# Patient Record
Sex: FEMALE | Race: BLACK OR AFRICAN AMERICAN | NOT HISPANIC OR LATINO
[De-identification: names, ages, dates, MRNs, and addresses within clinical notes are randomized per-mention and may not be internally consistent; named-entity substitution may affect disease eponyms.]

---
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
## 2020-01-02 ENCOUNTER — LABORATORY RESULT (OUTPATIENT)
Age: 20
End: 2020-01-02

## 2020-01-02 ENCOUNTER — APPOINTMENT (OUTPATIENT)
Dept: PEDIATRIC HEMATOLOGY/ONCOLOGY | Facility: CLINIC | Age: 20
End: 2020-01-02
Payer: COMMERCIAL

## 2020-01-02 ENCOUNTER — OUTPATIENT (OUTPATIENT)
Dept: OUTPATIENT SERVICES | Age: 20
LOS: 1 days | End: 2020-01-02

## 2020-01-02 DIAGNOSIS — Z98.890 OTHER SPECIFIED POSTPROCEDURAL STATES: Chronic | ICD-10-CM

## 2020-01-02 LAB
BASOPHILS # BLD AUTO: 0.09 K/UL — SIGNIFICANT CHANGE UP (ref 0–0.2)
BASOPHILS NFR BLD AUTO: 0.7 % — SIGNIFICANT CHANGE UP (ref 0–2)
BLD GP AB SCN SERPL QL: NEGATIVE — SIGNIFICANT CHANGE UP
EOSINOPHIL # BLD AUTO: 0.34 K/UL — SIGNIFICANT CHANGE UP (ref 0–0.5)
EOSINOPHIL NFR BLD AUTO: 2.6 % — SIGNIFICANT CHANGE UP (ref 0–6)
HCT VFR BLD CALC: 29.1 % — LOW (ref 34.5–45)
HGB BLD-MCNC: 9.6 G/DL — LOW (ref 11.5–15.5)
IMM GRANULOCYTES NFR BLD AUTO: 0.3 % — SIGNIFICANT CHANGE UP (ref 0–1.5)
LYMPHOCYTES # BLD AUTO: 27.1 % — SIGNIFICANT CHANGE UP (ref 13–44)
LYMPHOCYTES # BLD AUTO: 3.52 K/UL — HIGH (ref 1–3.3)
MCHC RBC-ENTMCNC: 29 PG — SIGNIFICANT CHANGE UP (ref 27–34)
MCHC RBC-ENTMCNC: 33 % — SIGNIFICANT CHANGE UP (ref 32–36)
MCV RBC AUTO: 87.9 FL — SIGNIFICANT CHANGE UP (ref 80–100)
MONOCYTES # BLD AUTO: 1.17 K/UL — HIGH (ref 0–0.9)
MONOCYTES NFR BLD AUTO: 9 % — SIGNIFICANT CHANGE UP (ref 2–14)
NEUTROPHILS # BLD AUTO: 7.84 K/UL — HIGH (ref 1.8–7.4)
NEUTROPHILS NFR BLD AUTO: 60.3 % — SIGNIFICANT CHANGE UP (ref 43–77)
NRBC # FLD: 0.05 K/UL — SIGNIFICANT CHANGE UP (ref 0–0)
PLATELET # BLD AUTO: 411 K/UL — HIGH (ref 150–400)
PMV BLD: 10.3 FL — SIGNIFICANT CHANGE UP (ref 7–13)
RBC # BLD: 3.31 M/UL — LOW (ref 3.8–5.2)
RBC # FLD: 17.6 % — HIGH (ref 10.3–14.5)
RETICS #: 244 K/UL — HIGH (ref 25–125)
RETICS/RBC NFR: 7.4 % — HIGH (ref 0.5–2.5)
RH IG SCN BLD-IMP: POSITIVE — SIGNIFICANT CHANGE UP
WBC # BLD: 13 K/UL — HIGH (ref 3.8–10.5)
WBC # FLD AUTO: 13 K/UL — HIGH (ref 3.8–10.5)

## 2020-01-02 PROCEDURE — ZZZZZ: CPT

## 2020-01-03 DIAGNOSIS — D57.1 SICKLE-CELL DISEASE WITHOUT CRISIS: ICD-10-CM

## 2020-01-03 LAB
HGB A MFR BLD: 66.7 % — LOW
HGB A2 MFR BLD: 2.8 % — SIGNIFICANT CHANGE UP (ref 2.4–3.5)
HGB F MFR BLD: < 1 % — SIGNIFICANT CHANGE UP (ref 0–1.5)
HGB S MFR BLD: 29.9 % — HIGH (ref 0–0)

## 2020-01-04 ENCOUNTER — LABORATORY RESULT (OUTPATIENT)
Age: 20
End: 2020-01-04

## 2020-01-04 ENCOUNTER — OUTPATIENT (OUTPATIENT)
Dept: OUTPATIENT SERVICES | Age: 20
LOS: 1 days | End: 2020-01-04

## 2020-01-04 ENCOUNTER — APPOINTMENT (OUTPATIENT)
Dept: PEDIATRIC HEMATOLOGY/ONCOLOGY | Facility: CLINIC | Age: 20
End: 2020-01-04
Payer: COMMERCIAL

## 2020-01-04 VITALS
SYSTOLIC BLOOD PRESSURE: 102 MMHG | TEMPERATURE: 98.42 F | DIASTOLIC BLOOD PRESSURE: 48 MMHG | RESPIRATION RATE: 22 BRPM | HEART RATE: 84 BPM

## 2020-01-04 DIAGNOSIS — Z98.890 OTHER SPECIFIED POSTPROCEDURAL STATES: Chronic | ICD-10-CM

## 2020-01-04 LAB
APPEARANCE UR: CLEAR — SIGNIFICANT CHANGE UP
BILIRUB UR-MCNC: NEGATIVE — SIGNIFICANT CHANGE UP
BLOOD UR QL VISUAL: NEGATIVE — SIGNIFICANT CHANGE UP
COLOR SPEC: SIGNIFICANT CHANGE UP
GLUCOSE UR-MCNC: NEGATIVE — SIGNIFICANT CHANGE UP
KETONES UR-MCNC: NEGATIVE — SIGNIFICANT CHANGE UP
LEUKOCYTE ESTERASE UR-ACNC: NEGATIVE — SIGNIFICANT CHANGE UP
NITRITE UR-MCNC: NEGATIVE — SIGNIFICANT CHANGE UP
PH UR: 7 — SIGNIFICANT CHANGE UP (ref 5–8)
PROT UR-MCNC: NEGATIVE — SIGNIFICANT CHANGE UP
SP GR SPEC: 1.01 — SIGNIFICANT CHANGE UP (ref 1–1.04)
UROBILINOGEN FLD QL: SIGNIFICANT CHANGE UP

## 2020-01-04 PROCEDURE — ZZZZZ: CPT

## 2020-01-04 PROCEDURE — 99214 OFFICE O/P EST MOD 30 MIN: CPT

## 2020-01-04 RX ORDER — SULFAMETHOXAZOLE AND TRIMETHOPRIM 800; 160 MG/1; MG/1
800-160 TABLET ORAL TWICE DAILY
Qty: 6 | Refills: 0 | Status: DISCONTINUED | COMMUNITY
Start: 2019-11-09 | End: 2020-01-04

## 2020-01-06 DIAGNOSIS — D57.1 SICKLE-CELL DISEASE WITHOUT CRISIS: ICD-10-CM

## 2020-01-09 NOTE — PHYSICAL EXAM
[Mediport] : Mediport [Normal] : affect appropriate [No focal deficits] : no focal deficits [100: Normal, no complaints, no evidence of disease.] : 100: Normal, no complaints, no evidence of disease.

## 2020-01-09 NOTE — REVIEW OF SYSTEMS
[Sore Throat] : sore throat [Braces] : braces [Dysuria] : no dysuria [Abdominal Pain] : no abdominal pain [Nausea] : no nausea [Headache] : no headache [Delvalle] : delvalle [Anxiety] : anxiety [Depressed] : depressed [Negative] : Allergic/Immunologic [FreeTextEntry3] : strabismus [de-identified] : see HPI [Immunizations are up to date by report] : Immunizations are up to date by report

## 2020-01-09 NOTE — HISTORY OF PRESENT ILLNESS
[de-identified] : Elizabeth is now an 19 year old young woman with sickle cell anemia who is on chronic transfusion therapy to prevent vaso-occlusive painful episodes and acute chest syndrome. She has had no recent VOC or ACS admits.  No recent fevers.  She has been following with Psych regularly secondary to diagnosis of Bipolar disorder and Depression.  She is taking both Abilify and Zoloft, and denies missing doses.  She is up to date within the last year having seen Ophthalmology, Pulmonology and Cardio.  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  Denies fever, URI symptoms, n/v/d. No headache. Feeling well today, no new complaints.\par \par Recent admission sent in from outpatient ophthalmologist with new left afferent pupillary defect in setting of 3 days of bilateral headache x3 days.  Evaluated by ophtho and neurosurg.  No acute ophtho intervention needed and to f/u as outpatient. \par \par MRI done. No shunt malfunction. No hydro.\par \par \par  [No Feeding Issues] : no feeding issues at this time

## 2020-01-21 ENCOUNTER — NON-APPOINTMENT (OUTPATIENT)
Age: 20
End: 2020-01-21

## 2020-01-21 ENCOUNTER — APPOINTMENT (OUTPATIENT)
Dept: OPHTHALMOLOGY | Facility: CLINIC | Age: 20
End: 2020-01-21
Payer: COMMERCIAL

## 2020-01-21 PROCEDURE — 92012 INTRM OPH EXAM EST PATIENT: CPT

## 2020-01-26 ENCOUNTER — INPATIENT (INPATIENT)
Age: 20
LOS: 2 days | Discharge: HOME | End: 2020-01-29
Attending: PEDIATRICS | Admitting: PEDIATRICS
Payer: COMMERCIAL

## 2020-01-26 VITALS
HEART RATE: 87 BPM | TEMPERATURE: 98 F | SYSTOLIC BLOOD PRESSURE: 99 MMHG | RESPIRATION RATE: 20 BRPM | OXYGEN SATURATION: 96 % | WEIGHT: 125 LBS | DIASTOLIC BLOOD PRESSURE: 61 MMHG

## 2020-01-26 DIAGNOSIS — D57.00 HB-SS DISEASE WITH CRISIS, UNSPECIFIED: ICD-10-CM

## 2020-01-26 DIAGNOSIS — Z98.890 OTHER SPECIFIED POSTPROCEDURAL STATES: Chronic | ICD-10-CM

## 2020-01-26 LAB
ALBUMIN SERPL ELPH-MCNC: 4.2 G/DL — SIGNIFICANT CHANGE UP (ref 3.3–5)
ALP SERPL-CCNC: 53 U/L — SIGNIFICANT CHANGE UP (ref 40–120)
ALT FLD-CCNC: 10 U/L — SIGNIFICANT CHANGE UP (ref 4–33)
ANION GAP SERPL CALC-SCNC: 14 MMO/L — SIGNIFICANT CHANGE UP (ref 7–14)
AST SERPL-CCNC: 32 U/L — SIGNIFICANT CHANGE UP (ref 4–32)
B PERT DNA SPEC QL NAA+PROBE: NOT DETECTED — SIGNIFICANT CHANGE UP
BASOPHILS # BLD AUTO: 0.08 K/UL — SIGNIFICANT CHANGE UP (ref 0–0.2)
BASOPHILS NFR BLD AUTO: 0.6 % — SIGNIFICANT CHANGE UP (ref 0–2)
BILIRUB SERPL-MCNC: 4.1 MG/DL — HIGH (ref 0.2–1.2)
BUN SERPL-MCNC: 7 MG/DL — SIGNIFICANT CHANGE UP (ref 7–23)
C PNEUM DNA SPEC QL NAA+PROBE: NOT DETECTED — SIGNIFICANT CHANGE UP
CALCIUM SERPL-MCNC: 8.9 MG/DL — SIGNIFICANT CHANGE UP (ref 8.4–10.5)
CHLORIDE SERPL-SCNC: 105 MMOL/L — SIGNIFICANT CHANGE UP (ref 98–107)
CO2 SERPL-SCNC: 19 MMOL/L — LOW (ref 22–31)
CREAT SERPL-MCNC: 0.59 MG/DL — SIGNIFICANT CHANGE UP (ref 0.5–1.3)
EOSINOPHIL # BLD AUTO: 0.29 K/UL — SIGNIFICANT CHANGE UP (ref 0–0.5)
EOSINOPHIL NFR BLD AUTO: 2.2 % — SIGNIFICANT CHANGE UP (ref 0–6)
FLUAV H1 2009 PAND RNA SPEC QL NAA+PROBE: NOT DETECTED — SIGNIFICANT CHANGE UP
FLUAV H1 RNA SPEC QL NAA+PROBE: NOT DETECTED — SIGNIFICANT CHANGE UP
FLUAV H3 RNA SPEC QL NAA+PROBE: NOT DETECTED — SIGNIFICANT CHANGE UP
FLUAV SUBTYP SPEC NAA+PROBE: NOT DETECTED — SIGNIFICANT CHANGE UP
FLUBV RNA SPEC QL NAA+PROBE: NOT DETECTED — SIGNIFICANT CHANGE UP
GLUCOSE SERPL-MCNC: 92 MG/DL — SIGNIFICANT CHANGE UP (ref 70–99)
HADV DNA SPEC QL NAA+PROBE: NOT DETECTED — SIGNIFICANT CHANGE UP
HCG SERPL-ACNC: < 5 MIU/ML — SIGNIFICANT CHANGE UP
HCOV PNL SPEC NAA+PROBE: SIGNIFICANT CHANGE UP
HCT VFR BLD CALC: 24.9 % — LOW (ref 34.5–45)
HGB BLD-MCNC: 8.4 G/DL — LOW (ref 11.5–15.5)
HIV COMBO RESULT: SIGNIFICANT CHANGE UP
HIV1+2 AB SPEC QL: SIGNIFICANT CHANGE UP
HMPV RNA SPEC QL NAA+PROBE: NOT DETECTED — SIGNIFICANT CHANGE UP
HPIV1 RNA SPEC QL NAA+PROBE: NOT DETECTED — SIGNIFICANT CHANGE UP
HPIV2 RNA SPEC QL NAA+PROBE: NOT DETECTED — SIGNIFICANT CHANGE UP
HPIV3 RNA SPEC QL NAA+PROBE: NOT DETECTED — SIGNIFICANT CHANGE UP
HPIV4 RNA SPEC QL NAA+PROBE: NOT DETECTED — SIGNIFICANT CHANGE UP
IMM GRANULOCYTES NFR BLD AUTO: 0.5 % — SIGNIFICANT CHANGE UP (ref 0–1.5)
LYMPHOCYTES # BLD AUTO: 1.29 K/UL — SIGNIFICANT CHANGE UP (ref 1–3.3)
LYMPHOCYTES # BLD AUTO: 9.8 % — LOW (ref 13–44)
MAGNESIUM SERPL-MCNC: 1.7 MG/DL — SIGNIFICANT CHANGE UP (ref 1.6–2.6)
MCHC RBC-ENTMCNC: 30.2 PG — SIGNIFICANT CHANGE UP (ref 27–34)
MCHC RBC-ENTMCNC: 33.7 % — SIGNIFICANT CHANGE UP (ref 32–36)
MCV RBC AUTO: 89.6 FL — SIGNIFICANT CHANGE UP (ref 80–100)
MONOCYTES # BLD AUTO: 1.15 K/UL — HIGH (ref 0–0.9)
MONOCYTES NFR BLD AUTO: 8.7 % — SIGNIFICANT CHANGE UP (ref 2–14)
NEUTROPHILS # BLD AUTO: 10.28 K/UL — HIGH (ref 1.8–7.4)
NEUTROPHILS NFR BLD AUTO: 78.2 % — HIGH (ref 43–77)
NRBC # FLD: 0.13 K/UL — SIGNIFICANT CHANGE UP (ref 0–0)
NRBC FLD-RTO: 1 — SIGNIFICANT CHANGE UP
PHOSPHATE SERPL-MCNC: 3.3 MG/DL — SIGNIFICANT CHANGE UP (ref 2.5–4.5)
PLATELET # BLD AUTO: 357 K/UL — SIGNIFICANT CHANGE UP (ref 150–400)
PMV BLD: 9.4 FL — SIGNIFICANT CHANGE UP (ref 7–13)
POTASSIUM SERPL-MCNC: 3.7 MMOL/L — SIGNIFICANT CHANGE UP (ref 3.5–5.3)
POTASSIUM SERPL-SCNC: 3.7 MMOL/L — SIGNIFICANT CHANGE UP (ref 3.5–5.3)
PROT SERPL-MCNC: 6.6 G/DL — SIGNIFICANT CHANGE UP (ref 6–8.3)
RBC # BLD: 2.78 M/UL — LOW (ref 3.8–5.2)
RBC # FLD: 16.7 % — HIGH (ref 10.3–14.5)
RETICS #: 288 K/UL — HIGH (ref 25–125)
RETICS/RBC NFR: 10.4 % — HIGH (ref 0.5–2.5)
RSV RNA SPEC QL NAA+PROBE: NOT DETECTED — SIGNIFICANT CHANGE UP
RV+EV RNA SPEC QL NAA+PROBE: NOT DETECTED — SIGNIFICANT CHANGE UP
SODIUM SERPL-SCNC: 138 MMOL/L — SIGNIFICANT CHANGE UP (ref 135–145)
WBC # BLD: 13.15 K/UL — HIGH (ref 3.8–10.5)
WBC # FLD AUTO: 13.15 K/UL — HIGH (ref 3.8–10.5)

## 2020-01-26 PROCEDURE — 71046 X-RAY EXAM CHEST 2 VIEWS: CPT | Mod: 26

## 2020-01-26 PROCEDURE — 70551 MRI BRAIN STEM W/O DYE: CPT | Mod: 26

## 2020-01-26 PROCEDURE — 99222 1ST HOSP IP/OBS MODERATE 55: CPT | Mod: GC

## 2020-01-26 RX ORDER — MORPHINE SULFATE 50 MG/1
4 CAPSULE, EXTENDED RELEASE ORAL ONCE
Refills: 0 | Status: DISCONTINUED | OUTPATIENT
Start: 2020-01-26 | End: 2020-01-26

## 2020-01-26 RX ORDER — HEPARIN SODIUM 5000 [USP'U]/ML
5 INJECTION INTRAVENOUS; SUBCUTANEOUS ONCE
Refills: 0 | Status: COMPLETED | OUTPATIENT
Start: 2020-01-26 | End: 2020-01-26

## 2020-01-26 RX ORDER — MORPHINE SULFATE 50 MG/1
7 CAPSULE, EXTENDED RELEASE ORAL
Refills: 0 | Status: DISCONTINUED | OUTPATIENT
Start: 2020-01-26 | End: 2020-01-26

## 2020-01-26 RX ORDER — SODIUM CHLORIDE 9 MG/ML
1000 INJECTION, SOLUTION INTRAVENOUS
Refills: 0 | Status: DISCONTINUED | OUTPATIENT
Start: 2020-01-26 | End: 2020-01-27

## 2020-01-26 RX ORDER — CEFTRIAXONE 500 MG/1
2000 INJECTION, POWDER, FOR SOLUTION INTRAMUSCULAR; INTRAVENOUS ONCE
Refills: 0 | Status: COMPLETED | OUTPATIENT
Start: 2020-01-26 | End: 2020-01-26

## 2020-01-26 RX ORDER — MORPHINE SULFATE 50 MG/1
6 CAPSULE, EXTENDED RELEASE ORAL
Refills: 0 | Status: DISCONTINUED | OUTPATIENT
Start: 2020-01-26 | End: 2020-01-28

## 2020-01-26 RX ORDER — KETOROLAC TROMETHAMINE 30 MG/ML
28 SYRINGE (ML) INJECTION EVERY 6 HOURS
Refills: 0 | Status: DISCONTINUED | OUTPATIENT
Start: 2020-01-26 | End: 2020-01-29

## 2020-01-26 RX ORDER — MORPHINE SULFATE 50 MG/1
6 CAPSULE, EXTENDED RELEASE ORAL
Refills: 0 | Status: DISCONTINUED | OUTPATIENT
Start: 2020-01-26 | End: 2020-01-26

## 2020-01-26 RX ADMIN — Medication 28 MILLIGRAM(S): at 19:31

## 2020-01-26 RX ADMIN — MORPHINE SULFATE 24 MILLIGRAM(S): 50 CAPSULE, EXTENDED RELEASE ORAL at 13:01

## 2020-01-26 RX ADMIN — MORPHINE SULFATE 36 MILLIGRAM(S): 50 CAPSULE, EXTENDED RELEASE ORAL at 19:31

## 2020-01-26 RX ADMIN — SODIUM CHLORIDE 95 MILLILITER(S): 9 INJECTION, SOLUTION INTRAVENOUS at 22:35

## 2020-01-26 RX ADMIN — MORPHINE SULFATE 4 MILLIGRAM(S): 50 CAPSULE, EXTENDED RELEASE ORAL at 17:00

## 2020-01-26 RX ADMIN — HEPARIN SODIUM 5 MILLILITER(S): 5000 INJECTION INTRAVENOUS; SUBCUTANEOUS at 18:08

## 2020-01-26 RX ADMIN — MORPHINE SULFATE 4 MILLIGRAM(S): 50 CAPSULE, EXTENDED RELEASE ORAL at 13:16

## 2020-01-26 RX ADMIN — MORPHINE SULFATE 24 MILLIGRAM(S): 50 CAPSULE, EXTENDED RELEASE ORAL at 16:30

## 2020-01-26 RX ADMIN — Medication 28 MILLIGRAM(S): at 20:00

## 2020-01-26 RX ADMIN — SODIUM CHLORIDE 95 MILLILITER(S): 9 INJECTION, SOLUTION INTRAVENOUS at 13:30

## 2020-01-26 RX ADMIN — MORPHINE SULFATE 6 MILLIGRAM(S): 50 CAPSULE, EXTENDED RELEASE ORAL at 20:00

## 2020-01-26 RX ADMIN — CEFTRIAXONE 100 MILLIGRAM(S): 500 INJECTION, POWDER, FOR SOLUTION INTRAMUSCULAR; INTRAVENOUS at 13:30

## 2020-01-26 NOTE — CONSULT NOTE PEDS - ASSESSMENT
20 year old sickle cell here with sickle cell crisis  Being worked up outpatient for strabismus and intermittent headache possibley related to  shunt partial malfunction      1. Currently asymtomatic- denies headache  2. Rapid MRI today to ensure ventriclar size is stable  3. If MRI stable then outpatient folllow up with Dr. Loza this week to discuss ICP monitoring   4/ Dr. Dr. Corey

## 2020-01-26 NOTE — ED PEDIATRIC NURSE NOTE - PSH
H/O surgical procedure  s/p Mediport placement 2011  No significant past surgical history    S/P Cholecystectomy  s/p open cholecystectomy 2012  S/P Tonsillectomy and Adenoide    S/P  Shunt  x6 revisions, last 2012 w/ Dr. Loza  Strabismus Repair  x4

## 2020-01-26 NOTE — CONSULT NOTE PEDS - SUBJECTIVE AND OBJECTIVE BOX
HPI: Pt is ex 24wk with sickle cell pw VOC, URI sx and fever.     	Pain started 7AM as soon as she woke up, pain all over which is her normal. Tramadol at 8 which helped a little but pain returned. Tylenol 10AM which didn't really help. Fever started about 1.5 hr prior to this conversation (on way to hospital). +cough +sore throat started today -no nasal congestion/rhinorrhea -peeing normally -no dysuria -no vomiting -no constipation -no rashes -no swelling - no SOB -a little chest pain -no belly pain -has one eye that drifts which is being worked up    	-no SI/HI, +vaping    	Port for chronic RBC transfusions q3 weeks, no history of reaction. 4 years.  Baseline Hgb about 9  	PMHx/SHx: Ex 22 weeks, Hydrocephalus (IVH grade 4), chronic lung disease, atrophy spleen, tonsils/adenoids/gallbladder removed, 6 hydrocephalus revisions (1 shunt is dormant, 1 is not working very well), 2 strokes, 4 eye surgeries, scar tissue in "stomach area"    	Meds: jadenu, blood transfusions, tramadol, ibuprofen, tylenol, flovent PRN, no folic acid/hydroxyurea, depo shot for birth control   	Allergies: none  	Vaccinated, yes flu shot  	PMD: Screnci  Heme: Aygun      Patient recently saw Dr. Oh for strabismus however family unhappy with visit. Per reports, no papilledema, L APD  Intermittent headaches, Dr. Loza thought strabisumus could be intermittent shunt malfunction.   Denies headaches currently     PAST MEDICAL & SURGICAL HISTORY:  No pertinent past medical history  CP (cerebral palsy): - mild  Impacted teeth  Anxiety  Depression  CVA (Cerebral Vascular Accident): Onset date unknown, noted on MRI from 5/2010  Strabismus  Chronic Lung Disease of Premat: follows with [facility]  Reactive Airway Disease: receives albuterol and xoponex treatments at home  S/P  Shunt: x2 with multiple shunt revisions  Hydrocephalus  Sickle Cell Disease  No significant past surgical history  H/O surgical procedure: s/p Mediport placement 2011  Strabismus Repair: x4  S/P Cholecystectomy: s/p open cholecystectomy 2012  S/P  Shunt: x6 revisions, last 2012 w/ Dr. Loza  S/P Tonsillectomy and Adenoide    FAMILY HISTORY:  No pertinent family history in first degree relatives    Home Medications:  Abilify 2 mg oral tablet: 1 tab(s) orally once a day (05 Mar 2018 11:52)  acetaminophen 325 mg oral tablet: 2 tab(s) orally every 6 hours, As needed, Temp greater or equal to 38 C (100.4 F), Mild Pain (1 - 3) (31 Dec 2019 11:41)  cholecalciferol 1000 intl units oral tablet: 1 tab(s) orally once a day (05 Mar 2018 11:52)  Depo-Provera 400 mg/mL intramuscular suspension:  (05 Mar 2018 11:52)  Flovent  mcg/inh inhalation aerosol: 2 puff(s) inhaled 2 times a day, As Needed (05 Mar 2018 11:52)  Jadenu 360 mg oral tablet: 4 tab(s) orally once a day (05 Mar 2018 11:52)  levalbuterol 0.63 mg/3 mL inhalation solution: 3 milliliter(s) inhaled 3 times a day, As Needed (05 Mar 2018 11:52)  Xopenex HFA 45 mcg/inh inhalation aerosol: 2 puff(s) inhaled every 4 hours, As Needed (05 Mar 2018 11:52)  Zoloft 100 mg oral tablet: 1 tab(s) orally once a day (05 Mar 2018 11:52)      MEDICATIONS  (STANDING):  dextrose 5% + sodium chloride 0.45%. - Pediatric 1000 milliLiter(s) (95 mL/Hr) IV Continuous <Continuous>  morphine  IV Intermittent - Peds 4 milliGRAM(s) IV Intermittent Once    MEDICATIONS  (PRN):    Vital Signs Last 24 Hrs  T(C): 36.6 (26 Jan 2020 16:07), Max: 37 (26 Jan 2020 14:24)  T(F): 97.8 (26 Jan 2020 16:07), Max: 98.6 (26 Jan 2020 14:24)  HR: 96 (26 Jan 2020 16:07) (79 - 96)  BP: 106/56 (26 Jan 2020 16:07) (99/61 - 106/56)  BP(mean): --  RR: 20 (26 Jan 2020 16:07) (20 - 20)  SpO2: 100% (26 Jan 2020 16:07) (96% - 100%)    PHYSICAL EXAM:  Awake Alert Oriented x 3 No distress, Speech is clear  PERRL, APD, R strabismus  Motor:             RUE 5/5        LUE 5/5             RLE 5/5         LLE 5/5  Shunt valve felt- not depressed (did not pump as patient has slit ventrices)    LABS:                            8.4    13.15 )-----------( 357      ( 26 Jan 2020 12:52 )             24.9     01-26    138  |  105  |  7   ----------------------------<  92  3.7   |  19<L>  |  0.59    Ca    8.9      26 Jan 2020 12:52  Phos  3.3     01-26  Mg     1.7     01-26    TPro  6.6  /  Alb  4.2  /  TBili  4.1<H>  /  DBili  x   /  AST  32  /  ALT  10  /  AlkPhos  53  01-26      RADIOLOGY:

## 2020-01-26 NOTE — ED PROVIDER NOTE - OBJECTIVE STATEMENT
Pt is     Pain started 7AM as soon as she woke up, pain all over which is her normal. Tramadol at 8 which helped a little but pain returned. Tylenol 10AM which didn't really help. Fever started about 1.5 hr prior to this conversation (on way to hospital). +cough +sore throat started today -no nasal congestion/rhinorrhea -peeing normally -no dysuria -no vomiting -no constipation -no rashes -no swelling - no SOB -a little chest pain -no belly pain -has one eye that drifts which is being worked up    Port for chronic RBC transfusions q3 weeks, no history of reaction. 4 years.  Baseline Hgb about 9  PMHx/SHx: Ex 22 weeks, Hydrocephalus (IVH grade 4), chronic lung disease, atrophy spleen, tonsils/adenoids/gallbladder removed, 6 hydrocephalus revisions (1 shunt is dormant, 1 is not working very well), 2 strokes, 4 eye surgeries, scar tissue in "stomach area"    Meds: jadenu, blood transfusions, tramadol, ibuprofen, tylenol, flovent PRN, no folic acid/hydroxyurea, depo shot for birth control   Allergies: none  Vaccinated, yes flu shot  PMD: Screnci  Heme: Aygun Pt is ex 22wk with sickle cell pw VOC, URI sx and fever.     Pain started 7AM as soon as she woke up, pain all over which is her normal. Tramadol at 8 which helped a little but pain returned. Tylenol 10AM which didn't really help. Fever started about 1.5 hr prior to this conversation (on way to hospital). +cough +sore throat started today -no nasal congestion/rhinorrhea -peeing normally -no dysuria -no vomiting -no constipation -no rashes -no swelling - no SOB -a little chest pain -no belly pain -has one eye that drifts which is being worked up    -no SI/HI, +vaping    Port for chronic RBC transfusions q3 weeks, no history of reaction. 4 years.  Baseline Hgb about 9  PMHx/SHx: Ex 22 weeks, Hydrocephalus (IVH grade 4), chronic lung disease, atrophy spleen, tonsils/adenoids/gallbladder removed, 6 hydrocephalus revisions (1 shunt is dormant, 1 is not working very well), 2 strokes, 4 eye surgeries, scar tissue in "stomach area"    Meds: jadenu, blood transfusions, tramadol, ibuprofen, tylenol, flovent PRN, no folic acid/hydroxyurea, depo shot for birth control   Allergies: none  Vaccinated, yes flu shot  PMD: Screnci  Heme: Aly

## 2020-01-26 NOTE — ED PROVIDER NOTE - MDM ORDERS SUBMITTED SELECTION
Trach changed to cuffed trach by RT. Discarded trach covered in copious amount of dried secretions. Medications/Imaging Studies/Labs

## 2020-01-26 NOTE — H&P PEDIATRIC - NSHPLABSRESULTS_GEN_ALL_CORE
*Note:  {HIV Screen provided in [facility] - prior to Med 4 arrival}.     Lab Results:  CBC  CBC Full  -  ( 26 Jan 2020 12:52 )  WBC Count : 13.15 K/uL  RBC Count : 2.78 M/uL  Hemoglobin : 8.4 g/dL  Hematocrit : 24.9 %  Platelet Count - Automated : 357 K/uL  Mean Cell Volume : 89.6 fL  Mean Cell Hemoglobin : 30.2 pg  Mean Cell Hemoglobin Concentration : 33.7 %  Auto Neutrophil # : 10.28 K/uL  Auto Lymphocyte # : 1.29 K/uL  Auto Monocyte # : 1.15 K/uL  Auto Eosinophil # : 0.29 K/uL  Auto Basophil # : 0.08 K/uL  Auto Neutrophil % : 78.2 %  Auto Lymphocyte % : 9.8 %  Auto Monocyte % : 8.7 %  Auto Eosinophil % : 2.2 %  Auto Basophil % : 0.6 %    .		Differential:	[] Automated		[] Manual  Chemistry  01-26    138  |  105  |  7   ----------------------------<  92  3.7   |  19<L>  |  0.59    Ca    8.9      26 Jan 2020 12:52  Phos  3.3     01-26  Mg     1.7     01-26    TPro  6.6  /  Alb  4.2  /  TBili  4.1<H>  /  DBili  x   /  AST  32  /  ALT  10  /  AlkPhos  53  01-26    LIVER FUNCTIONS - ( 26 Jan 2020 12:52 )  Alb: 4.2 g/dL / Pro: 6.6 g/dL / ALK PHOS: 53 u/L / ALT: 10 u/L / AST: 32 u/L / GGT: x                 MICROBIOLOGY/CULTURES:    RADIOLOGY RESULTS:  **Rapid MRI performed in ED (as per Neurosurgery recommendations) - to ensure ventricular size is stable.  *Preliminary reported as no acute findings" - Final report pending.

## 2020-01-26 NOTE — ED PEDIATRIC NURSE NOTE - CHIEF COMPLAINT QUOTE
Pt with h/o HgB SS with generalized VOC,  [facility] x 2 . [facility].  Last took Tramadol at 0800.  AT 1015, took Tylenol for temp 101.1.  Denies sick contacts.

## 2020-01-26 NOTE — H&P PEDIATRIC - NSHPPHYSICALEXAM_GEN_ALL_CORE
Vital Signs Last 24 Hrs  T(C): 37 (26 Jan 2020 22:55), Max: 37.1 (26 Jan 2020 20:30)  T(F): 98.6 (26 Jan 2020 22:55), Max: 98.7 (26 Jan 2020 20:30)  HR: 80 (26 Jan 2020 22:55) (72 - 96)  BP: 104/58 (26 Jan 2020 22:55) (92/40 - 106/56)  BP(mean): --  RR: 20 (26 Jan 2020 22:55) (18 - 20)  SpO2: 98% (26 Jan 2020 22:55) (96% - 100%)    PHYSICAL EXAM:   • Physical Examination: Alert, oriented. Baseline neuro exam. Clear lungs, normal cardiac exam.  • CONSTITUTIONAL: Well appearing, awake, alert, oriented to person, place, time/situation and in no apparent distress.  • ENMT: Airway patent, Nasal mucosa clear. Mouth with normal mucosa. Throat has no vesicles, no oropharyngeal exudates and uvula is midline.  • EYES: - - -  • EYES: abnormal bilateral  • Bilateral Eyes: pupils equal, round, and reactive to light, -scleral icterus -intermittent strabismus  • CARDIAC: - - -  • CARDIAC RHYTHM: regular  • CARDIAC RATE: normal  • CARDIAC SOUNDS: S1-S2, MURMUR  • RESPIRATORY: Breath sounds clear and equal bilaterally.  • GASTROINTESTINAL: - - -  • Abdominal Exam: soft, nondistended, scarring  • Bowel Sounds: +bowel sounds  • MUSCULOSKELETAL: - - -  • MUSC EXAM: Weakness on left hip extension  • NEUROLOGICAL: - - -  • NEURO LOC: alert  follows commands  intermittent strabismus  • SKIN: Skin normal color for race, warm, dry and intact. No evidence of rash.  • PSYCHIATRIC: Alert and interactive. normal mood and affect. no apparent risk to self or others. Vital Signs Last 24 Hrs  T(C): 37 (26 Jan 2020 22:55), Max: 37.1 (26 Jan 2020 20:30)  T(F): 98.6 (26 Jan 2020 22:55), Max: 98.7 (26 Jan 2020 20:30)  HR: 80 (26 Jan 2020 22:55) (72 - 96)  BP: 104/58 (26 Jan 2020 22:55) (92/40 - 106/56)  BP(mean): --  RR: 20 (26 Jan 2020 22:55) (18 - 20)  SpO2: 98% (26 Jan 2020 22:55) (96% - 100%)    PHYSICAL EXAM:   • Physical Examination: Alert, oriented. Baseline neuro exam. Clear lungs, normal cardiac exam.  • CONSTITUTIONAL: Well appearing, awake, alert, oriented to person, place, time/situation and in no apparent distress.  • ENMT: Airway patent, Nasal mucosa clear. Mouth with normal mucosa. Throat has no vesicles, no oropharyngeal exudates and uvula is midline.  • EYES: - - -  • EYES: right eye outward gaze noted   • Bilateral Eyes: pupils equal, round, and reactive to light, -scleral icterus -intermittent strabismus  • CARDIAC: - - -  • CARDIAC RHYTHM: regular  • CARDIAC RATE: normal  • CARDIAC SOUNDS: S1-S2, MURMUR  • RESPIRATORY: Breath sounds clear and equal bilaterally.  • GASTROINTESTINAL: - - -  • Abdominal Exam: soft, nondistended, scarring  • Bowel Sounds: +bowel sounds  • MUSCULOSKELETAL: - - -  • MUSC EXAM: Weakness on left hip extension  • NEUROLOGICAL: - - -  • NEURO LOC: alert  follows commands  intermittent strabismus  • SKIN: Skin normal color for race, warm, dry and intact. No evidence of rash.  • PSYCHIATRIC: Alert and interactive. normal mood and affect. no apparent risk to self or others. states continues currently meds

## 2020-01-26 NOTE — CONSULT NOTE PEDS - ATTENDING COMMENTS
mri stable, no pap report per mom at dr. lees, no sign of shunt malfunction, f/u dr. palmer for possible icp monitor

## 2020-01-26 NOTE — ED PROVIDER NOTE - NS ED ROS FT
+cough +sore throat started today -no nasal congestion/rhinorrhea -peeing normally -no dysuria -no vomiting -no constipation -no rashes -no swelling - no SOB -a little chest pain -no belly pain -has one eye that drifts which is being worked up +fever +diffuse pain

## 2020-01-26 NOTE — ED PEDIATRIC NURSE REASSESSMENT NOTE - NS ED NURSE REASSESS COMMENT FT2
Patient is awake, alert and appropriate. Breathing is even and unlabored. Skin is warm, dry and pink. Pt c/o 6/10 pain at this time. Pt comfortable with a 4/10 pain. Provided with snack as requested. Parent updated with plan of care and verbalized understanding. Safety Maintained. Will continue to monitor and reassess.
Patient a&ox4 no acute distress noted. Reports comfort from pain s/p morphine as ordered. Awaiting MRI. Patient and mother at bedside updated with POC.
Patient a&ox4, appears comfortable, seen scrolling through cell phone. Awaiting lab results. Patient and caregiver updated with POC.

## 2020-01-26 NOTE — H&P PEDIATRIC - NSICDXPASTMEDICALHX_GEN_ALL_CORE_FT
PAST MEDICAL HISTORY:  Anxiety     Chronic Lung Disease of Premat follows with [facility]    CP (cerebral palsy) - mild    CVA (Cerebral Vascular Accident) Onset date unknown, noted on MRI from 5/2010    Depression     Hydrocephalus     Impacted teeth     No pertinent past medical history     Reactive Airway Disease receives albuterol and xoponex treatments at home    S/P  Shunt x2 with multiple shunt revisions    Sickle Cell Disease     Strabismus

## 2020-01-26 NOTE — H&P PEDIATRIC - ASSESSMENT
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 Iron overload and currently on Jadenu.   Currently on Depo-provera every 3 months.  Depression: Elizabeth is on Zoloft and Abilify and is being followed by psychiatrist.   Headaches followed by Neurosurgery-- shunt is patent.  Admit for VOC (generalized/ "all over") with Fever and URI symptoms including cough and pharyngitis (URI negative).         Per Neurosurgery Assessment (below):  20 year old sickle cell here with sickle cell crisis  Being worked up outpatient for strabismus and intermittent headache possibley related to  shunt partial malfunction  1. Currently asymtomatic- denies headache  2. Rapid MRI today to ensure ventriclar size is stable  3. If MRI stable then outpatient folllow up with Dr. Loza this week to discuss ICP monitoring   4/ Dr. Dr. Corey Elizabeth is a 21 yo female (ex- 22wk) with HgbSS pw VOC, URI sx and fever. Currently being admitted to [facility] for further pain control and medical management.   Currently on Depo-provera every 3 months.  Depression: Elizabeth is on Zoloft and Abilify and is being followed by psychiatrist.   Headaches followed by Neurosurgery-- shunt is patent.  Admit for VOC (generalized/ "all over") with Fever and URI symptoms including cough and pharyngitis (URI negative).   continuing f/u with neurosurgery regarding final MRI status and possible ICP monitoring.     Plan:  - Continue IV Ceftriaxone QD  - f/u blood c/s. Repeat c/s prn   - Tylenol prn fever   - RVP negative. f/u URI symptoms for symptomatic treatment   - Close monitoring respiratory status   - Final radiology finding including MRI; CXR readings  - Continue Morphine 6mg IV Q3; Toradol ATC [id number].   - Start bowel regimen and ulcer ppx meds   - Home meds as ordered   - Hem team to discuss next transfusion and next required labs   - Continue sub-specialty f/u when indicated.   - Normal diet  - Continue IVF @1x Maint. --- hematology team to further discuss.   - Confirm last Depo injection

## 2020-01-26 NOTE — ED PROVIDER NOTE - PROGRESS NOTE DETAILS
Notified neurosurgery. -Meenakshi BROCK PGY3 Seen by neurosurgery, ok with rapid MRI - low suspicion for infection since last revision was 2013. To f/u with Dr. Loza on Friday. -Meenakshi BROCK PGY3 Pt now with increased pain 5/10. Will give 4 mg morphine. -Meenakshi BROCK PGY3 Discussed with h/o. Recommend morphin 6mg q3h, start toradol q6h, D5-1/2NS, cftx. Will admit to Dr. Castillo. Katia BROKC PGY3 Pain 7/10 - discussed with heme. Not due for pain meds for 1 hour after 7/10 pain reported. Patient does not currently want pain meds but have concern he wont make three hour lanie. Discussed with heme and recommend increasing dose to morphine 7mg q3h. -Meenakshi BROCK PGY3

## 2020-01-26 NOTE — H&P PEDIATRIC - NSICDXPASTSURGICALHX_GEN_ALL_CORE_FT
PAST SURGICAL HISTORY:  H/O surgical procedure s/p Mediport placement 2011    No significant past surgical history     S/P Cholecystectomy s/p open cholecystectomy 2012    S/P Tonsillectomy and Adenoide     S/P  Shunt x6 revisions, last 2012 w/ Dr. Loza    Strabismus Repair x4

## 2020-01-26 NOTE — ED PROVIDER NOTE - CLINICAL SUMMARY MEDICAL DECISION MAKING FREE TEXT BOX
PGY3 MDM: 19 yo with sickle cell and  shunt and fever. Likely URI in origin. Ordered labs, blood culture for fever, gave morphine for VOC. Will call neurosurgery re:  shunt/fever. -Meenakshi BROCK PGY3

## 2020-01-26 NOTE — H&P PEDIATRIC - HISTORY OF PRESENT ILLNESS
Pt is ex 22wk with sickle cell pw VOC, URI sx and fever.     	Pain started 7AM as soon as she woke up, pain all over which is her normal. Tramadol at 8 which helped a little but pain returned. Tylenol 10AM which didn't really help. Fever started about 1.5 hr prior to this conversation (on way to hospital). +cough +sore throat started today -no nasal congestion/rhinorrhea -peeing normally -no dysuria -no vomiting -no constipation -no rashes -no swelling - no SOB -a little chest pain -no belly pain -has one eye that drifts which is being worked up    	-no SI/HI, +vaping    	Port for chronic RBC transfusions q3 weeks, no history of reaction. 4 years.  Baseline Hgb about 9  	PMHx/SHx: Ex 22 weeks, Hydrocephalus (IVH grade 4), chronic lung disease, atrophy spleen, tonsils/adenoids/gallbladder removed, 6 hydrocephalus revisions (1 shunt is dormant, 1 is not working very well), 2 strokes, 4 eye surgeries, scar tissue in "stomach area"    	Meds: jadenu, blood transfusions, tramadol, ibuprofen, tylenol, flovent PRN, no folic acid/hydroxyurea, depo shot for birth control   	Allergies: none  	Vaccinated, yes flu shot  	PMD: Screnci  Heme: Aygun    Pain started 7AM as soon as she woke up, pain all over which is her normal. Tramadol at 8 which helped a little but pain returned. Tylenol 10AM which didn't really help. Fever started about 1.5 hr prior to this conversation (on way to hospital). +cough +sore throat started today -no nasal congestion/rhinorrhea -peeing normally -no dysuria -no vomiting -no constipation -no rashes -no swelling - no SOB -a little chest pain -no belly pain -has one eye that drifts which is being worked up    Past History:  History of Present Illness    Elizabeth is now an 19 year old young woman with sickle cell anemia who is on chronic transfusion therapy to prevent vaso-occlusive painful episodes and acute chest syndrome. She has had no recent VOC or ACS admits. No recent fevers. She has been following with Psych regularly secondary to diagnosis of Bipolar disorder and Depression. She is taking both Abilify and Zoloft, and denies missing doses. She is up to date within the last year having seen Ophthalmology, Pulmonology and Cardio. Her last MRI abd/w/LIC was 8/7/18, with LIC of 2.5g.          Interval History: 18yo with HgbSS on chronic transfusion therapy every 3-4 weeks secondary to ACS and VOC. Denies fever, URI symptoms, n/v/d. No headache. Feeling well today, no new complaints.    Recent admission sent in from outpatient ophthalmologist with new left afferent pupillary defect in setting of 3 days of bilateral headache x3 days. Evaluated by ophtho and neurosurg. No acute ophtho intervention needed and to f/u as outpatient.     MRI done. No shunt malfunction. No hydro.             Diet: no feeding issues at this time. Elizabeth is a 19 yo female (ex- 22wk) with HgbSS pw VOC, URI sx and fever.   Interval Hx: Her pain started Sunday @7AM as soon as she woke up, with pain "all over her body," for which she states is her typical VOC pain.  She took Tramadol at 8am which had  minimal to some improvement in pain. Then @10AM she took Tylenol for the pain which had no effect on the pain.  She then made her way to [facility] ED. She also reports a fever (max  temp?) at home prior to arriving to the ED.  She admits to a new onset somewhat productive cough, +sore throat which started Sunday, yet denies any nasal congestion. She denies any  chest pain or SOB.  She has had normal urination, and denies any abdominal symptoms including: dysuria, or vomiting.  She has occasional constipation in the past.  She denies any new  rashes, or joint swelling, or skin changes.  Neurology/&, or Neurosurgery is following her as outpatient for her drifting right eye/strabismus.      Past Hematological/Medical History: She is on chronic transfusion protocol every 3-4 weeks, secondary to ACS/VOC.  She has iron overload due to chronic PRBC transfusions currently  on oral chelating therapy with Jadenu. She currently has a central line (mediport) for chronic PRBC transfusions (3-4 weeks).  She states no previous PRBC transfusion reaction in the past  4+ years. Her baseline Hgb is ~9g/dL.   Her PMH also includes: Hydrocephalus (IVH grade 4), 6 hydrocephalus revisions, 1 shunt is dormant, 1 is not working very well), 2 strokes, 4 ocular surgeries, chronic lung disease,  atrophic spleen, and past removal of: tonsils/adenoids/gallbladder.  She also receives Depoprovera every 3 months for contraception.    She has been following with Psych regularly secondary to diagnosis of Bipolar disorder and Depression. She is taking both Abilify and Zoloft.  She follows outpatient with Ophthalmology, Pulmonology and Cardiology. As well as dedicated imaging as per her primary outpatient hematology team.    Diet: no feeding issues at this time.     ED Course: Mediport accessed - Blood c/s taken, labs drawn, HIV screen performed.  Hgb 8.4%; Retic = 10.4%.  CMP was overall stable. S. Preg screen negative. Last transfusion in PACT to be confirmed. For her pain she received Morphine 4mg IV, and was continued on Morphine 6mg IV [id number], as well started on Toradol IV [id number]. Her pain was reduced to 5-6/10 upon arrival to [facility].  A CXR was performed - preliminary result without acute findings (final pending), RVP negative. Ceftriaxine was ordered. She also had a neurosurgery consult in the ED where a Rapid MRI was done to ensure if the ventriclar size is stable. The preliminary result was negative for acute findings (compared to previous MRI), final report pending.  And IVF were initiated.     She was admitted to [facility] for further pain control and medical management. Elizabeth is a 21 yo female (ex- 22wk) with HgbSS pw VOC, URI sx and fever.   Interval Hx: Her pain started Sunday @7AM as soon as she woke up, with pain "all over her body," for which she states is her typical VOC pain.  She took Tramadol at 8am which had  minimal to some improvement in pain. Then @10AM she took Tylenol for the pain which had no effect on the pain.  She then made her way to [facility] ED. She also reports a fever (max  temp?) at home prior to arriving to the ED.  She admits to a new onset somewhat productive cough, +sore throat which started Sunday, yet denies any nasal congestion. She denies any  chest pain or SOB.  She has had normal urination, and denies any abdominal symptoms including: dysuria, or vomiting.  She has occasional constipation in the past.  She denies any new  rashes, or joint swelling, or skin changes.  Neurology/&, or Neurosurgery is following her as outpatient for her drifting right eye/strabismus.      Past Hematological/Medical History: She is on chronic transfusion protocol every 3-4 weeks, secondary to ACS/VOC.  She has iron overload due to chronic PRBC transfusions currently  on oral chelating therapy with Jadenu. She currently has a central line (mediport) for chronic PRBC transfusions (3-4 weeks).  She states no previous PRBC transfusion reaction in the past  4+ years. Her baseline Hgb is ~9g/dL.   Her PMH also includes: Hydrocephalus (IVH grade 4), 6 hydrocephalus revisions, 1 shunt is dormant, 1 is not working very well), 2 strokes, 4 ocular surgeries, chronic lung disease,  atrophic spleen, and past removal of: tonsils/adenoids/gallbladder.  She also receives Depoprovera every 3 months for contraception.    She has been following with Psych regularly secondary to diagnosis of Bipolar disorder and Depression. She is taking both Abilify and Zoloft.  She follows outpatient with Ophthalmology, Pulmonology and Cardiology. As well as dedicated imaging as per her primary outpatient hematology team.    Diet: no feeding issues at this time.     ED Course: Mediport accessed - Blood c/s taken, labs drawn, HIV screen performed.  Hgb 8.4g/dL; Retic = 10.4%.  CMP was overall stable. S. Preg screen negative. Last transfusion in PACT to be confirmed. For her pain she received Morphine 4mg IV, and was continued on Morphine 6mg IV [id number], as well started on Toradol IV [id number]. Her pain was reduced to 5-6/10 upon arrival to [facility].  A CXR was performed - preliminary result without acute findings (final pending), RVP negative. Ceftriaxine was ordered. She also had a neurosurgery consult in the ED where a Rapid MRI was done to ensure if the ventriclar size is stable. The preliminary result was negative for acute findings (compared to previous MRI), final report pending.  And IVF were initiated.     She was admitted to [facility] for further pain control and medical management.

## 2020-01-26 NOTE — ED PEDIATRIC NURSE NOTE - PMH
Anxiety    Chronic Lung Disease of Premat  follows with [facility] Pulmonology  CP (cerebral palsy)  - mild  CVA (Cerebral Vascular Accident)  Onset date unknown, noted on MRI from 5/2010  Depression    Hydrocephalus    Impacted teeth    No pertinent past medical history    Reactive Airway Disease  receives albuterol and xoponex treatments at home  S/P  Shunt  x2 with multiple shunt revisions  Sickle Cell Disease    Strabismus

## 2020-01-27 ENCOUNTER — TRANSCRIPTION ENCOUNTER (OUTPATIENT)
Age: 20
End: 2020-01-27

## 2020-01-27 DIAGNOSIS — E83.111 HEMOCHROMATOSIS DUE TO REPEATED RED BLOOD CELL TRANSFUSIONS: ICD-10-CM

## 2020-01-27 DIAGNOSIS — D57.00 HB-SS DISEASE WITH CRISIS, UNSPECIFIED: ICD-10-CM

## 2020-01-27 DIAGNOSIS — G91.9 HYDROCEPHALUS, UNSPECIFIED: ICD-10-CM

## 2020-01-27 DIAGNOSIS — F32.9 MAJOR DEPRESSIVE DISORDER, SINGLE EPISODE, UNSPECIFIED: ICD-10-CM

## 2020-01-27 DIAGNOSIS — J45.909 UNSPECIFIED ASTHMA, UNCOMPLICATED: ICD-10-CM

## 2020-01-27 DIAGNOSIS — I63.9 CEREBRAL INFARCTION, UNSPECIFIED: ICD-10-CM

## 2020-01-27 DIAGNOSIS — Z92.89 PERSONAL HISTORY OF OTHER MEDICAL TREATMENT: ICD-10-CM

## 2020-01-27 DIAGNOSIS — R63.8 OTHER SYMPTOMS AND SIGNS CONCERNING FOOD AND FLUID INTAKE: ICD-10-CM

## 2020-01-27 LAB
BASOPHILS # BLD AUTO: 0.05 K/UL — SIGNIFICANT CHANGE UP (ref 0–0.2)
BASOPHILS NFR BLD AUTO: 0.5 % — SIGNIFICANT CHANGE UP (ref 0–2)
BLD GP AB SCN SERPL QL: NEGATIVE — SIGNIFICANT CHANGE UP
EOSINOPHIL # BLD AUTO: 0.39 K/UL — SIGNIFICANT CHANGE UP (ref 0–0.5)
EOSINOPHIL NFR BLD AUTO: 3.9 % — SIGNIFICANT CHANGE UP (ref 0–6)
HCT VFR BLD CALC: 23.6 % — LOW (ref 34.5–45)
HGB A MFR BLD: 71.5 % — LOW
HGB A2 MFR BLD: 2.8 % — SIGNIFICANT CHANGE UP (ref 2.4–3.5)
HGB BLD-MCNC: 7.8 G/DL — LOW (ref 11.5–15.5)
HGB F MFR BLD: < 1 % — SIGNIFICANT CHANGE UP (ref 0–1.5)
HGB S MFR BLD: 25.1 % — HIGH (ref 0–0)
IMM GRANULOCYTES NFR BLD AUTO: 0.2 % — SIGNIFICANT CHANGE UP (ref 0–1.5)
LYMPHOCYTES # BLD AUTO: 3.96 K/UL — HIGH (ref 1–3.3)
LYMPHOCYTES # BLD AUTO: 39.7 % — SIGNIFICANT CHANGE UP (ref 13–44)
MCHC RBC-ENTMCNC: 30.2 PG — SIGNIFICANT CHANGE UP (ref 27–34)
MCHC RBC-ENTMCNC: 33.1 % — SIGNIFICANT CHANGE UP (ref 32–36)
MCV RBC AUTO: 91.5 FL — SIGNIFICANT CHANGE UP (ref 80–100)
MONOCYTES # BLD AUTO: 1.08 K/UL — HIGH (ref 0–0.9)
MONOCYTES NFR BLD AUTO: 10.8 % — SIGNIFICANT CHANGE UP (ref 2–14)
NEUTROPHILS # BLD AUTO: 4.47 K/UL — SIGNIFICANT CHANGE UP (ref 1.8–7.4)
NEUTROPHILS NFR BLD AUTO: 44.9 % — SIGNIFICANT CHANGE UP (ref 43–77)
NRBC # FLD: 0.07 K/UL — SIGNIFICANT CHANGE UP (ref 0–0)
PLATELET # BLD AUTO: 350 K/UL — SIGNIFICANT CHANGE UP (ref 150–400)
PMV BLD: 9.7 FL — SIGNIFICANT CHANGE UP (ref 7–13)
RBC # BLD: 2.58 M/UL — LOW (ref 3.8–5.2)
RBC # FLD: 17 % — HIGH (ref 10.3–14.5)
RETICS #: 212 K/UL — HIGH (ref 25–125)
RETICS/RBC NFR: 8.2 % — HIGH (ref 0.5–2.5)
RH IG SCN BLD-IMP: POSITIVE — SIGNIFICANT CHANGE UP
SPECIMEN SOURCE: SIGNIFICANT CHANGE UP
WBC # BLD: 9.97 K/UL — SIGNIFICANT CHANGE UP (ref 3.8–10.5)
WBC # FLD AUTO: 9.97 K/UL — SIGNIFICANT CHANGE UP (ref 3.8–10.5)

## 2020-01-27 PROCEDURE — 99232 SBSQ HOSP IP/OBS MODERATE 35: CPT | Mod: GC

## 2020-01-27 RX ORDER — ARIPIPRAZOLE 15 MG/1
2 TABLET ORAL DAILY
Refills: 0 | Status: DISCONTINUED | OUTPATIENT
Start: 2020-01-27 | End: 2020-01-29

## 2020-01-27 RX ORDER — CHOLECALCIFEROL (VITAMIN D3) 125 MCG
1000 CAPSULE ORAL DAILY
Refills: 0 | Status: DISCONTINUED | OUTPATIENT
Start: 2020-01-27 | End: 2020-01-29

## 2020-01-27 RX ORDER — FAMOTIDINE 10 MG/ML
20 INJECTION INTRAVENOUS EVERY 12 HOURS
Refills: 0 | Status: DISCONTINUED | OUTPATIENT
Start: 2020-01-27 | End: 2020-01-27

## 2020-01-27 RX ORDER — DEXTROSE MONOHYDRATE, SODIUM CHLORIDE, AND POTASSIUM CHLORIDE 50; .745; 4.5 G/1000ML; G/1000ML; G/1000ML
1000 INJECTION, SOLUTION INTRAVENOUS
Refills: 0 | Status: DISCONTINUED | OUTPATIENT
Start: 2020-01-27 | End: 2020-01-29

## 2020-01-27 RX ORDER — BENZOCAINE AND MENTHOL 5; 1 G/100ML; G/100ML
1 LIQUID ORAL
Refills: 0 | Status: DISCONTINUED | OUTPATIENT
Start: 2020-01-27 | End: 2020-01-29

## 2020-01-27 RX ORDER — DEFERASIROX 180 MG/1
2000 GRANULE ORAL DAILY
Refills: 0 | Status: DISCONTINUED | OUTPATIENT
Start: 2020-01-27 | End: 2020-01-27

## 2020-01-27 RX ORDER — FLUTICASONE PROPIONATE 50 MCG
1 SPRAY, SUSPENSION NASAL DAILY
Refills: 0 | Status: DISCONTINUED | OUTPATIENT
Start: 2020-01-27 | End: 2020-01-29

## 2020-01-27 RX ORDER — POLYETHYLENE GLYCOL 3350 17 G/17G
17 POWDER, FOR SOLUTION ORAL DAILY
Refills: 0 | Status: DISCONTINUED | OUTPATIENT
Start: 2020-01-27 | End: 2020-01-28

## 2020-01-27 RX ORDER — MORPHINE SULFATE 50 MG/1
3 CAPSULE, EXTENDED RELEASE ORAL ONCE
Refills: 0 | Status: DISCONTINUED | OUTPATIENT
Start: 2020-01-27 | End: 2020-01-27

## 2020-01-27 RX ORDER — SERTRALINE 25 MG/1
50 TABLET, FILM COATED ORAL DAILY
Refills: 0 | Status: DISCONTINUED | OUTPATIENT
Start: 2020-01-27 | End: 2020-01-29

## 2020-01-27 RX ORDER — LACTULOSE 10 G/15ML
30 SOLUTION ORAL
Refills: 0 | Status: DISCONTINUED | OUTPATIENT
Start: 2020-01-27 | End: 2020-01-28

## 2020-01-27 RX ORDER — LANSOPRAZOLE 15 MG/1
15 CAPSULE, DELAYED RELEASE ORAL
Refills: 0 | Status: DISCONTINUED | OUTPATIENT
Start: 2020-01-27 | End: 2020-01-28

## 2020-01-27 RX ORDER — CEFTRIAXONE 500 MG/1
2000 INJECTION, POWDER, FOR SOLUTION INTRAMUSCULAR; INTRAVENOUS EVERY 24 HOURS
Refills: 0 | Status: DISCONTINUED | OUTPATIENT
Start: 2020-01-27 | End: 2020-01-29

## 2020-01-27 RX ORDER — ONDANSETRON 8 MG/1
8 TABLET, FILM COATED ORAL EVERY 8 HOURS
Refills: 0 | Status: DISCONTINUED | OUTPATIENT
Start: 2020-01-27 | End: 2020-01-29

## 2020-01-27 RX ORDER — FLUTICASONE PROPIONATE 220 MCG
2 AEROSOL WITH ADAPTER (GRAM) INHALATION
Refills: 0 | Status: DISCONTINUED | OUTPATIENT
Start: 2020-01-27 | End: 2020-01-29

## 2020-01-27 RX ADMIN — MORPHINE SULFATE 36 MILLIGRAM(S): 50 CAPSULE, EXTENDED RELEASE ORAL at 08:00

## 2020-01-27 RX ADMIN — FAMOTIDINE 200 MILLIGRAM(S): 10 INJECTION INTRAVENOUS at 04:42

## 2020-01-27 RX ADMIN — MORPHINE SULFATE 6 MILLIGRAM(S): 50 CAPSULE, EXTENDED RELEASE ORAL at 12:00

## 2020-01-27 RX ADMIN — MORPHINE SULFATE 6 MILLIGRAM(S): 50 CAPSULE, EXTENDED RELEASE ORAL at 05:25

## 2020-01-27 RX ADMIN — MORPHINE SULFATE 6 MILLIGRAM(S): 50 CAPSULE, EXTENDED RELEASE ORAL at 15:00

## 2020-01-27 RX ADMIN — Medication 28 MILLIGRAM(S): at 03:39

## 2020-01-27 RX ADMIN — Medication 28 MILLIGRAM(S): at 16:00

## 2020-01-27 RX ADMIN — MORPHINE SULFATE 36 MILLIGRAM(S): 50 CAPSULE, EXTENDED RELEASE ORAL at 20:40

## 2020-01-27 RX ADMIN — Medication 28 MILLIGRAM(S): at 17:30

## 2020-01-27 RX ADMIN — SODIUM CHLORIDE 95 MILLILITER(S): 9 INJECTION, SOLUTION INTRAVENOUS at 07:11

## 2020-01-27 RX ADMIN — MORPHINE SULFATE 36 MILLIGRAM(S): 50 CAPSULE, EXTENDED RELEASE ORAL at 14:05

## 2020-01-27 RX ADMIN — CEFTRIAXONE 100 MILLIGRAM(S): 500 INJECTION, POWDER, FOR SOLUTION INTRAMUSCULAR; INTRAVENOUS at 14:05

## 2020-01-27 RX ADMIN — ONDANSETRON 16 MILLIGRAM(S): 8 TABLET, FILM COATED ORAL at 21:13

## 2020-01-27 RX ADMIN — Medication 2 PUFF(S): at 22:45

## 2020-01-27 RX ADMIN — MORPHINE SULFATE 18 MILLIGRAM(S): 50 CAPSULE, EXTENDED RELEASE ORAL at 19:57

## 2020-01-27 RX ADMIN — LANSOPRAZOLE 15 MILLIGRAM(S): 15 CAPSULE, DELAYED RELEASE ORAL at 20:48

## 2020-01-27 RX ADMIN — MORPHINE SULFATE 36 MILLIGRAM(S): 50 CAPSULE, EXTENDED RELEASE ORAL at 11:01

## 2020-01-27 RX ADMIN — MORPHINE SULFATE 6 MILLIGRAM(S): 50 CAPSULE, EXTENDED RELEASE ORAL at 01:50

## 2020-01-27 RX ADMIN — SERTRALINE 50 MILLIGRAM(S): 25 TABLET, FILM COATED ORAL at 20:49

## 2020-01-27 RX ADMIN — ARIPIPRAZOLE 2 MILLIGRAM(S): 15 TABLET ORAL at 20:51

## 2020-01-27 RX ADMIN — Medication 1 SPRAY(S): at 08:05

## 2020-01-27 RX ADMIN — MORPHINE SULFATE 6 MILLIGRAM(S): 50 CAPSULE, EXTENDED RELEASE ORAL at 22:23

## 2020-01-27 RX ADMIN — MORPHINE SULFATE 36 MILLIGRAM(S): 50 CAPSULE, EXTENDED RELEASE ORAL at 01:20

## 2020-01-27 RX ADMIN — MORPHINE SULFATE 36 MILLIGRAM(S): 50 CAPSULE, EXTENDED RELEASE ORAL at 23:35

## 2020-01-27 RX ADMIN — Medication 2 PUFF(S): at 10:57

## 2020-01-27 RX ADMIN — MORPHINE SULFATE 36 MILLIGRAM(S): 50 CAPSULE, EXTENDED RELEASE ORAL at 17:32

## 2020-01-27 RX ADMIN — Medication 28 MILLIGRAM(S): at 03:09

## 2020-01-27 RX ADMIN — MORPHINE SULFATE 36 MILLIGRAM(S): 50 CAPSULE, EXTENDED RELEASE ORAL at 04:55

## 2020-01-27 RX ADMIN — Medication 1000 UNIT(S): at 20:51

## 2020-01-27 RX ADMIN — DEXTROSE MONOHYDRATE, SODIUM CHLORIDE, AND POTASSIUM CHLORIDE 95 MILLILITER(S): 50; .745; 4.5 INJECTION, SOLUTION INTRAVENOUS at 16:00

## 2020-01-27 RX ADMIN — POLYETHYLENE GLYCOL 3350 17 GRAM(S): 17 POWDER, FOR SOLUTION ORAL at 09:31

## 2020-01-27 RX ADMIN — MORPHINE SULFATE 3 MILLIGRAM(S): 50 CAPSULE, EXTENDED RELEASE ORAL at 20:47

## 2020-01-27 RX ADMIN — Medication 28 MILLIGRAM(S): at 22:22

## 2020-01-27 RX ADMIN — MORPHINE SULFATE 6 MILLIGRAM(S): 50 CAPSULE, EXTENDED RELEASE ORAL at 09:45

## 2020-01-27 RX ADMIN — Medication 28 MILLIGRAM(S): at 23:37

## 2020-01-27 RX ADMIN — MORPHINE SULFATE 6 MILLIGRAM(S): 50 CAPSULE, EXTENDED RELEASE ORAL at 18:16

## 2020-01-27 RX ADMIN — Medication 28 MILLIGRAM(S): at 09:45

## 2020-01-27 RX ADMIN — DEXTROSE MONOHYDRATE, SODIUM CHLORIDE, AND POTASSIUM CHLORIDE 95 MILLILITER(S): 50; .745; 4.5 INJECTION, SOLUTION INTRAVENOUS at 19:23

## 2020-01-27 RX ADMIN — Medication 28 MILLIGRAM(S): at 11:01

## 2020-01-27 NOTE — DISCHARGE NOTE PROVIDER - NSDCCPCAREPLAN_GEN_ALL_CORE_FT
PRINCIPAL DISCHARGE DIAGNOSIS  Diagnosis: Vasoocclusive sickle cell crisis  Assessment and Plan of Treatment:

## 2020-01-27 NOTE — DISCHARGE NOTE PROVIDER - NSDCMRMEDTOKEN_GEN_ALL_CORE_FT
Abilify 2 mg oral tablet: 1 tab(s) orally once a day  acetaminophen 325 mg oral tablet: 2 tab(s) orally every 6 hours, As needed, Temp greater or equal to 38 C (100.4 F), Mild Pain (1 - 3)  cholecalciferol 1000 intl units oral tablet: 1 tab(s) orally once a day  Depo-Provera 400 mg/mL intramuscular suspension:   Flovent  mcg/inh inhalation aerosol: 2 puff(s) inhaled 2 times a day, As Needed  Jadenu 360 mg oral tablet: 4 tab(s) orally once a day  levalbuterol 0.63 mg/3 mL inhalation solution: 3 milliliter(s) inhaled 3 times a day, As Needed  Xopenex HFA 45 mcg/inh inhalation aerosol: 2 puff(s) inhaled every 4 hours, As Needed  Zoloft 100 mg oral tablet: 1 tab(s) orally once a day amoxicillin 500 mg oral tablet: 4 tab(s) orally once - one hour prior to dental appointment  ARIPiprazole 2 mg oral tablet: 1 tab(s) orally once a day  cholecalciferol 1000 intl units oral tablet: 1 tab(s) orally once a day  Depo-Provera 400 mg/mL intramuscular suspension: 1 milliliter(s) intramuscular every 3 months - Last given 20  Flovent  mcg/inh inhalation aerosol: 2 puff(s) inhaled 2 times a day  ibuprofen 400 mg oral tablet: 1 tab(s) orally every 6 hours while taking tramadol. Then every 6 hours AS NEEDED for pain  Jadenu 360 mg oral tablet: 4 tab(s) orally once a day  lidocaine-prilocaine 2.5%-2.5% topical cream: Apply topically to affected area and cover 1-2 hours prior to treatment  ondansetron 8 mg oral tablet, disintegratin tab(s) orally every 8 hours, As Needed   [id number] oral powder for reconstitution: Take one capful (17g) in 8oz water daily while taking opioid medication. Then AS NEEDED for constipation.   Pepcid AC 10 mg oral tablet: 1 tab(s) orally every 12 hours while taking ibuprofen  Senna 8.6 mg oral tablet: Take 1-2 tabs daily while taking opioid medication. Then 1-2 times daily as needed for constipation  sertraline 50 mg oral tablet: 1 tab(s) orally once a day  traMADol 50 mg oral tablet: 1 tab(s) orally every 4 to 6 hours, As Needed -for moderate pain   Ultram 50 mg oral tablet: Taper: Take on 50mg tab every 4 hrs on . Take 1 tab every 6hrs on . Take 1 tab every 8hrs on . Then taper complete  Xopenex HFA 45 mcg/inh inhalation aerosol: 2 puff(s) inhaled every 4 hours, As Needed

## 2020-01-27 NOTE — DISCHARGE NOTE PROVIDER - CARE PROVIDER_API CALL
Ayse Rhoades)  Pediatric HematologyOncology; Pediatrics  [id number] [place] [address], [address]  [place] [address]  Phone: [phone]  Fax: [phone]  Follow Up Time:

## 2020-01-27 NOTE — PROGRESS NOTE PEDS - ATTENDING COMMENTS
19yo female (ex22wk), HbSS, VPS, on chronic simple blood transfusions, admitted with VOE, generalized, fever, and URI symptom, RVP neg.   Admits to spending the night in her boyfriend's house where it was quite cold.  Ensure adequate pain control, wean to oral as tolerated.  Also with intermittent c/o HA, VPS stable on imaging per neurosurgery.  Also has worsened strabismus, following with adult Opthalmology.  Continue empiric Ceftriaxone, BCx NGTD.  Due for PRBCs this week, will plan to give tomorrow after repeat CBC and type and cross obtained.  Dispo: pain well controlled with oral meds, afebrile ~24h.

## 2020-01-27 NOTE — DISCHARGE NOTE PROVIDER - NSDCFUADDAPPT_GEN_ALL_CORE_FT
Thursday 1/30 @ 15:30  Saturday 2/1 @ 11:00 Thursday 2/20 @ 3:30pm  Saturday 2/22 @ 11:00am for PRBC transfusion

## 2020-01-27 NOTE — DISCHARGE NOTE PROVIDER - HOSPITAL COURSE
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         Interval Hx: Her pain started Sunday @7AM as soon as she woke up, with pain "all over her body," for which she states is her typical VOC pain.  She took Tramadol at 8am which had    minimal to some improvement in pain. Then @10AM she took Tylenol for the pain which had no effect on the pain.  She then made her way to [facility] [place] [facility] ED. She also reports a fever (max    temp?) at home prior to arriving to the ED.  She admits to a new onset somewhat productive cough, +sore throat which started Sunday, yet denies any nasal congestion. She denies any    chest pain or SOB.  She has had normal urination, and denies any abdominal symptoms including: dysuria, or vomiting.  She has occasional constipation in the past.  She denies any new    rashes, or joint swelling, or skin changes.  Neurology/&, or Neurosurgery is following her as outpatient for her drifting right eye/strabismus.          Past Hematological/Medical History: She is on chronic transfusion protocol every 3-4 weeks, secondary to ACS/VOC.  She has iron overload due to chronic PRBC transfusions currently    on oral chelating therapy with Jadenu. She currently has a central line (mediport) for chronic PRBC transfusions (3-4 weeks).  She states no previous PRBC transfusion reaction in the past    4+ years. Her baseline Hgb is ~9g/dL.     Her PMH also includes: Hydrocephalus (IVH grade 4), 6 hydrocephalus revisions, 1 shunt is dormant, 1 is not working very well), 2 strokes, 4 ocular surgeries, chronic lung disease,    atrophic spleen, and past removal of: tonsils/adenoids/gallbladder.  She also receives Depoprovera every 3 months for contraception.      She has been following with Psych regularly secondary to diagnosis of Bipolar disorder and Depression. She is taking both Abilify and Zoloft.  She follows outpatient with Ophthalmology, Pulmonology and Cardiology. As well as dedicated imaging as per her primary outpatient hematology team.      Diet: no feeding issues at this time.         ED Course: Mediport accessed - Blood c/s taken, labs drawn, HIV screen performed.  Hgb 8.4g/dL; Retic = 10.4%.  CMP was overall stable. S. Preg screen negative. Last transfusion in PACT to be confirmed. For her pain she received Morphine 4mg IV, and was continued on Morphine 6mg IV [id number], as well started on Toradol IV [id number]. Her pain was reduced to 5-6/10 upon arrival to [facility] 4.  A CXR was performed - preliminary result without acute findings (final pending), RVP negative. Ceftriaxine was ordered. She also had a neurosurgery consult in the ED where a Rapid MRI was done to ensure if the ventriclar size is stable. The preliminary result was negative for acute findings (compared to previous MRI), final report pending.  And IVF were initiated.         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         Interval Hx: Her pain started Sunday @7AM as soon as she woke up, with pain "all over her body," for which she states is her typical VOC pain.  She took Tramadol at 8am which had    minimal to some improvement in pain. Then @10AM she took Tylenol for the pain which had no effect on the pain.  [place] [facility] [place] [facility] [place] She also reports a fever (max    temp?) at home prior to arriving to the ED.  She admits to a new onset somewhat productive cough, +sore throat which started Sunday, yet denies any nasal congestion. She denies any    chest pain or SOB.  She has had normal urination, and denies any abdominal symptoms including: dysuria, or vomiting.  She has occasional constipation in the past.  She denies any new    rashes, or joint swelling, or skin changes.  Neurology/&, or Neurosurgery is following her as outpatient for her drifting right eye/strabismus.          Past Hematological/Medical History: She is on chronic transfusion protocol every 3-4 weeks, secondary to ACS/VOC.  She has iron overload due to chronic PRBC transfusions currently    on oral chelating therapy with Jadenu. She currently has a central line (mediport) for chronic PRBC transfusions (3-4 weeks).  She states no previous PRBC transfusion reaction in the past    4+ years. Her baseline Hgb is ~9g/dL.     Her PMH also includes: Hydrocephalus (IVH grade 4), 6 hydrocephalus revisions, 1 shunt is dormant, 1 is not working very well), 2 strokes, 4 ocular surgeries, chronic lung disease,    atrophic spleen, and past removal of: tonsils/adenoids/gallbladder.  She also receives Depoprovera every 3 months for contraception.      She has been following with Psych regularly secondary to diagnosis of Bipolar disorder and Depression. She is taking both Abilify and Zoloft.  She follows outpatient with Ophthalmology, Pulmonology and Cardiology. As well as dedicated imaging as per her primary outpatient hematology team.      Diet: no feeding issues at this time.         ED Course: Mediport accessed - Blood c/s taken, labs drawn, HIV screen performed.  Hgb 8.4g/dL; Retic = 10.4%.  CMP was overall stable. S. Preg screen negative. Last transfusion in PACT to be confirmed. For her pain she received Morphine 4mg IV, and was continued on Morphine 6mg IV [id number], as well started on Toradol IV [id number]. Her pain was reduced to 5-6/10 upon arrival to Med 4.  A CXR was performed - preliminary result without acute findings (final pending), RVP negative. Ceftriaxine was ordered. She also had a neurosurgery consult in the ED where a Rapid MRI was done to ensure if the ventriclar size is stable. The preliminary result was negative for acute findings (compared to previous MRI), final report pending.  And IVF were initiated.         MED4 Course:    Elizabeth is a 20 year old female with HbSS with a complicated medical history related to her sick cell disease. She has Hx stroke x2, hydrocephalus (2  shunts), atrophic spleen, hx cholecystectomy, hx eye operations x4 - currently with right eye deviation (neuro following), recurrent VOE and ACS, on chronic transfusions [id number], on chelation therapy for iron overload. She presented with diffuse VOE, fever, and URI symptoms of cough and pharyngitis. CXR in ED was normal. MRI to evaluate eye deviation and shunts read as unchanged with no acute findings.        HbSS:    - Baseline Hgb 9.0    - Receives chronic transfusions [id number] - last transfusion 3 weeks prior    - Iron chelation therapy - takes Jadenu at home, taking Exjade while inpatient    - Hx atrophic spleen    - Hx cholecystectomy    - Reticulocyte count 10.4 (1/27)        VOE:    - diffuse pain in all extremities    - Morpine 6mg IV q3h    - Toradol 28mg Iv q6h        Hydrocephalus     - Premature birth @ 22 weeks    - Hx 2  shunt placements    - Right eye deviation present (neuro following)    - MR in ED 1/26 showed no changes to  shunts and no focal visual abnormalities on imaging        ID: URI symptoms/fever    - Febrile at home, no documented fever in ED or on Med4    - URI symptoms of pharyngitis and cough, no chest pain, no increased work of breathing, no congestion or rhinorrhea    - CXR in ED showed no consolidation or patchy infiltrates    - Toxicology screen negative    - HIV test negative    - RVP negative    - BCxs pending    - Ceftriaxone first dose midnight 1/27        FENGI:    - Regular pediatric diet    - IV fluids D51/2NS @ 95mL/hr (maintenance)    - Famotidine 20mg IV q12 GI ppx    - Miralax 17g daily constipation    - Lactulose 30g prn constipation    - Ondansetron 8mg IV q8 prn nausea    - VitD3 1000U daily        Psych:    - Hx bipolar I disorder    - Abilify 2mg oral daily    - Sertraline 50mg oral daily        Asthma:    Flonase spray prn congestion    Fluticasone inhaler prn asthma exacerbation Elizabeth is a 21 yo female (ex- 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         Interval Hx: Her pain started Sunday @7AM as soon as she woke up, with pain "all over her body," for which she states is her typical VOC pain.  She took Tramadol at 8am which had    minimal to some improvement in pain. Then @10AM she took Tylenol for the pain which had no effect on the pain.  She then made her way to [facility] [place] [facility] ED. She also reports a fever (max    temp?) at home prior to arriving to the ED.  She admits to a new onset somewhat productive cough, +sore throat which started Sunday, yet denies any nasal congestion. She denies any    chest pain or SOB.  She has had normal urination, and denies any abdominal symptoms including: dysuria, or vomiting.  She has occasional constipation in the past.  She denies any new    rashes, or joint swelling, or skin changes.  Neurology/&, or Neurosurgery is following her as outpatient for her drifting right eye/strabismus.          Past Hematological/Medical History: She is on chronic transfusion protocol every 3-4 weeks, secondary to ACS/VOC.  She has iron overload due to chronic PRBC transfusions currently    on oral chelating therapy with Jadenu. She currently has a central line (mediport) for chronic PRBC transfusions (3-4 weeks).  She states no previous PRBC transfusion reaction in the past    4+ years. Her baseline Hgb is ~9g/dL.     Her PMH also includes: Hydrocephalus (IVH grade 4), 6 hydrocephalus revisions, 1 shunt is dormant, 1 is not working very well), 2 strokes, 4 ocular surgeries, chronic lung disease,    atrophic spleen, and past removal of: tonsils/adenoids/gallbladder.  She also receives Depoprovera every 3 months for contraception.      She has been following with Psych regularly secondary to diagnosis of Bipolar disorder and Depression. She is taking both Abilify and Zoloft.  She follows outpatient with Ophthalmology, Pulmonology and Cardiology. As well as dedicated imaging as per her primary outpatient hematology team.      Diet: no feeding issues at this time.         ED Course: Mediport accessed - Blood c/s taken, labs drawn, HIV screen performed.  Hgb 8.4g/dL; Retic = 10.4%.  CMP was overall stable. S. Preg screen negative. Last transfusion in PACT to be confirmed. For her pain she received Morphine 4mg IV, and was continued on Morphine 6mg IV [id number], as well started on Toradol IV [id number]. Her pain was reduced to 5-6/10 upon arrival to Med 4.  A CXR was performed - preliminary result without acute findings (final pending), RVP negative. Ceftriaxine was ordered. She also had a neurosurgery consult in the ED where a Rapid MRI was done to ensure if the ventriclar size is stable. The preliminary result was negative for acute findings (compared to previous MRI), final report pending.  And IVF were initiated.         MED4 Course:    Elizabeth is a 20 year old female with HbSS with a complicated medical history related to her sick cell disease. She has Hx stroke x2, hydrocephalus (2  shunts), atrophic spleen, hx cholecystectomy, hx eye operations x4 - currently with right eye deviation (neuro following), recurrent VOE and ACS, on chronic transfusions [id number], on chelation therapy for iron overload.  At time of admission she was scheduled to received her next PRBC transfusion on 2/1. She presented with diffuse VOE, fever, and URI symptoms of cough and pharyngitis. CXR in ED was normal. MRI to evaluate eye deviation and shunts read as unchanged with no acute findings.        HbSS:    - Baseline Hgb 9.0    - Receives chronic transfusions [id number] - last transfusion 3 weeks prior    - Iron chelation therapy - takes Jadenu at home - on hold during VOE    - Hx atrophic spleen    - Hx cholecystectomy    - Reticulocyte count 10.4 (1/27)    - Labs: CBC-d/CMP/retic/T&S    - Transfused pRBCs 1/27        VOE:    - diffuse pain in all extremities    - Morpine 6mg IV q3h    - Toradol 28mg Iv q6h        Hydrocephalus     - Premature birth @ 22 weeks    - Hx 2  shunt placements    - Right eye deviation present (neuro following)    - MR in ED 1/26 showed no changes to  shunts and no focal visual abnormalities on imaging        ID: URI symptoms/fever    - Febrile at home, no documented fever in ED or on Med4    - URI symptoms of pharyngitis and cough, no chest pain, no increased work of breathing, no congestion or rhinorrhea    - CXR in ED showed no consolidation or patchy infiltrates    - Toxicology screen negative    - HIV test negative    - RVP negative    - BCxs pending    - Ceftriaxone first dose midnight 1/27        FENGI:    - Regular pediatric diet    - IV fluids D51/2NS @ 95mL/hr (maintenance) - 20mEq KCl added for K+ 3.7    - Famotidine 20mg IV q12 GI ppx - d/c'd 1/27 -- replaced with lansoprazole 15mg po bid before meals    - Miralax 17g daily constipation    - Lactulose 30g prn constipation    - Ondansetron 8mg IV q8 prn nausea    - VitD3 1000U daily        Psych:    - Hx bipolar I disorder    - Abilify 2mg oral daily    - Sertraline 50mg oral daily        Asthma:    Flonase spray prn congestion    Fluticasone inhaler prn asthma exacerbation Elizabeth is a 19 yo female (ex- 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         Interval Hx: Her pain started Sunday @7AM as soon as she woke up, with pain "all over her body," for which she states is her typical VOC pain.  She took Tramadol at 8am which had    minimal to some improvement in pain. Then @10AM she took Tylenol for the pain which had no effect on the pain.  [place] [facility] [place] She also reports a fever (max    temp?) at home prior to arriving to the ED.  She admits to a new onset somewhat productive cough, +sore throat which started Sunday, yet denies any nasal congestion. She denies any    chest pain or SOB.  She has had normal urination, and denies any abdominal symptoms including: dysuria, or vomiting.  She has occasional constipation in the past.  She denies any new    rashes, or joint swelling, or skin changes.  Neurology/&, or Neurosurgery is following her as outpatient for her drifting right eye/strabismus.          Past Hematological/Medical History: She is on chronic transfusion protocol every 3-4 weeks, secondary to ACS/VOC.  She has iron overload due to chronic PRBC transfusions currently    on oral chelating therapy with Jadenu. She currently has a central line (mediport) for chronic PRBC transfusions (3-4 weeks).  She states no previous PRBC transfusion reaction in the past    4+ years. Her baseline Hgb is ~9g/dL.     Her PMH also includes: Hydrocephalus (IVH grade 4), 6 hydrocephalus revisions, 1 shunt is dormant, 1 is not working very well), 2 strokes, 4 ocular surgeries, chronic lung disease,    atrophic spleen, and past removal of: tonsils/adenoids/gallbladder.  She also receives Depoprovera every 3 months for contraception.      She has been following with Psych regularly secondary to diagnosis of Bipolar disorder and Depression. She is taking both Abilify and Zoloft.  She follows outpatient with Ophthalmology, Pulmonology and Cardiology. As well as dedicated imaging as per her primary outpatient hematology team.      Diet: no feeding issues at this time.         ED Course: Mediport accessed - Blood c/s taken, labs drawn, HIV screen performed.  Hgb 8.4g/dL; Retic = 10.4%.  CMP was overall stable. S. Preg screen negative. Last transfusion in PACT to be confirmed. For her pain she received Morphine 4mg IV, and was continued on Morphine 6mg IV [id number], as well started on Toradol IV [id number]. Her pain was reduced to 5-6/10 upon arrival to [facility].  A CXR was performed - preliminary result without acute findings (final pending), RVP negative. Ceftriaxine was ordered. She also had a neurosurgery consult in the ED where a Rapid MRI was done to ensure if the ventriclar size is stable. The preliminary result was negative for acute findings (compared to previous MRI), final report pending.  And IVF were initiated.         [facility] Course:    Elizabeth is a 20 year old female with HbSS with a complicated medical history related to her sick cell disease. She has Hx stroke x2, hydrocephalus (2  shunts), atrophic spleen, hx cholecystectomy, hx eye operations x4 - currently with right eye deviation (neuro following), recurrent VOE and ACS, on chronic transfusions [id number], on chelation therapy for iron overload.  At time of admission she was scheduled to received her next PRBC transfusion on 2/1. She presented with diffuse VOE, fever, and URI symptoms of cough and pharyngitis. CXR in ED was normal. MRI to evaluate eye deviation and shunts read as unchanged with no acute findings.    HbSS: Patient with SLM for access, on chronic transfusions every three weeks. Next due for transfusion 1/30. Patient transfused 2 units PRBCs on 1/27. Patient's baseline hgb 9.0. Iron chelation therapy of Jadenu at home, this was placed on hold during admission for treatment of VOE. Patient's sickle cell course complicated by atrophic spleen and cholecystectomy.     VOE:    - Diffuse pain in all extremities unable to be managed with home tramadol. Patient admitted for treatment with IV pain medications. Started on morphine 6mg IV q3 which was spaced to q4h on 1/28, then discontinued morning of 1/29. Patient was transitioned to tramadol 50mg q4h at 9:00 on 1/29. She received two doses of oral pain medication and was observed for four hours after her 13:00 dose. Her pain remained well controlled. Toradol was discontinued in the morning of 1/29 and replaced with ibuprofen 400mg q6h. Patient to follow tramadol taper of 50mg q4h 1/29, q6h 1/30, q8h 1/31 and then as needed. Instructed to maintain home bowel regimen while taking narcotic medication. Patient to take ibuprofen 400mg q6h while taking tramadol ATC. Patient instructed to take H2 blocker while taking ibuprofen.     Hydrocephalus: Patient born premature at 22wks. History of 2  shunt placements for hydrocephalus. Right eye deviation present but not a new finding. Patient follows with neurology outpatient for this issue. MR in ED 1/26 showed no changes to  shunts and no focal visual abnormalities on imaging    ID: URI symptoms/fever: Patient reported fever at home, no documented fever in ED or on Med4 - remained afebrile admission to discharge. Presented with URI symptoms of pharyngitis and cough with no chest pain, no increased work of breathing, no congestion or rhinorrhea. CXR in ED showed no consolidation or patchy infiltrates. Toxicology screen negative, HIV test negative, RVP negative, BCxs negative to date. Ceftriaxone 1/26-1/28.    FENGI: Patient maintained a regular pediatric diet. IV fluids of D51/2NS @ 95mL/hr (maintenance) - 20mEq KCl added 1/27 for K+ 3.7. Famotidine 20mg IV q12 GI ppx - d/c'd 1/27 -- replaced with lansoprazole 15mg po bid before meals. Inpatient bowel regimen of Miralax, Lactulose, and Senna all scheduled bid. Ondansetron 8mg IV q8 prn nausea - patient did not require nausea medication during admission. VitD3 1000U daily. Patient to continue PPI while taking ibuprofen at home. Patient to continue home bowel regimen while taking narcotic pain medication at home.    Psych: Hx bipolar I disorder. On home medications of Abilify 2mg oral daily and Sertraline 50mg oral daily    Asthma: Flonase spray prn congestion, Fluticasone inhaler prn asthma exacerbation - neither needed during admission        Day of Discharge Vital Signs     Vital Signs Last 24 Hrs    T(C): 37.2 (01-29-20 @ 09:35), Max: 37.6 (01-28-20 @ 16:00)    T(F): 98.9 (01-29-20 @ 09:35), Max: 99.6 (01-28-20 @ 16:00)    HR: 92 (01-29-20 @ 09:35) (70 - 92)    BP: 126/64 (01-29-20 @ 09:35) (105/52 - 128/73)    BP(mean): --    RR: 20 (01-29-20 @ 09:35) (16 - 22)    SpO2: 99% (01-29-20 @ 09:35) (97% - 100%)        Day of Discharge Assessment        Constitutional:	Well appearing, in no apparent distress    Eyes		No conjunctival injection, symmetric gaze    ENT:		Mucus membranes moist, no mouth sores or mucosal bleeding, normal, dentition, symmetric facies.    Neck		No thyromegaly or masses appreciated    Cardiovascular	Regular rate, normal S1, S2, no murmurs, rubs or gallops    Respiratory	Clear to auscultation bilaterally, no wheezing    Abdominal	                    Normoactive bowel sounds, soft, NT, no hepatosplenomegaly, no masses    Lymphatic	                    No adenopathy appreciated    Extremities	FROM x4, no cyanosis or edema, symmetric pulses    Skin		Normal appearance, no rash, nodules, vesicles, ulcers or erythema, alopecia. SLM C/D/I.     Neurologic	                    No focal deficits, gait normal and normal motor exam.    Psychiatric	                    Affect appropriate    Musculoskeletal           Full range of motion and no deformities appreciated, no masses and normal strength in all extremities.         Day of Discharge Labs                                11.2     13.18 )-----------( 339      ( 29 Jan 2020 06:25 )               32.9     Reticulocyte Count (01.29.20 @ 06:25)      Reticulocyte Percent: 4.5 %      Absolute Reticulocytes: 164 k/uL            Pt stable to be discharged today and will follow up on 2/20 @ 3:30 for T&S    Pt stable to be discharged today and will follow up on 2/22 @ 11:00 in PACT for PRBC transfusion

## 2020-01-27 NOTE — DISCHARGE NOTE PROVIDER - NSFOLLOWUPCLINICS_GEN_ALL_ED_FT
Pediatric Hematology/Oncology (Stem Cell)  Pediatric Hematology/Oncology (Stem Cell)  [facility], [phone] [place] [id number]  Phone: [phone]  Fax: [phone]  Follow Up Time:

## 2020-01-27 NOTE — CHART NOTE - NSCHARTNOTEFT_GEN_A_CORE
NEUROSURGERY NOTE   KINGS RAJUN / [id number] / 01-27-20 @ 08:04    PAST 24hr EVENTS: pt seen and examined this morning with attending. MRI is stable, no shunt malfunction.     Pt w hx 2  shunts, one anterior (dormant, nonfunctioning) and one posterior (functioning), revised x 2 (2011, 2013). MRI stable.     PHYSICAL EXAM:   Vital Signs Last 24 Hrs  T(C): 37.2 (27 Jan 2020 06:00), Max: 37.2 (27 Jan 2020 06:00)  T(F): 98.9 (27 Jan 2020 06:00), Max: 98.9 (27 Jan 2020 06:00)  HR: 73 (27 Jan 2020 06:00) (72 - 96)  BP: 95/45 (27 Jan 2020 06:00) (92/40 - 106/56)  BP(mean): --  RR: 18 (27 Jan 2020 06:00) (18 - 20)  SpO2: 100% (27 Jan 2020 06:00) (96% - 100%)    Awake Alert Oriented x 3 No distress, Speech is clear  PERRL, APD, R strabismus  Motor:             RUE 5/5        LUE 5/5             RLE 5/5         LLE 5/5  Shunt valve felt- not depressed (did not pump as patient has slit ventricles)      RADIOLOGY:  < from: MR Head No Cont (01.26.20 @ 20:21) >      INTERPRETATION:  No emergent findings. Status post right-sided approach parietal ventricular catheter placement. The ventricles are stable in size when compared to prior study from 12/30/2019. Follow-up the official report.    PLAN:   no nsg intervention   reconsult as needed

## 2020-01-27 NOTE — DISCHARGE NOTE PROVIDER - NSDCFUSCHEDAPPT_GEN_ALL_CORE_FT
KINGS DÍAZ ; 01/30/2020 ; [facility] Ped HemOnc [address]  KINGS DÍAZ ; 02/01/2020 ; [facility] HemOnc [address]  KINGS DÍAZ ; 02/11/2020 ; [facility] HemOnc [address] KINGS DÍAZ ; 01/30/2020 ; \[facility] [place] [facility]"" Ped HemOnc [address]  KINGS DÍAZ ; 02/01/2020 ; [facility] HemOnc [address]  KINGS DÍAZ ; 02/11/2020 ; [facility] HemOnc [address] KINGS DÍAZ ; 01/30/2020 ; [facility] Ped HemOnc [address]  KINGS DÍAZ ; 02/01/2020 ; [facility] HemOnc [address]  KINGS DÍAZ ; 02/11/2020 ; [facility] HemOnc [address] KINGS DÍAZ ; 02/11/2020 ; [facility] Ped HemOnc [address]  KINGS DÍAZ ; 02/20/2020 ; [facility] HemOnc [address]  KINGS DÍAZ ; 02/22/2020 ; [facility] HemOnc [address] KINGS DÍAZ ; 02/11/2020 ; [facility] Ped HemOnc [address]  KINGS DÍAZ ; 02/20/2020 ; [facility] HemOnc [address]  KINGS DÍAZ ; 02/22/2020 ; [facility] HemOnc [address]

## 2020-01-27 NOTE — PROGRESS NOTE PEDS - ASSESSMENT
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 She has Hx stroke x2, hydrocephalus (2  shunts), atrophic spleen, hx cholecystectomy, hx eye operations x4 - currently with right eye deviation (neuro following), recurrent VOE and ACS, on chronic transfusions [id number], on chelation therapy for iron overload. She presented with diffuse VOE, fever, and URI symptoms of cough and pharyngitis. CXR in ED was normal. MRI to evaluate eye deviation and shunts read as unchanged with no acute findings.    HbSS:  - Baseline Hgb 9.0  - Receives chronic transfusions [id number] - last transfusion 3 weeks prior  - Iron chelation therapy - takes Jadenu at home, taking Exjade while inpatient  - Hx atrophic spleen  - Hx cholecystectomy  - Reticulocyte count 10.4 (1/27)    VOE:  - diffuse pain in all extremities  - Morpine 6mg IV q3h  - Toradol 28mg Iv q6h    Hydrocephalus   - Premature birth @ 22 weeks  - Hx 2  shunt placements  - Right eye deviation present (neuro following)  - MR in ED 1/26 showed no changes to  shunts and no focal visual abnormalities on imaging    ID: URI symptoms/fever  - Febrile at home, no documented fever in ED or on Med4  - URI symptoms of pharyngitis and cough, no chest pain, no increased work of breathing, no congestion or rhinorrhea  - CXR in ED showed no consolidation or patchy infiltrates  - Toxicology screen negative  - HIV test negative  - RVP negative  - BCxs pending  - Ceftriaxone first dose midnight 1/27    FENGI:  - Regular pediatric diet  - IV fluids D51/2NS @ 95mL/hr (maintenance)  - Famotidine 20mg IV q12 GI ppx  - Miralax 17g daily constipation  - Lactulose 30g prn constipation  - Ondansetron 8mg IV q8 prn nausea  - VitD3 1000U daily    Psych:  - Hx bipolar I disorder  - Abilify 2mg oral daily  - Sertraline 50mg oral daily    Asthma:  Flonase spray prn congestion  Fluticasone inhaler prn asthma exacerbation 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 She has Hx stroke x2, hydrocephalus (2  shunts), atrophic spleen, hx cholecystectomy, hx eye operations x4 - currently with right eye deviation (neuro following), recurrent VOE and ACS, on chronic transfusions [id number], on chelation therapy for iron overload. She presented with diffuse VOE, fever, and URI symptoms of cough and pharyngitis. CXR in ED was normal. MRI to evaluate eye deviation and shunts read as unchanged with no acute findings.    HbSS:  - Baseline Hgb 9.0  - Receives chronic transfusions [id number] - last transfusion 3 weeks prior  - Iron chelation therapy - takes Jadenu at home - on hold during treatment of VOE  - Hx atrophic spleen  - Hx cholecystectomy  - Reticulocyte count 10.4 (1/27)  - Bloodwork - CBC-d, retic, T&S  - Once active T&S - will transfuse pRBCs    VOE:  - diffuse pain  - Morpine 6mg IV q3h  - Toradol 28mg Iv q6h    Hydrocephalus   - Premature birth @ 22 weeks  - Hx 2  shunt placements  - Right eye deviation present (neuro following)  - MR in ED 1/26 showed no changes to  shunts and no focal visual abnormalities on imaging    ID: URI symptoms/fever  - Febrile at home, no documented fever in ED or on Med4  - URI symptoms of pharyngitis and cough, no chest pain, no increased work of breathing, no congestion or rhinorrhea  - CXR in ED showed no consolidation or patchy infiltrates  - Toxicology screen negative  - HIV test negative  - RVP negative  - BCxs pending  - Ceftriaxone first dose midnight 1/27    FENGI:  - Regular pediatric diet  - IV fluids D51/2NS @ 95mL/hr (maintenance) - added 20mEq KCl to fluids for K+ 3.7  - Famotidine 20mg IV q12 GI ppx - d/c'd 1/27 and replaced with oral lansoprazole 15mg twice daily before meals  - Miralax 17g daily constipation  - Lactulose 30g prn constipation  - Ondansetron 8mg IV q8 prn nausea  - VitD3 1000U daily    Psych:  - Hx bipolar I disorder  - Abilify 2mg oral daily  - Sertraline 50mg oral daily    Asthma:  - Flonase spray prn congestion  - Fluticasone inhaler prn asthma exacerbation

## 2020-01-28 DIAGNOSIS — K59.00 CONSTIPATION, UNSPECIFIED: ICD-10-CM

## 2020-01-28 DIAGNOSIS — R50.9 FEVER, UNSPECIFIED: ICD-10-CM

## 2020-01-28 DIAGNOSIS — F31.9 BIPOLAR DISORDER, UNSPECIFIED: ICD-10-CM

## 2020-01-28 LAB
BASOPHILS NFR SPEC: 0 % — SIGNIFICANT CHANGE UP (ref 0–2)
BLASTS # FLD: 0 % — SIGNIFICANT CHANGE UP (ref 0–0)
EOSINOPHIL NFR FLD: 2.6 % — SIGNIFICANT CHANGE UP (ref 0–6)
GIANT PLATELETS BLD QL SMEAR: PRESENT — SIGNIFICANT CHANGE UP
HCG UR-SCNC: NEGATIVE — SIGNIFICANT CHANGE UP
LYMPHOCYTES NFR SPEC AUTO: 37.4 % — SIGNIFICANT CHANGE UP (ref 13–44)
METAMYELOCYTES # FLD: 0 % — SIGNIFICANT CHANGE UP (ref 0–1)
MONOCYTES NFR BLD: 7.8 % — SIGNIFICANT CHANGE UP (ref 2–9)
MYELOCYTES NFR BLD: 0 % — SIGNIFICANT CHANGE UP (ref 0–0)
NEUTROPHIL AB SER-ACNC: 47.8 % — SIGNIFICANT CHANGE UP (ref 43–77)
NEUTS BAND # BLD: 0 % — SIGNIFICANT CHANGE UP (ref 0–6)
NRBC # BLD: 1 /100WBC — SIGNIFICANT CHANGE UP
OTHER - HEMATOLOGY %: 0 — SIGNIFICANT CHANGE UP
PLATELET COUNT - ESTIMATE: NORMAL — SIGNIFICANT CHANGE UP
POIKILOCYTOSIS BLD QL AUTO: SIGNIFICANT CHANGE UP
POLYCHROMASIA BLD QL SMEAR: SLIGHT — SIGNIFICANT CHANGE UP
PROMYELOCYTES # FLD: 0 % — SIGNIFICANT CHANGE UP (ref 0–0)
SICKLE CELLS BLD QL SMEAR: SIGNIFICANT CHANGE UP
SMUDGE CELLS # BLD: PRESENT — SIGNIFICANT CHANGE UP
SP GR UR: 1.01 — SIGNIFICANT CHANGE UP (ref 1–1.04)
VARIANT LYMPHS # BLD: 4.4 % — SIGNIFICANT CHANGE UP

## 2020-01-28 PROCEDURE — 99232 SBSQ HOSP IP/OBS MODERATE 35: CPT

## 2020-01-28 RX ORDER — DIPHENHYDRAMINE HCL 50 MG
25 CAPSULE ORAL ONCE
Refills: 0 | Status: COMPLETED | OUTPATIENT
Start: 2020-01-28 | End: 2020-01-28

## 2020-01-28 RX ORDER — LACTULOSE 10 G/15ML
30 SOLUTION ORAL
Refills: 0 | Status: DISCONTINUED | OUTPATIENT
Start: 2020-01-28 | End: 2020-01-29

## 2020-01-28 RX ORDER — MORPHINE SULFATE 50 MG/1
6 CAPSULE, EXTENDED RELEASE ORAL EVERY 4 HOURS
Refills: 0 | Status: DISCONTINUED | OUTPATIENT
Start: 2020-01-28 | End: 2020-01-29

## 2020-01-28 RX ORDER — LANSOPRAZOLE 15 MG/1
30 CAPSULE, DELAYED RELEASE ORAL DAILY
Refills: 0 | Status: DISCONTINUED | OUTPATIENT
Start: 2020-01-28 | End: 2020-01-29

## 2020-01-28 RX ORDER — ACETAMINOPHEN 500 MG
650 TABLET ORAL ONCE
Refills: 0 | Status: COMPLETED | OUTPATIENT
Start: 2020-01-28 | End: 2020-01-28

## 2020-01-28 RX ORDER — SENNA PLUS 8.6 MG/1
2 TABLET ORAL
Refills: 0 | Status: DISCONTINUED | OUTPATIENT
Start: 2020-01-28 | End: 2020-01-29

## 2020-01-28 RX ORDER — POLYETHYLENE GLYCOL 3350 17 G/17G
17 POWDER, FOR SOLUTION ORAL
Refills: 0 | Status: DISCONTINUED | OUTPATIENT
Start: 2020-01-28 | End: 2020-01-29

## 2020-01-28 RX ADMIN — Medication 28 MILLIGRAM(S): at 04:15

## 2020-01-28 RX ADMIN — MORPHINE SULFATE 6 MILLIGRAM(S): 50 CAPSULE, EXTENDED RELEASE ORAL at 11:30

## 2020-01-28 RX ADMIN — Medication 28 MILLIGRAM(S): at 11:00

## 2020-01-28 RX ADMIN — ARIPIPRAZOLE 2 MILLIGRAM(S): 15 TABLET ORAL at 21:21

## 2020-01-28 RX ADMIN — MORPHINE SULFATE 36 MILLIGRAM(S): 50 CAPSULE, EXTENDED RELEASE ORAL at 05:30

## 2020-01-28 RX ADMIN — DEXTROSE MONOHYDRATE, SODIUM CHLORIDE, AND POTASSIUM CHLORIDE 95 MILLILITER(S): 50; .745; 4.5 INJECTION, SOLUTION INTRAVENOUS at 19:26

## 2020-01-28 RX ADMIN — POLYETHYLENE GLYCOL 3350 17 GRAM(S): 17 POWDER, FOR SOLUTION ORAL at 21:20

## 2020-01-28 RX ADMIN — MORPHINE SULFATE 36 MILLIGRAM(S): 50 CAPSULE, EXTENDED RELEASE ORAL at 02:33

## 2020-01-28 RX ADMIN — Medication 28 MILLIGRAM(S): at 05:53

## 2020-01-28 RX ADMIN — SERTRALINE 50 MILLIGRAM(S): 25 TABLET, FILM COATED ORAL at 21:20

## 2020-01-28 RX ADMIN — Medication 28 MILLIGRAM(S): at 22:00

## 2020-01-28 RX ADMIN — MORPHINE SULFATE 36 MILLIGRAM(S): 50 CAPSULE, EXTENDED RELEASE ORAL at 16:30

## 2020-01-28 RX ADMIN — MORPHINE SULFATE 6 MILLIGRAM(S): 50 CAPSULE, EXTENDED RELEASE ORAL at 00:40

## 2020-01-28 RX ADMIN — Medication 25 MILLIGRAM(S): at 09:58

## 2020-01-28 RX ADMIN — MORPHINE SULFATE 6 MILLIGRAM(S): 50 CAPSULE, EXTENDED RELEASE ORAL at 07:00

## 2020-01-28 RX ADMIN — DEXTROSE MONOHYDRATE, SODIUM CHLORIDE, AND POTASSIUM CHLORIDE 95 MILLILITER(S): 50; .745; 4.5 INJECTION, SOLUTION INTRAVENOUS at 07:29

## 2020-01-28 RX ADMIN — Medication 28 MILLIGRAM(S): at 12:30

## 2020-01-28 RX ADMIN — MORPHINE SULFATE 36 MILLIGRAM(S): 50 CAPSULE, EXTENDED RELEASE ORAL at 20:00

## 2020-01-28 RX ADMIN — MORPHINE SULFATE 36 MILLIGRAM(S): 50 CAPSULE, EXTENDED RELEASE ORAL at 10:59

## 2020-01-28 RX ADMIN — MORPHINE SULFATE 36 MILLIGRAM(S): 50 CAPSULE, EXTENDED RELEASE ORAL at 08:08

## 2020-01-28 RX ADMIN — MORPHINE SULFATE 6 MILLIGRAM(S): 50 CAPSULE, EXTENDED RELEASE ORAL at 17:14

## 2020-01-28 RX ADMIN — Medication 28 MILLIGRAM(S): at 16:45

## 2020-01-28 RX ADMIN — LANSOPRAZOLE 15 MILLIGRAM(S): 15 CAPSULE, DELAYED RELEASE ORAL at 09:57

## 2020-01-28 RX ADMIN — MORPHINE SULFATE 6 MILLIGRAM(S): 50 CAPSULE, EXTENDED RELEASE ORAL at 21:22

## 2020-01-28 RX ADMIN — DEXTROSE MONOHYDRATE, SODIUM CHLORIDE, AND POTASSIUM CHLORIDE 95 MILLILITER(S): 50; .745; 4.5 INJECTION, SOLUTION INTRAVENOUS at 16:00

## 2020-01-28 RX ADMIN — POLYETHYLENE GLYCOL 3350 17 GRAM(S): 17 POWDER, FOR SOLUTION ORAL at 13:13

## 2020-01-28 RX ADMIN — Medication 2 PUFF(S): at 20:10

## 2020-01-28 RX ADMIN — CEFTRIAXONE 100 MILLIGRAM(S): 500 INJECTION, POWDER, FOR SOLUTION INTRAMUSCULAR; INTRAVENOUS at 16:00

## 2020-01-28 RX ADMIN — Medication 28 MILLIGRAM(S): at 23:00

## 2020-01-28 RX ADMIN — MORPHINE SULFATE 6 MILLIGRAM(S): 50 CAPSULE, EXTENDED RELEASE ORAL at 08:30

## 2020-01-28 RX ADMIN — BENZOCAINE AND MENTHOL 1 LOZENGE: 5; 1 LIQUID ORAL at 17:00

## 2020-01-28 RX ADMIN — Medication 2 PUFF(S): at 08:10

## 2020-01-28 RX ADMIN — Medication 650 MILLIGRAM(S): at 09:58

## 2020-01-28 RX ADMIN — SENNA PLUS 2 TABLET(S): 8.6 TABLET ORAL at 13:13

## 2020-01-28 RX ADMIN — Medication 28 MILLIGRAM(S): at 17:14

## 2020-01-28 RX ADMIN — MORPHINE SULFATE 6 MILLIGRAM(S): 50 CAPSULE, EXTENDED RELEASE ORAL at 03:30

## 2020-01-28 RX ADMIN — Medication 1000 UNIT(S): at 21:21

## 2020-01-28 NOTE — PROGRESS NOTE PEDS - SUBJECTIVE AND OBJECTIVE BOX
Problem Dx:  Nutrition, metabolism, and development symptoms  Iron overload due to repeated red blood cell transfusions  Transfusion history  Hydrocephalus  Depression  Reactive Airway Disease  CVA (Cerebral Vascular Accident)  Hb-SS disease with crisis  Vasoocclusive sickle cell crisis    Interval History: Patient did well overnight, no acute events. Vital signs stable, has remained afebrile. Continues to have sore throat today. Does not like Benzocaine lozenges, but feels relief with Ginger ale. Hgb down to 7.8 today therefore received 2 units of PRBC's. Patient notes last menses was several months ago, prior to starting relationship with boyfriend. Some spotting in between menses. Continues to report constipation. Patient feels her pain has improved today, though complained of 6/10 headache this afternoon.     Change from previous past medical, family or social history:	[x] No	[] Yes:    REVIEW OF SYSTEMS  All review of systems negative, except for those marked:  General:		[] Abnormal:  Pulmonary:		[] Abnormal:  Cardiac:		            [] Abnormal:  Gastrointestinal:	            [] Abnormal:  ENT:			[] Abnormal:  Renal/Urologic:		[] Abnormal:  Musculoskeletal		[] Abnormal:  Endocrine:		[] Abnormal:  Hematologic:		[] Abnormal:  Neurologic:		[] Abnormal:  Skin:			[] Abnormal:  Allergy/Immune		[] Abnormal:  Psychiatric:		[] Abnormal:    Allergies: No Known Allergies    Intolerances: No Known Intolerances      ARIPiprazole  Oral Tab/Cap - Peds 2 milliGRAM(s) Oral daily  benzocaine  15 mG/menthol 3.6 mG Oral Lozenge - Peds 1 Lozenge Oral five times a day PRN  cefTRIAXone IV Intermittent - Peds 2000 milliGRAM(s) IV Intermittent every 24 hours  cholecalciferol Oral Tab/Cap - Peds 1000 Unit(s) Oral daily  dextrose 5% + sodium chloride 0.9% with potassium chloride 20 mEq/L. - Pediatric 1000 milliLiter(s) IV Continuous <Continuous>  fluticasone propionate  110 MICROgram(s) HFA Inhaler - Peds 2 Puff(s) Inhalation two times a day  fluticasone propionate (50 MICROgram(s)/actuation) Nasal Spray - Peds 1 Spray(s) Both Nostrils daily PRN  ketorolac Injection - Peds. 28 milliGRAM(s) IV Push every 6 hours  lactulose Oral Liquid - Peds 30 Gram(s) Oral two times a day  lansoprazole  DR Oral Tab/Cap - Peds 30 milliGRAM(s) Oral daily  morphine  IV Intermittent - Peds 6 milliGRAM(s) IV Intermittent every 4 hours  ondansetron IV Intermittent - Peds 8 milliGRAM(s) IV Intermittent every 8 hours PRN  polyethylene glycol 3350 Oral Powder - Peds 17 Gram(s) Oral two times a day  senna 8.6 milliGRAM(s) Oral Tablet - Peds 2 Tablet(s) Oral two times a day  sertraline Oral Tab/Cap - Peds 50 milliGRAM(s) Oral daily    DIET: Pediatric Regular    Vital Signs Last 24 Hrs  T(C): 37.1 (28 Jan 2020 15:00), Max: 37.9 (28 Jan 2020 11:45)  T(F): 98.7 (28 Jan 2020 15:00), Max: 100.2 (28 Jan 2020 11:45)  HR: 76 (28 Jan 2020 15:00) (71 - 87)  BP: 111/55 (28 Jan 2020 15:00) (100/48 - 120/56)  BP(mean): --  RR: 20 (28 Jan 2020 15:00) (18 - 24)  SpO2: 100% (28 Jan 2020 11:30) (97% - 100%)  Daily     Daily     I&O's Summary    27 Jan 2020 07:01  -  28 Jan 2020 07:00  --------------------------------------------------------  IN: 3295 mL / OUT: 1000 mL / NET: 2295 mL    28 Jan 2020 07:01  -  28 Jan 2020 15:43  --------------------------------------------------------  IN: 1172.5 mL / OUT: 600 mL / NET: 572.5 mL    Pain Score (0-10): 0		Lansky/Karnofsky Score: 70    PATIENT CARE ACCESS  [] Peripheral IV  [] Central Venous Line	[] R	[] L	[] IJ	[] Fem	[] SC			[] Placed:  [] PICC:				[] Broviac		[x] Mediport, SL  [] Urinary Catheter, Date Placed:  [x] Necessity of urinary, arterial, and venous catheters discussed    PHYSICAL EXAM  All physical exam findings normal, except those marked:  Constitutional:	Normal: well appearing, resting in bed, in no apparent distress  .		[] Abnormal:  Eyes		Normal: no conjunctival injection, symmetric gaze  .		[x] Abnormal: strabismus (R>L)  ENT:		Normal: mucus membranes moist, no mouth sores or mucosal bleeding, normal dentition, symmetric facies  .		[] Abnormal:  Neck		Normal: no thyromegaly or masses appreciated  .		[] Abnormal:  Cardiovascular	Normal: regular rate, normal S1, S2, no murmurs, rubs or gallops  .		[] Abnormal:  Respiratory	Normal: clear to auscultation bilaterally, no wheezing  .		[] Abnormal:  Abdominal	Normal: normoactive bowel sounds, soft, NT, no hepatosplenomegaly, no masses  .		[] Abnormal:  		Normal: normal genitalia  .		[] Abnormal: [x] not done  Lymphatic	Normal: no adenopathy appreciated  .		[] Abnormal:  Extremities	Normal: FROM x4, no cyanosis or edema, symmetric pulses  .		[] Abnormal:  Skin		Normal: normal appearance, no rash, nodules, vesicles, ulcers or erythema  .		[] Abnormal:  Neurologic	Normal: no focal deficits, gait normal and normal motor exam.  .		[x] Abnormal: 2  shunts  Psychiatric	Normal: affect appropriate  		[] Abnormal:  Musculoskeletal		Normal: full range of motion and no deformities appreciated, no masses and normal strength in all extremities  .			[] Abnormal:    Lab Results:  CBC  CBC Full  -  ( 27 Jan 2020 22:00 )  WBC Count : 9.97 K/uL  RBC Count : 2.58 M/uL  Hemoglobin : 7.8 g/dL  Hematocrit : 23.6 %  Platelet Count - Automated : 350 K/uL  Mean Cell Volume : 91.5 fL  Mean Cell Hemoglobin : 30.2 pg  Mean Cell Hemoglobin Concentration : 33.1 %  Auto Neutrophil # : 4.47 K/uL  Auto Lymphocyte # : 3.96 K/uL  Auto Monocyte # : 1.08 K/uL  Auto Eosinophil # : 0.39 K/uL  Auto Basophil # : 0.05 K/uL  Auto Neutrophil % : 44.9 %  Auto Lymphocyte % : 39.7 %  Auto Monocyte % : 10.8 %  Auto Eosinophil % : 3.9 %  Auto Basophil % : 0.5 %    .		Differential:	[x] Automated		[] Manual  Chemistry    MICROBIOLOGY/CULTURES:    RADIOLOGY RESULTS:    Toxicities (with grade)  1.  2.  3.  4.

## 2020-01-28 NOTE — PROGRESS NOTE PEDS - PROBLEM SELECTOR PLAN 2
- Premature birth @ 22 weeks  - Hx 2  shunt placements  - Right eye deviation present (neuro following)  - MR (1/26): no changes to  shunts and no focal visual abnormalities on imaging

## 2020-01-28 NOTE — PROGRESS NOTE PEDS - ASSESSMENT
Elizabeth is a 20 year old female, ex 22 wk, with HbSS and complicated PMH relating to her disease. History of stroke x2, hydrocephalus (2  shunts), atrophic spleen, several eye operations (Neuro following). She also has a history of recurrent VOE and ACS, and is on chronic transfusions [id number] with chelation therapy for iron overload. She presented with diffuse VOE, fever, and URI symptoms of cough and pharyngitis. CXR in ED was normal. MRI to evaluate eye deviation, intermittent headache and shunts read as unchanged with no acute findings.    Two units PRBC's today for Hgb 7.8. Reports of continued constipation. Increased bowel regimen to BID Lactulose, BID Miralax and BID Senna. Will obtain urine pregnancy test and screening for Chlamydia/Gonorrhea. Currently on Morphine 6 mg IV q3, will decrease to q4 dosing today. If stable on q4 dosing, will transition IV Morphine to PO Tramadol tomorrow morning in preparation for discharge. Elizabeth is a 20 year old female, ex 22 wk, with HbSS and complicated PMH relating to her disease. History of stroke x2, hydrocephalus (2  shunts), atrophic spleen, several eye operations (Neuro following). She also has a history of recurrent VOE and ACS, and is on chronic transfusions [id number] with chelation therapy for iron overload. She presented with diffuse VOE, fever, and URI symptoms of cough and pharyngitis. CXR in ED was normal. MRI to evaluate eye deviation, intermittent headache and shunts read as unchanged with no acute findings.    Two units PRBC's today for Hgb 7.8. Reports of continued constipation. Increased bowel regimen to BID Lactulose, BID Miralax and BID Senna. Will obtain urine pregnancy test and screening for Chlamydia/Gonorrhea. Currently on Morphine 6 mg IV q3, will decrease to q4 dosing today. If stable on q4 dosing, will transition IV Morphine to PO Tramadol tomorrow morning in preparation for discharge.    Last received Depo in [facility]CT on 9/13/19. Plan to give tomorrow 1/29.

## 2020-01-28 NOTE — PROGRESS NOTE PEDS - PROBLEM SELECTOR PLAN 5
- Regular pediatric diet  - IV fluids D5 1/2NS w/ 20 KCl @ 95mL/hr (maintenance)  - Lansoprazole 30 mg PO daily  - Miralax 17 G PO BID  - Lactulose 30 G PO BID  - Senna 17.2 mg PO BID  - Ondansetron 8 mg IV q8 PRN Nausea  - Vit D3 1000U daily

## 2020-01-28 NOTE — PROGRESS NOTE PEDS - ATTENDING COMMENTS
21yo female (ex22wk), HbSS, VPS, on chronic simple blood transfusions, admitted with VOE, generalized, fever, and URI symptom, RVP neg.   Admits to spending the night in her boyfriend's house where it was quite cold.  Ensure adequate pain control, wean to oral as tolerated.  Also with intermittent c/o HA, VPS stable on imaging per neurosurgery.  Also has worsened strabismus, following with adult Opthalmology.  Continue empiric Ceftriaxone, BCx NGTD.  Due for PRBCs, transfuse today   Dispo: pain well controlled with oral meds, afebrile ~24h, possibly tomorrow. 21yo female (ex22wk), HbSS, VPS, on chronic simple blood transfusions, admitted with VOE, generalized, fever, and URI symptom, RVP neg.   Admits to spending the night in her boyfriend's house where it was quite cold.  Ensure adequate pain control, wean to oral as tolerated.  Also with intermittent c/o HA, VPS stable on imaging per neurosurgery.  Also has worsened strabismus, following with adult Opthalmology.  Continue empiric Ceftriaxone, BCx NGTD.  Due for PRBCs, transfuse today   Check to see when she's due for Depo-provera, if now, administer prior to d/c.  Dispo: pain well controlled with oral meds, afebrile ~24h, possibly tomorrow.

## 2020-01-28 NOTE — PROGRESS NOTE PEDS - PROBLEM SELECTOR PLAN 3
- Febrile at home, no documented fever in ED or on Med4  - URI symptoms of pharyngitis and cough, no chest pain, no increased work of breathing, no congestion or rhinorrhea  - CXR (1/26): no consolidation or patchy infiltrates  - Toxicology screen negative  - HIV test negative  - RVP negative  - BCxs negative  - Ceftriaxone 2000 mg IV daily (1/27 - )  - Benzocaine 5x/day PRN  - Flonase spray PRN congestion  - Fluticasone inhaler PRN asthma exacerbation

## 2020-01-28 NOTE — PROGRESS NOTE PEDS - REASON FOR ADMISSION
VOC (generalized/"all over"), & hx fever with URI symptoms
VOC (generalized/"all over"), & hx fever with URI symptoms

## 2020-01-28 NOTE — PROGRESS NOTE PEDS - PROBLEM SELECTOR PLAN 1
- Baseline Hgb 9.0  - Receives chronic transfusions [id number] - last transfusion today (previously 3 weeks ago)  - Iron chelation therapy - takes Jadenu at home - on hold during treatment of VOE  - Diffuse pain - improving  - Morpine 6mg IV q3h - decreased to q4 today  - Toradol 28mg Iv q6h  - Hx atrophic spleen, cholecystectomy  - Reticulocyte count 8.2  - CBCD, Retic, T+S

## 2020-01-29 ENCOUNTER — TRANSCRIPTION ENCOUNTER (OUTPATIENT)
Age: 20
End: 2020-01-29

## 2020-01-29 VITALS
HEART RATE: 75 BPM | DIASTOLIC BLOOD PRESSURE: 51 MMHG | OXYGEN SATURATION: 99 % | TEMPERATURE: 99 F | SYSTOLIC BLOOD PRESSURE: 114 MMHG | RESPIRATION RATE: 20 BRPM

## 2020-01-29 DIAGNOSIS — A69.1: ICD-10-CM

## 2020-01-29 DIAGNOSIS — K59.03 DRUG INDUCED CONSTIPATION: ICD-10-CM

## 2020-01-29 DIAGNOSIS — T40.2X5A DRUG INDUCED CONSTIPATION: ICD-10-CM

## 2020-01-29 DIAGNOSIS — K21.9 GASTRO-ESOPHAGEAL REFLUX DISEASE W/OUT ESOPHAGITIS: ICD-10-CM

## 2020-01-29 LAB
BASOPHILS # BLD AUTO: 0.04 K/UL — SIGNIFICANT CHANGE UP (ref 0–0.2)
BASOPHILS NFR BLD AUTO: 0.3 % — SIGNIFICANT CHANGE UP (ref 0–2)
BASOPHILS NFR SPEC: 0 % — SIGNIFICANT CHANGE UP (ref 0–2)
BLASTS # FLD: 0 % — SIGNIFICANT CHANGE UP (ref 0–0)
C TRACH RRNA SPEC QL NAA+PROBE: SIGNIFICANT CHANGE UP
EOSINOPHIL # BLD AUTO: 0.53 K/UL — HIGH (ref 0–0.5)
EOSINOPHIL NFR BLD AUTO: 4 % — SIGNIFICANT CHANGE UP (ref 0–6)
EOSINOPHIL NFR FLD: 3.6 % — SIGNIFICANT CHANGE UP (ref 0–6)
GIANT PLATELETS BLD QL SMEAR: PRESENT — SIGNIFICANT CHANGE UP
HCT VFR BLD CALC: 32.9 % — LOW (ref 34.5–45)
HGB BLD-MCNC: 11.2 G/DL — LOW (ref 11.5–15.5)
IMM GRANULOCYTES NFR BLD AUTO: 0.3 % — SIGNIFICANT CHANGE UP (ref 0–1.5)
LYMPHOCYTES # BLD AUTO: 2.7 K/UL — SIGNIFICANT CHANGE UP (ref 1–3.3)
LYMPHOCYTES # BLD AUTO: 20.5 % — SIGNIFICANT CHANGE UP (ref 13–44)
LYMPHOCYTES NFR SPEC AUTO: 9.8 % — LOW (ref 13–44)
MACROCYTES BLD QL: SLIGHT — SIGNIFICANT CHANGE UP
MCHC RBC-ENTMCNC: 30.7 PG — SIGNIFICANT CHANGE UP (ref 27–34)
MCHC RBC-ENTMCNC: 34 % — SIGNIFICANT CHANGE UP (ref 32–36)
MCV RBC AUTO: 90.1 FL — SIGNIFICANT CHANGE UP (ref 80–100)
METAMYELOCYTES # FLD: 0 % — SIGNIFICANT CHANGE UP (ref 0–1)
MICROCYTES BLD QL: SLIGHT — SIGNIFICANT CHANGE UP
MONOCYTES # BLD AUTO: 1 K/UL — HIGH (ref 0–0.9)
MONOCYTES NFR BLD AUTO: 7.6 % — SIGNIFICANT CHANGE UP (ref 2–14)
MONOCYTES NFR BLD: 7.1 % — SIGNIFICANT CHANGE UP (ref 2–9)
MYELOCYTES NFR BLD: 0 % — SIGNIFICANT CHANGE UP (ref 0–0)
N GONORRHOEA RRNA SPEC QL NAA+PROBE: SIGNIFICANT CHANGE UP
NEUTROPHIL AB SER-ACNC: 69.6 % — SIGNIFICANT CHANGE UP (ref 43–77)
NEUTROPHILS # BLD AUTO: 8.87 K/UL — HIGH (ref 1.8–7.4)
NEUTROPHILS NFR BLD AUTO: 67.3 % — SIGNIFICANT CHANGE UP (ref 43–77)
NEUTS BAND # BLD: 3.6 % — SIGNIFICANT CHANGE UP (ref 0–6)
NRBC # BLD: 2 /100WBC — SIGNIFICANT CHANGE UP
NRBC # FLD: 0.05 K/UL — SIGNIFICANT CHANGE UP (ref 0–0)
OTHER - HEMATOLOGY %: 0 — SIGNIFICANT CHANGE UP
OVALOCYTES BLD QL SMEAR: SLIGHT — SIGNIFICANT CHANGE UP
PLATELET # BLD AUTO: 339 K/UL — SIGNIFICANT CHANGE UP (ref 150–400)
PLATELET COUNT - ESTIMATE: NORMAL — SIGNIFICANT CHANGE UP
PMV BLD: 10.1 FL — SIGNIFICANT CHANGE UP (ref 7–13)
POIKILOCYTOSIS BLD QL AUTO: SLIGHT — SIGNIFICANT CHANGE UP
POLYCHROMASIA BLD QL SMEAR: SLIGHT — SIGNIFICANT CHANGE UP
PROMYELOCYTES # FLD: 0 % — SIGNIFICANT CHANGE UP (ref 0–0)
RBC # BLD: 3.65 M/UL — LOW (ref 3.8–5.2)
RBC # FLD: 16.3 % — HIGH (ref 10.3–14.5)
RETICS #: 164 K/UL — HIGH (ref 25–125)
RETICS/RBC NFR: 4.5 % — HIGH (ref 0.5–2.5)
SPHEROCYTES BLD QL SMEAR: SLIGHT — SIGNIFICANT CHANGE UP
VARIANT LYMPHS # BLD: 6.3 % — SIGNIFICANT CHANGE UP
WBC # BLD: 13.18 K/UL — HIGH (ref 3.8–10.5)
WBC # FLD AUTO: 13.18 K/UL — HIGH (ref 3.8–10.5)

## 2020-01-29 PROCEDURE — 99238 HOSP IP/OBS DSCHRG MGMT 30/<: CPT | Mod: GC

## 2020-01-29 RX ORDER — TRAMADOL HYDROCHLORIDE 50 MG/1
1 TABLET ORAL
Qty: 0 | Refills: 0 | DISCHARGE
Start: 2020-01-29 | End: 2020-01-31

## 2020-01-29 RX ORDER — ARIPIPRAZOLE 15 MG/1
1 TABLET ORAL
Qty: 0 | Refills: 0 | DISCHARGE
Start: 2020-01-29

## 2020-01-29 RX ORDER — ONDANSETRON 8 MG/1
1 TABLET, FILM COATED ORAL
Qty: 30 | Refills: 0
Start: 2020-01-29

## 2020-01-29 RX ORDER — TRAMADOL HYDROCHLORIDE 50 MG/1
50 TABLET, COATED ORAL EVERY 4 HOURS
Qty: 60 | Refills: 0 | Status: DISCONTINUED | COMMUNITY
Start: 2019-01-24 | End: 2020-01-29

## 2020-01-29 RX ORDER — ARIPIPRAZOLE 15 MG/1
1 TABLET ORAL
Qty: 0 | Refills: 0 | DISCHARGE

## 2020-01-29 RX ORDER — SERTRALINE 25 MG/1
1 TABLET, FILM COATED ORAL
Qty: 0 | Refills: 0 | DISCHARGE

## 2020-01-29 RX ORDER — TRAMADOL HYDROCHLORIDE 50 MG/1
50 TABLET ORAL EVERY 6 HOURS
Refills: 0 | Status: DISCONTINUED | OUTPATIENT
Start: 2020-01-29 | End: 2020-01-29

## 2020-01-29 RX ORDER — IBUPROFEN 200 MG
400 TABLET ORAL EVERY 6 HOURS
Refills: 0 | Status: DISCONTINUED | OUTPATIENT
Start: 2020-01-29 | End: 2020-01-29

## 2020-01-29 RX ORDER — MEDROXYPROGESTERONE ACETATE 150 MG/ML
150 INJECTION, SUSPENSION, EXTENDED RELEASE INTRAMUSCULAR ONCE
Refills: 0 | Status: COMPLETED | OUTPATIENT
Start: 2020-01-29 | End: 2020-01-29

## 2020-01-29 RX ORDER — TRAMADOL HYDROCHLORIDE 50 MG/1
1 TABLET ORAL
Qty: 30 | Refills: 0
Start: 2020-01-29

## 2020-01-29 RX ORDER — FLUTICASONE PROPIONATE 220 MCG
2 AEROSOL WITH ADAPTER (GRAM) INHALATION
Qty: 0 | Refills: 0 | DISCHARGE

## 2020-01-29 RX ORDER — SERTRALINE 25 MG/1
1 TABLET, FILM COATED ORAL
Qty: 0 | Refills: 0 | DISCHARGE
Start: 2020-01-29

## 2020-01-29 RX ORDER — TRAMADOL HYDROCHLORIDE 50 MG/1
50 TABLET ORAL EVERY 4 HOURS
Refills: 0 | Status: DISCONTINUED | OUTPATIENT
Start: 2020-01-29 | End: 2020-01-29

## 2020-01-29 RX ORDER — LEVALBUTEROL 1.25 MG/.5ML
3 SOLUTION, CONCENTRATE RESPIRATORY (INHALATION)
Qty: 0 | Refills: 0 | DISCHARGE

## 2020-01-29 RX ORDER — FAMOTIDINE 10 MG/ML
1 INJECTION INTRAVENOUS
Qty: 30 | Refills: 0
Start: 2020-01-29

## 2020-01-29 RX ORDER — MEDROXYPROGESTERONE ACETATE 150 MG/ML
0 INJECTION, SUSPENSION, EXTENDED RELEASE INTRAMUSCULAR
Qty: 0 | Refills: 0 | DISCHARGE

## 2020-01-29 RX ORDER — POLYETHYLENE GLYCOL 3350 17 G/17G
1 POWDER, FOR SOLUTION ORAL
Qty: 510 | Refills: 0
Start: 2020-01-29 | End: 2020-02-04

## 2020-01-29 RX ORDER — SENNOSIDES 8.6 MG TABLETS 8.6 MG/1
8.6 TABLET ORAL
Qty: 30 | Refills: 0 | Status: ACTIVE | COMMUNITY
Start: 2020-01-29 | End: 1900-01-01

## 2020-01-29 RX ORDER — POLYETHYLENE GLYCOL 3350 17 G/17G
1 POWDER, FOR SOLUTION ORAL
Qty: 510 | Refills: 0
Start: 2020-01-29 | End: 2020-02-19

## 2020-01-29 RX ORDER — TRAMADOL HYDROCHLORIDE 50 MG/1
1 TABLET ORAL
Qty: 11 | Refills: 0
Start: 2020-01-29

## 2020-01-29 RX ORDER — ACETAMINOPHEN 325 MG/1
325 TABLET, FILM COATED ORAL EVERY 6 HOURS
Refills: 0 | Status: DISCONTINUED | COMMUNITY
Start: 2017-08-16 | End: 2020-01-29

## 2020-01-29 RX ADMIN — DEXTROSE MONOHYDRATE, SODIUM CHLORIDE, AND POTASSIUM CHLORIDE 95 MILLILITER(S): 50; .745; 4.5 INJECTION, SOLUTION INTRAVENOUS at 07:28

## 2020-01-29 RX ADMIN — MEDROXYPROGESTERONE ACETATE 150 MILLIGRAM(S): 150 INJECTION, SUSPENSION, EXTENDED RELEASE INTRAMUSCULAR at 17:05

## 2020-01-29 RX ADMIN — Medication 28 MILLIGRAM(S): at 04:03

## 2020-01-29 RX ADMIN — MORPHINE SULFATE 36 MILLIGRAM(S): 50 CAPSULE, EXTENDED RELEASE ORAL at 00:10

## 2020-01-29 RX ADMIN — Medication 28 MILLIGRAM(S): at 05:00

## 2020-01-29 RX ADMIN — TRAMADOL HYDROCHLORIDE 50 MILLIGRAM(S): 50 TABLET ORAL at 09:00

## 2020-01-29 RX ADMIN — POLYETHYLENE GLYCOL 3350 17 GRAM(S): 17 POWDER, FOR SOLUTION ORAL at 09:08

## 2020-01-29 RX ADMIN — Medication 28 MILLIGRAM(S): at 10:00

## 2020-01-29 RX ADMIN — TRAMADOL HYDROCHLORIDE 50 MILLIGRAM(S): 50 TABLET ORAL at 13:15

## 2020-01-29 RX ADMIN — MORPHINE SULFATE 6 MILLIGRAM(S): 50 CAPSULE, EXTENDED RELEASE ORAL at 05:00

## 2020-01-29 RX ADMIN — LANSOPRAZOLE 30 MILLIGRAM(S): 15 CAPSULE, DELAYED RELEASE ORAL at 09:08

## 2020-01-29 RX ADMIN — TRAMADOL HYDROCHLORIDE 50 MILLIGRAM(S): 50 TABLET ORAL at 18:10

## 2020-01-29 RX ADMIN — TRAMADOL HYDROCHLORIDE 50 MILLIGRAM(S): 50 TABLET ORAL at 14:00

## 2020-01-29 RX ADMIN — Medication 400 MILLIGRAM(S): at 16:10

## 2020-01-29 RX ADMIN — TRAMADOL HYDROCHLORIDE 50 MILLIGRAM(S): 50 TABLET ORAL at 17:05

## 2020-01-29 RX ADMIN — Medication 2 PUFF(S): at 08:13

## 2020-01-29 RX ADMIN — TRAMADOL HYDROCHLORIDE 50 MILLIGRAM(S): 50 TABLET ORAL at 10:00

## 2020-01-29 RX ADMIN — MORPHINE SULFATE 6 MILLIGRAM(S): 50 CAPSULE, EXTENDED RELEASE ORAL at 01:00

## 2020-01-29 RX ADMIN — Medication 28 MILLIGRAM(S): at 11:00

## 2020-01-29 RX ADMIN — MORPHINE SULFATE 36 MILLIGRAM(S): 50 CAPSULE, EXTENDED RELEASE ORAL at 04:03

## 2020-01-29 RX ADMIN — Medication 400 MILLIGRAM(S): at 17:05

## 2020-01-29 NOTE — DISCHARGE NOTE NURSING/CASE MANAGEMENT/SOCIAL WORK - PATIENT PORTAL LINK FT
You can access the FollowMyHealth Patient Portal offered by [facility] by registering at the following website: [email or web address]. By joining [email or web address] FollowMyHealth portal, you will also be able to view your health information using other applications (apps) compatible with our system.

## 2020-01-29 NOTE — DISCHARGE NOTE NURSING/CASE MANAGEMENT/SOCIAL WORK - NSDCPNDISPN_GEN_ALL_CORE
Safe use, storage and disposal of opioids when prescribed/Side effects of pain management treatment/Education provided on the pain management plan of care/Activities of daily living, including home environment that might     exacerbate pain or reduce effectiveness of the pain management plan of care as well as strategies to address these issues/Opioids not applicable/not prescribed

## 2020-01-29 NOTE — DISCHARGE NOTE NURSING/CASE MANAGEMENT/SOCIAL WORK - NSDCPEPTSTRK_GEN_ALL_CORE
Signs and symptoms of stroke/Stroke support groups for patients, families, and friends/Call 911 for stroke/Need for follow up after discharge/Prescribed medications/Risk factors for stroke/Stroke education booklet/Stroke warning signs and symptoms

## 2020-01-30 ENCOUNTER — APPOINTMENT (OUTPATIENT)
Dept: PEDIATRIC HEMATOLOGY/ONCOLOGY | Facility: CLINIC | Age: 20
End: 2020-01-30

## 2020-01-31 LAB — BACTERIA BLD CULT: SIGNIFICANT CHANGE UP

## 2020-02-01 ENCOUNTER — APPOINTMENT (OUTPATIENT)
Dept: PEDIATRIC HEMATOLOGY/ONCOLOGY | Facility: CLINIC | Age: 20
End: 2020-02-01

## 2020-02-07 ENCOUNTER — OUTPATIENT (OUTPATIENT)
Dept: OUTPATIENT SERVICES | Age: 20
LOS: 1 days | End: 2020-02-07

## 2020-02-07 ENCOUNTER — OUTPATIENT (OUTPATIENT)
Dept: OUTPATIENT SERVICES | Facility: HOSPITAL | Age: 20
LOS: 1 days | End: 2020-02-07
Payer: MEDICAID

## 2020-02-07 VITALS
HEIGHT: 61.93 IN | DIASTOLIC BLOOD PRESSURE: 59 MMHG | RESPIRATION RATE: 12 BRPM | TEMPERATURE: 98 F | OXYGEN SATURATION: 98 % | WEIGHT: 118.39 LBS | HEART RATE: 90 BPM | SYSTOLIC BLOOD PRESSURE: 100 MMHG

## 2020-02-07 DIAGNOSIS — G91.1 OBSTRUCTIVE HYDROCEPHALUS: ICD-10-CM

## 2020-02-07 DIAGNOSIS — Z01.812 ENCOUNTER FOR PREPROCEDURAL LABORATORY EXAMINATION: ICD-10-CM

## 2020-02-07 DIAGNOSIS — Z98.890 OTHER SPECIFIED POSTPROCEDURAL STATES: Chronic | ICD-10-CM

## 2020-02-07 LAB
ALBUMIN SERPL ELPH-MCNC: 4.6 G/DL — SIGNIFICANT CHANGE UP (ref 3.3–5)
ALP SERPL-CCNC: 69 U/L — SIGNIFICANT CHANGE UP (ref 40–120)
ALT FLD-CCNC: 28 U/L — SIGNIFICANT CHANGE UP (ref 4–33)
ANION GAP SERPL CALC-SCNC: 12 MMO/L — SIGNIFICANT CHANGE UP (ref 7–14)
AST SERPL-CCNC: 31 U/L — SIGNIFICANT CHANGE UP (ref 4–32)
BILIRUB SERPL-MCNC: 2.3 MG/DL — HIGH (ref 0.2–1.2)
BLD GP AB SCN SERPL QL: NEGATIVE — SIGNIFICANT CHANGE UP
BUN SERPL-MCNC: 10 MG/DL — SIGNIFICANT CHANGE UP (ref 7–23)
CALCIUM SERPL-MCNC: 9.6 MG/DL — SIGNIFICANT CHANGE UP (ref 8.4–10.5)
CHLORIDE SERPL-SCNC: 106 MMOL/L — SIGNIFICANT CHANGE UP (ref 98–107)
CO2 SERPL-SCNC: 20 MMOL/L — LOW (ref 22–31)
CREAT SERPL-MCNC: 0.75 MG/DL — SIGNIFICANT CHANGE UP (ref 0.5–1.3)
GLUCOSE SERPL-MCNC: 109 MG/DL — HIGH (ref 70–99)
HCG SERPL-ACNC: < 5 MIU/ML — SIGNIFICANT CHANGE UP
HCT VFR BLD CALC: 31.1 % — LOW (ref 34.5–45)
HGB BLD-MCNC: 10.5 G/DL — LOW (ref 11.5–15.5)
MCHC RBC-ENTMCNC: 30.2 PG — SIGNIFICANT CHANGE UP (ref 27–34)
MCHC RBC-ENTMCNC: 33.8 % — SIGNIFICANT CHANGE UP (ref 32–36)
MCV RBC AUTO: 89.4 FL — SIGNIFICANT CHANGE UP (ref 80–100)
NRBC # FLD: 0.05 K/UL — SIGNIFICANT CHANGE UP (ref 0–0)
PLATELET # BLD AUTO: 491 K/UL — HIGH (ref 150–400)
PMV BLD: 9.8 FL — SIGNIFICANT CHANGE UP (ref 7–13)
POTASSIUM SERPL-MCNC: 3.3 MMOL/L — LOW (ref 3.5–5.3)
POTASSIUM SERPL-SCNC: 3.3 MMOL/L — LOW (ref 3.5–5.3)
PROT SERPL-MCNC: 7.7 G/DL — SIGNIFICANT CHANGE UP (ref 6–8.3)
RBC # BLD: 3.48 M/UL — LOW (ref 3.8–5.2)
RBC # FLD: 15.7 % — HIGH (ref 10.3–14.5)
RH IG SCN BLD-IMP: POSITIVE — SIGNIFICANT CHANGE UP
SODIUM SERPL-SCNC: 138 MMOL/L — SIGNIFICANT CHANGE UP (ref 135–145)
WBC # BLD: 17.21 K/UL — HIGH (ref 3.8–10.5)
WBC # FLD AUTO: 17.21 K/UL — HIGH (ref 3.8–10.5)

## 2020-02-07 RX ORDER — LEVALBUTEROL 1.25 MG/.5ML
2 SOLUTION, CONCENTRATE RESPIRATORY (INHALATION)
Qty: 0 | Refills: 0 | DISCHARGE

## 2020-02-07 RX ORDER — LEVALBUTEROL 1.25 MG/.5ML
2 SOLUTION, CONCENTRATE RESPIRATORY (INHALATION)
Qty: 1 | Refills: 0
Start: 2020-02-07 | End: 2020-03-07

## 2020-02-07 NOTE — H&P PST ADULT - REASON FOR ADMISSION
PST for insertion of intracranial pressure monitor on 2/11/20 with Dr. Benjamin Loza at [facility].

## 2020-02-07 NOTE — H&P PST ADULT - ASSESSMENT
20 year old female ex 24 week premie with a grade 4 IVH, with complex medical history of sickle cell disease, hydrocephalus s/p  shut x2 with 6 revisions, reactive airway, atrophic spleen, cholecystectomy, 4x eye operations with recent right eye deviation, bipolar I disorder and depression.  Recurrent VOE and ECS on chronic every 3-4 weeks of PRBC’s, on chelation therapy for iron overload.  Right eye deviation present but not a new finding.    No evidence of acute illness or infection.   Child life prep with family.   CBC, HgB Electrophoresis T&S and CMP done at [facility]'s request.  [facility] recommended IVF on day of surgery in ASU if case is later in the day.    Patient advised to hydrate well day prior to surgery.   Recommended to continue Flovent BID and start Xopenex 2 puffs BID for 2 days prior to procedure for optimization.  Attempting to get patient cardiac appointment for clearance prior to procedure Tuesday, Dr. Loza notified.

## 2020-02-07 NOTE — H&P PST ADULT - COMMENTS
History or jeanna which was treated - Dr. Klein GYN History or gardnerella which was treated - Dr. Klein GYN

## 2020-02-07 NOTE — H&P PST ADULT - HISTORY OF PRESENT ILLNESS
HPI:  20 year old female ex 24 week premie, with complex medical history of sickle cell disease, hydrocephalus s/p  shut with 2 revisions, asthma, atrophic spleen, cholecystectomy, 4x eye operations with recent right eye deviation, bipolar I disorder and depressioon.  Recurrent VOE and ECS on chronic every 3-4 weeks of PRBC’s, on chelation therapy for iron overload.  Right eye deviation present but not a new finding.  MRI 1/26 showed no changes to  shunts and no focal visual abnormalities on imaging. Toxicology screen negative, HIV test negative, RVP negative, blood cultures negative. HPI:  20 year old female ex 22 week premie with a grade IVH, with complex medical history of sicmkle cell disease, hydrocephalus s/p  shut x2 with 6 revisions, reactive airway, atrophic spleen, cholecystectomy, 4x eye operations with recent right eye deviation, bipolar I disorder and depression  Recurrent VOE and ECS on chronic every 3-4 weeks of PRBC’s, on chelation therapy for iron overload.  Right eye deviation present but not a new finding.  MRI 1/26 showed no changes to  shunts and no focal visual abnormalities on imaging. Toxicology screen negative, HIV test negative, RVP negative, blood cultures negative. HPI:  20 year old female ex 24 week premie with a grade 4 IVH, with complex medical history of sickle cell disease, hydrocephalus s/p  shut x2 with 6 revisions, reactive airway, atrophic spleen, cholecystectomy, 4x eye operations with recent right eye deviation, bipolar I disorder and depression.  Recurrent VOE and ECS on chronic every 3-4 weeks of PRBC’s, on chelation therapy for iron overload.  Right eye deviation present but not a new finding.  MRI 1/26 showed no changes to  shunts and no focal visual abnormalities on imaging. Toxicology screen negative, HIV test negative, RVP negative, blood cultures negative. HPI:  20 year old female ex 24 week premie with a grade 4 IVH, with complex medical history of sickle cell disease, hydrocephalus s/p  shut x2 with 6 revisions, reactive airway, atrophic spleen, cholecystectomy, 4x eye operations with recent right eye deviation, bipolar I disorder and depression.  Recurrent VOE and ECS on chronic every 3-4 weeks of PRBC’s, on chelation therapy for iron overload.  Right eye deviation present but not a new finding.    Most recently admitted 1/26/20 for vaso-occlusive crisis with URI symptoms, requiring 2x units of PRBC’s.  RVP negative, blood culture negative, CXR normal.  MRI 1/26 showed no changes to  shunts and no focal visual abnormalities on imaging. Toxicology screen negative, HIV test negative, RVP negative, blood cultures negative. Prior to this last vaso-occlusive crisis admission over 3-4 years ago. 20 year old female ex 24 week premie with a grade 4 IVH, with complex medical history of sickle cell disease, hydrocephalus s/p  shut x2 with 6 revisions, reactive airway, atrophic spleen, cholecystectomy, 4x eye operations with recent right eye deviation, bipolar I disorder and depression.  Recurrent VOE and ECS on chronic every 3-4 weeks of PRBC’s, on chelation therapy for iron overload.  Right eye deviation present but not a new finding.    Most recently admitted 1/26/20 for vaso-occlusive crisis with URI symptoms, requiring 2x units of PRBC’s.  RVP negative, blood culture negative, CXR normal.  MRI 1/26 showed no changes to  shunts and no focal visual abnormalities on imaging. Toxicology screen negative, HIV test negative, RVP negative, blood cultures negative. Prior to this last vaso-occlusive crisis admission over 3-4 years ago.

## 2020-02-07 NOTE — H&P PST ADULT - NEGATIVE OPHTHALMOLOGIC SYMPTOMS
Right eye deviation, still see's normal, causing headaches, Optho sent to Neurosurgery Vision remains intact

## 2020-02-07 NOTE — H&P PST ADULT - CARDIOVASCULAR COMMENTS
Last cardiology appointment 4/17/18 - ECHO normal except for mildly dilated LA and LV.  Follow up recommended yearly.

## 2020-02-07 NOTE — H&P PST ADULT - NSICDXPASTSURGICALHX_GEN_ALL_CORE_FT
PAST SURGICAL HISTORY:  H/O surgical procedure s/p Mediport placement 2011    No significant past surgical history     S/P Cholecystectomy s/p open cholecystectomy 2012    S/P Tonsillectomy and Adenoide     S/P  Shunt x6 revisions, last 2012 w/ Dr. Loza    Strabismus Repair x4 PAST SURGICAL HISTORY:  H/O surgical procedure s/p Mediport placement 2011    No significant past surgical history     S/P Cholecystectomy s/p open cholecystectomy 2012    S/P Tonsillectomy and Adenoides     S/P  Shunt x6 revisions, last 2012 w/ Dr. Loza    Strabismus Repair x4

## 2020-02-07 NOTE — H&P PST ADULT - NEGATIVE NEUROLOGICAL SYMPTOMS
no syncope/no focal seizures/no loss of sensation/no loss of consciousness/no generalized seizures/no confusion/no difficulty walking

## 2020-02-07 NOTE — H&P PST ADULT - NSICDXPROBLEM_GEN_ALL_CORE_FT
PROBLEM DIAGNOSES  Problem: Obstructive hydrocephalus  Assessment and Plan: Scheduled for insertion of intracranial pressure monitor on 2/11/20

## 2020-02-07 NOTE — H&P PST ADULT - NSICDXPASTMEDICALHX_GEN_ALL_CORE_FT
PAST MEDICAL HISTORY:  Anxiety     Chronic Lung Disease of Premat follows with [facility] Pulmonology    CP (cerebral palsy) - mild    CVA (Cerebral Vascular Accident) Onset date unknown, noted on MRI from 5/2010    Depression     Hydrocephalus     Impacted teeth     No pertinent past medical history     Reactive Airway Disease receives albuterol and xoponex treatments at home    S/P  Shunt x2 with multiple shunt revisions    Sickle Cell Disease     Strabismus PAST MEDICAL HISTORY:  Anxiety     Chronic Lung Disease of Prematura follows with [facility]    CP (cerebral palsy) - mild    CVA (Cerebral Vascular Accident) Onset date unknown, noted on MRI from 5/2010    Depression     Hydrocephalus     Impacted teeth     No pertinent past medical history     Reactive Airway Disease receives albuterol and Xopenex treatments at home    S/P  Shunt x2 with multiple shunt revisions    Sickle Cell Disease     Strabismus

## 2020-02-07 NOTE — H&P PST ADULT - NEGATIVE MUSCULOSKELETAL SYMPTOMS
Foot broken at 4yo required casting no arthralgia/no back pain/no myalgia/no muscle cramps/no neck pain/no arm pain L/no arm pain R/no leg pain L/no leg pain R/no arthritis/no stiffness/no joint swelling/no muscle weakness/Foot broken at 2yo required casting

## 2020-02-07 NOTE — H&P PST ADULT - PSYCHIATRIC COMMENTS
History of cutting at 13yo admitted to [facility] History [place] 13[place] [facility] [place]

## 2020-02-07 NOTE — H&P PST ADULT - NEUROLOGICAL COMMENTS
With eye deviation, patient experiences headaches and nausea at times which started in August 2019. 1/27/20 MRI to evaluate eye deviation and shunts read as unchanged with no acute findings.  ICP monitor to monitor pressure during eye deviation and headaches.

## 2020-02-07 NOTE — H&P PST ADULT - NEGATIVE PSYCHIATRIC SYMPTOMS
Anxiety and Depression, member of [facility] weekly. no depression/no suicidal ideation/Anxiety and Depression, member of [facility] weekly.  Patient states that medication is helping with symptoms./no anxiety

## 2020-02-07 NOTE — H&P PST ADULT - NS MD HP INPLANTS MED DEV
non-programable, mediport/Ventriculoperitoneal shunt non-programable, Mediport/Ventriculoperitoneal shunt

## 2020-02-07 NOTE — H&P PST ADULT - RESPIRATORY AND THORAX COMMENTS
History of obstructive apnea and persistent hypoxemia.  Previously had obstructive lung disease with persistent decrease in expiratory flow rates as low lung volumes.  Flovent maintenance.  2018 screening for pulmonary hypertension negative.  Will follow up with pulmonary post operatively.

## 2020-02-07 NOTE — H&P PST ADULT - NS PRO REFERRAL CMGT
Patient was seen by Child Life Specialist. Psychological preparation for procedure was provided through pictures and medical materials. Emotional support was provided to pt. and family.

## 2020-02-10 ENCOUNTER — APPOINTMENT (OUTPATIENT)
Dept: PEDIATRIC CARDIOLOGY | Facility: CLINIC | Age: 20
End: 2020-02-10
Payer: COMMERCIAL

## 2020-02-10 ENCOUNTER — TRANSCRIPTION ENCOUNTER (OUTPATIENT)
Age: 20
End: 2020-02-10

## 2020-02-10 ENCOUNTER — OUTPATIENT (OUTPATIENT)
Dept: OUTPATIENT SERVICES | Age: 20
LOS: 1 days | Discharge: HOME | End: 2020-02-10

## 2020-02-10 VITALS
SYSTOLIC BLOOD PRESSURE: 110 MMHG | RESPIRATION RATE: 18 BRPM | BODY MASS INDEX: 20.64 KG/M2 | OXYGEN SATURATION: 100 % | WEIGHT: 117.95 LBS | HEIGHT: 63.39 IN | HEART RATE: 82 BPM | DIASTOLIC BLOOD PRESSURE: 57 MMHG

## 2020-02-10 DIAGNOSIS — Z98.890 OTHER SPECIFIED POSTPROCEDURAL STATES: Chronic | ICD-10-CM

## 2020-02-10 DIAGNOSIS — Z13.6 ENCOUNTER FOR SCREENING FOR CARDIOVASCULAR DISORDERS: ICD-10-CM

## 2020-02-10 LAB
HGB A MFR BLD: 82.4 % — LOW
HGB A2 MFR BLD: 2.7 % — SIGNIFICANT CHANGE UP (ref 2.4–3.5)
HGB F MFR BLD: 1 % — SIGNIFICANT CHANGE UP (ref 0–1.5)
HGB S MFR BLD: 13.9 % — HIGH (ref 0–0)

## 2020-02-10 PROCEDURE — 93306 TTE W/DOPPLER COMPLETE: CPT

## 2020-02-10 PROCEDURE — 93000 ELECTROCARDIOGRAM COMPLETE: CPT

## 2020-02-10 PROCEDURE — 99214 OFFICE O/P EST MOD 30 MIN: CPT | Mod: 25

## 2020-02-10 NOTE — REASON FOR VISIT
[Follow-Up] : a follow-up visit for [Sickle Cell Disease] : in the setting of sickle cell disease [Noncardiac Disease] : cardiovascular evaluation  [Patient] : patient [Medical Records] : medical records [Mother] : mother [FreeTextEntry3] : Screening for Cardiovascular Disorders, Mildly Dilated LV/LA

## 2020-02-10 NOTE — CLINICAL NARRATIVE
[Up to Date] : Up to Date [FreeTextEntry2] : Arrives prior to  shunt revision surgery scheduled for tomorrow by Dr. Loza. No hx of cardiac symptoms.

## 2020-02-10 NOTE — CARDIOLOGY SUMMARY
[Today's Date] : [unfilled] [FreeTextEntry1] : Normal sinus rhythm without preexcitation or ectopy. Heart rate (bpm): 57 [FreeTextEntry2] : 1. Sickle cell anemia.\par  2. Normal left ventricular size, morphology and systolic function.\par  3. Left ventricular ejection fraction by 5/6 Area x Length is normal at 64 %.\par  4. Normal left ventricular diastolic function.\par  5. Normal right ventricular morphology with qualitatively normal size and systolic function.\par  6. No evidence of pulmonary hypertension.\par  7. No pericardial effusion.\par

## 2020-02-10 NOTE — CONSULT LETTER
[Today's Date] : [unfilled] [Name] : Name: [unfilled] [Dear  ___:] : Dear Dr. [unfilled]: [Today's Date:] : [unfilled] [] : : ~~ [Consult] : I had the pleasure of evaluating your patient, [unfilled]. My full evaluation follows. [Consult - Single Provider] : Thank you very much for allowing me to participate in the care of this patient. If you have any questions, please do not hesitate to contact me. [Sincerely,] : Sincerely, [DrMartin  ___] : Dr. MAJANO [___] : [unfilled] [FreeTextEntry4] : DR. DELANO KEY MD [de-identified] : An Thompson MD, FAAP, FACC\par \par Pediatric Cardiologist\par  of Pediatrics\par [facility]

## 2020-02-10 NOTE — PHYSICAL EXAM
[General Appearance - In No Acute Distress] : in no acute distress [General Appearance - Alert] : alert [General Appearance - Well-Appearing] : well appearing [General Appearance - Well Developed] : well developed [General Appearance - Well Nourished] : well nourished [Appearance Of Head] : the head was normocephalic [Facies] : the head and face were normal in appearance [Sclera] : the sclera were normal [Outer Ear] : the ears and nose were normal in appearance [Examination Of The Oral Cavity] : mucous membranes were moist and pink [Auscultation Breath Sounds / Voice Sounds] : breath sounds clear to auscultation bilaterally [Normal Chest Appearance] : the chest was normal in appearance [Apical Impulse] : quiet precordium with normal apical impulse [Chest Palpation Tender Sternum] : no chest wall tenderness [Heart Sounds] : normal S1 and S2 [Heart Rate And Rhythm] : normal heart rate and rhythm [Heart Sounds Gallop] : no gallops [Heart Sounds Pericardial Friction Rub] : no pericardial rub [Heart Sounds Click] : no clicks [Arterial Pulses] : normal upper and lower extremity pulses with no pulse delay [Capillary Refill Test] : normal capillary refill [Edema] : no edema [II] : a grade 2/6 [Systolic] : systolic [LUSB] : LUSB [Abdomen Tenderness] : non-tender [Nondistended] : nondistended [Abdomen Soft] : soft [Musculoskeletal Exam: Normal Movement Of All Extremities] : normal movements of all extremities [Motor Tone] : muscle strength and tone were normal [Nail Clubbing] : no clubbing  or cyanosis of the fingers [Cervical Lymph Nodes Enlarged Posterior] : The posterior cervical nodes were normal [Cervical Lymph Nodes Enlarged Anterior] : The anterior cervical nodes were normal [] : no rash [Skin Lesions] : no lesions [Demonstrated Behavior - Infant Nonreactive To Parents] : interactive [Skin Turgor] : normal turgor [Mood] : mood and affect were appropriate for age [Demonstrated Behavior] : normal behavior

## 2020-02-10 NOTE — DISCUSSION/SUMMARY
[FreeTextEntry1] : KINGS  has a normal cardiac exam, electrocardiogram and echocardiogram without evidence of cardiac dysfunction related to her Sickle Cell Disease.  I reassured KINGS  and Her family that the KINGS ’s heart is structurally and functionally normal and that KINGS is cleared from a cardiac standpoint for the upcoming surgery. There is no need for special cardiac monitoring unless deemed necessary by the anesthesiologist.\par   All physical activities may be performed without restriction and she should continue to have yearly cardiac follow-up in the future. \par   [PE + No Restrictions] : [unfilled] may participate in the entire physical education program without restriction, including all varsity competitive sports. [Needs SBE Prophylaxis] : [unfilled]  needs bacterial endocarditis prophylaxis. SBE prophylaxis is indicated for dental and invasive ENT procedures. (Circulation. 2007; 116: 5537-8392)

## 2020-02-10 NOTE — HISTORY OF PRESENT ILLNESS
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 \par KINGS was last seen by my colleague Dr. Quirino Emanuel ~ 2 yeas ago.

## 2020-02-10 NOTE — REVIEW OF SYSTEMS
[Feeling Poorly] : not feeling poorly (malaise) [Fever] : no fever [Wgt Loss (___ Lbs)] : no recent weight loss [Pallor] : not pale [Redness] : no redness [Eye Discharge] : no eye discharge [Nasal Stuffiness] : no nasal congestion [Change in Vision] : no change in vision [Sore Throat] : no sore throat [Loss Of Hearing] : no hearing loss [Earache] : no earache [Diaphoresis] : not diaphoretic [Edema] : no edema [Cyanosis] : no cyanosis [Chest Pain] : no chest pain or discomfort [Exercise Intolerance] : no persistence of exercise intolerance [Orthopnea] : no orthopnea [Palpitations] : no palpitations [Fast HR] : no tachycardia [Wheezing] : no wheezing [Cough] : no cough [Tachypnea] : not tachypneic [Shortness Of Breath] : not expressed as feeling short of breath [Vomiting] : no vomiting [Diarrhea] : no diarrhea [Abdominal Pain] : no abdominal pain [Decrease In Appetite] : appetite not decreased [Fainting (Syncope)] : no fainting [Seizure] : no seizures [Headache] : no headache [Dizziness] : no dizziness [Joint Pains] : no arthralgias [Joint Swelling] : no joint swelling [Limping] : no limping [Wound problems] : no wound problems [Rash] : no rash [Easy Bruising] : no tendency for easy bruising [Swollen Glands] : no lymphadenopathy [Nosebleeds] : no epistaxis [Easy Bleeding] : no ~M tendency for easy bleeding [Sleep Disturbances] : ~T no sleep disturbances [Hyperactive] : no hyperactive behavior [Anxiety] : no anxiety [Depression] : no depression [Failure To Thrive] : no failure to thrive [Short Stature] : short stature was not noted [Jitteriness] : no jitteriness [Heat/Cold Intolerance] : no temperature intolerance [Dec Urine Output] : no oliguria [FreeTextEntry1] : fluid accumulation in the forehead

## 2020-02-11 ENCOUNTER — INPATIENT (INPATIENT)
Age: 20
LOS: 1 days | Discharge: HOME | End: 2020-02-13
Attending: NEUROLOGICAL SURGERY | Admitting: NEUROLOGICAL SURGERY
Payer: COMMERCIAL

## 2020-02-11 ENCOUNTER — TRANSCRIPTION ENCOUNTER (OUTPATIENT)
Age: 20
End: 2020-02-11

## 2020-02-11 ENCOUNTER — APPOINTMENT (OUTPATIENT)
Dept: PEDIATRIC HEMATOLOGY/ONCOLOGY | Facility: CLINIC | Age: 20
End: 2020-02-11

## 2020-02-11 ENCOUNTER — OUTPATIENT (OUTPATIENT)
Dept: OUTPATIENT SERVICES | Age: 20
LOS: 1 days | End: 2020-02-11

## 2020-02-11 VITALS
WEIGHT: 118.39 LBS | OXYGEN SATURATION: 98 % | HEIGHT: 61.93 IN | HEART RATE: 62 BPM | TEMPERATURE: 98 F | DIASTOLIC BLOOD PRESSURE: 53 MMHG | RESPIRATION RATE: 18 BRPM | SYSTOLIC BLOOD PRESSURE: 114 MMHG

## 2020-02-11 DIAGNOSIS — D57.1 SICKLE-CELL DISEASE WITHOUT CRISIS: ICD-10-CM

## 2020-02-11 DIAGNOSIS — G91.9 HYDROCEPHALUS, UNSPECIFIED: ICD-10-CM

## 2020-02-11 DIAGNOSIS — Z98.890 OTHER SPECIFIED POSTPROCEDURAL STATES: Chronic | ICD-10-CM

## 2020-02-11 DIAGNOSIS — Z48.811 ENCOUNTER FOR SURGICAL AFTERCARE FOLLOWING SURGERY ON THE NERVOUS SYSTEM: ICD-10-CM

## 2020-02-11 DIAGNOSIS — F32.9 MAJOR DEPRESSIVE DISORDER, SINGLE EPISODE, UNSPECIFIED: ICD-10-CM

## 2020-02-11 DIAGNOSIS — G91.1 OBSTRUCTIVE HYDROCEPHALUS: ICD-10-CM

## 2020-02-11 LAB — HCG UR QL: NEGATIVE — SIGNIFICANT CHANGE UP

## 2020-02-11 PROCEDURE — 99291 CRITICAL CARE FIRST HOUR: CPT

## 2020-02-11 RX ORDER — ONDANSETRON 8 MG/1
4 TABLET, FILM COATED ORAL ONCE
Refills: 0 | Status: DISCONTINUED | OUTPATIENT
Start: 2020-02-11 | End: 2020-02-11

## 2020-02-11 RX ORDER — CEFAZOLIN SODIUM 1 G
1790 VIAL (EA) INJECTION EVERY 8 HOURS
Refills: 0 | Status: ACTIVE | OUTPATIENT
Start: 2020-02-11 | End: 2021-01-09

## 2020-02-11 RX ORDER — ACETAMINOPHEN 500 MG
650 TABLET ORAL EVERY 6 HOURS
Refills: 0 | Status: DISCONTINUED | OUTPATIENT
Start: 2020-02-11 | End: 2020-02-11

## 2020-02-11 RX ORDER — ARIPIPRAZOLE 15 MG/1
2 TABLET ORAL DAILY
Refills: 0 | Status: DISCONTINUED | OUTPATIENT
Start: 2020-02-11 | End: 2020-02-13

## 2020-02-11 RX ORDER — DEFERASIROX 180 MG/1
1440 GRANULE ORAL DAILY
Refills: 0 | Status: DISCONTINUED | OUTPATIENT
Start: 2020-02-11 | End: 2020-02-11

## 2020-02-11 RX ORDER — FAMOTIDINE 10 MG/ML
20 INJECTION INTRAVENOUS EVERY 12 HOURS
Refills: 0 | Status: DISCONTINUED | OUTPATIENT
Start: 2020-02-11 | End: 2020-02-11

## 2020-02-11 RX ORDER — ACETAMINOPHEN 500 MG
650 TABLET ORAL EVERY 6 HOURS
Refills: 0 | Status: DISCONTINUED | OUTPATIENT
Start: 2020-02-11 | End: 2020-02-13

## 2020-02-11 RX ORDER — POLYETHYLENE GLYCOL 3350 17 G/17G
17 POWDER, FOR SOLUTION ORAL DAILY
Refills: 0 | Status: DISCONTINUED | OUTPATIENT
Start: 2020-02-11 | End: 2020-02-13

## 2020-02-11 RX ORDER — DEFERASIROX 180 MG/1
2000 GRANULE ORAL DAILY
Refills: 0 | Status: DISCONTINUED | OUTPATIENT
Start: 2020-02-11 | End: 2020-02-13

## 2020-02-11 RX ORDER — SODIUM CHLORIDE 9 MG/ML
1000 INJECTION, SOLUTION INTRAVENOUS
Refills: 0 | Status: DISCONTINUED | OUTPATIENT
Start: 2020-02-11 | End: 2020-02-13

## 2020-02-11 RX ORDER — SERTRALINE 25 MG/1
50 TABLET, FILM COATED ORAL DAILY
Refills: 0 | Status: DISCONTINUED | OUTPATIENT
Start: 2020-02-11 | End: 2020-02-13

## 2020-02-11 RX ORDER — FLUTICASONE PROPIONATE 220 MCG
2 AEROSOL WITH ADAPTER (GRAM) INHALATION
Refills: 0 | Status: DISCONTINUED | OUTPATIENT
Start: 2020-02-11 | End: 2020-02-13

## 2020-02-11 RX ORDER — LIDOCAINE 4 G/100G
1 CREAM TOPICAL ONCE
Refills: 0 | Status: COMPLETED | OUTPATIENT
Start: 2020-02-11 | End: 2020-02-11

## 2020-02-11 RX ORDER — FENTANYL CITRATE 50 UG/ML
50 INJECTION INTRAVENOUS
Refills: 0 | Status: DISCONTINUED | OUTPATIENT
Start: 2020-02-11 | End: 2020-02-11

## 2020-02-11 RX ORDER — ONDANSETRON 8 MG/1
8 TABLET, FILM COATED ORAL EVERY 8 HOURS
Refills: 0 | Status: DISCONTINUED | OUTPATIENT
Start: 2020-02-11 | End: 2020-02-13

## 2020-02-11 RX ORDER — SODIUM CHLORIDE 9 MG/ML
1000 INJECTION, SOLUTION INTRAVENOUS
Refills: 0 | Status: DISCONTINUED | OUTPATIENT
Start: 2020-02-11 | End: 2020-02-12

## 2020-02-11 RX ADMIN — DEFERASIROX 2000 MILLIGRAM(S): 180 GRANULE ORAL at 23:22

## 2020-02-11 RX ADMIN — Medication 2 PUFF(S): at 23:45

## 2020-02-11 RX ADMIN — Medication 179 MILLIGRAM(S): at 23:16

## 2020-02-11 RX ADMIN — ARIPIPRAZOLE 2 MILLIGRAM(S): 15 TABLET ORAL at 23:22

## 2020-02-11 RX ADMIN — LIDOCAINE 1 APPLICATION(S): 4 CREAM TOPICAL at 12:15

## 2020-02-11 RX ADMIN — SODIUM CHLORIDE 90 MILLILITER(S): 9 INJECTION, SOLUTION INTRAVENOUS at 12:45

## 2020-02-11 RX ADMIN — SERTRALINE 50 MILLIGRAM(S): 25 TABLET, FILM COATED ORAL at 23:22

## 2020-02-11 RX ADMIN — SODIUM CHLORIDE 50 MILLILITER(S): 9 INJECTION, SOLUTION INTRAVENOUS at 16:00

## 2020-02-11 NOTE — PROGRESS NOTE PEDS - SUBJECTIVE AND OBJECTIVE BOX
Post op Note     Dx: Hydrocephalus  20y   Female  s/p ICP monitor    MEDICATIONS  (STANDING):  ARIPiprazole  Oral Tab/Cap - Peds 2 milliGRAM(s) Oral daily  ceFAZolin  IV Intermittent - Peds 1790 milliGRAM(s) IV Intermittent every 8 hours  deferasirox Oral Tab/Cap (EXJADE) - Peds 1440 milliGRAM(s) Oral daily  dextrose 5% + sodium chloride 0.45%. - Pediatric 1000 milliLiter(s) (90 mL/Hr) IV Continuous <Continuous>  famotidine IV Intermittent - Peds 20 milliGRAM(s) IV Intermittent every 12 hours  fluticasone propionate  110 MICROgram(s) HFA Inhaler - Peds 2 Puff(s) Inhalation two times a day  sertraline Oral Tab/Cap - Peds 50 milliGRAM(s) Oral daily  sodium chloride 0.9%. - Pediatric 1000 milliLiter(s) (50 mL/Hr) IV Continuous <Continuous>    MEDICATIONS  (PRN):  acetaminophen   Oral Liquid - Peds. 650 milliGRAM(s) Oral every 6 hours PRN Temp greater or equal to 38 C (100.4 F), Mild Pain (1 - 3)  acetaminophen   Oral Tab/Cap - Peds. 650 milliGRAM(s) Oral every 6 hours PRN Mild Pain (1 - 3)  fentaNYL    IV Intermittent - Peds 50 MICROGram(s) IV Intermittent every 10 minutes PRN Severe Pain (7 - 10)  ondansetron Disintegrating Oral Tablet - Peds 8 milliGRAM(s) Oral every 8 hours PRN Nausea and/or Vomiting  ondansetron IV Intermittent - Peds 4 milliGRAM(s) IV Intermittent once PRN Nausea and/or Vomiting  polyethylene glycol 3350 Oral Powder - Peds 17 Gram(s) Oral daily PRN Constipation          I&O's Summary      T(C): 36.6 (02-11-20 @ 15:30), Max: 36.6 (02-11-20 @ 15:30)  HR: 80 (02-11-20 @ 16:00) (75 - 84)  BP: 111/64 (02-11-20 @ 16:00) (98/43 - 111/64)  RR: 17 (02-11-20 @ 16:00) (12 - 17)  SpO2: 99% (02-11-20 @ 16:00) (97% - 100%)    PE-   awake, alert, affect appropriate   Sitting up in bed  Tolerating clear diet   Speech clear  RUSH x4 with good strength  ICP monitor in place currently 8. Range 5-12

## 2020-02-11 NOTE — PROGRESS NOTE PEDS - SUBJECTIVE AND OBJECTIVE BOX
Called by RN to evaluate ICP monitor with no signal input  Upon evaluation, the fiberoptic probe was noted to be kinked. The bolt was prepped in sterile fashion, a new probe was zeroed and inserted resulting in a good waveform and ICP of 4. The probe was then secured and a good waveform continued.

## 2020-02-11 NOTE — PROGRESS NOTE PEDS - SUBJECTIVE AND OBJECTIVE BOX
Interval/Overnight Events: ICP monitor placed in the OR today for headaches. No EBL. No intraoperative events.    ===========================RESPIRATORY==========================  RR: 20 (02-11-20 @ 21:00) (12 - 26)  SpO2: 98% (02-11-20 @ 21:00) (96% - 100%)    Respiratory Support: RA  fluticasone propionate  110 MICROgram(s) HFA Inhaler - Peds 2 Puff(s) Inhalation two times a day  [x] Airway Clearance Discussed  Extubation Readiness:  [x] Not Applicable     [ ] Discussed and Assessed  Comments:    =========================CARDIOVASCULAR========================  HR: 92 (02-11-20 @ 21:00) (62 - 92)  BP: 120/55 (02-11-20 @ 20:20) (90/38 - 120/55)  Patient Care Access: PIV  Comments:    =====================HEMATOLOGY/ONCOLOGY=====================  Transfusions:	[ ] PRBC	[ ] Platelets	[ ] FFP		[ ] Cryoprecipitate  DVT Prophylaxis: SCDs  Comments:    ========================INFECTIOUS DISEASE=======================  T(C): 36.8 (02-11-20 @ 20:20), Max: 36.8 (02-11-20 @ 20:20)  T(F): 98.2 (02-11-20 @ 20:20), Max: 98.2 (02-11-20 @ 20:20)  [ ] Cooling [place] being used. Target Temperature:    ceFAZolin  IV Intermittent - Peds 1790 milliGRAM(s) IV Intermittent every 8 hours    ==================FLUIDS/ELECTROLYTES/NUTRITION=================  I&O's Summary    11 Feb 2020 07:01  -  11 Feb 2020 22:03  --------------------------------------------------------  IN: 590 mL / OUT: 400 mL / NET: 190 mL    Diet: Regular  [ ] NGT		[ ] NDT		[ ] GT		[ ] GJT    dextrose 5% + sodium chloride 0.45%. - Pediatric 1000 milliLiter(s) IV Continuous <Continuous>  famotidine IV Intermittent - Peds 20 milliGRAM(s) IV Intermittent every 12 hours  polyethylene glycol 3350 Oral Powder - Peds 17 Gram(s) Oral daily PRN  sodium chloride 0.9%. - Pediatric 1000 milliLiter(s) IV Continuous <Continuous>  Comments:    ==========================NEUROLOGY===========================  [ ] SBS:		[ ] EVARISTO-1:	[ ] BIS:	[ ] CAPD:  acetaminophen   Oral Liquid - Peds. 650 milliGRAM(s) Oral every 6 hours PRN  ARIPiprazole  Oral Tab/Cap - Peds 2 milliGRAM(s) Oral daily  ondansetron Disintegrating Oral Tablet - Peds 8 milliGRAM(s) Oral every 8 hours PRN  sertraline Oral Tab/Cap - Peds 50 milliGRAM(s) Oral daily  [x] Adequacy of sedation and pain control has been assessed and adjusted  Comments:    OTHER MEDICATIONS:  deferasirox Oral Tab/Cap (EXJADE) - Peds 2000 milliGRAM(s) Oral daily    =========================PATIENT CARE==========================  [ ] There are pressure ulcers/areas of breakdown that are being addressed.  [x] Preventative measures are being taken to decrease risk for skin breakdown.  [x] Necessity of urinary, arterial, and venous catheters discussed    =========================PHYSICAL EXAM=========================  GENERAL: In no acute distress  RESPIRATORY: Lungs clear to auscultation bilaterally. Good aeration. No rales, rhonchi, retractions or wheezing. Effort even and unlabored.  CARDIOVASCULAR: Regular rate and rhythm. Normal S1/S2. No murmurs, rubs, or gallop. Capillary refill < 2 seconds. Distal pulses 2+ and equal.  ABDOMEN: Soft, non-distended. Bowel sounds present. No palpable hepatosplenomegaly.  SKIN: No rash.  EXTREMITIES: Warm and well perfused. No gross extremity deformities.  NEUROLOGIC: Alert and oriented. ICP monitor in place. Neuro exam grossly normal.    ===============================================================  Parent/Guardian is at the bedside:	[ ] Yes	[ x] No  Patient and Parent/Guardian updated as to the progress/plan of care:	[ ] Yes	[ ] No    [x ] The patient remains in critical and unstable condition, and requires ICU care and monitoring, total critical care time spent by myself, the attending physician was __ minutes, excluding procedure time.  [ ] The patient is improving but requires continued monitoring and adjustment of therapy

## 2020-02-11 NOTE — PROGRESS NOTE PEDS - PROBLEM SELECTOR PLAN 1
1. Monitor ICP, Notify Neurosurgery if sustained <20 for more than 5minutes  2. Headache diary-- track and correlate headache to ICP numbers. OOB, walk around  3. Anc  Case d/w Dr. Loza

## 2020-02-11 NOTE — ASU PATIENT PROFILE, PEDIATRIC - PMH
Anxiety    Chronic Lung Disease of Premat  follows with [facility] Pulmonology  CP (cerebral palsy)  - mild  CVA (Cerebral Vascular Accident)  Onset date unknown, noted on MRI from 5/2010  Depression    Hydrocephalus    Impacted teeth    Reactive Airway Disease  receives albuterol and xoponex treatments at home  S/P  Shunt  x2 with multiple shunt revisions  Sickle Cell Disease    Strabismus

## 2020-02-12 ENCOUNTER — TRANSCRIPTION ENCOUNTER (OUTPATIENT)
Age: 20
End: 2020-02-12

## 2020-02-12 DIAGNOSIS — Z98.2 PRESENCE OF CEREBROSPINAL FLUID DRAINAGE DEVICE: ICD-10-CM

## 2020-02-12 LAB
BLD GP AB SCN SERPL QL: NEGATIVE — SIGNIFICANT CHANGE UP
RH IG SCN BLD-IMP: POSITIVE — SIGNIFICANT CHANGE UP

## 2020-02-12 PROCEDURE — 99291 CRITICAL CARE FIRST HOUR: CPT

## 2020-02-12 RX ORDER — FENTANYL CITRATE 50 UG/ML
20 INJECTION INTRAVENOUS
Refills: 0 | Status: DISCONTINUED | OUTPATIENT
Start: 2020-02-12 | End: 2020-02-12

## 2020-02-12 RX ORDER — ONDANSETRON 8 MG/1
4 TABLET, FILM COATED ORAL ONCE
Refills: 0 | Status: DISCONTINUED | OUTPATIENT
Start: 2020-02-12 | End: 2020-02-12

## 2020-02-12 RX ORDER — ACETAMINOPHEN 500 MG
650 TABLET ORAL EVERY 6 HOURS
Refills: 0 | Status: COMPLETED | OUTPATIENT
Start: 2020-02-12 | End: 2020-02-12

## 2020-02-12 RX ORDER — FENTANYL CITRATE 50 UG/ML
40 INJECTION INTRAVENOUS
Refills: 0 | Status: DISCONTINUED | OUTPATIENT
Start: 2020-02-12 | End: 2020-02-12

## 2020-02-12 RX ADMIN — ARIPIPRAZOLE 2 MILLIGRAM(S): 15 TABLET ORAL at 22:52

## 2020-02-12 RX ADMIN — Medication 179 MILLIGRAM(S): at 07:00

## 2020-02-12 RX ADMIN — Medication 2 PUFF(S): at 21:20

## 2020-02-12 RX ADMIN — Medication 650 MILLIGRAM(S): at 19:15

## 2020-02-12 RX ADMIN — SERTRALINE 50 MILLIGRAM(S): 25 TABLET, FILM COATED ORAL at 22:52

## 2020-02-12 RX ADMIN — Medication 650 MILLIGRAM(S): at 18:45

## 2020-02-12 RX ADMIN — SODIUM CHLORIDE 100 MILLILITER(S): 9 INJECTION, SOLUTION INTRAVENOUS at 18:26

## 2020-02-12 RX ADMIN — SODIUM CHLORIDE 20 MILLILITER(S): 9 INJECTION, SOLUTION INTRAVENOUS at 21:18

## 2020-02-12 RX ADMIN — Medication 2 PUFF(S): at 11:15

## 2020-02-12 NOTE — BRIEF OPERATIVE NOTE - NSICDXBRIEFPROCEDURE_GEN_ALL_CORE_FT
PROCEDURES:  Insertion, intracranial pressure monitor catheter 11-Feb-2020 15:08:53  Arturo Salinas
PROCEDURES:  Insertion or revision of ventriculopleural shunt 12-Feb-2020 16:43:42  Charlie Obrien

## 2020-02-12 NOTE — DISCHARGE NOTE PROVIDER - NSDCFUADDINST_GEN_ALL_CORE_FT
Please follow up with Pediatrician within 1-2 days, and with Neurosurgery within 1 week.    Wound care as per Neurosurgery Instructions. Please follow up with Pediatrician within 1-2 days, and with Neurosurgery within 1 week.    Wound care as per Neurosurgery Instructions. Keep wound clean and dry. - Remove top surgical dressing on post operative day 3 unless it was removed by the surgical team prior to your discharge. Incision should be left uncovered after day 3.   - Begin showering with shampoo on post operative day 4. Avoid long soaks and do not submerge incision in water. Regular shower only and allow soap and water to run over the incision. Pat incision area dry with clean towel- do not scrub. Please shower regularly to ensure incision stays clean to avoid post operative infections.   - Notify your surgeon if you notice increased redness, drainage or you notice incision area opening.   - Return to ER immediately for high fevers, severe headache, vomiting, lethargy or weakness  - Please call your neurosurgeon following discharge to make follow up appointment in 1 week after discharge unless otherwise specified. See contact information.  - Prescription post operative medication has been sent to [facility]. [facility] is located in [facility] [place]. All post operative prescriptions should be picked up before departing the hospital.  - Ambulate as tolerate. Continue with all "activities of daily living." Avoid strenuous activity or lifting more than 10 pounds until cleared for additional activity at your follow up appointment.  - Do not return to work or school until cleared by your neurosurgeon at your follow up visit unless specified to you during your hospital stay

## 2020-02-12 NOTE — DISCHARGE NOTE PROVIDER - NSDCCPTREATMENT_GEN_ALL_CORE_FT
PRINCIPAL PROCEDURE  Procedure: Insertion or revision of ventriculopleural shunt  Findings and Treatment:       SECONDARY PROCEDURE  Procedure: Insertion, intracranial pressure monitor catheter  Findings and Treatment:

## 2020-02-12 NOTE — BRIEF OPERATIVE NOTE - NSICDXBRIEFPREOP_GEN_ALL_CORE_FT
PRE-OP DIAGNOSIS:  Hydrocephalus 11-Feb-2020 15:09:01  Arturo Salinas
PRE-OP DIAGNOSIS:  Hydrocephalus 11-Feb-2020 15:09:01  Arturo Salinas

## 2020-02-12 NOTE — DISCHARGE NOTE PROVIDER - CARE PROVIDERS DIRECT ADDRESSES
,DirectAddress_Unknown ,DirectAddress_Unknown,[email or web address]

## 2020-02-12 NOTE — DISCHARGE NOTE PROVIDER - NSDCCPCAREPLAN_GEN_ALL_CORE_FT
PRINCIPAL DISCHARGE DIAGNOSIS  Diagnosis: Headache due to intracranial disease  Assessment and Plan of Treatment: PRINCIPAL DISCHARGE DIAGNOSIS  Diagnosis: Headache due to intracranial disease  Assessment and Plan of Treatment: Please follow up with Pediatrician and Neurosurgery. If headache returns consult Pediatrician or Neurosurgery, if concerned come to ED immediately.

## 2020-02-12 NOTE — PROGRESS NOTE PEDS - SUBJECTIVE AND OBJECTIVE BOX
Neurosurgery postop  patient comfortable, no complaints  ICU Vital Signs Last 24 Hrs  T(C): 37 (12 Feb 2020 19:50), Max: 37.1 (11 Feb 2020 23:17)  T(F): 98.6 (12 Feb 2020 19:50), Max: 98.7 (11 Feb 2020 23:17)  HR: 90 (12 Feb 2020 19:50) (57 - 101)  BP: 105/54 (12 Feb 2020 19:50) (97/39 - 110/46)  BP(mean): 63 (12 Feb 2020 19:50) (52 - 82)  ABP: --  ABP(mean): --  RR: 13 (12 Feb 2020 19:50) (13 - 115)  SpO2: 100% (12 Feb 2020 19:50) (94% - 100%)    AAO X 3  PERRLA, EOMI  CN 2-12 grossly intact  RUSH strength 5/5  SILT    Wound C/D/I

## 2020-02-12 NOTE — PROGRESS NOTE PEDS - PROBLEM SELECTOR PLAN 1
1. Monitor ICP, Notify Neurosurgery if sustained >20 for more than 5minutes  2. Headache diary-- track and correlate headache to ICP numbers. OOB, walk around  3. Anc  Case d/w Dr. Loza

## 2020-02-12 NOTE — DISCHARGE NOTE PROVIDER - PROVIDER TOKENS
PROVIDER:[TOKEN:[[id number]],FOLLOWUP:[1 week]] PROVIDER:[TOKEN:[[id number]],FOLLOWUP:[1 week]],PROVIDER:[TOKEN:[[id number]],FOLLOWUP:[1-3 days]]

## 2020-02-12 NOTE — PROGRESS NOTE PEDS - SUBJECTIVE AND OBJECTIVE BOX
OVERNIGHT EVENTS:  Pt s/p ICP monitor placement. No acute events overnight. ICPs -5 to 12    Vital Signs Last 24 Hrs  T(C): 36.8 (12 Feb 2020 05:00), Max: 37.1 (11 Feb 2020 23:17)  T(F): 98.2 (12 Feb 2020 05:00), Max: 98.7 (11 Feb 2020 23:17)  HR: 60 (12 Feb 2020 07:00) (60 - 92)  BP: 104/52 (12 Feb 2020 06:00) (90/38 - 120/55)  BP(mean): 62 (12 Feb 2020 06:00) (48 - 75)  RR: 17 (12 Feb 2020 07:00) (12 - 115)  SpO2: 96% (12 Feb 2020 07:00) (96% - 100%)    I&O's Summary    11 Feb 2020 07:01  -  12 Feb 2020 07:00  --------------------------------------------------------  IN: 1590 mL / OUT: 1400 mL / NET: 190 mL        PHYSICAL EXAM:  Mental Status: Awake, Alert, Affect appropriate  PERRL  Motor:  MAEx4 w/ good strength  No drift  ICP monitor in place, ICP 5        MEDICATIONS:  Antibiotics:  ceFAZolin  IV Intermittent - Peds 1790 milliGRAM(s) IV Intermittent every 8 hours    Neuro:  acetaminophen   Oral Liquid - Peds. 650 milliGRAM(s) Oral every 6 hours PRN  ARIPiprazole  Oral Tab/Cap - Peds 2 milliGRAM(s) Oral daily  ondansetron Disintegrating Oral Tablet - Peds 8 milliGRAM(s) Oral every 8 hours PRN  sertraline Oral Tab/Cap - Peds 50 milliGRAM(s) Oral daily    Anticoagulation    OTHER:  deferasirox Oral Tab/Cap (EXJADE) - Peds 2000 milliGRAM(s) Oral daily  fluticasone propionate  110 MICROgram(s) HFA Inhaler - Peds 2 Puff(s) Inhalation two times a day  polyethylene glycol 3350 Oral Powder - Peds 17 Gram(s) Oral daily PRN    IVF:  sodium chloride 0.9%. - Pediatric 1000 milliLiter(s) IV Continuous <Continuous>    RADIOLOGY & ADDITIONAL TESTS:

## 2020-02-12 NOTE — PROGRESS NOTE PEDS - SUBJECTIVE AND OBJECTIVE BOX
Interval/Overnight Events: status post change of  shunt valve and removal of ICP monitor today returns to us extubated, pain controlled.   _________________________________________________________________  Respiratory:  room air  fluticasone propionate  110 MICROgram(s) HFA Inhaler - Peds 2 Puff(s) Inhalation two times a day    _________________________________________________________________  Cardiac:  Cardiac Rhythm: Sinus rhythm      _________________________________________________________________  Hematologic:      ________________________________________________________________  Infectious:    ceFAZolin  IV Intermittent - Peds 1790 milliGRAM(s) IV Intermittent every 8 hours    RECENT CULTURES:      ________________________________________________________________  Fluids/Electrolytes/Nutrition:  I&O's Summary    11 Feb 2020 07:01  -  12 Feb 2020 07:00  --------------------------------------------------------  IN: 1590 mL / OUT: 1400 mL / NET: 190 mL    12 Feb 2020 07:01  -  12 Feb 2020 23:10  --------------------------------------------------------  IN: 1137 mL / OUT: 550 mL / NET: 587 mL      Diet: npo /ivf    polyethylene glycol 3350 Oral Powder - Peds 17 Gram(s) Oral daily PRN  sodium chloride 0.9%. - Pediatric 1000 milliLiter(s) IV Continuous <Continuous>    _________________________________________________________________  Neurologic:  Adequacy of sedation and pain control has been assessed and adjusted    acetaminophen   Oral Liquid - Peds. 650 milliGRAM(s) Oral every 6 hours PRN  ARIPiprazole  Oral Tab/Cap - Peds 2 milliGRAM(s) Oral daily  ondansetron Disintegrating Oral Tablet - Peds 8 milliGRAM(s) Oral every 8 hours PRN  sertraline Oral Tab/Cap - Peds 50 milliGRAM(s) Oral daily    ________________________________________________________________  Additional Meds:    deferasirox Oral Tab/Cap (EXJADE) - Peds 2000 milliGRAM(s) Oral daily    ________________________________________________________________  Access:    Necessity of urinary, arterial, and venous catheters discussed  ________________________________________________________________  Labs:      _________________________________________________________________  Imaging:    _________________________________________________________________  PE:  T(C): 37 (02-12-20 @ 19:50), Max: 37.1 (02-11-20 @ 23:17)  HR: 66 (02-12-20 @ 21:21) (57 - 101)  BP: 105/54 (02-12-20 @ 19:50) (97/39 - 110/46)  ABP: --  ABP(mean): --  RR: 13 (02-12-20 @ 19:50) (13 - 115)  SpO2: 97% (02-12-20 @ 21:21) (94% - 100%)  CVP(mm Hg): --  Weight (kg): 53.7    GENERAL: In no acute distress  RESPIRATORY: Lungs clear to auscultation bilaterally. Good aeration. No rales, rhonchi, retractions or wheezing. Effort even and unlabored.  CARDIOVASCULAR: Regular rate and rhythm. Normal S1/S2. No murmurs, rubs, or gallop. Capillary refill < 2 seconds. Distal pulses 2+ and equal.  ABDOMEN: Soft, non-distended. Bowel sounds present. No palpable hepatosplenomegaly.  SKIN: No rash. head dressing c/d/i  EXTREMITIES: Warm and well perfused. No gross extremity deformities.  NEUROLOGIC: Alert and oriented. . Neuro exam grossly normal. moves all extremities good strength no focality   ________________________________________________________________  Patient and Parent/Guardian was updated as to the progress/plan of care.    The patient remains in critical and unstable condition, and requires ICU care and monitoring. Total critical care time spent by attending physician was 40 minutes, excluding procedure time.

## 2020-02-12 NOTE — PROGRESS NOTE PEDS - SUBJECTIVE AND OBJECTIVE BOX
Interval/Overnight Events:  _________________________________________________________________  Respiratory:    fluticasone propionate  110 MICROgram(s) HFA Inhaler - Peds 2 Puff(s) Inhalation two times a day    _________________________________________________________________  Cardiac:  Cardiac Rhythm: Sinus rhythm      _________________________________________________________________  Hematologic:      ________________________________________________________________  Infectious:    ceFAZolin  IV Intermittent - Peds 1790 milliGRAM(s) IV Intermittent every 8 hours    RECENT CULTURES:      ________________________________________________________________  Fluids/Electrolytes/Nutrition:  I&O's Summary    11 Feb 2020 07:01 - 12 Feb 2020 07:00  --------------------------------------------------------  IN: 1590 mL / OUT: 1400 mL / NET: 190 mL    12 Feb 2020 07:01 - 12 Feb 2020 11:25  --------------------------------------------------------  IN: 200 mL / OUT: 0 mL / NET: 200 mL      Diet:    polyethylene glycol 3350 Oral Powder - Peds 17 Gram(s) Oral daily PRN  sodium chloride 0.9%. - Pediatric 1000 milliLiter(s) IV Continuous <Continuous>    _________________________________________________________________  Neurologic:  Adequacy of sedation and pain control has been assessed and adjusted    acetaminophen   Oral Liquid - Peds. 650 milliGRAM(s) Oral every 6 hours PRN  ARIPiprazole  Oral Tab/Cap - Peds 2 milliGRAM(s) Oral daily  ondansetron Disintegrating Oral Tablet - Peds 8 milliGRAM(s) Oral every 8 hours PRN  sertraline Oral Tab/Cap - Peds 50 milliGRAM(s) Oral daily    ________________________________________________________________  Additional Meds:    deferasirox Oral Tab/Cap (EXJADE) - Peds 2000 milliGRAM(s) Oral daily    ________________________________________________________________  Access:    Necessity of urinary, arterial, and venous catheters discussed  ________________________________________________________________  Labs:      _________________________________________________________________  Imaging:    _________________________________________________________________  PE:  T(C): 36.9 (02-12-20 @ 08:00), Max: 37.1 (02-11-20 @ 23:17)  HR: 67 (02-12-20 @ 08:00) (60 - 92)  BP: 103/51 (02-12-20 @ 08:00) (90/38 - 120/55)  ABP: --  ABP(mean): --  RR: 17 (02-12-20 @ 08:00) (12 - 115)  SpO2: 96% (02-12-20 @ 08:00) (96% - 100%)  CVP(mm Hg): --  Weight (kg): 53.7  General:	No distress  Respiratory:      Effort even and unlabored. Clear bilaterally.   CV:                   Regular rate and rhythm. Normal S1/S2. No murmurs, rubs, or   .                       gallop. Capillary refill < 2 seconds. Distal pulses 2+ and equal.  Abdomen:	Soft, non-distended. Bowel sounds present.   Skin:		No rashes.  Extremities:	Warm and well perfused.   Neurologic:	Alert.  No acute change from baseline exam.  ________________________________________________________________  Patient and Parent/Guardian was updated as to the progress/plan of care.    The patient remains in critical and unstable condition, and requires ICU care and monitoring. Total critical care time spent by attending physician was minutes, excluding procedure time.    The patient is improving but requires continued monitoring and adjustment of therapy.

## 2020-02-12 NOTE — DISCHARGE NOTE PROVIDER - NSDCFUSCHEDAPPT_GEN_ALL_CORE_FT
KINGS DÍAZ ; 02/20/2020 ; [facility] Ped HemOnc [address]  KINGS DÍAZ ; 02/22/2020 ; [facility] HemOnc [address] KINGS DÍAZ ; 02/20/2020 ; \[facility] [place] [facility]\"" Ped HemOnc [address]  KINGS DÍAZ ; 02/22/2020 ; [facility] HemOnc [address] KINGS DÍAZ ; 02/20/2020 ; [facility] Ped HemOnc [address]  KINGS DÍAZ ; 02/22/2020 ; [facility] HemOnc [address] KINSG DÍAZ ; 02/20/2020 ; [facility] Ped HemOnc [address]  KINGS DÍAZ ; 02/22/2020 ; [facility] HemOnc [address] KINGS DÍAZ ; 02/20/2020 ; [facility] Ped HemOnc [address]  KINGS DÍAZ ; 02/22/2020 ; [facility] HemOnc [address] KINGS DÍAZ ; 02/20/2020 ; [facility] Ped HemOnc [address]  KINGS DÍAZ ; 02/22/2020 ; [facility] HemOnc [address] KINGS DÍAZ ; 02/20/2020 ; hospitals Ped HemOnc [address]  KINGS DÍAZ ; 02/22/2020 ; [facility] HemOnc [address] KINGS DÍAZ ; 02/20/2020 ; [facility] Ped HemOnc [address]  KINGS DÍAZ ; 02/22/2020 ; [facility] HemOnc [address] KINGS DÍAZ ; 02/20/2020 ; hospitals Ped HemOnc [address]  KINGS DÍAZ ; 02/22/2020 ; [facility] HemOnc [address]

## 2020-02-12 NOTE — DISCHARGE NOTE PROVIDER - NSDCMRMEDTOKEN_GEN_ALL_CORE_FT
amoxicillin 500 mg oral tablet: 4 tab(s) orally once - one hour prior to dental appointment  ARIPiprazole 2 mg oral tablet: 1 tab(s) orally once a day  cholecalciferol 1000 intl units oral tablet: 1 tab(s) orally once a day  Depo-Provera 400 mg/mL intramuscular suspension: 1 milliliter(s) intramuscular every 3 months - Last given 20  Flovent  mcg/inh inhalation aerosol: 2 puff(s) inhaled 2 times a day  ibuprofen 400 mg oral tablet: 1 tab(s) orally every 6 hours while taking tramadol. Then every 6 hours AS NEEDED for pain  Jadenu 360 mg oral tablet: 4 tab(s) orally once a day  lidocaine-prilocaine 2.5%-2.5% topical cream: Apply topically to affected area and cover 1-2 hours prior to treatment  ondansetron 8 mg oral tablet, disintegratin tab(s) orally every 8 hours, As Needed   [id number] oral powder for reconstitution: Take one capful (17g) in 8oz water daily while taking opioid medication. Then AS NEEDED for constipation.   Pepcid AC 10 mg oral tablet: 1 tab(s) orally every 12 hours while taking ibuprofen  Senna 8.6 mg oral tablet: Take 1-2 tabs daily while taking opioid medication. Then 1-2 times daily as needed for constipation  sertraline 50 mg oral tablet: 1 tab(s) orally once a day  traMADol 50 mg oral tablet: 1 tab(s) orally every 4 to 6 hours, As Needed -for moderate pain   Ultram 50 mg oral tablet: Taper: Take on 50mg tab every 4 hrs on . Take 1 tab every 6hrs on . Take 1 tab every 8hrs on . Then taper complete  Xopenex HFA 45 mcg/inh inhalation aerosol: 2 puff(s) inhaled every 4 hours, As Needed -for shortness of breath and/or wheezing amoxicillin 500 mg oral tablet: 4 tab(s) orally once - one hour prior to dental appointment  ARIPiprazole 2 mg oral tablet: 1 tab(s) orally once a day  cholecalciferol 1000 intl units oral tablet: 1 tab(s) orally once a day  Depo-Provera 400 mg/mL intramuscular suspension: 1 milliliter(s) intramuscular every 3 months - Last given 20  Flovent  mcg/inh inhalation aerosol: 2 puff(s) inhaled 2 times a day  ibuprofen 400 mg oral tablet: 1 tab(s) orally every 6 hours while taking tramadol. Then every 6 hours AS NEEDED for pain  Jadenu 360 mg oral tablet: 4 tab(s) orally once a day  lidocaine-prilocaine 2.5%-2.5% topical cream: Apply topically to affected area and cover 1-2 hours prior to treatment  ondansetron 8 mg oral tablet, disintegratin tab(s) orally every 8 hours, As Needed   [id number] oral powder for reconstitution: Take one capful (17g) in 8oz water daily while taking opioid medication. Then AS NEEDED for constipation.   Pepcid AC 10 mg oral tablet: 1 tab(s) orally every 12 hours while taking ibuprofen  Senna 8.6 mg oral tablet: Take 1-2 tabs daily while taking opioid medication. Then 1-2 times daily as needed for constipation  sertraline 50 mg oral tablet: 1 tab(s) orally once a day  traMADol 50 mg oral tablet: 1 tab(s) orally every 4 to 6 hours, As Needed -for moderate pain   Ultram 50 mg oral tablet: Taper: Take on 50mg tab every 4 hrs on . Take 1 tab every 6hrs on . Take 1 tab every 8hrs on . Then taper complete  Xopenex HFA 45 mcg/inh inhalation aerosol: 2 puff(s) inhaled every 4 hours, As Needed -for shortness of breath and/or wheezing amoxicillin 500 mg oral tablet: 4 tab(s) orally once - one hour prior to dental appointment  ARIPiprazole 2 mg oral tablet: 1 tab(s) orally once a day  cholecalciferol 1000 intl units oral tablet: 1 tab(s) orally once a day  Depo-Provera 400 mg/mL intramuscular suspension: 1 milliliter(s) intramuscular every 3 months - Last given 20  Flovent  mcg/inh inhalation aerosol: 2 puff(s) inhaled 2 times a day  ibuprofen 400 mg oral tablet: 1 tab(s) orally every 6 hours while taking tramadol. Then every 6 hours AS NEEDED for pain  Jadenu 360 mg oral tablet: 4 tab(s) orally once a day  lidocaine-prilocaine 2.5%-2.5% topical cream: Apply topically to affected area and cover 1-2 hours prior to treatment  ondansetron 8 mg oral tablet, disintegratin tab(s) orally every 8 hours, As Needed   [id number] oral powder for reconstitution: Take one capful (17g) in 8oz water daily while taking opioid medication. Then AS NEEDED for constipation.   Pepcid AC 10 mg oral tablet: 1 tab(s) orally every 12 hours while taking ibuprofen  Senna 8.6 mg oral tablet: Take 1-2 tabs daily while taking opioid medication. Then 1-2 times daily as needed for constipation  sertraline 50 mg oral tablet: 1 tab(s) orally once a day  traMADol 50 mg oral tablet: 1 tab(s) orally every 4 to 6 hours, As Needed -for moderate pain   Ultram 50 mg oral tablet: Taper: Take on 50mg tab every 4 hrs on . Take 1 tab every 6hrs on . Take 1 tab every 8hrs on . Then taper complete  Xopenex HFA 45 mcg/inh inhalation aerosol: 2 puff(s) inhaled every 4 hours, As Needed -for shortness of breath and/or wheezing amoxicillin 500 mg oral tablet: 4 tab(s) orally once - one hour prior to dental appointment  ARIPiprazole 2 mg oral tablet: 1 tab(s) orally once a day  cholecalciferol 1000 intl units oral tablet: 1 tab(s) orally once a day  Depo-Provera 400 mg/mL intramuscular suspension: 1 milliliter(s) intramuscular every 3 months - Last given 20  Flovent  mcg/inh inhalation aerosol: 2 puff(s) inhaled 2 times a day  ibuprofen 400 mg oral tablet: 1 tab(s) orally every 6 hours while taking tramadol. Then every 6 hours AS NEEDED for pain  Jadenu 360 mg oral tablet: 4 tab(s) orally once a day  lidocaine-prilocaine 2.5%-2.5% topical cream: Apply topically to affected area and cover 1-2 hours prior to treatment  ondansetron 8 mg oral tablet, disintegratin tab(s) orally every 8 hours, As Needed   [id number] oral powder for reconstitution: Take one capful (17g) in 8oz water daily while taking opioid medication. Then AS NEEDED for constipation.   Pepcid AC 10 mg oral tablet: 1 tab(s) orally every 12 hours while taking ibuprofen  Senna 8.6 mg oral tablet: Take 1-2 tabs daily while taking opioid medication. Then 1-2 times daily as needed for constipation  sertraline 50 mg oral tablet: 1 tab(s) orally once a day  traMADol 50 mg oral tablet: 1 tab(s) orally every 4 to 6 hours, As Needed -for moderate pain   Ultram 50 mg oral tablet: Taper: Take on 50mg tab every 4 hrs on . Take 1 tab every 6hrs on . Take 1 tab every 8hrs on . Then taper complete  Xopenex HFA 45 mcg/inh inhalation aerosol: 2 puff(s) inhaled every 4 hours, As Needed -for shortness of breath and/or wheezing amoxicillin 500 mg oral tablet: 4 tab(s) orally once - one hour prior to dental appointment  ARIPiprazole 2 mg oral tablet: 1 tab(s) orally once a day  cholecalciferol 1000 intl units oral tablet: 1 tab(s) orally once a day  Depo-Provera 400 mg/mL intramuscular suspension: 1 milliliter(s) intramuscular every 3 months - Last given 20  Flovent  mcg/inh inhalation aerosol: 2 puff(s) inhaled 2 times a day  ibuprofen 400 mg oral tablet: 1 tab(s) orally every 6 hours while taking tramadol. Then every 6 hours AS NEEDED for pain  Jadenu 360 mg oral tablet: 4 tab(s) orally once a day  lidocaine-prilocaine 2.5%-2.5% topical cream: Apply topically to affected area and cover 1-2 hours prior to treatment  ondansetron 8 mg oral tablet, disintegratin tab(s) orally every 8 hours, As Needed   [id number] oral powder for reconstitution: Take one capful (17g) in 8oz water daily while taking opioid medication. Then AS NEEDED for constipation.   Pepcid AC 10 mg oral tablet: 1 tab(s) orally every 12 hours while taking ibuprofen  Senna 8.6 mg oral tablet: Take 1-2 tabs daily while taking opioid medication. Then 1-2 times daily as needed for constipation  sertraline 50 mg oral tablet: 1 tab(s) orally once a day  traMADol 50 mg oral tablet: 1 tab(s) orally every 4 to 6 hours, As Needed -for moderate pain   Ultram 50 mg oral tablet: Taper: Take on 50mg tab every 4 hrs on . Take 1 tab every 6hrs on . Take 1 tab every 8hrs on . Then taper complete  Xopenex HFA 45 mcg/inh inhalation aerosol: 2 puff(s) inhaled every 4 hours, As Needed -for shortness of breath and/or wheezing amoxicillin 500 mg oral tablet: 4 tab(s) orally once - one hour prior to dental appointment  ARIPiprazole 2 mg oral tablet: 1 tab(s) orally once a day  cholecalciferol 1000 intl units oral tablet: 1 tab(s) orally once a day  Depo-Provera 400 mg/mL intramuscular suspension: 1 milliliter(s) intramuscular every 3 months - Last given 20  Flovent  mcg/inh inhalation aerosol: 2 puff(s) inhaled 2 times a day  ibuprofen 400 mg oral tablet: 1 tab(s) orally every 6 hours while taking tramadol. Then every 6 hours AS NEEDED for pain  Jadenu 360 mg oral tablet: 4 tab(s) orally once a day  lidocaine-prilocaine 2.5%-2.5% topical cream: Apply topically to affected area and cover 1-2 hours prior to treatment  ondansetron 8 mg oral tablet, disintegratin tab(s) orally every 8 hours, As Needed   [id number] oral powder for reconstitution: Take one capful (17g) in 8oz water daily while taking opioid medication. Then AS NEEDED for constipation.   Pepcid AC 10 mg oral tablet: 1 tab(s) orally every 12 hours while taking ibuprofen  Senna 8.6 mg oral tablet: Take 1-2 tabs daily while taking opioid medication. Then 1-2 times daily as needed for constipation  sertraline 50 mg oral tablet: 1 tab(s) orally once a day  traMADol 50 mg oral tablet: 1 tab(s) orally every 4 to 6 hours, As Needed -for moderate pain   Ultram 50 mg oral tablet: Taper: Take on 50mg tab every 4 hrs on . Take 1 tab every 6hrs on . Take 1 tab every 8hrs on . Then taper complete  Xopenex HFA 45 mcg/inh inhalation aerosol: 2 puff(s) inhaled every 4 hours, As Needed -for shortness of breath and/or wheezing amoxicillin 500 mg oral tablet: 4 tab(s) orally once - one hour prior to dental appointment  ARIPiprazole 2 mg oral tablet: 1 tab(s) orally once a day  cholecalciferol 1000 intl units oral tablet: 1 tab(s) orally once a day  Depo-Provera 400 mg/mL intramuscular suspension: 1 milliliter(s) intramuscular every 3 months - Last given 20  Flovent  mcg/inh inhalation aerosol: 2 puff(s) inhaled 2 times a day  ibuprofen 400 mg oral tablet: 1 tab(s) orally every 6 hours while taking tramadol. Then every 6 hours AS NEEDED for pain  Jadenu 360 mg oral tablet: 4 tab(s) orally once a day  lidocaine-prilocaine 2.5%-2.5% topical cream: Apply topically to affected area and cover 1-2 hours prior to treatment  ondansetron 8 mg oral tablet, disintegratin tab(s) orally every 8 hours, As Needed   [id number] oral powder for reconstitution: Take one capful (17g) in 8oz water daily while taking opioid medication. Then AS NEEDED for constipation.   Pepcid AC 10 mg oral tablet: 1 tab(s) orally every 12 hours while taking ibuprofen  Senna 8.6 mg oral tablet: Take 1-2 tabs daily while taking opioid medication. Then 1-2 times daily as needed for constipation  sertraline 50 mg oral tablet: 1 tab(s) orally once a day  Xopenex HFA 45 mcg/inh inhalation aerosol: 2 puff(s) inhaled every 4 hours, As Needed -for shortness of breath and/or wheezing amoxicillin 500 mg oral tablet: 4 tab(s) orally once - one hour prior to dental appointment  ARIPiprazole 2 mg oral tablet: 1 tab(s) orally once a day  cholecalciferol 1000 intl units oral tablet: 1 tab(s) orally once a day  Depo-Provera 400 mg/mL intramuscular suspension: 1 milliliter(s) intramuscular every 3 months - Last given 20  Flovent  mcg/inh inhalation aerosol: 2 puff(s) inhaled 2 times a day  ibuprofen 400 mg oral tablet: 1 tab(s) orally every 6 hours while taking tramadol. Then every 6 hours AS NEEDED for pain  Jadenu 360 mg oral tablet: 4 tab(s) orally once a day  lidocaine-prilocaine 2.5%-2.5% topical cream: Apply topically to affected area and cover 1-2 hours prior to treatment  ondansetron 8 mg oral tablet, disintegratin tab(s) orally every 8 hours, As Needed   [id number] oral powder for reconstitution: Take one capful (17g) in 8oz water daily while taking opioid medication. Then AS NEEDED for constipation.   Pepcid AC 10 mg oral tablet: 1 tab(s) orally every 12 hours while taking ibuprofen  Senna 8.6 mg oral tablet: Take 1-2 tabs daily while taking opioid medication. Then 1-2 times daily as needed for constipation  sertraline 50 mg oral tablet: 1 tab(s) orally once a day  Xopenex HFA 45 mcg/inh inhalation aerosol: 2 puff(s) inhaled every 4 hours, As Needed -for shortness of breath and/or wheezing amoxicillin 500 mg oral tablet: 4 tab(s) orally once - one hour prior to dental appointment  ARIPiprazole 2 mg oral tablet: 1 tab(s) orally once a day  cholecalciferol 1000 intl units oral tablet: 1 tab(s) orally once a day  Depo-Provera 400 mg/mL intramuscular suspension: 1 milliliter(s) intramuscular every 3 months - Last given 20  Flovent  mcg/inh inhalation aerosol: 2 puff(s) inhaled 2 times a day  ibuprofen 400 mg oral tablet: 1 tab(s) orally every 6 hours while taking tramadol. Then every 6 hours AS NEEDED for pain  Jadenu 360 mg oral tablet: 4 tab(s) orally once a day  lidocaine-prilocaine 2.5%-2.5% topical cream: Apply topically to affected area and cover 1-2 hours prior to treatment  ondansetron 8 mg oral tablet, disintegratin tab(s) orally every 8 hours, As Needed   [id number] oral powder for reconstitution: Take one capful (17g) in 8oz water daily while taking opioid medication. Then AS NEEDED for constipation.   Pepcid AC 10 mg oral tablet: 1 tab(s) orally every 12 hours while taking ibuprofen  Senna 8.6 mg oral tablet: Take 1-2 tabs daily while taking opioid medication. Then 1-2 times daily as needed for constipation  sertraline 50 mg oral tablet: 1 tab(s) orally once a day  Xopenex HFA 45 mcg/inh inhalation aerosol: 2 puff(s) inhaled every 4 hours, As Needed -for shortness of breath and/or wheezing

## 2020-02-12 NOTE — DISCHARGE NOTE PROVIDER - CARE PROVIDER_API CALL
Benjamin Loza)  Neurological Surgery; Pediatric Neurological Surgery  [address] [place] [address], [address]  [place] [address]  Phone: [phone]  Fax: [phone]  Follow Up Time: 1 week Benjamin Loza)  Neurological Surgery; Pediatric Neurological Surgery  [address] [facility], [address]  [place] [address]  Phone: [phone]  Fax: [phone]  Follow Up Time: 1 week    Carmen Lu)  Pediatric HematologyOncology; Pediatrics  [address] [address] [place] [address]  Phone: [phone]  Fax: [phone]  Follow Up Time: 1-3 days

## 2020-02-12 NOTE — DISCHARGE NOTE PROVIDER - HOSPITAL COURSE
20 year old female ex 24 week premie with a grade 4 IVH, with complex medical history of sickle cell disease, hydrocephalus s/p  shut x2 with 6 revisions, reactive airway, atrophic spleen, cholecystectomy, 4x eye operations with recent right eye deviation, bipolar I disorder and depression.  Recurrent VOE and ECS on chronic every 3-4 weeks of PRBC’s, on chelation therapy for iron overload.  Right eye deviation present but not a new finding.      Most recently admitted 1/26/20 for vaso-occlusive crisis with URI symptoms, requiring 2x units of PRBC’s.  RVP negative, blood culture negative, CXR normal.  MRI 1/26 showed no changes to  shunts and no focal visual abnormalities on imaging. Toxicology screen negative, HIV test negative, RVP negative, blood cultures negative. Prior to this last vaso-occlusive crisis admission over 3-4 years ago. Now admitted post-op for ICP bolt monitoring.     2 Central Course (2/11- )    Neuro: Patient was admitted from PACU on RA with ICP bolt in place. Neuro checks are stable. Neurosurgery recommended_____.    MARCUSI: Regular diet initiated. 20 year old female ex 24 week premie with a grade 4 IVH, with complex medical history of sickle cell disease, hydrocephalus s/p  shut x2 with 6 revisions, reactive airway, atrophic spleen, cholecystectomy, 4x eye operations with recent right eye deviation, bipolar I disorder and depression.  Recurrent VOE and ECS on chronic every 3-4 weeks of PRBC’s, on chelation therapy for iron overload.  Right eye deviation present but not a new finding.      Most recently admitted 1/26/20 for vaso-occlusive crisis with URI symptoms, requiring 2x units of PRBC’s.  RVP negative, blood culture negative, CXR normal.  MRI 1/26 showed no changes to  shunts and no focal visual abnormalities on imaging. Toxicology screen negative, HIV test negative, RVP negative, blood cultures negative. Prior to this last vaso-occlusive crisis admission over 3-4 years ago. Now admitted post-op for ICP bolt monitoring.     2 Central Course (2/11- )    Neuro: Patient was admitted from PACU on RA with ICP bolt in place. Neuro checks have been stable throughout admission. Neurosurgery recommended shunt revision on the second day of admission.    FENGI: Regular diet initiated. On the second day, she was placed NPO with IVF in preparation for OR. 20 year old female ex 24 week premie with a grade 4 IVH, with complex medical history of sickle cell disease, hydrocephalus s/p  shut x2 with 6 revisions, reactive airway, atrophic spleen, cholecystectomy, 4x eye operations with recent right eye deviation, bipolar I disorder and depression.  Recurrent VOE and ECS on chronic every 3-4 weeks of PRBC’s, on chelation therapy for iron overload.  Right eye deviation present but not a new finding.      Most recently admitted 1/26/20 for vaso-occlusive crisis with URI symptoms, requiring 2x units of PRBC’s.  RVP negative, blood culture negative, CXR normal.  MRI 1/26 showed no changes to  shunts and no focal visual abnormalities on imaging. Toxicology screen negative, HIV test negative, RVP negative, blood cultures negative. Prior to this last vaso-occlusive crisis admission over 3-4 years ago. Now admitted post-op for ICP bolt monitoring.     2 Central Course (2/11- )    Elizabeth was admitted to [address] [place] [address] from PACU in stable condition. She was awake and alert. Neuro checks were normal over the first night. She was asked to report headaches in order to correlate with ICP monitoring.     Cefazolin was given for perioperative coverage.     On the second day of admission, Neurosurgery recommended shunt revision based on monitoring.     Elizabeth was initially placed on a regular diet. However, due to second scheduled surgery, she was placed NPO and on IV fluids.     During admission, home medications were continued. She continued Ablify and Sertraline treatment for her Bipolar I, Depression. She continued Deferasirox for iron chelation, treatment for her sickle cell anemia. She also continued Flovent for her asthma treatment. Ondansetron and Miralax were on hand as needed. 20 year old female ex 24 week premie with a grade 4 IVH, with complex medical history of sickle cell disease, hydrocephalus s/p  shut x2 with 6 revisions, reactive airway, atrophic spleen, cholecystectomy, 4x eye operations with recent right eye deviation, bipolar I disorder and depression.  Recurrent VOE and ECS on chronic every 3-4 weeks of PRBC’s, on chelation therapy for iron overload.  Right eye deviation present but not a new finding.      Most recently admitted 1/26/20 for vaso-occlusive crisis with URI symptoms, requiring 2x units of PRBC’s.  RVP negative, blood culture negative, CXR normal.  MRI 1/26 showed no changes to  shunts and no focal visual abnormalities on imaging. Toxicology screen negative, HIV test negative, RVP negative, blood cultures negative. Prior to this last vaso-occlusive crisis admission over 3-4 years ago. Now admitted post-op for ICP bolt monitoring.     2 Central Course (2/11- )    Elizabeth was admitted to [address] [place] [address] from PACU in stable condition. She was awake and alert. Neuro checks were normal over the first night. She was asked to report headaches in order to correlate with ICP monitoring.     Cefazolin was given for perioperative coverage.     On the second day of admission, Neurosurgery recommended shunt revision based on ICP monitoring that revealed low pressure. The persistent headaches are likely due to low ICP caused by excess drainage from the shunts she has in place.      Elizabeth was initially placed on a regular diet. However, due to second scheduled surgery, she was placed NPO and on IV fluids.     During admission, home medications were continued. She continued Ablify and Sertraline treatment for her Bipolar I, Depression. She continued Deferasirox for iron chelation, treatment for her sickle cell anemia. She also continued Flovent for her asthma treatment. Ondansetron and Miralax were on hand as needed. 20 year old female ex 24 week premie with a grade 4 IVH, with complex medical history of sickle cell disease, hydrocephalus s/p  shut x2 with 6 revisions, reactive airway, atrophic spleen, cholecystectomy, 4x eye operations with recent right eye deviation, bipolar I disorder and depression.  Recurrent VOE and ECS on chronic every 3-4 weeks of PRBC’s, on chelation therapy for iron overload.  Right eye deviation present but not a new finding.      Most recently admitted 1/26/20 for vaso-occlusive crisis with URI symptoms, requiring 2x units of PRBC’s.  RVP negative, blood culture negative, CXR normal.  MRI 1/26 showed no changes to  shunts and no focal visual abnormalities on imaging. Toxicology screen negative, HIV test negative, RVP negative, blood cultures negative. Prior to this last vaso-occlusive crisis admission over 3-4 years ago. Now admitted post-op for ICP bolt monitoring.     2 Central Course (2/11- )    Elizabeth was admitted to [address] [place] [address] from PACU in stable condition. She was awake and alert. Neuro checks were normal over the first night. She was asked to report headaches in order to correlate with ICP monitoring.     Cefazolin was given for perioperative coverage.     On the second day of admission, Neurosurgery recommended shunt revision for valve placement based on ICP monitoring that revealed low pressure. The persistent headaches were likely due to low ICP caused by excess drainage from the shunts she has in place.  Symptoms improved significantly after the second surgery.     Elizabeth was initially placed on a regular diet. However, due to second scheduled surgery, she was placed NPO and on IV fluids on second day. After the second operation, she was on regular diet again and tolerated well.      During admission, home medications were continued. She continued Abilify and Sertraline treatment for her Bipolar I, Depression. She continued Deferasirox for iron chelation, treatment for her sickle cell anemia. She also continued Flovent for her asthma treatment. Ondansetron and Miralax were on hand as needed.     On Day 3 of hospitalization, day of discharge, Elizabeth is significantly improved. Her headaches have subsided, she feels happy and she is able to ambulate independently. She is tolerating PO without restriction. Regular urine output. Father is happy with her progress and satisfied with the care they received during their stay.         ICU Vital Signs Last 24 Hrs    T(C): 37.2 (13 Feb 2020 05:00), Max: 37.2 (13 Feb 2020 05:00)    T(F): 98.9 (13 Feb 2020 05:00), Max: 98.9 (13 Feb 2020 05:00)    HR: 59 (13 Feb 2020 05:00) (57 - 101)    BP: 106/52 (13 Feb 2020 05:00) (101/39 - 109/50)    BP(mean): 64 (13 Feb 2020 05:00) (54 - 82)    ABP: --    ABP(mean): --    RR: 20 (13 Feb 2020 05:00) (13 - 28)    SpO2: 97% (13 Feb 2020 05:00) (94% - 100%)        GENERAL: In no acute distress    RESPIRATORY: Lungs clear to auscultation bilaterally. Good aeration. No rales, rhonchi, retractions or wheezing. Effort even and unlabored.    CARDIOVASCULAR: Regular rate and rhythm. Normal S1/S2. No murmurs, rubs, or gallop. Capillary refill < 2 seconds. Distal pulses 2+ and equal.    ABDOMEN: Soft, non-distended. Bowel sounds present. No palpable hepatosplenomegaly.    SKIN: No rash. Dressing over scalp at area of incisions    EXTREMITIES: Warm and well perfused. No gross extremity deformities.    NEUROLOGIC: Awake, Alert and Oriented. Moves all four extremities spontaneously and with coordination. Speech coherent and organized. 20 year old female ex 24 week premie with a grade 4 IVH, with complex medical history of sickle cell disease, hydrocephalus s/p  shut x2 with 6 revisions, reactive airway, atrophic spleen, cholecystectomy, 4x eye operations with recent right eye deviation, bipolar I disorder and depression.  Recurrent VOE and ECS on chronic every 3-4 weeks of PRBC’s, on chelation therapy for iron overload.  Right eye deviation present but not a new finding.      Most recently admitted 1/26/20 for vaso-occlusive crisis with URI symptoms, requiring 2x units of PRBC’s.  RVP negative, blood culture negative, CXR normal.  MRI 1/26 showed no changes to  shunts and no focal visual abnormalities on imaging. Toxicology screen negative, HIV test negative, RVP negative, blood cultures negative. Prior to this last vaso-occlusive crisis admission over 3-4 years ago. Now admitted post-op for ICP bolt monitoring.     2 Central Course (2/11-2/13)    Elizabeth was admitted to [address] [place] [address] from PACU in stable condition. She was awake and alert. Neuro checks were normal over the first night. She was asked to report headaches in order to correlate with ICP monitoring.     Cefazolin was given for perioperative coverage.     On the second day of admission, Neurosurgery recommended shunt revision for valve placement based on ICP monitoring that revealed low pressure. The persistent headaches were likely due to low ICP caused by excess drainage from the shunts she has in place.  Symptoms improved significantly after the second surgery.     Elizabeth was initially placed on a regular diet. However, due to second scheduled surgery, she was placed NPO and on IV fluids on second day. After the second operation, she was on regular diet again and tolerated well.      During admission, home medications were continued. She continued Abilify and Sertraline treatment for her Bipolar I, Depression. She continued Deferasirox for iron chelation, treatment for her sickle cell anemia. She also continued Flovent for her asthma treatment. Ondansetron and Miralax were on hand as needed.     On Day 3 of hospitalization, day of discharge, Elizabeth is significantly improved. Her headaches have subsided, she feels happy and she is able to ambulate independently. She is tolerating PO without restriction. Regular urine output. Father is happy with her progress and satisfied with the care they received during their stay.         ICU Vital Signs Last 24 Hrs    T(C): 37.2 (13 Feb 2020 05:00), Max: 37.2 (13 Feb 2020 05:00)    T(F): 98.9 (13 Feb 2020 05:00), Max: 98.9 (13 Feb 2020 05:00)    HR: 59 (13 Feb 2020 05:00) (57 - 101)    BP: 106/52 (13 Feb 2020 05:00) (101/39 - 109/50)    RR: 20 (13 Feb 2020 05:00) (13 - 28)    SpO2: 97% (13 Feb 2020 05:00) (94% - 100%)        GENERAL: In no acute distress    RESPIRATORY: Lungs clear to auscultation bilaterally. Good aeration. No rales, rhonchi, retractions or wheezing. Effort even and unlabored.    CARDIOVASCULAR: Regular rate and rhythm. Normal S1/S2. No murmurs, rubs, or gallop. Capillary refill < 2 seconds. Distal pulses 2+ and equal.    ABDOMEN: Soft, non-distended. Bowel sounds present. No palpable hepatosplenomegaly.    SKIN: No rash. Dressing over scalp at area of incisions    EXTREMITIES: Warm and well perfused. No gross extremity deformities.    NEUROLOGIC: Awake, Alert and Oriented. Moves all four extremities spontaneously and with coordination. Speech coherent and organized. 20 year old female ex 24 week premie with a grade 4 IVH, with complex medical history of sickle cell disease, hydrocephalus s/p  shut x2 with 6 revisions, reactive airway, atrophic spleen, cholecystectomy, 4x eye operations with recent right eye deviation, bipolar I disorder and depression.  Recurrent VOE and ECS on chronic every 3-4 weeks of PRBC’s, on chelation therapy for iron overload.  Right eye deviation present but not a new finding.      Most recently admitted 1/26/20 for vaso-occlusive crisis with URI symptoms, requiring 2x units of PRBC’s.  RVP negative, blood culture negative, CXR normal.  MRI 1/26 showed no changes to  shunts and no focal visual abnormalities on imaging. Toxicology screen negative, HIV test negative, RVP negative, blood cultures negative. Prior to this last vaso-occlusive crisis admission over 3-4 years ago. Now admitted post-op for ICP bolt monitoring.     2 Central Course (2/11-2/13)    Elizabeth was admitted to [address] [place] [address] from PACU in stable condition. She was awake and alert. Neuro checks were normal over the first night. She was asked to report headaches in order to correlate with ICP monitoring.     Cefazolin was given for perioperative coverage.     On the second day of admission, Neurosurgery recommended shunt revision for valve placement based on ICP monitoring that revealed low pressure. The persistent headaches were likely due to low ICP caused by excess drainage from the shunts she has in place.  Symptoms improved significantly after the second surgery.     Eliazbeth was initially placed on a regular diet. However, due to second scheduled surgery, she was placed NPO and on IV fluids on second day. After the second operation, she was on regular diet again and tolerated well.      During admission, home medications were continued. She continued Abilify and Sertraline treatment for her Bipolar I, Depression. She continued Deferasirox for iron chelation, treatment for her sickle cell anemia. She also continued Flovent for her asthma treatment. Ondansetron and Miralax were on hand as needed.     On Day 3 of hospitalization, day of discharge, Elizabeth is significantly improved. Her headaches have subsided, she feels happy and she is able to ambulate independently. She is tolerating PO without restriction. Regular urine output. Father is happy with her progress and satisfied with the care they received during their stay.     OR COURSE: ICP monitor on 2/11/20    SHUNT REVISION - VALVE CHANGE on 2/12/20 with removal of ICP monitor.     Patient now has a STRATA VALVE at 1.5.     ICU Vital Signs Last 24 Hrs    T(C): 37.2 (13 Feb 2020 05:00), Max: 37.2 (13 Feb 2020 05:00)    T(F): 98.9 (13 Feb 2020 05:00), Max: 98.9 (13 Feb 2020 05:00)    HR: 59 (13 Feb 2020 05:00) (57 - 101)    BP: 106/52 (13 Feb 2020 05:00) (101/39 - 109/50)    RR: 20 (13 Feb 2020 05:00) (13 - 28)    SpO2: 97% (13 Feb 2020 05:00) (94% - 100%)        GENERAL: In no acute distress    RESPIRATORY: Lungs clear to auscultation bilaterally. Good aeration. No rales, rhonchi, retractions or wheezing. Effort even and unlabored.    CARDIOVASCULAR: Regular rate and rhythm. Normal S1/S2. No murmurs, rubs, or gallop. Capillary refill < 2 seconds. Distal pulses 2+ and equal.    ABDOMEN: Soft, non-distended. Bowel sounds present. No palpable hepatosplenomegaly.    SKIN: No rash. Dressing over scalp at area of incisions    EXTREMITIES: Warm and well perfused. No gross extremity deformities.    NEUROLOGIC: Awake, Alert and Oriented. Moves all four extremities spontaneously and with coordination. Speech coherent and organized.

## 2020-02-13 ENCOUNTER — TRANSCRIPTION ENCOUNTER (OUTPATIENT)
Age: 20
End: 2020-02-13

## 2020-02-13 VITALS — OXYGEN SATURATION: 98 %

## 2020-02-13 PROCEDURE — 99238 HOSP IP/OBS DSCHRG MGMT 30/<: CPT

## 2020-02-13 RX ORDER — ACETAMINOPHEN 500 MG
2 TABLET ORAL
Qty: 30 | Refills: 0
Start: 2020-02-13 | End: 2020-02-17

## 2020-02-13 RX ADMIN — Medication 179 MILLIGRAM(S): at 01:09

## 2020-02-13 RX ADMIN — DEFERASIROX 2000 MILLIGRAM(S): 180 GRANULE ORAL at 09:22

## 2020-02-13 RX ADMIN — Medication 650 MILLIGRAM(S): at 05:40

## 2020-02-13 RX ADMIN — SODIUM CHLORIDE 20 MILLILITER(S): 9 INJECTION, SOLUTION INTRAVENOUS at 09:09

## 2020-02-13 RX ADMIN — Medication 650 MILLIGRAM(S): at 05:11

## 2020-02-13 RX ADMIN — Medication 179 MILLIGRAM(S): at 09:22

## 2020-02-13 RX ADMIN — Medication 2 PUFF(S): at 08:47

## 2020-02-13 NOTE — DISCHARGE NOTE NURSING/CASE MANAGEMENT/SOCIAL WORK - PATIENT PORTAL LINK FT
You can access the FollowMyHealth Patient Portal offered by [facility] by registering at the following website: [email or web address]. By joining [employer]’s FollowMyHealth portal, you will also be able to view your health information using other applications (apps) compatible with our system.
You can access the FollowMyHealth Patient Portal offered by [facility] by registering at the following website: [email or web address]. By joining [facility]’s FollowMyHealth portal, you will also be able to view your health information using other applications (apps) compatible with our system.

## 2020-02-13 NOTE — PROGRESS NOTE PEDS - SUBJECTIVE AND OBJECTIVE BOX
NEUROSURGERY NOTE   KINGS ARJUN / [id number] / 02-13-20 @ 08:05    PAST 24hr EVENTS: pt went to OR yesterday for  shunt valve change, now has strata set at 1.5. Dermott has been removed     PHYSICAL EXAM:   Vital Signs Last 24 Hrs  T(C): 37.2 (13 Feb 2020 05:00), Max: 37.2 (13 Feb 2020 05:00)  T(F): 98.9 (13 Feb 2020 05:00), Max: 98.9 (13 Feb 2020 05:00)  HR: 59 (13 Feb 2020 05:00) (57 - 101)  BP: 106/52 (13 Feb 2020 05:00) (101/39 - 109/50)  BP(mean): 64 (13 Feb 2020 05:00) (54 - 82)  RR: 20 (13 Feb 2020 05:00) (13 - 28)  SpO2: 97% (13 Feb 2020 05:00) (94% - 100%)    Awake, Alert, Affect appropriate  PERRL, EOMI  MAEx4 w/ good strength  No drift  Incision/wound C/D/I    I&O's Summary    12 Feb 2020 07:01  -  13 Feb 2020 07:00  --------------------------------------------------------  IN: 1357 mL / OUT: 1750 mL / NET: -393 mL    MEDICATIONS  (STANDING):  ARIPiprazole  Oral Tab/Cap - Peds 2 milliGRAM(s) Oral daily  ceFAZolin  IV Intermittent - Peds 1790 milliGRAM(s) IV Intermittent every 8 hours  deferasirox Oral Tab/Cap (EXJADE) - Peds 2000 milliGRAM(s) Oral daily  fluticasone propionate  110 MICROgram(s) HFA Inhaler - Peds 2 Puff(s) Inhalation two times a day  sertraline Oral Tab/Cap - Peds 50 milliGRAM(s) Oral daily  sodium chloride 0.9%. - Pediatric 1000 milliLiter(s) (20 mL/Hr) IV Continuous <Continuous>    MEDICATIONS  (PRN):  acetaminophen   Oral Liquid - Peds. 650 milliGRAM(s) Oral every 6 hours PRN Temp greater or equal to 38 C (100.4 F), Mild Pain (1 - 3)  ondansetron Disintegrating Oral Tablet - Peds 8 milliGRAM(s) Oral every 8 hours PRN Nausea and/or Vomiting  polyethylene glycol 3350 Oral Powder - Peds 17 Gram(s) Oral daily PRN Constipation

## 2020-02-13 NOTE — PROGRESS NOTE PEDS - ASSESSMENT
V-BEAM PULSED DYE LASER 595nm PROCEDURE NOTE    SURGEON:  Serene Diaz MD  DIAGNOSIS:  Telangiectasia  SITE(s) cheek  LASER:  Danielle Kerrie. V-beam pulsed dye laser 595nm  EYE PROTECTION:  Goggles per patient and staff    TREATMENT SITE /SPOT SIZE /PULSE DRN / FLUENCE/ D-COOL /TOTAL PULSES                  7mm     6ms        9 J/cm2  0/10   0 pulses      POST-OP DRESSING:  Unable to complete due to breakdown of equipment   POST-OP MEDICATIONS:  None.    Consent signed prior to visit.  Cost for visit  0$  Will reschedule    Potential cost for next visit 0$    On 12/19/2019, Cristal WU MA scribed the services personally performed by Serene Diaz MD     I, Dr Diaz, attest that the documentation recorded by the scribe accurately and completely reflects the service(s) I personally performed and the decisions made by me.     
19 YO female stable postop change of  shunt valve to strata at 1.5, removal of ICP monitor POD #1     PLAN:   - home today   Case discussed with attending neurosurgeon.
20 year old female ex 24 week premie with a grade 4 IVH, with complex medical history of sickle cell disease, hydrocephalus s/p  shut x2 with 6 revisions, reactive airway disease, atrophic spleen, cholecystectomy, multiple operations for strabismus bipolar I disorder and depression.  She has been on a chronic transfusion protocol with chelation therapy.   She is acutely status post change of  shunt valve and removal of ICP monitor today (2/12/20).     cardiorespiratory monitoring  Cont Flovent for RAD  advance diet as tolerated, bowel regimen   Perioperative antibiotics  Neuro checks,   Continue medications for depression  Pain control with Tylenol  following with neurosurgery.
20 year old female ex 24 week premie with a grade 4 IVH, with complex medical history of sickle cell disease, hydrocephalus s/p  shut x2 with 6 revisions, reactive airway disease, atrophic spleen, cholecystectomy, multiple operations for strabismus bipolar I disorder and depression.  She has been on a chronic transfusion protocol with chelation therapy. She had an ICP monitor places on 2/11 for recurrent headaches.    She is stable from a resp and hemodynamic standpoint. Cont to monitor  Cont Flovent for RAD  Can eat, and is off IVF  Perioperative antibiotics  Neuro checks, and monitor ICP  Continue medications for depression  Pain control with Tylenol  following with neurosurgery.
20year old female with PMH of Hydrocephalus, VPS with ongoing headaches, now s/p ICP monitor
20year old female with history of Hydrocephalus, VPS with persistent headaches s/p ICP monitor POD #1
21 YO female stable postop change of  shunt valve

## 2020-02-20 ENCOUNTER — OUTPATIENT (OUTPATIENT)
Dept: OUTPATIENT SERVICES | Age: 20
LOS: 1 days | End: 2020-02-20

## 2020-02-20 ENCOUNTER — APPOINTMENT (OUTPATIENT)
Dept: PEDIATRIC HEMATOLOGY/ONCOLOGY | Facility: CLINIC | Age: 20
End: 2020-02-20

## 2020-02-20 DIAGNOSIS — Z98.890 OTHER SPECIFIED POSTPROCEDURAL STATES: Chronic | ICD-10-CM

## 2020-02-21 ENCOUNTER — LABORATORY RESULT (OUTPATIENT)
Age: 20
End: 2020-02-21

## 2020-02-21 ENCOUNTER — APPOINTMENT (OUTPATIENT)
Dept: PEDIATRIC HEMATOLOGY/ONCOLOGY | Facility: CLINIC | Age: 20
End: 2020-02-21
Payer: COMMERCIAL

## 2020-02-21 ENCOUNTER — OUTPATIENT (OUTPATIENT)
Dept: OUTPATIENT SERVICES | Age: 20
LOS: 1 days | End: 2020-02-21

## 2020-02-21 DIAGNOSIS — Z98.890 OTHER SPECIFIED POSTPROCEDURAL STATES: Chronic | ICD-10-CM

## 2020-02-21 LAB
ALBUMIN SERPL ELPH-MCNC: 4.2 G/DL — SIGNIFICANT CHANGE UP (ref 3.3–5)
ALP SERPL-CCNC: 63 U/L — SIGNIFICANT CHANGE UP (ref 40–120)
ALT FLD-CCNC: 11 U/L — SIGNIFICANT CHANGE UP (ref 4–33)
ANION GAP SERPL CALC-SCNC: 12 MMO/L — SIGNIFICANT CHANGE UP (ref 7–14)
AST SERPL-CCNC: 19 U/L — SIGNIFICANT CHANGE UP (ref 4–32)
BASOPHILS # BLD AUTO: 0.06 K/UL — SIGNIFICANT CHANGE UP (ref 0–0.2)
BASOPHILS NFR BLD AUTO: 0.5 % — SIGNIFICANT CHANGE UP (ref 0–2)
BILIRUB SERPL-MCNC: 2.8 MG/DL — HIGH (ref 0.2–1.2)
BLD GP AB SCN SERPL QL: NEGATIVE — SIGNIFICANT CHANGE UP
BUN SERPL-MCNC: 6 MG/DL — LOW (ref 7–23)
CALCIUM SERPL-MCNC: 9.1 MG/DL — SIGNIFICANT CHANGE UP (ref 8.4–10.5)
CHLORIDE SERPL-SCNC: 105 MMOL/L — SIGNIFICANT CHANGE UP (ref 98–107)
CO2 SERPL-SCNC: 24 MMOL/L — SIGNIFICANT CHANGE UP (ref 22–31)
CREAT SERPL-MCNC: 0.57 MG/DL — SIGNIFICANT CHANGE UP (ref 0.5–1.3)
EOSINOPHIL # BLD AUTO: 0.28 K/UL — SIGNIFICANT CHANGE UP (ref 0–0.5)
EOSINOPHIL NFR BLD AUTO: 2.3 % — SIGNIFICANT CHANGE UP (ref 0–6)
FERRITIN SERPL-MCNC: 1558 NG/ML — HIGH (ref 15–150)
GLUCOSE SERPL-MCNC: 143 MG/DL — HIGH (ref 70–99)
HCT VFR BLD CALC: 26 % — LOW (ref 34.5–45)
HGB BLD-MCNC: 8.8 G/DL — LOW (ref 11.5–15.5)
IMM GRANULOCYTES NFR BLD AUTO: 0.3 % — SIGNIFICANT CHANGE UP (ref 0–1.5)
LYMPHOCYTES # BLD AUTO: 3.83 K/UL — HIGH (ref 1–3.3)
LYMPHOCYTES # BLD AUTO: 31.3 % — SIGNIFICANT CHANGE UP (ref 13–44)
MCHC RBC-ENTMCNC: 30.9 PG — SIGNIFICANT CHANGE UP (ref 27–34)
MCHC RBC-ENTMCNC: 33.8 % — SIGNIFICANT CHANGE UP (ref 32–36)
MCV RBC AUTO: 91.2 FL — SIGNIFICANT CHANGE UP (ref 80–100)
MONOCYTES # BLD AUTO: 0.97 K/UL — HIGH (ref 0–0.9)
MONOCYTES NFR BLD AUTO: 7.9 % — SIGNIFICANT CHANGE UP (ref 2–14)
NEUTROPHILS # BLD AUTO: 7.04 K/UL — SIGNIFICANT CHANGE UP (ref 1.8–7.4)
NEUTROPHILS NFR BLD AUTO: 57.7 % — SIGNIFICANT CHANGE UP (ref 43–77)
NRBC # FLD: 0.09 K/UL — SIGNIFICANT CHANGE UP (ref 0–0)
PLATELET # BLD AUTO: 425 K/UL — HIGH (ref 150–400)
PMV BLD: 9.6 FL — SIGNIFICANT CHANGE UP (ref 7–13)
POTASSIUM SERPL-MCNC: 3.5 MMOL/L — SIGNIFICANT CHANGE UP (ref 3.5–5.3)
POTASSIUM SERPL-SCNC: 3.5 MMOL/L — SIGNIFICANT CHANGE UP (ref 3.5–5.3)
PROT SERPL-MCNC: 6.2 G/DL — SIGNIFICANT CHANGE UP (ref 6–8.3)
RBC # BLD: 2.85 M/UL — LOW (ref 3.8–5.2)
RBC # FLD: 17.8 % — HIGH (ref 10.3–14.5)
RETICS #: 304 K/UL — HIGH (ref 25–125)
RETICS/RBC NFR: 10.7 % — HIGH (ref 0.5–2.5)
RH IG SCN BLD-IMP: POSITIVE — SIGNIFICANT CHANGE UP
SODIUM SERPL-SCNC: 141 MMOL/L — SIGNIFICANT CHANGE UP (ref 135–145)
WBC # BLD: 12.22 K/UL — HIGH (ref 3.8–10.5)
WBC # FLD AUTO: 12.22 K/UL — HIGH (ref 3.8–10.5)

## 2020-02-21 PROCEDURE — ZZZZZ: CPT

## 2020-02-22 ENCOUNTER — LABORATORY RESULT (OUTPATIENT)
Age: 20
End: 2020-02-22

## 2020-02-22 ENCOUNTER — OUTPATIENT (OUTPATIENT)
Dept: OUTPATIENT SERVICES | Age: 20
LOS: 1 days | End: 2020-02-22

## 2020-02-22 ENCOUNTER — APPOINTMENT (OUTPATIENT)
Dept: PEDIATRIC HEMATOLOGY/ONCOLOGY | Facility: CLINIC | Age: 20
End: 2020-02-22
Payer: COMMERCIAL

## 2020-02-22 VITALS
TEMPERATURE: 98.06 F | BODY MASS INDEX: 22.07 KG/M2 | WEIGHT: 121.47 LBS | HEIGHT: 62.13 IN | SYSTOLIC BLOOD PRESSURE: 119 MMHG | RESPIRATION RATE: 20 BRPM | DIASTOLIC BLOOD PRESSURE: 62 MMHG | HEART RATE: 90 BPM

## 2020-02-22 DIAGNOSIS — Z98.890 OTHER SPECIFIED POSTPROCEDURAL STATES: Chronic | ICD-10-CM

## 2020-02-22 LAB
APPEARANCE UR: CLEAR — SIGNIFICANT CHANGE UP
BACTERIA # UR AUTO: SIGNIFICANT CHANGE UP
BILIRUB UR-MCNC: NEGATIVE — SIGNIFICANT CHANGE UP
BLOOD UR QL VISUAL: SIGNIFICANT CHANGE UP
COLOR SPEC: YELLOW — SIGNIFICANT CHANGE UP
GLUCOSE UR-MCNC: NEGATIVE — SIGNIFICANT CHANGE UP
HYALINE CASTS # UR AUTO: NEGATIVE — SIGNIFICANT CHANGE UP
KETONES UR-MCNC: NEGATIVE — SIGNIFICANT CHANGE UP
LEUKOCYTE ESTERASE UR-ACNC: NEGATIVE — SIGNIFICANT CHANGE UP
NITRITE UR-MCNC: NEGATIVE — SIGNIFICANT CHANGE UP
PH UR: 6.5 — SIGNIFICANT CHANGE UP (ref 5–8)
PROT UR-MCNC: NEGATIVE — SIGNIFICANT CHANGE UP
RBC CASTS # UR COMP ASSIST: SIGNIFICANT CHANGE UP (ref 0–?)
SP GR SPEC: 1.01 — SIGNIFICANT CHANGE UP (ref 1–1.04)
SQUAMOUS # UR AUTO: SIGNIFICANT CHANGE UP
UROBILINOGEN FLD QL: SIGNIFICANT CHANGE UP
WBC UR QL: SIGNIFICANT CHANGE UP (ref 0–?)

## 2020-02-22 PROCEDURE — 99214 OFFICE O/P EST MOD 30 MIN: CPT

## 2020-02-22 NOTE — REASON FOR VISIT
[Sickle Cell Disease] : sickle cell disease [Follow-Up Visit] : a follow-up visit for [Patient] : patient [Medical Records] : medical records [FreeTextEntry2] : PRBC

## 2020-02-22 NOTE — REVIEW OF SYSTEMS
[Sore Throat] : sore throat [Braces] : braces [Anxiety] : anxiety [Negative] : Allergic/Immunologic [Immunizations are up to date by report] : Immunizations are up to date by report [Dysuria] : no dysuria [Headache] : no headache [Abdominal Pain] : no abdominal pain [Nausea] : no nausea [Delvalle] : not delvalle [Depressed] : not depressed [FreeTextEntry3] : strabismus [de-identified] : see HPI

## 2020-02-22 NOTE — HISTORY OF PRESENT ILLNESS
[No Feeding Issues] : no feeding issues at this time [de-identified] : Elizabeth is now an 19 year old young woman with sickle cell anemia who is on chronic transfusion therapy to prevent vaso-occlusive painful episodes and acute chest syndrome. She has had no recent VOC or ACS admits.  No recent fevers.  She has been following with Psych regularly secondary to diagnosis of Bipolar disorder and Depression.  She is taking both Abilify and Zoloft, and denies missing doses.  She is up to date within the last year having seen Ophthalmology, Pulmonology and Cardio.  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  Denies fever, URI symptoms, n/v/d. No headache. Feeling well today, no new complaints.\par \par Recent admission sent in from outpatient ophthalmologist with new left afferent pupillary defect in setting of 3 days of bilateral headache x3 days.  Evaluated by ophtho and neurosurg.  No acute ophtho intervention needed and to f/u as outpatient. \par \par Recent shunt revision 2/11/20 no complications.\par \par \par

## 2020-02-22 NOTE — PHYSICAL EXAM
[Mediport] : Mediport [No focal deficits] : no focal deficits [100: Normal, no complaints, no evidence of disease.] : 100: Normal, no complaints, no evidence of disease. [Normal] : affect appropriate

## 2020-02-24 DIAGNOSIS — D57.1 SICKLE-CELL DISEASE WITHOUT CRISIS: ICD-10-CM

## 2020-02-24 LAB
HGB A MFR BLD: 66.6 % — LOW
HGB A2 MFR BLD: 2.9 % — SIGNIFICANT CHANGE UP (ref 2.4–3.5)
HGB F MFR BLD: 1 % — SIGNIFICANT CHANGE UP (ref 0–1.5)
HGB S MFR BLD: 29.5 % — HIGH (ref 0–0)

## 2020-03-20 ENCOUNTER — LABORATORY RESULT (OUTPATIENT)
Age: 20
End: 2020-03-20

## 2020-03-20 ENCOUNTER — OUTPATIENT (OUTPATIENT)
Dept: OUTPATIENT SERVICES | Age: 20
LOS: 1 days | End: 2020-03-20

## 2020-03-20 ENCOUNTER — APPOINTMENT (OUTPATIENT)
Dept: PEDIATRIC HEMATOLOGY/ONCOLOGY | Facility: CLINIC | Age: 20
End: 2020-03-20
Payer: COMMERCIAL

## 2020-03-20 DIAGNOSIS — Z98.890 OTHER SPECIFIED POSTPROCEDURAL STATES: Chronic | ICD-10-CM

## 2020-03-20 LAB
ALBUMIN SERPL ELPH-MCNC: 4.6 G/DL — SIGNIFICANT CHANGE UP (ref 3.3–5)
ALP SERPL-CCNC: 67 U/L — SIGNIFICANT CHANGE UP (ref 40–120)
ALT FLD-CCNC: 9 U/L — SIGNIFICANT CHANGE UP (ref 4–33)
ANION GAP SERPL CALC-SCNC: 12 MMO/L — SIGNIFICANT CHANGE UP (ref 7–14)
AST SERPL-CCNC: 27 U/L — SIGNIFICANT CHANGE UP (ref 4–32)
BASOPHILS # BLD AUTO: 0.08 K/UL — SIGNIFICANT CHANGE UP (ref 0–0.2)
BASOPHILS NFR BLD AUTO: 0.5 % — SIGNIFICANT CHANGE UP (ref 0–2)
BILIRUB SERPL-MCNC: 3.7 MG/DL — HIGH (ref 0.2–1.2)
BLD GP AB SCN SERPL QL: NEGATIVE — SIGNIFICANT CHANGE UP
BUN SERPL-MCNC: 16 MG/DL — SIGNIFICANT CHANGE UP (ref 7–23)
CALCIUM SERPL-MCNC: 9.2 MG/DL — SIGNIFICANT CHANGE UP (ref 8.4–10.5)
CHLORIDE SERPL-SCNC: 108 MMOL/L — HIGH (ref 98–107)
CO2 SERPL-SCNC: 20 MMOL/L — LOW (ref 22–31)
CREAT SERPL-MCNC: 0.98 MG/DL — SIGNIFICANT CHANGE UP (ref 0.5–1.3)
EOSINOPHIL # BLD AUTO: 0.32 K/UL — SIGNIFICANT CHANGE UP (ref 0–0.5)
EOSINOPHIL NFR BLD AUTO: 2.1 % — SIGNIFICANT CHANGE UP (ref 0–6)
FERRITIN SERPL-MCNC: 1391 NG/ML — HIGH (ref 15–150)
GLUCOSE SERPL-MCNC: 81 MG/DL — SIGNIFICANT CHANGE UP (ref 70–99)
HCG SERPL-ACNC: < 5 MIU/ML — SIGNIFICANT CHANGE UP
HCT VFR BLD CALC: 26.7 % — LOW (ref 34.5–45)
HGB BLD-MCNC: 9 G/DL — LOW (ref 11.5–15.5)
IMM GRANULOCYTES NFR BLD AUTO: 0.5 % — SIGNIFICANT CHANGE UP (ref 0–1.5)
LYMPHOCYTES # BLD AUTO: 31.2 % — SIGNIFICANT CHANGE UP (ref 13–44)
LYMPHOCYTES # BLD AUTO: 4.66 K/UL — HIGH (ref 1–3.3)
MCHC RBC-ENTMCNC: 30.1 PG — SIGNIFICANT CHANGE UP (ref 27–34)
MCHC RBC-ENTMCNC: 33.7 % — SIGNIFICANT CHANGE UP (ref 32–36)
MCV RBC AUTO: 89.3 FL — SIGNIFICANT CHANGE UP (ref 80–100)
MONOCYTES # BLD AUTO: 1.2 K/UL — HIGH (ref 0–0.9)
MONOCYTES NFR BLD AUTO: 8 % — SIGNIFICANT CHANGE UP (ref 2–14)
NEUTROPHILS # BLD AUTO: 8.61 K/UL — HIGH (ref 1.8–7.4)
NEUTROPHILS NFR BLD AUTO: 57.7 % — SIGNIFICANT CHANGE UP (ref 43–77)
NRBC # FLD: 0.12 K/UL — SIGNIFICANT CHANGE UP (ref 0–0)
PLATELET # BLD AUTO: 408 K/UL — HIGH (ref 150–400)
PMV BLD: 9.9 FL — SIGNIFICANT CHANGE UP (ref 7–13)
POTASSIUM SERPL-MCNC: 3.6 MMOL/L — SIGNIFICANT CHANGE UP (ref 3.5–5.3)
POTASSIUM SERPL-SCNC: 3.6 MMOL/L — SIGNIFICANT CHANGE UP (ref 3.5–5.3)
PROT SERPL-MCNC: 6.7 G/DL — SIGNIFICANT CHANGE UP (ref 6–8.3)
RBC # BLD: 2.99 M/UL — LOW (ref 3.8–5.2)
RBC # FLD: 16.1 % — HIGH (ref 10.3–14.5)
RETICS #: 290 K/UL — HIGH (ref 25–125)
RETICS/RBC NFR: 9.7 % — HIGH (ref 0.5–2.5)
RH IG SCN BLD-IMP: POSITIVE — SIGNIFICANT CHANGE UP
SODIUM SERPL-SCNC: 140 MMOL/L — SIGNIFICANT CHANGE UP (ref 135–145)
WBC # BLD: 14.94 K/UL — HIGH (ref 3.8–10.5)
WBC # FLD AUTO: 14.94 K/UL — HIGH (ref 3.8–10.5)

## 2020-03-20 PROCEDURE — ZZZZZ: CPT

## 2020-03-21 ENCOUNTER — LABORATORY RESULT (OUTPATIENT)
Age: 20
End: 2020-03-21

## 2020-03-21 ENCOUNTER — OUTPATIENT (OUTPATIENT)
Dept: OUTPATIENT SERVICES | Age: 20
LOS: 1 days | End: 2020-03-21

## 2020-03-21 ENCOUNTER — APPOINTMENT (OUTPATIENT)
Dept: PEDIATRIC HEMATOLOGY/ONCOLOGY | Facility: CLINIC | Age: 20
End: 2020-03-21
Payer: MEDICAID

## 2020-03-21 DIAGNOSIS — Z98.890 OTHER SPECIFIED POSTPROCEDURAL STATES: Chronic | ICD-10-CM

## 2020-03-21 LAB
APPEARANCE UR: CLEAR — SIGNIFICANT CHANGE UP
BACTERIA # UR AUTO: SIGNIFICANT CHANGE UP
BILIRUB UR-MCNC: NEGATIVE — SIGNIFICANT CHANGE UP
BLOOD UR QL VISUAL: SIGNIFICANT CHANGE UP
COLOR SPEC: SIGNIFICANT CHANGE UP
GLUCOSE UR-MCNC: NEGATIVE — SIGNIFICANT CHANGE UP
KETONES UR-MCNC: NEGATIVE — SIGNIFICANT CHANGE UP
LEUKOCYTE ESTERASE UR-ACNC: SIGNIFICANT CHANGE UP
NITRITE UR-MCNC: NEGATIVE — SIGNIFICANT CHANGE UP
PH UR: 6.5 — SIGNIFICANT CHANGE UP (ref 5–8)
PROT UR-MCNC: NEGATIVE — SIGNIFICANT CHANGE UP
RBC CASTS # UR COMP ASSIST: HIGH (ref 0–?)
SP GR SPEC: 1.01 — SIGNIFICANT CHANGE UP (ref 1–1.04)
SQUAMOUS # UR AUTO: SIGNIFICANT CHANGE UP
UROBILINOGEN FLD QL: SIGNIFICANT CHANGE UP
WBC UR QL: HIGH (ref 0–?)

## 2020-03-21 PROCEDURE — 99214 OFFICE O/P EST MOD 30 MIN: CPT

## 2020-03-21 RX ORDER — MEDROXYPROGESTERONE ACETATE 150 MG/ML
150 INJECTION, SUSPENSION, EXTENDED RELEASE INTRAMUSCULAR ONCE
Refills: 0 | Status: DISCONTINUED | OUTPATIENT
Start: 2020-03-21 | End: 2020-04-05

## 2020-03-21 NOTE — REVIEW OF SYSTEMS
[Sore Throat] : sore throat [Braces] : braces [Abdominal Pain] : no abdominal pain [Nausea] : no nausea [Dysuria] : no dysuria [Headache] : no headache [Delvalle] : not delvalle [Depressed] : not depressed [Anxiety] : anxiety [Negative] : Allergic/Immunologic [FreeTextEntry3] : strabismus [de-identified] : see HPI [Immunizations are up to date by report] : Immunizations are up to date by report

## 2020-03-21 NOTE — HISTORY OF PRESENT ILLNESS
[de-identified] : Elizabeth is now an 19 year old young woman with sickle cell anemia who is on chronic transfusion therapy to prevent vaso-occlusive painful episodes and acute chest syndrome. She has had no recent VOC or ACS admits.  No recent fevers.  She has been following with Psych regularly secondary to diagnosis of Bipolar disorder and Depression.  She is taking both Abilify and Zoloft, and denies missing doses.  She is up to date within the last year having seen Ophthalmology, Pulmonology and Cardio.  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  Denies fever, URI symptoms, n/v/d. No headache. Feeling well today.\par \par \par Recent shunt revision 2/11/20 no complications.  Has f/u with Dr. Loza in April.\par \par \par  [No Feeding Issues] : no feeding issues at this time

## 2020-03-23 DIAGNOSIS — D57.1 SICKLE-CELL DISEASE WITHOUT CRISIS: ICD-10-CM

## 2020-03-24 LAB
HGB A MFR BLD: 69.3 % — LOW
HGB A2 MFR BLD: 2.6 % — SIGNIFICANT CHANGE UP (ref 2.4–3.5)
HGB F MFR BLD: < 1 % — SIGNIFICANT CHANGE UP (ref 0–1.5)
HGB S MFR BLD: 27.3 % — HIGH (ref 0–0)

## 2020-03-25 ENCOUNTER — APPOINTMENT (OUTPATIENT)
Dept: ULTRASOUND IMAGING | Facility: HOSPITAL | Age: 20
End: 2020-03-25

## 2020-03-25 ENCOUNTER — OUTPATIENT (OUTPATIENT)
Dept: OUTPATIENT SERVICES | Facility: HOSPITAL | Age: 20
LOS: 1 days | End: 2020-03-25
Payer: MEDICAID

## 2020-03-25 ENCOUNTER — LABORATORY RESULT (OUTPATIENT)
Age: 20
End: 2020-03-25

## 2020-03-25 ENCOUNTER — RESULT REVIEW (OUTPATIENT)
Age: 20
End: 2020-03-25

## 2020-03-25 ENCOUNTER — APPOINTMENT (OUTPATIENT)
Dept: PEDIATRIC HEMATOLOGY/ONCOLOGY | Facility: CLINIC | Age: 20
End: 2020-03-25
Payer: COMMERCIAL

## 2020-03-25 ENCOUNTER — INPATIENT (INPATIENT)
Age: 20
LOS: 0 days | Discharge: HOME | End: 2020-03-26
Attending: PEDIATRICS | Admitting: PEDIATRICS
Payer: MEDICAID

## 2020-03-25 ENCOUNTER — TRANSCRIPTION ENCOUNTER (OUTPATIENT)
Age: 20
End: 2020-03-25

## 2020-03-25 ENCOUNTER — OUTPATIENT (OUTPATIENT)
Dept: OUTPATIENT SERVICES | Age: 20
LOS: 1 days | End: 2020-03-25

## 2020-03-25 VITALS
HEART RATE: 115 BPM | OXYGEN SATURATION: 98 % | RESPIRATION RATE: 26 BRPM | TEMPERATURE: 101.48 F | SYSTOLIC BLOOD PRESSURE: 117 MMHG | DIASTOLIC BLOOD PRESSURE: 57 MMHG

## 2020-03-25 VITALS
DIASTOLIC BLOOD PRESSURE: 78 MMHG | TEMPERATURE: 98.78 F | WEIGHT: 117.95 LBS | SYSTOLIC BLOOD PRESSURE: 123 MMHG | RESPIRATION RATE: 22 BRPM | HEART RATE: 113 BPM | OXYGEN SATURATION: 100 %

## 2020-03-25 VITALS
HEART RATE: 99 BPM | OXYGEN SATURATION: 96 % | TEMPERATURE: 99 F | RESPIRATION RATE: 32 BRPM | SYSTOLIC BLOOD PRESSURE: 114 MMHG | WEIGHT: 118.17 LBS | HEIGHT: 59.84 IN | DIASTOLIC BLOOD PRESSURE: 73 MMHG

## 2020-03-25 VITALS
SYSTOLIC BLOOD PRESSURE: 117 MMHG | TEMPERATURE: 98.78 F | RESPIRATION RATE: 26 BRPM | OXYGEN SATURATION: 98 % | DIASTOLIC BLOOD PRESSURE: 70 MMHG | HEART RATE: 126 BPM

## 2020-03-25 DIAGNOSIS — Z98.890 OTHER SPECIFIED POSTPROCEDURAL STATES: Chronic | ICD-10-CM

## 2020-03-25 DIAGNOSIS — D57.1 SICKLE-CELL DISEASE WITHOUT CRISIS: ICD-10-CM

## 2020-03-25 DIAGNOSIS — R10.9 UNSPECIFIED ABDOMINAL PAIN: ICD-10-CM

## 2020-03-25 DIAGNOSIS — R50.81 FEVER PRESENTING WITH CONDITIONS CLASSIFIED ELSEWHERE: ICD-10-CM

## 2020-03-25 DIAGNOSIS — Z87.440 PERSONAL HISTORY OF URINARY (TRACT) INFECTIONS: ICD-10-CM

## 2020-03-25 LAB
APPEARANCE UR: SIGNIFICANT CHANGE UP
B PERT DNA SPEC QL NAA+PROBE: NOT DETECTED — SIGNIFICANT CHANGE UP
BACTERIA # UR AUTO: HIGH
BASOPHILS # BLD AUTO: 0.1 K/UL — SIGNIFICANT CHANGE UP (ref 0–0.2)
BASOPHILS NFR BLD AUTO: 0.4 % — SIGNIFICANT CHANGE UP (ref 0–2)
BILIRUB UR-MCNC: NEGATIVE — SIGNIFICANT CHANGE UP
BLOOD UR QL VISUAL: HIGH
C PNEUM DNA SPEC QL NAA+PROBE: NOT DETECTED — SIGNIFICANT CHANGE UP
COLOR SPEC: YELLOW — SIGNIFICANT CHANGE UP
EOSINOPHIL # BLD AUTO: 0.07 K/UL — SIGNIFICANT CHANGE UP (ref 0–0.5)
EOSINOPHIL NFR BLD AUTO: 0.3 % — SIGNIFICANT CHANGE UP (ref 0–6)
FLUAV H1 2009 PAND RNA SPEC QL NAA+PROBE: NOT DETECTED — SIGNIFICANT CHANGE UP
FLUAV H1 RNA SPEC QL NAA+PROBE: NOT DETECTED — SIGNIFICANT CHANGE UP
FLUAV H3 RNA SPEC QL NAA+PROBE: NOT DETECTED — SIGNIFICANT CHANGE UP
FLUAV SUBTYP SPEC NAA+PROBE: NOT DETECTED — SIGNIFICANT CHANGE UP
FLUBV RNA SPEC QL NAA+PROBE: NOT DETECTED — SIGNIFICANT CHANGE UP
GLUCOSE UR-MCNC: NEGATIVE — SIGNIFICANT CHANGE UP
HADV DNA SPEC QL NAA+PROBE: NOT DETECTED — SIGNIFICANT CHANGE UP
HCG UR-SCNC: NEGATIVE — SIGNIFICANT CHANGE UP
HCOV PNL SPEC NAA+PROBE: SIGNIFICANT CHANGE UP
HCT VFR BLD CALC: 33.8 % — LOW (ref 34.5–45)
HGB BLD-MCNC: 11.6 G/DL — SIGNIFICANT CHANGE UP (ref 11.5–15.5)
HMPV RNA SPEC QL NAA+PROBE: NOT DETECTED — SIGNIFICANT CHANGE UP
HPIV1 RNA SPEC QL NAA+PROBE: NOT DETECTED — SIGNIFICANT CHANGE UP
HPIV2 RNA SPEC QL NAA+PROBE: NOT DETECTED — SIGNIFICANT CHANGE UP
HPIV3 RNA SPEC QL NAA+PROBE: NOT DETECTED — SIGNIFICANT CHANGE UP
HPIV4 RNA SPEC QL NAA+PROBE: NOT DETECTED — SIGNIFICANT CHANGE UP
HYALINE CASTS # UR AUTO: NEGATIVE — SIGNIFICANT CHANGE UP
IMM GRANULOCYTES NFR BLD AUTO: 1.2 % — SIGNIFICANT CHANGE UP (ref 0–1.5)
KETONES UR-MCNC: NEGATIVE — SIGNIFICANT CHANGE UP
LEUKOCYTE ESTERASE UR-ACNC: SIGNIFICANT CHANGE UP
LYMPHOCYTES # BLD AUTO: 1.37 K/UL — SIGNIFICANT CHANGE UP (ref 1–3.3)
LYMPHOCYTES # BLD AUTO: 5.7 % — LOW (ref 13–44)
MCHC RBC-ENTMCNC: 29.4 PG — SIGNIFICANT CHANGE UP (ref 27–34)
MCHC RBC-ENTMCNC: 34.3 % — SIGNIFICANT CHANGE UP (ref 32–36)
MCV RBC AUTO: 85.8 FL — SIGNIFICANT CHANGE UP (ref 80–100)
MONOCYTES # BLD AUTO: 2.45 K/UL — HIGH (ref 0–0.9)
MONOCYTES NFR BLD AUTO: 10.2 % — SIGNIFICANT CHANGE UP (ref 2–14)
NEUTROPHILS # BLD AUTO: 19.76 K/UL — HIGH (ref 1.8–7.4)
NEUTROPHILS NFR BLD AUTO: 82.2 % — HIGH (ref 43–77)
NITRITE UR-MCNC: POSITIVE — HIGH
NRBC # FLD: 0.06 K/UL — SIGNIFICANT CHANGE UP (ref 0–0)
PH UR: 6.5 — SIGNIFICANT CHANGE UP (ref 5–8)
PLATELET # BLD AUTO: 291 K/UL — SIGNIFICANT CHANGE UP (ref 150–400)
PMV BLD: 9.8 FL — SIGNIFICANT CHANGE UP (ref 7–13)
PROT UR-MCNC: 70 — SIGNIFICANT CHANGE UP
RBC # BLD: 3.94 M/UL — SIGNIFICANT CHANGE UP (ref 3.8–5.2)
RBC # FLD: 15.4 % — HIGH (ref 10.3–14.5)
RBC CASTS # UR COMP ASSIST: HIGH (ref 0–?)
RETICS #: 269 K/UL — HIGH (ref 25–125)
RETICS/RBC NFR: 6.8 % — HIGH (ref 0.5–2.5)
RSV RNA SPEC QL NAA+PROBE: NOT DETECTED — SIGNIFICANT CHANGE UP
RV+EV RNA SPEC QL NAA+PROBE: NOT DETECTED — SIGNIFICANT CHANGE UP
SP GR SPEC: 1.01 — SIGNIFICANT CHANGE UP (ref 1–1.04)
SQUAMOUS # UR AUTO: SIGNIFICANT CHANGE UP
UROBILINOGEN FLD QL: NORMAL — SIGNIFICANT CHANGE UP
WBC # BLD: 24.04 K/UL — HIGH (ref 3.8–10.5)
WBC # FLD AUTO: 24.04 K/UL — HIGH (ref 3.8–10.5)
WBC UR QL: >50 — HIGH (ref 0–?)

## 2020-03-25 PROCEDURE — ZZZZZ: CPT

## 2020-03-25 PROCEDURE — 76856 US EXAM PELVIC COMPLETE: CPT | Mod: 26

## 2020-03-25 PROCEDURE — 99222 1ST HOSP IP/OBS MODERATE 55: CPT

## 2020-03-25 PROCEDURE — 76830 TRANSVAGINAL US NON-OB: CPT | Mod: 26

## 2020-03-25 RX ORDER — CHOLECALCIFEROL (VITAMIN D3) 125 MCG
1000 CAPSULE ORAL DAILY
Refills: 0 | Status: DISCONTINUED | OUTPATIENT
Start: 2020-03-25 | End: 2020-03-26

## 2020-03-25 RX ORDER — CEFTRIAXONE 500 MG/1
2000 INJECTION, POWDER, FOR SOLUTION INTRAMUSCULAR; INTRAVENOUS ONCE
Refills: 0 | Status: DISCONTINUED | OUTPATIENT
Start: 2020-03-25 | End: 2020-03-25

## 2020-03-25 RX ORDER — ONDANSETRON 8 MG/1
8 TABLET, FILM COATED ORAL EVERY 8 HOURS
Refills: 0 | Status: DISCONTINUED | OUTPATIENT
Start: 2020-03-25 | End: 2020-03-26

## 2020-03-25 RX ORDER — SERTRALINE 25 MG/1
50 TABLET, FILM COATED ORAL DAILY
Refills: 0 | Status: DISCONTINUED | OUTPATIENT
Start: 2020-03-25 | End: 2020-03-26

## 2020-03-25 RX ORDER — ACETAMINOPHEN 500 MG
650 TABLET ORAL EVERY 6 HOURS
Refills: 0 | Status: DISCONTINUED | OUTPATIENT
Start: 2020-03-25 | End: 2020-03-26

## 2020-03-25 RX ORDER — SODIUM CHLORIDE 9 MG/ML
1000 INJECTION, SOLUTION INTRAVENOUS
Refills: 0 | Status: DISCONTINUED | OUTPATIENT
Start: 2020-03-25 | End: 2020-03-26

## 2020-03-25 RX ORDER — ARIPIPRAZOLE 15 MG/1
2 TABLET ORAL DAILY
Refills: 0 | Status: DISCONTINUED | OUTPATIENT
Start: 2020-03-25 | End: 2020-03-26

## 2020-03-25 RX ORDER — DEFERASIROX 180 MG/1
2000 GRANULE ORAL ONCE
Refills: 0 | Status: COMPLETED | OUTPATIENT
Start: 2020-03-25 | End: 2020-03-26

## 2020-03-25 RX ORDER — FLUTICASONE PROPIONATE 220 MCG
2 AEROSOL WITH ADAPTER (GRAM) INHALATION
Refills: 0 | Status: DISCONTINUED | OUTPATIENT
Start: 2020-03-25 | End: 2020-03-26

## 2020-03-25 RX ORDER — IBUPROFEN 200 MG
400 TABLET ORAL EVERY 6 HOURS
Refills: 0 | Status: DISCONTINUED | OUTPATIENT
Start: 2020-03-25 | End: 2020-03-26

## 2020-03-25 RX ADMIN — Medication 400 MILLIGRAM(S): at 23:10

## 2020-03-25 RX ADMIN — SODIUM CHLORIDE 20 MILLILITER(S): 9 INJECTION, SOLUTION INTRAVENOUS at 21:59

## 2020-03-25 NOTE — REVIEW OF SYSTEMS
[Anxiety] : anxiety [Negative] : Allergic/Immunologic [Immunizations are up to date by report] : Immunizations are up to date by report [Abdominal Pain] : abdominal pain [Nausea] : no nausea [Emesis] : no emesis [Constipation] : no constipation [Diarrhea] : no diarrhea [Dysuria] : no dysuria [Headache] : no headache [Delvalle] : not delvalle [Depressed] : not depressed [FreeTextEntry3] : strabismus [de-identified] : see HPI

## 2020-03-25 NOTE — DISCHARGE NOTE PROVIDER - NSDCMRMEDTOKEN_GEN_ALL_CORE_FT
amoxicillin 500 mg oral tablet: 4 tab(s) orally once - one hour prior to dental appointment  ARIPiprazole 2 mg oral tablet: 1 tab(s) orally once a day  cholecalciferol 1000 intl units oral tablet: 1 tab(s) orally once a day  Depo-Provera 400 mg/mL intramuscular suspension: 1 milliliter(s) intramuscular every 3 months - Last given 20  Flovent  mcg/inh inhalation aerosol: 2 puff(s) inhaled 2 times a day  ibuprofen 400 mg oral tablet: 1 tab(s) orally every 6 hours while taking tramadol. Then every 6 hours AS NEEDED for pain  Jadenu 360 mg oral tablet: 4 tab(s) orally once a day  lidocaine-prilocaine 2.5%-2.5% topical cream: Apply topically to affected area and cover 1-2 hours prior to treatment  ondansetron 8 mg oral tablet, disintegratin tab(s) orally every 8 hours, As Needed   Senna 8.6 mg oral tablet: Take 1-2 tabs daily while taking opioid medication. Then 1-2 times daily as needed for constipation  sertraline 50 mg oral tablet: 1 tab(s) orally once a day  Xopenex HFA 45 mcg/inh inhalation aerosol: 2 puff(s) inhaled every 4 hours, As Needed -for shortness of breath and/or wheezing amoxicillin 500 mg oral tablet: 4 tab(s) orally once - one hour prior to dental appointment  ARIPiprazole 2 mg oral tablet: 1 tab(s) orally once a day  cholecalciferol 1000 intl units oral tablet: 1 tab(s) orally once a day  Cipro 250 mg oral tablet: 1 tab(s) orally 2 times a day for 3 days  Depo-Provera 400 mg/mL intramuscular suspension: 1 milliliter(s) intramuscular every 3 months - Last given 3/21/20  Flovent  mcg/inh inhalation aerosol: 2 puff(s) inhaled 2 times a day  Jadenu 360 mg oral tablet: 4 tab(s) orally once a day  lidocaine-prilocaine 2.5%-2.5% topical cream: Apply topically to affected area and cover 1-2 hours prior to treatment  ondansetron 8 mg oral tablet, disintegratin tab(s) orally every 8 hours, As Needed   Senna 8.6 mg oral tablet: Take 1-2 tabs daily while taking opioid medication. Then 1-2 times daily as needed for constipation  sertraline 50 mg oral tablet: 1 tab(s) orally once a day  Xopenex HFA 45 mcg/inh inhalation aerosol: 2 puff(s) inhaled every 4 hours, As Needed -for shortness of breath and/or wheezing

## 2020-03-25 NOTE — DISCHARGE NOTE PROVIDER - NSDCFUSCHEDAPPT_GEN_ALL_CORE_FT
KINGS DÍAZ ; 04/16/2020 ; [facility] [address]  KINGS DÍAZ ; 04/18/2020 ; [facility] [address] KINGS DÍAZ ; 04/16/2020 ; [facility] [address]  KINGS DÍAZ ; 04/18/2020 ; [facility] [address]

## 2020-03-25 NOTE — DISCHARGE NOTE PROVIDER - CARE PROVIDER_API CALL
Marii Licea)  Pediatrics  [address] [facility] [facility], [address]  [place] [address]  Phone: [phone]  Fax: [phone]  Follow Up Time: 1-3 days

## 2020-03-25 NOTE — DISCHARGE NOTE PROVIDER - HOSPITAL COURSE
Elizabeth is a 20 year old ex 24 week female with grade 4 IVH, with complex medical history of sickle cell disease, hydrocephalus s/p  shunt x2 with 9 revisions, reactive airway, atrophic spleen, cholecystectomy, 4x eye operations with recent right eye deviation, bipolar 1 disorder, and depression on chronic transfusion protocols on chelation therapy, history of ACS and strokes here with fever and UTI. The patient had been in PACT earlier today, complained of lower abdominal pain and fever. The lower abdominal pain started in a band-like distribution in the lower quadrants and then localized to the left lower abdomen. She had a fever today with Tmax 101F. She started having pain with urination 2 days prior, but no change in frequency, color, no blood in the urine. She has had UTIs in the past and states that this feels similar to prior occasions. No history of STIs (see HEADSS below). No unusual vaginal discharge/ odors. She has had no upper respiratory symptoms. Over the past week she has only gone to visit her boyfriend who is not currently sick. She has had no positive COVID contacts, no recent travel. No diarrhea, vomiting, headaches, rashes. She does report that she received a dose of ceftriaxone in PACT, and started having itchy palms, was nauseous, sweaty. However, those symptoms resolved. Her last transfusion was 3/21, gets them every 3 weeks.     Right eye deviation present but not a new finding.      Recently admitted on 2/11 for shunt revision. Was also admitted 1/26/20 for vaso-occlusive crisis with URI symptoms, requiring 2x units of PRBC’s.  RVP negative, blood culture negative, CXR normal.  MRI 1/26 showed no changes to  shunts and no focal visual abnormalities on imaging. Toxicology screen negative, HIV test negative, RVP negative, blood cultures negative. Prior to this last vaso-occlusive crisis admission over 3-4 years ago.    PMH: sickle cell anemia, hydrocephalus s/p  shunt x2 with 9 revisions, reactive airway, atrophic spleen, cholecystectomy, 4x eye operations with recent right eye deviation, bipolar 1 disorder, and depression on chronic transfusion protocols on chelation therapy, ACS and strokes    PSH: cholecystectomy, splenectomy.  shunt placement s/p 9 revisions. 4 eye surgeries     Meds:     Abilify 2 MG Oral Tablet; TAKE 1 TABLET DAILY    Amoxicillin 500 MG Oral Capsule; TAKE 4 CAPSULES ONE HOUR PRIOR TO DENTAL    APPT    Deferasirox 360 MG Oral Tablet; TAKE 4 TABLET Daily    Flovent  MCG/ACT Inhalation Aerosol; Inhale 2 puffs twice daily    Ibuprofen 400 MG Oral Tablet; TAKE 1 TABLET EVERY 6 HOURS while taking tramadol.    Take 1 tablet every 6 hours AS NEEDED for pain    Lidocaine-Prilocaine 2.5-2.5 % External Cream; APPLY 1 TO 2 HOURS PRIOR TO    TREATMENT AS DIRECTED    medroxyPROGESTERone Acetate 150 MG/ML Intramuscular Suspension; INJECT 1 ML    INTRAMUSCULARLY ONCE EVERY 3 MONTHS.  Last dose 1/29/20    Ondansetron 8 MG Oral Tablet Disintegrating; TAKE 8 MG 3 times daily PRN nausea    Polyethylene Glycol 3350 Oral Powder; MIX 1 CAPFUL (17GM) IN 8 OUNCES OF WATER,    JUICE, OR TEA AND DRINK DAILY while taking opioid medication    Senna 8.6 MG Oral Tablet; Take 1-2 tablets by mouth daily while taking opioid    medication. Then as needed for constipation    traMADol HCl - 50 MG Oral Tablet; TAKE 1-2 TABLET EVERY 4-6 HOURS AS NEEDED    FOR PAIN MDD:6TAB    traMADol HCl - 50 MG Oral Tablet; TAPER: Take one 50mg tab every 4hrs on 1/29. Take 1    tab every 6hrs on`1/30. Take 1 tab every 8hrs on 1/31 MDD:6tab    Vitamin D (Cholecalciferol) 25 MCG (1000 UT) Oral Tablet; TAKE 1 TABLET DAILY    Xopenex HFA 45 MCG/ACT Inhalation Aerosol; INHALE 2 PUFFS EVERY 4 HOURS AS    NEEDED    Zoloft 50 MG Oral Tablet; TAKE 1 TABLET DAILY AS DIRECTED        Allergies: none    Immunizations: up to date     HEADSS: Lives at home with parents, no siblings. Feels safe at home. Used to go to [facility] but after shunt revision in February, could not go back because it was too late. For fun she states she hangs out with her boyfriend. She drinks alcohol ~2x/month only socially and no binge drinking. No cigarette smoking ever, smokes weed, most recently a couple days ago, reports that she has tried to stop but peer pressure causes her to do it again. She prefers males, is sexually active with her boyfriend and states that she always uses contraception in the form of condoms, as well as Depo-Provera shots every 3 months. No history of STIs. Told other providers she does not use protection always w/ sexual encounters.         PACT 3/25: developed lower abdominal pain, fever, UA, blood culture, urine culture sent, GC/chlam, transvaginal ultrasound performed, given CTX- had rash, itchy palms, nauseous, diaphoretic, switched to levaquin.     [facility] Course: Patient arrived to the floor in stable condition. Was continued on Levaquin. Blood culture resulted **. Urine culture resulted **.         On day of discharge, VS reviewed and remained stable. Child continued to have good PO intake with adequate urine output. They remained well-appearing, with no concerning findings noted on physical exam. Care plan discussed with caregivers who endorsed understanding. Anticipatory guidance and strict return precautions also discussed with caregivers in great detail. Child deemed stable for discharge home with recommended pediatrician follow up in 1-2 days of discharge.         Discharge vitals        Discharge physical exam Elizabeth is a 20 year old ex 24 week female with grade 4 IVH, with complex medical history of sickle cell disease, hydrocephalus s/p  shunt x2 with 9 revisions, reactive airway, atrophic spleen, cholecystectomy, 4x eye operations with recent right eye deviation, bipolar 1 disorder, and depression on chronic transfusion protocols on chelation therapy, history of ACS and strokes here with fever and UTI. The patient had been in PACT earlier today, complained of lower abdominal pain and fever. The lower abdominal pain started in a band-like distribution in the lower quadrants and then localized to the left lower abdomen. She had a fever today with Tmax 101F. She started having pain with urination 2 days prior, but no change in frequency, color, no blood in the urine. She has had UTIs in the past and states that this feels similar to prior occasions. No history of STIs (see HEADSS below). No unusual vaginal discharge/ odors. She has had no upper respiratory symptoms. Over the past week she has only gone to visit her boyfriend who is not currently sick. She has had no positive COVID contacts, no recent travel. No diarrhea, vomiting, headaches, rashes. She does report that she received a dose of ceftriaxone in PACT, and started having itchy palms, was nauseous, sweaty. However, those symptoms resolved. Her last transfusion was 3/21, gets them every 3 weeks.     Right eye deviation present but not a new finding.      Recently admitted on  for shunt revision. Was also admitted 20 for vaso-occlusive crisis with URI symptoms, requiring 2x units of PRBC’s.  RVP negative, blood culture negative, CXR normal.  MRI  showed no changes to  shunts and no focal visual abnormalities on imaging. Toxicology screen negative, HIV test negative, RVP negative, blood cultures negative. Prior to this last vaso-occlusive crisis admission over 3-4 years ago.    PMH: sickle cell anemia, hydrocephalus s/p  shunt x2 with 9 revisions, reactive airway, atrophic spleen, cholecystectomy, 4x eye operations with recent right eye deviation, bipolar 1 disorder, and depression on chronic transfusion protocols on chelation therapy, ACS and strokes    PSH: cholecystectomy, splenectomy.  shunt placement s/p 9 revisions. 4 eye surgeries     Meds:     Abilify 2 MG Oral Tablet; TAKE 1 TABLET DAILY    Amoxicillin 500 MG Oral Capsule; TAKE 4 CAPSULES ONE HOUR PRIOR TO DENTAL    APPT    Deferasirox 360 MG Oral Tablet; TAKE 4 TABLET Daily    Flovent  MCG/ACT Inhalation Aerosol; Inhale 2 puffs twice daily    Ibuprofen 400 MG Oral Tablet; TAKE 1 TABLET EVERY 6 HOURS while taking tramadol.    Take 1 tablet every 6 hours AS NEEDED for pain    Lidocaine-Prilocaine 2.5-2.5 % External Cream; APPLY 1 TO 2 HOURS PRIOR TO    TREATMENT AS DIRECTED    medroxyPROGESTERone Acetate 150 MG/ML Intramuscular Suspension; INJECT 1 ML    INTRAMUSCULARLY ONCE EVERY 3 MONTHS.  Last dose 20    Ondansetron 8 MG Oral Tablet Disintegrating; TAKE 8 MG 3 times daily PRN nausea    Polyethylene Glycol 3350 Oral Powder; MIX 1 CAPFUL (17GM) IN 8 OUNCES OF WATER,    JUICE, OR TEA AND DRINK DAILY while taking opioid medication    Senna 8.6 MG Oral Tablet; Take 1-2 tablets by mouth daily while taking opioid    medication. Then as needed for constipation    traMADol HCl - 50 MG Oral Tablet; TAKE 1-2 TABLET EVERY 4-6 HOURS AS NEEDED    FOR PAIN MDD:6TAB    traMADol HCl - 50 MG Oral Tablet; TAPER: Take one 50mg tab every 4hrs on . Take 1    tab every 6hrs on`. Take 1 tab every 8hrs on  MDD:6tab    Vitamin D (Cholecalciferol) 25 MCG (1000 UT) Oral Tablet; TAKE 1 TABLET DAILY    Xopenex HFA 45 MCG/ACT Inhalation Aerosol; INHALE 2 PUFFS EVERY 4 HOURS AS    NEEDED    Zoloft 50 MG Oral Tablet; TAKE 1 TABLET DAILY AS DIRECTED        Allergies: none    Immunizations: up to date     HEADSS: Lives at home with parents, no siblings. Feels safe at home. Used to go to [facility] but after shunt revision in February, could not go back because it was too late. For fun she states she hangs out with her boyfriend. She drinks alcohol ~2x/month only socially and no binge drinking. No cigarette smoking ever, smokes weed, most recently a couple days ago, reports that she has tried to stop but peer pressure causes her to do it again. She prefers males, is sexually active with her boyfriend and states that she always uses contraception in the form of condoms, as well as Depo-Provera shots every 3 months. No history of STIs. Told other providers she does not use protection always w/ sexual encounters.         PACT 3/25: developed lower abdominal pain, fever, UA, blood culture, urine culture sent, GC/chlam, transvaginal ultrasound performed, given CTX- had rash, itchy palms, nauseous, diaphoretic, switched to levaquin.     [facility] Course 3/25-3/26:     Patient arrived to the floor in stable condition. Was continued on Levaquin. Blood culture pending Urine culture pending. Urine GC/chlamydia, HIV, and syphilis pending    On day of discharge, VS reviewed and remained stable. Child continued to have good PO intake with adequate urine output. They remained well-appearing, with no concerning findings noted on physical exam. Care plan discussed with caregivers who endorsed understanding. Anticipatory guidance and strict return precautions also discussed with caregivers in great detail. Child deemed stable for discharge home with recommended pediatrician follow up in 1-2 days of discharge.         Discharge vitals    Vital Signs Last 24 Hrs    T(C): 37 (26 Mar 2020 09:54), Max: 37.1 (25 Mar 2020 20:47)    T(F): 98.6 (26 Mar 2020 09:54), Max: 98.7 (25 Mar 2020 20:47)    HR: 87 (26 Mar 2020 09:54) (82 - 108)    BP: 103/67 (26 Mar 2020 09:54) (93/54 - 119/74)    BP(mean): 89 (25 Mar 2020 23:10) (89 - 89)    RR: 22 (26 Mar 2020 09:54) (22 - 32)    SpO2: 97% (26 Mar 2020 09:54) (96% - 98%)        Discharge physical exam     Gen: no acute distress; smiling, interactive, well appearing    HEENT: NC/AT; AFOSF; pupils equal, responsive, reactive to light; no conjunctivitis or scleral icterus; no nasal discharge; no nasal congestion; oropharynx without exudates/erythema; mucus membranes moist    Neck: FROM, supple, no cervical lymphadenopathy    Chest: clear to auscultation bilaterally, no crackles/wheezes, good air entry, no tachypnea or retractions    CV: regular rate and rhythm, no murmurs     Abd: soft, mild TTP on LUQ, nondistended, no HSM appreciated, NABS    : (+) L CVAT     Back: no vertebral or paraspinal tenderness along entire spine; no CVAT    Extrem: no joint effusion or tenderness; FROM of all joints; no deformities or erythema noted. 2+ peripheral pulses, WWP    Neuro: grossly nonfocal, strength and tone grossly normal            Discharge medications    amoxicillin 500 mg oral tablet: 4 tab(s) orally once - one hour prior to dental appointment    ARIPiprazole 2 mg oral tablet: 1 tab(s) orally once a day    cholecalciferol 1000 intl units oral tablet: 1 tab(s) orally once a day    Cipro 250 mg oral tablet: 1 tab(s) orally 2 times a day for 3 days    Depo-Provera 400 mg/mL intramuscular suspension: 1 milliliter(s) intramuscular every 3 months - Last given 3/21/20    Flovent  mcg/inh inhalation aerosol: 2 puff(s) inhaled 2 times a day    Jadenu 360 mg oral tablet: 4 tab(s) orally once a day    lidocaine-prilocaine 2.5%-2.5% topical cream: Apply topically to affected area and cover 1-2 hours prior to treatment    ondansetron 8 mg oral tablet, disintegratin tab(s) orally every 8 hours, As Needed     Senna 8.6 mg oral tablet: Take 1-2 tabs daily while taking opioid medication. Then 1-2 times daily as needed for constipation    sertraline 50 mg oral tablet: 1 tab(s) orally once a day    Xopenex HFA 45 mcg/inh inhalation aerosol: 2 puff(s) inhaled every 4 hours, As Needed -for shortness of breath and/or wheezing

## 2020-03-25 NOTE — REASON FOR VISIT
[Sick Visit] : a sick visit for [Sickle Cell Disease] : sickle cell disease [Patient] : patient [Medical Records] : medical records

## 2020-03-25 NOTE — H&P PEDIATRIC - NSHPLABSRESULTS_GEN_ALL_CORE
Urinalysis (03.25.20 @ 13:30)    Color: YELLOW    Urine Appearance: Lt TURBID    Glucose: NEGATIVE    Bilirubin: NEGATIVE    Ketone - Urine: NEGATIVE    Specific Gravity: 1.014    Blood: MODERATE    pH - Urine: 6.5    Protein, Urine: 70    Urobilinogen: NORMAL    Nitrite: POSITIVE    Leukocyte Esterase Concentration: LARGE    Red Blood Cell - Urine: 6-10    White Blood Cell - Urine: >50    Hyaline Casts: NEGATIVE    Bacteria: MANY    Squamous Epithelial: OCC                          11.6   24.04 )-----------( 291      ( 25 Mar 2020 15:00 )             33.8   < from: US Pelvis Complete (03.25.20 @ 14:47) >    IMPRESSION:    Small amount of complex fluid within the endometrial cavity.    < end of copied text >

## 2020-03-25 NOTE — H&P PEDIATRIC - ASSESSMENT
20 year old female ex-24 week premie with a grade 4 IVH, with complex medical history of sickle cell disease, hydrocephalus s/p  shut x2 with 6 revisions, reactive airway disease, atrophic spleen, cholecystectomy, 4x eye operations with right eye deviation, bipolar I disorder and depression.  Recurrent VOE and ACS on chronic every 3-4 weeks of PRBC’s, on chelation therapy for iron overload who presents with fever and pain with urination, UA with positive LE, nitrites, WBC >50. CBC WBC 24, RVP negative. ****    Plan: 20 year old female ex-24 week premie with a grade 4 IVH, with complex medical history of sickle cell disease, hydrocephalus s/p  shut x2 with 6 revisions, reactive airway disease, atrophic spleen, cholecystectomy, 4x eye operations with right eye deviation, bipolar I disorder and depression.  Recurrent VOE and ACS on chronic every 3-4 weeks of PRBC’s, on chelation therapy for iron overload who presents with fever and pain with urination, UA with positive LE, nitrites, WBC >50. CBC WBC 24, RVP negative. Patient currently well-appearing with abdominal pain localized to LLQ upon palpation.     Plan:     UTI  - Levaquin  - s/p CTX (developed itchy palms, rash,   - f/u urine culture  - f/u blood culture     Sickle cell disease   - Jadenu  - baseline Hgb 8  - last transfusion 3/21 (gets them every 3-4 weeks)    History of unprotected sex  - HIV/syphilis testing in AM per patient's request  - f/u GC/chlam    Reactive airway  - Flovent 2 puffs twice daily     Depression  - Zoloft   - Abilify    Nutrition  - regular diet   - Vitamin D 20 year old female ex-24 week premie with a grade 4 IVH, with complex medical history of sickle cell disease, hydrocephalus s/p  shut x2 with 6 revisions, reactive airway disease, atrophic spleen, cholecystectomy, 4x eye operations with right eye deviation, bipolar I disorder and depression.  Recurrent VOE and ACS on chronic every 3-4 weeks of PRBC’s, on chelation therapy for iron overload who presents with fever and pain with urination, UA with positive LE, nitrites, WBC >50. CBC WBC 24, RVP negative. Patient currently well-appearing with abdominal pain localized to LLQ upon palpation.     Plan:     UTI  - Levaquin  - s/p CTX (developed itchy palms, nausea)  - f/u urine culture  - f/u blood culture     Sickle cell disease   - Jadenu  - baseline Hgb 8  - last transfusion 3/21 (gets them every 3-4 weeks)    History of unprotected sex  - HIV/syphilis testing in AM per patient's request  - f/u GC/chlam    Reactive airway  - Flovent 2 puffs twice daily     Depression  - Zoloft   - Abilify    Nutrition  - regular diet   - Vitamin D 20 year old female ex-24 week premie with a grade 4 IVH, with complex medical history of sickle cell disease, hydrocephalus s/p  shut x2 with 6 revisions, reactive airway disease, atrophic spleen, cholecystectomy, 4x eye operations with right eye deviation, bipolar I disorder and depression.  Recurrent VOE and ACS on chronic every 3-4 weeks of PRBC’s, on chelation therapy for iron overload who presents with fever and pain with urination, UA with positive LE, nitrites, WBC >50. CBC WBC 24, RVP negative. Blood culture, urine culture, GC/Chlam pending. Patient currently well-appearing with abdominal pain localized to LLQ upon palpation.     Plan:     UTI  - Levaquin  - s/p CTX (developed itchy palms, nausea)  - f/u urine culture  - f/u blood culture     Sickle cell disease   - Jadenu  - baseline Hgb 8  - last transfusion 3/21 (gets them every 3-4 weeks)    History of unprotected sex  - HIV/syphilis testing in AM per patient's request  - f/u GC/chlam    Reactive airway  - Flovent 2 puffs twice daily     Depression  - Zoloft   - Abilify    Nutrition  - regular diet   - Vitamin D

## 2020-03-25 NOTE — H&P PEDIATRIC - NSHPREVIEWOFSYSTEMS_GEN_ALL_CORE
Gen: + fever, normal appetite  Eyes: No eye irritation or discharge  ENT: No earpain, congestion, sore throat  Resp: No cough or trouble breathing  Cardiovascular: No chest pain or palpitation  Gastroenteric: +LLQ abdominal pain, No nausea/vomiting, diarrhea, constipation  : No dysuria  MS: No joint or muscle pain  Skin: No rashes  Neuro: No headache  Remainder negative, except as per the HPI

## 2020-03-25 NOTE — HISTORY OF PRESENT ILLNESS
[No Feeding Issues] : no feeding issues at this time [de-identified] : Elizabeth is now an 19 year old young woman with sickle cell anemia who is on chronic transfusion therapy to prevent vaso-occlusive painful episodes and acute chest syndrome. She has had no recent VOC or ACS admits.  No recent fevers.  She has been following with Psych regularly secondary to diagnosis of Bipolar disorder and Depression.  She is taking both Abilify and Zoloft, and denies missing doses.  She is up to date within the last year having seen Ophthalmology, Pulmonology and Cardio.  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  Here for lower abdominal pain on L and R side which started today.  Rates pain 10/10, does not feel like sickle cell pain.  Has recently had unprotected sex with a new partner and is concerned over possible STI.  No vomiting, diarrhea, constipation.  No fever or URI symptoms.  LMP 10 days ago.  Was 1 week late for last depo provera shot, received on 3/21/20.  \par \par \par

## 2020-03-25 NOTE — PATIENT PROFILE PEDIATRIC. - DIAGNOSIS
Kae from [facility] called and need a script for dermatology for concerned eczema if you have any questions phone number [phone] fax number [phone] (1) Other Diagnosis

## 2020-03-25 NOTE — H&P PEDIATRIC - HISTORY OF PRESENT ILLNESS
Elizabeth is a 20 year old ex 24 week female with grade 4 IVH, with complex medical history of sickle cell disease, hydrocephalus s/p  shunt x2 with 9 revisions, reactive airway, atrophic spleen, cholecystectomy, 4x eye operations with recent right eye deviation, bipolar 1 disorder, and depression on chronic transfusion protocols on chelation therapy, history of ACS and strokes here with fever and UTI. The patient had been in PACT earlier today, complained of lower abdominal pain and fever. The lower abdominal pain started in a band-like distribution in the lower quadrants and then localized to the left lower abdomen. She had a fever today with Tmax ***. She started having pain with urination 2 days prior, but no change in frequency, color, no blood in the urine. She has had UTIs in the past and states that this feels similar to prior occasions. No history of STIs (see HEADSS below). No unusual vaginal discharge/ odors. She has had no upper respiratory symptoms. Over the past week she has only gone to visit her boyfriend who is not currently sick. She has had no positive COVID contacts, no recent travel. No diarrhea, vomiting, headaches, rashes. She does report that she received a dose of ceftriaxone in PACT, and started having itchy palms, was nauseous, sweaty. However, those symptoms resolved.   Right eye deviation present but not a new finding.    Recently admitted on 2/11 for shunt revision. Was also admitted 1/26/20 for vaso-occlusive crisis with URI symptoms, requiring 2x units of PRBC’s.  RVP negative, blood culture negative, CXR normal.  MRI 1/26 showed no changes to  shunts and no focal visual abnormalities on imaging. Toxicology screen negative, HIV test negative, RVP negative, blood cultures negative. Prior to this last vaso-occlusive crisis admission over 3-4 years ago.  PMH: sickle cell anemia, hydrocephalus s/p  shunt x2 with 9 revisions, reactive airway, atrophic spleen, cholecystectomy, 4x eye operations with recent right eye deviation, bipolar 1 disorder, and depression on chronic transfusion protocols on chelation therapy, ACS and strokes  PSH: cholecystectomy, splenectomy.  shunt placement s/p 9 revisions. 4 eye surgeries   Meds: Jadenu. Depo shot every 3 months  Allergies: none  Immunizations: up to date   HEADSS: Lives at home with parents, no siblings. Feels safe at home. Used to go to [facility] but after shunt revision in February, could not go back because it was too late. For fun she states she hangs out with her boyfriend. She drinks alcohol ~2x/month only socially and no binge drinking. No cigarette smoking ever, smokes weed, most recently a couple days ago, reports that she has tried to stop but peer pressure causes her to do it again. She prefers males, is sexually active with her boyfriend and states that she always uses contraception in the form of condoms, as well as Depo-Provera shots every 3 months. No history of STIs. Elizabeth is a 20 year old ex 24 week female with grade 4 IVH, with complex medical history of sickle cell disease, hydrocephalus s/p  shunt x2 with 9 revisions, reactive airway, atrophic spleen, cholecystectomy, 4x eye operations with recent right eye deviation, bipolar 1 disorder, and depression on chronic transfusion protocols on chelation therapy, history of ACS and strokes here with fever and UTI. The patient had been in PACT earlier today, complained of lower abdominal pain and fever. The lower abdominal pain started in a band-like distribution in the lower quadrants and then localized to the left lower abdomen. She had a fever today with Tmax ***. She started having pain with urination 2 days prior, but no change in frequency, color, no blood in the urine. She has had UTIs in the past and states that this feels similar to prior occasions. No history of STIs (see HEADSS below). No unusual vaginal discharge/ odors. She has had no upper respiratory symptoms. Over the past week she has only gone to visit her boyfriend who is not currently sick. She has had no positive COVID contacts, no recent travel. No diarrhea, vomiting, headaches, rashes. She does report that she received a dose of ceftriaxone in PACT, and started having itchy palms, was nauseous, sweaty. However, those symptoms resolved.   Right eye deviation present but not a new finding.    Recently admitted on 2/11 for shunt revision. Was also admitted 1/26/20 for vaso-occlusive crisis with URI symptoms, requiring 2x units of PRBC’s.  RVP negative, blood culture negative, CXR normal.  MRI 1/26 showed no changes to  shunts and no focal visual abnormalities on imaging. Toxicology screen negative, HIV test negative, RVP negative, blood cultures negative. Prior to this last vaso-occlusive crisis admission over 3-4 years ago.  PMH: sickle cell anemia, hydrocephalus s/p  shunt x2 with 9 revisions, reactive airway, atrophic spleen, cholecystectomy, 4x eye operations with recent right eye deviation, bipolar 1 disorder, and depression on chronic transfusion protocols on chelation therapy, ACS and strokes  PSH: cholecystectomy, splenectomy.  shunt placement s/p 9 revisions. 4 eye surgeries   Meds:   Abilify 2 MG Oral Tablet; TAKE 1 TABLET DAILY  Amoxicillin 500 MG Oral Capsule; TAKE 4 CAPSULES ONE HOUR PRIOR TO DENTAL  APPT  Deferasirox 360 MG Oral Tablet; TAKE 4 TABLET Daily  Flovent  MCG/ACT Inhalation Aerosol; Inhale 2 puffs twice daily  Ibuprofen 400 MG Oral Tablet; TAKE 1 TABLET EVERY 6 HOURS while taking tramadol.  Take 1 tablet every 6 hours AS NEEDED for pain  Lidocaine-Prilocaine 2.5-2.5 % External Cream; APPLY 1 TO 2 HOURS PRIOR TO  TREATMENT AS DIRECTED  medroxyPROGESTERone Acetate 150 MG/ML Intramuscular Suspension; INJECT 1 ML  INTRAMUSCULARLY ONCE EVERY 3 MONTHS.  Last dose 1/29/20  Ondansetron 8 MG Oral Tablet Disintegrating; TAKE 8 MG 3 times daily PRN nausea  Polyethylene Glycol 3350 Oral Powder; MIX 1 CAPFUL (17GM) IN 8 OUNCES OF WATER,  JUICE, OR TEA AND DRINK DAILY while taking opioid medication  Senna 8.6 MG Oral Tablet; Take 1-2 tablets by mouth daily while taking opioid  medication. Then as needed for constipation  traMADol HCl - 50 MG Oral Tablet; TAKE 1-2 TABLET EVERY 4-6 HOURS AS NEEDED  FOR PAIN MDD:6TAB  traMADol HCl - 50 MG Oral Tablet; TAPER: Take one 50mg tab every 4hrs on 1/29. Take 1  tab every 6hrs on`1/30. Take 1 tab every 8hrs on 1/31 MDD:6tab  Vitamin D (Cholecalciferol) 25 MCG (1000 UT) Oral Tablet; TAKE 1 TABLET DAILY  Xopenex HFA 45 MCG/ACT Inhalation Aerosol; INHALE 2 PUFFS EVERY 4 HOURS AS  NEEDED  Zoloft 50 MG Oral Tablet; TAKE 1 TABLET DAILY AS DIRECTED    Allergies: none  Immunizations: up to date   HEADSS: Lives at home with parents, no siblings. Feels safe at home. Used to go to [facility] but after shunt revision in February, could not go back because it was too late. For fun she states she hangs out with her boyfriend. She drinks alcohol ~2x/month only socially and no binge drinking. No cigarette smoking ever, smokes weed, most recently a couple days ago, reports that she has tried to stop but peer pressure causes her to do it again. She prefers males, is sexually active with her boyfriend and states that she always uses contraception in the form of condoms, as well as Depo-Provera shots every 3 months. No history of STIs. Elizabeth is a 20 year old ex 24 week female with grade 4 IVH, with complex medical history of sickle cell disease, hydrocephalus s/p  shunt x2 with 9 revisions, reactive airway, atrophic spleen, cholecystectomy, 4x eye operations with recent right eye deviation, bipolar 1 disorder, and depression on chronic transfusion protocols on chelation therapy, history of ACS and strokes here with fever and UTI. The patient had been in PACT earlier today, complained of lower abdominal pain and fever. The lower abdominal pain started in a band-like distribution in the lower quadrants and then localized to the left lower abdomen. She had a fever today with Tmax 101F. She started having pain with urination 2 days prior, but no change in frequency, color, no blood in the urine. She has had UTIs in the past and states that this feels similar to prior occasions. No history of STIs (see HEADSS below). No unusual vaginal discharge/ odors. She has had no upper respiratory symptoms. Over the past week she has only gone to visit her boyfriend who is not currently sick. She has had no positive COVID contacts, no recent travel. No diarrhea, vomiting, headaches, rashes. She does report that she received a dose of ceftriaxone in PACT, and started having itchy palms, was nauseous, sweaty. However, those symptoms resolved. Her last transfusion was 3/21, gets them every 3 weeks.   Right eye deviation present but not a new finding.    Recently admitted on 2/11 for shunt revision. Was also admitted 1/26/20 for vaso-occlusive crisis with URI symptoms, requiring 2x units of PRBC’s.  RVP negative, blood culture negative, CXR normal.  MRI 1/26 showed no changes to  shunts and no focal visual abnormalities on imaging. Toxicology screen negative, HIV test negative, RVP negative, blood cultures negative. Prior to this last vaso-occlusive crisis admission over 3-4 years ago.  PMH: sickle cell anemia, hydrocephalus s/p  shunt x2 with 9 revisions, reactive airway, atrophic spleen, cholecystectomy, 4x eye operations with recent right eye deviation, bipolar 1 disorder, and depression on chronic transfusion protocols on chelation therapy, ACS and strokes  PSH: cholecystectomy, splenectomy.  shunt placement s/p 9 revisions. 4 eye surgeries   Meds:   Abilify 2 MG Oral Tablet; TAKE 1 TABLET DAILY  Amoxicillin 500 MG Oral Capsule; TAKE 4 CAPSULES ONE HOUR PRIOR TO DENTAL  APPT  Deferasirox 360 MG Oral Tablet; TAKE 4 TABLET Daily  Flovent  MCG/ACT Inhalation Aerosol; Inhale 2 puffs twice daily  Ibuprofen 400 MG Oral Tablet; TAKE 1 TABLET EVERY 6 HOURS while taking tramadol.  Take 1 tablet every 6 hours AS NEEDED for pain  Lidocaine-Prilocaine 2.5-2.5 % External Cream; APPLY 1 TO 2 HOURS PRIOR TO  TREATMENT AS DIRECTED  medroxyPROGESTERone Acetate 150 MG/ML Intramuscular Suspension; INJECT 1 ML  INTRAMUSCULARLY ONCE EVERY 3 MONTHS.  Last dose 1/29/20  Ondansetron 8 MG Oral Tablet Disintegrating; TAKE 8 MG 3 times daily PRN nausea  Polyethylene Glycol 3350 Oral Powder; MIX 1 CAPFUL (17GM) IN 8 OUNCES OF WATER,  JUICE, OR TEA AND DRINK DAILY while taking opioid medication  Senna 8.6 MG Oral Tablet; Take 1-2 tablets by mouth daily while taking opioid  medication. Then as needed for constipation  traMADol HCl - 50 MG Oral Tablet; TAKE 1-2 TABLET EVERY 4-6 HOURS AS NEEDED  FOR PAIN MDD:6TAB  traMADol HCl - 50 MG Oral Tablet; TAPER: Take one 50mg tab every 4hrs on 1/29. Take 1  tab every 6hrs on`1/30. Take 1 tab every 8hrs on 1/31 MDD:6tab  Vitamin D (Cholecalciferol) 25 MCG (1000 UT) Oral Tablet; TAKE 1 TABLET DAILY  Xopenex HFA 45 MCG/ACT Inhalation Aerosol; INHALE 2 PUFFS EVERY 4 HOURS AS  NEEDED  Zoloft 50 MG Oral Tablet; TAKE 1 TABLET DAILY AS DIRECTED    Allergies: none  Immunizations: up to date   HEADSS: Lives at home with parents, no siblings. Feels safe at home. Used to go to [facility] but after shunt revision in February, could not go back because it was too late. For fun she states she hangs out with her boyfriend. She drinks alcohol ~2x/month only socially and no binge drinking. No cigarette smoking ever, smokes weed, most recently a couple days ago, reports that she has tried to stop but peer pressure causes her to do it again. She prefers males, is sexually active with her boyfriend and states that she always uses contraception in the form of condoms, as well as Depo-Provera shots every 3 months. No history of STIs. Told other providers she does not use protection always w/ sexual encounters. Elizabeth is a 20 year old ex 24 week female with grade 4 IVH, with complex medical history of sickle cell disease, hydrocephalus s/p  shunt x2 with 9 revisions, reactive airway, atrophic spleen, cholecystectomy, 4x eye operations with recent right eye deviation, bipolar 1 disorder, and depression on chronic transfusion protocols on chelation therapy, history of ACS and strokes here with fever and UTI. The patient had been in PACT earlier today, complained of lower abdominal pain and fever. The lower abdominal pain started in a band-like distribution in the lower quadrants and then localized to the left lower abdomen. She had a fever today with Tmax 38.6C. She started having pain with urination 2 days prior, but no change in frequency, color, no blood in the urine. She has had UTIs in the past and states that this feels similar to prior occasions. No history of STIs (see HEADSS below). No unusual vaginal discharge/ odors. She has had no upper respiratory symptoms. Over the past week she has only gone to visit her boyfriend who is not currently sick. She has had no positive COVID contacts, no recent travel. No diarrhea, vomiting, headaches, rashes. She does report that she received a dose of ceftriaxone in PACT, and started having itchy palms, was nauseous, sweaty. However, those symptoms resolved. Her last transfusion was 3/21, gets them every 3 weeks.   Right eye deviation present but not a new finding.    Recently admitted on 2/11 for shunt revision. Was also admitted 1/26/20 for vaso-occlusive crisis with URI symptoms, requiring 2x units of PRBC’s.  RVP negative, blood culture negative, CXR normal.  MRI 1/26 showed no changes to  shunts and no focal visual abnormalities on imaging. Toxicology screen negative, HIV test negative, RVP negative, blood cultures negative. Prior to this last vaso-occlusive crisis admission over 3-4 years ago.  PMH: sickle cell anemia, hydrocephalus s/p  shunt x2 with 9 revisions, reactive airway, atrophic spleen, cholecystectomy, 4x eye operations with recent right eye deviation, bipolar 1 disorder, and depression on chronic transfusion protocols on chelation therapy, ACS and strokes  PSH: cholecystectomy, splenectomy.  shunt placement s/p 9 revisions. 4 eye surgeries   Meds:   Abilify 2 MG Oral Tablet; TAKE 1 TABLET DAILY  Amoxicillin 500 MG Oral Capsule; TAKE 4 CAPSULES ONE HOUR PRIOR TO DENTAL  APPT  Deferasirox 360 MG Oral Tablet; TAKE 4 TABLET Daily  Flovent  MCG/ACT Inhalation Aerosol; Inhale 2 puffs twice daily  Ibuprofen 400 MG Oral Tablet; TAKE 1 TABLET EVERY 6 HOURS while taking tramadol.  Take 1 tablet every 6 hours AS NEEDED for pain  Lidocaine-Prilocaine 2.5-2.5 % External Cream; APPLY 1 TO 2 HOURS PRIOR TO  TREATMENT AS DIRECTED  medroxyPROGESTERone Acetate 150 MG/ML Intramuscular Suspension; INJECT 1 ML  INTRAMUSCULARLY ONCE EVERY 3 MONTHS.  Last dose 1/29/20  Ondansetron 8 MG Oral Tablet Disintegrating; TAKE 8 MG 3 times daily PRN nausea  Polyethylene Glycol 3350 Oral Powder; MIX 1 CAPFUL (17GM) IN 8 OUNCES OF WATER,  JUICE, OR TEA AND DRINK DAILY while taking opioid medication  Senna 8.6 MG Oral Tablet; Take 1-2 tablets by mouth daily while taking opioid  medication. Then as needed for constipation  traMADol HCl - 50 MG Oral Tablet; TAKE 1-2 TABLET EVERY 4-6 HOURS AS NEEDED  FOR PAIN MDD:6TAB  traMADol HCl - 50 MG Oral Tablet; TAPER: Take one 50mg tab every 4hrs on 1/29. Take 1  tab every 6hrs on`1/30. Take 1 tab every 8hrs on 1/31 MDD:6tab  Vitamin D (Cholecalciferol) 25 MCG (1000 UT) Oral Tablet; TAKE 1 TABLET DAILY  Xopenex HFA 45 MCG/ACT Inhalation Aerosol; INHALE 2 PUFFS EVERY 4 HOURS AS  NEEDED  Zoloft 50 MG Oral Tablet; TAKE 1 TABLET DAILY AS DIRECTED    Allergies: none  Immunizations: up to date   HEADSS: Lives at home with parents, no siblings. Feels safe at home. Used to go to [facility] but after shunt revision in February, could not go back because it was too late. For fun she states she hangs out with her boyfriend. She drinks alcohol ~2x/month only socially and no binge drinking. No cigarette smoking ever, smokes weed, most recently a couple days ago, reports that she has tried to stop but peer pressure causes her to do it again. She prefers males, is sexually active with her boyfriend and states that she always uses contraception in the form of condoms, as well as Depo-Provera shots every 3 months. No history of STIs. Told other providers she does not use protection always w/ sexual encounters.   PACT 3/25: developed lower abdominal pain, fever, UA, blood culture, urine culture sent, GC/chlam, transvaginal ultrasound performed, given CTX- face, ear and B/L hands with erythema. itchy palms, nauseous, tachycardic, diaphoretic, switched to levaquin.   Was okay to be discharged home but spiked temp to 38.6C.

## 2020-03-25 NOTE — DISCHARGE NOTE PROVIDER - NSDCCPCAREPLAN_GEN_ALL_CORE_FT
PRINCIPAL DISCHARGE DIAGNOSIS  Diagnosis: Urinary tract infection  Assessment and Plan of Treatment: A urinary tract infection (UTI) is an infection of any part of the urinary tract. The urinary tract includes the kidneys, ureters, bladder, and urethra. These organs make, store, and get rid of urine in the body.  Your health care provider may use other names to describe the infection. An upper UTI affects the ureters and kidneys (pyelonephritis). A lower UTI affects the bladder (cystitis) and urethra (urethritis).  What are the causes?  Most urinary tract infections are caused by bacteria in your genital area, around the entrance to your urinary tract (urethra). These bacteria grow and cause inflammation of your urinary tract.  What increases the risk?Have low estrogen levels.Are pregnant.You have certain genes that increase your risk (genetics).You are sexually active.You take antibiotic medicines.You have a condition that causes your flow of urine to slow down, such as:  Needing to urinate right away (urgently).Frequent urination or passing small amounts of urine frequently.Pain or burning with urination.Blood in the urine.Urine that smells bad or unusual.Trouble urinating.Cloudy urine.Vaginal discharge, if you are female.Pain in the abdomen or the lower back.

## 2020-03-25 NOTE — DISCHARGE NOTE PROVIDER - NSFOLLOWUPCLINICS_GEN_ALL_ED_FT
[facility]  Hematology / Oncology & Stem Cell Transplantation  [phone] [place] [address], [address]  [place] [address]  Phone: [phone]  Fax:   Follow Up Time:

## 2020-03-25 NOTE — PATIENT PROFILE PEDIATRIC. - MENTAL HEALTH COMMENT, PEDS PROFILE
Pt. states openness to talk with counselor/social support as stated she doesn't have anyone to confide in or talk to about problems & her feelings.

## 2020-03-25 NOTE — PHYSICAL EXAM
[Mediport] : Mediport [No focal deficits] : no focal deficits [Normal] : affect appropriate [100: Normal, no complaints, no evidence of disease.] : 100: Normal, no complaints, no evidence of disease. [de-identified] : no guarding or tenderness on palpation, soft, non distended

## 2020-03-25 NOTE — PATIENT PROFILE PEDIATRIC. - LOW RISK FALLS INTERVENTIONS (SCORE 7-11)
Side rails x 2 or 4 up, assess large gaps, such that a patient could get extremity or other body part entrapped, use additional safety procedures/Use of non-skid footwear for ambulating patients, use of appropriate size clothing to prevent risk of tripping/Call light is within reach, educate patient/family on its functionality/Environment clear of unused equipment, furniture's in place, clear of hazards/Patient and family education available to parents and patient/Bed in low position, brakes on/Assess eliminations need, assist as needed/Document fall prevention teaching and include in plan of care/Orientation to room/Assess for adequate lighting, leave nightlight on

## 2020-03-25 NOTE — H&P PEDIATRIC - NSICDXPASTMEDICALHX_GEN_ALL_CORE_FT
PAST MEDICAL HISTORY:  Anxiety     Chronic Lung Disease of Premat follows with Counts include [facility] beds at the [facility] Pulmonology    CP (cerebral palsy) - mild    CVA (Cerebral Vascular Accident) Onset date unknown, noted on MRI from 5/2010    Depression     Hydrocephalus     Impacted teeth     Reactive Airway Disease receives albuterol and xoponex treatments at home    S/P  Shunt x2 with multiple shunt revisions    Sickle Cell Disease     Strabismus

## 2020-03-25 NOTE — H&P PEDIATRIC - NSHPPHYSICALEXAM_GEN_ALL_CORE
PHYSICAL EXAM:  GEN:  No acute distress.   HEENT: Head normocephalic and atraumatic. R eye deviation present. Clear conjunctiva, non icteric. Moist mucosa. Neck supple.  CV: Normal S1 and S2. No murmurs, rubs, or gallops.   RESPI: Clear to auscultation bilaterally. No wheezes or rales. No increased work of breathing.   ABD: Scars present from patient's prior surgeries. Tenderness to palpation in LLQ starting at midline. No rebound, guarding. Soft, nondistended. No hepatomegaly.   EXT: Moving all extremities equally bilaterally  NEURO: Awake and alert, good tone  SKIN: No rashes, warm and well perfused, brisk cap refill

## 2020-03-26 ENCOUNTER — TRANSCRIPTION ENCOUNTER (OUTPATIENT)
Age: 20
End: 2020-03-26

## 2020-03-26 VITALS
RESPIRATION RATE: 22 BRPM | HEART RATE: 87 BPM | DIASTOLIC BLOOD PRESSURE: 67 MMHG | OXYGEN SATURATION: 97 % | TEMPERATURE: 99 F | SYSTOLIC BLOOD PRESSURE: 103 MMHG

## 2020-03-26 DIAGNOSIS — D57.1 SICKLE-CELL DISEASE WITHOUT CRISIS: ICD-10-CM

## 2020-03-26 LAB
BASOPHILS # BLD AUTO: 0.08 K/UL — SIGNIFICANT CHANGE UP (ref 0–0.2)
BASOPHILS NFR BLD AUTO: 0.4 % — SIGNIFICANT CHANGE UP (ref 0–2)
EOSINOPHIL # BLD AUTO: 0.19 K/UL — SIGNIFICANT CHANGE UP (ref 0–0.5)
EOSINOPHIL NFR BLD AUTO: 0.9 % — SIGNIFICANT CHANGE UP (ref 0–6)
HCT VFR BLD CALC: 33.2 % — LOW (ref 34.5–45)
HGB BLD-MCNC: 11.3 G/DL — LOW (ref 11.5–15.5)
HIV 1+2 AB+HIV1 P24 AG SERPL QL IA: SIGNIFICANT CHANGE UP
IMM GRANULOCYTES NFR BLD AUTO: 0.5 % — SIGNIFICANT CHANGE UP (ref 0–1.5)
LYMPHOCYTES # BLD AUTO: 10.2 % — LOW (ref 13–44)
LYMPHOCYTES # BLD AUTO: 2.13 K/UL — SIGNIFICANT CHANGE UP (ref 1–3.3)
MCHC RBC-ENTMCNC: 29.4 PG — SIGNIFICANT CHANGE UP (ref 27–34)
MCHC RBC-ENTMCNC: 34 % — SIGNIFICANT CHANGE UP (ref 32–36)
MCV RBC AUTO: 86.5 FL — SIGNIFICANT CHANGE UP (ref 80–100)
MONOCYTES # BLD AUTO: 2.07 K/UL — HIGH (ref 0–0.9)
MONOCYTES NFR BLD AUTO: 9.9 % — SIGNIFICANT CHANGE UP (ref 2–14)
NEUTROPHILS # BLD AUTO: 16.37 K/UL — HIGH (ref 1.8–7.4)
NEUTROPHILS NFR BLD AUTO: 78.1 % — HIGH (ref 43–77)
NRBC # FLD: 0.05 K/UL — SIGNIFICANT CHANGE UP (ref 0–0)
PLATELET # BLD AUTO: 280 K/UL — SIGNIFICANT CHANGE UP (ref 150–400)
PMV BLD: 9.9 FL — SIGNIFICANT CHANGE UP (ref 7–13)
RBC # BLD: 3.84 M/UL — SIGNIFICANT CHANGE UP (ref 3.8–5.2)
RBC # FLD: 15.5 % — HIGH (ref 10.3–14.5)
RETICS #: 214 K/UL — HIGH (ref 25–125)
RETICS/RBC NFR: 5.6 % — HIGH (ref 0.5–2.5)
WBC # BLD: 20.95 K/UL — HIGH (ref 3.8–10.5)
WBC # FLD AUTO: 20.95 K/UL — HIGH (ref 3.8–10.5)

## 2020-03-26 PROCEDURE — 99238 HOSP IP/OBS DSCHRG MGMT 30/<: CPT

## 2020-03-26 RX ORDER — IBUPROFEN 200 MG
1 TABLET ORAL
Qty: 0 | Refills: 0 | DISCHARGE

## 2020-03-26 RX ORDER — MEDROXYPROGESTERONE ACETATE 150 MG/ML
1 INJECTION, SUSPENSION, EXTENDED RELEASE INTRAMUSCULAR
Qty: 0 | Refills: 0 | DISCHARGE

## 2020-03-26 RX ORDER — CIPROFLOXACIN LACTATE 400MG/40ML
1 VIAL (ML) INTRAVENOUS
Qty: 6 | Refills: 0
Start: 2020-03-26 | End: 2020-03-28

## 2020-03-26 RX ADMIN — Medication 1000 UNIT(S): at 10:03

## 2020-03-26 RX ADMIN — ARIPIPRAZOLE 2 MILLIGRAM(S): 15 TABLET ORAL at 10:03

## 2020-03-26 RX ADMIN — SERTRALINE 50 MILLIGRAM(S): 25 TABLET, FILM COATED ORAL at 10:03

## 2020-03-26 RX ADMIN — Medication 5 MILLILITER(S): at 13:45

## 2020-03-26 RX ADMIN — SODIUM CHLORIDE 20 MILLILITER(S): 9 INJECTION, SOLUTION INTRAVENOUS at 07:54

## 2020-03-26 RX ADMIN — DEFERASIROX 2000 MILLIGRAM(S): 180 GRANULE ORAL at 10:03

## 2020-03-26 RX ADMIN — Medication 2 PUFF(S): at 09:23

## 2020-03-26 NOTE — DISCHARGE NOTE NURSING/CASE MANAGEMENT/SOCIAL WORK - NSDCPNINST_GEN_ALL_CORE
call MD if you develop a fever or are not feeling we. call MD if you develop a fever above 100.4, if you develop a cough or difficulty breathing, if your abdominal pain returns or if you have any other questions or concerns regarding recent hospitalization.

## 2020-03-26 NOTE — PROGRESS NOTE PEDS - ASSESSMENT
20 year old female ex-24 week premie with hbSS on chronic transfusion and chelation and other complex medical hx presenting with fever and pain with urination, UA with positive LE, nitrites, WBC >50 and moderate RBCs. CBC WBC 24, RVP negative. Blood culture, urine culture pending, Will send GC/Chlam, HIV and syphilis per patient request d/t hx of unprotected sex. Patient currently well-appearing with abdominal pain improved from yesterday localized to LUQ upon palpation and L CVAT.     Plan:     UTI vs pyelo  - Levaquin  - s/p CTX (developed itchy palms, nausea)  - f/u urine culture  - f/u blood culture     Sickle cell disease   - Jadenu  - baseline Hgb 8  - last transfusion 3/21 (gets them every 3-4 weeks)    History of unprotected sex  - HIV/syphilis testing in AM per patient's request  - send GC/chlam    Reactive airway  - Flovent 2 puffs twice daily     Depression  - Zoloft   - Abilify    Nutrition  - regular diet   - Vitamin D

## 2020-03-26 NOTE — PROGRESS NOTE PEDS - SUBJECTIVE AND OBJECTIVE BOX
20 year old ex 24 week female with grade 4 IVH, with complex medical history of sickle cell disease with history of ACS and strokes on chronic transfusion protocols on chelation therapy,, hydrocephalus s/p  shunt x2 with 9 revisions, reactive airway, atrophic spleen, cholecystectomy,  bipolar 1 disorder, and depression  here with febrile UTI.   [x ] History per: patient  [ ]  utilized, number:     INTERVAL/OVERNIGHT EVENTS: No acute events overnight. afebrile. Pain improved today 6/10 (yesterday 10/10) Still having belly pain. No back pain    MEDICATIONS  (STANDING):  ARIPiprazole  Oral Tab/Cap - Peds 2 milliGRAM(s) Oral daily  cholecalciferol Oral Tab/Cap - Peds 1000 Unit(s) Oral daily  deferasirox Oral Tab/Cap (EXJADE) - Peds 2000 milliGRAM(s) Oral once  fluticasone propionate  110 MICROgram(s) HFA Inhaler - Peds 2 Puff(s) Inhalation two times a day  levoFLOXacin IV Intermittent - Peds 500 milliGRAM(s) IV Intermittent daily  sertraline Oral Tab/Cap - Peds 50 milliGRAM(s) Oral daily  sodium chloride 0.9%. - Pediatric 1000 milliLiter(s) (20 mL/Hr) IV Continuous <Continuous>    MEDICATIONS  (PRN):  ondansetron Disintegrating Oral Tablet - Peds 8 milliGRAM(s) Oral every 8 hours PRN Nausea and/or Vomiting    Allergies    ceftriaxone (Pruritus)    Intolerances        DIET:    [x ] There are no updates to the medical, surgical, social or family history unless described:    PATIENT CARE ACCESS DEVICES:  [ ] Peripheral IV  [ x] Central Venous Line, Date Placed:		Site/Device: mediport  [ ] Urinary Catheter, Date Placed:  [ ] Necessity of urinary, arterial, and venous catheters discussed    REVIEW OF SYSTEMS: If not negative (Neg) please elaborate. History Per:   General: [ ] Neg  Pulmonary: [ ] Neg  Cardiac: [ ] Neg  Gastrointestinal: [ ] Neg  Ears, Nose, Throat: [ ] Neg  Renal/Urologic: [ ] Neg  Musculoskeletal: [ ] Neg  Endocrine: [ ] Neg  Hematologic: [ ] Neg  Neurologic: [ ] Neg  Allergy/Immunologic: [ ] Neg  All other systems reviewed and negative [x ]     VITAL SIGNS AND PHYSICAL EXAM:  Vital Signs Last 24 Hrs  T(C): 36.8 (26 Mar 2020 05:35), Max: 37.1 (25 Mar 2020 20:47)  T(F): 98.2 (26 Mar 2020 05:35), Max: 98.7 (25 Mar 2020 20:47)  HR: 108 (26 Mar 2020 05:35) (82 - 108)  BP: 95/58 (26 Mar 2020 05:35) (93/54 - 119/74)  BP(mean): 89 (25 Mar 2020 23:10) (89 - 89)  RR: 22 (26 Mar 2020 05:35) (22 - 32)  SpO2: 98% (26 Mar 2020 05:35) (96% - 98%)  I&O's Summary    25 Mar 2020 07:01  -  26 Mar 2020 07:00  --------------------------------------------------------  IN: 300 mL / OUT: 0 mL / NET: 300 mL      Pain Score:  Daily Weight in Gm: 18659 (25 Mar 2020 20:47)  BMI (kg/m2): 23.2 (- @ 20:47)    Gen: no acute distress; smiling, interactive, well appearing  HEENT: NC/AT; AFOSF; pupils equal, responsive, reactive to light; no conjunctivitis or scleral icterus; no nasal discharge; no nasal congestion; oropharynx without exudates/erythema; mucus membranes moist  Neck: FROM, supple, no cervical lymphadenopathy  Chest: clear to auscultation bilaterally, no crackles/wheezes, good air entry, no tachypnea or retractions  CV: regular rate and rhythm, no murmurs   Abd: soft, mild TTP on LUQ, nondistended, no HSM appreciated, NABS  : (+) L CVAT   Back: no vertebral or paraspinal tenderness along entire spine; no CVAT  Extrem: no joint effusion or tenderness; FROM of all joints; no deformities or erythema noted. 2+ peripheral pulses, WWP  Neuro: grossly nonfocal, strength and tone grossly normal    INTERVAL LAB RESULTS:                        11.6   24.04 )-----------( 291      ( 25 Mar 2020 15:00 )             33.8         Urinalysis Basic - ( 25 Mar 2020 13:30 )    Color: YELLOW / Appearance: Lt TURBID / S.014 / pH: 6.5  Gluc: NEGATIVE / Ketone: NEGATIVE  / Bili: NEGATIVE / Urobili: NORMAL   Blood: MODERATE / Protein: 70 / Nitrite: POSITIVE   Leuk Esterase: LARGE / RBC: 6-10 / WBC >50   Sq Epi: OCC / Non Sq Epi: x / Bacteria: MANY        INTERVAL IMAGING STUDIES:

## 2020-03-26 NOTE — DISCHARGE NOTE NURSING/CASE MANAGEMENT/SOCIAL WORK - PATIENT PORTAL LINK FT
You can access the FollowMyHealth Patient Portal offered by [facility] by registering at the following website: [email or web address]. By joining [employer]’s FollowMyHealth portal, you will also be able to view your health information using other applications (apps) compatible with our system.

## 2020-03-27 LAB
-  AMIKACIN: SIGNIFICANT CHANGE UP
-  AMPICILLIN/SULBACTAM: SIGNIFICANT CHANGE UP
-  AMPICILLIN: SIGNIFICANT CHANGE UP
-  AZTREONAM: SIGNIFICANT CHANGE UP
-  CEFAZOLIN: SIGNIFICANT CHANGE UP
-  CEFEPIME: SIGNIFICANT CHANGE UP
-  CEFOXITIN: SIGNIFICANT CHANGE UP
-  CEFTRIAXONE: SIGNIFICANT CHANGE UP
-  CIPROFLOXACIN: SIGNIFICANT CHANGE UP
-  GENTAMICIN: SIGNIFICANT CHANGE UP
-  IMIPENEM: SIGNIFICANT CHANGE UP
-  LEVOFLOXACIN: SIGNIFICANT CHANGE UP
-  MEROPENEM: SIGNIFICANT CHANGE UP
-  NITROFURANTOIN: SIGNIFICANT CHANGE UP
-  PIPERACILLIN/TAZOBACTAM: SIGNIFICANT CHANGE UP
-  TIGECYCLINE: SIGNIFICANT CHANGE UP
-  TOBRAMYCIN: SIGNIFICANT CHANGE UP
-  TRIMETHOPRIM/SULFAMETHOXAZOLE: SIGNIFICANT CHANGE UP
C TRACH RRNA SPEC QL NAA+PROBE: SIGNIFICANT CHANGE UP
CULTURE RESULTS: SIGNIFICANT CHANGE UP
METHOD TYPE: SIGNIFICANT CHANGE UP
N GONORRHOEA RRNA SPEC QL NAA+PROBE: SIGNIFICANT CHANGE UP
ORGANISM # SPEC MICROSCOPIC CNT: SIGNIFICANT CHANGE UP
ORGANISM # SPEC MICROSCOPIC CNT: SIGNIFICANT CHANGE UP
SPECIMEN SOURCE: SIGNIFICANT CHANGE UP
SPECIMEN SOURCE: SIGNIFICANT CHANGE UP

## 2020-04-01 LAB
CULTURE RESULTS: SIGNIFICANT CHANGE UP
SPECIMEN SOURCE: SIGNIFICANT CHANGE UP

## 2020-04-16 ENCOUNTER — LABORATORY RESULT (OUTPATIENT)
Age: 20
End: 2020-04-16

## 2020-04-16 ENCOUNTER — OUTPATIENT (OUTPATIENT)
Dept: OUTPATIENT SERVICES | Age: 20
LOS: 1 days | End: 2020-04-16

## 2020-04-16 ENCOUNTER — APPOINTMENT (OUTPATIENT)
Dept: PEDIATRIC HEMATOLOGY/ONCOLOGY | Facility: CLINIC | Age: 20
End: 2020-04-16
Payer: MEDICAID

## 2020-04-16 DIAGNOSIS — Z98.890 OTHER SPECIFIED POSTPROCEDURAL STATES: Chronic | ICD-10-CM

## 2020-04-16 LAB
ALBUMIN SERPL ELPH-MCNC: 4.6 G/DL — SIGNIFICANT CHANGE UP (ref 3.3–5)
ALP SERPL-CCNC: 65 U/L — SIGNIFICANT CHANGE UP (ref 40–120)
ALT FLD-CCNC: 9 U/L — SIGNIFICANT CHANGE UP (ref 4–33)
ANION GAP SERPL CALC-SCNC: 12 MMO/L — SIGNIFICANT CHANGE UP (ref 7–14)
AST SERPL-CCNC: 21 U/L — SIGNIFICANT CHANGE UP (ref 4–32)
BASOPHILS # BLD AUTO: 0.05 K/UL — SIGNIFICANT CHANGE UP (ref 0–0.2)
BASOPHILS NFR BLD AUTO: 0.4 % — SIGNIFICANT CHANGE UP (ref 0–2)
BASOPHILS NFR SPEC: 4.5 % — HIGH (ref 0–2)
BILIRUB SERPL-MCNC: 2.6 MG/DL — HIGH (ref 0.2–1.2)
BLASTS # FLD: 0 % — SIGNIFICANT CHANGE UP (ref 0–0)
BLD GP AB SCN SERPL QL: NEGATIVE — SIGNIFICANT CHANGE UP
BUN SERPL-MCNC: 6 MG/DL — LOW (ref 7–23)
CALCIUM SERPL-MCNC: 9.8 MG/DL — SIGNIFICANT CHANGE UP (ref 8.4–10.5)
CHLORIDE SERPL-SCNC: 106 MMOL/L — SIGNIFICANT CHANGE UP (ref 98–107)
CO2 SERPL-SCNC: 23 MMOL/L — SIGNIFICANT CHANGE UP (ref 22–31)
CREAT SERPL-MCNC: 0.63 MG/DL — SIGNIFICANT CHANGE UP (ref 0.5–1.3)
DACRYOCYTES BLD QL SMEAR: SLIGHT — SIGNIFICANT CHANGE UP
ELLIPTOCYTES BLD QL SMEAR: SLIGHT — SIGNIFICANT CHANGE UP
EOSINOPHIL # BLD AUTO: 0.3 K/UL — SIGNIFICANT CHANGE UP (ref 0–0.5)
EOSINOPHIL NFR BLD AUTO: 2.1 % — SIGNIFICANT CHANGE UP (ref 0–6)
EOSINOPHIL NFR FLD: 1.8 % — SIGNIFICANT CHANGE UP (ref 0–6)
FERRITIN SERPL-MCNC: 2275 NG/ML — HIGH (ref 15–150)
GIANT PLATELETS BLD QL SMEAR: PRESENT — SIGNIFICANT CHANGE UP
GLUCOSE SERPL-MCNC: 91 MG/DL — SIGNIFICANT CHANGE UP (ref 70–99)
HCT VFR BLD CALC: 25.3 % — LOW (ref 34.5–45)
HGB BLD-MCNC: 8.5 G/DL — LOW (ref 11.5–15.5)
HYPOCHROMIA BLD QL: SLIGHT — SIGNIFICANT CHANGE UP
IMM GRANULOCYTES NFR BLD AUTO: 0.3 % — SIGNIFICANT CHANGE UP (ref 0–1.5)
IRON SATN MFR SERPL: 182 UG/DL — SIGNIFICANT CHANGE UP (ref 140–530)
IRON SATN MFR SERPL: 96 UG/DL — SIGNIFICANT CHANGE UP (ref 30–160)
LYMPHOCYTES # BLD AUTO: 36.6 % — SIGNIFICANT CHANGE UP (ref 13–44)
LYMPHOCYTES # BLD AUTO: 5.18 K/UL — HIGH (ref 1–3.3)
LYMPHOCYTES NFR SPEC AUTO: 28.6 % — SIGNIFICANT CHANGE UP (ref 13–44)
MCHC RBC-ENTMCNC: 29.1 PG — SIGNIFICANT CHANGE UP (ref 27–34)
MCHC RBC-ENTMCNC: 33.6 % — SIGNIFICANT CHANGE UP (ref 32–36)
MCV RBC AUTO: 86.6 FL — SIGNIFICANT CHANGE UP (ref 80–100)
METAMYELOCYTES # FLD: 0 % — SIGNIFICANT CHANGE UP (ref 0–1)
MONOCYTES # BLD AUTO: 1.14 K/UL — HIGH (ref 0–0.9)
MONOCYTES NFR BLD AUTO: 8.1 % — SIGNIFICANT CHANGE UP (ref 2–14)
MONOCYTES NFR BLD: 7.1 % — SIGNIFICANT CHANGE UP (ref 2–9)
MYELOCYTES NFR BLD: 0 % — SIGNIFICANT CHANGE UP (ref 0–0)
NEUTROPHIL AB SER-ACNC: 56.2 % — SIGNIFICANT CHANGE UP (ref 43–77)
NEUTROPHILS # BLD AUTO: 7.43 K/UL — HIGH (ref 1.8–7.4)
NEUTROPHILS NFR BLD AUTO: 52.5 % — SIGNIFICANT CHANGE UP (ref 43–77)
NEUTS BAND # BLD: 0 % — SIGNIFICANT CHANGE UP (ref 0–6)
NRBC # BLD: 1 /100WBC — SIGNIFICANT CHANGE UP
NRBC # FLD: 0.1 K/UL — SIGNIFICANT CHANGE UP (ref 0–0)
OTHER - HEMATOLOGY %: 0 — SIGNIFICANT CHANGE UP
PLATELET # BLD AUTO: 464 K/UL — HIGH (ref 150–400)
PLATELET COUNT - ESTIMATE: NORMAL — SIGNIFICANT CHANGE UP
PMV BLD: 9.7 FL — SIGNIFICANT CHANGE UP (ref 7–13)
POIKILOCYTOSIS BLD QL AUTO: SIGNIFICANT CHANGE UP
POLYCHROMASIA BLD QL SMEAR: SLIGHT — SIGNIFICANT CHANGE UP
POTASSIUM SERPL-MCNC: 3.7 MMOL/L — SIGNIFICANT CHANGE UP (ref 3.5–5.3)
POTASSIUM SERPL-SCNC: 3.7 MMOL/L — SIGNIFICANT CHANGE UP (ref 3.5–5.3)
PROMYELOCYTES # FLD: 0 % — SIGNIFICANT CHANGE UP (ref 0–0)
PROT SERPL-MCNC: 6.7 G/DL — SIGNIFICANT CHANGE UP (ref 6–8.3)
RBC # BLD: 2.92 M/UL — LOW (ref 3.8–5.2)
RBC # FLD: 17.2 % — HIGH (ref 10.3–14.5)
RH IG SCN BLD-IMP: POSITIVE — SIGNIFICANT CHANGE UP
SCHISTOCYTES BLD QL AUTO: SLIGHT — SIGNIFICANT CHANGE UP
SICKLE CELLS BLD QL SMEAR: SLIGHT — SIGNIFICANT CHANGE UP
SODIUM SERPL-SCNC: 141 MMOL/L — SIGNIFICANT CHANGE UP (ref 135–145)
TARGETS BLD QL SMEAR: SIGNIFICANT CHANGE UP
UIBC SERPL-MCNC: 85.9 UG/DL — LOW (ref 110–370)
VARIANT LYMPHS # BLD: 1.8 % — SIGNIFICANT CHANGE UP
WBC # BLD: 14.14 K/UL — HIGH (ref 3.8–10.5)
WBC # FLD AUTO: 14.14 K/UL — HIGH (ref 3.8–10.5)

## 2020-04-16 PROCEDURE — ZZZZZ: CPT

## 2020-04-17 LAB
HGB A MFR BLD: 67.6 % — LOW
HGB A2 MFR BLD: 2.8 % — SIGNIFICANT CHANGE UP (ref 2.4–3.5)
HGB F MFR BLD: < 1 % — SIGNIFICANT CHANGE UP (ref 0–1.5)
HGB S MFR BLD: 28.7 % — HIGH (ref 0–0)

## 2020-04-18 ENCOUNTER — OUTPATIENT (OUTPATIENT)
Dept: OUTPATIENT SERVICES | Age: 20
LOS: 1 days | End: 2020-04-18

## 2020-04-18 ENCOUNTER — APPOINTMENT (OUTPATIENT)
Dept: PEDIATRIC HEMATOLOGY/ONCOLOGY | Facility: CLINIC | Age: 20
End: 2020-04-18
Payer: MEDICAID

## 2020-04-18 VITALS
DIASTOLIC BLOOD PRESSURE: 60 MMHG | HEART RATE: 75 BPM | WEIGHT: 120.15 LBS | OXYGEN SATURATION: 98 % | SYSTOLIC BLOOD PRESSURE: 125 MMHG | TEMPERATURE: 97.7 F | RESPIRATION RATE: 24 BRPM

## 2020-04-18 DIAGNOSIS — Z98.890 OTHER SPECIFIED POSTPROCEDURAL STATES: Chronic | ICD-10-CM

## 2020-04-18 PROCEDURE — 99214 OFFICE O/P EST MOD 30 MIN: CPT

## 2020-04-18 RX ORDER — TRAMADOL HYDROCHLORIDE 50 MG/1
50 TABLET, COATED ORAL
Qty: 11 | Refills: 0 | Status: DISCONTINUED | COMMUNITY
Start: 2020-01-29 | End: 2020-04-18

## 2020-04-18 RX ORDER — FAMOTIDINE 10 MG/1
10 TABLET, FILM COATED ORAL
Qty: 30 | Refills: 0 | Status: DISCONTINUED | COMMUNITY
Start: 2020-01-29 | End: 2020-04-18

## 2020-04-18 RX ORDER — CIPROFLOXACIN HYDROCHLORIDE 250 MG/1
250 TABLET, FILM COATED ORAL
Qty: 6 | Refills: 0 | Status: DISCONTINUED | COMMUNITY
Start: 2020-03-25 | End: 2020-04-18

## 2020-04-18 NOTE — PHYSICAL EXAM
[Mediport] : Mediport [No focal deficits] : no focal deficits [Normal] : affect appropriate [100: Normal, no complaints, no evidence of disease.] : 100: Normal, no complaints, no evidence of disease. [Icterus] : icterus

## 2020-04-18 NOTE — REVIEW OF SYSTEMS
[Anxiety] : anxiety [Immunizations are up to date by report] : Immunizations are up to date by report [Negative] : Gastrointestinal [Abdominal Pain] : no abdominal pain [Nausea] : no nausea [Emesis] : no emesis [Constipation] : no constipation [Diarrhea] : no diarrhea [Dysuria] : no dysuria [Headache] : no headache [Delvalle] : not delvalle [Depressed] : not depressed [FreeTextEntry3] : strabismus [de-identified] : see HPI

## 2020-04-18 NOTE — HISTORY OF PRESENT ILLNESS
[No Feeding Issues] : no feeding issues at this time [de-identified] : Elizabeth is now an 19 year old young woman with sickle cell anemia who is on chronic transfusion therapy to prevent vaso-occlusive painful episodes and acute chest syndrome. She has had no recent VOC or ACS admits.  No recent fevers.  She has been following with Psych regularly secondary to diagnosis of Bipolar disorder and Depression.  She is taking both Abilify and Zoloft, and denies missing doses.  She is up to date within the last year having seen Ophthalmology, Pulmonology and Cardio.  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  No vomiting, diarrhea, constipation.  No fever or URI symptoms. Feeling well today.   Last depo provera shot, received on 3/21/20.  \par \par Reports she has been out of Counts include 234 beds at the [facility] > one month.  Cam and her mother are very concerned that she is now receiving another 2 units without chelation.  They are also very concerned that blood products are not screened for COVID.  \par \par

## 2020-04-20 DIAGNOSIS — D57.1 SICKLE-CELL DISEASE WITHOUT CRISIS: ICD-10-CM

## 2020-04-21 RX ORDER — DEFERASIROX 360 MG/1
360 TABLET, FILM COATED ORAL DAILY
Qty: 120 | Refills: 5 | Status: DISCONTINUED | COMMUNITY
Start: 2017-02-07 | End: 2020-04-21

## 2020-04-23 DIAGNOSIS — D57.1 SICKLE-CELL DISEASE WITHOUT CRISIS: ICD-10-CM

## 2020-04-23 RX ORDER — DEFERASIROX 360 MG/1
360 TABLET, FILM COATED ORAL DAILY
Qty: 90 | Refills: 0 | Status: DISCONTINUED | COMMUNITY
Start: 2020-04-21 | End: 2020-04-23

## 2020-05-07 ENCOUNTER — APPOINTMENT (OUTPATIENT)
Dept: PEDIATRIC HEMATOLOGY/ONCOLOGY | Facility: CLINIC | Age: 20
End: 2020-05-07

## 2020-05-07 ENCOUNTER — OUTPATIENT (OUTPATIENT)
Dept: OUTPATIENT SERVICES | Age: 20
LOS: 1 days | End: 2020-05-07

## 2020-05-07 DIAGNOSIS — Z98.890 OTHER SPECIFIED POSTPROCEDURAL STATES: Chronic | ICD-10-CM

## 2020-05-08 ENCOUNTER — OUTPATIENT (OUTPATIENT)
Dept: OUTPATIENT SERVICES | Age: 20
LOS: 1 days | End: 2020-05-08

## 2020-05-08 ENCOUNTER — LABORATORY RESULT (OUTPATIENT)
Age: 20
End: 2020-05-08

## 2020-05-08 ENCOUNTER — APPOINTMENT (OUTPATIENT)
Dept: PEDIATRIC HEMATOLOGY/ONCOLOGY | Facility: CLINIC | Age: 20
End: 2020-05-08
Payer: MEDICAID

## 2020-05-08 DIAGNOSIS — Z98.890 OTHER SPECIFIED POSTPROCEDURAL STATES: Chronic | ICD-10-CM

## 2020-05-08 LAB
ALBUMIN SERPL ELPH-MCNC: 4.5 G/DL — SIGNIFICANT CHANGE UP (ref 3.3–5)
ALP SERPL-CCNC: 62 U/L — SIGNIFICANT CHANGE UP (ref 40–120)
ALT FLD-CCNC: 10 U/L — SIGNIFICANT CHANGE UP (ref 4–33)
ANION GAP SERPL CALC-SCNC: 11 MMO/L — SIGNIFICANT CHANGE UP (ref 7–14)
AST SERPL-CCNC: 26 U/L — SIGNIFICANT CHANGE UP (ref 4–32)
BASOPHILS # BLD AUTO: 0.07 K/UL — SIGNIFICANT CHANGE UP (ref 0–0.2)
BASOPHILS NFR BLD AUTO: 0.5 % — SIGNIFICANT CHANGE UP (ref 0–2)
BILIRUB SERPL-MCNC: 4.4 MG/DL — HIGH (ref 0.2–1.2)
BLD GP AB SCN SERPL QL: NEGATIVE — SIGNIFICANT CHANGE UP
BUN SERPL-MCNC: 9 MG/DL — SIGNIFICANT CHANGE UP (ref 7–23)
CALCIUM SERPL-MCNC: 9.2 MG/DL — SIGNIFICANT CHANGE UP (ref 8.4–10.5)
CHLORIDE SERPL-SCNC: 108 MMOL/L — HIGH (ref 98–107)
CO2 SERPL-SCNC: 22 MMOL/L — SIGNIFICANT CHANGE UP (ref 22–31)
CREAT SERPL-MCNC: 1.01 MG/DL — SIGNIFICANT CHANGE UP (ref 0.5–1.3)
EOSINOPHIL # BLD AUTO: 0.31 K/UL — SIGNIFICANT CHANGE UP (ref 0–0.5)
EOSINOPHIL NFR BLD AUTO: 2.3 % — SIGNIFICANT CHANGE UP (ref 0–6)
FERRITIN SERPL-MCNC: 2275 NG/ML — HIGH (ref 15–150)
GLUCOSE SERPL-MCNC: 90 MG/DL — SIGNIFICANT CHANGE UP (ref 70–99)
HCT VFR BLD CALC: 22.5 % — LOW (ref 34.5–45)
HGB BLD-MCNC: 7.6 G/DL — LOW (ref 11.5–15.5)
IMM GRANULOCYTES NFR BLD AUTO: 0.4 % — SIGNIFICANT CHANGE UP (ref 0–1.5)
LYMPHOCYTES # BLD AUTO: 33.3 % — SIGNIFICANT CHANGE UP (ref 13–44)
LYMPHOCYTES # BLD AUTO: 4.51 K/UL — HIGH (ref 1–3.3)
MCHC RBC-ENTMCNC: 29.1 PG — SIGNIFICANT CHANGE UP (ref 27–34)
MCHC RBC-ENTMCNC: 33.8 % — SIGNIFICANT CHANGE UP (ref 32–36)
MCV RBC AUTO: 86.2 FL — SIGNIFICANT CHANGE UP (ref 80–100)
MONOCYTES # BLD AUTO: 1.44 K/UL — HIGH (ref 0–0.9)
MONOCYTES NFR BLD AUTO: 10.6 % — SIGNIFICANT CHANGE UP (ref 2–14)
NEUTROPHILS # BLD AUTO: 7.18 K/UL — SIGNIFICANT CHANGE UP (ref 1.8–7.4)
NEUTROPHILS NFR BLD AUTO: 52.9 % — SIGNIFICANT CHANGE UP (ref 43–77)
NRBC # FLD: 0.17 K/UL — SIGNIFICANT CHANGE UP (ref 0–0)
NRBC FLD-RTO: 1.3 — SIGNIFICANT CHANGE UP
PLATELET # BLD AUTO: 382 K/UL — SIGNIFICANT CHANGE UP (ref 150–400)
PMV BLD: 9.6 FL — SIGNIFICANT CHANGE UP (ref 7–13)
POTASSIUM SERPL-MCNC: 4.1 MMOL/L — SIGNIFICANT CHANGE UP (ref 3.5–5.3)
POTASSIUM SERPL-SCNC: 4.1 MMOL/L — SIGNIFICANT CHANGE UP (ref 3.5–5.3)
PROT SERPL-MCNC: 6.5 G/DL — SIGNIFICANT CHANGE UP (ref 6–8.3)
RBC # BLD: 2.61 M/UL — LOW (ref 3.8–5.2)
RBC # FLD: 17.9 % — HIGH (ref 10.3–14.5)
RETICS #: 366 K/UL — HIGH (ref 25–125)
RETICS/RBC NFR: 14 % — HIGH (ref 0.5–2.5)
RH IG SCN BLD-IMP: POSITIVE — SIGNIFICANT CHANGE UP
SODIUM SERPL-SCNC: 141 MMOL/L — SIGNIFICANT CHANGE UP (ref 135–145)
WBC # BLD: 13.56 K/UL — HIGH (ref 3.8–10.5)
WBC # FLD AUTO: 13.56 K/UL — HIGH (ref 3.8–10.5)

## 2020-05-08 PROCEDURE — ZZZZZ: CPT

## 2020-05-09 ENCOUNTER — APPOINTMENT (OUTPATIENT)
Dept: PEDIATRIC HEMATOLOGY/ONCOLOGY | Facility: CLINIC | Age: 20
End: 2020-05-09
Payer: MEDICAID

## 2020-05-09 ENCOUNTER — LABORATORY RESULT (OUTPATIENT)
Age: 20
End: 2020-05-09

## 2020-05-09 ENCOUNTER — OUTPATIENT (OUTPATIENT)
Dept: OUTPATIENT SERVICES | Age: 20
LOS: 1 days | End: 2020-05-09

## 2020-05-09 VITALS
DIASTOLIC BLOOD PRESSURE: 50 MMHG | WEIGHT: 119.05 LBS | OXYGEN SATURATION: 99 % | HEIGHT: 62.36 IN | RESPIRATION RATE: 22 BRPM | SYSTOLIC BLOOD PRESSURE: 110 MMHG | TEMPERATURE: 97.88 F | HEART RATE: 74 BPM | BODY MASS INDEX: 21.63 KG/M2

## 2020-05-09 DIAGNOSIS — Z98.890 OTHER SPECIFIED POSTPROCEDURAL STATES: Chronic | ICD-10-CM

## 2020-05-09 LAB
APPEARANCE UR: SIGNIFICANT CHANGE UP
BACTERIA # UR AUTO: SIGNIFICANT CHANGE UP
BILIRUB UR-MCNC: NEGATIVE — SIGNIFICANT CHANGE UP
BLOOD UR QL VISUAL: SIGNIFICANT CHANGE UP
COLOR SPEC: YELLOW — SIGNIFICANT CHANGE UP
GLUCOSE UR-MCNC: NEGATIVE — SIGNIFICANT CHANGE UP
KETONES UR-MCNC: NEGATIVE — SIGNIFICANT CHANGE UP
LEUKOCYTE ESTERASE UR-ACNC: HIGH
NITRITE UR-MCNC: NEGATIVE — SIGNIFICANT CHANGE UP
PH UR: 6.5 — SIGNIFICANT CHANGE UP (ref 5–8)
PROT UR-MCNC: NEGATIVE — SIGNIFICANT CHANGE UP
RBC CASTS # UR COMP ASSIST: SIGNIFICANT CHANGE UP (ref 0–?)
SP GR SPEC: 1.01 — SIGNIFICANT CHANGE UP (ref 1–1.04)
SQUAMOUS # UR AUTO: SIGNIFICANT CHANGE UP
UROBILINOGEN FLD QL: NORMAL — SIGNIFICANT CHANGE UP
WBC UR QL: SIGNIFICANT CHANGE UP (ref 0–?)

## 2020-05-09 PROCEDURE — 99214 OFFICE O/P EST MOD 30 MIN: CPT

## 2020-05-09 NOTE — REVIEW OF SYSTEMS
[Anxiety] : anxiety [Negative] : Allergic/Immunologic [Immunizations are up to date by report] : Immunizations are up to date by report [Abdominal Pain] : no abdominal pain [Nausea] : no nausea [Emesis] : no emesis [Constipation] : no constipation [Diarrhea] : no diarrhea [Dysuria] : no dysuria [Headache] : no headache [FreeTextEntry3] : strabismus [Delvalle] : not delvalle [Depressed] : not depressed [de-identified] : see HPI

## 2020-05-09 NOTE — PHYSICAL EXAM
[Icterus] : icterus [Mediport] : Mediport [No focal deficits] : no focal deficits [Normal] : affect appropriate [100: Normal, no complaints, no evidence of disease.] : 100: Normal, no complaints, no evidence of disease.

## 2020-05-09 NOTE — HISTORY OF PRESENT ILLNESS
[No Feeding Issues] : no feeding issues at this time [de-identified] : Elizabeth is now an 19 year old young woman with sickle cell anemia who is on chronic transfusion therapy to prevent vaso-occlusive painful episodes and acute chest syndrome. She has had no recent VOC or ACS admits.  No recent fevers.  She has been following with Psych regularly secondary to diagnosis of Bipolar disorder and Depression.  She is taking both Abilify and Zoloft, and denies missing doses.  She is up to date within the last year having seen Ophthalmology, Pulmonology and Cardio.  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 She denies fever, URI symptoms, n/v/d. Feeling well today. Last depo provera shot, received on 3/21/20.  She was out of Jadenu > one month due to insurance authorization. She just received the medication this week. Restarted her Jadenu once daily.   \par \par

## 2020-05-09 NOTE — REASON FOR VISIT
[Patient] : patient [Medical Records] : medical records [Sickle Cell Disease] : sickle cell disease [Follow-Up Visit] : a follow-up visit for [FreeTextEntry2] : chronic transfusion

## 2020-05-11 DIAGNOSIS — D57.1 SICKLE-CELL DISEASE WITHOUT CRISIS: ICD-10-CM

## 2020-05-11 LAB
HGB A MFR BLD: 50.5 % — LOW
HGB A2 MFR BLD: 3.3 % — SIGNIFICANT CHANGE UP (ref 2.4–3.5)
HGB F MFR BLD: < 1 % — SIGNIFICANT CHANGE UP (ref 0–1.5)
HGB S MFR BLD: 45.3 % — HIGH (ref 0–0)

## 2020-05-26 ENCOUNTER — RX RENEWAL (OUTPATIENT)
Age: 20
End: 2020-05-26

## 2020-05-27 ENCOUNTER — RX RENEWAL (OUTPATIENT)
Age: 20
End: 2020-05-27

## 2020-06-01 ENCOUNTER — OUTPATIENT (OUTPATIENT)
Dept: OUTPATIENT SERVICES | Facility: HOSPITAL | Age: 20
LOS: 1 days | End: 2020-06-01
Payer: COMMERCIAL

## 2020-06-01 DIAGNOSIS — Z98.890 OTHER SPECIFIED POSTPROCEDURAL STATES: Chronic | ICD-10-CM

## 2020-06-04 ENCOUNTER — LABORATORY RESULT (OUTPATIENT)
Age: 20
End: 2020-06-04

## 2020-06-04 ENCOUNTER — OUTPATIENT (OUTPATIENT)
Dept: OUTPATIENT SERVICES | Age: 20
LOS: 1 days | End: 2020-06-04

## 2020-06-04 ENCOUNTER — APPOINTMENT (OUTPATIENT)
Dept: PEDIATRIC HEMATOLOGY/ONCOLOGY | Facility: CLINIC | Age: 20
End: 2020-06-04
Payer: MEDICAID

## 2020-06-04 DIAGNOSIS — Z98.890 OTHER SPECIFIED POSTPROCEDURAL STATES: Chronic | ICD-10-CM

## 2020-06-04 LAB
ALBUMIN SERPL ELPH-MCNC: 4.4 G/DL — SIGNIFICANT CHANGE UP (ref 3.3–5)
ALP SERPL-CCNC: 66 U/L — SIGNIFICANT CHANGE UP (ref 40–120)
ALT FLD-CCNC: 13 U/L — SIGNIFICANT CHANGE UP (ref 4–33)
ANION GAP SERPL CALC-SCNC: 10 MMO/L — SIGNIFICANT CHANGE UP (ref 7–14)
AST SERPL-CCNC: 39 U/L — HIGH (ref 4–32)
BASOPHILS # BLD AUTO: 0.07 K/UL — SIGNIFICANT CHANGE UP (ref 0–0.2)
BASOPHILS NFR BLD AUTO: 0.5 % — SIGNIFICANT CHANGE UP (ref 0–2)
BILIRUB SERPL-MCNC: 3.5 MG/DL — HIGH (ref 0.2–1.2)
BLD GP AB SCN SERPL QL: NEGATIVE — SIGNIFICANT CHANGE UP
BUN SERPL-MCNC: 9 MG/DL — SIGNIFICANT CHANGE UP (ref 7–23)
CALCIUM SERPL-MCNC: 9.2 MG/DL — SIGNIFICANT CHANGE UP (ref 8.4–10.5)
CHLORIDE SERPL-SCNC: 108 MMOL/L — HIGH (ref 98–107)
CO2 SERPL-SCNC: 23 MMOL/L — SIGNIFICANT CHANGE UP (ref 22–31)
CREAT SERPL-MCNC: 0.9 MG/DL — SIGNIFICANT CHANGE UP (ref 0.5–1.3)
EOSINOPHIL # BLD AUTO: 0.31 K/UL — SIGNIFICANT CHANGE UP (ref 0–0.5)
EOSINOPHIL NFR BLD AUTO: 2.4 % — SIGNIFICANT CHANGE UP (ref 0–6)
FERRITIN SERPL-MCNC: 1784 NG/ML — HIGH (ref 15–150)
GLUCOSE SERPL-MCNC: 105 MG/DL — HIGH (ref 70–99)
HCT VFR BLD CALC: 25.6 % — LOW (ref 34.5–45)
HGB BLD-MCNC: 8.6 G/DL — LOW (ref 11.5–15.5)
IMM GRANULOCYTES NFR BLD AUTO: 0.4 % — SIGNIFICANT CHANGE UP (ref 0–1.5)
IRON SATN MFR SERPL: 184 UG/DL — HIGH (ref 30–160)
IRON SATN MFR SERPL: 493 UG/DL — SIGNIFICANT CHANGE UP (ref 140–530)
LYMPHOCYTES # BLD AUTO: 28.3 % — SIGNIFICANT CHANGE UP (ref 13–44)
LYMPHOCYTES # BLD AUTO: 3.71 K/UL — HIGH (ref 1–3.3)
MCHC RBC-ENTMCNC: 28.3 PG — SIGNIFICANT CHANGE UP (ref 27–34)
MCHC RBC-ENTMCNC: 33.6 % — SIGNIFICANT CHANGE UP (ref 32–36)
MCV RBC AUTO: 84.2 FL — SIGNIFICANT CHANGE UP (ref 80–100)
MONOCYTES # BLD AUTO: 1.18 K/UL — HIGH (ref 0–0.9)
MONOCYTES NFR BLD AUTO: 9 % — SIGNIFICANT CHANGE UP (ref 2–14)
NEUTROPHILS # BLD AUTO: 7.77 K/UL — HIGH (ref 1.8–7.4)
NEUTROPHILS NFR BLD AUTO: 59.4 % — SIGNIFICANT CHANGE UP (ref 43–77)
NRBC # FLD: 0.23 K/UL — SIGNIFICANT CHANGE UP (ref 0–0)
NRBC FLD-RTO: 1.8 — SIGNIFICANT CHANGE UP
PLATELET # BLD AUTO: 366 K/UL — SIGNIFICANT CHANGE UP (ref 150–400)
PMV BLD: 9.8 FL — SIGNIFICANT CHANGE UP (ref 7–13)
POTASSIUM SERPL-MCNC: 3.5 MMOL/L — SIGNIFICANT CHANGE UP (ref 3.5–5.3)
POTASSIUM SERPL-SCNC: 3.5 MMOL/L — SIGNIFICANT CHANGE UP (ref 3.5–5.3)
PROT SERPL-MCNC: 6.4 G/DL — SIGNIFICANT CHANGE UP (ref 6–8.3)
RBC # BLD: 3.04 M/UL — LOW (ref 3.8–5.2)
RBC # FLD: 18 % — HIGH (ref 10.3–14.5)
RETICS #: 266 K/UL — HIGH (ref 25–125)
RETICS/RBC NFR: 8.7 % — HIGH (ref 0.5–2.5)
RH IG SCN BLD-IMP: POSITIVE — SIGNIFICANT CHANGE UP
SODIUM SERPL-SCNC: 141 MMOL/L — SIGNIFICANT CHANGE UP (ref 135–145)
UIBC SERPL-MCNC: 308.7 UG/DL — SIGNIFICANT CHANGE UP (ref 110–370)
WBC # BLD: 13.09 K/UL — HIGH (ref 3.8–10.5)
WBC # FLD AUTO: 13.09 K/UL — HIGH (ref 3.8–10.5)

## 2020-06-04 PROCEDURE — ZZZZZ: CPT

## 2020-06-05 DIAGNOSIS — D57.1 SICKLE-CELL DISEASE WITHOUT CRISIS: ICD-10-CM

## 2020-06-05 LAB
HGB A MFR BLD: 68.5 % — LOW
HGB A2 MFR BLD: 2.6 % — SIGNIFICANT CHANGE UP (ref 2.4–3.5)
HGB F MFR BLD: < 1 % — SIGNIFICANT CHANGE UP (ref 0–1.5)
HGB S MFR BLD: 28.2 % — HIGH (ref 0–0)

## 2020-06-06 ENCOUNTER — LABORATORY RESULT (OUTPATIENT)
Age: 20
End: 2020-06-06

## 2020-06-06 ENCOUNTER — OUTPATIENT (OUTPATIENT)
Dept: OUTPATIENT SERVICES | Age: 20
LOS: 1 days | End: 2020-06-06

## 2020-06-06 ENCOUNTER — APPOINTMENT (OUTPATIENT)
Dept: PEDIATRIC HEMATOLOGY/ONCOLOGY | Facility: CLINIC | Age: 20
End: 2020-06-06
Payer: MEDICAID

## 2020-06-06 VITALS
RESPIRATION RATE: 20 BRPM | HEIGHT: 62.48 IN | DIASTOLIC BLOOD PRESSURE: 63 MMHG | HEART RATE: 77 BPM | BODY MASS INDEX: 21.68 KG/M2 | WEIGHT: 120.81 LBS | SYSTOLIC BLOOD PRESSURE: 112 MMHG | TEMPERATURE: 98.78 F | OXYGEN SATURATION: 97 %

## 2020-06-06 DIAGNOSIS — Z98.890 OTHER SPECIFIED POSTPROCEDURAL STATES: Chronic | ICD-10-CM

## 2020-06-06 LAB
APPEARANCE UR: CLEAR — SIGNIFICANT CHANGE UP
BACTERIA # UR AUTO: SIGNIFICANT CHANGE UP
BILIRUB UR-MCNC: NEGATIVE — SIGNIFICANT CHANGE UP
BLOOD UR QL VISUAL: SIGNIFICANT CHANGE UP
COLOR SPEC: YELLOW — SIGNIFICANT CHANGE UP
GLUCOSE UR-MCNC: NEGATIVE — SIGNIFICANT CHANGE UP
HYALINE CASTS # UR AUTO: HIGH
KETONES UR-MCNC: NEGATIVE — SIGNIFICANT CHANGE UP
LEUKOCYTE ESTERASE UR-ACNC: NEGATIVE — SIGNIFICANT CHANGE UP
MUCOUS THREADS # UR AUTO: PRESENT — SIGNIFICANT CHANGE UP
NITRITE UR-MCNC: NEGATIVE — SIGNIFICANT CHANGE UP
PH UR: 6 — SIGNIFICANT CHANGE UP (ref 5–8)
PROT UR-MCNC: 20 — SIGNIFICANT CHANGE UP
RBC CASTS # UR COMP ASSIST: SIGNIFICANT CHANGE UP (ref 0–?)
SP GR SPEC: 1.02 — SIGNIFICANT CHANGE UP (ref 1–1.04)
SQUAMOUS # UR AUTO: SIGNIFICANT CHANGE UP
UROBILINOGEN FLD QL: NORMAL — SIGNIFICANT CHANGE UP
WBC UR QL: SIGNIFICANT CHANGE UP (ref 0–?)

## 2020-06-06 PROCEDURE — 99214 OFFICE O/P EST MOD 30 MIN: CPT

## 2020-06-06 RX ORDER — MEDROXYPROGESTERONE ACETATE 150 MG/ML
150 INJECTION, SUSPENSION, EXTENDED RELEASE INTRAMUSCULAR ONCE
Refills: 0 | Status: DISCONTINUED | OUTPATIENT
Start: 2020-06-06 | End: 2020-06-21

## 2020-06-06 NOTE — PHYSICAL EXAM
[100: Normal, no complaints, no evidence of disease.] : 100: Normal, no complaints, no evidence of disease. [Normal] : affect appropriate

## 2020-06-06 NOTE — HISTORY OF PRESENT ILLNESS
[No Feeding Issues] : no feeding issues at this time [de-identified] : Elizabeth is now an 20 year old young woman with sickle cell anemia who is on chronic transfusion therapy to prevent vaso-occlusive painful episodes and acute chest syndrome. She has had no recent VOC or ACS admits.  No recent fevers.  She has been following with Psych regularly secondary to diagnosis of Bipolar disorder and Depression.  She is taking both Abilify and Zoloft, and denies missing doses.  She is up to date within the last year having seen Ophthalmology, Pulmonology and Cardio.  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 She denies fever, URI symptoms, n/v/d. Feeling well today.  She reported nto missing any doses of Jadenu.   \par \par

## 2020-06-06 NOTE — REASON FOR VISIT
[Follow-Up Visit] : a follow-up visit for [Medical Records] : medical records [Patient] : patient [Sickle Cell Disease] : sickle cell disease [FreeTextEntry2] : chronic transfusion

## 2020-06-08 DIAGNOSIS — D57.1 SICKLE-CELL DISEASE WITHOUT CRISIS: ICD-10-CM

## 2020-06-25 DIAGNOSIS — Z71.89 OTHER SPECIFIED COUNSELING: ICD-10-CM

## 2020-06-29 ENCOUNTER — LABORATORY RESULT (OUTPATIENT)
Age: 20
End: 2020-06-29

## 2020-06-29 ENCOUNTER — OUTPATIENT (OUTPATIENT)
Dept: OUTPATIENT SERVICES | Age: 20
LOS: 1 days | End: 2020-06-29

## 2020-06-29 ENCOUNTER — APPOINTMENT (OUTPATIENT)
Dept: PEDIATRIC HEMATOLOGY/ONCOLOGY | Facility: CLINIC | Age: 20
End: 2020-06-29
Payer: MEDICAID

## 2020-06-29 DIAGNOSIS — Z98.890 OTHER SPECIFIED POSTPROCEDURAL STATES: Chronic | ICD-10-CM

## 2020-06-29 LAB
ALBUMIN SERPL ELPH-MCNC: 4.6 G/DL — SIGNIFICANT CHANGE UP (ref 3.3–5)
ALP SERPL-CCNC: 70 U/L — SIGNIFICANT CHANGE UP (ref 40–120)
ALT FLD-CCNC: 36 U/L — HIGH (ref 4–33)
ANION GAP SERPL CALC-SCNC: 14 MMO/L — SIGNIFICANT CHANGE UP (ref 7–14)
AST SERPL-CCNC: 49 U/L — HIGH (ref 4–32)
BASOPHILS # BLD AUTO: 0.06 K/UL — SIGNIFICANT CHANGE UP (ref 0–0.2)
BASOPHILS NFR BLD AUTO: 0.5 % — SIGNIFICANT CHANGE UP (ref 0–2)
BILIRUB SERPL-MCNC: 3.9 MG/DL — HIGH (ref 0.2–1.2)
BLD GP AB SCN SERPL QL: NEGATIVE — SIGNIFICANT CHANGE UP
BUN SERPL-MCNC: 13 MG/DL — SIGNIFICANT CHANGE UP (ref 7–23)
CALCIUM SERPL-MCNC: 9.8 MG/DL — SIGNIFICANT CHANGE UP (ref 8.4–10.5)
CHLORIDE SERPL-SCNC: 107 MMOL/L — SIGNIFICANT CHANGE UP (ref 98–107)
CO2 SERPL-SCNC: 22 MMOL/L — SIGNIFICANT CHANGE UP (ref 22–31)
CREAT SERPL-MCNC: 0.93 MG/DL — SIGNIFICANT CHANGE UP (ref 0.5–1.3)
EOSINOPHIL # BLD AUTO: 0.35 K/UL — SIGNIFICANT CHANGE UP (ref 0–0.5)
EOSINOPHIL NFR BLD AUTO: 3 % — SIGNIFICANT CHANGE UP (ref 0–6)
FERRITIN SERPL-MCNC: 1255 NG/ML — HIGH (ref 15–150)
GLUCOSE SERPL-MCNC: 81 MG/DL — SIGNIFICANT CHANGE UP (ref 70–99)
HCT VFR BLD CALC: 27.4 % — LOW (ref 34.5–45)
HGB BLD-MCNC: 9.2 G/DL — LOW (ref 11.5–15.5)
IMM GRANULOCYTES NFR BLD AUTO: 0.3 % — SIGNIFICANT CHANGE UP (ref 0–1.5)
LYMPHOCYTES # BLD AUTO: 2.77 K/UL — SIGNIFICANT CHANGE UP (ref 1–3.3)
LYMPHOCYTES # BLD AUTO: 23.4 % — SIGNIFICANT CHANGE UP (ref 13–44)
MCHC RBC-ENTMCNC: 28 PG — SIGNIFICANT CHANGE UP (ref 27–34)
MCHC RBC-ENTMCNC: 33.6 % — SIGNIFICANT CHANGE UP (ref 32–36)
MCV RBC AUTO: 83.3 FL — SIGNIFICANT CHANGE UP (ref 80–100)
MONOCYTES # BLD AUTO: 1.2 K/UL — HIGH (ref 0–0.9)
MONOCYTES NFR BLD AUTO: 10.1 % — SIGNIFICANT CHANGE UP (ref 2–14)
NEUTROPHILS # BLD AUTO: 7.43 K/UL — HIGH (ref 1.8–7.4)
NEUTROPHILS NFR BLD AUTO: 62.7 % — SIGNIFICANT CHANGE UP (ref 43–77)
NRBC # FLD: 0.25 K/UL — SIGNIFICANT CHANGE UP (ref 0–0)
NRBC FLD-RTO: 2.1 — SIGNIFICANT CHANGE UP
PLATELET # BLD AUTO: 363 K/UL — SIGNIFICANT CHANGE UP (ref 150–400)
PMV BLD: 10.1 FL — SIGNIFICANT CHANGE UP (ref 7–13)
POTASSIUM SERPL-MCNC: 3.8 MMOL/L — SIGNIFICANT CHANGE UP (ref 3.5–5.3)
POTASSIUM SERPL-SCNC: 3.8 MMOL/L — SIGNIFICANT CHANGE UP (ref 3.5–5.3)
PROT SERPL-MCNC: 6.6 G/DL — SIGNIFICANT CHANGE UP (ref 6–8.3)
RBC # BLD: 3.29 M/UL — LOW (ref 3.8–5.2)
RBC # FLD: 18.8 % — HIGH (ref 10.3–14.5)
RH IG SCN BLD-IMP: POSITIVE — SIGNIFICANT CHANGE UP
SODIUM SERPL-SCNC: 143 MMOL/L — SIGNIFICANT CHANGE UP (ref 135–145)
WBC # BLD: 11.84 K/UL — HIGH (ref 3.8–10.5)
WBC # FLD AUTO: 11.84 K/UL — HIGH (ref 3.8–10.5)

## 2020-06-29 PROCEDURE — ZZZZZ: CPT

## 2020-06-30 ENCOUNTER — LABORATORY RESULT (OUTPATIENT)
Age: 20
End: 2020-06-30

## 2020-06-30 ENCOUNTER — APPOINTMENT (OUTPATIENT)
Dept: PEDIATRIC HEMATOLOGY/ONCOLOGY | Facility: CLINIC | Age: 20
End: 2020-06-30
Payer: MEDICAID

## 2020-06-30 ENCOUNTER — OUTPATIENT (OUTPATIENT)
Dept: OUTPATIENT SERVICES | Age: 20
LOS: 1 days | End: 2020-06-30

## 2020-06-30 VITALS
HEART RATE: 76 BPM | TEMPERATURE: 98.6 F | WEIGHT: 121.7 LBS | RESPIRATION RATE: 24 BRPM | DIASTOLIC BLOOD PRESSURE: 56 MMHG | OXYGEN SATURATION: 98 % | SYSTOLIC BLOOD PRESSURE: 108 MMHG

## 2020-06-30 DIAGNOSIS — D57.1 SICKLE-CELL DISEASE WITHOUT CRISIS: ICD-10-CM

## 2020-06-30 DIAGNOSIS — Z98.890 OTHER SPECIFIED POSTPROCEDURAL STATES: Chronic | ICD-10-CM

## 2020-06-30 LAB
APPEARANCE UR: SIGNIFICANT CHANGE UP
BACTERIA # UR AUTO: SIGNIFICANT CHANGE UP
BILIRUB UR-MCNC: NEGATIVE — SIGNIFICANT CHANGE UP
BLOOD UR QL VISUAL: SIGNIFICANT CHANGE UP
COLOR SPEC: YELLOW — SIGNIFICANT CHANGE UP
GLUCOSE UR-MCNC: NEGATIVE — SIGNIFICANT CHANGE UP
HGB A MFR BLD: 66.8 % — LOW
HGB A2 MFR BLD: 2.2 % — LOW (ref 2.4–3.5)
HGB F MFR BLD: < 1 % — SIGNIFICANT CHANGE UP (ref 0–1.5)
HGB S MFR BLD: 30.3 % — HIGH (ref 0–0)
KETONES UR-MCNC: NEGATIVE — SIGNIFICANT CHANGE UP
LEUKOCYTE ESTERASE UR-ACNC: NEGATIVE — SIGNIFICANT CHANGE UP
MUCOUS THREADS # UR AUTO: SIGNIFICANT CHANGE UP
NITRITE UR-MCNC: NEGATIVE — SIGNIFICANT CHANGE UP
PH UR: 6.5 — SIGNIFICANT CHANGE UP (ref 5–8)
PROT UR-MCNC: NEGATIVE — SIGNIFICANT CHANGE UP
RBC CASTS # UR COMP ASSIST: HIGH (ref 0–?)
SP GR SPEC: 1.01 — SIGNIFICANT CHANGE UP (ref 1–1.04)
SQUAMOUS # UR AUTO: SIGNIFICANT CHANGE UP
UROBILINOGEN FLD QL: NORMAL — SIGNIFICANT CHANGE UP
WBC UR QL: HIGH (ref 0–?)

## 2020-06-30 PROCEDURE — 99214 OFFICE O/P EST MOD 30 MIN: CPT

## 2020-06-30 NOTE — REASON FOR VISIT
[Follow-Up Visit] : a follow-up visit for [Sickle Cell Disease] : sickle cell disease [Patient] : patient [Medical Records] : medical records [FreeTextEntry2] : chronic transfusion

## 2020-06-30 NOTE — HISTORY OF PRESENT ILLNESS
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 She denies fever, URI symptoms, n/v/d. Feeling well today.  She reported not missing any doses of Jadenu.   \par \par  [de-identified] : Elizabeth is now an 20 year old young woman with sickle cell anemia who is on chronic transfusion therapy to prevent vaso-occlusive painful episodes and acute chest syndrome. She has had no recent VOC or ACS admits.  No recent fevers.  She has been following with Psych regularly secondary to diagnosis of Bipolar disorder and Depression.  She is taking both Abilify and Zoloft, and denies missing doses.  She is up to date within the last year having seen Ophthalmology, Pulmonology and Cardio.  Her last MRI abd/w/LIC was 8/7/18, with LIC of 2.5g

## 2020-07-01 DIAGNOSIS — D57.1 SICKLE-CELL DISEASE WITHOUT CRISIS: ICD-10-CM

## 2020-07-06 ENCOUNTER — TRANSCRIPTION ENCOUNTER (OUTPATIENT)
Age: 20
End: 2020-07-06

## 2020-07-14 ENCOUNTER — APPOINTMENT (OUTPATIENT)
Dept: PEDIATRIC HEMATOLOGY/ONCOLOGY | Facility: CLINIC | Age: 20
End: 2020-07-14
Payer: MEDICAID

## 2020-07-14 ENCOUNTER — OUTPATIENT (OUTPATIENT)
Dept: OUTPATIENT SERVICES | Age: 20
LOS: 1 days | End: 2020-07-14

## 2020-07-14 VITALS
HEART RATE: 71 BPM | HEIGHT: 61.81 IN | DIASTOLIC BLOOD PRESSURE: 59 MMHG | OXYGEN SATURATION: 99 % | WEIGHT: 122.14 LBS | BODY MASS INDEX: 22.48 KG/M2 | TEMPERATURE: 98.24 F | SYSTOLIC BLOOD PRESSURE: 107 MMHG | RESPIRATION RATE: 20 BRPM

## 2020-07-14 DIAGNOSIS — Z98.890 OTHER SPECIFIED POSTPROCEDURAL STATES: Chronic | ICD-10-CM

## 2020-07-14 PROCEDURE — 99214 OFFICE O/P EST MOD 30 MIN: CPT

## 2020-07-14 RX ORDER — MUPIROCIN 20 MG/G
2 OINTMENT TOPICAL TWICE DAILY
Qty: 1 | Refills: 0 | Status: COMPLETED | COMMUNITY
Start: 2018-09-28 | End: 2020-07-14

## 2020-07-14 NOTE — PHYSICAL EXAM
[Normal] : affect appropriate [100: Normal, no complaints, no evidence of disease.] : 100: Normal, no complaints, no evidence of disease. [Braces] : braces

## 2020-07-14 NOTE — HISTORY OF PRESENT ILLNESS
[No Feeding Issues] : no feeding issues at this time [de-identified] : Elizabeth is now an 20 year old young woman with sickle cell anemia who is on chronic transfusion therapy to prevent vaso-occlusive painful episodes and acute chest syndrome. She has had no recent VOC or ACS admits.  No recent fevers.  She has been following with Psych regularly secondary to diagnosis of Bipolar disorder and Depression.  She is taking both Abilify and Zoloft. [de-identified] : Elizabeth is a 20 year old young woman with HbSS, who is on chronic transfusion therapy to prevent recurrent vaso-occlusive episodes. She was admitted to the hospital in February 2020 with  shunt malfunction and underwent shunt valve placement. She was then admitted to the hospital in March 2020 with UTI. She has been doing well since. No contacts with COVID. She denies fever, URI symptoms, n/v/d. No pain crisis. She was out of Jadenu for a while due to insurance problems, but has received Jadenu and reports not missing any doses. \par \par On Depo Provera but sometimes gets her periods. Denies any recent sexual activity. Denies smoking, drugs, ETOH use. Denies feeling depressed, sees psychiatrist every 3 months.    \par \par Missed the spring semester but will resume college in the fall. 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 \par \par

## 2020-07-14 NOTE — REASON FOR VISIT
[Follow-Up Visit] : a follow-up visit for [Sickle Cell Disease] : sickle cell disease [Patient] : patient [Medical Records] : medical records

## 2020-07-14 NOTE — CONSULT LETTER
[Dear  ___] : Dear  [unfilled], [Courtesy Letter:] : I had the pleasure of seeing your patient, [unfilled], in my office today. [Please see my note below.] : Please see my note below. [Consult Closing:] : Thank you very much for allowing me to participate in the care of this patient.  If you have any questions, please do not hesitate to contact me. [Sincerely,] : Sincerely, [FreeTextEntry2] : Dr. Marii Licea\par [address] [place] [address] [address] \[place] [address] [FreeTextEntry3] : Carmen Lu MD, FAAP\par Professor of Pediatrics\par [facility]\par Associate Chief for Hematology\par Section Head, Sickle Cell Disease\par Division of Hematology/Oncology and Stem Cell Transplantation\par [facility]\par Tel: [phone]\par Fax: [phone]\par [email or web address] \par \par

## 2020-07-21 ENCOUNTER — LABORATORY RESULT (OUTPATIENT)
Age: 20
End: 2020-07-21

## 2020-07-21 ENCOUNTER — APPOINTMENT (OUTPATIENT)
Dept: PEDIATRIC HEMATOLOGY/ONCOLOGY | Facility: CLINIC | Age: 20
End: 2020-07-21
Payer: MEDICAID

## 2020-07-21 ENCOUNTER — OUTPATIENT (OUTPATIENT)
Dept: OUTPATIENT SERVICES | Age: 20
LOS: 1 days | End: 2020-07-21

## 2020-07-21 DIAGNOSIS — Z98.890 OTHER SPECIFIED POSTPROCEDURAL STATES: Chronic | ICD-10-CM

## 2020-07-21 LAB
ALBUMIN SERPL ELPH-MCNC: 5 G/DL — SIGNIFICANT CHANGE UP (ref 3.3–5)
ALP SERPL-CCNC: 76 U/L — SIGNIFICANT CHANGE UP (ref 40–120)
ALT FLD-CCNC: 23 U/L — SIGNIFICANT CHANGE UP (ref 4–33)
ANION GAP SERPL CALC-SCNC: 14 MMO/L — SIGNIFICANT CHANGE UP (ref 7–14)
AST SERPL-CCNC: 42 U/L — HIGH (ref 4–32)
BASOPHILS # BLD AUTO: 0.1 K/UL — SIGNIFICANT CHANGE UP (ref 0–0.2)
BASOPHILS NFR BLD AUTO: 0.8 % — SIGNIFICANT CHANGE UP (ref 0–2)
BILIRUB SERPL-MCNC: 3.7 MG/DL — HIGH (ref 0.2–1.2)
BLD GP AB SCN SERPL QL: NEGATIVE — SIGNIFICANT CHANGE UP
BUN SERPL-MCNC: 14 MG/DL — SIGNIFICANT CHANGE UP (ref 7–23)
CALCIUM SERPL-MCNC: 9.9 MG/DL — SIGNIFICANT CHANGE UP (ref 8.4–10.5)
CHLORIDE SERPL-SCNC: 105 MMOL/L — SIGNIFICANT CHANGE UP (ref 98–107)
CO2 SERPL-SCNC: 23 MMOL/L — SIGNIFICANT CHANGE UP (ref 22–31)
CREAT SERPL-MCNC: 0.91 MG/DL — SIGNIFICANT CHANGE UP (ref 0.5–1.3)
EOSINOPHIL # BLD AUTO: 0.28 K/UL — SIGNIFICANT CHANGE UP (ref 0–0.5)
EOSINOPHIL NFR BLD AUTO: 2.2 % — SIGNIFICANT CHANGE UP (ref 0–6)
FERRITIN SERPL-MCNC: 1638 NG/ML — HIGH (ref 15–150)
GLUCOSE SERPL-MCNC: 52 MG/DL — LOW (ref 70–99)
HCT VFR BLD CALC: 29.5 % — LOW (ref 34.5–45)
HGB BLD-MCNC: 10 G/DL — LOW (ref 11.5–15.5)
IMM GRANULOCYTES NFR BLD AUTO: 0.2 % — SIGNIFICANT CHANGE UP (ref 0–1.5)
LYMPHOCYTES # BLD AUTO: 29.9 % — SIGNIFICANT CHANGE UP (ref 13–44)
LYMPHOCYTES # BLD AUTO: 3.82 K/UL — HIGH (ref 1–3.3)
MCHC RBC-ENTMCNC: 28.6 PG — SIGNIFICANT CHANGE UP (ref 27–34)
MCHC RBC-ENTMCNC: 33.9 % — SIGNIFICANT CHANGE UP (ref 32–36)
MCV RBC AUTO: 84.3 FL — SIGNIFICANT CHANGE UP (ref 80–100)
MONOCYTES # BLD AUTO: 1.17 K/UL — HIGH (ref 0–0.9)
MONOCYTES NFR BLD AUTO: 9.1 % — SIGNIFICANT CHANGE UP (ref 2–14)
NEUTROPHILS # BLD AUTO: 7.39 K/UL — SIGNIFICANT CHANGE UP (ref 1.8–7.4)
NEUTROPHILS NFR BLD AUTO: 57.8 % — SIGNIFICANT CHANGE UP (ref 43–77)
NRBC # FLD: 0.15 K/UL — SIGNIFICANT CHANGE UP (ref 0–0)
NRBC FLD-RTO: 1.2 — SIGNIFICANT CHANGE UP
PLATELET # BLD AUTO: 377 K/UL — SIGNIFICANT CHANGE UP (ref 150–400)
PMV BLD: 9.8 FL — SIGNIFICANT CHANGE UP (ref 7–13)
POTASSIUM SERPL-MCNC: 3.9 MMOL/L — SIGNIFICANT CHANGE UP (ref 3.5–5.3)
POTASSIUM SERPL-SCNC: 3.9 MMOL/L — SIGNIFICANT CHANGE UP (ref 3.5–5.3)
PROT SERPL-MCNC: 7.1 G/DL — SIGNIFICANT CHANGE UP (ref 6–8.3)
RBC # BLD: 3.5 M/UL — LOW (ref 3.8–5.2)
RBC # FLD: 18.6 % — HIGH (ref 10.3–14.5)
RETICS #: 241 K/UL — HIGH (ref 25–125)
RETICS/RBC NFR: 6.9 % — HIGH (ref 0.5–2.5)
RH IG SCN BLD-IMP: POSITIVE — SIGNIFICANT CHANGE UP
SODIUM SERPL-SCNC: 142 MMOL/L — SIGNIFICANT CHANGE UP (ref 135–145)
WBC # BLD: 12.79 K/UL — HIGH (ref 3.8–10.5)
WBC # FLD AUTO: 12.79 K/UL — HIGH (ref 3.8–10.5)

## 2020-07-21 PROCEDURE — ZZZZZ: CPT

## 2020-07-22 ENCOUNTER — APPOINTMENT (OUTPATIENT)
Dept: PEDIATRIC HEMATOLOGY/ONCOLOGY | Facility: CLINIC | Age: 20
End: 2020-07-22
Payer: MEDICAID

## 2020-07-22 ENCOUNTER — OUTPATIENT (OUTPATIENT)
Dept: OUTPATIENT SERVICES | Age: 20
LOS: 1 days | End: 2020-07-22

## 2020-07-22 VITALS
TEMPERATURE: 98.42 F | HEART RATE: 82 BPM | WEIGHT: 122.36 LBS | DIASTOLIC BLOOD PRESSURE: 63 MMHG | RESPIRATION RATE: 22 BRPM | OXYGEN SATURATION: 100 % | SYSTOLIC BLOOD PRESSURE: 123 MMHG

## 2020-07-22 DIAGNOSIS — E55.9 VITAMIN D DEFICIENCY, UNSPECIFIED: ICD-10-CM

## 2020-07-22 DIAGNOSIS — Z98.890 OTHER SPECIFIED POSTPROCEDURAL STATES: Chronic | ICD-10-CM

## 2020-07-22 DIAGNOSIS — D57.1 SICKLE-CELL DISEASE WITHOUT CRISIS: ICD-10-CM

## 2020-07-22 DIAGNOSIS — J45.40 MODERATE PERSISTENT ASTHMA, UNCOMPLICATED: ICD-10-CM

## 2020-07-22 LAB
24R-OH-CALCIDIOL SERPL-MCNC: 93.6 NG/ML — HIGH (ref 30–80)
HGB A MFR BLD: 71.1 % — LOW
HGB A2 MFR BLD: 2.9 % — SIGNIFICANT CHANGE UP (ref 2.4–3.5)
HGB F MFR BLD: < 1 % — SIGNIFICANT CHANGE UP (ref 0–1.5)
HGB S MFR BLD: 25.3 % — HIGH (ref 0–0)

## 2020-07-22 PROCEDURE — 99214 OFFICE O/P EST MOD 30 MIN: CPT

## 2020-07-22 RX ORDER — PNEUMOCOCCAL 23-VAL P-SAC VAC 25MCG/0.5
0.5 VIAL (ML) INJECTION ONCE
Refills: 0 | Status: DISCONTINUED | OUTPATIENT
Start: 2020-07-22 | End: 2020-07-22

## 2020-07-22 NOTE — PHYSICAL EXAM
[Braces] : braces  [100: Normal, no complaints, no evidence of disease.] : 100: Normal, no complaints, no evidence of disease. [Normal] : mucous membranes moist, no mouth sores or mucosal bleeding, normal dentition, symmetric facies [No focal deficits] : no focal deficits

## 2020-07-22 NOTE — HISTORY OF PRESENT ILLNESS
[No Feeding Issues] : no feeding issues at this time [de-identified] : Elizabeth is a 20 year old young woman with HbSS, who is on chronic transfusion therapy to prevent recurrent vaso-occlusive episodes. She was admitted to the hospital in February 2020 with  shunt malfunction and underwent shunt valve placement. She was then admitted to the hospital in March 2020 with UTI.\par \par She has been doing well since her last visit. No contacts with COVID. She denies fever, URI symptoms, n/v/d. No pain crisis. She reports taking all of her medications as prescribed.  Eating and drinking well.\par \par \par  [de-identified] : Elizabeth is now an 20 year old young woman with sickle cell anemia who is on chronic transfusion therapy to prevent vaso-occlusive painful episodes and acute chest syndrome. She has had no recent VOC or ACS admits.  No recent fevers.  She has been following with Psych regularly secondary to diagnosis of Bipolar disorder and Depression.  She is taking both Abilify and Zoloft.

## 2020-07-22 NOTE — REASON FOR VISIT
[Sickle Cell Disease] : sickle cell disease [Follow-Up Visit] : a follow-up visit for [Patient] : patient [Medical Records] : medical records

## 2020-07-23 DIAGNOSIS — D57.1 SICKLE-CELL DISEASE WITHOUT CRISIS: ICD-10-CM

## 2020-08-06 NOTE — ED ADULT TRIAGE NOTE - ARRIVAL FROM
Home
I have reviewed and confirmed nurses' notes for patient's medications, allergies, medical history, and surgical history.

## 2020-08-18 ENCOUNTER — LABORATORY RESULT (OUTPATIENT)
Age: 20
End: 2020-08-18

## 2020-08-18 ENCOUNTER — APPOINTMENT (OUTPATIENT)
Dept: PEDIATRIC HEMATOLOGY/ONCOLOGY | Facility: CLINIC | Age: 20
End: 2020-08-18
Payer: MEDICAID

## 2020-08-18 ENCOUNTER — OUTPATIENT (OUTPATIENT)
Dept: OUTPATIENT SERVICES | Age: 20
LOS: 1 days | End: 2020-08-18

## 2020-08-18 DIAGNOSIS — Z98.890 OTHER SPECIFIED POSTPROCEDURAL STATES: Chronic | ICD-10-CM

## 2020-08-18 LAB
ALBUMIN SERPL ELPH-MCNC: 4.5 G/DL — SIGNIFICANT CHANGE UP (ref 3.3–5)
ALP SERPL-CCNC: 67 U/L — SIGNIFICANT CHANGE UP (ref 40–120)
ALT FLD-CCNC: 22 U/L — SIGNIFICANT CHANGE UP (ref 4–33)
ANION GAP SERPL CALC-SCNC: 11 MMO/L — SIGNIFICANT CHANGE UP (ref 7–14)
AST SERPL-CCNC: 39 U/L — HIGH (ref 4–32)
BASOPHILS # BLD AUTO: 0.08 K/UL — SIGNIFICANT CHANGE UP (ref 0–0.2)
BASOPHILS NFR BLD AUTO: 0.5 % — SIGNIFICANT CHANGE UP (ref 0–2)
BILIRUB SERPL-MCNC: 4.2 MG/DL — HIGH (ref 0.2–1.2)
BLD GP AB SCN SERPL QL: NEGATIVE — SIGNIFICANT CHANGE UP
BUN SERPL-MCNC: 9 MG/DL — SIGNIFICANT CHANGE UP (ref 7–23)
CALCIUM SERPL-MCNC: 9.3 MG/DL — SIGNIFICANT CHANGE UP (ref 8.4–10.5)
CHLORIDE SERPL-SCNC: 110 MMOL/L — HIGH (ref 98–107)
CO2 SERPL-SCNC: 24 MMOL/L — SIGNIFICANT CHANGE UP (ref 22–31)
CREAT SERPL-MCNC: 0.8 MG/DL — SIGNIFICANT CHANGE UP (ref 0.5–1.3)
EOSINOPHIL # BLD AUTO: 0.44 K/UL — SIGNIFICANT CHANGE UP (ref 0–0.5)
EOSINOPHIL NFR BLD AUTO: 3 % — SIGNIFICANT CHANGE UP (ref 0–6)
FERRITIN SERPL-MCNC: 1334 NG/ML — HIGH (ref 15–150)
GLUCOSE SERPL-MCNC: 101 MG/DL — HIGH (ref 70–99)
HCT VFR BLD CALC: 25.3 % — LOW (ref 34.5–45)
HGB BLD-MCNC: 8.3 G/DL — LOW (ref 11.5–15.5)
IMM GRANULOCYTES NFR BLD AUTO: 0.3 % — SIGNIFICANT CHANGE UP (ref 0–1.5)
LYMPHOCYTES # BLD AUTO: 27.9 % — SIGNIFICANT CHANGE UP (ref 13–44)
LYMPHOCYTES # BLD AUTO: 4.15 K/UL — HIGH (ref 1–3.3)
MCHC RBC-ENTMCNC: 29.9 PG — SIGNIFICANT CHANGE UP (ref 27–34)
MCHC RBC-ENTMCNC: 32.8 % — SIGNIFICANT CHANGE UP (ref 32–36)
MCV RBC AUTO: 91 FL — SIGNIFICANT CHANGE UP (ref 80–100)
MONOCYTES # BLD AUTO: 1.49 K/UL — HIGH (ref 0–0.9)
MONOCYTES NFR BLD AUTO: 10 % — SIGNIFICANT CHANGE UP (ref 2–14)
NEUTROPHILS # BLD AUTO: 8.66 K/UL — HIGH (ref 1.8–7.4)
NEUTROPHILS NFR BLD AUTO: 58.3 % — SIGNIFICANT CHANGE UP (ref 43–77)
NRBC # FLD: 0.2 K/UL — SIGNIFICANT CHANGE UP (ref 0–0)
NRBC FLD-RTO: 1.3 — SIGNIFICANT CHANGE UP
PLATELET # BLD AUTO: 421 K/UL — HIGH (ref 150–400)
PMV BLD: 10.2 FL — SIGNIFICANT CHANGE UP (ref 7–13)
POTASSIUM SERPL-MCNC: 3.7 MMOL/L — SIGNIFICANT CHANGE UP (ref 3.5–5.3)
POTASSIUM SERPL-SCNC: 3.7 MMOL/L — SIGNIFICANT CHANGE UP (ref 3.5–5.3)
PROT SERPL-MCNC: 6.3 G/DL — SIGNIFICANT CHANGE UP (ref 6–8.3)
RBC # BLD: 2.78 M/UL — LOW (ref 3.8–5.2)
RBC # FLD: 18.6 % — HIGH (ref 10.3–14.5)
RETICS #: 388 K/UL — HIGH (ref 25–125)
RETICS/RBC NFR: 14 % — HIGH (ref 0.5–2.5)
RH IG SCN BLD-IMP: POSITIVE — SIGNIFICANT CHANGE UP
SODIUM SERPL-SCNC: 145 MMOL/L — SIGNIFICANT CHANGE UP (ref 135–145)
WBC # BLD: 14.87 K/UL — HIGH (ref 3.8–10.5)
WBC # FLD AUTO: 14.87 K/UL — HIGH (ref 3.8–10.5)

## 2020-08-18 PROCEDURE — ZZZZZ: CPT

## 2020-08-19 ENCOUNTER — APPOINTMENT (OUTPATIENT)
Dept: PEDIATRIC HEMATOLOGY/ONCOLOGY | Facility: CLINIC | Age: 20
End: 2020-08-19
Payer: MEDICAID

## 2020-08-19 ENCOUNTER — OUTPATIENT (OUTPATIENT)
Dept: OUTPATIENT SERVICES | Age: 20
LOS: 1 days | End: 2020-08-19

## 2020-08-19 VITALS
DIASTOLIC BLOOD PRESSURE: 61 MMHG | SYSTOLIC BLOOD PRESSURE: 105 MMHG | OXYGEN SATURATION: 100 % | HEART RATE: 97 BPM | TEMPERATURE: 98.42 F | RESPIRATION RATE: 22 BRPM

## 2020-08-19 VITALS
SYSTOLIC BLOOD PRESSURE: 110 MMHG | DIASTOLIC BLOOD PRESSURE: 48 MMHG | RESPIRATION RATE: 23 BRPM | HEART RATE: 89 BPM | OXYGEN SATURATION: 98 % | HEIGHT: 62.17 IN | BODY MASS INDEX: 23.15 KG/M2 | TEMPERATURE: 98.78 F | WEIGHT: 127.43 LBS

## 2020-08-19 DIAGNOSIS — Z98.890 OTHER SPECIFIED POSTPROCEDURAL STATES: Chronic | ICD-10-CM

## 2020-08-19 LAB
HGB A MFR BLD: 59.7 % — LOW
HGB A2 MFR BLD: 3 % — SIGNIFICANT CHANGE UP (ref 2.4–3.5)
HGB F MFR BLD: < 1 % — SIGNIFICANT CHANGE UP (ref 0–1.5)
HGB S MFR BLD: 36.6 % — HIGH (ref 0–0)

## 2020-08-19 PROCEDURE — 99214 OFFICE O/P EST MOD 30 MIN: CPT

## 2020-08-19 RX ORDER — PNEUMOCOCCAL 23-VAL P-SAC VAC 25MCG/0.5
0.5 VIAL (ML) INJECTION ONCE
Refills: 0 | Status: DISCONTINUED | OUTPATIENT
Start: 2020-08-19 | End: 2020-09-03

## 2020-08-19 RX ORDER — MEDROXYPROGESTERONE ACETATE 150 MG/ML
150 INJECTION, SUSPENSION, EXTENDED RELEASE INTRAMUSCULAR ONCE
Refills: 0 | Status: DISCONTINUED | OUTPATIENT
Start: 2020-08-19 | End: 2020-09-03

## 2020-08-19 NOTE — PHYSICAL EXAM
[Braces] : braces  [No focal deficits] : no focal deficits [Normal] : affect appropriate [100: Normal, no complaints, no evidence of disease.] : 100: Normal, no complaints, no evidence of disease.

## 2020-08-19 NOTE — HISTORY OF PRESENT ILLNESS
[No Feeding Issues] : no feeding issues at this time [de-identified] : Elizabeth is now an 20 year old young woman with sickle cell anemia who is on chronic transfusion therapy to prevent vaso-occlusive painful episodes and acute chest syndrome. She has had no recent VOC or ACS admits.  No recent fevers.  She has been following with Psych regularly secondary to diagnosis of Bipolar disorder and Depression.  She is taking both Abilify and Zoloft. [de-identified] : Elizabeth is a 20 year old young woman with HbSS, who is on chronic transfusion therapy to prevent recurrent vaso-occlusive episodes. She was admitted to the hospital in February 2020 with  shunt malfunction and underwent shunt valve placement. She was then admitted to the hospital in March 2020 with UTI.\par \par She has been doing well since her last visit. No contacts with COVID. She denies fever, URI symptoms, n/v/d. No pain crisis. She reports taking all of her medications as prescribed.  Eating and drinking well.\par \par \par

## 2020-08-20 DIAGNOSIS — D57.1 SICKLE-CELL DISEASE WITHOUT CRISIS: ICD-10-CM

## 2020-09-08 ENCOUNTER — APPOINTMENT (OUTPATIENT)
Dept: PEDIATRIC HEMATOLOGY/ONCOLOGY | Facility: CLINIC | Age: 20
End: 2020-09-08
Payer: MEDICAID

## 2020-09-08 ENCOUNTER — LABORATORY RESULT (OUTPATIENT)
Age: 20
End: 2020-09-08

## 2020-09-08 ENCOUNTER — OUTPATIENT (OUTPATIENT)
Dept: OUTPATIENT SERVICES | Age: 20
LOS: 1 days | End: 2020-09-08

## 2020-09-08 DIAGNOSIS — Z98.890 OTHER SPECIFIED POSTPROCEDURAL STATES: Chronic | ICD-10-CM

## 2020-09-08 LAB
ALBUMIN SERPL ELPH-MCNC: 4.9 G/DL — SIGNIFICANT CHANGE UP (ref 3.3–5)
ALP SERPL-CCNC: 72 U/L — SIGNIFICANT CHANGE UP (ref 40–120)
ALT FLD-CCNC: 16 U/L — SIGNIFICANT CHANGE UP (ref 4–33)
ANION GAP SERPL CALC-SCNC: 15 MMO/L — HIGH (ref 7–14)
AST SERPL-CCNC: 29 U/L — SIGNIFICANT CHANGE UP (ref 4–32)
BASOPHILS # BLD AUTO: 0.11 K/UL — SIGNIFICANT CHANGE UP (ref 0–0.2)
BASOPHILS NFR BLD AUTO: 0.8 % — SIGNIFICANT CHANGE UP (ref 0–2)
BILIRUB SERPL-MCNC: 4.9 MG/DL — HIGH (ref 0.2–1.2)
BLD GP AB SCN SERPL QL: NEGATIVE — SIGNIFICANT CHANGE UP
BUN SERPL-MCNC: 10 MG/DL — SIGNIFICANT CHANGE UP (ref 7–23)
CALCIUM SERPL-MCNC: 9.4 MG/DL — SIGNIFICANT CHANGE UP (ref 8.4–10.5)
CHLORIDE SERPL-SCNC: 105 MMOL/L — SIGNIFICANT CHANGE UP (ref 98–107)
CO2 SERPL-SCNC: 21 MMOL/L — LOW (ref 22–31)
CREAT SERPL-MCNC: 0.69 MG/DL — SIGNIFICANT CHANGE UP (ref 0.5–1.3)
EOSINOPHIL # BLD AUTO: 0.29 K/UL — SIGNIFICANT CHANGE UP (ref 0–0.5)
EOSINOPHIL NFR BLD AUTO: 2 % — SIGNIFICANT CHANGE UP (ref 0–6)
FERRITIN SERPL-MCNC: 1650 NG/ML — HIGH (ref 15–150)
GLUCOSE SERPL-MCNC: 79 MG/DL — SIGNIFICANT CHANGE UP (ref 70–99)
HCT VFR BLD CALC: 27 % — LOW (ref 34.5–45)
HGB BLD-MCNC: 8.8 G/DL — LOW (ref 11.5–15.5)
IMM GRANULOCYTES NFR BLD AUTO: 0.4 % — SIGNIFICANT CHANGE UP (ref 0–1.5)
IRON SATN MFR SERPL: 428 UG/DL — SIGNIFICANT CHANGE UP (ref 140–530)
IRON SATN MFR SERPL: 57 UG/DL — SIGNIFICANT CHANGE UP (ref 30–160)
LYMPHOCYTES # BLD AUTO: 27.9 % — SIGNIFICANT CHANGE UP (ref 13–44)
LYMPHOCYTES # BLD AUTO: 4.05 K/UL — HIGH (ref 1–3.3)
MCHC RBC-ENTMCNC: 29.9 PG — SIGNIFICANT CHANGE UP (ref 27–34)
MCHC RBC-ENTMCNC: 32.6 % — SIGNIFICANT CHANGE UP (ref 32–36)
MCV RBC AUTO: 91.8 FL — SIGNIFICANT CHANGE UP (ref 80–100)
MONOCYTES # BLD AUTO: 1.36 K/UL — HIGH (ref 0–0.9)
MONOCYTES NFR BLD AUTO: 9.4 % — SIGNIFICANT CHANGE UP (ref 2–14)
NEUTROPHILS # BLD AUTO: 8.64 K/UL — HIGH (ref 1.8–7.4)
NEUTROPHILS NFR BLD AUTO: 59.5 % — SIGNIFICANT CHANGE UP (ref 43–77)
NRBC # FLD: 0.2 K/UL — SIGNIFICANT CHANGE UP (ref 0–0)
NRBC FLD-RTO: 1.4 — SIGNIFICANT CHANGE UP
PLATELET # BLD AUTO: 429 K/UL — HIGH (ref 150–400)
PMV BLD: 10.1 FL — SIGNIFICANT CHANGE UP (ref 7–13)
POTASSIUM SERPL-MCNC: 3.6 MMOL/L — SIGNIFICANT CHANGE UP (ref 3.5–5.3)
POTASSIUM SERPL-SCNC: 3.6 MMOL/L — SIGNIFICANT CHANGE UP (ref 3.5–5.3)
PROT SERPL-MCNC: 6.9 G/DL — SIGNIFICANT CHANGE UP (ref 6–8.3)
RBC # BLD: 2.94 M/UL — LOW (ref 3.8–5.2)
RBC # FLD: 17.6 % — HIGH (ref 10.3–14.5)
RETICS #: 376 K/UL — HIGH (ref 25–125)
RETICS/RBC NFR: 12.8 % — HIGH (ref 0.5–2.5)
RH IG SCN BLD-IMP: POSITIVE — SIGNIFICANT CHANGE UP
SODIUM SERPL-SCNC: 141 MMOL/L — SIGNIFICANT CHANGE UP (ref 135–145)
UIBC SERPL-MCNC: 371 UG/DL — HIGH (ref 110–370)
WBC # BLD: 14.51 K/UL — HIGH (ref 3.8–10.5)
WBC # FLD AUTO: 14.51 K/UL — HIGH (ref 3.8–10.5)

## 2020-09-08 PROCEDURE — ZZZZZ: CPT

## 2020-09-09 LAB
HGB A MFR BLD: 66.2 % — LOW
HGB A2 MFR BLD: 2.9 % — SIGNIFICANT CHANGE UP (ref 2.4–3.5)
HGB F MFR BLD: < 1 % — SIGNIFICANT CHANGE UP (ref 0–1.5)
HGB S MFR BLD: 30.2 % — HIGH (ref 0–0)

## 2020-09-10 ENCOUNTER — APPOINTMENT (OUTPATIENT)
Dept: PEDIATRIC HEMATOLOGY/ONCOLOGY | Facility: CLINIC | Age: 20
End: 2020-09-10
Payer: MEDICAID

## 2020-09-10 ENCOUNTER — OUTPATIENT (OUTPATIENT)
Dept: OUTPATIENT SERVICES | Age: 20
LOS: 1 days | End: 2020-09-10

## 2020-09-10 VITALS
RESPIRATION RATE: 22 BRPM | DIASTOLIC BLOOD PRESSURE: 56 MMHG | BODY MASS INDEX: 21.79 KG/M2 | HEIGHT: 62.28 IN | WEIGHT: 119.93 LBS | HEART RATE: 95 BPM | TEMPERATURE: 99.14 F | SYSTOLIC BLOOD PRESSURE: 119 MMHG

## 2020-09-10 DIAGNOSIS — D57.1 SICKLE-CELL DISEASE WITHOUT CRISIS: ICD-10-CM

## 2020-09-10 DIAGNOSIS — Z98.890 OTHER SPECIFIED POSTPROCEDURAL STATES: Chronic | ICD-10-CM

## 2020-09-10 PROCEDURE — 99214 OFFICE O/P EST MOD 30 MIN: CPT

## 2020-09-10 RX ORDER — MEDROXYPROGESTERONE ACETATE 150 MG/ML
150 INJECTION, SUSPENSION INTRAMUSCULAR
Qty: 1 | Refills: 0 | Status: ACTIVE | COMMUNITY
Start: 2018-04-10

## 2020-09-10 NOTE — HISTORY OF PRESENT ILLNESS
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 She was admitted to the hospital in February 2020 with  shunt malfunction and underwent shunt valve placement. She was then admitted to the hospital in March 2020 with UTI.\par She has been following with Psych regularly secondary to diagnosis of Bipolar disorder and Depression.  She is taking both Abilify and Zoloft. [de-identified] : Elizabeth is here today for PRBC transfusion. Denies fever, URI symptoms, n/v/d. No headaches or blurred vision. No pain crisis. She reports taking all of her medications as prescribed.  No new concerns today. \par \par \par

## 2020-09-11 DIAGNOSIS — D57.1 SICKLE-CELL DISEASE WITHOUT CRISIS: ICD-10-CM

## 2020-09-27 ENCOUNTER — EMERGENCY (EMERGENCY)
Age: 20
LOS: 1 days | Discharge: HOME | End: 2020-09-27
Attending: PEDIATRICS | Admitting: PEDIATRICS
Payer: MEDICAID

## 2020-09-27 VITALS
RESPIRATION RATE: 18 BRPM | SYSTOLIC BLOOD PRESSURE: 109 MMHG | TEMPERATURE: 98 F | HEART RATE: 94 BPM | OXYGEN SATURATION: 100 % | DIASTOLIC BLOOD PRESSURE: 70 MMHG

## 2020-09-27 VITALS
OXYGEN SATURATION: 99 % | TEMPERATURE: 99 F | SYSTOLIC BLOOD PRESSURE: 110 MMHG | RESPIRATION RATE: 16 BRPM | HEART RATE: 84 BPM | DIASTOLIC BLOOD PRESSURE: 60 MMHG

## 2020-09-27 DIAGNOSIS — Z98.890 OTHER SPECIFIED POSTPROCEDURAL STATES: Chronic | ICD-10-CM

## 2020-09-27 LAB
ALBUMIN SERPL ELPH-MCNC: 4.3 G/DL — SIGNIFICANT CHANGE UP (ref 3.3–5)
ALP SERPL-CCNC: 67 U/L — SIGNIFICANT CHANGE UP (ref 40–120)
ALT FLD-CCNC: 12 U/L — SIGNIFICANT CHANGE UP (ref 4–33)
ANION GAP SERPL CALC-SCNC: 14 MMO/L — SIGNIFICANT CHANGE UP (ref 7–14)
ANISOCYTOSIS BLD QL: SLIGHT — SIGNIFICANT CHANGE UP
APPEARANCE UR: SIGNIFICANT CHANGE UP
AST SERPL-CCNC: 24 U/L — SIGNIFICANT CHANGE UP (ref 4–32)
BACTERIA # UR AUTO: NEGATIVE — SIGNIFICANT CHANGE UP
BASOPHILS # BLD AUTO: 0.1 K/UL — SIGNIFICANT CHANGE UP (ref 0–0.2)
BASOPHILS NFR BLD AUTO: 0.4 % — SIGNIFICANT CHANGE UP (ref 0–2)
BASOPHILS NFR SPEC: 0 % — SIGNIFICANT CHANGE UP (ref 0–2)
BILIRUB SERPL-MCNC: 4.5 MG/DL — HIGH (ref 0.2–1.2)
BILIRUB UR-MCNC: NEGATIVE — SIGNIFICANT CHANGE UP
BLASTS # FLD: 0 % — SIGNIFICANT CHANGE UP (ref 0–0)
BLD GP AB SCN SERPL QL: NEGATIVE — SIGNIFICANT CHANGE UP
BLOOD UR QL VISUAL: NEGATIVE — SIGNIFICANT CHANGE UP
BUN SERPL-MCNC: 8 MG/DL — SIGNIFICANT CHANGE UP (ref 7–23)
CALCIUM SERPL-MCNC: 9 MG/DL — SIGNIFICANT CHANGE UP (ref 8.4–10.5)
CHLORIDE SERPL-SCNC: 105 MMOL/L — SIGNIFICANT CHANGE UP (ref 98–107)
CO2 SERPL-SCNC: 21 MMOL/L — LOW (ref 22–31)
COLOR SPEC: YELLOW — SIGNIFICANT CHANGE UP
CREAT SERPL-MCNC: 0.59 MG/DL — SIGNIFICANT CHANGE UP (ref 0.5–1.3)
ELLIPTOCYTES BLD QL SMEAR: SLIGHT — SIGNIFICANT CHANGE UP
EOSINOPHIL # BLD AUTO: 0.43 K/UL — SIGNIFICANT CHANGE UP (ref 0–0.5)
EOSINOPHIL NFR BLD AUTO: 1.7 % — SIGNIFICANT CHANGE UP (ref 0–6)
EOSINOPHIL NFR FLD: 1.8 % — SIGNIFICANT CHANGE UP (ref 0–6)
GIANT PLATELETS BLD QL SMEAR: PRESENT — SIGNIFICANT CHANGE UP
GLUCOSE SERPL-MCNC: 78 MG/DL — SIGNIFICANT CHANGE UP (ref 70–99)
GLUCOSE UR-MCNC: NEGATIVE — SIGNIFICANT CHANGE UP
HCG SERPL-ACNC: < 5 MIU/ML — SIGNIFICANT CHANGE UP
HCT VFR BLD CALC: 28.2 % — LOW (ref 34.5–45)
HGB BLD-MCNC: 9 G/DL — LOW (ref 11.5–15.5)
HIV COMBO RESULT: SIGNIFICANT CHANGE UP
HIV1+2 AB SPEC QL: SIGNIFICANT CHANGE UP
HOWELL-JOLLY BOD BLD QL SMEAR: PRESENT — SIGNIFICANT CHANGE UP
HYALINE CASTS # UR AUTO: NEGATIVE — SIGNIFICANT CHANGE UP
IMM GRANULOCYTES NFR BLD AUTO: 0.7 % — SIGNIFICANT CHANGE UP (ref 0–1.5)
KETONES UR-MCNC: NEGATIVE — SIGNIFICANT CHANGE UP
LEUKOCYTE ESTERASE UR-ACNC: SIGNIFICANT CHANGE UP
LYMPHOCYTES # BLD AUTO: 1.59 K/UL — SIGNIFICANT CHANGE UP (ref 1–3.3)
LYMPHOCYTES # BLD AUTO: 6.4 % — LOW (ref 13–44)
LYMPHOCYTES NFR SPEC AUTO: 7.8 % — LOW (ref 13–44)
MCHC RBC-ENTMCNC: 29.6 PG — SIGNIFICANT CHANGE UP (ref 27–34)
MCHC RBC-ENTMCNC: 31.9 % — LOW (ref 32–36)
MCV RBC AUTO: 92.8 FL — SIGNIFICANT CHANGE UP (ref 80–100)
METAMYELOCYTES # FLD: 0 % — SIGNIFICANT CHANGE UP (ref 0–1)
MICROCYTES BLD QL: SLIGHT — SIGNIFICANT CHANGE UP
MONOCYTES # BLD AUTO: 1.66 K/UL — HIGH (ref 0–0.9)
MONOCYTES NFR BLD AUTO: 6.7 % — SIGNIFICANT CHANGE UP (ref 2–14)
MONOCYTES NFR BLD: 5.2 % — SIGNIFICANT CHANGE UP (ref 2–9)
MYELOCYTES NFR BLD: 0 % — SIGNIFICANT CHANGE UP (ref 0–0)
NEUTROPHIL AB SER-ACNC: 82.6 % — HIGH (ref 43–77)
NEUTROPHILS # BLD AUTO: 20.78 K/UL — HIGH (ref 1.8–7.4)
NEUTROPHILS NFR BLD AUTO: 84.1 % — HIGH (ref 43–77)
NEUTS BAND # BLD: 0 % — SIGNIFICANT CHANGE UP (ref 0–6)
NITRITE UR-MCNC: NEGATIVE — SIGNIFICANT CHANGE UP
NRBC # FLD: 0.06 K/UL — SIGNIFICANT CHANGE UP (ref 0–0)
OTHER - HEMATOLOGY %: 0 — SIGNIFICANT CHANGE UP
PH UR: 6.5 — SIGNIFICANT CHANGE UP (ref 5–8)
PLATELET # BLD AUTO: 347 K/UL — SIGNIFICANT CHANGE UP (ref 150–400)
PLATELET COUNT - ESTIMATE: NORMAL — SIGNIFICANT CHANGE UP
PMV BLD: 10 FL — SIGNIFICANT CHANGE UP (ref 7–13)
POIKILOCYTOSIS BLD QL AUTO: SLIGHT — SIGNIFICANT CHANGE UP
POLYCHROMASIA BLD QL SMEAR: SLIGHT — SIGNIFICANT CHANGE UP
POTASSIUM SERPL-MCNC: 3.4 MMOL/L — LOW (ref 3.5–5.3)
POTASSIUM SERPL-SCNC: 3.4 MMOL/L — LOW (ref 3.5–5.3)
PROMYELOCYTES # FLD: 0 % — SIGNIFICANT CHANGE UP (ref 0–0)
PROT SERPL-MCNC: 6.3 G/DL — SIGNIFICANT CHANGE UP (ref 6–8.3)
PROT UR-MCNC: 10 — SIGNIFICANT CHANGE UP
RBC # BLD: 3.04 M/UL — LOW (ref 3.8–5.2)
RBC # FLD: 16 % — HIGH (ref 10.3–14.5)
RBC CASTS # UR COMP ASSIST: SIGNIFICANT CHANGE UP (ref 0–?)
RETICS #: 195 K/UL — HIGH (ref 25–125)
RETICS/RBC NFR: 6.4 % — HIGH (ref 0.5–2.5)
RH IG SCN BLD-IMP: POSITIVE — SIGNIFICANT CHANGE UP
SARS-COV-2 RNA SPEC QL NAA+PROBE: SIGNIFICANT CHANGE UP
SODIUM SERPL-SCNC: 140 MMOL/L — SIGNIFICANT CHANGE UP (ref 135–145)
SP GR SPEC: 1.01 — SIGNIFICANT CHANGE UP (ref 1–1.04)
SQUAMOUS # UR AUTO: SIGNIFICANT CHANGE UP
TARGETS BLD QL SMEAR: SLIGHT — SIGNIFICANT CHANGE UP
UROBILINOGEN FLD QL: SIGNIFICANT CHANGE UP
VARIANT LYMPHS # BLD: 2.6 % — SIGNIFICANT CHANGE UP
WBC # BLD: 24.73 K/UL — HIGH (ref 3.8–10.5)
WBC # FLD AUTO: 24.73 K/UL — HIGH (ref 3.8–10.5)
WBC UR QL: SIGNIFICANT CHANGE UP (ref 0–?)

## 2020-09-27 PROCEDURE — 76705 ECHO EXAM OF ABDOMEN: CPT | Mod: 26

## 2020-09-27 PROCEDURE — 76856 US EXAM PELVIC COMPLETE: CPT | Mod: 26

## 2020-09-27 PROCEDURE — 99284 EMERGENCY DEPT VISIT MOD MDM: CPT

## 2020-09-27 RX ORDER — SODIUM CHLORIDE 9 MG/ML
1000 INJECTION, SOLUTION INTRAVENOUS
Refills: 0 | Status: DISCONTINUED | OUTPATIENT
Start: 2020-09-27 | End: 2020-09-30

## 2020-09-27 RX ORDER — FENTANYL CITRATE 50 UG/ML
100 INJECTION INTRAVENOUS ONCE
Refills: 0 | Status: DISCONTINUED | OUTPATIENT
Start: 2020-09-27 | End: 2020-09-27

## 2020-09-27 RX ORDER — KETOROLAC TROMETHAMINE 30 MG/ML
28 SYRINGE (ML) INJECTION ONCE
Refills: 0 | Status: DISCONTINUED | OUTPATIENT
Start: 2020-09-27 | End: 2020-09-27

## 2020-09-27 RX ORDER — IBUPROFEN 200 MG
400 TABLET ORAL ONCE
Refills: 0 | Status: COMPLETED | OUTPATIENT
Start: 2020-09-27 | End: 2020-09-27

## 2020-09-27 RX ORDER — HEPARIN SODIUM 5000 [USP'U]/ML
5 INJECTION INTRAVENOUS; SUBCUTANEOUS ONCE
Refills: 0 | Status: DISCONTINUED | OUTPATIENT
Start: 2020-09-27 | End: 2020-09-27

## 2020-09-27 RX ORDER — TRAMADOL HYDROCHLORIDE 50 MG/1
1 TABLET ORAL
Qty: 20 | Refills: 0
Start: 2020-09-27 | End: 2020-10-01

## 2020-09-27 RX ORDER — TRAMADOL HYDROCHLORIDE 50 MG/1
50 TABLET ORAL ONCE
Refills: 0 | Status: DISCONTINUED | OUTPATIENT
Start: 2020-09-27 | End: 2020-09-27

## 2020-09-27 RX ADMIN — SODIUM CHLORIDE 30 MILLILITER(S): 9 INJECTION, SOLUTION INTRAVENOUS at 11:07

## 2020-09-27 RX ADMIN — SODIUM CHLORIDE 30 MILLILITER(S): 9 INJECTION, SOLUTION INTRAVENOUS at 15:08

## 2020-09-27 RX ADMIN — TRAMADOL HYDROCHLORIDE 50 MILLIGRAM(S): 50 TABLET ORAL at 15:36

## 2020-09-27 RX ADMIN — FENTANYL CITRATE 100 MICROGRAM(S): 50 INJECTION INTRAVENOUS at 10:00

## 2020-09-27 RX ADMIN — SODIUM CHLORIDE 30 MILLILITER(S): 9 INJECTION, SOLUTION INTRAVENOUS at 17:38

## 2020-09-27 RX ADMIN — Medication 28 MILLIGRAM(S): at 11:08

## 2020-09-27 RX ADMIN — Medication 400 MILLIGRAM(S): at 17:51

## 2020-09-27 RX ADMIN — Medication 5 MILLILITER(S): at 21:30

## 2020-09-27 NOTE — ED PEDIATRIC NURSE NOTE - RESPONSE TO SURGERY/SEDATION/ANESTHESIA
[FreeTextEntry1] : Patient is doing well. patient is asymptomatic.    Patient's questions and concerns addressed to patient's satisfaction. patient was advised to see Dr Hernandez if she is having problems with acid reflux \par  
(1) More than 48 hours/None

## 2020-09-27 NOTE — ED PEDIATRIC NURSE NOTE - OBJECTIVE STATEMENT
generalized body pain since last night.  brought to spot 16 fro complete triage and evaluation. Pt c/o of pain to arms, legs, head and now abd. Pt advised npo until ultrasound. Pt given intra nasal fentanyl at 1000 by me and Vidhya haddad. Pt tolerated well. md at bedside. Pt denies any N,V,D or fever.

## 2020-09-27 NOTE — ED PROVIDER NOTE - NSFOLLOWUPINSTRUCTIONS_ED_ALL_ED_FT
Ethan Benitez, DO Sickle Cell Crisis    WHAT YOU NEED TO KNOW:    A sickle cell crisis is a painful episode that occurs in people who have sickle cell anemia. It happens when sickle-shaped red blood cells (RBCs) block blood vessels. Blood and oxygen cannot get to tissues, causing pain. A sickle cell crisis can also damage your tissues and cause organ failure, such liver or kidney failure. A sickle cell crisis can become life-threatening.    DISCHARGE INSTRUCTIONS:    Call 911 for any of the following:   •You have shortness of breath or chest pain.      •You are a man and have an erection that is painful and does not go away.       •You lose vision in one or both eyes.      Return to the emergency department if:   •You have a fever.      •You feel like you cannot cope with your pain, or you feel like hurting yourself.       •You have behavior changes, a seizure, or faint.       •You have abdominal pain, nausea, or vomiting.      •You have a headache that is worse or different from those that you have had in the past.       •You have new weakness or numbness in your arm, leg, or face.      •You have new pain in any part of your body.      •Your urine is dark and you are urinating less than usual or not at all.       •You are dizzy, lightheaded, or faint.       Contact your healthcare provider if:   •You have any new signs or symptoms.       •You have blood in your urine.       •You are constipated or you have diarrhea.       •You have changes in your vision.       •You have increased fatigue.       •You plan to travel by airplane or to a high elevation.       •You have questions or concerns about your condition or care.      Medicines: You may need any of the following:   •Prescription pain medicine may be given. Ask how to take this medicine safely. Medicine may also be given to decrease sickling of your RBCs. You may also need medicine to treat or prevent a bacterial infection.       •NSAIDs, such as ibuprofen, help decrease swelling, pain, and fever. This medicine is available with or without a doctor's order. NSAIDs can cause stomach bleeding or kidney problems in certain people. If you take blood thinner medicine, always ask your healthcare provider if NSAIDs are safe for you. Always read the medicine label and follow directions.      •Acetaminophen decreases pain and fever. It is available without a doctor's order. Ask how much to take and how often to take it. Follow directions. Acetaminophen can cause liver damage if not taken correctly.      •Take your medicine as directed. Contact your healthcare provider if you think your medicine is not helping or if you have side effects. Tell him or her if you are allergic to any medicine. Keep a list of the medicines, vitamins, and herbs you take. Include the amounts, and when and why you take them. Bring the list or the pill bottles to follow-up visits. Carry your medicine list with you in case of an emergency.      Medical alert identification: Wear medical alert jewelry or carry a card that says you have sickle cell anemia. Ask your healthcare provider where to get these items.     Medical Alert Jewelry         Prevent a sickle cell crisis:   •Take vitamins and minerals as directed. Folic acid can help prevent blood vessel problems that can occur with sickle cell anemia. Zinc may decrease how often you have pain.       •Drink liquids as directed. Dehydration can increase your risk for a sickle cell crisis. Ask how much liquid to drink each day and which liquids are best for you.      •Balance rest and exercise. Rest during a sickle cell crisis. Over time, increase your activity to a moderate amount. Exercise regularly. Avoid exercise or activities that can cause injury, such as football. Ask about the best exercise plan for you.       •Stay out of the cold. Do not go quickly from a warm place to a cold place. Do not go swimming in cold water. Stay warm in the winter.      •Do not smoke cigarettes or drink alcohol. These increase your risk for a sickle cell crisis. Nicotine and other chemicals in cigarettes and cigars can cause lung damage. Ask your healthcare provider for information if you currently smoke and need help to quit. E-cigarettes or smokeless tobacco still contain nicotine. Talk to your healthcare provider before you use these products.       •Ask about vaccinations you need. Vaccinations can help prevent a viral infection that may lead to a sickle cell crisis. Get a flu shot every year as directed. You may need a pneumonia vaccine every 5 years.       Follow up with your healthcare provider as directed: You may need ongoing screening for conditions that can develop because of sickle cell disease. Examples include kidney disease, hypertension (high blood pressure), retinopathy (eye problems), and problems with your lungs. Write down your questions so you remember to ask them during your visits.

## 2020-09-27 NOTE — ED PROVIDER NOTE - CARE PROVIDER_API CALL
Carmen Lu)  Pediatric HematologyOncology; Pediatrics  [address] [address] [place] [address]  Phone: [phone]  Fax: [phone]  Follow Up Time: 1-3 Days

## 2020-09-27 NOTE — ED PROVIDER NOTE - PROGRESS NOTE DETAILS
Attending Note:  grade 4 IVH, with complex medical history of sickle cell disease, hydrocephalus s/p  shunt x2 with 9 revisions, reactive airway, atrophic spleen, cholecystectomy, 4x eye operations with recent right eye deviation, bipolar 1 disorder, and depression on chronic transfusion protocols on chelation therapy, history of ACS and strokes There was a registration issue, so IN fentanyl was given for pain on arrival, under different chart. Fentanyl was reordered on this chart, but not given as patient already received.  Dinora Calle MD Attending Note:  21 yo female with sickle cell disease, HbSS, with complicated history here for pain since yesterday morning, She also has a headache, arms and legs hurting.Also had mild abd pain which resolved with heating pad. Now more pain to legs and hips. No fevers. Has taken aleve, last dose 8pm. She did not take oxycodone as it never helps and makes her sleepy. Does smoke marijuana, drink alcohol, no tobacco usage, has been sexually active with men and women. Allergies-ceftriaxone. Meds-jenu, zoloft,abilify, Vit D. Vaccines UTD. LMP 1 week ago. History of sickle cell disease, HbSS, ex-24 weeker with grade 4 IVH,  shunt secondary to hydrocephalus, multiple revisions, last revision Feb 2020, port paced 7 years ago, asthma, depression,anxiety, asthma, ACS, multiple strokes, on chronic transfusions. Also history of eye surgeries, cholecystectomy 7 years ago and atrophic spleen. Here VSS. She is awake, alert. On exam, eyes-sclera icteric, Heart-S1S2nl, Lungs CTA bl, abd soft, tender to rlq and lower abd region. Extremities-FROM x 4. Will check labs, given IN fentanyl on arrival, will give toradol and consult Heme. Also to obtain us appendix and us pelvis.  Dinora Calle MD Patient reassess after analgesia. Sleeping comfortably. Will reassess again when awake. Patient pain currently 2. Last analgesia at 11am. Patient feels comfortable with discharge. Discussed with H/O fellow. Patient can be discharged on Tramadol considering patient does not feel like oxycodone helps. Tramadol sent to pharmacy. -Chente Hendrickson, PGY-2 Patient intermittently require oral pain medications with pain ranging for 2-5. Abdominal and pelvic ultrasounds partially completed. Still pending visualization of right ovary and reads of other US. -Chente Hendrickson, PGY-2 Received resident signout. Ultrasound appy and pelvis are negative. Pain significantly improved. Will lock port and prepare for dc. Berhane Mccallum MD, PGY-2 Attending Note:  21 yo female with sickle cell disease, HbSS, with complicated history here for pain since yesterday morning, She states, arms and legs hurting.Also had mild abd pain which resolved with heating pad. Now more pain to legs and hips. No fevers. Has taken aleve, last dose 8pm. She did not take oxycodone as it never helps and makes her sleepy. Does smoke marijuana, drink alcohol, no tobacco usage, has been sexually active with men and women. Allergies-ceftriaxone. Meds-jenu, zoloft,abilify, Vit D. Vaccines UTD. LMP 1 week ago. History of sickle cell disease, HbSS, ex-24 weeker with grade 4 IVH,  shunt secondary to hydrocephalus, multiple revisions, last revision Feb 2020, port paced 7 years ago, asthma, depression,anxiety, asthma, ACS, multiple strokes, on chronic transfusions. Also history of eye surgeries, cholecystectomy 7 years ago and atrophic spleen. Here VSS. She is awake, alert. Well appearing. On exam, eyes-sclera icteric, Heart-S1S2nl, Lungs CTA bl, abd soft, very mild tender lower abd region (states it is more her hips). Extremities-FROM x 4. Neuro good tone, equal strength. Will check labs, given IN fentanyl on arrival, will give toradol and consult Heme. Also to obtain us appendix and us pelvis.  Dinora Calle MD Received resident signout. Ultrasound pelvis negative. Unable to visualize appendix on U/s. Discussed with heme/onc, pt not currently complaining of abdominal pain. Strict return precautions given.. Pain significantly improved. Will lock port and prepare for dc. Berhane Mccallum MD, PGY-2

## 2020-09-27 NOTE — ED PEDIATRIC NURSE REASSESSMENT NOTE - COMFORT CARE
wait time explained/plan of care explained/darkened lights/side rails up
ambulated to bathroom/plan of care explained/darkened lights/side rails up/wait time explained
meal provided/wait time explained/darkened lights/plan of care explained/side rails up

## 2020-09-27 NOTE — ED PEDIATRIC NURSE REASSESSMENT NOTE - NS ED NURSE REASSESS COMMENT FT2
Pt was first registered under a different name by accident. Fentanyl was given at 1000 with Jazimine.
Pt resting comfortably in bed with father at bedside, awaiting US read, states she is current comfortable with 1/10 pain in bilateral legs. In no apparent distress. Port WDL. Remains on cont pulse ox. Will cont to monitor.
[Normal] : affect appropriate

## 2020-09-27 NOTE — ED PROVIDER NOTE - CARE PROVIDERS DIRECT ADDRESSES
[email or web address]

## 2020-09-27 NOTE — ED PEDIATRIC NURSE REASSESSMENT NOTE - GENERAL PATIENT STATE
family/SO at bedside/comfortable appearance/cooperative
comfortable appearance/resting/sleeping
comfortable appearance/resting/sleeping

## 2020-09-27 NOTE — ED PEDIATRIC NURSE NOTE - PMH
Anxiety    Chronic Lung Disease of Premat  follows with [facility] Pulmonology  CP (cerebral palsy)  - mild  CVA (Cerebral Vascular Accident)  Onset date unknown, noted on MRI from 5/2010  Depression    Hydrocephalus    Impacted teeth    Reactive Airway Disease  receives albuterol and xoponex treatments at home  S/P  Shunt  x2 with multiple shunt revisions  Sickle Cell Disease    Strabismus

## 2020-09-27 NOTE — ED PROVIDER NOTE - PATIENT PORTAL LINK FT
You can access the FollowMyHealth Patient Portal offered by [facility] by registering at the following website: [email or web address]. By joining [email or web address]’s FollowMyHealth portal, you will also be able to view your health information using other applications (apps) compatible with our system.

## 2020-09-27 NOTE — ED PROVIDER NOTE - PMH
Anxiety    Chronic Lung Disease of Premat  follows with [facility] Pulmonology  CP (cerebral palsy)  - mild  CVA (Cerebral Vascular Accident)  Onset date unknown, noted on MRI from 5/2010  Depression    Hydrocephalus    Impacted teeth    Reactive Airway Disease  receives albuterol and xoponex treatments at home  S/P  Shunt  x2 with multiple shunt revisions  Sickle Cell Disease    Strabismus

## 2020-09-27 NOTE — ED PROVIDER NOTE - CLINICAL SUMMARY MEDICAL DECISION MAKING FREE TEXT BOX
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 No fevers. Will give IN and IV analgesia and reassess pain. CBC, Retic, CMP. Will send COVID, HIV, and G/C because of patient interactions.

## 2020-09-27 NOTE — ED PROVIDER NOTE - OBJECTIVE STATEMENT
KINGS DYE is a 20y old Female, ex-24wga, with past medical history of HbSS, chronic transfusion protocols on chelation therapy, ACS, CVA, grade 4 IVH as , hydrocephalus s/p  shunt x2 with 9 revisions, asthma, atrophic spleen, cholecystectomy, 4x eye operations, and bipolar 1 disorder presenting with pain in her extremities and hips. Pain began on  in the morning after the patient had significant physical exertion at her new job as a . She rested yesterday and took Advil. She did not take oxycodone because she reports that it never helps her. KINGS DYE is a 20y old Female, ex-24wga, with past medical history of HbSS, chronic transfusion protocols on chelation therapy, ACS, CVA, grade 4 IVH as , hydrocephalus s/p  shunt x2 with 9 revisions, asthma, atrophic spleen, cholecystectomy, 4x eye operations, and bipolar 1 disorder presenting with pain in her extremities and hips. Pain began on  in the morning after the patient had significant physical exertion at her new job as a . She rested yesterday and took Advil. She did not take oxycodone because she reports that it never helps her.    H - safe at home. No physical or sexual abuse concerns.  E - physically safe at work. Coworkers sometimes make sexual innuendos that patient doesn't like.  A - active at work. physical appearance okay.  D - marijuana occasionally. Last time on Friday. No other drugs.  S - sexually active with men and women. No recent oral, anal, vaginal intercourse. Unsure how she identifies wrt gender. Prefers she/her or they/their. Requesting HIV/STI testing. No SI/HI.

## 2020-09-27 NOTE — ED PEDIATRIC NURSE NOTE - LOW RISK FALLS INTERVENTIONS (SCORE 7-11)
Use of non-skid footwear for ambulating patients, use of appropriate size clothing to prevent risk of tripping/Orientation to room/Side rails x 2 or 4 up, assess large gaps, such that a patient could get extremity or other body part entrapped, use additional safety procedures/Bed in low position, brakes on

## 2020-09-28 NOTE — ED POST DISCHARGE NOTE - DETAILS
Elizabeth is feeling better.  Discussed pain regimen with family - took aleve this morning so advised to delay motrin until 12 hours later, went over directions for tramodol as well.  Discussed labs and return precautions.  -NOEL Cortez Attending 9/29/20 8:52 pm spoke w. patient informed urine + Chlamydia and needs treatment escribed Zithromax to her pharmacy and counseled on safe sex and condom use and to f/u w/ GYN and pt agreed MPopcun PNP 9/30/20 11:42 Mother called regarding tramadol. Spoke to Hematology fellow who clarified with pharmacist, patient has had tramadol before. Ok  to give tramadol for 1-2 days and then prn for this week but no more than that as it can cause serotonin syndrome with her zoloft.  Called pharmacist at [facility] to update. Also spoke to MOC about this. Will try to use tramadol prn, has f/u with Heme tomorrow. Dinora Calle MD

## 2020-09-28 NOTE — ED POST DISCHARGE NOTE - RESULT SUMMARY
9/29/20 8:52 pm urine + Chlamydia, informed hematology Dr Yvrose Dawkins and she stated treat with proper antibiotic and GYN f/u  MPopcun PNP

## 2020-09-29 ENCOUNTER — LABORATORY RESULT (OUTPATIENT)
Age: 20
End: 2020-09-29

## 2020-09-29 ENCOUNTER — OUTPATIENT (OUTPATIENT)
Dept: OUTPATIENT SERVICES | Age: 20
LOS: 1 days | End: 2020-09-29

## 2020-09-29 ENCOUNTER — APPOINTMENT (OUTPATIENT)
Dept: PEDIATRIC HEMATOLOGY/ONCOLOGY | Facility: CLINIC | Age: 20
End: 2020-09-29
Payer: MEDICAID

## 2020-09-29 DIAGNOSIS — Z98.890 OTHER SPECIFIED POSTPROCEDURAL STATES: Chronic | ICD-10-CM

## 2020-09-29 LAB
ALBUMIN SERPL ELPH-MCNC: 4.3 G/DL — SIGNIFICANT CHANGE UP (ref 3.3–5)
ALP SERPL-CCNC: 76 U/L — SIGNIFICANT CHANGE UP (ref 40–120)
ALT FLD-CCNC: 17 U/L — SIGNIFICANT CHANGE UP (ref 4–33)
ANION GAP SERPL CALC-SCNC: 13 MMO/L — SIGNIFICANT CHANGE UP (ref 7–14)
AST SERPL-CCNC: 27 U/L — SIGNIFICANT CHANGE UP (ref 4–32)
BASOPHILS # BLD AUTO: 0.09 K/UL — SIGNIFICANT CHANGE UP (ref 0–0.2)
BASOPHILS NFR BLD AUTO: 0.6 % — SIGNIFICANT CHANGE UP (ref 0–2)
BILIRUB SERPL-MCNC: 4.9 MG/DL — HIGH (ref 0.2–1.2)
BLD GP AB SCN SERPL QL: NEGATIVE — SIGNIFICANT CHANGE UP
BUN SERPL-MCNC: 8 MG/DL — SIGNIFICANT CHANGE UP (ref 7–23)
C TRACH RRNA SPEC QL NAA+PROBE: DETECTED — SIGNIFICANT CHANGE UP
CALCIUM SERPL-MCNC: 9.4 MG/DL — SIGNIFICANT CHANGE UP (ref 8.4–10.5)
CHLORIDE SERPL-SCNC: 107 MMOL/L — SIGNIFICANT CHANGE UP (ref 98–107)
CO2 SERPL-SCNC: 23 MMOL/L — SIGNIFICANT CHANGE UP (ref 22–31)
CREAT SERPL-MCNC: 0.66 MG/DL — SIGNIFICANT CHANGE UP (ref 0.5–1.3)
EOSINOPHIL # BLD AUTO: 0.53 K/UL — HIGH (ref 0–0.5)
EOSINOPHIL NFR BLD AUTO: 3.2 % — SIGNIFICANT CHANGE UP (ref 0–6)
FERRITIN SERPL-MCNC: 1407 NG/ML — HIGH (ref 15–150)
GLUCOSE SERPL-MCNC: 77 MG/DL — SIGNIFICANT CHANGE UP (ref 70–99)
HCT VFR BLD CALC: 27.6 % — LOW (ref 34.5–45)
HGB BLD-MCNC: 8.9 G/DL — LOW (ref 11.5–15.5)
IMM GRANULOCYTES NFR BLD AUTO: 0.4 % — SIGNIFICANT CHANGE UP (ref 0–1.5)
LYMPHOCYTES # BLD AUTO: 22.4 % — SIGNIFICANT CHANGE UP (ref 13–44)
LYMPHOCYTES # BLD AUTO: 3.65 K/UL — HIGH (ref 1–3.3)
MCHC RBC-ENTMCNC: 29.7 PG — SIGNIFICANT CHANGE UP (ref 27–34)
MCHC RBC-ENTMCNC: 32.2 % — SIGNIFICANT CHANGE UP (ref 32–36)
MCV RBC AUTO: 92 FL — SIGNIFICANT CHANGE UP (ref 80–100)
MONOCYTES # BLD AUTO: 1.45 K/UL — HIGH (ref 0–0.9)
MONOCYTES NFR BLD AUTO: 8.9 % — SIGNIFICANT CHANGE UP (ref 2–14)
N GONORRHOEA RRNA SPEC QL NAA+PROBE: SIGNIFICANT CHANGE UP
NEUTROPHILS # BLD AUTO: 10.52 K/UL — HIGH (ref 1.8–7.4)
NEUTROPHILS NFR BLD AUTO: 64.5 % — SIGNIFICANT CHANGE UP (ref 43–77)
NRBC # FLD: 0.06 K/UL — SIGNIFICANT CHANGE UP (ref 0–0)
PLATELET # BLD AUTO: 372 K/UL — SIGNIFICANT CHANGE UP (ref 150–400)
PMV BLD: 10.4 FL — SIGNIFICANT CHANGE UP (ref 7–13)
POTASSIUM SERPL-MCNC: 3.6 MMOL/L — SIGNIFICANT CHANGE UP (ref 3.5–5.3)
POTASSIUM SERPL-SCNC: 3.6 MMOL/L — SIGNIFICANT CHANGE UP (ref 3.5–5.3)
PROT SERPL-MCNC: 6.2 G/DL — SIGNIFICANT CHANGE UP (ref 6–8.3)
RBC # BLD: 3 M/UL — LOW (ref 3.8–5.2)
RBC # FLD: 16.1 % — HIGH (ref 10.3–14.5)
RETICS #: 218 K/UL — HIGH (ref 25–125)
RETICS/RBC NFR: 7.3 % — HIGH (ref 0.5–2.5)
RH IG SCN BLD-IMP: POSITIVE — SIGNIFICANT CHANGE UP
SODIUM SERPL-SCNC: 143 MMOL/L — SIGNIFICANT CHANGE UP (ref 135–145)
SPECIMEN SOURCE: SIGNIFICANT CHANGE UP
WBC # BLD: 16.31 K/UL — HIGH (ref 3.8–10.5)
WBC # FLD AUTO: 16.31 K/UL — HIGH (ref 3.8–10.5)

## 2020-09-29 PROCEDURE — ZZZZZ: CPT

## 2020-09-29 RX ORDER — AZITHROMYCIN 500 MG/1
2 TABLET, FILM COATED ORAL
Qty: 2 | Refills: 0
Start: 2020-09-29 | End: 2020-09-29

## 2020-09-30 DIAGNOSIS — D57.1 SICKLE-CELL DISEASE WITHOUT CRISIS: ICD-10-CM

## 2020-09-30 LAB
HGB A MFR BLD: 72 % — LOW
HGB A2 MFR BLD: 2.9 % — SIGNIFICANT CHANGE UP (ref 2.4–3.5)
HGB F MFR BLD: < 1 % — SIGNIFICANT CHANGE UP (ref 0–1.5)
HGB S MFR BLD: 24.5 % — HIGH (ref 0–0)

## 2020-10-01 ENCOUNTER — APPOINTMENT (OUTPATIENT)
Dept: PEDIATRIC HEMATOLOGY/ONCOLOGY | Facility: CLINIC | Age: 20
End: 2020-10-01
Payer: MEDICAID

## 2020-10-01 ENCOUNTER — OUTPATIENT (OUTPATIENT)
Dept: OUTPATIENT SERVICES | Age: 20
LOS: 1 days | End: 2020-10-01

## 2020-10-01 DIAGNOSIS — Z87.09 PERSONAL HISTORY OF OTHER DISEASES OF THE RESPIRATORY SYSTEM: ICD-10-CM

## 2020-10-01 DIAGNOSIS — N91.2 AMENORRHEA, UNSPECIFIED: ICD-10-CM

## 2020-10-01 DIAGNOSIS — R05 COUGH: ICD-10-CM

## 2020-10-01 DIAGNOSIS — Z98.890 OTHER SPECIFIED POSTPROCEDURAL STATES: Chronic | ICD-10-CM

## 2020-10-01 LAB
B PERT DNA SPEC QL NAA+PROBE: SIGNIFICANT CHANGE UP
C PNEUM DNA SPEC QL NAA+PROBE: SIGNIFICANT CHANGE UP
FLUAV H1 2009 PAND RNA SPEC QL NAA+PROBE: SIGNIFICANT CHANGE UP
FLUAV H1 RNA SPEC QL NAA+PROBE: SIGNIFICANT CHANGE UP
FLUAV H3 RNA SPEC QL NAA+PROBE: SIGNIFICANT CHANGE UP
FLUAV SUBTYP SPEC NAA+PROBE: SIGNIFICANT CHANGE UP
FLUBV RNA SPEC QL NAA+PROBE: SIGNIFICANT CHANGE UP
HADV DNA SPEC QL NAA+PROBE: SIGNIFICANT CHANGE UP
HCOV PNL SPEC NAA+PROBE: SIGNIFICANT CHANGE UP
HMPV RNA SPEC QL NAA+PROBE: SIGNIFICANT CHANGE UP
HPIV1 RNA SPEC QL NAA+PROBE: SIGNIFICANT CHANGE UP
HPIV2 RNA SPEC QL NAA+PROBE: SIGNIFICANT CHANGE UP
HPIV3 RNA SPEC QL NAA+PROBE: SIGNIFICANT CHANGE UP
HPIV4 RNA SPEC QL NAA+PROBE: SIGNIFICANT CHANGE UP
RAPID RVP RESULT: SIGNIFICANT CHANGE UP
RSV RNA SPEC QL NAA+PROBE: SIGNIFICANT CHANGE UP
RV+EV RNA SPEC QL NAA+PROBE: SIGNIFICANT CHANGE UP
SARS-COV-2 RNA SPEC QL NAA+PROBE: SIGNIFICANT CHANGE UP

## 2020-10-01 PROCEDURE — 99213 OFFICE O/P EST LOW 20 MIN: CPT

## 2020-10-02 ENCOUNTER — APPOINTMENT (OUTPATIENT)
Dept: PEDIATRIC HEMATOLOGY/ONCOLOGY | Facility: CLINIC | Age: 20
End: 2020-10-02
Payer: MEDICAID

## 2020-10-02 ENCOUNTER — OUTPATIENT (OUTPATIENT)
Dept: OUTPATIENT SERVICES | Age: 20
LOS: 1 days | End: 2020-10-02

## 2020-10-02 VITALS
HEIGHT: 62.32 IN | TEMPERATURE: 98.6 F | RESPIRATION RATE: 22 BRPM | OXYGEN SATURATION: 100 % | WEIGHT: 123.02 LBS | SYSTOLIC BLOOD PRESSURE: 105 MMHG | BODY MASS INDEX: 22.35 KG/M2 | HEART RATE: 80 BPM | DIASTOLIC BLOOD PRESSURE: 52 MMHG

## 2020-10-02 DIAGNOSIS — A74.9 CHLAMYDIAL INFECTION, UNSPECIFIED: ICD-10-CM

## 2020-10-02 DIAGNOSIS — Z98.890 OTHER SPECIFIED POSTPROCEDURAL STATES: Chronic | ICD-10-CM

## 2020-10-02 PROCEDURE — 99214 OFFICE O/P EST MOD 30 MIN: CPT

## 2020-10-02 NOTE — HISTORY OF PRESENT ILLNESS
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 She was admitted to the hospital in February 2020 with  shunt malfunction and underwent shunt valve placement. She was then admitted to the hospital in March 2020 with UTI.\par She has been following with Psych regularly secondary to diagnosis of Bipolar disorder and Depression.  She is taking both Abilify and Zoloft. [de-identified] : Elizabeth is here today for PRBC transfusion. Denies fever, n/v/d. No headaches or blurred vision. No pain crisis. She reports taking all of her medications as prescribed.  No new concerns today. \par \par presenting with pain in her extremities and hips. Pain began on 9/26 in the morning after the patient had significant physical exertion at her new job as a . She rested yesterday and took Advil. She did not take oxycodone because she reports that it never helps her.\par \par \par

## 2020-10-05 DIAGNOSIS — D57.1 SICKLE-CELL DISEASE WITHOUT CRISIS: ICD-10-CM

## 2020-10-22 ENCOUNTER — APPOINTMENT (OUTPATIENT)
Dept: PEDIATRIC HEMATOLOGY/ONCOLOGY | Facility: CLINIC | Age: 20
End: 2020-10-22
Payer: MEDICAID

## 2020-10-22 ENCOUNTER — LABORATORY RESULT (OUTPATIENT)
Age: 20
End: 2020-10-22

## 2020-10-22 ENCOUNTER — OUTPATIENT (OUTPATIENT)
Dept: OUTPATIENT SERVICES | Age: 20
LOS: 1 days | End: 2020-10-22

## 2020-10-22 DIAGNOSIS — Z98.890 OTHER SPECIFIED POSTPROCEDURAL STATES: Chronic | ICD-10-CM

## 2020-10-22 LAB
ALBUMIN SERPL ELPH-MCNC: 4.4 G/DL — SIGNIFICANT CHANGE UP (ref 3.3–5)
ALP SERPL-CCNC: 70 U/L — SIGNIFICANT CHANGE UP (ref 40–120)
ALT FLD-CCNC: 10 U/L — SIGNIFICANT CHANGE UP (ref 4–33)
ANION GAP SERPL CALC-SCNC: 12 MMO/L — SIGNIFICANT CHANGE UP (ref 7–14)
AST SERPL-CCNC: 22 U/L — SIGNIFICANT CHANGE UP (ref 4–32)
BASOPHILS # BLD AUTO: 0.11 K/UL — SIGNIFICANT CHANGE UP (ref 0–0.2)
BASOPHILS NFR BLD AUTO: 0.7 % — SIGNIFICANT CHANGE UP (ref 0–2)
BILIRUB SERPL-MCNC: 2.8 MG/DL — HIGH (ref 0.2–1.2)
BLD GP AB SCN SERPL QL: NEGATIVE — SIGNIFICANT CHANGE UP
BUN SERPL-MCNC: 15 MG/DL — SIGNIFICANT CHANGE UP (ref 7–23)
CALCIUM SERPL-MCNC: 9 MG/DL — SIGNIFICANT CHANGE UP (ref 8.4–10.5)
CHLORIDE SERPL-SCNC: 105 MMOL/L — SIGNIFICANT CHANGE UP (ref 98–107)
CO2 SERPL-SCNC: 22 MMOL/L — SIGNIFICANT CHANGE UP (ref 22–31)
CREAT SERPL-MCNC: 0.68 MG/DL — SIGNIFICANT CHANGE UP (ref 0.5–1.3)
EOSINOPHIL # BLD AUTO: 0.54 K/UL — HIGH (ref 0–0.5)
EOSINOPHIL NFR BLD AUTO: 3.4 % — SIGNIFICANT CHANGE UP (ref 0–6)
FERRITIN SERPL-MCNC: 1580 NG/ML — HIGH (ref 15–150)
GLUCOSE SERPL-MCNC: 76 MG/DL — SIGNIFICANT CHANGE UP (ref 70–99)
HCT VFR BLD CALC: 25.9 % — LOW (ref 34.5–45)
HGB BLD-MCNC: 8.7 G/DL — LOW (ref 11.5–15.5)
IMM GRANULOCYTES NFR BLD AUTO: 0.5 % — SIGNIFICANT CHANGE UP (ref 0–1.5)
IRON SATN MFR SERPL: 147 UG/DL — SIGNIFICANT CHANGE UP (ref 30–160)
IRON SATN MFR SERPL: 409 UG/DL — SIGNIFICANT CHANGE UP (ref 140–530)
LYMPHOCYTES # BLD AUTO: 28.5 % — SIGNIFICANT CHANGE UP (ref 13–44)
LYMPHOCYTES # BLD AUTO: 4.57 K/UL — HIGH (ref 1–3.3)
MCHC RBC-ENTMCNC: 30.4 PG — SIGNIFICANT CHANGE UP (ref 27–34)
MCHC RBC-ENTMCNC: 33.6 % — SIGNIFICANT CHANGE UP (ref 32–36)
MCV RBC AUTO: 90.6 FL — SIGNIFICANT CHANGE UP (ref 80–100)
MONOCYTES # BLD AUTO: 1.77 K/UL — HIGH (ref 0–0.9)
MONOCYTES NFR BLD AUTO: 11 % — SIGNIFICANT CHANGE UP (ref 2–14)
NEUTROPHILS # BLD AUTO: 8.99 K/UL — HIGH (ref 1.8–7.4)
NEUTROPHILS NFR BLD AUTO: 55.9 % — SIGNIFICANT CHANGE UP (ref 43–77)
NRBC # FLD: 0.09 K/UL — SIGNIFICANT CHANGE UP (ref 0–0)
PLATELET # BLD AUTO: 465 K/UL — HIGH (ref 150–400)
PMV BLD: 9.9 FL — SIGNIFICANT CHANGE UP (ref 7–13)
POTASSIUM SERPL-MCNC: 4 MMOL/L — SIGNIFICANT CHANGE UP (ref 3.5–5.3)
POTASSIUM SERPL-SCNC: 4 MMOL/L — SIGNIFICANT CHANGE UP (ref 3.5–5.3)
PROT SERPL-MCNC: 6.4 G/DL — SIGNIFICANT CHANGE UP (ref 6–8.3)
RBC # BLD: 2.86 M/UL — LOW (ref 3.8–5.2)
RBC # FLD: 16.1 % — HIGH (ref 10.3–14.5)
RETICS #: 251 K/UL — HIGH (ref 25–125)
RETICS/RBC NFR: 8.8 % — HIGH (ref 0.5–2.5)
RH IG SCN BLD-IMP: POSITIVE — SIGNIFICANT CHANGE UP
SODIUM SERPL-SCNC: 139 MMOL/L — SIGNIFICANT CHANGE UP (ref 135–145)
UIBC SERPL-MCNC: 261.5 UG/DL — SIGNIFICANT CHANGE UP (ref 110–370)
WBC # BLD: 16.06 K/UL — HIGH (ref 3.8–10.5)
WBC # FLD AUTO: 16.06 K/UL — HIGH (ref 3.8–10.5)

## 2020-10-22 PROCEDURE — ZZZZZ: CPT

## 2020-10-22 NOTE — ED PEDIATRIC TRIAGE NOTE - INTERNATIONAL TRAVEL
Transplant Coordinator: Emmy Spicer     Transplant Office Phone Number: [phone]     [facility] ([place], [place], [place] - [address] [place] [address] [phone]    [facility] [address] [place] [address]    [facility] [address] [place] [address]    [facility] [address] [place] [address], 73 Garcia [facility] [address] [place] [address]    [facility] ([place], [place])    Route: Oral    Prior authorization: Approved    nicotine (COMMIT) 2 MG lozenge  100 lozenge 3 9/24/2020    [facility] [address] [place] [address]    Sig: [facility] [address] [place] [address]    [facility] [address] Elissa Schwartz [facility] [address] Elissa Schwartz Georgette Coffey MD FACS  Assistant Professor of Surgery  Director, [facility].     No

## 2020-10-22 NOTE — ED PEDIATRIC TRIAGE NOTE - DIRECT TO ROOM CARE INITIATED:
Referral received from ADI Wilkinson.     Writer called Mrs. Garcia and introduced self. Reviewed Mrs. Garcia's acknowledgement of her current cardiologists being out of network. She was told to look for other cardiologists within our system and had several names but has now misplaced those names.     According to Mrs. Garcia she was told by her insurance carrier, [employer] that her current providers, Dr. Daniel Smith and gil Jean are not in-network. Mrs. Garcia was confused with this information as she has been seeing these providers since the beginning of 2020 and never has had an issue. Per Mrs. Garcia, she has not received any billing that would indicate these providers are not in-network.     Writer explained I would connect with Neeru Armas and get back in touch with her once I gained more information.    Support provided.     05-Mar-2018 11:25

## 2020-10-23 DIAGNOSIS — D57.1 SICKLE-CELL DISEASE WITHOUT CRISIS: ICD-10-CM

## 2020-10-23 LAB
HGB A MFR BLD: 75.9 % — LOW
HGB A2 MFR BLD: 2.9 % — SIGNIFICANT CHANGE UP (ref 2.4–3.5)
HGB F MFR BLD: < 1 % — SIGNIFICANT CHANGE UP (ref 0–1.5)
HGB S MFR BLD: 20.6 % — HIGH (ref 0–0)

## 2020-10-24 ENCOUNTER — APPOINTMENT (OUTPATIENT)
Dept: PEDIATRIC HEMATOLOGY/ONCOLOGY | Facility: CLINIC | Age: 20
End: 2020-10-24
Payer: MEDICAID

## 2020-10-24 ENCOUNTER — OUTPATIENT (OUTPATIENT)
Dept: OUTPATIENT SERVICES | Age: 20
LOS: 1 days | End: 2020-10-24

## 2020-10-24 ENCOUNTER — LABORATORY RESULT (OUTPATIENT)
Age: 20
End: 2020-10-24

## 2020-10-24 VITALS
BODY MASS INDEX: 22.63 KG/M2 | WEIGHT: 124.56 LBS | DIASTOLIC BLOOD PRESSURE: 51 MMHG | HEIGHT: 62.2 IN | TEMPERATURE: 98.24 F | SYSTOLIC BLOOD PRESSURE: 104 MMHG | RESPIRATION RATE: 24 BRPM | OXYGEN SATURATION: 100 % | HEART RATE: 72 BPM

## 2020-10-24 DIAGNOSIS — Z98.890 OTHER SPECIFIED POSTPROCEDURAL STATES: Chronic | ICD-10-CM

## 2020-10-24 LAB
APPEARANCE UR: SIGNIFICANT CHANGE UP
BACTERIA # UR AUTO: HIGH
BILIRUB UR-MCNC: NEGATIVE — SIGNIFICANT CHANGE UP
BLOOD UR QL VISUAL: 7 — SIGNIFICANT CHANGE UP
COLOR SPEC: SIGNIFICANT CHANGE UP
GLUCOSE UR-MCNC: NEGATIVE — SIGNIFICANT CHANGE UP
KETONES UR-MCNC: NEGATIVE — SIGNIFICANT CHANGE UP
LEUKOCYTE ESTERASE UR-ACNC: SIGNIFICANT CHANGE UP
NITRITE UR-MCNC: NEGATIVE — SIGNIFICANT CHANGE UP
PH UR: 300 — HIGH (ref 5–8)
PROT UR-MCNC: NEGATIVE — SIGNIFICANT CHANGE UP
RBC CASTS # UR COMP ASSIST: >50 — HIGH (ref 0–?)
SP GR SPEC: 1.01 — SIGNIFICANT CHANGE UP (ref 1–1.04)
SQUAMOUS # UR AUTO: SIGNIFICANT CHANGE UP
UROBILINOGEN FLD QL: NORMAL — SIGNIFICANT CHANGE UP
WBC UR QL: HIGH (ref 0–?)

## 2020-10-24 PROCEDURE — 99214 OFFICE O/P EST MOD 30 MIN: CPT

## 2020-10-24 NOTE — HISTORY OF PRESENT ILLNESS
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 She was admitted to the hospital in February 2020 with  shunt malfunction and underwent shunt valve placement. She was then admitted to the hospital in March 2020 with UTI.\par She has been following with Psych regularly secondary to diagnosis of Bipolar disorder and Depression.  She is taking both Abilify and Zoloft. [de-identified] : Elizabeth is here today for PRBC transfusion. Denies fever, n/v/d. No headaches or blurred vision. No pain crisis. She reports taking all of her medications as prescribed.  No new concerns today. \par \par Reports she follows coleen Garcia at [facility] who prescribes her Abilify and Zoloft.\par \par \par  [No Feeding Issues] : no feeding issues at this time

## 2020-10-24 NOTE — PHYSICAL EXAM
[Braces] : braces  [Mediport] : Mediport [No focal deficits] : no focal deficits [Normal] : affect appropriate [100: Normal, no complaints, no evidence of disease.] : 100: Normal, no complaints, no evidence of disease.

## 2020-10-27 DIAGNOSIS — D57.1 SICKLE-CELL DISEASE WITHOUT CRISIS: ICD-10-CM

## 2020-10-28 ENCOUNTER — OUTPATIENT (OUTPATIENT)
Dept: OUTPATIENT SERVICES | Age: 20
LOS: 1 days | End: 2020-10-28

## 2020-10-28 ENCOUNTER — APPOINTMENT (OUTPATIENT)
Dept: PEDIATRIC HEMATOLOGY/ONCOLOGY | Facility: CLINIC | Age: 20
End: 2020-10-28
Payer: MEDICAID

## 2020-10-28 ENCOUNTER — LABORATORY RESULT (OUTPATIENT)
Age: 20
End: 2020-10-28

## 2020-10-28 VITALS
TEMPERATURE: 97.88 F | DIASTOLIC BLOOD PRESSURE: 50 MMHG | SYSTOLIC BLOOD PRESSURE: 98 MMHG | BODY MASS INDEX: 22.55 KG/M2 | RESPIRATION RATE: 24 BRPM | HEIGHT: 62.01 IN | HEART RATE: 78 BPM | WEIGHT: 124.12 LBS

## 2020-10-28 DIAGNOSIS — Z98.890 OTHER SPECIFIED POSTPROCEDURAL STATES: Chronic | ICD-10-CM

## 2020-10-28 LAB
BASOPHILS # BLD AUTO: 0.1 K/UL — SIGNIFICANT CHANGE UP (ref 0–0.2)
BASOPHILS NFR BLD AUTO: 0.6 % — SIGNIFICANT CHANGE UP (ref 0–2)
EOSINOPHIL # BLD AUTO: 0.39 K/UL — SIGNIFICANT CHANGE UP (ref 0–0.5)
EOSINOPHIL NFR BLD AUTO: 2.3 % — SIGNIFICANT CHANGE UP (ref 0–6)
HCT VFR BLD CALC: 35.7 % — SIGNIFICANT CHANGE UP (ref 34.5–45)
HGB BLD-MCNC: 12.2 G/DL — SIGNIFICANT CHANGE UP (ref 11.5–15.5)
IMM GRANULOCYTES NFR BLD AUTO: 0.7 % — SIGNIFICANT CHANGE UP (ref 0–1.5)
LYMPHOCYTES # BLD AUTO: 19.5 % — SIGNIFICANT CHANGE UP (ref 13–44)
LYMPHOCYTES # BLD AUTO: 3.25 K/UL — SIGNIFICANT CHANGE UP (ref 1–3.3)
MCHC RBC-ENTMCNC: 30.5 PG — SIGNIFICANT CHANGE UP (ref 27–34)
MCHC RBC-ENTMCNC: 34.2 % — SIGNIFICANT CHANGE UP (ref 32–36)
MCV RBC AUTO: 89.3 FL — SIGNIFICANT CHANGE UP (ref 80–100)
MONOCYTES # BLD AUTO: 1.31 K/UL — HIGH (ref 0–0.9)
MONOCYTES NFR BLD AUTO: 7.9 % — SIGNIFICANT CHANGE UP (ref 2–14)
NEUTROPHILS # BLD AUTO: 11.46 K/UL — HIGH (ref 1.8–7.4)
NEUTROPHILS NFR BLD AUTO: 69 % — SIGNIFICANT CHANGE UP (ref 43–77)
NRBC # FLD: 0.05 K/UL — SIGNIFICANT CHANGE UP (ref 0–0)
PLATELET # BLD AUTO: 430 K/UL — HIGH (ref 150–400)
PMV BLD: 9.6 FL — SIGNIFICANT CHANGE UP (ref 7–13)
RBC # BLD: 4 M/UL — SIGNIFICANT CHANGE UP (ref 3.8–5.2)
RBC # FLD: 15.2 % — HIGH (ref 10.3–14.5)
RETICS #: 186 K/UL — HIGH (ref 25–125)
RETICS/RBC NFR: 4.7 % — HIGH (ref 0.5–2.5)
WBC # BLD: 16.63 K/UL — HIGH (ref 3.8–10.5)
WBC # FLD AUTO: 16.63 K/UL — HIGH (ref 3.8–10.5)

## 2020-10-28 PROCEDURE — ZZZZZ: CPT

## 2020-11-01 PROCEDURE — G9005: CPT

## 2020-11-05 DIAGNOSIS — D57.3 SICKLE-CELL TRAIT: ICD-10-CM

## 2020-11-11 ENCOUNTER — LABORATORY RESULT (OUTPATIENT)
Age: 20
End: 2020-11-11

## 2020-11-11 ENCOUNTER — OUTPATIENT (OUTPATIENT)
Dept: OUTPATIENT SERVICES | Age: 20
LOS: 1 days | End: 2020-11-11

## 2020-11-11 ENCOUNTER — APPOINTMENT (OUTPATIENT)
Dept: PEDIATRIC HEMATOLOGY/ONCOLOGY | Facility: CLINIC | Age: 20
End: 2020-11-11
Payer: MEDICAID

## 2020-11-11 DIAGNOSIS — Z98.890 OTHER SPECIFIED POSTPROCEDURAL STATES: Chronic | ICD-10-CM

## 2020-11-11 DIAGNOSIS — D57.3 SICKLE-CELL TRAIT: ICD-10-CM

## 2020-11-11 LAB
ALBUMIN SERPL ELPH-MCNC: 4.4 G/DL — SIGNIFICANT CHANGE UP (ref 3.3–5)
ALP SERPL-CCNC: 76 U/L — SIGNIFICANT CHANGE UP (ref 40–120)
ALT FLD-CCNC: 11 U/L — SIGNIFICANT CHANGE UP (ref 4–33)
ANION GAP SERPL CALC-SCNC: 11 MMO/L — SIGNIFICANT CHANGE UP (ref 7–14)
AST SERPL-CCNC: 25 U/L — SIGNIFICANT CHANGE UP (ref 4–32)
BASOPHILS # BLD AUTO: 0.1 K/UL — SIGNIFICANT CHANGE UP (ref 0–0.2)
BASOPHILS NFR BLD AUTO: 0.6 % — SIGNIFICANT CHANGE UP (ref 0–2)
BILIRUB SERPL-MCNC: 2.2 MG/DL — HIGH (ref 0.2–1.2)
BLD GP AB SCN SERPL QL: NEGATIVE — SIGNIFICANT CHANGE UP
BUN SERPL-MCNC: 11 MG/DL — SIGNIFICANT CHANGE UP (ref 7–23)
CALCIUM SERPL-MCNC: 9.2 MG/DL — SIGNIFICANT CHANGE UP (ref 8.4–10.5)
CHLORIDE SERPL-SCNC: 106 MMOL/L — SIGNIFICANT CHANGE UP (ref 98–107)
CO2 SERPL-SCNC: 23 MMOL/L — SIGNIFICANT CHANGE UP (ref 22–31)
CREAT SERPL-MCNC: 0.68 MG/DL — SIGNIFICANT CHANGE UP (ref 0.5–1.3)
EOSINOPHIL # BLD AUTO: 0.54 K/UL — HIGH (ref 0–0.5)
EOSINOPHIL NFR BLD AUTO: 3.3 % — SIGNIFICANT CHANGE UP (ref 0–6)
FERRITIN SERPL-MCNC: 1628 NG/ML — HIGH (ref 15–150)
GLUCOSE SERPL-MCNC: 84 MG/DL — SIGNIFICANT CHANGE UP (ref 70–99)
HCT VFR BLD CALC: 27 % — LOW (ref 34.5–45)
HGB BLD-MCNC: 9.2 G/DL — LOW (ref 11.5–15.5)
IMM GRANULOCYTES NFR BLD AUTO: 0.4 % — SIGNIFICANT CHANGE UP (ref 0–1.5)
LYMPHOCYTES # BLD AUTO: 32.4 % — SIGNIFICANT CHANGE UP (ref 13–44)
LYMPHOCYTES # BLD AUTO: 5.34 K/UL — HIGH (ref 1–3.3)
MCHC RBC-ENTMCNC: 31.3 PG — SIGNIFICANT CHANGE UP (ref 27–34)
MCHC RBC-ENTMCNC: 34.1 % — SIGNIFICANT CHANGE UP (ref 32–36)
MCV RBC AUTO: 91.8 FL — SIGNIFICANT CHANGE UP (ref 80–100)
MONOCYTES # BLD AUTO: 1.27 K/UL — HIGH (ref 0–0.9)
MONOCYTES NFR BLD AUTO: 7.7 % — SIGNIFICANT CHANGE UP (ref 2–14)
NEUTROPHILS # BLD AUTO: 9.17 K/UL — HIGH (ref 1.8–7.4)
NEUTROPHILS NFR BLD AUTO: 55.6 % — SIGNIFICANT CHANGE UP (ref 43–77)
NRBC # FLD: 0.08 K/UL — SIGNIFICANT CHANGE UP (ref 0–0)
PLATELET # BLD AUTO: 360 K/UL — SIGNIFICANT CHANGE UP (ref 150–400)
PMV BLD: 10.5 FL — SIGNIFICANT CHANGE UP (ref 7–13)
POTASSIUM SERPL-MCNC: 4 MMOL/L — SIGNIFICANT CHANGE UP (ref 3.5–5.3)
POTASSIUM SERPL-SCNC: 4 MMOL/L — SIGNIFICANT CHANGE UP (ref 3.5–5.3)
PROT SERPL-MCNC: 6.4 G/DL — SIGNIFICANT CHANGE UP (ref 6–8.3)
RBC # BLD: 2.94 M/UL — LOW (ref 3.8–5.2)
RBC # FLD: 15.8 % — HIGH (ref 10.3–14.5)
RETICS #: 196 K/UL — HIGH (ref 25–125)
RETICS/RBC NFR: 6.7 % — HIGH (ref 0.5–2.5)
RH IG SCN BLD-IMP: POSITIVE — SIGNIFICANT CHANGE UP
SODIUM SERPL-SCNC: 140 MMOL/L — SIGNIFICANT CHANGE UP (ref 135–145)
WBC # BLD: 16.48 K/UL — HIGH (ref 3.8–10.5)
WBC # FLD AUTO: 16.48 K/UL — HIGH (ref 3.8–10.5)

## 2020-11-11 PROCEDURE — ZZZZZ: CPT

## 2020-11-12 LAB
HGB A MFR BLD: 82.8 % — LOW
HGB A2 MFR BLD: 2.8 % — SIGNIFICANT CHANGE UP (ref 2.4–3.5)
HGB F MFR BLD: < 1 % — SIGNIFICANT CHANGE UP (ref 0–1.5)
HGB S MFR BLD: 13.8 % — HIGH (ref 0–0)

## 2020-11-14 ENCOUNTER — LABORATORY RESULT (OUTPATIENT)
Age: 20
End: 2020-11-14

## 2020-11-14 ENCOUNTER — APPOINTMENT (OUTPATIENT)
Dept: PEDIATRIC HEMATOLOGY/ONCOLOGY | Facility: CLINIC | Age: 20
End: 2020-11-14
Payer: MEDICAID

## 2020-11-14 ENCOUNTER — OUTPATIENT (OUTPATIENT)
Dept: OUTPATIENT SERVICES | Age: 20
LOS: 1 days | End: 2020-11-14

## 2020-11-14 VITALS
SYSTOLIC BLOOD PRESSURE: 116 MMHG | RESPIRATION RATE: 24 BRPM | DIASTOLIC BLOOD PRESSURE: 47 MMHG | WEIGHT: 125.22 LBS | TEMPERATURE: 97.7 F | OXYGEN SATURATION: 100 % | HEART RATE: 70 BPM | BODY MASS INDEX: 22.47 KG/M2 | HEIGHT: 62.56 IN

## 2020-11-14 DIAGNOSIS — Z98.890 OTHER SPECIFIED POSTPROCEDURAL STATES: Chronic | ICD-10-CM

## 2020-11-14 LAB
APPEARANCE UR: CLEAR — SIGNIFICANT CHANGE UP
BACTERIA # UR AUTO: SIGNIFICANT CHANGE UP
BILIRUB UR-MCNC: NEGATIVE — SIGNIFICANT CHANGE UP
BLOOD UR QL VISUAL: SIGNIFICANT CHANGE UP
COLOR SPEC: SIGNIFICANT CHANGE UP
GLUCOSE UR-MCNC: NEGATIVE — SIGNIFICANT CHANGE UP
HYALINE CASTS # UR AUTO: NEGATIVE — SIGNIFICANT CHANGE UP
KETONES UR-MCNC: NEGATIVE — SIGNIFICANT CHANGE UP
LEUKOCYTE ESTERASE UR-ACNC: NEGATIVE — SIGNIFICANT CHANGE UP
NITRITE UR-MCNC: NEGATIVE — SIGNIFICANT CHANGE UP
PH UR: 7 — SIGNIFICANT CHANGE UP (ref 5–8)
PROT UR-MCNC: NEGATIVE — SIGNIFICANT CHANGE UP
RBC CASTS # UR COMP ASSIST: SIGNIFICANT CHANGE UP (ref 0–?)
SP GR SPEC: 1.01 — SIGNIFICANT CHANGE UP (ref 1–1.04)
SQUAMOUS # UR AUTO: SIGNIFICANT CHANGE UP
UROBILINOGEN FLD QL: NORMAL — SIGNIFICANT CHANGE UP
WBC UR QL: SIGNIFICANT CHANGE UP (ref 0–?)

## 2020-11-14 PROCEDURE — 99215 OFFICE O/P EST HI 40 MIN: CPT

## 2020-11-14 RX ORDER — MEDROXYPROGESTERONE ACETATE 150 MG/ML
150 INJECTION, SUSPENSION, EXTENDED RELEASE INTRAMUSCULAR ONCE
Refills: 0 | Status: DISCONTINUED | OUTPATIENT
Start: 2020-11-14 | End: 2020-11-28

## 2020-11-14 NOTE — HISTORY OF PRESENT ILLNESS
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 She was admitted to the hospital in February 2020 with  shunt malfunction and underwent shunt valve placement. She was then admitted to the hospital in March 2020 with UTI.\par She has been following with Psych regularly secondary to diagnosis of Bipolar disorder and Depression.  She is taking both Abilify and Zoloft. [de-identified] : Elizabeth is here today for PRBC transfusion. Denies fever, n/v/d. No headaches or blurred vision. No pain crisis. She reports taking all of her medications as prescribed.  No new concerns today. \par \par Reports she follows coleen Garcia at [facility] who prescribes her Abilify and Zoloft.\par \par \par

## 2020-11-16 DIAGNOSIS — D57.1 SICKLE-CELL DISEASE WITHOUT CRISIS: ICD-10-CM

## 2020-12-01 ENCOUNTER — OUTPATIENT (OUTPATIENT)
Dept: OUTPATIENT SERVICES | Facility: HOSPITAL | Age: 20
LOS: 1 days | End: 2020-12-01
Payer: MEDICAID

## 2020-12-01 DIAGNOSIS — Z98.890 OTHER SPECIFIED POSTPROCEDURAL STATES: Chronic | ICD-10-CM

## 2020-12-02 ENCOUNTER — APPOINTMENT (OUTPATIENT)
Dept: MRI IMAGING | Facility: HOSPITAL | Age: 20
End: 2020-12-02
Payer: MEDICAID

## 2020-12-02 ENCOUNTER — OUTPATIENT (OUTPATIENT)
Dept: OUTPATIENT SERVICES | Age: 20
LOS: 1 days | End: 2020-12-02

## 2020-12-02 DIAGNOSIS — Z98.890 OTHER SPECIFIED POSTPROCEDURAL STATES: Chronic | ICD-10-CM

## 2020-12-02 DIAGNOSIS — D57.1 SICKLE-CELL DISEASE WITHOUT CRISIS: ICD-10-CM

## 2020-12-02 PROCEDURE — 74181 MRI ABDOMEN W/O CONTRAST: CPT | Mod: 26

## 2020-12-10 ENCOUNTER — RESULT REVIEW (OUTPATIENT)
Age: 20
End: 2020-12-10

## 2020-12-10 ENCOUNTER — APPOINTMENT (OUTPATIENT)
Dept: PEDIATRIC HEMATOLOGY/ONCOLOGY | Facility: CLINIC | Age: 20
End: 2020-12-10
Payer: MEDICAID

## 2020-12-10 ENCOUNTER — OUTPATIENT (OUTPATIENT)
Dept: OUTPATIENT SERVICES | Age: 20
LOS: 1 days | End: 2020-12-10

## 2020-12-10 DIAGNOSIS — Z98.890 OTHER SPECIFIED POSTPROCEDURAL STATES: Chronic | ICD-10-CM

## 2020-12-10 LAB
ALBUMIN SERPL ELPH-MCNC: 4.7 G/DL — SIGNIFICANT CHANGE UP (ref 3.3–5)
ALP SERPL-CCNC: 72 U/L — SIGNIFICANT CHANGE UP (ref 40–120)
ALT FLD-CCNC: 11 U/L — SIGNIFICANT CHANGE UP (ref 4–33)
ANION GAP SERPL CALC-SCNC: 12 MMOL/L — SIGNIFICANT CHANGE UP (ref 7–14)
AST SERPL-CCNC: 22 U/L — SIGNIFICANT CHANGE UP (ref 4–32)
BASOPHILS # BLD AUTO: 0.06 K/UL — SIGNIFICANT CHANGE UP (ref 0–0.2)
BASOPHILS NFR BLD AUTO: 0.4 % — SIGNIFICANT CHANGE UP (ref 0–2)
BILIRUB SERPL-MCNC: 2.7 MG/DL — HIGH (ref 0.2–1.2)
BUN SERPL-MCNC: 7 MG/DL — SIGNIFICANT CHANGE UP (ref 7–23)
CALCIUM SERPL-MCNC: 9.4 MG/DL — SIGNIFICANT CHANGE UP (ref 8.4–10.5)
CHLORIDE SERPL-SCNC: 106 MMOL/L — SIGNIFICANT CHANGE UP (ref 98–107)
CO2 SERPL-SCNC: 22 MMOL/L — SIGNIFICANT CHANGE UP (ref 22–31)
CREAT SERPL-MCNC: 0.57 MG/DL — SIGNIFICANT CHANGE UP (ref 0.5–1.3)
EOSINOPHIL # BLD AUTO: 0.21 K/UL — SIGNIFICANT CHANGE UP (ref 0–0.5)
EOSINOPHIL NFR BLD AUTO: 1.4 % — SIGNIFICANT CHANGE UP (ref 0–6)
FERRITIN SERPL-MCNC: 2323 NG/ML — HIGH (ref 15–150)
GLUCOSE SERPL-MCNC: 86 MG/DL — SIGNIFICANT CHANGE UP (ref 70–99)
HCT VFR BLD CALC: 28.1 % — LOW (ref 34.5–45)
HGB BLD-MCNC: 9.5 G/DL — LOW (ref 11.5–15.5)
IANC: 8.8 K/UL — HIGH (ref 1.5–8.5)
IMM GRANULOCYTES NFR BLD AUTO: 0.7 % — SIGNIFICANT CHANGE UP (ref 0–1.5)
IRON SATN MFR SERPL: 134 UG/DL — SIGNIFICANT CHANGE UP (ref 30–160)
IRON SATN MFR SERPL: 72 % — HIGH (ref 14–50)
LYMPHOCYTES # BLD AUTO: 29.5 % — SIGNIFICANT CHANGE UP (ref 13–44)
LYMPHOCYTES # BLD AUTO: 4.41 K/UL — HIGH (ref 1–3.3)
MCHC RBC-ENTMCNC: 30.5 PG — SIGNIFICANT CHANGE UP (ref 27–34)
MCHC RBC-ENTMCNC: 33.8 GM/DL — SIGNIFICANT CHANGE UP (ref 32–36)
MCV RBC AUTO: 90.4 FL — SIGNIFICANT CHANGE UP (ref 80–100)
MONOCYTES # BLD AUTO: 1.39 K/UL — HIGH (ref 0–0.9)
MONOCYTES NFR BLD AUTO: 9.3 % — SIGNIFICANT CHANGE UP (ref 2–14)
NEUTROPHILS # BLD AUTO: 8.8 K/UL — HIGH (ref 1.8–7.4)
NEUTROPHILS NFR BLD AUTO: 58.7 % — SIGNIFICANT CHANGE UP (ref 43–77)
NRBC # BLD: 0 /100 WBCS — SIGNIFICANT CHANGE UP
NRBC # FLD: 0.1 K/UL — HIGH
PLATELET # BLD AUTO: 394 K/UL — SIGNIFICANT CHANGE UP (ref 150–400)
POTASSIUM SERPL-MCNC: 3.2 MMOL/L — LOW (ref 3.5–5.3)
POTASSIUM SERPL-SCNC: 3.2 MMOL/L — LOW (ref 3.5–5.3)
PROT SERPL-MCNC: 7.1 G/DL — SIGNIFICANT CHANGE UP (ref 6–8.3)
RBC # BLD: 3.06 M/UL — LOW (ref 3.8–5.2)
RBC # BLD: 3.06 M/UL — LOW (ref 3.8–5.2)
RBC # FLD: 16.2 % — HIGH (ref 10.3–14.5)
RETICS #: 393.8 K/UL — HIGH (ref 25–125)
RETICS/RBC NFR: 12.9 % — HIGH (ref 0.5–2.5)
SODIUM SERPL-SCNC: 140 MMOL/L — SIGNIFICANT CHANGE UP (ref 135–145)
TIBC SERPL-MCNC: 185 UG/DL — LOW (ref 220–430)
UIBC SERPL-MCNC: 51 UG/DL — LOW (ref 110–370)
WBC # BLD: 14.97 K/UL — HIGH (ref 3.8–10.5)
WBC # FLD AUTO: 14.97 K/UL — HIGH (ref 3.8–10.5)

## 2020-12-10 PROCEDURE — ZZZZZ: CPT

## 2020-12-10 NOTE — PROGRESS NOTE PEDS - PROBLEM SELECTOR PROBLEM 4
PATIENT MESSAGE SENT WITH RESULTS AT THIS TIME. NOTIFICATION SET TO ALERT RN IF MESSAGE IS NOT READ WITHIN 3 DAYS.    Aftercare following surgery of the nervous system

## 2020-12-11 LAB
HEMOGLOBIN INTERPRETATION: SIGNIFICANT CHANGE UP
HGB A MFR BLD: 75.6 % — LOW
HGB A2 MFR BLD: 2.6 % — SIGNIFICANT CHANGE UP (ref 2.4–3.5)
HGB F MFR BLD: <1 % — SIGNIFICANT CHANGE UP (ref 0–1.5)
HGB S MFR BLD: 21.1 % — HIGH

## 2020-12-12 ENCOUNTER — APPOINTMENT (OUTPATIENT)
Dept: PEDIATRIC HEMATOLOGY/ONCOLOGY | Facility: CLINIC | Age: 20
End: 2020-12-12
Payer: MEDICAID

## 2020-12-12 ENCOUNTER — OUTPATIENT (OUTPATIENT)
Dept: OUTPATIENT SERVICES | Age: 20
LOS: 1 days | End: 2020-12-12

## 2020-12-12 ENCOUNTER — RESULT REVIEW (OUTPATIENT)
Age: 20
End: 2020-12-12

## 2020-12-12 VITALS
OXYGEN SATURATION: 100 % | BODY MASS INDEX: 21.95 KG/M2 | SYSTOLIC BLOOD PRESSURE: 125 MMHG | RESPIRATION RATE: 24 BRPM | HEART RATE: 82 BPM | TEMPERATURE: 97.88 F | HEIGHT: 62.44 IN | WEIGHT: 122.36 LBS | DIASTOLIC BLOOD PRESSURE: 49 MMHG

## 2020-12-12 DIAGNOSIS — Z98.890 OTHER SPECIFIED POSTPROCEDURAL STATES: Chronic | ICD-10-CM

## 2020-12-12 LAB
APPEARANCE UR: CLEAR — SIGNIFICANT CHANGE UP
BILIRUB UR-MCNC: NEGATIVE — SIGNIFICANT CHANGE UP
COLOR SPEC: SIGNIFICANT CHANGE UP
DIFF PNL FLD: NEGATIVE — SIGNIFICANT CHANGE UP
GLUCOSE UR QL: NEGATIVE — SIGNIFICANT CHANGE UP
KETONES UR-MCNC: NEGATIVE — SIGNIFICANT CHANGE UP
LEUKOCYTE ESTERASE UR-ACNC: NEGATIVE — SIGNIFICANT CHANGE UP
NITRITE UR-MCNC: NEGATIVE — SIGNIFICANT CHANGE UP
PH UR: 6.5 — SIGNIFICANT CHANGE UP (ref 5–8)
PROT UR-MCNC: NEGATIVE — SIGNIFICANT CHANGE UP
SP GR SPEC: 1.01 — SIGNIFICANT CHANGE UP (ref 1.01–1.02)
UROBILINOGEN FLD QL: SIGNIFICANT CHANGE UP

## 2020-12-12 PROCEDURE — 99214 OFFICE O/P EST MOD 30 MIN: CPT

## 2020-12-12 NOTE — HISTORY OF PRESENT ILLNESS
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 She was admitted to the hospital in February 2020 with  shunt malfunction and underwent shunt valve placement. She was then admitted to the hospital in March 2020 with UTI.\par She has been following with Psych regularly secondary to diagnosis of Bipolar disorder and Depression.  She is taking both Abilify and Zoloft. [de-identified] : Elizabeth is here today for PRBC transfusion. Denies fever, n/v/d. No headaches or blurred vision. No pain crisis. She reports taking all of her medications as prescribed.  No new concerns today. \par \par Reports she follows coleen Garcia at [facility] who prescribes her Abilify and Zoloft.\par \par \par  [No Feeding Issues] : no feeding issues at this time

## 2020-12-14 DIAGNOSIS — D57.1 SICKLE-CELL DISEASE WITHOUT CRISIS: ICD-10-CM

## 2020-12-15 DIAGNOSIS — D57.1 SICKLE-CELL DISEASE WITHOUT CRISIS: ICD-10-CM

## 2020-12-21 ENCOUNTER — APPOINTMENT (OUTPATIENT)
Dept: PEDIATRIC HEMATOLOGY/ONCOLOGY | Facility: CLINIC | Age: 20
End: 2020-12-21
Payer: MEDICAID

## 2020-12-21 ENCOUNTER — OUTPATIENT (OUTPATIENT)
Dept: OUTPATIENT SERVICES | Age: 20
LOS: 1 days | End: 2020-12-21

## 2020-12-21 DIAGNOSIS — Z98.890 OTHER SPECIFIED POSTPROCEDURAL STATES: Chronic | ICD-10-CM

## 2020-12-21 PROCEDURE — ZZZZZ: CPT

## 2020-12-22 LAB — SARS-COV-2 N GENE NPH QL NAA+PROBE: NOT DETECTED

## 2020-12-23 PROBLEM — Z87.440 HISTORY OF URINARY TRACT INFECTION: Status: RESOLVED | Noted: 2019-11-09 | Resolved: 2020-12-23

## 2020-12-23 PROBLEM — Z87.09 HISTORY OF SORE THROAT: Status: RESOLVED | Noted: 2020-10-01 | Resolved: 2020-12-23

## 2020-12-28 ENCOUNTER — OUTPATIENT (OUTPATIENT)
Dept: OUTPATIENT SERVICES | Age: 20
LOS: 1 days | End: 2020-12-28

## 2020-12-28 ENCOUNTER — RESULT REVIEW (OUTPATIENT)
Age: 20
End: 2020-12-28

## 2020-12-28 ENCOUNTER — APPOINTMENT (OUTPATIENT)
Dept: PEDIATRIC HEMATOLOGY/ONCOLOGY | Facility: CLINIC | Age: 20
End: 2020-12-28
Payer: MEDICAID

## 2020-12-28 DIAGNOSIS — Z98.890 OTHER SPECIFIED POSTPROCEDURAL STATES: Chronic | ICD-10-CM

## 2020-12-28 LAB
ALBUMIN SERPL ELPH-MCNC: 4.8 G/DL — SIGNIFICANT CHANGE UP (ref 3.3–5)
ALP SERPL-CCNC: 77 U/L — SIGNIFICANT CHANGE UP (ref 40–120)
ALT FLD-CCNC: 14 U/L — SIGNIFICANT CHANGE UP (ref 4–33)
ANION GAP SERPL CALC-SCNC: 11 MMOL/L — SIGNIFICANT CHANGE UP (ref 7–14)
AST SERPL-CCNC: 30 U/L — SIGNIFICANT CHANGE UP (ref 4–32)
BASOPHILS # BLD AUTO: 0.11 K/UL — SIGNIFICANT CHANGE UP (ref 0–0.2)
BASOPHILS NFR BLD AUTO: 0.8 % — SIGNIFICANT CHANGE UP (ref 0–2)
BILIRUB SERPL-MCNC: 3.4 MG/DL — HIGH (ref 0.2–1.2)
BUN SERPL-MCNC: 12 MG/DL — SIGNIFICANT CHANGE UP (ref 7–23)
CALCIUM SERPL-MCNC: 9.7 MG/DL — SIGNIFICANT CHANGE UP (ref 8.4–10.5)
CHLORIDE SERPL-SCNC: 108 MMOL/L — HIGH (ref 98–107)
CO2 SERPL-SCNC: 22 MMOL/L — SIGNIFICANT CHANGE UP (ref 22–31)
CREAT SERPL-MCNC: 0.7 MG/DL — SIGNIFICANT CHANGE UP (ref 0.5–1.3)
EOSINOPHIL # BLD AUTO: 0.29 K/UL — SIGNIFICANT CHANGE UP (ref 0–0.5)
EOSINOPHIL NFR BLD AUTO: 2.1 % — SIGNIFICANT CHANGE UP (ref 0–6)
FERRITIN SERPL-MCNC: 2650 NG/ML — HIGH (ref 15–150)
GLUCOSE SERPL-MCNC: 74 MG/DL — SIGNIFICANT CHANGE UP (ref 70–99)
HCT VFR BLD CALC: 30.2 % — LOW (ref 34.5–45)
HGB BLD-MCNC: 9.9 G/DL — LOW (ref 11.5–15.5)
HIV 1+2 AB+HIV1 P24 AG SERPL QL IA: SIGNIFICANT CHANGE UP
IANC: 7.94 K/UL — SIGNIFICANT CHANGE UP (ref 1.5–8.5)
IMM GRANULOCYTES NFR BLD AUTO: 0.4 % — SIGNIFICANT CHANGE UP (ref 0–1.5)
IRON SATN MFR SERPL: 133 UG/DL — SIGNIFICANT CHANGE UP (ref 30–160)
IRON SATN MFR SERPL: 37 % — SIGNIFICANT CHANGE UP (ref 14–50)
LYMPHOCYTES # BLD AUTO: 28.9 % — SIGNIFICANT CHANGE UP (ref 13–44)
LYMPHOCYTES # BLD AUTO: 4.01 K/UL — HIGH (ref 1–3.3)
MCHC RBC-ENTMCNC: 30.2 PG — SIGNIFICANT CHANGE UP (ref 27–34)
MCHC RBC-ENTMCNC: 32.8 GM/DL — SIGNIFICANT CHANGE UP (ref 32–36)
MCV RBC AUTO: 92.1 FL — SIGNIFICANT CHANGE UP (ref 80–100)
MONOCYTES # BLD AUTO: 1.47 K/UL — HIGH (ref 0–0.9)
MONOCYTES NFR BLD AUTO: 10.6 % — SIGNIFICANT CHANGE UP (ref 2–14)
NEUTROPHILS # BLD AUTO: 7.94 K/UL — HIGH (ref 1.8–7.4)
NEUTROPHILS NFR BLD AUTO: 57.2 % — SIGNIFICANT CHANGE UP (ref 43–77)
NRBC # BLD: 0 /100 WBCS — SIGNIFICANT CHANGE UP
NRBC # FLD: 0.09 K/UL — HIGH
PLATELET # BLD AUTO: 414 K/UL — HIGH (ref 150–400)
POTASSIUM SERPL-MCNC: 4.1 MMOL/L — SIGNIFICANT CHANGE UP (ref 3.5–5.3)
POTASSIUM SERPL-SCNC: 4.1 MMOL/L — SIGNIFICANT CHANGE UP (ref 3.5–5.3)
PROT SERPL-MCNC: 6.8 G/DL — SIGNIFICANT CHANGE UP (ref 6–8.3)
RBC # BLD: 3.28 M/UL — LOW (ref 3.8–5.2)
RBC # BLD: 3.28 M/UL — LOW (ref 3.8–5.2)
RBC # FLD: 16 % — HIGH (ref 10.3–14.5)
RETICS #: 301.8 K/UL — HIGH (ref 25–125)
RETICS/RBC NFR: 9.2 % — HIGH (ref 0.5–2.5)
SARS-COV-2 RNA SPEC QL NAA+PROBE: SIGNIFICANT CHANGE UP
SODIUM SERPL-SCNC: 141 MMOL/L — SIGNIFICANT CHANGE UP (ref 135–145)
TIBC SERPL-MCNC: 364 UG/DL — SIGNIFICANT CHANGE UP (ref 220–430)
UIBC SERPL-MCNC: 231 UG/DL — SIGNIFICANT CHANGE UP (ref 110–370)
WBC # BLD: 13.88 K/UL — HIGH (ref 3.8–10.5)
WBC # FLD AUTO: 13.88 K/UL — HIGH (ref 3.8–10.5)

## 2020-12-28 PROCEDURE — ZZZZZ: CPT

## 2020-12-29 DIAGNOSIS — D57.1 SICKLE-CELL DISEASE WITHOUT CRISIS: ICD-10-CM

## 2020-12-30 ENCOUNTER — RESULT REVIEW (OUTPATIENT)
Age: 20
End: 2020-12-30

## 2020-12-30 ENCOUNTER — APPOINTMENT (OUTPATIENT)
Dept: PEDIATRIC HEMATOLOGY/ONCOLOGY | Facility: CLINIC | Age: 20
End: 2020-12-30
Payer: MEDICAID

## 2020-12-30 ENCOUNTER — OUTPATIENT (OUTPATIENT)
Dept: OUTPATIENT SERVICES | Age: 20
LOS: 1 days | End: 2020-12-30

## 2020-12-30 DIAGNOSIS — Z98.890 OTHER SPECIFIED POSTPROCEDURAL STATES: Chronic | ICD-10-CM

## 2020-12-30 LAB — 24R-OH-CALCIDIOL SERPL-MCNC: 30.7 NG/ML — SIGNIFICANT CHANGE UP (ref 30–80)

## 2020-12-30 PROCEDURE — ZZZZZ: CPT

## 2020-12-31 NOTE — ED PROVIDER NOTE - NS ED NOTE AC HIGH RISK COUNTRIES
Auth: # [id number]    Date: 12/30/2020 thru 6/28/2021  Type of SX:  Outpatient MBB #1  Location: [facility]  CPT: 40493, 17330   DX Code: F81.725, M51.36, M54.16  SX area: Lumbar spine  Insurance: [employer]     CPT: 140[address] [facility] ER, [id number] 36, [id number]  [address] [facility] No

## 2021-01-04 DIAGNOSIS — D57.1 SICKLE-CELL DISEASE WITHOUT CRISIS: ICD-10-CM

## 2021-01-06 ENCOUNTER — APPOINTMENT (OUTPATIENT)
Dept: PEDIATRIC HEMATOLOGY/ONCOLOGY | Facility: CLINIC | Age: 21
End: 2021-01-06

## 2021-01-06 DIAGNOSIS — Z71.89 OTHER SPECIFIED COUNSELING: ICD-10-CM

## 2021-01-15 ENCOUNTER — APPOINTMENT (OUTPATIENT)
Dept: INTERNAL MEDICINE | Facility: CLINIC | Age: 21
End: 2021-01-15
Payer: MEDICAID

## 2021-01-15 ENCOUNTER — OUTPATIENT (OUTPATIENT)
Dept: OUTPATIENT SERVICES | Facility: HOSPITAL | Age: 21
LOS: 1 days | End: 2021-01-15

## 2021-01-15 VITALS
WEIGHT: 126 LBS | SYSTOLIC BLOOD PRESSURE: 90 MMHG | HEART RATE: 94 BPM | OXYGEN SATURATION: 96 % | HEIGHT: 62 IN | DIASTOLIC BLOOD PRESSURE: 70 MMHG | BODY MASS INDEX: 23.19 KG/M2

## 2021-01-15 VITALS — TEMPERATURE: 97.7 F

## 2021-01-15 DIAGNOSIS — Z23 ENCOUNTER FOR IMMUNIZATION: ICD-10-CM

## 2021-01-15 DIAGNOSIS — Z98.890 OTHER SPECIFIED POSTPROCEDURAL STATES: Chronic | ICD-10-CM

## 2021-01-15 PROCEDURE — 99203 OFFICE O/P NEW LOW 30 MIN: CPT

## 2021-01-19 ENCOUNTER — RESULT REVIEW (OUTPATIENT)
Age: 21
End: 2021-01-19

## 2021-01-19 ENCOUNTER — OUTPATIENT (OUTPATIENT)
Dept: OUTPATIENT SERVICES | Age: 21
LOS: 1 days | End: 2021-01-19
Payer: MEDICAID

## 2021-01-19 ENCOUNTER — APPOINTMENT (OUTPATIENT)
Dept: PEDIATRIC HEMATOLOGY/ONCOLOGY | Facility: CLINIC | Age: 21
End: 2021-01-19
Payer: MEDICAID

## 2021-01-19 DIAGNOSIS — Z98.890 OTHER SPECIFIED POSTPROCEDURAL STATES: Chronic | ICD-10-CM

## 2021-01-19 DIAGNOSIS — Z98.2 PRESENCE OF CEREBROSPINAL FLUID DRAINAGE DEVICE: ICD-10-CM

## 2021-01-19 DIAGNOSIS — F31.9 BIPOLAR DISORDER, UNSPECIFIED: ICD-10-CM

## 2021-01-19 DIAGNOSIS — Z92.89 PERSONAL HISTORY OF OTHER MEDICAL TREATMENT: ICD-10-CM

## 2021-01-19 DIAGNOSIS — Z23 ENCOUNTER FOR IMMUNIZATION: ICD-10-CM

## 2021-01-19 DIAGNOSIS — D57.3 SICKLE-CELL TRAIT: ICD-10-CM

## 2021-01-19 LAB
ALBUMIN SERPL ELPH-MCNC: 4.7 G/DL — SIGNIFICANT CHANGE UP (ref 3.3–5)
ALP SERPL-CCNC: 76 U/L — SIGNIFICANT CHANGE UP (ref 40–120)
ALT FLD-CCNC: 18 U/L — SIGNIFICANT CHANGE UP (ref 4–33)
ANION GAP SERPL CALC-SCNC: 12 MMOL/L — SIGNIFICANT CHANGE UP (ref 7–14)
ANISOCYTOSIS BLD QL: SIGNIFICANT CHANGE UP
AST SERPL-CCNC: 33 U/L — HIGH (ref 4–32)
BASOPHILS # BLD AUTO: 0 K/UL — SIGNIFICANT CHANGE UP (ref 0–0.2)
BASOPHILS NFR BLD AUTO: 0 % — SIGNIFICANT CHANGE UP (ref 0–2)
BILIRUB SERPL-MCNC: 3.4 MG/DL — HIGH (ref 0.2–1.2)
BUN SERPL-MCNC: 10 MG/DL — SIGNIFICANT CHANGE UP (ref 7–23)
CALCIUM SERPL-MCNC: 9.2 MG/DL — SIGNIFICANT CHANGE UP (ref 8.4–10.5)
CHLORIDE SERPL-SCNC: 106 MMOL/L — SIGNIFICANT CHANGE UP (ref 98–107)
CO2 SERPL-SCNC: 21 MMOL/L — LOW (ref 22–31)
CREAT SERPL-MCNC: 0.7 MG/DL — SIGNIFICANT CHANGE UP (ref 0.5–1.3)
ELLIPTOCYTES BLD QL SMEAR: SIGNIFICANT CHANGE UP
EOSINOPHIL # BLD AUTO: 0.24 K/UL — SIGNIFICANT CHANGE UP (ref 0–0.5)
EOSINOPHIL NFR BLD AUTO: 1.7 % — SIGNIFICANT CHANGE UP (ref 0–6)
FERRITIN SERPL-MCNC: 2274 NG/ML — HIGH (ref 15–150)
GIANT PLATELETS BLD QL SMEAR: PRESENT — SIGNIFICANT CHANGE UP
GLUCOSE SERPL-MCNC: 116 MG/DL — HIGH (ref 70–99)
HCT VFR BLD CALC: 27.2 % — LOW (ref 34.5–45)
HGB BLD-MCNC: 8.8 G/DL — LOW (ref 11.5–15.5)
HYPOCHROMIA BLD QL: SLIGHT — SIGNIFICANT CHANGE UP
IANC: 7.42 K/UL — SIGNIFICANT CHANGE UP (ref 1.5–8.5)
LYMPHOCYTES # BLD AUTO: 33.3 % — SIGNIFICANT CHANGE UP (ref 13–44)
LYMPHOCYTES # BLD AUTO: 4.71 K/UL — HIGH (ref 1–3.3)
MCHC RBC-ENTMCNC: 29.2 PG — SIGNIFICANT CHANGE UP (ref 27–34)
MCHC RBC-ENTMCNC: 32.4 GM/DL — SIGNIFICANT CHANGE UP (ref 32–36)
MCV RBC AUTO: 90.4 FL — SIGNIFICANT CHANGE UP (ref 80–100)
MONOCYTES # BLD AUTO: 0.47 K/UL — SIGNIFICANT CHANGE UP (ref 0–0.9)
MONOCYTES NFR BLD AUTO: 3.3 % — SIGNIFICANT CHANGE UP (ref 2–14)
NEUTROPHILS # BLD AUTO: 8.26 K/UL — HIGH (ref 1.8–7.4)
NEUTROPHILS NFR BLD AUTO: 58.4 % — SIGNIFICANT CHANGE UP (ref 43–77)
OVALOCYTES BLD QL SMEAR: SLIGHT — SIGNIFICANT CHANGE UP
PLAT MORPH BLD: NORMAL — SIGNIFICANT CHANGE UP
PLATELET # BLD AUTO: 400 K/UL — SIGNIFICANT CHANGE UP (ref 150–400)
PLATELET COUNT - ESTIMATE: NORMAL — SIGNIFICANT CHANGE UP
POIKILOCYTOSIS BLD QL AUTO: SIGNIFICANT CHANGE UP
POLYCHROMASIA BLD QL SMEAR: SLIGHT — SIGNIFICANT CHANGE UP
POTASSIUM SERPL-MCNC: 3.7 MMOL/L — SIGNIFICANT CHANGE UP (ref 3.5–5.3)
POTASSIUM SERPL-SCNC: 3.7 MMOL/L — SIGNIFICANT CHANGE UP (ref 3.5–5.3)
PROT SERPL-MCNC: 6.8 G/DL — SIGNIFICANT CHANGE UP (ref 6–8.3)
RBC # BLD: 3.01 M/UL — LOW (ref 3.8–5.2)
RBC # BLD: 3.01 M/UL — LOW (ref 3.8–5.2)
RBC # FLD: 15.3 % — HIGH (ref 10.3–14.5)
RBC BLD AUTO: ABNORMAL
RETICS #: 248.9 K/UL — HIGH (ref 25–125)
RETICS/RBC NFR: 8.3 % — HIGH (ref 0.5–2.5)
SICKLE CELLS BLD QL SMEAR: SLIGHT — SIGNIFICANT CHANGE UP
SODIUM SERPL-SCNC: 139 MMOL/L — SIGNIFICANT CHANGE UP (ref 135–145)
VARIANT LYMPHS # BLD: 3.3 % — SIGNIFICANT CHANGE UP (ref 0–6)
WBC # BLD: 14.14 K/UL — HIGH (ref 3.8–10.5)
WBC # FLD AUTO: 14.14 K/UL — HIGH (ref 3.8–10.5)

## 2021-01-19 PROCEDURE — ZZZZZ: CPT

## 2021-01-19 PROCEDURE — 83020 HEMOGLOBIN ELECTROPHORESIS: CPT | Mod: 26

## 2021-01-20 DIAGNOSIS — D57.1 SICKLE-CELL DISEASE WITHOUT CRISIS: ICD-10-CM

## 2021-01-20 DIAGNOSIS — D57.3 SICKLE-CELL TRAIT: ICD-10-CM

## 2021-01-20 LAB
HEMOGLOBIN INTERPRETATION: SIGNIFICANT CHANGE UP
HGB A MFR BLD: 79.5 % — LOW
HGB A2 MFR BLD: 2.6 % — SIGNIFICANT CHANGE UP (ref 2.4–3.5)
HGB F MFR BLD: <1 % — SIGNIFICANT CHANGE UP (ref 0–1.5)
HGB S MFR BLD: 17.3 % — HIGH

## 2021-01-21 ENCOUNTER — APPOINTMENT (OUTPATIENT)
Dept: PEDIATRIC HEMATOLOGY/ONCOLOGY | Facility: CLINIC | Age: 21
End: 2021-01-21
Payer: MEDICAID

## 2021-01-21 ENCOUNTER — NON-APPOINTMENT (OUTPATIENT)
Age: 21
End: 2021-01-21

## 2021-01-21 ENCOUNTER — OUTPATIENT (OUTPATIENT)
Dept: OUTPATIENT SERVICES | Age: 21
LOS: 1 days | End: 2021-01-21

## 2021-01-21 VITALS
RESPIRATION RATE: 22 BRPM | DIASTOLIC BLOOD PRESSURE: 54 MMHG | WEIGHT: 125.44 LBS | OXYGEN SATURATION: 96 % | HEART RATE: 92 BPM | TEMPERATURE: 36.6 F | SYSTOLIC BLOOD PRESSURE: 123 MMHG

## 2021-01-21 DIAGNOSIS — Z98.890 OTHER SPECIFIED POSTPROCEDURAL STATES: Chronic | ICD-10-CM

## 2021-01-21 DIAGNOSIS — D57.1 SICKLE-CELL DISEASE W/OUT CRISIS: ICD-10-CM

## 2021-01-21 PROCEDURE — 99213 OFFICE O/P EST LOW 20 MIN: CPT

## 2021-01-21 NOTE — HISTORY OF PRESENT ILLNESS
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 She was admitted to the hospital in February 2020 with  shunt malfunction and underwent shunt valve placement. She was then admitted to the hospital in March 2020 with UTI.\par She has been following with Psych regularly secondary to diagnosis of Bipolar disorder and Depression.  She is taking both Abilify and Zoloft. [de-identified] : Elizabeth is here today for PRBC transfusion. Denies fever, n/v/d. No headaches or blurred vision. No pain crisis. She reports taking all of her medications as prescribed.  No new concerns today. \par \par Reports she follows coleen Garcia at [facility] who prescribes her Abilify and Zoloft.\par \par Elizabeth is in the process of transitioning to an adult sickle cell provider, Eleonora Richardson MD. She had her initial visit on 1/15/21 and will hopefully receive her next transfusion in the adult clinic. \par

## 2021-01-25 DIAGNOSIS — D57.1 SICKLE-CELL DISEASE WITHOUT CRISIS: ICD-10-CM

## 2021-02-04 NOTE — CONSULT NOTE PEDS - ASSESSMENT
17 year old F h/o  shunt p/w dizziness and 1 episode of vomiting- slight increase in ventricles noted by radiology however comparison study was from 2014 LDR 12

## 2021-02-09 ENCOUNTER — OUTPATIENT (OUTPATIENT)
Dept: OUTPATIENT SERVICES | Facility: HOSPITAL | Age: 21
LOS: 1 days | End: 2021-02-09
Payer: MEDICAID

## 2021-02-09 DIAGNOSIS — Z98.890 OTHER SPECIFIED POSTPROCEDURAL STATES: Chronic | ICD-10-CM

## 2021-02-09 DIAGNOSIS — D57.1 SICKLE-CELL DISEASE WITHOUT CRISIS: ICD-10-CM

## 2021-02-09 DIAGNOSIS — Z11.52 ENCOUNTER FOR SCREENING FOR COVID-19: ICD-10-CM

## 2021-02-09 LAB
ALBUMIN SERPL ELPH-MCNC: 4.4 G/DL — SIGNIFICANT CHANGE UP (ref 3.3–5)
ALP SERPL-CCNC: 73 U/L — SIGNIFICANT CHANGE UP (ref 40–120)
ALT FLD-CCNC: 13 U/L — SIGNIFICANT CHANGE UP (ref 10–45)
ANION GAP SERPL CALC-SCNC: 10 MMOL/L — SIGNIFICANT CHANGE UP (ref 5–17)
AST SERPL-CCNC: 21 U/L — SIGNIFICANT CHANGE UP (ref 10–40)
BILIRUB SERPL-MCNC: 3.8 MG/DL — HIGH (ref 0.2–1.2)
BUN SERPL-MCNC: 8 MG/DL — SIGNIFICANT CHANGE UP (ref 7–23)
CALCIUM SERPL-MCNC: 9.5 MG/DL — SIGNIFICANT CHANGE UP (ref 8.4–10.5)
CHLORIDE SERPL-SCNC: 107 MMOL/L — SIGNIFICANT CHANGE UP (ref 96–108)
CO2 SERPL-SCNC: 24 MMOL/L — SIGNIFICANT CHANGE UP (ref 22–31)
CREAT SERPL-MCNC: 0.88 MG/DL — SIGNIFICANT CHANGE UP (ref 0.5–1.3)
FERRITIN SERPL-MCNC: 2159 NG/ML — HIGH (ref 15–150)
GLUCOSE SERPL-MCNC: 106 MG/DL — HIGH (ref 70–99)
HCT VFR BLD CALC: 29.5 % — LOW (ref 34.5–45)
HGB BLD-MCNC: 9.8 G/DL — LOW (ref 11.5–15.5)
MCHC RBC-ENTMCNC: 30.2 PG — SIGNIFICANT CHANGE UP (ref 27–34)
MCHC RBC-ENTMCNC: 33.2 GM/DL — SIGNIFICANT CHANGE UP (ref 32–36)
MCV RBC AUTO: 91 FL — SIGNIFICANT CHANGE UP (ref 80–100)
NRBC # BLD: 0 /100 WBCS — SIGNIFICANT CHANGE UP (ref 0–0)
PLATELET # BLD AUTO: 407 K/UL — HIGH (ref 150–400)
POTASSIUM SERPL-MCNC: 3.3 MMOL/L — LOW (ref 3.5–5.3)
POTASSIUM SERPL-SCNC: 3.3 MMOL/L — LOW (ref 3.5–5.3)
PROT SERPL-MCNC: 6.7 G/DL — SIGNIFICANT CHANGE UP (ref 6–8.3)
RBC # BLD: 3.24 M/UL — LOW (ref 3.8–5.2)
RBC # FLD: 16.1 % — HIGH (ref 10.3–14.5)
SARS-COV-2 RNA SPEC QL NAA+PROBE: SIGNIFICANT CHANGE UP
SODIUM SERPL-SCNC: 141 MMOL/L — SIGNIFICANT CHANGE UP (ref 135–145)
WBC # BLD: 13.73 K/UL — HIGH (ref 3.8–10.5)
WBC # FLD AUTO: 13.73 K/UL — HIGH (ref 3.8–10.5)

## 2021-02-09 PROCEDURE — 83020 HEMOGLOBIN ELECTROPHORESIS: CPT

## 2021-02-09 PROCEDURE — 80053 COMPREHEN METABOLIC PANEL: CPT

## 2021-02-09 PROCEDURE — 85027 COMPLETE CBC AUTOMATED: CPT

## 2021-02-09 PROCEDURE — U0005: CPT

## 2021-02-09 PROCEDURE — 96523 IRRIG DRUG DELIVERY DEVICE: CPT

## 2021-02-09 PROCEDURE — U0003: CPT

## 2021-02-09 PROCEDURE — 82728 ASSAY OF FERRITIN: CPT

## 2021-02-09 PROCEDURE — C9803: CPT

## 2021-02-10 LAB
HGB A MFR BLD: 76.5 % — LOW (ref 95.8–98)
HGB A2 MFR BLD: 2.7 % — SIGNIFICANT CHANGE UP (ref 2–3.2)
HGB C MFR BLD: 0 % — SIGNIFICANT CHANGE UP
HGB F MFR BLD: 0 % — SIGNIFICANT CHANGE UP (ref 0–1)
HGB OTHER MFR BLD ELPH: 0 % — SIGNIFICANT CHANGE UP
HGB S MFR BLD: 20.8 % — HIGH

## 2021-02-11 ENCOUNTER — NON-APPOINTMENT (OUTPATIENT)
Age: 21
End: 2021-02-11

## 2021-02-12 ENCOUNTER — OUTPATIENT (OUTPATIENT)
Dept: OUTPATIENT SERVICES | Facility: HOSPITAL | Age: 21
LOS: 1 days | End: 2021-02-12
Payer: MEDICAID

## 2021-02-12 DIAGNOSIS — D57.1 SICKLE-CELL DISEASE WITHOUT CRISIS: ICD-10-CM

## 2021-02-12 DIAGNOSIS — Z98.890 OTHER SPECIFIED POSTPROCEDURAL STATES: Chronic | ICD-10-CM

## 2021-02-12 LAB
HCT VFR BLD CALC: 25.9 % — LOW (ref 34.5–45)
HGB BLD-MCNC: 8.6 G/DL — LOW (ref 11.5–15.5)
MCHC RBC-ENTMCNC: 30 PG — SIGNIFICANT CHANGE UP (ref 27–34)
MCHC RBC-ENTMCNC: 33.2 GM/DL — SIGNIFICANT CHANGE UP (ref 32–36)
MCV RBC AUTO: 90.2 FL — SIGNIFICANT CHANGE UP (ref 80–100)
NRBC # BLD: 0 /100 WBCS — SIGNIFICANT CHANGE UP (ref 0–0)
PLATELET # BLD AUTO: 362 K/UL — SIGNIFICANT CHANGE UP (ref 150–400)
RBC # BLD: 2.87 M/UL — LOW (ref 3.8–5.2)
RBC # FLD: 16.1 % — HIGH (ref 10.3–14.5)
WBC # BLD: 13.33 K/UL — HIGH (ref 3.8–10.5)
WBC # FLD AUTO: 13.33 K/UL — HIGH (ref 3.8–10.5)

## 2021-02-12 PROCEDURE — 85027 COMPLETE CBC AUTOMATED: CPT

## 2021-02-12 PROCEDURE — 36430 TRANSFUSION BLD/BLD COMPNT: CPT

## 2021-03-08 ENCOUNTER — OUTPATIENT (OUTPATIENT)
Dept: OUTPATIENT SERVICES | Facility: HOSPITAL | Age: 21
LOS: 1 days | End: 2021-03-08
Payer: MEDICAID

## 2021-03-08 DIAGNOSIS — D57.1 SICKLE-CELL DISEASE WITHOUT CRISIS: ICD-10-CM

## 2021-03-08 DIAGNOSIS — Z98.890 OTHER SPECIFIED POSTPROCEDURAL STATES: Chronic | ICD-10-CM

## 2021-03-08 DIAGNOSIS — Z11.52 ENCOUNTER FOR SCREENING FOR COVID-19: ICD-10-CM

## 2021-03-08 LAB
ALBUMIN SERPL ELPH-MCNC: 4.3 G/DL — SIGNIFICANT CHANGE UP (ref 3.3–5)
ALP SERPL-CCNC: 72 U/L — SIGNIFICANT CHANGE UP (ref 40–120)
ALT FLD-CCNC: 18 U/L — SIGNIFICANT CHANGE UP (ref 10–45)
ANION GAP SERPL CALC-SCNC: 11 MMOL/L — SIGNIFICANT CHANGE UP (ref 5–17)
AST SERPL-CCNC: 25 U/L — SIGNIFICANT CHANGE UP (ref 10–40)
BILIRUB SERPL-MCNC: 4.1 MG/DL — HIGH (ref 0.2–1.2)
BUN SERPL-MCNC: 10 MG/DL — SIGNIFICANT CHANGE UP (ref 7–23)
CALCIUM SERPL-MCNC: 9.6 MG/DL — SIGNIFICANT CHANGE UP (ref 8.4–10.5)
CHLORIDE SERPL-SCNC: 108 MMOL/L — SIGNIFICANT CHANGE UP (ref 96–108)
CO2 SERPL-SCNC: 22 MMOL/L — SIGNIFICANT CHANGE UP (ref 22–31)
CREAT SERPL-MCNC: 0.59 MG/DL — SIGNIFICANT CHANGE UP (ref 0.5–1.3)
FERRITIN SERPL-MCNC: 1537 NG/ML — HIGH (ref 15–150)
GLUCOSE SERPL-MCNC: 109 MG/DL — HIGH (ref 70–99)
HCT VFR BLD CALC: 24.4 % — LOW (ref 34.5–45)
HGB BLD-MCNC: 8.3 G/DL — LOW (ref 11.5–15.5)
MCHC RBC-ENTMCNC: 31.2 PG — SIGNIFICANT CHANGE UP (ref 27–34)
MCHC RBC-ENTMCNC: 34 GM/DL — SIGNIFICANT CHANGE UP (ref 32–36)
MCV RBC AUTO: 91.7 FL — SIGNIFICANT CHANGE UP (ref 80–100)
NRBC # BLD: 1 /100 WBCS — HIGH (ref 0–0)
PLATELET # BLD AUTO: 386 K/UL — SIGNIFICANT CHANGE UP (ref 150–400)
POTASSIUM SERPL-MCNC: 3.7 MMOL/L — SIGNIFICANT CHANGE UP (ref 3.5–5.3)
POTASSIUM SERPL-SCNC: 3.7 MMOL/L — SIGNIFICANT CHANGE UP (ref 3.5–5.3)
PROT SERPL-MCNC: 6.4 G/DL — SIGNIFICANT CHANGE UP (ref 6–8.3)
RBC # BLD: 2.66 M/UL — LOW (ref 3.8–5.2)
RBC # FLD: 17.8 % — HIGH (ref 10.3–14.5)
SARS-COV-2 RNA SPEC QL NAA+PROBE: SIGNIFICANT CHANGE UP
SODIUM SERPL-SCNC: 141 MMOL/L — SIGNIFICANT CHANGE UP (ref 135–145)
WBC # BLD: 14.5 K/UL — HIGH (ref 3.8–10.5)
WBC # FLD AUTO: 14.5 K/UL — HIGH (ref 3.8–10.5)

## 2021-03-08 PROCEDURE — U0005: CPT

## 2021-03-08 PROCEDURE — C9803: CPT

## 2021-03-08 PROCEDURE — U0003: CPT

## 2021-03-09 LAB
HGB A MFR BLD: 65.5 % — LOW (ref 95.8–98)
HGB A2 MFR BLD: 2.8 % — SIGNIFICANT CHANGE UP (ref 2–3.2)
HGB C MFR BLD: 0 % — SIGNIFICANT CHANGE UP
HGB F MFR BLD: 0 % — SIGNIFICANT CHANGE UP (ref 0–1)
HGB OTHER MFR BLD ELPH: 0 % — SIGNIFICANT CHANGE UP
HGB S MFR BLD: 31.7 % — HIGH

## 2021-03-10 ENCOUNTER — OUTPATIENT (OUTPATIENT)
Dept: OUTPATIENT SERVICES | Facility: HOSPITAL | Age: 21
LOS: 1 days | End: 2021-03-10
Payer: MEDICAID

## 2021-03-10 DIAGNOSIS — D57.1 SICKLE-CELL DISEASE WITHOUT CRISIS: ICD-10-CM

## 2021-03-10 DIAGNOSIS — Z98.890 OTHER SPECIFIED POSTPROCEDURAL STATES: Chronic | ICD-10-CM

## 2021-03-10 LAB
HCT VFR BLD CALC: 24.4 % — LOW (ref 34.5–45)
HGB BLD-MCNC: 8.3 G/DL — LOW (ref 11.5–15.5)
MCHC RBC-ENTMCNC: 30.6 PG — SIGNIFICANT CHANGE UP (ref 27–34)
MCHC RBC-ENTMCNC: 34 GM/DL — SIGNIFICANT CHANGE UP (ref 32–36)
MCV RBC AUTO: 90 FL — SIGNIFICANT CHANGE UP (ref 80–100)
NRBC # BLD: 0 /100 WBCS — SIGNIFICANT CHANGE UP (ref 0–0)
PLATELET # BLD AUTO: 371 K/UL — SIGNIFICANT CHANGE UP (ref 150–400)
RBC # BLD: 2.71 M/UL — LOW (ref 3.8–5.2)
RBC # FLD: 17.5 % — HIGH (ref 10.3–14.5)
WBC # BLD: 12.2 K/UL — HIGH (ref 3.8–10.5)
WBC # FLD AUTO: 12.2 K/UL — HIGH (ref 3.8–10.5)

## 2021-03-10 PROCEDURE — 86902 BLOOD TYPE ANTIGEN DONOR EA: CPT

## 2021-03-10 PROCEDURE — 82728 ASSAY OF FERRITIN: CPT

## 2021-03-10 PROCEDURE — 96523 IRRIG DRUG DELIVERY DEVICE: CPT

## 2021-03-10 PROCEDURE — 86923 COMPATIBILITY TEST ELECTRIC: CPT

## 2021-03-10 PROCEDURE — P9040: CPT

## 2021-03-10 PROCEDURE — 85027 COMPLETE CBC AUTOMATED: CPT

## 2021-03-10 PROCEDURE — 80053 COMPREHEN METABOLIC PANEL: CPT

## 2021-03-10 PROCEDURE — 36430 TRANSFUSION BLD/BLD COMPNT: CPT

## 2021-03-10 PROCEDURE — 83020 HEMOGLOBIN ELECTROPHORESIS: CPT

## 2021-03-19 DIAGNOSIS — L85.3 XEROSIS CUTIS: ICD-10-CM

## 2021-03-19 PROCEDURE — 99214 OFFICE O/P EST MOD 30 MIN: CPT | Mod: 95

## 2021-03-30 ENCOUNTER — EMERGENCY (EMERGENCY)
Facility: HOSPITAL | Age: 21
LOS: 1 days | Discharge: HOME | End: 2021-03-30
Attending: EMERGENCY MEDICINE | Admitting: EMERGENCY MEDICINE
Payer: MEDICAID

## 2021-03-30 VITALS
TEMPERATURE: 98 F | OXYGEN SATURATION: 96 % | DIASTOLIC BLOOD PRESSURE: 57 MMHG | HEIGHT: 61.93 IN | HEART RATE: 106 BPM | SYSTOLIC BLOOD PRESSURE: 116 MMHG | RESPIRATION RATE: 16 BRPM

## 2021-03-30 DIAGNOSIS — Z98.890 OTHER SPECIFIED POSTPROCEDURAL STATES: Chronic | ICD-10-CM

## 2021-03-30 LAB
ALBUMIN SERPL ELPH-MCNC: 4.5 G/DL — SIGNIFICANT CHANGE UP (ref 3.3–5)
ALP SERPL-CCNC: 68 U/L — SIGNIFICANT CHANGE UP (ref 40–120)
ALT FLD-CCNC: 19 U/L — SIGNIFICANT CHANGE UP (ref 4–33)
ANION GAP SERPL CALC-SCNC: 11 MMOL/L — SIGNIFICANT CHANGE UP (ref 7–14)
AST SERPL-CCNC: 34 U/L — HIGH (ref 4–32)
BASOPHILS # BLD AUTO: 0.07 K/UL — SIGNIFICANT CHANGE UP (ref 0–0.2)
BASOPHILS NFR BLD AUTO: 0.4 % — SIGNIFICANT CHANGE UP (ref 0–2)
BILIRUB SERPL-MCNC: 5.3 MG/DL — HIGH (ref 0.2–1.2)
BUN SERPL-MCNC: 7 MG/DL — SIGNIFICANT CHANGE UP (ref 7–23)
CALCIUM SERPL-MCNC: 9.1 MG/DL — SIGNIFICANT CHANGE UP (ref 8.4–10.5)
CHLORIDE SERPL-SCNC: 100 MMOL/L — SIGNIFICANT CHANGE UP (ref 98–107)
CO2 SERPL-SCNC: 22 MMOL/L — SIGNIFICANT CHANGE UP (ref 22–31)
CREAT SERPL-MCNC: 0.6 MG/DL — SIGNIFICANT CHANGE UP (ref 0.5–1.3)
EOSINOPHIL # BLD AUTO: 0.05 K/UL — SIGNIFICANT CHANGE UP (ref 0–0.5)
EOSINOPHIL NFR BLD AUTO: 0.3 % — SIGNIFICANT CHANGE UP (ref 0–6)
GLUCOSE SERPL-MCNC: 92 MG/DL — SIGNIFICANT CHANGE UP (ref 70–99)
HCT VFR BLD CALC: 24.4 % — LOW (ref 34.5–45)
HGB BLD-MCNC: 8.4 G/DL — LOW (ref 11.5–15.5)
IANC: 15.37 K/UL — HIGH (ref 1.5–8.5)
IMM GRANULOCYTES NFR BLD AUTO: 0.7 % — SIGNIFICANT CHANGE UP (ref 0–1.5)
LYMPHOCYTES # BLD AUTO: 0.62 K/UL — LOW (ref 1–3.3)
LYMPHOCYTES # BLD AUTO: 3.5 % — LOW (ref 13–44)
MCHC RBC-ENTMCNC: 30.3 PG — SIGNIFICANT CHANGE UP (ref 27–34)
MCHC RBC-ENTMCNC: 34.4 GM/DL — SIGNIFICANT CHANGE UP (ref 32–36)
MCV RBC AUTO: 88.1 FL — SIGNIFICANT CHANGE UP (ref 80–100)
MONOCYTES # BLD AUTO: 1.29 K/UL — HIGH (ref 0–0.9)
MONOCYTES NFR BLD AUTO: 7.4 % — SIGNIFICANT CHANGE UP (ref 2–14)
NEUTROPHILS # BLD AUTO: 15.37 K/UL — HIGH (ref 1.8–7.4)
NEUTROPHILS NFR BLD AUTO: 87.7 % — HIGH (ref 43–77)
NRBC # BLD: 2 /100 WBCS — SIGNIFICANT CHANGE UP
NRBC # FLD: 0.41 K/UL — HIGH
PLATELET # BLD AUTO: 368 K/UL — SIGNIFICANT CHANGE UP (ref 150–400)
POTASSIUM SERPL-MCNC: 3.7 MMOL/L — SIGNIFICANT CHANGE UP (ref 3.5–5.3)
POTASSIUM SERPL-SCNC: 3.7 MMOL/L — SIGNIFICANT CHANGE UP (ref 3.5–5.3)
PROT SERPL-MCNC: 6.6 G/DL — SIGNIFICANT CHANGE UP (ref 6–8.3)
RBC # BLD: 2.77 M/UL — LOW (ref 3.8–5.2)
RBC # FLD: 17.8 % — HIGH (ref 10.3–14.5)
SARS-COV-2 RNA SPEC QL NAA+PROBE: SIGNIFICANT CHANGE UP
SODIUM SERPL-SCNC: 133 MMOL/L — LOW (ref 135–145)
WBC # BLD: 17.52 K/UL — HIGH (ref 3.8–10.5)
WBC # FLD AUTO: 17.52 K/UL — HIGH (ref 3.8–10.5)

## 2021-03-30 PROCEDURE — 99284 EMERGENCY DEPT VISIT MOD MDM: CPT

## 2021-03-30 PROCEDURE — 71045 X-RAY EXAM CHEST 1 VIEW: CPT | Mod: 26

## 2021-03-30 RX ORDER — MORPHINE SULFATE 50 MG/1
4 CAPSULE, EXTENDED RELEASE ORAL ONCE
Refills: 0 | Status: DISCONTINUED | OUTPATIENT
Start: 2021-03-30 | End: 2021-03-30

## 2021-03-30 RX ORDER — ACETAMINOPHEN 500 MG
650 TABLET ORAL ONCE
Refills: 0 | Status: COMPLETED | OUTPATIENT
Start: 2021-03-30 | End: 2021-03-30

## 2021-03-30 RX ORDER — SODIUM CHLORIDE 9 MG/ML
1000 INJECTION INTRAMUSCULAR; INTRAVENOUS; SUBCUTANEOUS ONCE
Refills: 0 | Status: COMPLETED | OUTPATIENT
Start: 2021-03-30 | End: 2021-03-30

## 2021-03-30 RX ADMIN — Medication 650 MILLIGRAM(S): at 17:40

## 2021-03-30 RX ADMIN — SODIUM CHLORIDE 1000 MILLILITER(S): 9 INJECTION INTRAMUSCULAR; INTRAVENOUS; SUBCUTANEOUS at 17:41

## 2021-03-30 RX ADMIN — MORPHINE SULFATE 4 MILLIGRAM(S): 50 CAPSULE, EXTENDED RELEASE ORAL at 17:40

## 2021-03-30 NOTE — ED PROVIDER NOTE - ATTENDING CONTRIBUTION TO CARE
agree with resident note    "20yo F h.o SCD SS, Asthma presents to ED 1 day after receiving second pfizer vaccination for COVID-19. Pt reports fever, chills, body aches and fatigue since this morning. Tylenol taken at home 7am this morning, as well as home tramadol without relief of symptoms. Denies SOB, cough, abd pain, urinary symptoms. ROS otherwise negative. No sick contacts, no recent travel."    PE: well appearing; afebrile; normotensive; scleral icterus; CTAB/L; s1 s2 2/6 systolic murmur; abd soft/NT/ND      Imp: likely crisis from vaccine; well appearing; will get labs, xray, UA, treat for crisis and reassess  has a medport but symptoms immediately after vaccine and non toxic on exam

## 2021-03-30 NOTE — ED PROVIDER NOTE - PATIENT PORTAL LINK FT
You can access the FollowMyHealth Patient Portal offered by [facility] by registering at the following website: [email or web address]. By joining [employer]’s FollowMyHealth portal, you will also be able to view your health information using other applications (apps) compatible with our system.

## 2021-03-30 NOTE — ED ADULT NURSE NOTE - OBJECTIVE STATEMENT
Pt c/o fever, chills, nausea and body pain that is consistent with sickle cell pain. Mediport is accessed, labs sent and pt medicated for pain. Pt breathing with ease on room air and eval in progress.

## 2021-03-30 NOTE — ED PROVIDER NOTE - CLINICAL SUMMARY MEDICAL DECISION MAKING FREE TEXT BOX
22yo F h.o sickle cell presenting to ED with flu-like symptoms 1 day after receiving covid vaccination. Symptoms are expected however given patient's medical history and current complaint of sever body aches, may be sickle cell crisis, will treat accordingly. Cannot rule out COVID infection, will test. Low suspicion for acute chest

## 2021-03-30 NOTE — ED ADULT TRIAGE NOTE - CHIEF COMPLAINT QUOTE
Received the 2nd dose of Pfizer vaccine yesterday c/o tiredness, achiness, fever, nausea, and weakness since midnight.

## 2021-03-30 NOTE — ED PROVIDER NOTE - OBJECTIVE STATEMENT
22yo F h.o SCD SS, Asthma presents to ED 1 day after receiving second pfizer vaccination for COVID-19. Pt reports fever, chills, body aches and fatigue since this morning. Tylenol taken at home 7am this morning, as well as home tramadol without relief of symptoms. Denies SOB, cough, abd pain, urinary symptoms. ROS otherwise negative. No sick contacts, no recent travel.

## 2021-03-30 NOTE — ED PROVIDER NOTE - PROGRESS NOTE DETAILS
Joon Villar, PGY-1: Pt feels much improved. Pain is minimal. CXR clear. Will d/c home with return precautions

## 2021-03-30 NOTE — ED PROVIDER NOTE - ENMT, MLM
+Scleral icterus Airway patent, Nasal mucosa clear. Mouth with normal mucosa. Throat has no vesicles, no oropharyngeal exudates and uvula is midline.

## 2021-04-05 ENCOUNTER — OUTPATIENT (OUTPATIENT)
Dept: OUTPATIENT SERVICES | Facility: HOSPITAL | Age: 21
LOS: 1 days | End: 2021-04-05
Payer: MEDICAID

## 2021-04-05 ENCOUNTER — APPOINTMENT (OUTPATIENT)
Dept: OBGYN | Facility: CLINIC | Age: 21
End: 2021-04-05

## 2021-04-05 DIAGNOSIS — Z11.52 ENCOUNTER FOR SCREENING FOR COVID-19: ICD-10-CM

## 2021-04-05 DIAGNOSIS — D57.1 SICKLE-CELL DISEASE WITHOUT CRISIS: ICD-10-CM

## 2021-04-05 DIAGNOSIS — Z98.890 OTHER SPECIFIED POSTPROCEDURAL STATES: Chronic | ICD-10-CM

## 2021-04-05 LAB
ALBUMIN SERPL ELPH-MCNC: 4.6 G/DL — SIGNIFICANT CHANGE UP (ref 3.3–5)
ALP SERPL-CCNC: 70 U/L — SIGNIFICANT CHANGE UP (ref 40–120)
ALT FLD-CCNC: 16 U/L — SIGNIFICANT CHANGE UP (ref 10–45)
ANION GAP SERPL CALC-SCNC: 11 MMOL/L — SIGNIFICANT CHANGE UP (ref 5–17)
AST SERPL-CCNC: 30 U/L — SIGNIFICANT CHANGE UP (ref 10–40)
BILIRUB SERPL-MCNC: 3.3 MG/DL — HIGH (ref 0.2–1.2)
BUN SERPL-MCNC: <4 MG/DL — LOW (ref 7–23)
CALCIUM SERPL-MCNC: 9.3 MG/DL — SIGNIFICANT CHANGE UP (ref 8.4–10.5)
CHLORIDE SERPL-SCNC: 108 MMOL/L — SIGNIFICANT CHANGE UP (ref 96–108)
CO2 SERPL-SCNC: 22 MMOL/L — SIGNIFICANT CHANGE UP (ref 22–31)
CREAT SERPL-MCNC: 0.59 MG/DL — SIGNIFICANT CHANGE UP (ref 0.5–1.3)
FERRITIN SERPL-MCNC: 1693 NG/ML — HIGH (ref 15–150)
GLUCOSE SERPL-MCNC: 100 MG/DL — HIGH (ref 70–99)
HCT VFR BLD CALC: 24.5 % — LOW (ref 34.5–45)
HGB BLD-MCNC: 8.2 G/DL — LOW (ref 11.5–15.5)
MCHC RBC-ENTMCNC: 29.5 PG — SIGNIFICANT CHANGE UP (ref 27–34)
MCHC RBC-ENTMCNC: 33.5 GM/DL — SIGNIFICANT CHANGE UP (ref 32–36)
MCV RBC AUTO: 88.1 FL — SIGNIFICANT CHANGE UP (ref 80–100)
NRBC # BLD: 2 /100 WBCS — HIGH (ref 0–0)
PLATELET # BLD AUTO: 438 K/UL — HIGH (ref 150–400)
POTASSIUM SERPL-MCNC: 3.8 MMOL/L — SIGNIFICANT CHANGE UP (ref 3.5–5.3)
POTASSIUM SERPL-SCNC: 3.8 MMOL/L — SIGNIFICANT CHANGE UP (ref 3.5–5.3)
PROT SERPL-MCNC: 6.8 G/DL — SIGNIFICANT CHANGE UP (ref 6–8.3)
RBC # BLD: 2.78 M/UL — LOW (ref 3.8–5.2)
RBC # FLD: 18.6 % — HIGH (ref 10.3–14.5)
SODIUM SERPL-SCNC: 141 MMOL/L — SIGNIFICANT CHANGE UP (ref 135–145)
WBC # BLD: 13.9 K/UL — HIGH (ref 3.8–10.5)
WBC # FLD AUTO: 13.9 K/UL — HIGH (ref 3.8–10.5)

## 2021-04-05 PROCEDURE — 96523 IRRIG DRUG DELIVERY DEVICE: CPT

## 2021-04-05 PROCEDURE — 86850 RBC ANTIBODY SCREEN: CPT

## 2021-04-05 PROCEDURE — 86901 BLOOD TYPING SEROLOGIC RH(D): CPT

## 2021-04-05 PROCEDURE — 85027 COMPLETE CBC AUTOMATED: CPT

## 2021-04-05 PROCEDURE — 82728 ASSAY OF FERRITIN: CPT

## 2021-04-05 PROCEDURE — 86900 BLOOD TYPING SEROLOGIC ABO: CPT

## 2021-04-05 PROCEDURE — 80053 COMPREHEN METABOLIC PANEL: CPT

## 2021-04-05 PROCEDURE — 83020 HEMOGLOBIN ELECTROPHORESIS: CPT

## 2021-04-06 LAB
HGB A MFR BLD: 51.6 % — LOW (ref 95.8–98)
HGB A2 MFR BLD: 3.1 % — SIGNIFICANT CHANGE UP (ref 2–3.2)
HGB C MFR BLD: 0 % — SIGNIFICANT CHANGE UP
HGB F MFR BLD: 0 % — SIGNIFICANT CHANGE UP (ref 0–1)
HGB OTHER MFR BLD ELPH: 0 % — SIGNIFICANT CHANGE UP
HGB S MFR BLD: 45.3 % — HIGH
SARS-COV-2 RNA SPEC QL NAA+PROBE: SIGNIFICANT CHANGE UP

## 2021-04-07 ENCOUNTER — OUTPATIENT (OUTPATIENT)
Dept: OUTPATIENT SERVICES | Facility: HOSPITAL | Age: 21
LOS: 1 days | End: 2021-04-07
Payer: MEDICAID

## 2021-04-07 DIAGNOSIS — D57.1 SICKLE-CELL DISEASE WITHOUT CRISIS: ICD-10-CM

## 2021-04-07 DIAGNOSIS — Z98.890 OTHER SPECIFIED POSTPROCEDURAL STATES: Chronic | ICD-10-CM

## 2021-04-07 LAB
HCT VFR BLD CALC: 23.4 % — LOW (ref 34.5–45)
HGB BLD-MCNC: 8.1 G/DL — LOW (ref 11.5–15.5)
MCHC RBC-ENTMCNC: 30.7 PG — SIGNIFICANT CHANGE UP (ref 27–34)
MCHC RBC-ENTMCNC: 34.6 GM/DL — SIGNIFICANT CHANGE UP (ref 32–36)
MCV RBC AUTO: 88.6 FL — SIGNIFICANT CHANGE UP (ref 80–100)
NRBC # BLD: 2 /100 WBCS — HIGH (ref 0–0)
PLATELET # BLD AUTO: 437 K/UL — HIGH (ref 150–400)
RBC # BLD: 2.64 M/UL — LOW (ref 3.8–5.2)
RBC # FLD: 18.9 % — HIGH (ref 10.3–14.5)
WBC # BLD: 15.71 K/UL — HIGH (ref 3.8–10.5)
WBC # FLD AUTO: 15.71 K/UL — HIGH (ref 3.8–10.5)

## 2021-04-07 PROCEDURE — 85027 COMPLETE CBC AUTOMATED: CPT

## 2021-04-07 PROCEDURE — P9040: CPT

## 2021-04-07 PROCEDURE — U0003: CPT

## 2021-04-07 PROCEDURE — 86923 COMPATIBILITY TEST ELECTRIC: CPT

## 2021-04-07 PROCEDURE — 36430 TRANSFUSION BLD/BLD COMPNT: CPT

## 2021-04-07 PROCEDURE — 86902 BLOOD TYPE ANTIGEN DONOR EA: CPT

## 2021-04-07 PROCEDURE — U0005: CPT

## 2021-04-07 PROCEDURE — C9803: CPT

## 2021-04-14 ENCOUNTER — NON-APPOINTMENT (OUTPATIENT)
Age: 21
End: 2021-04-14

## 2021-04-16 ENCOUNTER — APPOINTMENT (OUTPATIENT)
Dept: INTERNAL MEDICINE | Facility: CLINIC | Age: 21
End: 2021-04-16
Payer: MEDICAID

## 2021-04-16 ENCOUNTER — OUTPATIENT (OUTPATIENT)
Dept: OUTPATIENT SERVICES | Facility: HOSPITAL | Age: 21
LOS: 1 days | End: 2021-04-16

## 2021-04-16 VITALS — TEMPERATURE: 206.42 F

## 2021-04-16 DIAGNOSIS — Z98.2 PRESENCE OF CEREBROSPINAL FLUID DRAINAGE DEVICE: ICD-10-CM

## 2021-04-16 DIAGNOSIS — D57.3 SICKLE-CELL TRAIT: ICD-10-CM

## 2021-04-16 DIAGNOSIS — E83.111 HEMOCHROMATOSIS DUE TO REPEATED RED BLOOD CELL TRANSFUSIONS: ICD-10-CM

## 2021-04-16 DIAGNOSIS — Z92.89 PERSONAL HISTORY OF OTHER MEDICAL TREATMENT: ICD-10-CM

## 2021-04-16 DIAGNOSIS — F31.9 BIPOLAR DISORDER, UNSPECIFIED: ICD-10-CM

## 2021-04-16 DIAGNOSIS — Z98.890 OTHER SPECIFIED POSTPROCEDURAL STATES: Chronic | ICD-10-CM

## 2021-04-16 PROCEDURE — 99214 OFFICE O/P EST MOD 30 MIN: CPT

## 2021-04-16 RX ORDER — AMOXICILLIN 500 MG/1
500 CAPSULE ORAL AS DIRECTED
Qty: 20 | Refills: 0 | Status: COMPLETED | COMMUNITY
Start: 2018-10-26 | End: 2021-04-16

## 2021-04-16 NOTE — HISTORY OF PRESENT ILLNESS
[FreeTextEntry1] : 22 yo female with HGB SS, no complaints. [de-identified] : 20 yo female with HGB SS on monthly single unit transfusion. 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\par CVA- 7 yo-\par Has iron overload- takes Jadenu- 360 ( 4 tabs per day)\par H/O bipolar d/o- follows at [facility] with appts.\par In school at [employer]- freshman- studying liberal arts at this point\par + recent risky sexual activity- receives q 3 monthly depoprovera- does not use condoms\par \par \par PE: gen- cheerful female in nad\par vss\par anicteric\par oropharynx- deferred\par lungs- cta\par cor: rrr-m\par abd- benign\par ext: -c/c/e, no ulcers\par \par 4/5 labs-\par HGB 8.2\par ferritin- 1693 ( stable)\par \par A/P- 20 yo female with HGB SS, s/p shunt placement at birth\par 1.f/u with Dr. Ledbetter for neurosugery at [facility]- referral given\par 2.:Bipolar disorder- Dr. Gomez at [facility]-continue routine f/u\par 3. SCD- continue monthly simple transfusion\par one unit q month\par 4. iron overload- continue jadenu- ferritin stable\par 5. COVID vax already done (pfizer X 2)\par 6.F/U 3 months\par 7. discussed importance of condom usage, also discussed SCD pregnancies/ genetic screening of partner\par \par ISTOP check- 2 minutes\par chart note 6 minutes\par visit 22 minutes\par \par Eleonora Richardson MD\par

## 2021-05-03 ENCOUNTER — OUTPATIENT (OUTPATIENT)
Dept: OUTPATIENT SERVICES | Facility: HOSPITAL | Age: 21
LOS: 1 days | End: 2021-05-03
Payer: MEDICAID

## 2021-05-03 DIAGNOSIS — Z11.52 ENCOUNTER FOR SCREENING FOR COVID-19: ICD-10-CM

## 2021-05-03 DIAGNOSIS — Z98.890 OTHER SPECIFIED POSTPROCEDURAL STATES: Chronic | ICD-10-CM

## 2021-05-03 DIAGNOSIS — D57.1 SICKLE-CELL DISEASE WITHOUT CRISIS: ICD-10-CM

## 2021-05-03 LAB
ALBUMIN SERPL ELPH-MCNC: 4.4 G/DL — SIGNIFICANT CHANGE UP (ref 3.3–5)
ALP SERPL-CCNC: 70 U/L — SIGNIFICANT CHANGE UP (ref 40–120)
ALT FLD-CCNC: 9 U/L — LOW (ref 10–45)
ANION GAP SERPL CALC-SCNC: 13 MMOL/L — SIGNIFICANT CHANGE UP (ref 5–17)
AST SERPL-CCNC: 37 U/L — SIGNIFICANT CHANGE UP (ref 10–40)
BILIRUB SERPL-MCNC: 5.1 MG/DL — HIGH (ref 0.2–1.2)
BUN SERPL-MCNC: 8 MG/DL — SIGNIFICANT CHANGE UP (ref 7–23)
CALCIUM SERPL-MCNC: 9.4 MG/DL — SIGNIFICANT CHANGE UP (ref 8.4–10.5)
CHLORIDE SERPL-SCNC: 110 MMOL/L — HIGH (ref 96–108)
CO2 SERPL-SCNC: 19 MMOL/L — LOW (ref 22–31)
CREAT SERPL-MCNC: 0.63 MG/DL — SIGNIFICANT CHANGE UP (ref 0.5–1.3)
FERRITIN SERPL-MCNC: 1205 NG/ML — HIGH (ref 15–150)
GLUCOSE SERPL-MCNC: 85 MG/DL — SIGNIFICANT CHANGE UP (ref 70–99)
HCT VFR BLD CALC: 19.3 % — CRITICAL LOW (ref 34.5–45)
HGB BLD-MCNC: 6.8 G/DL — CRITICAL LOW (ref 11.5–15.5)
MCHC RBC-ENTMCNC: 30 PG — SIGNIFICANT CHANGE UP (ref 27–34)
MCHC RBC-ENTMCNC: 35.2 GM/DL — SIGNIFICANT CHANGE UP (ref 32–36)
MCV RBC AUTO: 85 FL — SIGNIFICANT CHANGE UP (ref 80–100)
NRBC # BLD: 6 /100 WBCS — HIGH (ref 0–0)
PLATELET # BLD AUTO: 365 K/UL — SIGNIFICANT CHANGE UP (ref 150–400)
POTASSIUM SERPL-MCNC: 3.9 MMOL/L — SIGNIFICANT CHANGE UP (ref 3.5–5.3)
POTASSIUM SERPL-SCNC: 3.9 MMOL/L — SIGNIFICANT CHANGE UP (ref 3.5–5.3)
PROT SERPL-MCNC: 6.5 G/DL — SIGNIFICANT CHANGE UP (ref 6–8.3)
RBC # BLD: 2.27 M/UL — LOW (ref 3.8–5.2)
RBC # FLD: 20.4 % — HIGH (ref 10.3–14.5)
SARS-COV-2 RNA SPEC QL NAA+PROBE: SIGNIFICANT CHANGE UP
SODIUM SERPL-SCNC: 142 MMOL/L — SIGNIFICANT CHANGE UP (ref 135–145)
WBC # BLD: 12.78 K/UL — HIGH (ref 3.8–10.5)
WBC # FLD AUTO: 12.78 K/UL — HIGH (ref 3.8–10.5)

## 2021-05-03 PROCEDURE — 96523 IRRIG DRUG DELIVERY DEVICE: CPT

## 2021-05-03 PROCEDURE — 85027 COMPLETE CBC AUTOMATED: CPT

## 2021-05-03 PROCEDURE — 80053 COMPREHEN METABOLIC PANEL: CPT

## 2021-05-03 PROCEDURE — 82728 ASSAY OF FERRITIN: CPT

## 2021-05-03 PROCEDURE — 83020 HEMOGLOBIN ELECTROPHORESIS: CPT

## 2021-05-04 ENCOUNTER — NON-APPOINTMENT (OUTPATIENT)
Age: 21
End: 2021-05-04

## 2021-05-04 LAB
HGB A MFR BLD: 43.7 % — LOW (ref 95.8–98)
HGB A2 MFR BLD: 3 % — SIGNIFICANT CHANGE UP (ref 2–3.2)
HGB C MFR BLD: 0 % — SIGNIFICANT CHANGE UP
HGB F MFR BLD: 0 % — SIGNIFICANT CHANGE UP (ref 0–1)
HGB OTHER MFR BLD ELPH: 0 % — SIGNIFICANT CHANGE UP
HGB S MFR BLD: 53.3 % — HIGH

## 2021-05-05 ENCOUNTER — OUTPATIENT (OUTPATIENT)
Dept: OUTPATIENT SERVICES | Facility: HOSPITAL | Age: 21
LOS: 1 days | End: 2021-05-05
Payer: MEDICAID

## 2021-05-05 DIAGNOSIS — Z98.890 OTHER SPECIFIED POSTPROCEDURAL STATES: Chronic | ICD-10-CM

## 2021-05-05 DIAGNOSIS — D57.1 SICKLE-CELL DISEASE WITHOUT CRISIS: ICD-10-CM

## 2021-05-05 LAB
HCT VFR BLD CALC: 19.7 % — CRITICAL LOW (ref 34.5–45)
HGB BLD-MCNC: 6.8 G/DL — CRITICAL LOW (ref 11.5–15.5)
MCHC RBC-ENTMCNC: 29.4 PG — SIGNIFICANT CHANGE UP (ref 27–34)
MCHC RBC-ENTMCNC: 34.5 GM/DL — SIGNIFICANT CHANGE UP (ref 32–36)
MCV RBC AUTO: 85.3 FL — SIGNIFICANT CHANGE UP (ref 80–100)
NRBC # BLD: 3 /100 WBCS — HIGH (ref 0–0)
PLATELET # BLD AUTO: 355 K/UL — SIGNIFICANT CHANGE UP (ref 150–400)
RBC # BLD: 2.31 M/UL — LOW (ref 3.8–5.2)
RBC # FLD: 21.2 % — HIGH (ref 10.3–14.5)
WBC # BLD: 13.02 K/UL — HIGH (ref 3.8–10.5)
WBC # FLD AUTO: 13.02 K/UL — HIGH (ref 3.8–10.5)

## 2021-05-05 PROCEDURE — 36430 TRANSFUSION BLD/BLD COMPNT: CPT

## 2021-05-05 PROCEDURE — 85027 COMPLETE CBC AUTOMATED: CPT

## 2021-06-01 ENCOUNTER — OUTPATIENT (OUTPATIENT)
Dept: OUTPATIENT SERVICES | Facility: HOSPITAL | Age: 21
LOS: 1 days | End: 2021-06-01
Payer: MEDICAID

## 2021-06-01 ENCOUNTER — NON-APPOINTMENT (OUTPATIENT)
Age: 21
End: 2021-06-01

## 2021-06-01 DIAGNOSIS — D57.1 SICKLE-CELL DISEASE WITHOUT CRISIS: ICD-10-CM

## 2021-06-01 DIAGNOSIS — Z98.890 OTHER SPECIFIED POSTPROCEDURAL STATES: Chronic | ICD-10-CM

## 2021-06-01 DIAGNOSIS — Z11.52 ENCOUNTER FOR SCREENING FOR COVID-19: ICD-10-CM

## 2021-06-01 LAB
ALBUMIN SERPL ELPH-MCNC: 4.8 G/DL — SIGNIFICANT CHANGE UP (ref 3.3–5)
ALP SERPL-CCNC: 63 U/L — SIGNIFICANT CHANGE UP (ref 40–120)
ALT FLD-CCNC: 15 U/L — SIGNIFICANT CHANGE UP (ref 10–45)
ANION GAP SERPL CALC-SCNC: 14 MMOL/L — SIGNIFICANT CHANGE UP (ref 5–17)
AST SERPL-CCNC: 34 U/L — SIGNIFICANT CHANGE UP (ref 10–40)
BILIRUB SERPL-MCNC: 5.9 MG/DL — HIGH (ref 0.2–1.2)
BUN SERPL-MCNC: 11 MG/DL — SIGNIFICANT CHANGE UP (ref 7–23)
CALCIUM SERPL-MCNC: 9.4 MG/DL — SIGNIFICANT CHANGE UP (ref 8.4–10.5)
CHLORIDE SERPL-SCNC: 104 MMOL/L — SIGNIFICANT CHANGE UP (ref 96–108)
CO2 SERPL-SCNC: 20 MMOL/L — LOW (ref 22–31)
CREAT SERPL-MCNC: 0.67 MG/DL — SIGNIFICANT CHANGE UP (ref 0.5–1.3)
FERRITIN SERPL-MCNC: 1447 NG/ML — HIGH (ref 15–150)
GLUCOSE SERPL-MCNC: 87 MG/DL — SIGNIFICANT CHANGE UP (ref 70–99)
HCT VFR BLD CALC: 21.7 % — LOW (ref 34.5–45)
HGB BLD-MCNC: 7.5 G/DL — LOW (ref 11.5–15.5)
MCHC RBC-ENTMCNC: 29.9 PG — SIGNIFICANT CHANGE UP (ref 27–34)
MCHC RBC-ENTMCNC: 34.6 GM/DL — SIGNIFICANT CHANGE UP (ref 32–36)
MCV RBC AUTO: 86.5 FL — SIGNIFICANT CHANGE UP (ref 80–100)
NRBC # BLD: 1 /100 WBCS — HIGH (ref 0–0)
PLATELET # BLD AUTO: 391 K/UL — SIGNIFICANT CHANGE UP (ref 150–400)
POTASSIUM SERPL-MCNC: 3.8 MMOL/L — SIGNIFICANT CHANGE UP (ref 3.5–5.3)
POTASSIUM SERPL-SCNC: 3.8 MMOL/L — SIGNIFICANT CHANGE UP (ref 3.5–5.3)
PROT SERPL-MCNC: 7.1 G/DL — SIGNIFICANT CHANGE UP (ref 6–8.3)
RBC # BLD: 2.51 M/UL — LOW (ref 3.8–5.2)
RBC # FLD: 19.9 % — HIGH (ref 10.3–14.5)
SARS-COV-2 RNA SPEC QL NAA+PROBE: SIGNIFICANT CHANGE UP
SODIUM SERPL-SCNC: 138 MMOL/L — SIGNIFICANT CHANGE UP (ref 135–145)
WBC # BLD: 17.81 K/UL — HIGH (ref 3.8–10.5)
WBC # FLD AUTO: 17.81 K/UL — HIGH (ref 3.8–10.5)

## 2021-06-01 PROCEDURE — U0005: CPT

## 2021-06-01 PROCEDURE — 86901 BLOOD TYPING SEROLOGIC RH(D): CPT

## 2021-06-01 PROCEDURE — P9040: CPT

## 2021-06-01 PROCEDURE — C9803: CPT

## 2021-06-01 PROCEDURE — 86902 BLOOD TYPE ANTIGEN DONOR EA: CPT

## 2021-06-01 PROCEDURE — 86900 BLOOD TYPING SEROLOGIC ABO: CPT

## 2021-06-01 PROCEDURE — 86850 RBC ANTIBODY SCREEN: CPT

## 2021-06-01 PROCEDURE — U0003: CPT

## 2021-06-01 PROCEDURE — 86923 COMPATIBILITY TEST ELECTRIC: CPT

## 2021-06-02 ENCOUNTER — OUTPATIENT (OUTPATIENT)
Dept: OUTPATIENT SERVICES | Facility: HOSPITAL | Age: 21
LOS: 1 days | End: 2021-06-02
Payer: MEDICAID

## 2021-06-02 DIAGNOSIS — D57.1 SICKLE-CELL DISEASE WITHOUT CRISIS: ICD-10-CM

## 2021-06-02 DIAGNOSIS — Z98.890 OTHER SPECIFIED POSTPROCEDURAL STATES: Chronic | ICD-10-CM

## 2021-06-02 LAB
HCT VFR BLD CALC: 21 % — CRITICAL LOW (ref 34.5–45)
HGB A MFR BLD: 50.6 % — LOW (ref 95.8–98)
HGB A2 MFR BLD: 2.8 % — SIGNIFICANT CHANGE UP (ref 2–3.2)
HGB BLD-MCNC: 7.3 G/DL — LOW (ref 11.5–15.5)
HGB C MFR BLD: 0 % — SIGNIFICANT CHANGE UP
HGB F MFR BLD: 0 % — SIGNIFICANT CHANGE UP (ref 0–1)
HGB OTHER MFR BLD ELPH: 0 % — SIGNIFICANT CHANGE UP
HGB S MFR BLD: 46.6 % — HIGH
MCHC RBC-ENTMCNC: 29.7 PG — SIGNIFICANT CHANGE UP (ref 27–34)
MCHC RBC-ENTMCNC: 34.8 GM/DL — SIGNIFICANT CHANGE UP (ref 32–36)
MCV RBC AUTO: 85.4 FL — SIGNIFICANT CHANGE UP (ref 80–100)
NRBC # BLD: 2 /100 WBCS — HIGH (ref 0–0)
PLATELET # BLD AUTO: 397 K/UL — SIGNIFICANT CHANGE UP (ref 150–400)
RBC # BLD: 2.46 M/UL — LOW (ref 3.8–5.2)
RBC # FLD: 20 % — HIGH (ref 10.3–14.5)
WBC # BLD: 13.81 K/UL — HIGH (ref 3.8–10.5)
WBC # FLD AUTO: 13.81 K/UL — HIGH (ref 3.8–10.5)

## 2021-06-02 PROCEDURE — 85027 COMPLETE CBC AUTOMATED: CPT

## 2021-06-02 PROCEDURE — P9040: CPT

## 2021-06-02 PROCEDURE — 96523 IRRIG DRUG DELIVERY DEVICE: CPT

## 2021-06-02 PROCEDURE — 82728 ASSAY OF FERRITIN: CPT

## 2021-06-02 PROCEDURE — 80053 COMPREHEN METABOLIC PANEL: CPT

## 2021-06-02 PROCEDURE — 86923 COMPATIBILITY TEST ELECTRIC: CPT

## 2021-06-02 PROCEDURE — 86902 BLOOD TYPE ANTIGEN DONOR EA: CPT

## 2021-06-02 PROCEDURE — 36430 TRANSFUSION BLD/BLD COMPNT: CPT

## 2021-06-02 PROCEDURE — 83020 HEMOGLOBIN ELECTROPHORESIS: CPT

## 2021-06-17 ENCOUNTER — EMERGENCY (EMERGENCY)
Facility: HOSPITAL | Age: 21
LOS: 1 days | Discharge: HOME | End: 2021-06-17
Attending: EMERGENCY MEDICINE
Payer: MEDICAID

## 2021-06-17 VITALS
TEMPERATURE: 98 F | SYSTOLIC BLOOD PRESSURE: 113 MMHG | DIASTOLIC BLOOD PRESSURE: 75 MMHG | OXYGEN SATURATION: 98 % | HEART RATE: 82 BPM | RESPIRATION RATE: 16 BRPM

## 2021-06-17 VITALS
HEART RATE: 110 BPM | RESPIRATION RATE: 18 BRPM | HEIGHT: 62 IN | OXYGEN SATURATION: 97 % | WEIGHT: 117.95 LBS | DIASTOLIC BLOOD PRESSURE: 82 MMHG | TEMPERATURE: 99 F | SYSTOLIC BLOOD PRESSURE: 137 MMHG

## 2021-06-17 DIAGNOSIS — Z98.890 OTHER SPECIFIED POSTPROCEDURAL STATES: Chronic | ICD-10-CM

## 2021-06-17 LAB
ALBUMIN SERPL ELPH-MCNC: 4.5 G/DL — SIGNIFICANT CHANGE UP (ref 3.3–5)
ALP SERPL-CCNC: 64 U/L — SIGNIFICANT CHANGE UP (ref 40–120)
ALT FLD-CCNC: 14 U/L — SIGNIFICANT CHANGE UP (ref 10–45)
ANION GAP SERPL CALC-SCNC: 14 MMOL/L — SIGNIFICANT CHANGE UP (ref 5–17)
AST SERPL-CCNC: 32 U/L — SIGNIFICANT CHANGE UP (ref 10–40)
BASOPHILS # BLD AUTO: 0.11 K/UL — SIGNIFICANT CHANGE UP (ref 0–0.2)
BASOPHILS NFR BLD AUTO: 0.7 % — SIGNIFICANT CHANGE UP (ref 0–2)
BILIRUB SERPL-MCNC: 6.2 MG/DL — HIGH (ref 0.2–1.2)
BUN SERPL-MCNC: 6 MG/DL — LOW (ref 7–23)
CALCIUM SERPL-MCNC: 8.9 MG/DL — SIGNIFICANT CHANGE UP (ref 8.4–10.5)
CHLORIDE SERPL-SCNC: 106 MMOL/L — SIGNIFICANT CHANGE UP (ref 96–108)
CO2 SERPL-SCNC: 18 MMOL/L — LOW (ref 22–31)
CREAT SERPL-MCNC: 0.6 MG/DL — SIGNIFICANT CHANGE UP (ref 0.5–1.3)
EOSINOPHIL # BLD AUTO: 0.56 K/UL — HIGH (ref 0–0.5)
EOSINOPHIL NFR BLD AUTO: 3.4 % — SIGNIFICANT CHANGE UP (ref 0–6)
GLUCOSE SERPL-MCNC: 87 MG/DL — SIGNIFICANT CHANGE UP (ref 70–99)
HCT VFR BLD CALC: 24.1 % — LOW (ref 34.5–45)
HGB BLD-MCNC: 8.1 G/DL — LOW (ref 11.5–15.5)
IMM GRANULOCYTES NFR BLD AUTO: 0.6 % — SIGNIFICANT CHANGE UP (ref 0–1.5)
LYMPHOCYTES # BLD AUTO: 1.36 K/UL — SIGNIFICANT CHANGE UP (ref 1–3.3)
LYMPHOCYTES # BLD AUTO: 8.3 % — LOW (ref 13–44)
MCHC RBC-ENTMCNC: 28.6 PG — SIGNIFICANT CHANGE UP (ref 27–34)
MCHC RBC-ENTMCNC: 33.6 GM/DL — SIGNIFICANT CHANGE UP (ref 32–36)
MCV RBC AUTO: 85.2 FL — SIGNIFICANT CHANGE UP (ref 80–100)
MONOCYTES # BLD AUTO: 1.23 K/UL — HIGH (ref 0–0.9)
MONOCYTES NFR BLD AUTO: 7.5 % — SIGNIFICANT CHANGE UP (ref 2–14)
NEUTROPHILS # BLD AUTO: 12.98 K/UL — HIGH (ref 1.8–7.4)
NEUTROPHILS NFR BLD AUTO: 79.5 % — HIGH (ref 43–77)
NRBC # BLD: 2 /100 WBCS — HIGH (ref 0–0)
PLATELET # BLD AUTO: 385 K/UL — SIGNIFICANT CHANGE UP (ref 150–400)
POTASSIUM SERPL-MCNC: 3.8 MMOL/L — SIGNIFICANT CHANGE UP (ref 3.5–5.3)
POTASSIUM SERPL-SCNC: 3.8 MMOL/L — SIGNIFICANT CHANGE UP (ref 3.5–5.3)
PROT SERPL-MCNC: 6.8 G/DL — SIGNIFICANT CHANGE UP (ref 6–8.3)
RAPID RVP RESULT: DETECTED
RBC # BLD: 2.83 M/UL — LOW (ref 3.8–5.2)
RBC # BLD: 2.83 M/UL — LOW (ref 3.8–5.2)
RBC # FLD: 18.2 % — HIGH (ref 10.3–14.5)
RETICS #: 437.8 K/UL — HIGH (ref 25–125)
RETICS/RBC NFR: 15.5 % — HIGH (ref 0.5–2.5)
RV+EV RNA SPEC QL NAA+PROBE: DETECTED
S PYO AG SPEC QL IA: NEGATIVE — SIGNIFICANT CHANGE UP
SARS-COV-2 RNA SPEC QL NAA+PROBE: SIGNIFICANT CHANGE UP
SODIUM SERPL-SCNC: 138 MMOL/L — SIGNIFICANT CHANGE UP (ref 135–145)
WBC # BLD: 16.33 K/UL — HIGH (ref 3.8–10.5)
WBC # FLD AUTO: 16.33 K/UL — HIGH (ref 3.8–10.5)

## 2021-06-17 PROCEDURE — 99284 EMERGENCY DEPT VISIT MOD MDM: CPT | Mod: 25

## 2021-06-17 PROCEDURE — 85025 COMPLETE CBC W/AUTO DIFF WBC: CPT

## 2021-06-17 PROCEDURE — 0225U NFCT DS DNA&RNA 21 SARSCOV2: CPT

## 2021-06-17 PROCEDURE — 93005 ELECTROCARDIOGRAM TRACING: CPT

## 2021-06-17 PROCEDURE — 87081 CULTURE SCREEN ONLY: CPT

## 2021-06-17 PROCEDURE — 87880 STREP A ASSAY W/OPTIC: CPT

## 2021-06-17 PROCEDURE — 99284 EMERGENCY DEPT VISIT MOD MDM: CPT

## 2021-06-17 PROCEDURE — 80053 COMPREHEN METABOLIC PANEL: CPT

## 2021-06-17 PROCEDURE — 85045 AUTOMATED RETICULOCYTE COUNT: CPT

## 2021-06-17 PROCEDURE — 96374 THER/PROPH/DIAG INJ IV PUSH: CPT

## 2021-06-17 RX ORDER — ACETAMINOPHEN 500 MG
650 TABLET ORAL ONCE
Refills: 0 | Status: COMPLETED | OUTPATIENT
Start: 2021-06-17 | End: 2021-06-17

## 2021-06-17 RX ORDER — SODIUM CHLORIDE 9 MG/ML
500 INJECTION INTRAMUSCULAR; INTRAVENOUS; SUBCUTANEOUS ONCE
Refills: 0 | Status: COMPLETED | OUTPATIENT
Start: 2021-06-17 | End: 2021-06-17

## 2021-06-17 RX ORDER — DEXAMETHASONE 0.5 MG/5ML
6 ELIXIR ORAL ONCE
Refills: 0 | Status: COMPLETED | OUTPATIENT
Start: 2021-06-17 | End: 2021-06-17

## 2021-06-17 RX ADMIN — Medication 6 MILLIGRAM(S): at 11:27

## 2021-06-17 RX ADMIN — Medication 650 MILLIGRAM(S): at 09:40

## 2021-06-17 RX ADMIN — SODIUM CHLORIDE 500 MILLILITER(S): 9 INJECTION INTRAMUSCULAR; INTRAVENOUS; SUBCUTANEOUS at 09:41

## 2021-06-17 NOTE — ED ADULT NURSE NOTE - OBJECTIVE STATEMENT
pt 20 yo who states sore throat onset yesterday and today had fever 100.1 this morning pt took advil 8:30 am pt with med hx sickle cell accompanied by mother to er no sob pt had covid vaccine no sick contacts motor sensory intact to extremities

## 2021-06-17 NOTE — ED PROVIDER NOTE - OBJECTIVE STATEMENT
21F w h/o sickle cell SS, CLD of prematurity, CVA,  shunt in place, s/p tonsillectomy presents w/ 1 day of sore throat and temp of 100.1 at home. Reports her friend had similar symptoms and she was exposed. s/p covid vaccine. Denies chest pain, SOB, n/v, abdominal pain.

## 2021-06-17 NOTE — ED PROVIDER NOTE - INTERPRETATION
normal sinus rhythm Double Island Pedicle Flap Text: The defect edges were debeveled with a #15 scalpel blade.  Given the location of the defect, shape of the defect and the proximity to free margins a double island pedicle advancement flap was deemed most appropriate.  Using a sterile surgical marker, an appropriate advancement flap was drawn incorporating the defect, outlining the appropriate donor tissue and placing the expected incisions within the relaxed skin tension lines where possible.    The area thus outlined was incised deep to adipose tissue with a #15 scalpel blade.  The skin margins were undermined to an appropriate distance in all directions around the primary defect and laterally outward around the island pedicle utilizing iris scissors.  There was minimal undermining beneath the pedicle flap.

## 2021-06-17 NOTE — ED PROVIDER NOTE - CLINICAL SUMMARY MEDICAL DECISION MAKING FREE TEXT BOX
martha 21 f ssc  shunt with regular transfusions - baseline hgb 7-8 presents 1 day of sorethroat cough fever no cp no abd pain no sob - endorses recent sick contacts w cold -- lungs cta sats 98 ra -- op no exudate minimal erythem ( PT S/P t&a IN REMOTE PAST) abd soft nt - no jt pain - will check labs as mom endorses that infxn often trigger crisis -- iv fluids rvp and strep -- darrius viral infection -- pt covid vax completed in march - well appearing darrius crawley with supportive care martha 21 f ssc  shunt with regular transfusions - baseline hgb 7-8 presents 1 day of sorethroat cough fever no cp no abd pain no sob - endorses recent sick contacts w cold -- lungs cta sats 98 ra -- op no exudate minimal erythem ( PT S/P t&a IN REMOTE PAST) abd soft nt - no jt pain - will check labs as mom endorses that infxn often trigger crisis -- iv fluids rvp and strep -- darrius viral infection -- pt covid vax completed in march - well appearing darrius crawley with supportive care    no signs of acute chest, not currently in a pain crisis, vss, + sick contact, likely viral, s/p covic vacc, symptom control, reassess

## 2021-06-17 NOTE — ED ADULT NURSE REASSESSMENT NOTE - NS ED NURSE REASSESS COMMENT FT1
right chest wall port accessed by RN good blood return with bolus 500ml on normal saline given via infusion pump

## 2021-06-17 NOTE — ED PROVIDER NOTE - NS ED ROS FT
General: + fever  HENT: denies nasal congestion, rhinorrhea, + sore throat  CV: denies chest pain, palpitations  Resp: denies difficulty breathing, cough  Abdominal: denies nausea, vomiting, diarrhea, abdominal pain  MSK: denies muscle aches, leg swelling  Neuro: denies headaches, numbness, tingling  Skin: denies rashes, bruises  Heme: denies petechia, abnormal bleeding

## 2021-06-17 NOTE — ED PROVIDER NOTE - PATIENT PORTAL LINK FT
You can access the FollowMyHealth Patient Portal offered by [facility] by registering at the following website: [email or web address]. By joining [facility]’s FollowMyHealth portal, you will also be able to view your health information using other applications (apps) compatible with our system.

## 2021-06-17 NOTE — ED ADULT NURSE NOTE - PMH
Anxiety    Chronic Lung Disease of Premat  follows with [facility] Pulmonology  CP (cerebral palsy)  - mild  CVA (Cerebral Vascular Accident)  Onset date unknown, noted on MRI from 5/2010  Depression    Hydrocephalus    Impacted teeth    Reactive Airway Disease  receives albuterol and xoponex treatments at home  S/P  Shunt  x2 with multiple shunt revisions  Sickle Cell Disease    Strabismus

## 2021-06-17 NOTE — ED ADULT NURSE REASSESSMENT NOTE - NS ED NURSE REASSESS COMMENT FT1
pt verbalized feeling better after steroid pt for discharge with pmd follow up pt with left chest wall deacessed  with sterile technique site clear dry pt given all follow up instructions vitals stable on discharge

## 2021-06-17 NOTE — ED PROVIDER NOTE - PHYSICAL EXAMINATION
CONSTITUTIONAL: NAD, awake, alert  HEAD: Normocephalic; atraumatic  EYES: EOMI, no nystagmus, + icterus   ENMT: External appears normal, MMM, no pharyngeal erythema, no exudates or pus, no uvular deviation, tolerating secretions, no signs of ludwigs or PTA  NECK: no tenderness, FROM  CARD: Normal Sl, S2; no audible murmurs  RESP: normal wob, lungs ctab, no respiratory distresss   ABD: soft, non-distended; non-tender  MSK: no edema, normal ROM in all four extremities  SKIN: Warm, dry, no rashes  NEURO: aaox3, moving all extremities spontaneously

## 2021-06-17 NOTE — ED PROVIDER NOTE - PMH
Anxiety    Chronic Lung Disease of Premat  follows with [facility] Pulmonology  CP (cerebral palsy)  - mild  CVA (Cerebral Vascular Accident)  Onset date unknown, noted on MRI from 5/2010  Depression    Hydrocephalus    Impacted teeth    Reactive Airway Disease  receives albuterol and xoponex treatments at home  S/P  Shunt  x2 with multiple shunt revisions  Sickle Cell Disease    Strabismus

## 2021-06-17 NOTE — ED PROVIDER NOTE - PROGRESS NOTE DETAILS
shared decision making w pt and mother -- lungs clear - , sats 97/98 no chest pain will defer cxr at this time - no concern for pna or acute chest offered decadron - pt declined - minimal pain 3/10 to throat - strep neg hgb at baseline mild leukocytosis - pt feeling better will dc home centor criteria 1- low suspcion

## 2021-06-29 ENCOUNTER — OUTPATIENT (OUTPATIENT)
Dept: OUTPATIENT SERVICES | Facility: HOSPITAL | Age: 21
LOS: 1 days | End: 2021-06-29
Payer: MEDICAID

## 2021-06-29 DIAGNOSIS — Z98.890 OTHER SPECIFIED POSTPROCEDURAL STATES: Chronic | ICD-10-CM

## 2021-06-29 DIAGNOSIS — D57.1 SICKLE-CELL DISEASE WITHOUT CRISIS: ICD-10-CM

## 2021-06-29 LAB
ALBUMIN SERPL ELPH-MCNC: 4.7 G/DL — SIGNIFICANT CHANGE UP (ref 3.3–5)
ALP SERPL-CCNC: 60 U/L — SIGNIFICANT CHANGE UP (ref 40–120)
ALT FLD-CCNC: 13 U/L — SIGNIFICANT CHANGE UP (ref 10–45)
ANION GAP SERPL CALC-SCNC: 12 MMOL/L — SIGNIFICANT CHANGE UP (ref 5–17)
AST SERPL-CCNC: 29 U/L — SIGNIFICANT CHANGE UP (ref 10–40)
BILIRUB SERPL-MCNC: 3.5 MG/DL — HIGH (ref 0.2–1.2)
BUN SERPL-MCNC: 9 MG/DL — SIGNIFICANT CHANGE UP (ref 7–23)
CALCIUM SERPL-MCNC: 8.8 MG/DL — SIGNIFICANT CHANGE UP (ref 8.4–10.5)
CHLORIDE SERPL-SCNC: 108 MMOL/L — SIGNIFICANT CHANGE UP (ref 96–108)
CO2 SERPL-SCNC: 19 MMOL/L — LOW (ref 22–31)
CREAT SERPL-MCNC: 0.93 MG/DL — SIGNIFICANT CHANGE UP (ref 0.5–1.3)
FERRITIN SERPL-MCNC: 1082 NG/ML — HIGH (ref 15–150)
GLUCOSE SERPL-MCNC: 92 MG/DL — SIGNIFICANT CHANGE UP (ref 70–99)
HCT VFR BLD CALC: 21.7 % — LOW (ref 34.5–45)
HGB BLD-MCNC: 7.8 G/DL — LOW (ref 11.5–15.5)
MCHC RBC-ENTMCNC: 30.7 PG — SIGNIFICANT CHANGE UP (ref 27–34)
MCHC RBC-ENTMCNC: 35.9 GM/DL — SIGNIFICANT CHANGE UP (ref 32–36)
MCV RBC AUTO: 85.4 FL — SIGNIFICANT CHANGE UP (ref 80–100)
NRBC # BLD: 0 /100 WBCS — SIGNIFICANT CHANGE UP (ref 0–0)
PLATELET # BLD AUTO: 364 K/UL — SIGNIFICANT CHANGE UP (ref 150–400)
POTASSIUM SERPL-MCNC: 3.8 MMOL/L — SIGNIFICANT CHANGE UP (ref 3.5–5.3)
POTASSIUM SERPL-SCNC: 3.8 MMOL/L — SIGNIFICANT CHANGE UP (ref 3.5–5.3)
PROT SERPL-MCNC: 6.8 G/DL — SIGNIFICANT CHANGE UP (ref 6–8.3)
RBC # BLD: 2.54 M/UL — LOW (ref 3.8–5.2)
RBC # FLD: 21.4 % — HIGH (ref 10.3–14.5)
SODIUM SERPL-SCNC: 139 MMOL/L — SIGNIFICANT CHANGE UP (ref 135–145)
WBC # BLD: 16.22 K/UL — HIGH (ref 3.8–10.5)
WBC # FLD AUTO: 16.22 K/UL — HIGH (ref 3.8–10.5)

## 2021-06-30 ENCOUNTER — OUTPATIENT (OUTPATIENT)
Dept: OUTPATIENT SERVICES | Facility: HOSPITAL | Age: 21
LOS: 1 days | End: 2021-06-30
Payer: MEDICAID

## 2021-06-30 DIAGNOSIS — D57.1 SICKLE-CELL DISEASE WITHOUT CRISIS: ICD-10-CM

## 2021-06-30 DIAGNOSIS — Z98.890 OTHER SPECIFIED POSTPROCEDURAL STATES: Chronic | ICD-10-CM

## 2021-06-30 LAB
HCT VFR BLD CALC: 21 % — CRITICAL LOW (ref 34.5–45)
HGB A MFR BLD: 43.8 % — LOW (ref 95.8–98)
HGB A2 MFR BLD: 3 % — SIGNIFICANT CHANGE UP (ref 2–3.2)
HGB BLD-MCNC: 7.4 G/DL — LOW (ref 11.5–15.5)
HGB C MFR BLD: 0 % — SIGNIFICANT CHANGE UP
HGB F MFR BLD: 0.6 % — SIGNIFICANT CHANGE UP (ref 0–1)
HGB OTHER MFR BLD ELPH: 0 % — SIGNIFICANT CHANGE UP
HGB S MFR BLD: 52.6 % — HIGH
MCHC RBC-ENTMCNC: 30 PG — SIGNIFICANT CHANGE UP (ref 27–34)
MCHC RBC-ENTMCNC: 35.2 GM/DL — SIGNIFICANT CHANGE UP (ref 32–36)
MCV RBC AUTO: 85 FL — SIGNIFICANT CHANGE UP (ref 80–100)
NRBC # BLD: 0 /100 WBCS — SIGNIFICANT CHANGE UP (ref 0–0)
PLATELET # BLD AUTO: 342 K/UL — SIGNIFICANT CHANGE UP (ref 150–400)
RBC # BLD: 2.47 M/UL — LOW (ref 3.8–5.2)
RBC # FLD: 21.6 % — HIGH (ref 10.3–14.5)
WBC # BLD: 15.01 K/UL — HIGH (ref 3.8–10.5)
WBC # FLD AUTO: 15.01 K/UL — HIGH (ref 3.8–10.5)

## 2021-06-30 PROCEDURE — 86922 COMPATIBILITY TEST ANTIGLOB: CPT

## 2021-06-30 PROCEDURE — 36430 TRANSFUSION BLD/BLD COMPNT: CPT

## 2021-06-30 PROCEDURE — 96523 IRRIG DRUG DELIVERY DEVICE: CPT

## 2021-06-30 PROCEDURE — 86900 BLOOD TYPING SEROLOGIC ABO: CPT

## 2021-06-30 PROCEDURE — 85027 COMPLETE CBC AUTOMATED: CPT

## 2021-06-30 PROCEDURE — 86902 BLOOD TYPE ANTIGEN DONOR EA: CPT

## 2021-06-30 PROCEDURE — P9040: CPT

## 2021-06-30 PROCEDURE — 86901 BLOOD TYPING SEROLOGIC RH(D): CPT

## 2021-06-30 PROCEDURE — 82728 ASSAY OF FERRITIN: CPT

## 2021-06-30 PROCEDURE — 86923 COMPATIBILITY TEST ELECTRIC: CPT

## 2021-06-30 PROCEDURE — 83020 HEMOGLOBIN ELECTROPHORESIS: CPT

## 2021-06-30 PROCEDURE — 86850 RBC ANTIBODY SCREEN: CPT

## 2021-06-30 PROCEDURE — 80053 COMPREHEN METABOLIC PANEL: CPT

## 2021-07-16 ENCOUNTER — OUTPATIENT (OUTPATIENT)
Dept: OUTPATIENT SERVICES | Facility: HOSPITAL | Age: 21
LOS: 1 days | End: 2021-07-16

## 2021-07-16 ENCOUNTER — APPOINTMENT (OUTPATIENT)
Dept: INTERNAL MEDICINE | Facility: CLINIC | Age: 21
End: 2021-07-16
Payer: MEDICAID

## 2021-07-16 VITALS
HEIGHT: 62 IN | WEIGHT: 113 LBS | SYSTOLIC BLOOD PRESSURE: 119 MMHG | OXYGEN SATURATION: 98 % | HEART RATE: 74 BPM | BODY MASS INDEX: 20.8 KG/M2 | DIASTOLIC BLOOD PRESSURE: 70 MMHG

## 2021-07-16 DIAGNOSIS — E83.111 HEMOCHROMATOSIS DUE TO REPEATED RED BLOOD CELL TRANSFUSIONS: ICD-10-CM

## 2021-07-16 DIAGNOSIS — Z98.2 PRESENCE OF CEREBROSPINAL FLUID DRAINAGE DEVICE: ICD-10-CM

## 2021-07-16 DIAGNOSIS — D57.3 SICKLE-CELL TRAIT: ICD-10-CM

## 2021-07-16 DIAGNOSIS — Z98.890 OTHER SPECIFIED POSTPROCEDURAL STATES: Chronic | ICD-10-CM

## 2021-07-16 DIAGNOSIS — Z92.89 PERSONAL HISTORY OF OTHER MEDICAL TREATMENT: ICD-10-CM

## 2021-07-16 DIAGNOSIS — F32.9 MAJOR DEPRESSIVE DISORDER, SINGLE EPISODE, UNSPECIFIED: ICD-10-CM

## 2021-07-16 DIAGNOSIS — F31.9 BIPOLAR DISORDER, UNSPECIFIED: ICD-10-CM

## 2021-07-16 PROCEDURE — 99214 OFFICE O/P EST MOD 30 MIN: CPT

## 2021-07-16 RX ORDER — LIDOCAINE AND PRILOCAINE 25; 25 MG/G; MG/G
2.5-2.5 CREAM TOPICAL
Qty: 1 | Refills: 5 | Status: ACTIVE | COMMUNITY
Start: 2017-11-30 | End: 1900-01-01

## 2021-07-16 RX ORDER — FLUTICASONE PROPIONATE 110 UG/1
110 AEROSOL, METERED RESPIRATORY (INHALATION) TWICE DAILY
Qty: 12 | Refills: 0 | Status: COMPLETED | COMMUNITY
Start: 2017-03-24 | End: 2021-07-16

## 2021-07-16 NOTE — HISTORY OF PRESENT ILLNESS
[FreeTextEntry1] : 20 yo female with HGB SS, no complaints. [de-identified] : 22 yo female with HGB SS on monthly single unit transfusion. 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 Ledbetter for neurosugery at [facility]- referral given, MRI pending\par 2.:Bipolar disorder- Dr. Gomez at [facility]-continue routine f/u\par 3. SCD- continue monthly simple transfusion\par 1-2 units q month\par 4. iron overload- continue jadenu- ferritin stable\par 5. COVID vax already done (pfizer X 2)\par 6.F/U 4 months\par \par Eleonora Richardson MD\par

## 2021-07-24 ENCOUNTER — APPOINTMENT (OUTPATIENT)
Dept: MRI IMAGING | Facility: HOSPITAL | Age: 21
End: 2021-07-24
Payer: MEDICAID

## 2021-07-24 ENCOUNTER — OUTPATIENT (OUTPATIENT)
Dept: OUTPATIENT SERVICES | Age: 21
LOS: 1 days | End: 2021-07-24

## 2021-07-24 DIAGNOSIS — Z98.890 OTHER SPECIFIED POSTPROCEDURAL STATES: Chronic | ICD-10-CM

## 2021-07-24 DIAGNOSIS — G91.9 HYDROCEPHALUS, UNSPECIFIED: ICD-10-CM

## 2021-07-24 PROCEDURE — 70551 MRI BRAIN STEM W/O DYE: CPT | Mod: 26

## 2021-07-26 ENCOUNTER — OUTPATIENT (OUTPATIENT)
Dept: OUTPATIENT SERVICES | Facility: HOSPITAL | Age: 21
LOS: 1 days | End: 2021-07-26
Payer: MEDICAID

## 2021-07-26 DIAGNOSIS — Z98.890 OTHER SPECIFIED POSTPROCEDURAL STATES: Chronic | ICD-10-CM

## 2021-07-26 DIAGNOSIS — D57.1 SICKLE-CELL DISEASE WITHOUT CRISIS: ICD-10-CM

## 2021-07-26 LAB
ALBUMIN SERPL ELPH-MCNC: 4.8 G/DL — SIGNIFICANT CHANGE UP (ref 3.3–5)
ALP SERPL-CCNC: 63 U/L — SIGNIFICANT CHANGE UP (ref 40–120)
ALT FLD-CCNC: 9 U/L — LOW (ref 10–45)
ANION GAP SERPL CALC-SCNC: 12 MMOL/L — SIGNIFICANT CHANGE UP (ref 5–17)
AST SERPL-CCNC: 25 U/L — SIGNIFICANT CHANGE UP (ref 10–40)
BILIRUB SERPL-MCNC: 4.3 MG/DL — HIGH (ref 0.2–1.2)
BUN SERPL-MCNC: 6 MG/DL — LOW (ref 7–23)
CALCIUM SERPL-MCNC: 9.9 MG/DL — SIGNIFICANT CHANGE UP (ref 8.4–10.5)
CHLORIDE SERPL-SCNC: 106 MMOL/L — SIGNIFICANT CHANGE UP (ref 96–108)
CO2 SERPL-SCNC: 22 MMOL/L — SIGNIFICANT CHANGE UP (ref 22–31)
CREAT SERPL-MCNC: 0.66 MG/DL — SIGNIFICANT CHANGE UP (ref 0.5–1.3)
FERRITIN SERPL-MCNC: 920 NG/ML — HIGH (ref 15–150)
GLUCOSE SERPL-MCNC: 89 MG/DL — SIGNIFICANT CHANGE UP (ref 70–99)
HCT VFR BLD CALC: 25.7 % — LOW (ref 34.5–45)
HGB BLD-MCNC: 8.8 G/DL — LOW (ref 11.5–15.5)
MCHC RBC-ENTMCNC: 30.9 PG — SIGNIFICANT CHANGE UP (ref 27–34)
MCHC RBC-ENTMCNC: 34.2 GM/DL — SIGNIFICANT CHANGE UP (ref 32–36)
MCV RBC AUTO: 90.2 FL — SIGNIFICANT CHANGE UP (ref 80–100)
NRBC # BLD: 0 /100 WBCS — SIGNIFICANT CHANGE UP (ref 0–0)
PLATELET # BLD AUTO: 390 K/UL — SIGNIFICANT CHANGE UP (ref 150–400)
POTASSIUM SERPL-MCNC: 3.9 MMOL/L — SIGNIFICANT CHANGE UP (ref 3.5–5.3)
POTASSIUM SERPL-SCNC: 3.9 MMOL/L — SIGNIFICANT CHANGE UP (ref 3.5–5.3)
PROT SERPL-MCNC: 7.1 G/DL — SIGNIFICANT CHANGE UP (ref 6–8.3)
RBC # BLD: 2.85 M/UL — LOW (ref 3.8–5.2)
RBC # FLD: 18.4 % — HIGH (ref 10.3–14.5)
SODIUM SERPL-SCNC: 140 MMOL/L — SIGNIFICANT CHANGE UP (ref 135–145)
WBC # BLD: 11.06 K/UL — HIGH (ref 3.8–10.5)
WBC # FLD AUTO: 11.06 K/UL — HIGH (ref 3.8–10.5)

## 2021-07-27 LAB
HGB A MFR BLD: 54 % — LOW (ref 95.8–98)
HGB A2 MFR BLD: 2.9 % — SIGNIFICANT CHANGE UP (ref 2–3.2)
HGB C MFR BLD: 0 % — SIGNIFICANT CHANGE UP
HGB F MFR BLD: 0.7 % — SIGNIFICANT CHANGE UP (ref 0–1)
HGB OTHER MFR BLD ELPH: 0 % — SIGNIFICANT CHANGE UP
HGB S MFR BLD: 42.4 % — HIGH

## 2021-07-28 ENCOUNTER — OUTPATIENT (OUTPATIENT)
Dept: OUTPATIENT SERVICES | Facility: HOSPITAL | Age: 21
LOS: 1 days | End: 2021-07-28
Payer: MEDICAID

## 2021-07-28 DIAGNOSIS — D57.1 SICKLE-CELL DISEASE WITHOUT CRISIS: ICD-10-CM

## 2021-07-28 DIAGNOSIS — Z98.890 OTHER SPECIFIED POSTPROCEDURAL STATES: Chronic | ICD-10-CM

## 2021-07-28 LAB
HCT VFR BLD CALC: 23.9 % — LOW (ref 34.5–45)
HGB BLD-MCNC: 8.2 G/DL — LOW (ref 11.5–15.5)
MCHC RBC-ENTMCNC: 31.2 PG — SIGNIFICANT CHANGE UP (ref 27–34)
MCHC RBC-ENTMCNC: 34.3 GM/DL — SIGNIFICANT CHANGE UP (ref 32–36)
MCV RBC AUTO: 90.9 FL — SIGNIFICANT CHANGE UP (ref 80–100)
NRBC # BLD: 0 /100 WBCS — SIGNIFICANT CHANGE UP (ref 0–0)
PLATELET # BLD AUTO: 334 K/UL — SIGNIFICANT CHANGE UP (ref 150–400)
RBC # BLD: 2.63 M/UL — LOW (ref 3.8–5.2)
RBC # FLD: 18.9 % — HIGH (ref 10.3–14.5)
WBC # BLD: 9.94 K/UL — SIGNIFICANT CHANGE UP (ref 3.8–10.5)
WBC # FLD AUTO: 9.94 K/UL — SIGNIFICANT CHANGE UP (ref 3.8–10.5)

## 2021-07-28 PROCEDURE — P9040: CPT

## 2021-07-28 PROCEDURE — 86901 BLOOD TYPING SEROLOGIC RH(D): CPT

## 2021-07-28 PROCEDURE — 96523 IRRIG DRUG DELIVERY DEVICE: CPT

## 2021-07-28 PROCEDURE — 82728 ASSAY OF FERRITIN: CPT

## 2021-07-28 PROCEDURE — 83020 HEMOGLOBIN ELECTROPHORESIS: CPT

## 2021-07-28 PROCEDURE — 85027 COMPLETE CBC AUTOMATED: CPT

## 2021-07-28 PROCEDURE — 86902 BLOOD TYPE ANTIGEN DONOR EA: CPT

## 2021-07-28 PROCEDURE — 99214 OFFICE O/P EST MOD 30 MIN: CPT | Mod: 95

## 2021-07-28 PROCEDURE — 80053 COMPREHEN METABOLIC PANEL: CPT

## 2021-07-28 PROCEDURE — 86850 RBC ANTIBODY SCREEN: CPT

## 2021-07-28 PROCEDURE — 86923 COMPATIBILITY TEST ELECTRIC: CPT

## 2021-07-28 PROCEDURE — 36430 TRANSFUSION BLD/BLD COMPNT: CPT

## 2021-07-28 PROCEDURE — 86900 BLOOD TYPING SEROLOGIC ABO: CPT

## 2021-07-31 NOTE — ED PROVIDER NOTE - RESPIRATORY, MLM
Pt presents to UC with c/o vaginal itching that started a few days ago. Pt denies discharge or foul odor. Pt reports she thinks she has a yeast infection which she has had previously.
Breath sounds clear and equal bilaterally.

## 2021-08-19 ENCOUNTER — NON-APPOINTMENT (OUTPATIENT)
Age: 21
End: 2021-08-19

## 2021-08-24 ENCOUNTER — NON-APPOINTMENT (OUTPATIENT)
Age: 21
End: 2021-08-24

## 2021-08-28 ENCOUNTER — OUTPATIENT (OUTPATIENT)
Dept: OUTPATIENT SERVICES | Facility: HOSPITAL | Age: 21
LOS: 1 days | End: 2021-08-28
Payer: MEDICAID

## 2021-08-28 DIAGNOSIS — D57.1 SICKLE-CELL DISEASE WITHOUT CRISIS: ICD-10-CM

## 2021-08-28 DIAGNOSIS — Z11.52 ENCOUNTER FOR SCREENING FOR COVID-19: ICD-10-CM

## 2021-08-28 DIAGNOSIS — Z98.890 OTHER SPECIFIED POSTPROCEDURAL STATES: Chronic | ICD-10-CM

## 2021-08-28 LAB
ALBUMIN SERPL ELPH-MCNC: 4.4 G/DL — SIGNIFICANT CHANGE UP (ref 3.3–5)
ALP SERPL-CCNC: 55 U/L — SIGNIFICANT CHANGE UP (ref 40–120)
ALT FLD-CCNC: 8 U/L — LOW (ref 10–45)
ANION GAP SERPL CALC-SCNC: 14 MMOL/L — SIGNIFICANT CHANGE UP (ref 5–17)
AST SERPL-CCNC: 18 U/L — SIGNIFICANT CHANGE UP (ref 10–40)
BILIRUB SERPL-MCNC: 4.2 MG/DL — HIGH (ref 0.2–1.2)
BUN SERPL-MCNC: 4 MG/DL — LOW (ref 7–23)
CALCIUM SERPL-MCNC: 9.4 MG/DL — SIGNIFICANT CHANGE UP (ref 8.4–10.5)
CHLORIDE SERPL-SCNC: 107 MMOL/L — SIGNIFICANT CHANGE UP (ref 96–108)
CO2 SERPL-SCNC: 21 MMOL/L — LOW (ref 22–31)
CREAT SERPL-MCNC: 0.56 MG/DL — SIGNIFICANT CHANGE UP (ref 0.5–1.3)
FERRITIN SERPL-MCNC: 1158 NG/ML — HIGH (ref 15–150)
GLUCOSE SERPL-MCNC: 107 MG/DL — HIGH (ref 70–99)
HCT VFR BLD CALC: 23.4 % — LOW (ref 34.5–45)
HGB BLD-MCNC: 8.1 G/DL — LOW (ref 11.5–15.5)
MCHC RBC-ENTMCNC: 31.9 PG — SIGNIFICANT CHANGE UP (ref 27–34)
MCHC RBC-ENTMCNC: 34.6 GM/DL — SIGNIFICANT CHANGE UP (ref 32–36)
MCV RBC AUTO: 92.1 FL — SIGNIFICANT CHANGE UP (ref 80–100)
NRBC # BLD: 0 /100 WBCS — SIGNIFICANT CHANGE UP (ref 0–0)
PLATELET # BLD AUTO: 395 K/UL — SIGNIFICANT CHANGE UP (ref 150–400)
POTASSIUM SERPL-MCNC: 3.8 MMOL/L — SIGNIFICANT CHANGE UP (ref 3.5–5.3)
POTASSIUM SERPL-SCNC: 3.8 MMOL/L — SIGNIFICANT CHANGE UP (ref 3.5–5.3)
PROT SERPL-MCNC: 6.6 G/DL — SIGNIFICANT CHANGE UP (ref 6–8.3)
RBC # BLD: 2.54 M/UL — LOW (ref 3.8–5.2)
RBC # FLD: 19.2 % — HIGH (ref 10.3–14.5)
SARS-COV-2 RNA SPEC QL NAA+PROBE: SIGNIFICANT CHANGE UP
SODIUM SERPL-SCNC: 142 MMOL/L — SIGNIFICANT CHANGE UP (ref 135–145)
WBC # BLD: 10.5 K/UL — SIGNIFICANT CHANGE UP (ref 3.8–10.5)
WBC # FLD AUTO: 10.5 K/UL — SIGNIFICANT CHANGE UP (ref 3.8–10.5)

## 2021-08-28 PROCEDURE — U0003: CPT

## 2021-08-28 PROCEDURE — U0005: CPT

## 2021-08-28 PROCEDURE — C9803: CPT

## 2021-08-29 LAB
HGB A MFR BLD: 53.6 % — LOW (ref 95.8–98)
HGB A2 MFR BLD: 2.9 % — SIGNIFICANT CHANGE UP (ref 2–3.2)
HGB C MFR BLD: 0 % — SIGNIFICANT CHANGE UP
HGB F MFR BLD: 0.4 % — SIGNIFICANT CHANGE UP (ref 0–1)
HGB OTHER MFR BLD ELPH: 0 % — SIGNIFICANT CHANGE UP
HGB S MFR BLD: 43.1 % — HIGH

## 2021-08-30 ENCOUNTER — NON-APPOINTMENT (OUTPATIENT)
Age: 21
End: 2021-08-30

## 2021-08-31 ENCOUNTER — OUTPATIENT (OUTPATIENT)
Dept: OUTPATIENT SERVICES | Facility: HOSPITAL | Age: 21
LOS: 1 days | End: 2021-08-31
Payer: MEDICAID

## 2021-08-31 DIAGNOSIS — Z98.890 OTHER SPECIFIED POSTPROCEDURAL STATES: Chronic | ICD-10-CM

## 2021-08-31 DIAGNOSIS — D57.1 SICKLE-CELL DISEASE WITHOUT CRISIS: ICD-10-CM

## 2021-08-31 LAB
HCT VFR BLD CALC: 20.5 % — CRITICAL LOW (ref 34.5–45)
HGB BLD-MCNC: 7.2 G/DL — LOW (ref 11.5–15.5)
MCHC RBC-ENTMCNC: 31.7 PG — SIGNIFICANT CHANGE UP (ref 27–34)
MCHC RBC-ENTMCNC: 35.1 GM/DL — SIGNIFICANT CHANGE UP (ref 32–36)
MCV RBC AUTO: 90.3 FL — SIGNIFICANT CHANGE UP (ref 80–100)
NRBC # BLD: 0 /100 WBCS — SIGNIFICANT CHANGE UP (ref 0–0)
PLATELET # BLD AUTO: 317 K/UL — SIGNIFICANT CHANGE UP (ref 150–400)
RBC # BLD: 2.27 M/UL — LOW (ref 3.8–5.2)
RBC # FLD: 17.3 % — HIGH (ref 10.3–14.5)
WBC # BLD: 12.66 K/UL — HIGH (ref 3.8–10.5)
WBC # FLD AUTO: 12.66 K/UL — HIGH (ref 3.8–10.5)

## 2021-08-31 PROCEDURE — 96523 IRRIG DRUG DELIVERY DEVICE: CPT

## 2021-08-31 PROCEDURE — P9040: CPT

## 2021-08-31 PROCEDURE — 86901 BLOOD TYPING SEROLOGIC RH(D): CPT

## 2021-08-31 PROCEDURE — 86850 RBC ANTIBODY SCREEN: CPT

## 2021-08-31 PROCEDURE — 85027 COMPLETE CBC AUTOMATED: CPT

## 2021-08-31 PROCEDURE — 36430 TRANSFUSION BLD/BLD COMPNT: CPT

## 2021-08-31 PROCEDURE — 86900 BLOOD TYPING SEROLOGIC ABO: CPT

## 2021-08-31 PROCEDURE — 83020 HEMOGLOBIN ELECTROPHORESIS: CPT

## 2021-08-31 PROCEDURE — 86902 BLOOD TYPE ANTIGEN DONOR EA: CPT

## 2021-08-31 PROCEDURE — 82728 ASSAY OF FERRITIN: CPT

## 2021-08-31 PROCEDURE — 80053 COMPREHEN METABOLIC PANEL: CPT

## 2021-08-31 PROCEDURE — 86923 COMPATIBILITY TEST ELECTRIC: CPT

## 2021-10-04 ENCOUNTER — INPATIENT (INPATIENT)
Facility: HOSPITAL | Age: 21
LOS: 3 days | Discharge: HOME | DRG: 812 | End: 2021-10-08
Attending: INTERNAL MEDICINE | Admitting: HOSPITALIST
Payer: MEDICAID

## 2021-10-04 VITALS
TEMPERATURE: 98 F | DIASTOLIC BLOOD PRESSURE: 68 MMHG | HEART RATE: 84 BPM | RESPIRATION RATE: 18 BRPM | OXYGEN SATURATION: 96 % | SYSTOLIC BLOOD PRESSURE: 102 MMHG | HEIGHT: 62 IN

## 2021-10-04 DIAGNOSIS — F31.9 BIPOLAR DISORDER, UNSPECIFIED: ICD-10-CM

## 2021-10-04 DIAGNOSIS — Z98.890 OTHER SPECIFIED POSTPROCEDURAL STATES: Chronic | ICD-10-CM

## 2021-10-04 DIAGNOSIS — D57.00 HB-SS DISEASE WITH CRISIS, UNSPECIFIED: ICD-10-CM

## 2021-10-04 LAB
ALBUMIN SERPL ELPH-MCNC: 2.9 G/DL — LOW (ref 3.3–5)
ALBUMIN SERPL ELPH-MCNC: 4.9 G/DL — SIGNIFICANT CHANGE UP (ref 3.3–5)
ALP SERPL-CCNC: 46 U/L — SIGNIFICANT CHANGE UP (ref 40–120)
ALP SERPL-CCNC: 83 U/L — SIGNIFICANT CHANGE UP (ref 40–120)
ALT FLD-CCNC: 24 U/L — SIGNIFICANT CHANGE UP (ref 10–45)
ALT FLD-CCNC: 46 U/L — HIGH (ref 10–45)
ANION GAP SERPL CALC-SCNC: 10 MMOL/L — SIGNIFICANT CHANGE UP (ref 5–17)
ANION GAP SERPL CALC-SCNC: 16 MMOL/L — SIGNIFICANT CHANGE UP (ref 5–17)
ANISOCYTOSIS BLD QL: SIGNIFICANT CHANGE UP
AST SERPL-CCNC: 31 U/L — SIGNIFICANT CHANGE UP (ref 10–40)
AST SERPL-CCNC: 59 U/L — HIGH (ref 10–40)
BASOPHILS # BLD AUTO: 0 K/UL — SIGNIFICANT CHANGE UP (ref 0–0.2)
BASOPHILS NFR BLD AUTO: 0 % — SIGNIFICANT CHANGE UP (ref 0–2)
BILIRUB SERPL-MCNC: 2.5 MG/DL — HIGH (ref 0.2–1.2)
BILIRUB SERPL-MCNC: 4.7 MG/DL — HIGH (ref 0.2–1.2)
BUN SERPL-MCNC: 11 MG/DL — SIGNIFICANT CHANGE UP (ref 7–23)
BUN SERPL-MCNC: 8 MG/DL — SIGNIFICANT CHANGE UP (ref 7–23)
CALCIUM SERPL-MCNC: 10 MG/DL — SIGNIFICANT CHANGE UP (ref 8.4–10.5)
CALCIUM SERPL-MCNC: 5.7 MG/DL — CRITICAL LOW (ref 8.4–10.5)
CHLORIDE SERPL-SCNC: 105 MMOL/L — SIGNIFICANT CHANGE UP (ref 96–108)
CHLORIDE SERPL-SCNC: 120 MMOL/L — HIGH (ref 96–108)
CO2 SERPL-SCNC: 12 MMOL/L — LOW (ref 22–31)
CO2 SERPL-SCNC: 18 MMOL/L — LOW (ref 22–31)
CREAT SERPL-MCNC: 0.62 MG/DL — SIGNIFICANT CHANGE UP (ref 0.5–1.3)
CREAT SERPL-MCNC: 0.99 MG/DL — SIGNIFICANT CHANGE UP (ref 0.5–1.3)
DACRYOCYTES BLD QL SMEAR: SLIGHT — SIGNIFICANT CHANGE UP
ELLIPTOCYTES BLD QL SMEAR: SLIGHT — SIGNIFICANT CHANGE UP
EOSINOPHIL # BLD AUTO: 0.23 K/UL — SIGNIFICANT CHANGE UP (ref 0–0.5)
EOSINOPHIL NFR BLD AUTO: 1.7 % — SIGNIFICANT CHANGE UP (ref 0–6)
GIANT PLATELETS BLD QL SMEAR: PRESENT — SIGNIFICANT CHANGE UP
GLUCOSE SERPL-MCNC: 60 MG/DL — LOW (ref 70–99)
GLUCOSE SERPL-MCNC: 86 MG/DL — SIGNIFICANT CHANGE UP (ref 70–99)
HCT VFR BLD CALC: 26 % — LOW (ref 34.5–45)
HGB BLD-MCNC: 9 G/DL — LOW (ref 11.5–15.5)
HOWELL-JOLLY BOD BLD QL SMEAR: PRESENT — SIGNIFICANT CHANGE UP
LYMPHOCYTES # BLD AUTO: 14.9 % — SIGNIFICANT CHANGE UP (ref 13–44)
LYMPHOCYTES # BLD AUTO: 2.04 K/UL — SIGNIFICANT CHANGE UP (ref 1–3.3)
MACROCYTES BLD QL: SIGNIFICANT CHANGE UP
MANUAL SMEAR VERIFICATION: SIGNIFICANT CHANGE UP
MCHC RBC-ENTMCNC: 28.8 PG — SIGNIFICANT CHANGE UP (ref 27–34)
MCHC RBC-ENTMCNC: 34.6 GM/DL — SIGNIFICANT CHANGE UP (ref 32–36)
MCV RBC AUTO: 83.3 FL — SIGNIFICANT CHANGE UP (ref 80–100)
MONOCYTES # BLD AUTO: 1.8 K/UL — HIGH (ref 0–0.9)
MONOCYTES NFR BLD AUTO: 13.2 % — SIGNIFICANT CHANGE UP (ref 2–14)
NEUTROPHILS # BLD AUTO: 9.59 K/UL — HIGH (ref 1.8–7.4)
NEUTROPHILS NFR BLD AUTO: 70.2 % — SIGNIFICANT CHANGE UP (ref 43–77)
NRBC # BLD: 6 /100 — HIGH (ref 0–0)
PAPPENHEIMER BOD BLD QL SMEAR: PRESENT — SIGNIFICANT CHANGE UP
PLAT MORPH BLD: NORMAL — SIGNIFICANT CHANGE UP
PLATELET # BLD AUTO: 329 K/UL — SIGNIFICANT CHANGE UP (ref 150–400)
POIKILOCYTOSIS BLD QL AUTO: SLIGHT — SIGNIFICANT CHANGE UP
POLYCHROMASIA BLD QL SMEAR: SLIGHT — SIGNIFICANT CHANGE UP
POTASSIUM SERPL-MCNC: 2.6 MMOL/L — CRITICAL LOW (ref 3.5–5.3)
POTASSIUM SERPL-MCNC: 4.1 MMOL/L — SIGNIFICANT CHANGE UP (ref 3.5–5.3)
POTASSIUM SERPL-SCNC: 2.6 MMOL/L — CRITICAL LOW (ref 3.5–5.3)
POTASSIUM SERPL-SCNC: 4.1 MMOL/L — SIGNIFICANT CHANGE UP (ref 3.5–5.3)
PROT SERPL-MCNC: 4.3 G/DL — LOW (ref 6–8.3)
PROT SERPL-MCNC: 7.4 G/DL — SIGNIFICANT CHANGE UP (ref 6–8.3)
RBC # BLD: 3.12 M/UL — LOW (ref 3.8–5.2)
RBC # BLD: 3.12 M/UL — LOW (ref 3.8–5.2)
RBC # FLD: 17.4 % — HIGH (ref 10.3–14.5)
RBC BLD AUTO: ABNORMAL
RETICS #: 504.2 K/UL — HIGH (ref 25–125)
RETICS/RBC NFR: 16.2 % — HIGH (ref 0.5–2.5)
SARS-COV-2 RNA SPEC QL NAA+PROBE: SIGNIFICANT CHANGE UP
SICKLE CELLS BLD QL SMEAR: SLIGHT — SIGNIFICANT CHANGE UP
SODIUM SERPL-SCNC: 139 MMOL/L — SIGNIFICANT CHANGE UP (ref 135–145)
SODIUM SERPL-SCNC: 142 MMOL/L — SIGNIFICANT CHANGE UP (ref 135–145)
TARGETS BLD QL SMEAR: SIGNIFICANT CHANGE UP
WBC # BLD: 13.66 K/UL — HIGH (ref 3.8–10.5)
WBC # FLD AUTO: 13.66 K/UL — HIGH (ref 3.8–10.5)

## 2021-10-04 PROCEDURE — 99223 1ST HOSP IP/OBS HIGH 75: CPT

## 2021-10-04 PROCEDURE — 99285 EMERGENCY DEPT VISIT HI MDM: CPT | Mod: CS

## 2021-10-04 PROCEDURE — 73080 X-RAY EXAM OF ELBOW: CPT | Mod: 26,LT

## 2021-10-04 PROCEDURE — 93010 ELECTROCARDIOGRAM REPORT: CPT

## 2021-10-04 PROCEDURE — 73090 X-RAY EXAM OF FOREARM: CPT | Mod: 26,LT

## 2021-10-04 PROCEDURE — 71046 X-RAY EXAM CHEST 2 VIEWS: CPT | Mod: 26

## 2021-10-04 PROCEDURE — 73110 X-RAY EXAM OF WRIST: CPT | Mod: 26,LT

## 2021-10-04 RX ORDER — ONDANSETRON 8 MG/1
4 TABLET, FILM COATED ORAL EVERY 6 HOURS
Refills: 0 | Status: DISCONTINUED | OUTPATIENT
Start: 2021-10-04 | End: 2021-10-08

## 2021-10-04 RX ORDER — SENNA PLUS 8.6 MG/1
1 TABLET ORAL
Qty: 0 | Refills: 0 | DISCHARGE

## 2021-10-04 RX ORDER — POTASSIUM CHLORIDE 20 MEQ
40 PACKET (EA) ORAL ONCE
Refills: 0 | Status: DISCONTINUED | OUTPATIENT
Start: 2021-10-04 | End: 2021-10-04

## 2021-10-04 RX ORDER — ARIPIPRAZOLE 15 MG/1
5 TABLET ORAL DAILY
Refills: 0 | Status: DISCONTINUED | OUTPATIENT
Start: 2021-10-04 | End: 2021-10-08

## 2021-10-04 RX ORDER — LIDOCAINE AND PRILOCAINE CREAM 25; 25 MG/G; MG/G
1 CREAM TOPICAL
Qty: 0 | Refills: 0 | DISCHARGE

## 2021-10-04 RX ORDER — ACETAMINOPHEN 500 MG
650 TABLET ORAL EVERY 6 HOURS
Refills: 0 | Status: DISCONTINUED | OUTPATIENT
Start: 2021-10-04 | End: 2021-10-08

## 2021-10-04 RX ORDER — CALCIUM GLUCONATE 100 MG/ML
2 VIAL (ML) INTRAVENOUS ONCE
Refills: 0 | Status: DISCONTINUED | OUTPATIENT
Start: 2021-10-04 | End: 2021-10-04

## 2021-10-04 RX ORDER — MAGNESIUM SULFATE 500 MG/ML
1 VIAL (ML) INJECTION ONCE
Refills: 0 | Status: DISCONTINUED | OUTPATIENT
Start: 2021-10-04 | End: 2021-10-04

## 2021-10-04 RX ORDER — NALOXONE HYDROCHLORIDE 4 MG/.1ML
0.1 SPRAY NASAL
Refills: 0 | Status: DISCONTINUED | OUTPATIENT
Start: 2021-10-04 | End: 2021-10-08

## 2021-10-04 RX ORDER — IBUPROFEN 200 MG
600 TABLET ORAL EVERY 6 HOURS
Refills: 0 | Status: DISCONTINUED | OUTPATIENT
Start: 2021-10-04 | End: 2021-10-08

## 2021-10-04 RX ORDER — AMOXICILLIN 250 MG/5ML
4 SUSPENSION, RECONSTITUTED, ORAL (ML) ORAL
Qty: 0 | Refills: 0 | DISCHARGE

## 2021-10-04 RX ORDER — SODIUM CHLORIDE 9 MG/ML
1000 INJECTION, SOLUTION INTRAVENOUS
Refills: 0 | Status: DISCONTINUED | OUTPATIENT
Start: 2021-10-04 | End: 2021-10-08

## 2021-10-04 RX ORDER — HYDROMORPHONE HYDROCHLORIDE 2 MG/ML
30 INJECTION INTRAMUSCULAR; INTRAVENOUS; SUBCUTANEOUS
Refills: 0 | Status: DISCONTINUED | OUTPATIENT
Start: 2021-10-04 | End: 2021-10-04

## 2021-10-04 RX ORDER — POLYETHYLENE GLYCOL 3350 17 G/17G
17 POWDER, FOR SOLUTION ORAL DAILY
Refills: 0 | Status: DISCONTINUED | OUTPATIENT
Start: 2021-10-04 | End: 2021-10-08

## 2021-10-04 RX ORDER — KETOROLAC TROMETHAMINE 30 MG/ML
30 SYRINGE (ML) INJECTION ONCE
Refills: 0 | Status: DISCONTINUED | OUTPATIENT
Start: 2021-10-04 | End: 2021-10-04

## 2021-10-04 RX ORDER — HYDROMORPHONE HYDROCHLORIDE 2 MG/ML
30 INJECTION INTRAMUSCULAR; INTRAVENOUS; SUBCUTANEOUS
Refills: 0 | Status: DISCONTINUED | OUTPATIENT
Start: 2021-10-04 | End: 2021-10-08

## 2021-10-04 RX ORDER — ENOXAPARIN SODIUM 100 MG/ML
40 INJECTION SUBCUTANEOUS DAILY
Refills: 0 | Status: DISCONTINUED | OUTPATIENT
Start: 2021-10-04 | End: 2021-10-08

## 2021-10-04 RX ORDER — FOLIC ACID 0.8 MG
1 TABLET ORAL DAILY
Refills: 0 | Status: DISCONTINUED | OUTPATIENT
Start: 2021-10-04 | End: 2021-10-08

## 2021-10-04 RX ORDER — SODIUM CHLORIDE 9 MG/ML
1000 INJECTION INTRAMUSCULAR; INTRAVENOUS; SUBCUTANEOUS ONCE
Refills: 0 | Status: COMPLETED | OUTPATIENT
Start: 2021-10-04 | End: 2021-10-04

## 2021-10-04 RX ORDER — HYDROMORPHONE HYDROCHLORIDE 2 MG/ML
0.5 INJECTION INTRAMUSCULAR; INTRAVENOUS; SUBCUTANEOUS EVERY 4 HOURS
Refills: 0 | Status: DISCONTINUED | OUTPATIENT
Start: 2021-10-04 | End: 2021-10-05

## 2021-10-04 RX ORDER — CHOLECALCIFEROL (VITAMIN D3) 125 MCG
1 CAPSULE ORAL
Qty: 0 | Refills: 0 | DISCHARGE

## 2021-10-04 RX ORDER — MORPHINE SULFATE 50 MG/1
4 CAPSULE, EXTENDED RELEASE ORAL ONCE
Refills: 0 | Status: DISCONTINUED | OUTPATIENT
Start: 2021-10-04 | End: 2021-10-04

## 2021-10-04 RX ORDER — OXYCODONE HYDROCHLORIDE 5 MG/1
5 TABLET ORAL ONCE
Refills: 0 | Status: DISCONTINUED | OUTPATIENT
Start: 2021-10-04 | End: 2021-10-04

## 2021-10-04 RX ORDER — MEDROXYPROGESTERONE ACETATE 150 MG/ML
1 INJECTION, SUSPENSION, EXTENDED RELEASE INTRAMUSCULAR
Qty: 0 | Refills: 0 | DISCHARGE

## 2021-10-04 RX ORDER — FLUTICASONE PROPIONATE 220 MCG
2 AEROSOL WITH ADAPTER (GRAM) INHALATION
Qty: 0 | Refills: 0 | DISCHARGE

## 2021-10-04 RX ORDER — DIPHENHYDRAMINE HCL 50 MG
25 CAPSULE ORAL EVERY 4 HOURS
Refills: 0 | Status: DISCONTINUED | OUTPATIENT
Start: 2021-10-04 | End: 2021-10-08

## 2021-10-04 RX ORDER — SERTRALINE 25 MG/1
100 TABLET, FILM COATED ORAL DAILY
Refills: 0 | Status: DISCONTINUED | OUTPATIENT
Start: 2021-10-04 | End: 2021-10-08

## 2021-10-04 RX ORDER — POTASSIUM CHLORIDE 20 MEQ
10 PACKET (EA) ORAL
Refills: 0 | Status: DISCONTINUED | OUTPATIENT
Start: 2021-10-04 | End: 2021-10-04

## 2021-10-04 RX ORDER — HYDROXYUREA 500 MG/1
500 CAPSULE ORAL DAILY
Refills: 0 | Status: DISCONTINUED | OUTPATIENT
Start: 2021-10-05 | End: 2021-10-08

## 2021-10-04 RX ADMIN — MORPHINE SULFATE 4 MILLIGRAM(S): 50 CAPSULE, EXTENDED RELEASE ORAL at 11:49

## 2021-10-04 RX ADMIN — HYDROMORPHONE HYDROCHLORIDE 0.5 MILLIGRAM(S): 2 INJECTION INTRAMUSCULAR; INTRAVENOUS; SUBCUTANEOUS at 18:28

## 2021-10-04 RX ADMIN — SERTRALINE 100 MILLIGRAM(S): 25 TABLET, FILM COATED ORAL at 23:16

## 2021-10-04 RX ADMIN — ONDANSETRON 4 MILLIGRAM(S): 8 TABLET, FILM COATED ORAL at 18:30

## 2021-10-04 RX ADMIN — MORPHINE SULFATE 4 MILLIGRAM(S): 50 CAPSULE, EXTENDED RELEASE ORAL at 14:25

## 2021-10-04 RX ADMIN — SODIUM CHLORIDE 75 MILLILITER(S): 9 INJECTION, SOLUTION INTRAVENOUS at 15:33

## 2021-10-04 RX ADMIN — ARIPIPRAZOLE 5 MILLIGRAM(S): 15 TABLET ORAL at 23:17

## 2021-10-04 RX ADMIN — ENOXAPARIN SODIUM 40 MILLIGRAM(S): 100 INJECTION SUBCUTANEOUS at 23:16

## 2021-10-04 RX ADMIN — MORPHINE SULFATE 4 MILLIGRAM(S): 50 CAPSULE, EXTENDED RELEASE ORAL at 11:48

## 2021-10-04 RX ADMIN — Medication 30 MILLIGRAM(S): at 13:03

## 2021-10-04 RX ADMIN — Medication 30 MILLIGRAM(S): at 14:22

## 2021-10-04 RX ADMIN — OXYCODONE HYDROCHLORIDE 5 MILLIGRAM(S): 5 TABLET ORAL at 14:22

## 2021-10-04 RX ADMIN — OXYCODONE HYDROCHLORIDE 5 MILLIGRAM(S): 5 TABLET ORAL at 12:36

## 2021-10-04 RX ADMIN — HYDROMORPHONE HYDROCHLORIDE 30 MILLILITER(S): 2 INJECTION INTRAMUSCULAR; INTRAVENOUS; SUBCUTANEOUS at 23:31

## 2021-10-04 RX ADMIN — HYDROMORPHONE HYDROCHLORIDE 0.5 MILLIGRAM(S): 2 INJECTION INTRAMUSCULAR; INTRAVENOUS; SUBCUTANEOUS at 18:39

## 2021-10-04 RX ADMIN — SODIUM CHLORIDE 1000 MILLILITER(S): 9 INJECTION INTRAMUSCULAR; INTRAVENOUS; SUBCUTANEOUS at 13:03

## 2021-10-04 RX ADMIN — MORPHINE SULFATE 4 MILLIGRAM(S): 50 CAPSULE, EXTENDED RELEASE ORAL at 14:31

## 2021-10-04 RX ADMIN — MORPHINE SULFATE 4 MILLIGRAM(S): 50 CAPSULE, EXTENDED RELEASE ORAL at 07:54

## 2021-10-04 NOTE — H&P ADULT - NSHPPHYSICALEXAM_GEN_ALL_CORE
T(C): 36.7 (10-04-21 @ 12:01), Max: 36.9 (10-04-21 @ 07:13)  HR: 77 (10-04-21 @ 12:01) (72 - 84)  BP: 124/77 (10-04-21 @ 12:01) (102/68 - 127/77)  RR: 18 (10-04-21 @ 12:01) (16 - 18)  SpO2: 99% (10-04-21 @ 12:01) (96% - 100%)    GEN: female in NAD, appears comfortable, no diaphoresis  EYES: No scleral injection, PERRL, EOMI  ENTM: neck supple & symmetric without tracheal deviation, moist membranes, no gross hearing impairment, thyroid gland not enlarged  CV: +S1/S2, no m/r/g, no abdominal bruit, no LE edema; +left chest wall mediport  RESP: breathing comfortably, no respiratory accessory muscle use, CTAB, no w/r/r  GI: normoactive BS, soft, NTND, no rebounding/guarding, no palpable masses  LYMPHATICS: no LAD or tenderness to palpation  NEURO: AOx3, no focal deficits, CNII-XII grossly intact  PSYCH: No SI/HI/AVH, appropriate affect, appropriate insight/judgment   SKIN: no petechiae, ecchymosis or maculopapular rash noted

## 2021-10-04 NOTE — ED ADULT NURSE NOTE - NSICDXPASTMEDICALHX_GEN_ALL_CORE_FT
PAST MEDICAL HISTORY:  Anxiety     Chronic Lung Disease of Premat follows with [facility] Pulmonology    CP (cerebral palsy) - mild    CVA (Cerebral Vascular Accident) Onset date unknown, noted on MRI from 5/2010    Depression     Hydrocephalus     Impacted teeth     Reactive Airway Disease receives albuterol and xoponex treatments at home    S/P  Shunt x2 with multiple shunt revisions    Sickle Cell Disease     Strabismus

## 2021-10-04 NOTE — H&P ADULT - NSHPREVIEWOFSYSTEMS_GEN_ALL_CORE
GEN: no night sweats or change in appetite  EYES: no changes in vision or diplopia   ENT: no epistaxis, sinus pain, gingival bleeding, odynophagia or dysphagia  CV: no CP, PND or palpitations  RESP: no cough, wheezing, or hemoptysis  GI: no hematemesis, hematochezia, or melena  : no dysuria, polyuria, or hematuria  MSK: no arthralgias or joint swelling  NEURO: no gross sensory changes, numbness, focal deficits  PSYCH: no depression or changes in concentration  HEME/ONC: no purpura, petechiae or night sweats  SKIN: no pruritus, hair loss or skin lesions  ALL: no photosensitivity, no complaints of anaphylaxis (SOB, throat swelling)

## 2021-10-04 NOTE — ED PROVIDER NOTE - NSICDXPASTMEDICALHX_GEN_ALL_CORE_FT
PAST MEDICAL HISTORY:  Anxiety     Chronic Lung Disease of Premat follows with [facility] Pulmonology    CP (cerebral palsy) - mild    CVA (Cerebral Vascular Accident) Onset date unknown, noted on MRI from 5/2010    Depression     Hydrocephalus     Impacted teeth     Reactive Airway Disease receives albuterol and xoponex treatments at home    S/P  Shunt x2 with multiple shunt revisions    Sickle Cell Disease     Strabismus

## 2021-10-04 NOTE — ED ADULT NURSE REASSESSMENT NOTE - NS ED NURSE REASSESS COMMENT FT1
[facility] technique maintained. patient tolerated well. blood work sent.

## 2021-10-04 NOTE — H&P ADULT - ASSESSMENT
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 Patient with acute vaso-occlusive crisis which requires inpatient admission for IV opioids and monitoring for progression to acute chest.

## 2021-10-04 NOTE — H&P ADULT - PROBLEM SELECTOR PLAN 1
-Will provide PRN dilaudid IV push while in the ED then transition to PCA pump  -Tylenol and Motrin PRN for mild-mod pain  -Start 1/2NS at 75 cc/hr  -Incentive spirometer  -Daily sickle cell labs  -Continue with Hydroxyurea and Folic Acid  -Will send hemoglobin electrophoresis and if HgS% is high would provide simple blood transfusion (goal HgS <30%, though has not been at this level recently. Patient states pending simple transfusion in a couple of days anyway)  -DVT PPx

## 2021-10-04 NOTE — ED PROVIDER NOTE - OBJECTIVE STATEMENT
21F hx of sickle cell SS, CLD of prematurity, CVA,  shunt in place, s/p tonsillectomy, left chest wall mediport (placed years ago), last access 3 weeks ago presents to the ED atraumatic., 21F hx of sickle cell SS, CLD of prematurity, CVA,  shunt in place, s/p tonsillectomy, left chest wall mediport (placed years ago), last access 3 weeks ago presents to the ED atraumatic left arm pain that started when patient woke up, feels different than prior sickle cell pain. Pain worse with arm flexion and supination. Denies numbness, tingling, weakness. Patient also endorsing mild diffuse abdominal discomfort with radiation to lower chest, constant, not pleuritic, not exertional. No change with diet. Tried motrin and tylenol at home, does not use oxycodone. Denies nausea, vomiting, diarrhea.

## 2021-10-04 NOTE — ED ADULT NURSE NOTE - OBJECTIVE STATEMENT
Pt c/o "left forearm pain x 1 hr that woke her from her sleep and is worse when trying to get dressed/certain movements. No injury. My sickle cell pain to arms usually isn't this bad. I took Tylenol when it started 1 hr ago but it hasn't helped yet. "

## 2021-10-04 NOTE — CHART NOTE - NSCHARTNOTEFT_GEN_A_CORE
Notified by RN that she spoke with Marisol from pain service about incorrect PCA pump order  PCA pump concentration was ordered for 5mg/mL, should be 1mg/ml. Ordered by Dr. Lau  Spoke with [facility], who will update the order    Amy Sandoval PA-C  Department of Medicine Notified by RN that she spoke with Marisol from pain service about incorrect PCA pump order  PCA pump concentration was ordered for 5mg/mL, should be 1mg/ml. Ordered by Dr. Lau  Spoke with [facility], Dr. Reyes, who will update the order    Amy Sandoval PA-C  Department of Medicine

## 2021-10-04 NOTE — ED ADULT NURSE REASSESSMENT NOTE - NS ED NURSE REASSESS COMMENT FT1
received report from KEYANA De La Cruz. patient in bed in no acute distress. mother at the bedside. c/o 9/10 left arm pain without injury. patient to be seen by MD. left mediport noted to chest wall. last accessed 3 weeks ago. call bell in reach, will continue to monitor.

## 2021-10-04 NOTE — CHART NOTE - NSCHARTNOTEFT_GEN_A_CORE
Reference #: [id number]    Rx Written	Rx Dispensed	Drug	Quantity	Days Supply	Prescriber Name	Prescriber [facility] #  07/21/2021	07/21/2021	tramadol hcl 50 mg tablet	30	8	Eleonora Richardson	[id number]

## 2021-10-04 NOTE — ED ADULT NURSE REASSESSMENT NOTE - NS ED NURSE REASSESS COMMENT FT1
patient resting comfortably in bed. c/o 3/10 chest pain. MD aware. pending repeat blood work. patient aware of plan of care

## 2021-10-04 NOTE — ED PROVIDER NOTE - ATTENDING CONTRIBUTION TO CARE
Patient with history as above presenting complaining of atraumatic L elbow pains, somewhat similar to prior sickle pain crises.  No associated fevers or chills.  Pain worse with rotating forearm.  Well appearing, no point tenderness/swelling/edema on exam, no deformity, normal ROM.  Possible pain crisis?  plan for labs, pain control, plain films of arm to screen for avascular necrosis.

## 2021-10-04 NOTE — H&P ADULT - NSICDXPASTMEDICALHX_GEN_ALL_CORE_FT
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

## 2021-10-04 NOTE — ED PROVIDER NOTE - NS ED ROS FT
CONSTITUTIONAL: No fevers, chills, fatigue, weakness, unexpected weight change  CV: + chest pain  PULM: No shortness of breath  GI: +abdominal pain. No nausea, vomiting, diarrhea, constipation  : No dysuria, polyuria, hematuria  SKIN: No rashes, swelling  MSK: + left arm pain  NEURO: No headache, numbness, tingling, sciatica, seizures, saddle anesthesia  HEME: No nodules  PSYCH: No SI

## 2021-10-04 NOTE — ED PROVIDER NOTE - PHYSICAL EXAMINATION
GENERAL: young female, lying in bed, NAD. Vital signs are within normal limits  EYES: Conjunctiva noninjected or pale, sclera anicteric  HENT: NC/AT, moist mucous membranes  NECK: Supple, trachea midline  LUNG: Nonlabored respirations, no wheezes, rales  CV: RRR, Pulses- Radial/dorsalis pedis: 2+ bilateral and equal, + chest wall tenderness. + left chest wall mediport.  ABDOMEN: Nondistended, nontender, no guarding  MSK: + left forearm tenderness, diffuse. No midline spinal tenderness, step-offs, or deformities. No paraspinal tenderness  -Range of motion: Full range UE b/l (shoulder, elbow, wrist, fingers); LE b/l (hip, knee, ankle); nonrestricted flexion/extension/rotation of back. + pain with left arm supination.  -Strength: 5/5 muscle strength UE/LE b/l   SKIN: No rashes, bruises  NEURO: AAOx4 (to person, place, time, event), no tremor, steady gait, sensation intact to touch  PSYCH: Normal mood and affect GENERAL: young female, lying in bed, NAD. Vital signs are within normal limits  EYES: Conjunctiva noninjected or pale, sclera icteric  HENT: NC/AT, moist mucous membranes  NECK: Supple, trachea midline  LUNG: Nonlabored respirations, no wheezes, rales  CV: RRR, Pulses- Radial/dorsalis pedis: 2+ bilateral and equal, + chest wall tenderness. + left chest wall mediport.  ABDOMEN: Nondistended, nontender, no guarding  MSK: + left forearm tenderness, diffuse. No midline spinal tenderness, step-offs, or deformities. No paraspinal tenderness  -Range of motion: Full range UE b/l (shoulder, elbow, wrist, fingers); LE b/l (hip, knee, ankle); nonrestricted flexion/extension/rotation of back. + pain with left arm supination.  -Strength: 5/5 muscle strength UE/LE b/l   SKIN: No rashes, bruises  NEURO: AAOx4 (to person, place, time, event), no tremor, steady gait, sensation intact to touch  PSYCH: Normal mood and affect

## 2021-10-04 NOTE — H&P ADULT - HISTORY OF PRESENT ILLNESS
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 Patient states pain occurred suddenly when waking up this morning. She denies trauma. She states pain is 8/10 and not alleviated by Tylenol. She only takes Tylenol PRN for pain. She has been prescribed Tramadol in the past, but with only remote use. She denies shortness of breath, but finds that it is difficult for her to take in deep breaths because of pain. Patient states feels similar to prior vaso-occlusive crisis pain. She denies fever or cough. Unsure about what may have triggered this, but possibly cold and walking in shorts yesterday. No known sick contacts. Last VOC many years prior. She had acute chest many years prior to that. She receives simple blood transfusions once a month and has been receiving two units lately. In the ED had unremarkable XR (including chest). She was given 1 L NS, tramadol, morphine 4 mg x2, and Oxycodone 5. She states pain improves to 4-5/10 after morphine, but quickly reoccurs.

## 2021-10-04 NOTE — ED PROVIDER NOTE - PROGRESS NOTE DETAILS
Jose Granado D.O., PGY3 (Resident)  Rpt bmp wnl. Pain improved but still present. Results endorsed. Redose morphine. Suspect vasoocclusive crisis Jose Granado D.O., PGY3 (Resident)  Pain still present. Discussed further with patient. Patient now also suspects vasoocculsive crisis. Patient not clinically dehydrated, tolerating oral intake (gingerale x2). No current indication for IVF. EKG without diagnostic ischemic ekg changes. Patient amenable to admission. Will admit for further pain control. Jose Granado D.O., PGY3 (Resident)  Discussed with. hospitalist. Istop only with tramadol. Rec CDU for continued pain control and if not tolerated on oral meds > can admit. Jose Granado D.O., PGY3 (Resident)  Discussed with patient, mom, PCP. Patient not clinically dehydrated. Labs endorsed. Patient tolerating PO. Amenable to trying to oxycodone oral with toradol. If pain not improved, shared decision making to opt to admit instead of CDU. PCP recs patient to get IVF bolus. Discussed with patient re: risk of precipitating sickling. Agreeable to try IVF bolus. Continue to monitor. Jose Granado D.O., PGY3 (Resident)  Pain not improved. REdose morphine. Admit. Discussed with patient and mom. Amenable

## 2021-10-05 LAB
ALBUMIN SERPL ELPH-MCNC: 4.1 G/DL — SIGNIFICANT CHANGE UP (ref 3.3–5)
ALP SERPL-CCNC: 66 U/L — SIGNIFICANT CHANGE UP (ref 40–120)
ALT FLD-CCNC: 49 U/L — HIGH (ref 10–45)
ANION GAP SERPL CALC-SCNC: 11 MMOL/L — SIGNIFICANT CHANGE UP (ref 5–17)
AST SERPL-CCNC: 62 U/L — HIGH (ref 10–40)
BILIRUB DIRECT SERPL-MCNC: 0.3 MG/DL — HIGH (ref 0–0.2)
BILIRUB SERPL-MCNC: 4.4 MG/DL — HIGH (ref 0.2–1.2)
BUN SERPL-MCNC: 8 MG/DL — SIGNIFICANT CHANGE UP (ref 7–23)
CALCIUM SERPL-MCNC: 9.1 MG/DL — SIGNIFICANT CHANGE UP (ref 8.4–10.5)
CHLORIDE SERPL-SCNC: 108 MMOL/L — SIGNIFICANT CHANGE UP (ref 96–108)
CO2 SERPL-SCNC: 19 MMOL/L — LOW (ref 22–31)
COVID-19 SPIKE DOMAIN AB INTERP: POSITIVE
COVID-19 SPIKE DOMAIN ANTIBODY RESULT: >250 U/ML — HIGH
CREAT SERPL-MCNC: 0.9 MG/DL — SIGNIFICANT CHANGE UP (ref 0.5–1.3)
GLUCOSE SERPL-MCNC: 89 MG/DL — SIGNIFICANT CHANGE UP (ref 70–99)
HCT VFR BLD CALC: 24.2 % — LOW (ref 34.5–45)
HGB BLD-MCNC: 8.2 G/DL — LOW (ref 11.5–15.5)
LDH SERPL L TO P-CCNC: 454 U/L — HIGH (ref 50–242)
MCHC RBC-ENTMCNC: 28.5 PG — SIGNIFICANT CHANGE UP (ref 27–34)
MCHC RBC-ENTMCNC: 33.9 GM/DL — SIGNIFICANT CHANGE UP (ref 32–36)
MCV RBC AUTO: 84 FL — SIGNIFICANT CHANGE UP (ref 80–100)
NRBC # BLD: 6 /100 WBCS — HIGH (ref 0–0)
PLATELET # BLD AUTO: 304 K/UL — SIGNIFICANT CHANGE UP (ref 150–400)
POTASSIUM SERPL-MCNC: 4 MMOL/L — SIGNIFICANT CHANGE UP (ref 3.5–5.3)
POTASSIUM SERPL-SCNC: 4 MMOL/L — SIGNIFICANT CHANGE UP (ref 3.5–5.3)
PROT SERPL-MCNC: 5.9 G/DL — LOW (ref 6–8.3)
RBC # BLD: 2.88 M/UL — LOW (ref 3.8–5.2)
RBC # BLD: 2.88 M/UL — LOW (ref 3.8–5.2)
RBC # FLD: 16.9 % — HIGH (ref 10.3–14.5)
RETICS #: 496.2 K/UL — HIGH (ref 25–125)
RETICS/RBC NFR: 17.2 % — HIGH (ref 0.5–2.5)
SARS-COV-2 IGG+IGM SERPL QL IA: >250 U/ML — HIGH
SARS-COV-2 IGG+IGM SERPL QL IA: POSITIVE
SODIUM SERPL-SCNC: 138 MMOL/L — SIGNIFICANT CHANGE UP (ref 135–145)
WBC # BLD: 13.8 K/UL — HIGH (ref 3.8–10.5)
WBC # FLD AUTO: 13.8 K/UL — HIGH (ref 3.8–10.5)

## 2021-10-05 PROCEDURE — 99233 SBSQ HOSP IP/OBS HIGH 50: CPT

## 2021-10-05 PROCEDURE — 83020 HEMOGLOBIN ELECTROPHORESIS: CPT | Mod: 26

## 2021-10-05 RX ORDER — CHLORHEXIDINE GLUCONATE 213 G/1000ML
1 SOLUTION TOPICAL
Refills: 0 | Status: DISCONTINUED | OUTPATIENT
Start: 2021-10-05 | End: 2021-10-08

## 2021-10-05 RX ADMIN — SERTRALINE 100 MILLIGRAM(S): 25 TABLET, FILM COATED ORAL at 12:13

## 2021-10-05 RX ADMIN — CHLORHEXIDINE GLUCONATE 1 APPLICATION(S): 213 SOLUTION TOPICAL at 14:00

## 2021-10-05 RX ADMIN — Medication 1 MILLIGRAM(S): at 12:13

## 2021-10-05 RX ADMIN — ARIPIPRAZOLE 5 MILLIGRAM(S): 15 TABLET ORAL at 12:13

## 2021-10-05 RX ADMIN — HYDROMORPHONE HYDROCHLORIDE 30 MILLILITER(S): 2 INJECTION INTRAMUSCULAR; INTRAVENOUS; SUBCUTANEOUS at 19:23

## 2021-10-05 RX ADMIN — Medication 600 MILLIGRAM(S): at 09:54

## 2021-10-05 RX ADMIN — Medication 600 MILLIGRAM(S): at 10:30

## 2021-10-05 RX ADMIN — Medication 600 MILLIGRAM(S): at 21:11

## 2021-10-05 RX ADMIN — POLYETHYLENE GLYCOL 3350 17 GRAM(S): 17 POWDER, FOR SOLUTION ORAL at 12:13

## 2021-10-05 RX ADMIN — HYDROMORPHONE HYDROCHLORIDE 30 MILLILITER(S): 2 INJECTION INTRAMUSCULAR; INTRAVENOUS; SUBCUTANEOUS at 07:37

## 2021-10-05 RX ADMIN — ENOXAPARIN SODIUM 40 MILLIGRAM(S): 100 INJECTION SUBCUTANEOUS at 12:13

## 2021-10-05 RX ADMIN — HYDROXYUREA 500 MILLIGRAM(S): 500 CAPSULE ORAL at 12:13

## 2021-10-05 RX ADMIN — Medication 600 MILLIGRAM(S): at 22:32

## 2021-10-05 NOTE — PROGRESS NOTE ADULT - SUBJECTIVE AND OBJECTIVE BOX
[facility] Division of Hospital Medicine  Bisi Glaser MD  Pager (KAILEE-F, 8A-7U): [phone]  Other Times:  [phone]      SUBJECTIVE / OVERNIGHT EVENTS: Pt seen and examined at bedside this morning. She is complaining of left arm and chest pain which is unchanged from when she came in. Her PCA pump was not working and was being adjusted during the encounter.      MEDICATIONS  (STANDING):  ARIPiprazole 5 milliGRAM(s) Oral daily  chlorhexidine 4% Liquid 1 Application(s) Topical <User Schedule>  enoxaparin Injectable 40 milliGRAM(s) SubCutaneous daily  folic acid 1 milliGRAM(s) Oral daily  HYDROmorphone PCA (1 mG/mL) 30 milliLiter(s) PCA Continuous PCA Continuous  hydroxyurea 500 milliGRAM(s) Oral daily  polyethylene glycol 3350 17 Gram(s) Oral daily  sertraline 100 milliGRAM(s) Oral daily  sodium chloride 0.45%. 1000 milliLiter(s) (75 mL/Hr) IV Continuous <Continuous>    MEDICATIONS  (PRN):  acetaminophen   Tablet .. 650 milliGRAM(s) Oral every 6 hours PRN Temp greater or equal to 38C (100.4F), Mild Pain (1 - 3)  diphenhydrAMINE 25 milliGRAM(s) Oral every 4 hours PRN Rash and/or Itching  ibuprofen  Tablet. 600 milliGRAM(s) Oral every 6 hours PRN Moderate Pain (4 - 6)  naloxone Injectable 0.1 milliGRAM(s) IV Push every 3 minutes PRN For ANY of the following changes in patient status:  A. RR LESS THAN 10 breaths per minute, B. Oxygen saturation LESS THAN 90%, C. Sedation score of 6  ondansetron Injectable 4 milliGRAM(s) IV Push every 6 hours PRN Nausea      I&O's Summary    05 Oct 2021 07:01  -  05 Oct 2021 13:55  --------------------------------------------------------  IN: 220 mL / OUT: 0 mL / NET: 220 mL        PHYSICAL EXAM:  Vital Signs Last 24 Hrs  T(C): 37.1 (05 Oct 2021 09:38), Max: 37.1 (04 Oct 2021 21:42)  T(F): 98.7 (05 Oct 2021 09:38), Max: 98.8 (04 Oct 2021 21:42)  HR: 76 (05 Oct 2021 09:38) (62 - 83)  BP: 117/76 (05 Oct 2021 09:38) (106/66 - 122/78)  BP(mean): --  RR: 18 (05 Oct 2021 09:38) (18 - 20)  SpO2: 94% (05 Oct 2021 09:38) (94% - 99%)  CONSTITUTIONAL: NAD, well-developed, well-groomed  EYES: PERRLA; conjunctiva and sclera icteric  ENMT: Moist oral mucosa, no pharyngeal injection or exudates; normal dentition  NECK: Supple, no palpable masses; no thyromegaly  RESPIRATORY: Normal respiratory effort; lungs are clear to auscultation bilaterally  CARDIOVASCULAR: Regular rate and rhythm, normal S1 and S2, no murmur/rub/gallop; No lower extremity edema; Peripheral pulses are 2+ bilaterally  ABDOMEN: Nontender to palpation, normoactive bowel sounds, no rebound/guarding; No hepatosplenomegaly  MUSCULOSKELETAL:  Normal gait; no clubbing or cyanosis of digits; no joint swelling or tenderness to palpation  PSYCH: A+O to person, place, and time; affect appropriate  NEUROLOGY: CN 2-12 are intact and symmetric; no gross sensory deficits   SKIN: No rashes; no palpable lesions    LABS:                        8.2    13.80 )-----------( 304      ( 05 Oct 2021 07:30 )             24.2     10-05    138  |  108  |  8   ----------------------------<  89  4.0   |  19<L>  |  0.90    Ca    9.1      05 Oct 2021 07:30    TPro  5.9<L>  /  Alb  4.1  /  TBili  4.4<H>  /  DBili  0.3<H>  /  AST  62<H>  /  ALT  49<H>  /  AlkPhos  66  10-05

## 2021-10-05 NOTE — PROGRESS NOTE ADULT - PROBLEM SELECTOR PLAN 1
LDH and Retic noted.     On Dilaudid PCA pump  -Tylenol and Motrin PRN for mild-mod pain  continue 1/2NS at 75 cc/hr  -Incentive spirometer  -Daily sickle cell labs  -Continue with Hydroxyurea and Folic Acid  Follow up hemoglobin electrophoresis and if HgS% is high would provide simple blood transfusion (goal HgS <30%, though has not been at this level recently. Patient states pending simple transfusion in a couple of days anyway)  -DVT PPx

## 2021-10-06 LAB
ALBUMIN SERPL ELPH-MCNC: 4.1 G/DL — SIGNIFICANT CHANGE UP (ref 3.3–5)
ALP SERPL-CCNC: 78 U/L — SIGNIFICANT CHANGE UP (ref 40–120)
ALT FLD-CCNC: 53 U/L — HIGH (ref 10–45)
ANION GAP SERPL CALC-SCNC: 9 MMOL/L — SIGNIFICANT CHANGE UP (ref 5–17)
AST SERPL-CCNC: 54 U/L — HIGH (ref 10–40)
BILIRUB SERPL-MCNC: 5.2 MG/DL — HIGH (ref 0.2–1.2)
BLD GP AB SCN SERPL QL: NEGATIVE — SIGNIFICANT CHANGE UP
BUN SERPL-MCNC: 8 MG/DL — SIGNIFICANT CHANGE UP (ref 7–23)
CALCIUM SERPL-MCNC: 9 MG/DL — SIGNIFICANT CHANGE UP (ref 8.4–10.5)
CHLORIDE SERPL-SCNC: 107 MMOL/L — SIGNIFICANT CHANGE UP (ref 96–108)
CO2 SERPL-SCNC: 22 MMOL/L — SIGNIFICANT CHANGE UP (ref 22–31)
CREAT SERPL-MCNC: 0.85 MG/DL — SIGNIFICANT CHANGE UP (ref 0.5–1.3)
GLUCOSE SERPL-MCNC: 86 MG/DL — SIGNIFICANT CHANGE UP (ref 70–99)
HCT VFR BLD CALC: 24.7 % — LOW (ref 34.5–45)
HGB BLD-MCNC: 8.5 G/DL — LOW (ref 11.5–15.5)
LDH SERPL L TO P-CCNC: 463 U/L — HIGH (ref 50–242)
MCHC RBC-ENTMCNC: 28.8 PG — SIGNIFICANT CHANGE UP (ref 27–34)
MCHC RBC-ENTMCNC: 34.4 GM/DL — SIGNIFICANT CHANGE UP (ref 32–36)
MCV RBC AUTO: 83.7 FL — SIGNIFICANT CHANGE UP (ref 80–100)
NRBC # BLD: 7 /100 WBCS — HIGH (ref 0–0)
PLATELET # BLD AUTO: 297 K/UL — SIGNIFICANT CHANGE UP (ref 150–400)
POTASSIUM SERPL-MCNC: 3.6 MMOL/L — SIGNIFICANT CHANGE UP (ref 3.5–5.3)
POTASSIUM SERPL-SCNC: 3.6 MMOL/L — SIGNIFICANT CHANGE UP (ref 3.5–5.3)
PROT SERPL-MCNC: 6.1 G/DL — SIGNIFICANT CHANGE UP (ref 6–8.3)
RBC # BLD: 2.95 M/UL — LOW (ref 3.8–5.2)
RBC # BLD: 2.95 M/UL — LOW (ref 3.8–5.2)
RBC # FLD: 17.2 % — HIGH (ref 10.3–14.5)
RETICS #: 329 K/UL — HIGH (ref 25–125)
RETICS/RBC NFR: 19.3 % — HIGH (ref 0.5–2.5)
RH IG SCN BLD-IMP: POSITIVE — SIGNIFICANT CHANGE UP
SODIUM SERPL-SCNC: 138 MMOL/L — SIGNIFICANT CHANGE UP (ref 135–145)
WBC # BLD: 11.65 K/UL — HIGH (ref 3.8–10.5)
WBC # FLD AUTO: 11.65 K/UL — HIGH (ref 3.8–10.5)

## 2021-10-06 PROCEDURE — 99233 SBSQ HOSP IP/OBS HIGH 50: CPT

## 2021-10-06 RX ORDER — HYDROMORPHONE HYDROCHLORIDE 2 MG/ML
1 INJECTION INTRAMUSCULAR; INTRAVENOUS; SUBCUTANEOUS ONCE
Refills: 0 | Status: DISCONTINUED | OUTPATIENT
Start: 2021-10-06 | End: 2021-10-06

## 2021-10-06 RX ADMIN — SODIUM CHLORIDE 75 MILLILITER(S): 9 INJECTION, SOLUTION INTRAVENOUS at 07:49

## 2021-10-06 RX ADMIN — SODIUM CHLORIDE 75 MILLILITER(S): 9 INJECTION, SOLUTION INTRAVENOUS at 04:42

## 2021-10-06 RX ADMIN — Medication 600 MILLIGRAM(S): at 07:48

## 2021-10-06 RX ADMIN — Medication 1 MILLIGRAM(S): at 13:17

## 2021-10-06 RX ADMIN — ARIPIPRAZOLE 5 MILLIGRAM(S): 15 TABLET ORAL at 13:18

## 2021-10-06 RX ADMIN — HYDROMORPHONE HYDROCHLORIDE 30 MILLILITER(S): 2 INJECTION INTRAMUSCULAR; INTRAVENOUS; SUBCUTANEOUS at 07:38

## 2021-10-06 RX ADMIN — HYDROMORPHONE HYDROCHLORIDE 1 MILLIGRAM(S): 2 INJECTION INTRAMUSCULAR; INTRAVENOUS; SUBCUTANEOUS at 21:40

## 2021-10-06 RX ADMIN — CHLORHEXIDINE GLUCONATE 1 APPLICATION(S): 213 SOLUTION TOPICAL at 07:52

## 2021-10-06 RX ADMIN — HYDROMORPHONE HYDROCHLORIDE 30 MILLILITER(S): 2 INJECTION INTRAMUSCULAR; INTRAVENOUS; SUBCUTANEOUS at 19:48

## 2021-10-06 RX ADMIN — Medication 600 MILLIGRAM(S): at 08:33

## 2021-10-06 RX ADMIN — HYDROMORPHONE HYDROCHLORIDE 1 MILLIGRAM(S): 2 INJECTION INTRAMUSCULAR; INTRAVENOUS; SUBCUTANEOUS at 21:10

## 2021-10-06 RX ADMIN — HYDROXYUREA 500 MILLIGRAM(S): 500 CAPSULE ORAL at 17:46

## 2021-10-06 RX ADMIN — SERTRALINE 100 MILLIGRAM(S): 25 TABLET, FILM COATED ORAL at 13:18

## 2021-10-06 NOTE — PROGRESS NOTE ADULT - PROBLEM SELECTOR PLAN 2
-Continue with Abilify and Sertraline -Continue with Abilify and Sertraline    Discussed in detail with the patient and her mother at length this morning at the bedside.

## 2021-10-06 NOTE — PROGRESS NOTE ADULT - PROBLEM SELECTOR PLAN 1
LDH and Retic trending upward.  Due for transfusion. She is on a transfusion protocol. Will give 1U PRBC today. Jadenu per home dose.    On Dilaudid PCA pump  -Tylenol and Motrin PRN for mild-mod pain  continue 1/2NS at 75 cc/hr  -Incentive spirometer  -Daily sickle cell labs  -Continue with Hydroxyurea and Folic Acid  Follow up hemoglobin electrophoresis and if HgS% is high would provide simple blood transfusion (goal HgS <30%, though has not been at this level recently. Patient states pending simple transfusion in a couple of days anyway)  -DVT PPx LDH and Retic trending upward.  Due for transfusion. She is on a transfusion protocol. Will give 1U PRBC today. Jadenu per home dose.    On Dilaudid PCA pump  -Tylenol and Motrin PRN for mild-mod pain  continue 1/2NS at 75 cc/hr  -Incentive spirometer  -Daily sickle cell labs  -Continue with Hydroxyurea and Folic Acid  Follow up hemoglobin electrophoresis   -DVT PPx

## 2021-10-06 NOTE — PROGRESS NOTE ADULT - SUBJECTIVE AND OBJECTIVE BOX
[facility] Division of Hospital Medicine  Bisi Glaser MD  Pager (KAILEE-F, 8A-5P): [phone]  Other Times:  [phone]      SUBJECTIVE / OVERNIGHT EVENTS: Pt seen and examined at bedside this morning. NAD. She continues to complain of pain that is alleviated by current regimen.    MEDICATIONS  (STANDING):  ARIPiprazole 5 milliGRAM(s) Oral daily  chlorhexidine 4% Liquid 1 Application(s) Topical <User Schedule>  enoxaparin Injectable 40 milliGRAM(s) SubCutaneous daily  folic acid 1 milliGRAM(s) Oral daily  HYDROmorphone PCA (1 mG/mL) 30 milliLiter(s) PCA Continuous PCA Continuous  hydroxyurea 500 milliGRAM(s) Oral daily  polyethylene glycol 3350 17 Gram(s) Oral daily  sertraline 100 milliGRAM(s) Oral daily  sodium chloride 0.45%. 1000 milliLiter(s) (75 mL/Hr) IV Continuous <Continuous>    MEDICATIONS  (PRN):  acetaminophen   Tablet .. 650 milliGRAM(s) Oral every 6 hours PRN Temp greater or equal to 38C (100.4F), Mild Pain (1 - 3)  diphenhydrAMINE 25 milliGRAM(s) Oral every 4 hours PRN Rash and/or Itching  ibuprofen  Tablet. 600 milliGRAM(s) Oral every 6 hours PRN Moderate Pain (4 - 6)  naloxone Injectable 0.1 milliGRAM(s) IV Push every 3 minutes PRN For ANY of the following changes in patient status:  A. RR LESS THAN 10 breaths per minute, B. Oxygen saturation LESS THAN 90%, C. Sedation score of 6  ondansetron Injectable 4 milliGRAM(s) IV Push every 6 hours PRN Nausea      I&O's Summary    05 Oct 2021 07:01  -  06 Oct 2021 07:00  --------------------------------------------------------  IN: 1120 mL / OUT: 0 mL / NET: 1120 mL    06 Oct 2021 07:01  -  06 Oct 2021 15:47  --------------------------------------------------------  IN: 500 mL / OUT: 0 mL / NET: 500 mL        PHYSICAL EXAM:  Vital Signs Last 24 Hrs  T(C): 37.3 (06 Oct 2021 15:43), Max: 37.3 (06 Oct 2021 10:11)  T(F): 99.2 (06 Oct 2021 15:43), Max: 99.2 (06 Oct 2021 10:11)  HR: 74 (06 Oct 2021 15:43) (60 - 74)  BP: 108/65 (06 Oct 2021 15:43) (99/58 - 109/64)  BP(mean): --  RR: 18 (06 Oct 2021 15:43) (18 - 18)  SpO2: 97% (06 Oct 2021 15:43) (95% - 97%)  CONSTITUTIONAL: NAD, well-developed, well-groomed  EYES: PERRLA; conjunctiva and sclera clear  ENMT: Moist oral mucosa, no pharyngeal injection or exudates; normal dentition  NECK: Supple, no palpable masses; no thyromegaly  RESPIRATORY: Normal respiratory effort; lungs are clear to auscultation bilaterally  CARDIOVASCULAR: Regular rate and rhythm, normal S1 and S2, no murmur/rub/gallop; No lower extremity edema; Peripheral pulses are 2+ bilaterally  ABDOMEN: Nontender to palpation, normoactive bowel sounds, no rebound/guarding; No hepatosplenomegaly  MUSCULOSKELETAL:  Normal gait; no clubbing or cyanosis of digits; no joint swelling or tenderness to palpation  PSYCH: A+O to person, place, and time; affect appropriate  NEUROLOGY: CN 2-12 are intact and symmetric; no gross sensory deficits   SKIN: No rashes; no palpable lesions    LABS:                        8.5    11.65 )-----------( 297      ( 06 Oct 2021 05:29 )             24.7     10-06    138  |  107  |  8   ----------------------------<  86  3.6   |  22  |  0.85    Ca    9.0      06 Oct 2021 05:29    TPro  6.1  /  Alb  4.1  /  TBili  5.2<H>  /  DBili  x   /  AST  54<H>  /  ALT  53<H>  /  AlkPhos  78  10-06                  COORDINATION OF CARE:  Prior or Outpatient Records Reviewed [Y/N]: Reviewed outpt records, Jadenu given daily but not on formulary here. Pt's parents will bring to the hospital.   [facility] Division of Hospital Medicine  Bisi Glaser MD  Pager (KAILEE-F, 8A-5P): [phone]  Other Times:  [phone]      SUBJECTIVE / OVERNIGHT EVENTS: Pt seen and examined at bedside this morning. NAD. She continues to complain of pain that is alleviated by current regimen.    MEDICATIONS  (STANDING):  ARIPiprazole 5 milliGRAM(s) Oral daily  chlorhexidine 4% Liquid 1 Application(s) Topical <User Schedule>  enoxaparin Injectable 40 milliGRAM(s) SubCutaneous daily  folic acid 1 milliGRAM(s) Oral daily  HYDROmorphone PCA (1 mG/mL) 30 milliLiter(s) PCA Continuous PCA Continuous  hydroxyurea 500 milliGRAM(s) Oral daily  polyethylene glycol 3350 17 Gram(s) Oral daily  sertraline 100 milliGRAM(s) Oral daily  sodium chloride 0.45%. 1000 milliLiter(s) (75 mL/Hr) IV Continuous <Continuous>    MEDICATIONS  (PRN):  acetaminophen   Tablet .. 650 milliGRAM(s) Oral every 6 hours PRN Temp greater or equal to 38C (100.4F), Mild Pain (1 - 3)  diphenhydrAMINE 25 milliGRAM(s) Oral every 4 hours PRN Rash and/or Itching  ibuprofen  Tablet. 600 milliGRAM(s) Oral every 6 hours PRN Moderate Pain (4 - 6)  naloxone Injectable 0.1 milliGRAM(s) IV Push every 3 minutes PRN For ANY of the following changes in patient status:  A. RR LESS THAN 10 breaths per minute, B. Oxygen saturation LESS THAN 90%, C. Sedation score of 6  ondansetron Injectable 4 milliGRAM(s) IV Push every 6 hours PRN Nausea      I&O's Summary    05 Oct 2021 07:01  -  06 Oct 2021 07:00  --------------------------------------------------------  IN: 1120 mL / OUT: 0 mL / NET: 1120 mL    06 Oct 2021 07:01  -  06 Oct 2021 15:47  --------------------------------------------------------  IN: 500 mL / OUT: 0 mL / NET: 500 mL        PHYSICAL EXAM:  Vital Signs Last 24 Hrs  T(C): 37.3 (06 Oct 2021 15:43), Max: 37.3 (06 Oct 2021 10:11)  T(F): 99.2 (06 Oct 2021 15:43), Max: 99.2 (06 Oct 2021 10:11)  HR: 74 (06 Oct 2021 15:43) (60 - 74)  BP: 108/65 (06 Oct 2021 15:43) (99/58 - 109/64)  BP(mean): --  RR: 18 (06 Oct 2021 15:43) (18 - 18)  SpO2: 97% (06 Oct 2021 15:43) (95% - 97%)  CONSTITUTIONAL: NAD, well-developed, well-groomed  EYES: PERRLA; conjunctiva and sclera icteric  ENMT: Moist oral mucosa, no pharyngeal injection or exudates; normal dentition  NECK: Supple, no palpable masses; no thyromegaly  RESPIRATORY: Normal respiratory effort; lungs are clear to auscultation bilaterally  CARDIOVASCULAR: Regular rate and rhythm, normal S1 and S2, no murmur/rub/gallop; No lower extremity edema; Peripheral pulses are 2+ bilaterally  ABDOMEN: Nontender to palpation, normoactive bowel sounds, no rebound/guarding; No hepatosplenomegaly  MUSCULOSKELETAL:  Normal gait; no clubbing or cyanosis of digits; no joint swelling or tenderness to palpation  PSYCH: A+O to person, place, and time; affect appropriate  NEUROLOGY: CN 2-12 are intact and symmetric; no gross sensory deficits   SKIN: No rashes; no palpable lesions    LABS:                        8.5    11.65 )-----------( 297      ( 06 Oct 2021 05:29 )             24.7     10-06    138  |  107  |  8   ----------------------------<  86  3.6   |  22  |  0.85    Ca    9.0      06 Oct 2021 05:29    TPro  6.1  /  Alb  4.1  /  TBili  5.2<H>  /  DBili  x   /  AST  54<H>  /  ALT  53<H>  /  AlkPhos  78  10-06                  COORDINATION OF CARE:  Prior or Outpatient Records Reviewed [Y/N]: Reviewed outpt records, Jadenu given daily but not on formulary here. Pt's parents will bring to the hospital.

## 2021-10-07 LAB
ALBUMIN SERPL ELPH-MCNC: 4.4 G/DL — SIGNIFICANT CHANGE UP (ref 3.3–5)
ALP SERPL-CCNC: 82 U/L — SIGNIFICANT CHANGE UP (ref 40–120)
ALT FLD-CCNC: 52 U/L — HIGH (ref 10–45)
ANION GAP SERPL CALC-SCNC: 11 MMOL/L — SIGNIFICANT CHANGE UP (ref 5–17)
AST SERPL-CCNC: 49 U/L — HIGH (ref 10–40)
BILIRUB SERPL-MCNC: 5.6 MG/DL — HIGH (ref 0.2–1.2)
BUN SERPL-MCNC: 8 MG/DL — SIGNIFICANT CHANGE UP (ref 7–23)
CALCIUM SERPL-MCNC: 9.4 MG/DL — SIGNIFICANT CHANGE UP (ref 8.4–10.5)
CHLORIDE SERPL-SCNC: 107 MMOL/L — SIGNIFICANT CHANGE UP (ref 96–108)
CO2 SERPL-SCNC: 22 MMOL/L — SIGNIFICANT CHANGE UP (ref 22–31)
CREAT SERPL-MCNC: 0.77 MG/DL — SIGNIFICANT CHANGE UP (ref 0.5–1.3)
GLUCOSE SERPL-MCNC: 86 MG/DL — SIGNIFICANT CHANGE UP (ref 70–99)
HCT VFR BLD CALC: 29.7 % — LOW (ref 34.5–45)
HGB BLD-MCNC: 10.5 G/DL — LOW (ref 11.5–15.5)
LDH SERPL L TO P-CCNC: 466 U/L — HIGH (ref 50–242)
MCHC RBC-ENTMCNC: 30 PG — SIGNIFICANT CHANGE UP (ref 27–34)
MCHC RBC-ENTMCNC: 35.4 GM/DL — SIGNIFICANT CHANGE UP (ref 32–36)
MCV RBC AUTO: 84.9 FL — SIGNIFICANT CHANGE UP (ref 80–100)
NRBC # BLD: 3 /100 WBCS — HIGH (ref 0–0)
PLATELET # BLD AUTO: 318 K/UL — SIGNIFICANT CHANGE UP (ref 150–400)
POTASSIUM SERPL-MCNC: 3.7 MMOL/L — SIGNIFICANT CHANGE UP (ref 3.5–5.3)
POTASSIUM SERPL-SCNC: 3.7 MMOL/L — SIGNIFICANT CHANGE UP (ref 3.5–5.3)
PROT SERPL-MCNC: 6.3 G/DL — SIGNIFICANT CHANGE UP (ref 6–8.3)
RBC # BLD: 3.5 M/UL — LOW (ref 3.8–5.2)
RBC # FLD: 17.1 % — HIGH (ref 10.3–14.5)
SODIUM SERPL-SCNC: 140 MMOL/L — SIGNIFICANT CHANGE UP (ref 135–145)
WBC # BLD: 12.14 K/UL — HIGH (ref 3.8–10.5)
WBC # FLD AUTO: 12.14 K/UL — HIGH (ref 3.8–10.5)

## 2021-10-07 PROCEDURE — 99233 SBSQ HOSP IP/OBS HIGH 50: CPT

## 2021-10-07 RX ADMIN — Medication 600 MILLIGRAM(S): at 17:36

## 2021-10-07 RX ADMIN — HYDROXYUREA 500 MILLIGRAM(S): 500 CAPSULE ORAL at 12:06

## 2021-10-07 RX ADMIN — ARIPIPRAZOLE 5 MILLIGRAM(S): 15 TABLET ORAL at 12:06

## 2021-10-07 RX ADMIN — SODIUM CHLORIDE 75 MILLILITER(S): 9 INJECTION, SOLUTION INTRAVENOUS at 12:04

## 2021-10-07 RX ADMIN — HYDROMORPHONE HYDROCHLORIDE 30 MILLILITER(S): 2 INJECTION INTRAMUSCULAR; INTRAVENOUS; SUBCUTANEOUS at 07:21

## 2021-10-07 RX ADMIN — HYDROMORPHONE HYDROCHLORIDE 30 MILLILITER(S): 2 INJECTION INTRAMUSCULAR; INTRAVENOUS; SUBCUTANEOUS at 19:06

## 2021-10-07 RX ADMIN — SODIUM CHLORIDE 75 MILLILITER(S): 9 INJECTION, SOLUTION INTRAVENOUS at 01:03

## 2021-10-07 RX ADMIN — CHLORHEXIDINE GLUCONATE 1 APPLICATION(S): 213 SOLUTION TOPICAL at 05:17

## 2021-10-07 RX ADMIN — Medication 1 MILLIGRAM(S): at 12:06

## 2021-10-07 RX ADMIN — SERTRALINE 100 MILLIGRAM(S): 25 TABLET, FILM COATED ORAL at 12:05

## 2021-10-07 RX ADMIN — Medication 600 MILLIGRAM(S): at 16:51

## 2021-10-07 NOTE — PROGRESS NOTE ADULT - PROBLEM SELECTOR PLAN 1
Reactive Leukocytosis noted; LDH remains elevated. Repeat Retic in the AM    On Dilaudid PCA pump  -Tylenol and Motrin PRN for mild-mod pain  continue 1/2NS at 75 cc/hr  s/p 1 U PRBC 10/6; Jadenu per home dose.  -Incentive spirometer  -Daily sickle cell labs  -Continue with Hydroxyurea and Folic Acid  Follow up hemoglobin electrophoresis   -DVT PPx

## 2021-10-07 NOTE — PROGRESS NOTE ADULT - SUBJECTIVE AND OBJECTIVE BOX
[facility] Division of Hospital Medicine  Bisi Glaser MD  Pager (M-F, 8A-5P): [phone]  Other Times:  [phone]      SUBJECTIVE / OVERNIGHT EVENTS: Pt seen and examined at bedside. NAD. She appears tired and continues to report left arm pain.       MEDICATIONS  (STANDING):  ARIPiprazole 5 milliGRAM(s) Oral daily  chlorhexidine 4% Liquid 1 Application(s) Topical <User Schedule>  enoxaparin Injectable 40 milliGRAM(s) SubCutaneous daily  folic acid 1 milliGRAM(s) Oral daily  HYDROmorphone PCA (1 mG/mL) 30 milliLiter(s) PCA Continuous PCA Continuous  hydroxyurea 500 milliGRAM(s) Oral daily  polyethylene glycol 3350 17 Gram(s) Oral daily  sertraline 100 milliGRAM(s) Oral daily  sodium chloride 0.45%. 1000 milliLiter(s) (75 mL/Hr) IV Continuous <Continuous>    MEDICATIONS  (PRN):  acetaminophen   Tablet .. 650 milliGRAM(s) Oral every 6 hours PRN Temp greater or equal to 38C (100.4F), Mild Pain (1 - 3)  diphenhydrAMINE 25 milliGRAM(s) Oral every 4 hours PRN Rash and/or Itching  ibuprofen  Tablet. 600 milliGRAM(s) Oral every 6 hours PRN Moderate Pain (4 - 6)  naloxone Injectable 0.1 milliGRAM(s) IV Push every 3 minutes PRN For ANY of the following changes in patient status:  A. RR LESS THAN 10 breaths per minute, B. Oxygen saturation LESS THAN 90%, C. Sedation score of 6  ondansetron Injectable 4 milliGRAM(s) IV Push every 6 hours PRN Nausea      I&O's Summary    06 Oct 2021 07:01  -  07 Oct 2021 07:00  --------------------------------------------------------  IN: 920 mL / OUT: 0 mL / NET: 920 mL    07 Oct 2021 07:01  -  07 Oct 2021 14:48  --------------------------------------------------------  IN: 500 mL / OUT: 0 mL / NET: 500 mL        PHYSICAL EXAM:  Vital Signs Last 24 Hrs  T(C): 37.3 (07 Oct 2021 14:11), Max: 37.3 (06 Oct 2021 15:43)  T(F): 99.1 (07 Oct 2021 14:11), Max: 99.2 (06 Oct 2021 15:43)  HR: 75 (07 Oct 2021 14:11) (56 - 75)  BP: 96/62 (07 Oct 2021 14:11) (92/56 - 149/85)  BP(mean): --  RR: 18 (07 Oct 2021 14:11) (18 - 18)  SpO2: 96% (07 Oct 2021 14:11) (94% - 97%)  CONSTITUTIONAL: NAD, well-developed, well-groomed  EYES: PERRLA; conjunctiva and sclera icteric but improving  ENMT: Moist oral mucosa, no pharyngeal injection or exudates; normal dentition  NECK: Supple, no palpable masses; no thyromegaly  RESPIRATORY: Normal respiratory effort; lungs are clear to auscultation bilaterally  CARDIOVASCULAR: Regular rate and rhythm, normal S1 and S2, no murmur/rub/gallop; No lower extremity edema; Peripheral pulses are 2+ bilaterally  ABDOMEN: Nontender to palpation, normoactive bowel sounds, no rebound/guarding; No hepatosplenomegaly  MUSCULOSKELETAL:  Normal gait; no clubbing or cyanosis of digits; no joint swelling or tenderness to palpation  PSYCH: A+O to person, place, and time; affect appropriate  NEUROLOGY: CN 2-12 are intact and symmetric; no gross sensory deficits   SKIN: No rashes; no palpable lesions    LABS:                        10.5   12.14 )-----------( 318      ( 07 Oct 2021 07:28 )             29.7     10-07    140  |  107  |  8   ----------------------------<  86  3.7   |  22  |  0.77    Ca    9.4      07 Oct 2021 07:28    TPro  6.3  /  Alb  4.4  /  TBili  5.6<H>  /  DBili  x   /  AST  49<H>  /  ALT  52<H>  /  AlkPhos  82  10-07

## 2021-10-08 ENCOUNTER — TRANSCRIPTION ENCOUNTER (OUTPATIENT)
Age: 21
End: 2021-10-08

## 2021-10-08 VITALS
OXYGEN SATURATION: 97 % | HEART RATE: 63 BPM | RESPIRATION RATE: 18 BRPM | SYSTOLIC BLOOD PRESSURE: 105 MMHG | TEMPERATURE: 99 F | DIASTOLIC BLOOD PRESSURE: 64 MMHG

## 2021-10-08 LAB
ALBUMIN SERPL ELPH-MCNC: 4.2 G/DL — SIGNIFICANT CHANGE UP (ref 3.3–5)
ALP SERPL-CCNC: 76 U/L — SIGNIFICANT CHANGE UP (ref 40–120)
ALT FLD-CCNC: 42 U/L — SIGNIFICANT CHANGE UP (ref 10–45)
AST SERPL-CCNC: 32 U/L — SIGNIFICANT CHANGE UP (ref 10–40)
BILIRUB DIRECT SERPL-MCNC: 0.3 MG/DL — HIGH (ref 0–0.2)
BILIRUB INDIRECT FLD-MCNC: 3.4 MG/DL — HIGH (ref 0.2–1)
BILIRUB SERPL-MCNC: 3.7 MG/DL — HIGH (ref 0.2–1.2)
LDH SERPL L TO P-CCNC: 443 U/L — HIGH (ref 50–242)
PROT SERPL-MCNC: 6.4 G/DL — SIGNIFICANT CHANGE UP (ref 6–8.3)
RBC # BLD: 3.46 M/UL — LOW (ref 3.8–5.2)
RETICS #: 506.5 K/UL — HIGH (ref 25–125)
RETICS/RBC NFR: 14.6 % — HIGH (ref 0.5–2.5)

## 2021-10-08 PROCEDURE — 85027 COMPLETE CBC AUTOMATED: CPT

## 2021-10-08 PROCEDURE — 86902 BLOOD TYPE ANTIGEN DONOR EA: CPT

## 2021-10-08 PROCEDURE — 80053 COMPREHEN METABOLIC PANEL: CPT

## 2021-10-08 PROCEDURE — 71046 X-RAY EXAM CHEST 2 VIEWS: CPT

## 2021-10-08 PROCEDURE — 85025 COMPLETE CBC W/AUTO DIFF WBC: CPT

## 2021-10-08 PROCEDURE — 86850 RBC ANTIBODY SCREEN: CPT

## 2021-10-08 PROCEDURE — C9803: CPT

## 2021-10-08 PROCEDURE — 73080 X-RAY EXAM OF ELBOW: CPT

## 2021-10-08 PROCEDURE — 86900 BLOOD TYPING SEROLOGIC ABO: CPT

## 2021-10-08 PROCEDURE — P9040: CPT

## 2021-10-08 PROCEDURE — 83020 HEMOGLOBIN ELECTROPHORESIS: CPT

## 2021-10-08 PROCEDURE — 86901 BLOOD TYPING SEROLOGIC RH(D): CPT

## 2021-10-08 PROCEDURE — 83615 LACTATE (LD) (LDH) ENZYME: CPT

## 2021-10-08 PROCEDURE — 36415 COLL VENOUS BLD VENIPUNCTURE: CPT

## 2021-10-08 PROCEDURE — 86923 COMPATIBILITY TEST ELECTRIC: CPT

## 2021-10-08 PROCEDURE — U0003: CPT

## 2021-10-08 PROCEDURE — 85045 AUTOMATED RETICULOCYTE COUNT: CPT

## 2021-10-08 PROCEDURE — U0005: CPT

## 2021-10-08 PROCEDURE — 82962 GLUCOSE BLOOD TEST: CPT

## 2021-10-08 PROCEDURE — 73110 X-RAY EXAM OF WRIST: CPT

## 2021-10-08 PROCEDURE — 99285 EMERGENCY DEPT VISIT HI MDM: CPT

## 2021-10-08 PROCEDURE — 82248 BILIRUBIN DIRECT: CPT

## 2021-10-08 PROCEDURE — 73090 X-RAY EXAM OF FOREARM: CPT

## 2021-10-08 PROCEDURE — 86880 COOMBS TEST DIRECT: CPT

## 2021-10-08 PROCEDURE — 80076 HEPATIC FUNCTION PANEL: CPT

## 2021-10-08 PROCEDURE — 99239 HOSP IP/OBS DSCHRG MGMT >30: CPT

## 2021-10-08 PROCEDURE — 86769 SARS-COV-2 COVID-19 ANTIBODY: CPT

## 2021-10-08 RX ORDER — TRAMADOL HYDROCHLORIDE 50 MG/1
1 TABLET ORAL
Qty: 20 | Refills: 0
Start: 2021-10-08 | End: 2021-10-12

## 2021-10-08 RX ORDER — FOLIC ACID 0.8 MG
1 TABLET ORAL
Qty: 30 | Refills: 0
Start: 2021-10-08 | End: 2021-11-06

## 2021-10-08 RX ADMIN — ARIPIPRAZOLE 5 MILLIGRAM(S): 15 TABLET ORAL at 12:17

## 2021-10-08 RX ADMIN — HYDROXYUREA 500 MILLIGRAM(S): 500 CAPSULE ORAL at 12:16

## 2021-10-08 RX ADMIN — Medication 600 MILLIGRAM(S): at 05:34

## 2021-10-08 RX ADMIN — HYDROMORPHONE HYDROCHLORIDE 30 MILLILITER(S): 2 INJECTION INTRAMUSCULAR; INTRAVENOUS; SUBCUTANEOUS at 07:32

## 2021-10-08 RX ADMIN — CHLORHEXIDINE GLUCONATE 1 APPLICATION(S): 213 SOLUTION TOPICAL at 09:13

## 2021-10-08 RX ADMIN — Medication 1 MILLIGRAM(S): at 12:16

## 2021-10-08 RX ADMIN — SERTRALINE 100 MILLIGRAM(S): 25 TABLET, FILM COATED ORAL at 12:16

## 2021-10-08 NOTE — PROGRESS NOTE ADULT - PROBLEM SELECTOR PROBLEM 1
Hemoglobin SS disease with vasoocclusive crisis

## 2021-10-08 NOTE — PROGRESS NOTE ADULT - PROBLEM SELECTOR PLAN 2
-Continue with Abilify and Sertraline    Discussed in detail with the patient at the bedside.    DC home today -Continue with Abilify and Sertraline    Discussed in detail with the patient at the bedside.    DC home today    35 minutes spent on dc planning

## 2021-10-08 NOTE — DISCHARGE NOTE NURSING/CASE MANAGEMENT/SOCIAL WORK - NSDCVIVACCINE_GEN_ALL_CORE_FT
Hep B, unspecified formulation [inactive]; 2009/9/4 ; Jac Yost);   influenza, unspecified formulation [inactive]; 2009/9/4 ; Jac Yost);

## 2021-10-08 NOTE — PROGRESS NOTE ADULT - SUBJECTIVE AND OBJECTIVE BOX
[facility] Division of Hospital Medicine  Bisi Glaser MD  Pager (KAILEE-F, 8A-5P): [phone]  Other Times:  [phone]      SUBJECTIVE / OVERNIGHT EVENTS: Pt seen and examined at bedside this morning. She appears well. Her arm pain has resolved and she wants to go home.    MEDICATIONS  (STANDING):  ARIPiprazole 5 milliGRAM(s) Oral daily  chlorhexidine 4% Liquid 1 Application(s) Topical <User Schedule>  enoxaparin Injectable 40 milliGRAM(s) SubCutaneous daily  folic acid 1 milliGRAM(s) Oral daily  HYDROmorphone PCA (1 mG/mL) 30 milliLiter(s) PCA Continuous PCA Continuous  hydroxyurea 500 milliGRAM(s) Oral daily  polyethylene glycol 3350 17 Gram(s) Oral daily  sertraline 100 milliGRAM(s) Oral daily  sodium chloride 0.45%. 1000 milliLiter(s) (75 mL/Hr) IV Continuous <Continuous>    MEDICATIONS  (PRN):  acetaminophen   Tablet .. 650 milliGRAM(s) Oral every 6 hours PRN Temp greater or equal to 38C (100.4F), Mild Pain (1 - 3)  diphenhydrAMINE 25 milliGRAM(s) Oral every 4 hours PRN Rash and/or Itching  ibuprofen  Tablet. 600 milliGRAM(s) Oral every 6 hours PRN Moderate Pain (4 - 6)  naloxone Injectable 0.1 milliGRAM(s) IV Push every 3 minutes PRN For ANY of the following changes in patient status:  A. RR LESS THAN 10 breaths per minute, B. Oxygen saturation LESS THAN 90%, C. Sedation score of 6  ondansetron Injectable 4 milliGRAM(s) IV Push every 6 hours PRN Nausea      I&O's Summary    07 Oct 2021 07:01  -  08 Oct 2021 07:00  --------------------------------------------------------  IN: 800 mL / OUT: 0 mL / NET: 800 mL        PHYSICAL EXAM:  Vital Signs Last 24 Hrs  T(C): 37.3 (08 Oct 2021 10:13), Max: 37.3 (07 Oct 2021 14:11)  T(F): 99.1 (08 Oct 2021 10:13), Max: 99.1 (07 Oct 2021 14:11)  HR: 63 (08 Oct 2021 10:13) (60 - 75)  BP: 105/64 (08 Oct 2021 10:13) (96/62 - 107/66)  BP(mean): --  RR: 18 (08 Oct 2021 10:13) (18 - 18)  SpO2: 97% (08 Oct 2021 10:13) (95% - 98%)  CONSTITUTIONAL: NAD, well-developed, well-groomed  EYES: PERRLA; conjunctiva and sclera clear  ENMT: Moist oral mucosa, no pharyngeal injection or exudates; normal dentition  NECK: Supple, no palpable masses; no thyromegaly  RESPIRATORY: Normal respiratory effort; lungs are clear to auscultation bilaterally  CARDIOVASCULAR: Regular rate and rhythm, normal S1 and S2, no murmur/rub/gallop; No lower extremity edema; Peripheral pulses are 2+ bilaterally  ABDOMEN: Nontender to palpation, normoactive bowel sounds, no rebound/guarding; No hepatosplenomegaly  MUSCULOSKELETAL:  Normal gait; no clubbing or cyanosis of digits; no joint swelling or tenderness to palpation  PSYCH: A+O to person, place, and time; affect appropriate  NEUROLOGY: CN 2-12 are intact and symmetric; no gross sensory deficits   SKIN: No rashes; no palpable lesions    LABS:                        10.5   12.14 )-----------( 318      ( 07 Oct 2021 07:28 )             29.7     10-07    140  |  107  |  8   ----------------------------<  86  3.7   |  22  |  0.77    Ca    9.4      07 Oct 2021 07:28    TPro  6.4  /  Alb  4.2  /  TBili  3.7<H>  /  DBili  0.3<H>  /  AST  32  /  ALT  42  /  AlkPhos  76  10-08

## 2021-10-08 NOTE — DISCHARGE NOTE PROVIDER - NSDCMRMEDTOKEN_GEN_ALL_CORE_FT
Abilify 5 mg oral tablet: 1 tab(s) orally once a day  folic acid 1 mg oral tablet: 1 tab(s) orally once a day  hydroxyurea 500 mg oral capsule: 1 cap(s) orally once a day  Jadenu 360 mg oral tablet: 4 tab(s) orally once a day  sertraline 100 mg oral tablet: 1 tab(s) orally once a day  traMADol 50 mg oral tablet: 1 tab(s) orally every 6 hours MDD:4 tablets

## 2021-10-08 NOTE — DISCHARGE NOTE PROVIDER - HOSPITAL COURSE
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 Patient with acute vaso-occlusive crisis which requires inpatient admission for IV opioids and monitoring for progression to acute chest.  Problem: Hemoglobin SS disease with vasoocclusive crisis.   Plan: Reactive Leukocytosis noted; LDH remains elevated. Repeat Retic in the AM  On Dilaudid PCA pump  Transitioned to PO tramadol on dc   continue 1/2NS at 75 cc/hr  s/p 1 U PRBC 10/6; Jadenu per home dose.  Incentive spirometer  Daily sickle cell labs  Continue with Hydroxyurea and Folic Acid  Follow up hemoglobin electrophoresis   Follow up with Dr Dylan VILLARREAL with [facility] discussed with Dr Glaser

## 2021-10-08 NOTE — DISCHARGE NOTE NURSING/CASE MANAGEMENT/SOCIAL WORK - PATIENT PORTAL LINK FT
You can access the FollowMyHealth Patient Portal offered by  by registering at the following website: [email or web address]. By joining [employer] [facility]’s FollowMyHealth portal, you will also be able to view your health information using other applications (apps) compatible with our system.

## 2021-10-08 NOTE — DISCHARGE NOTE PROVIDER - NSRESEARCHGRANT_PROPHYLAXISRECOMFT_GEN_A_CORE
[email or web address]
IMPROVE-DD Application Not Available

## 2021-10-08 NOTE — DISCHARGE NOTE PROVIDER - CARE PROVIDERS DIRECT ADDRESSES
Right arm;
[email or web address]

## 2021-10-08 NOTE — DISCHARGE NOTE PROVIDER - NSDCCPCAREPLAN_GEN_ALL_CORE_FT
PRINCIPAL DISCHARGE DIAGNOSIS  Diagnosis: Vasoocclusive sickle cell crisis  Assessment and Plan of Treatment: Pain management   Follow up with PCP   Take meds as ordered

## 2021-10-08 NOTE — PROGRESS NOTE ADULT - PROBLEM SELECTOR PLAN 1
Reactive Leukocytosis noted; LDH remains elevated.  Retic  trending down. Hb 10.5    On Dilaudid PCA pump  -Tylenol and Motrin PRN for mild-mod pain  continue 1/2NS at 75 cc/hr  s/p 1 U PRBC 10/6; Jadenu per home dose.  -Incentive spirometer  -Daily sickle cell labs  -Continue with Hydroxyurea and Folic Acid  -DVT PPx

## 2021-10-08 NOTE — DISCHARGE NOTE PROVIDER - CARE PROVIDER_API CALL
Eleonora Richardson)  Internal Medicine  [address] [place] [address]  Phone: [phone]  Fax: [phone]  Follow Up Time:

## 2021-10-11 ENCOUNTER — NON-APPOINTMENT (OUTPATIENT)
Age: 21
End: 2021-10-11

## 2021-10-11 LAB
HEMOGLOBIN INTERPRETATION: SIGNIFICANT CHANGE UP
HGB A MFR BLD: 46 % — LOW (ref 95.8–98)
HGB A2 MFR BLD: 3.1 % — SIGNIFICANT CHANGE UP (ref 2–3.2)
HGB F MFR BLD: 0.4 % — SIGNIFICANT CHANGE UP (ref 0–1)
HGB S MFR BLD: 50.5 % — HIGH

## 2021-10-12 ENCOUNTER — NON-APPOINTMENT (OUTPATIENT)
Age: 21
End: 2021-10-12

## 2021-10-15 ENCOUNTER — INPATIENT (INPATIENT)
Facility: HOSPITAL | Age: 21
LOS: 10 days | Discharge: HOME | DRG: 812 | End: 2021-10-26
Attending: HOSPITALIST | Admitting: STUDENT IN AN ORGANIZED HEALTH CARE EDUCATION/TRAINING PROGRAM
Payer: MEDICAID

## 2021-10-15 ENCOUNTER — NON-APPOINTMENT (OUTPATIENT)
Age: 21
End: 2021-10-15

## 2021-10-15 VITALS
RESPIRATION RATE: 18 BRPM | OXYGEN SATURATION: 95 % | WEIGHT: 119.93 LBS | TEMPERATURE: 99 F | DIASTOLIC BLOOD PRESSURE: 58 MMHG | HEIGHT: 62 IN | HEART RATE: 92 BPM | SYSTOLIC BLOOD PRESSURE: 109 MMHG

## 2021-10-15 DIAGNOSIS — Z98.890 OTHER SPECIFIED POSTPROCEDURAL STATES: Chronic | ICD-10-CM

## 2021-10-15 PROCEDURE — 71045 X-RAY EXAM CHEST 1 VIEW: CPT | Mod: 26

## 2021-10-15 PROCEDURE — 99285 EMERGENCY DEPT VISIT HI MDM: CPT

## 2021-10-15 RX ORDER — SODIUM CHLORIDE 9 MG/ML
500 INJECTION INTRAMUSCULAR; INTRAVENOUS; SUBCUTANEOUS ONCE
Refills: 0 | Status: COMPLETED | OUTPATIENT
Start: 2021-10-15 | End: 2021-10-15

## 2021-10-15 RX ORDER — ONDANSETRON 8 MG/1
4 TABLET, FILM COATED ORAL ONCE
Refills: 0 | Status: DISCONTINUED | OUTPATIENT
Start: 2021-10-15 | End: 2021-10-16

## 2021-10-15 RX ORDER — MORPHINE SULFATE 50 MG/1
2 CAPSULE, EXTENDED RELEASE ORAL ONCE
Refills: 0 | Status: DISCONTINUED | OUTPATIENT
Start: 2021-10-15 | End: 2021-10-15

## 2021-10-15 NOTE — ED PROVIDER NOTE - NSICDXPASTMEDICALHX_GEN_ALL_CORE_FT
PAST MEDICAL HISTORY:  Anxiety     Chronic Lung Disease of Premat follows with [facility]    CP (cerebral palsy) - mild    CVA (Cerebral Vascular Accident) Onset date unknown, noted on MRI from 5/2010    Depression     Hydrocephalus     Impacted teeth     Reactive Airway Disease receives albuterol and xoponex treatments at home    S/P  Shunt x2 with multiple shunt revisions    Sickle Cell Disease     Strabismus

## 2021-10-15 NOTE — ED PROVIDER NOTE - RAPID ASSESSMENT
21y F with pmhx of sickle cell presents to ED c/o R sided body pain. Endorses R arm and R leg pain. Reports CP as well.  Pt was admitted last week for L sided body pain. Pt on tramadol and Tylenol for pain at home, which she has been taking with no relief.     Patient was seen as a tele QDOC patient. The patient will be seen and further worked up in the main emergency department and their care will be completed by the main emergency department team along with a thorough physical exam. Receiving team will follow up on labs, analgesia, any clinical imaging, reassess and disposition as clinically indicated, all decisions regarding the progression of care will be made at their discretion.    Scribe Statement: Rio GRAY, attest that this documentation has been prepared under the direction and in the presence of Meghann Irene (PA) 21y F with pmhx of sickle cell presents to ED c/o R sided body pain. Endorses R arm and R leg pain. Reports CP as well.  Pt was admitted last week for L sided body pain. Pt on tramadol and Tylenol for pain at home, which she has been taking with no relief.     Patient was seen as a tele QDOC patient. The patient will be seen and further worked up in the main emergency department and their care will be completed by the main emergency department team along with a thorough physical exam. Receiving team will follow up on labs, analgesia, any clinical imaging, reassess and disposition as clinically indicated, all decisions regarding the progression of care will be made at their discretion.    Scribe Statement: I, Rio Juares, attest that this documentation has been prepared under the direction and in the presence of Meghann Irene (PA)    Rapid assessment by Meghann Irene PA-C full eval to be performed in ED. Above documentation completed by scribmelvina above. I was present for and agree with documentation.   Meghann Irene PA-C

## 2021-10-15 NOTE — ED PROVIDER NOTE - OBJECTIVE STATEMENT
22 yo F no pmh presents with 1 day hx of R arm pain, chest pain. Worse with movement. CP nonexertional/nonpositional. Patient endorses SOB due to pain. On tramadol and tylenol after a hospital stay last week where she was transitioned from morphine to dilaudid to tramadol/tylenol on dc. Patient said it helped at first and then stopped working. Endorses nausea. Denies fevers, chills, hip pain, abd pain, cough, headaches.

## 2021-10-15 NOTE — ED PROVIDER NOTE - CHIEF COMPLAINT
The patient is a 21y Female complaining of  The patient is a 21y Female complaining of chest pain, R leg pain, R arm pain

## 2021-10-15 NOTE — ED PROVIDER NOTE - CLINICAL SUMMARY MEDICAL DECISION MAKING FREE TEXT BOX
20 yo M hx of SCC presents with chest pain, R arm and leg pain. Pt temp 99F, sat 95%, pt has reproducible R arm and chest pain . IVF, pain control, CXR, labs. Rule out acute chest, vasocclusive crisis.

## 2021-10-15 NOTE — ED PROVIDER NOTE - NS ED ROS FT
GENERAL: no fever, no chills, no weight loss  CARDIAC: +chest pain, no palpitations   PULMONARY: no cough, no shortness of breath, no wheezing  GI: +nausea, no abdominal pain, no vomiting, no diarrhea, no constipation, no melena, no hematochezia, no hematemesis  : no changes in urination, no dysuria, no frequency, no hematuria, no discharge  SKIN: no rashes  NEURO: no headache, no numbness, no weakness  MSK: +R arm, no joint pain, no muscle pain, no back pain, no calf pain

## 2021-10-15 NOTE — ED PROVIDER NOTE - PHYSICAL EXAMINATION
GENERAL: no acute distress, non-toxic appearing  HEAD: normocephalic, atraumatic  HEENT: normal conjunctiva, oral mucosa moist, neck supple  CARDIAC: regular rate and rhythm, normal S1 and S2,  no appreciable murmurs  PULM: clear to ascultation bilaterally, no crackles, rales, rhonchi, or wheezing  GI: abdomen nondistended, soft, nontender, no guarding or rebound tenderness  NEURO: alert, moving all extremities  MSK: +R arm tenderness, +chest wall pain, no visible deformities, no peripheral edema, calf tenderness/redness/swelling  SKIN: no visible rashes, dry, well-perfused  PSYCH: appropriate mood and affect

## 2021-10-16 DIAGNOSIS — F31.9 BIPOLAR DISORDER, UNSPECIFIED: ICD-10-CM

## 2021-10-16 DIAGNOSIS — D57.00 HB-SS DISEASE WITH CRISIS, UNSPECIFIED: ICD-10-CM

## 2021-10-16 LAB
ALBUMIN SERPL ELPH-MCNC: 4.5 G/DL — SIGNIFICANT CHANGE UP (ref 3.3–5)
ALP SERPL-CCNC: 72 U/L — SIGNIFICANT CHANGE UP (ref 40–120)
ALT FLD-CCNC: 26 U/L — SIGNIFICANT CHANGE UP (ref 10–45)
ANION GAP SERPL CALC-SCNC: 17 MMOL/L — SIGNIFICANT CHANGE UP (ref 5–17)
AST SERPL-CCNC: 42 U/L — HIGH (ref 10–40)
BASOPHILS # BLD AUTO: 0.08 K/UL — SIGNIFICANT CHANGE UP (ref 0–0.2)
BASOPHILS NFR BLD AUTO: 0.6 % — SIGNIFICANT CHANGE UP (ref 0–2)
BILIRUB SERPL-MCNC: 2.8 MG/DL — HIGH (ref 0.2–1.2)
BUN SERPL-MCNC: 10 MG/DL — SIGNIFICANT CHANGE UP (ref 7–23)
CALCIUM SERPL-MCNC: 9.6 MG/DL — SIGNIFICANT CHANGE UP (ref 8.4–10.5)
CHLORIDE SERPL-SCNC: 102 MMOL/L — SIGNIFICANT CHANGE UP (ref 96–108)
CO2 SERPL-SCNC: 17 MMOL/L — LOW (ref 22–31)
CREAT SERPL-MCNC: 0.78 MG/DL — SIGNIFICANT CHANGE UP (ref 0.5–1.3)
EOSINOPHIL # BLD AUTO: 0.01 K/UL — SIGNIFICANT CHANGE UP (ref 0–0.5)
EOSINOPHIL NFR BLD AUTO: 0.1 % — SIGNIFICANT CHANGE UP (ref 0–6)
GLUCOSE SERPL-MCNC: 120 MG/DL — HIGH (ref 70–99)
HCG SERPL-ACNC: <2 MIU/ML — SIGNIFICANT CHANGE UP
HCT VFR BLD CALC: 26.1 % — LOW (ref 34.5–45)
HGB BLD-MCNC: 8.8 G/DL — LOW (ref 11.5–15.5)
IMM GRANULOCYTES NFR BLD AUTO: 1.4 % — SIGNIFICANT CHANGE UP (ref 0–1.5)
LDH SERPL L TO P-CCNC: 446 U/L — HIGH (ref 50–242)
LYMPHOCYTES # BLD AUTO: 1.3 K/UL — SIGNIFICANT CHANGE UP (ref 1–3.3)
LYMPHOCYTES # BLD AUTO: 9.7 % — LOW (ref 13–44)
MCHC RBC-ENTMCNC: 28.8 PG — SIGNIFICANT CHANGE UP (ref 27–34)
MCHC RBC-ENTMCNC: 33.7 GM/DL — SIGNIFICANT CHANGE UP (ref 32–36)
MCV RBC AUTO: 85.3 FL — SIGNIFICANT CHANGE UP (ref 80–100)
MONOCYTES # BLD AUTO: 0.83 K/UL — SIGNIFICANT CHANGE UP (ref 0–0.9)
MONOCYTES NFR BLD AUTO: 6.2 % — SIGNIFICANT CHANGE UP (ref 2–14)
NEUTROPHILS # BLD AUTO: 10.93 K/UL — HIGH (ref 1.8–7.4)
NEUTROPHILS NFR BLD AUTO: 82 % — HIGH (ref 43–77)
NRBC # BLD: 2 /100 WBCS — HIGH (ref 0–0)
PLATELET # BLD AUTO: 387 K/UL — SIGNIFICANT CHANGE UP (ref 150–400)
POTASSIUM SERPL-MCNC: 3.6 MMOL/L — SIGNIFICANT CHANGE UP (ref 3.5–5.3)
POTASSIUM SERPL-SCNC: 3.6 MMOL/L — SIGNIFICANT CHANGE UP (ref 3.5–5.3)
PROT SERPL-MCNC: 6.7 G/DL — SIGNIFICANT CHANGE UP (ref 6–8.3)
RBC # BLD: 3.06 M/UL — LOW (ref 3.8–5.2)
RBC # BLD: 3.06 M/UL — LOW (ref 3.8–5.2)
RBC # FLD: 19.1 % — HIGH (ref 10.3–14.5)
RETICS #: 322.8 K/UL — HIGH (ref 25–125)
RETICS/RBC NFR: 10.6 % — HIGH (ref 0.5–2.5)
SARS-COV-2 RNA SPEC QL NAA+PROBE: SIGNIFICANT CHANGE UP
SODIUM SERPL-SCNC: 136 MMOL/L — SIGNIFICANT CHANGE UP (ref 135–145)
WBC # BLD: 13.34 K/UL — HIGH (ref 3.8–10.5)
WBC # FLD AUTO: 13.34 K/UL — HIGH (ref 3.8–10.5)

## 2021-10-16 PROCEDURE — 99223 1ST HOSP IP/OBS HIGH 75: CPT

## 2021-10-16 RX ORDER — HYDROMORPHONE HYDROCHLORIDE 2 MG/ML
0.25 INJECTION INTRAMUSCULAR; INTRAVENOUS; SUBCUTANEOUS ONCE
Refills: 0 | Status: DISCONTINUED | OUTPATIENT
Start: 2021-10-16 | End: 2021-10-16

## 2021-10-16 RX ORDER — MORPHINE SULFATE 50 MG/1
4 CAPSULE, EXTENDED RELEASE ORAL ONCE
Refills: 0 | Status: DISCONTINUED | OUTPATIENT
Start: 2021-10-16 | End: 2021-10-16

## 2021-10-16 RX ORDER — SERTRALINE 25 MG/1
100 TABLET, FILM COATED ORAL DAILY
Refills: 0 | Status: DISCONTINUED | OUTPATIENT
Start: 2021-10-16 | End: 2021-10-26

## 2021-10-16 RX ORDER — KETOROLAC TROMETHAMINE 30 MG/ML
15 SYRINGE (ML) INJECTION ONCE
Refills: 0 | Status: DISCONTINUED | OUTPATIENT
Start: 2021-10-16 | End: 2021-10-16

## 2021-10-16 RX ORDER — SODIUM CHLORIDE 9 MG/ML
1000 INJECTION, SOLUTION INTRAVENOUS
Refills: 0 | Status: DISCONTINUED | OUTPATIENT
Start: 2021-10-16 | End: 2021-10-16

## 2021-10-16 RX ORDER — HYDROMORPHONE HYDROCHLORIDE 2 MG/ML
30 INJECTION INTRAMUSCULAR; INTRAVENOUS; SUBCUTANEOUS
Refills: 0 | Status: DISCONTINUED | OUTPATIENT
Start: 2021-10-16 | End: 2021-10-17

## 2021-10-16 RX ORDER — ACETAMINOPHEN 500 MG
650 TABLET ORAL EVERY 6 HOURS
Refills: 0 | Status: DISCONTINUED | OUTPATIENT
Start: 2021-10-16 | End: 2021-10-18

## 2021-10-16 RX ORDER — SODIUM CHLORIDE 9 MG/ML
1000 INJECTION, SOLUTION INTRAVENOUS
Refills: 0 | Status: DISCONTINUED | OUTPATIENT
Start: 2021-10-16 | End: 2021-10-26

## 2021-10-16 RX ORDER — ONDANSETRON 8 MG/1
4 TABLET, FILM COATED ORAL EVERY 8 HOURS
Refills: 0 | Status: DISCONTINUED | OUTPATIENT
Start: 2021-10-16 | End: 2021-10-26

## 2021-10-16 RX ORDER — ARIPIPRAZOLE 15 MG/1
5 TABLET ORAL DAILY
Refills: 0 | Status: DISCONTINUED | OUTPATIENT
Start: 2021-10-16 | End: 2021-10-26

## 2021-10-16 RX ORDER — LANOLIN ALCOHOL/MO/W.PET/CERES
3 CREAM (GRAM) TOPICAL AT BEDTIME
Refills: 0 | Status: DISCONTINUED | OUTPATIENT
Start: 2021-10-16 | End: 2021-10-26

## 2021-10-16 RX ORDER — ONDANSETRON 8 MG/1
4 TABLET, FILM COATED ORAL ONCE
Refills: 0 | Status: COMPLETED | OUTPATIENT
Start: 2021-10-16 | End: 2021-10-16

## 2021-10-16 RX ORDER — HYDROXYUREA 500 MG/1
500 CAPSULE ORAL DAILY
Refills: 0 | Status: DISCONTINUED | OUTPATIENT
Start: 2021-10-16 | End: 2021-10-26

## 2021-10-16 RX ORDER — ENOXAPARIN SODIUM 100 MG/ML
40 INJECTION SUBCUTANEOUS DAILY
Refills: 0 | Status: DISCONTINUED | OUTPATIENT
Start: 2021-10-16 | End: 2021-10-26

## 2021-10-16 RX ADMIN — SODIUM CHLORIDE 100 MILLILITER(S): 9 INJECTION, SOLUTION INTRAVENOUS at 12:09

## 2021-10-16 RX ADMIN — Medication 15 MILLIGRAM(S): at 04:56

## 2021-10-16 RX ADMIN — HYDROMORPHONE HYDROCHLORIDE 0.25 MILLIGRAM(S): 2 INJECTION INTRAMUSCULAR; INTRAVENOUS; SUBCUTANEOUS at 11:00

## 2021-10-16 RX ADMIN — SODIUM CHLORIDE 100 MILLILITER(S): 9 INJECTION, SOLUTION INTRAVENOUS at 14:01

## 2021-10-16 RX ADMIN — HYDROMORPHONE HYDROCHLORIDE 30 MILLILITER(S): 2 INJECTION INTRAMUSCULAR; INTRAVENOUS; SUBCUTANEOUS at 19:24

## 2021-10-16 RX ADMIN — HYDROMORPHONE HYDROCHLORIDE 0.25 MILLIGRAM(S): 2 INJECTION INTRAMUSCULAR; INTRAVENOUS; SUBCUTANEOUS at 10:39

## 2021-10-16 RX ADMIN — HYDROMORPHONE HYDROCHLORIDE 30 MILLILITER(S): 2 INJECTION INTRAMUSCULAR; INTRAVENOUS; SUBCUTANEOUS at 13:19

## 2021-10-16 RX ADMIN — HYDROXYUREA 500 MILLIGRAM(S): 500 CAPSULE ORAL at 14:01

## 2021-10-16 RX ADMIN — ONDANSETRON 4 MILLIGRAM(S): 8 TABLET, FILM COATED ORAL at 01:15

## 2021-10-16 RX ADMIN — SODIUM CHLORIDE 100 MILLILITER(S): 9 INJECTION, SOLUTION INTRAVENOUS at 04:56

## 2021-10-16 RX ADMIN — ARIPIPRAZOLE 5 MILLIGRAM(S): 15 TABLET ORAL at 14:02

## 2021-10-16 RX ADMIN — SERTRALINE 100 MILLIGRAM(S): 25 TABLET, FILM COATED ORAL at 14:02

## 2021-10-16 RX ADMIN — MORPHINE SULFATE 2 MILLIGRAM(S): 50 CAPSULE, EXTENDED RELEASE ORAL at 01:15

## 2021-10-16 RX ADMIN — Medication 650 MILLIGRAM(S): at 17:15

## 2021-10-16 RX ADMIN — Medication 650 MILLIGRAM(S): at 16:50

## 2021-10-16 RX ADMIN — MORPHINE SULFATE 4 MILLIGRAM(S): 50 CAPSULE, EXTENDED RELEASE ORAL at 02:53

## 2021-10-16 RX ADMIN — SODIUM CHLORIDE 100 MILLILITER(S): 9 INJECTION, SOLUTION INTRAVENOUS at 22:44

## 2021-10-16 RX ADMIN — MORPHINE SULFATE 4 MILLIGRAM(S): 50 CAPSULE, EXTENDED RELEASE ORAL at 04:56

## 2021-10-16 RX ADMIN — SODIUM CHLORIDE 500 MILLILITER(S): 9 INJECTION INTRAMUSCULAR; INTRAVENOUS; SUBCUTANEOUS at 01:16

## 2021-10-16 NOTE — H&P ADULT - PROBLEM SELECTOR PLAN 1
LR at 100 cc/hr  NS O2 as needed to maintain normal O2 saturation - currently with clear lung on CXR and on exam - doesn't require supplementation with oxygen  Once on medical floor, start dilaudid IV PCA pump per prior admission dose regimen. 1/2 NS at 100 cc/hr  NS O2 as needed to maintain normal O2 saturation - currently with clear lung on CXR and on exam - doesn't require supplementation with oxygen  Once on medical floor, start dilaudid IV PCA pump per prior admission dose regimen.

## 2021-10-16 NOTE — H&P ADULT - NSHPSOCIALHISTORY_GEN_ALL_CORE
Lives with parents. Currently studying digital media, not working at this time.   Denies smoking, alcohol, and drug use.   Independent at baseline.

## 2021-10-16 NOTE — H&P ADULT - HISTORY OF PRESENT ILLNESS
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   Vital in the ED are okay, just a bit tachycardic at low 100s. Not hypoxic. Given multiple IV doses of morphine IV and admitted to the hospital from the CDU for management of sickle cell pain crisis.  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 This occurred spontaneously since yesterday. No inciting factors. pain is currently being rated at 7/10 severity; helped by Morphine IV given in ED. Also has some non-specific chest tightness, mild severity, nonradiating, not associated with dyspnea. Vital signs in the ED are okay, just a bit tachycardic to low 100s. Not hypoxic. ECG NSR, no ischemic findings. Given multiple IV doses of morphine IV and admitted to the hospital from the CDU for management of sickle cell pain crisis.  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 This occurred spontaneously since yesterday. No inciting factors. pain is currently being rated at 7/10 severity; helped by Morphine IV given in ED. Also has some non-specific chest tightness, mild severity, nonradiating, not associated with dyspnea. Vital signs in the ED: T 99, HR 92, /55, RR 18, 95% on RA. Not hypoxic. ECG NSR, no ischemic findings. Given multiple IV doses of morphine IV and admitted to the hospital from the CDU for management of sickle cell pain crisis.

## 2021-10-16 NOTE — H&P ADULT - NSICDXPASTMEDICALHX_GEN_ALL_CORE_FT
PAST MEDICAL HISTORY:  Anxiety     Chronic Lung Disease of Premat follows with [facility]    CP (cerebral palsy) - mild    CVA (Cerebral Vascular Accident) Onset date unknown, noted on MRI from 5/2010    Depression     Hydrocephalus     Impacted teeth     Reactive Airway Disease receives albuterol and xoponex treatments at home    S/P  Shunt x2 with multiple shunt revisions    Sickle Cell Disease     Strabismus

## 2021-10-16 NOTE — H&P ADULT - NSHPLABSRESULTS_GEN_ALL_CORE
Labs reviewed: Hg 8.8, retic count elevated, Tbili 2.8,     CXR personally reviewed: Clear lung fields bilaterally, +mediport    ECG reviewed and interpreted: NSR, no ischemia

## 2021-10-16 NOTE — ED ADULT NURSE NOTE - OBJECTIVE STATEMENT
Patient is a 21 year old female complaining of right sided body pain. Patient has history of sickle cell. Patient is A&O x 4. Pt reports right sided body pain. pt states she has been taking Tylenol and tramadol at home with no relief. Denies complaints of chest pain, sob, fevers, chills, n/v/d, headache, syncope, burning urination. Safety and comfort maintained. Will continue to monitor.

## 2021-10-16 NOTE — ED CLERICAL - NS ED CLERK UNITS
Initial SW/CM Assessment/Plan of Care Note     Baseline Assessment  60 year old admitted 11/19/2019 as Inpatient.  Prior to admission patient was living with Spouse/significant other and residing at House [facility] the [facility]. Patient does not  have a Power of  for Healthcare.  Patient’s Primary Care Provider is Missy Larson NP.     Medical History  Past Medical History:   Diagnosis Date    Essential (primary) hypertension     High cholesterol     Pneumonia 02/24/2018    Tobacco abuse     1.5 PPD      Prior to Admission Status  Functional Status  Ambulation: Independent/Self  Bathing: Independent/Self  Dressing: Independent/Self  Toileting: Independent/Self  Meal Preparation: Independent/Self  Shopping: Independent/Self  Medication Preparation: Independent/Self  Medication Administration: Independent/Self  Housekeeping: Independent/Self  Laundry: Independent/Self  Transportation: Independent/Self    Agency/Support  Type of Services Prior to Hospitalization: None  Support Systems: Spouse/Significant other, Family members  Home Devices/Equipment: None  Mobility Assist Devices: None  Sensory Support Devices: Eyeglasses    Current Status   Current Mental Status: Cooperative    Insurance  Primary: WC-PAYOR UNKNOWN  Secondary: N/A    Barriers to Discharge  Identified Barriers to Discharge/Transition Planning: Assessment/stabilization in progress    Progress Note  Case management will assist with discharge needs as appropriate.    Plan  SW/CM - Recommendations for Discharge: Home  Anticipate patient will need post-hospital services. Necessary services are available.  Anticipate patient can return to the environment from which patient entered the hospital.   Anticipate patient can provide self-care at discharge.    Refer to SW/CM Flowsheet for Goals and objective data.      APER

## 2021-10-16 NOTE — ED ADULT NURSE NOTE - ED STAT RN HANDOFF DETAILS
hand off received from night nurse ketan Brooks RN. Pt TBA. No bed yet. sleeping. Resp deep and even. IV intact without sx of infilt RACF

## 2021-10-16 NOTE — ED ADULT NURSE NOTE - NSICDXPASTMEDICALHX_GEN_ALL_CORE_FT
PAST MEDICAL HISTORY:  Anxiety     Chronic Lung Disease of Premat follows with [facility]    CP (cerebral palsy) - mild    CVA (Cerebral Vascular Accident) Onset date unknown, noted on MRI from 5/2010    Depression     Hydrocephalus     Impacted teeth     Reactive Airway Disease receives albuterol and xoponex treatments at home    S/P  Shunt x2 with multiple shunt revisions    Sickle Cell Disease     Strabismus

## 2021-10-16 NOTE — H&P ADULT - ASSESSMENT
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 Admitted for management of sickle cell pain crisis.     VTE risk assessment could not be completed ([place] error)

## 2021-10-17 LAB
ANION GAP SERPL CALC-SCNC: 14 MMOL/L — SIGNIFICANT CHANGE UP (ref 5–17)
BUN SERPL-MCNC: 6 MG/DL — LOW (ref 7–23)
CALCIUM SERPL-MCNC: 9.3 MG/DL — SIGNIFICANT CHANGE UP (ref 8.4–10.5)
CHLORIDE SERPL-SCNC: 104 MMOL/L — SIGNIFICANT CHANGE UP (ref 96–108)
CO2 SERPL-SCNC: 19 MMOL/L — LOW (ref 22–31)
COVID-19 SPIKE DOMAIN AB INTERP: POSITIVE
COVID-19 SPIKE DOMAIN ANTIBODY RESULT: >250 U/ML — HIGH
CREAT SERPL-MCNC: 0.81 MG/DL — SIGNIFICANT CHANGE UP (ref 0.5–1.3)
GLUCOSE SERPL-MCNC: 97 MG/DL — SIGNIFICANT CHANGE UP (ref 70–99)
HCT VFR BLD CALC: 27.9 % — LOW (ref 34.5–45)
HGB BLD-MCNC: 9.8 G/DL — LOW (ref 11.5–15.5)
LDH SERPL L TO P-CCNC: 629 U/L — HIGH (ref 50–242)
MCHC RBC-ENTMCNC: 29.9 PG — SIGNIFICANT CHANGE UP (ref 27–34)
MCHC RBC-ENTMCNC: 35.1 GM/DL — SIGNIFICANT CHANGE UP (ref 32–36)
MCV RBC AUTO: 85.1 FL — SIGNIFICANT CHANGE UP (ref 80–100)
NRBC # BLD: 7 /100 WBCS — HIGH (ref 0–0)
PLATELET # BLD AUTO: 396 K/UL — SIGNIFICANT CHANGE UP (ref 150–400)
POTASSIUM SERPL-MCNC: 3.7 MMOL/L — SIGNIFICANT CHANGE UP (ref 3.5–5.3)
POTASSIUM SERPL-SCNC: 3.7 MMOL/L — SIGNIFICANT CHANGE UP (ref 3.5–5.3)
RBC # BLD: 3.28 M/UL — LOW (ref 3.8–5.2)
RBC # FLD: 19.2 % — HIGH (ref 10.3–14.5)
SARS-COV-2 IGG+IGM SERPL QL IA: >250 U/ML — HIGH
SARS-COV-2 IGG+IGM SERPL QL IA: POSITIVE
SODIUM SERPL-SCNC: 137 MMOL/L — SIGNIFICANT CHANGE UP (ref 135–145)
WBC # BLD: 13.17 K/UL — HIGH (ref 3.8–10.5)
WBC # FLD AUTO: 13.17 K/UL — HIGH (ref 3.8–10.5)

## 2021-10-17 PROCEDURE — 99233 SBSQ HOSP IP/OBS HIGH 50: CPT

## 2021-10-17 RX ORDER — MORPHINE SULFATE 50 MG/1
4 CAPSULE, EXTENDED RELEASE ORAL EVERY 4 HOURS
Refills: 0 | Status: DISCONTINUED | OUTPATIENT
Start: 2021-10-17 | End: 2021-10-18

## 2021-10-17 RX ORDER — IBUPROFEN 200 MG
600 TABLET ORAL ONCE
Refills: 0 | Status: COMPLETED | OUTPATIENT
Start: 2021-10-17 | End: 2021-10-17

## 2021-10-17 RX ORDER — IBUPROFEN 200 MG
600 TABLET ORAL EVERY 6 HOURS
Refills: 0 | Status: DISCONTINUED | OUTPATIENT
Start: 2021-10-17 | End: 2021-10-17

## 2021-10-17 RX ORDER — IBUPROFEN 200 MG
600 TABLET ORAL EVERY 6 HOURS
Refills: 0 | Status: DISCONTINUED | OUTPATIENT
Start: 2021-10-17 | End: 2021-10-18

## 2021-10-17 RX ORDER — ACETAMINOPHEN 500 MG
650 TABLET ORAL EVERY 6 HOURS
Refills: 0 | Status: DISCONTINUED | OUTPATIENT
Start: 2021-10-17 | End: 2021-10-24

## 2021-10-17 RX ADMIN — MORPHINE SULFATE 4 MILLIGRAM(S): 50 CAPSULE, EXTENDED RELEASE ORAL at 17:21

## 2021-10-17 RX ADMIN — ARIPIPRAZOLE 5 MILLIGRAM(S): 15 TABLET ORAL at 12:09

## 2021-10-17 RX ADMIN — MORPHINE SULFATE 4 MILLIGRAM(S): 50 CAPSULE, EXTENDED RELEASE ORAL at 17:40

## 2021-10-17 RX ADMIN — Medication 600 MILLIGRAM(S): at 20:01

## 2021-10-17 RX ADMIN — MORPHINE SULFATE 4 MILLIGRAM(S): 50 CAPSULE, EXTENDED RELEASE ORAL at 23:16

## 2021-10-17 RX ADMIN — MORPHINE SULFATE 4 MILLIGRAM(S): 50 CAPSULE, EXTENDED RELEASE ORAL at 23:46

## 2021-10-17 RX ADMIN — Medication 600 MILLIGRAM(S): at 12:20

## 2021-10-17 RX ADMIN — Medication 650 MILLIGRAM(S): at 05:52

## 2021-10-17 RX ADMIN — Medication 600 MILLIGRAM(S): at 19:31

## 2021-10-17 RX ADMIN — HYDROXYUREA 500 MILLIGRAM(S): 500 CAPSULE ORAL at 12:09

## 2021-10-17 RX ADMIN — Medication 600 MILLIGRAM(S): at 11:51

## 2021-10-17 RX ADMIN — SERTRALINE 100 MILLIGRAM(S): 25 TABLET, FILM COATED ORAL at 12:09

## 2021-10-17 RX ADMIN — SODIUM CHLORIDE 100 MILLILITER(S): 9 INJECTION, SOLUTION INTRAVENOUS at 18:04

## 2021-10-17 RX ADMIN — HYDROMORPHONE HYDROCHLORIDE 30 MILLILITER(S): 2 INJECTION INTRAMUSCULAR; INTRAVENOUS; SUBCUTANEOUS at 07:13

## 2021-10-17 NOTE — CHART NOTE - NSCHARTNOTEFT_GEN_A_CORE
Discussion at bedside with patient and her mother. Reporting pain poorly controlled. Discussed PCA pumps vs prn IVP and dilaudid vs morphine equivalence. Would like to switch back to regimen received at [facility]. Previous regimens ranging from morphine 4-6 q3 prn with toradol 28 q6. Per patient ibuprofen helping so will keep instead of toradol.   New Regimen:  Morphine 4 q4 IVP prn  Ibuprofen 600mg q6 prn

## 2021-10-18 LAB
ALBUMIN SERPL ELPH-MCNC: 4.1 G/DL — SIGNIFICANT CHANGE UP (ref 3.3–5)
ALP SERPL-CCNC: 102 U/L — SIGNIFICANT CHANGE UP (ref 40–120)
ALT FLD-CCNC: 45 U/L — SIGNIFICANT CHANGE UP (ref 10–45)
ANION GAP SERPL CALC-SCNC: 13 MMOL/L — SIGNIFICANT CHANGE UP (ref 5–17)
AST SERPL-CCNC: 51 U/L — HIGH (ref 10–40)
BILIRUB SERPL-MCNC: 4.2 MG/DL — HIGH (ref 0.2–1.2)
BUN SERPL-MCNC: 7 MG/DL — SIGNIFICANT CHANGE UP (ref 7–23)
CALCIUM SERPL-MCNC: 9.3 MG/DL — SIGNIFICANT CHANGE UP (ref 8.4–10.5)
CHLORIDE SERPL-SCNC: 104 MMOL/L — SIGNIFICANT CHANGE UP (ref 96–108)
CO2 SERPL-SCNC: 22 MMOL/L — SIGNIFICANT CHANGE UP (ref 22–31)
CREAT SERPL-MCNC: 0.88 MG/DL — SIGNIFICANT CHANGE UP (ref 0.5–1.3)
GLUCOSE SERPL-MCNC: 79 MG/DL — SIGNIFICANT CHANGE UP (ref 70–99)
HCT VFR BLD CALC: 27.1 % — LOW (ref 34.5–45)
HGB BLD-MCNC: 9.2 G/DL — LOW (ref 11.5–15.5)
LDH SERPL L TO P-CCNC: 691 U/L — HIGH (ref 50–242)
MAGNESIUM SERPL-MCNC: 2.2 MG/DL — SIGNIFICANT CHANGE UP (ref 1.6–2.6)
MCHC RBC-ENTMCNC: 29.4 PG — SIGNIFICANT CHANGE UP (ref 27–34)
MCHC RBC-ENTMCNC: 33.9 GM/DL — SIGNIFICANT CHANGE UP (ref 32–36)
MCV RBC AUTO: 86.6 FL — SIGNIFICANT CHANGE UP (ref 80–100)
NRBC # BLD: 9 /100 WBCS — HIGH (ref 0–0)
PLATELET # BLD AUTO: 365 K/UL — SIGNIFICANT CHANGE UP (ref 150–400)
POTASSIUM SERPL-MCNC: 3.5 MMOL/L — SIGNIFICANT CHANGE UP (ref 3.5–5.3)
POTASSIUM SERPL-SCNC: 3.5 MMOL/L — SIGNIFICANT CHANGE UP (ref 3.5–5.3)
PROT SERPL-MCNC: 6.5 G/DL — SIGNIFICANT CHANGE UP (ref 6–8.3)
RBC # BLD: 3.13 M/UL — LOW (ref 3.8–5.2)
RBC # BLD: 3.13 M/UL — LOW (ref 3.8–5.2)
RBC # FLD: 19.1 % — HIGH (ref 10.3–14.5)
RETICS #: 346.8 K/UL — HIGH (ref 25–125)
RETICS/RBC NFR: 11.1 % — HIGH (ref 0.5–2.5)
SODIUM SERPL-SCNC: 139 MMOL/L — SIGNIFICANT CHANGE UP (ref 135–145)
WBC # BLD: 10.08 K/UL — SIGNIFICANT CHANGE UP (ref 3.8–10.5)
WBC # FLD AUTO: 10.08 K/UL — SIGNIFICANT CHANGE UP (ref 3.8–10.5)

## 2021-10-18 PROCEDURE — 99233 SBSQ HOSP IP/OBS HIGH 50: CPT

## 2021-10-18 RX ORDER — SENNA PLUS 8.6 MG/1
2 TABLET ORAL AT BEDTIME
Refills: 0 | Status: DISCONTINUED | OUTPATIENT
Start: 2021-10-18 | End: 2021-10-26

## 2021-10-18 RX ORDER — KETOROLAC TROMETHAMINE 30 MG/ML
30 SYRINGE (ML) INJECTION EVERY 6 HOURS
Refills: 0 | Status: DISCONTINUED | OUTPATIENT
Start: 2021-10-18 | End: 2021-10-23

## 2021-10-18 RX ORDER — MORPHINE SULFATE 50 MG/1
4 CAPSULE, EXTENDED RELEASE ORAL
Refills: 0 | Status: DISCONTINUED | OUTPATIENT
Start: 2021-10-18 | End: 2021-10-20

## 2021-10-18 RX ADMIN — MORPHINE SULFATE 4 MILLIGRAM(S): 50 CAPSULE, EXTENDED RELEASE ORAL at 14:05

## 2021-10-18 RX ADMIN — MORPHINE SULFATE 4 MILLIGRAM(S): 50 CAPSULE, EXTENDED RELEASE ORAL at 19:27

## 2021-10-18 RX ADMIN — MORPHINE SULFATE 4 MILLIGRAM(S): 50 CAPSULE, EXTENDED RELEASE ORAL at 05:38

## 2021-10-18 RX ADMIN — ARIPIPRAZOLE 5 MILLIGRAM(S): 15 TABLET ORAL at 12:42

## 2021-10-18 RX ADMIN — Medication 600 MILLIGRAM(S): at 10:20

## 2021-10-18 RX ADMIN — Medication 30 MILLIGRAM(S): at 22:51

## 2021-10-18 RX ADMIN — Medication 30 MILLIGRAM(S): at 22:21

## 2021-10-18 RX ADMIN — MORPHINE SULFATE 4 MILLIGRAM(S): 50 CAPSULE, EXTENDED RELEASE ORAL at 13:48

## 2021-10-18 RX ADMIN — MORPHINE SULFATE 4 MILLIGRAM(S): 50 CAPSULE, EXTENDED RELEASE ORAL at 21:05

## 2021-10-18 RX ADMIN — SERTRALINE 100 MILLIGRAM(S): 25 TABLET, FILM COATED ORAL at 12:42

## 2021-10-18 RX ADMIN — SENNA PLUS 2 TABLET(S): 8.6 TABLET ORAL at 21:05

## 2021-10-18 RX ADMIN — Medication 600 MILLIGRAM(S): at 11:20

## 2021-10-18 RX ADMIN — SODIUM CHLORIDE 100 MILLILITER(S): 9 INJECTION, SOLUTION INTRAVENOUS at 15:21

## 2021-10-18 RX ADMIN — HYDROXYUREA 500 MILLIGRAM(S): 500 CAPSULE ORAL at 12:42

## 2021-10-18 RX ADMIN — MORPHINE SULFATE 4 MILLIGRAM(S): 50 CAPSULE, EXTENDED RELEASE ORAL at 05:08

## 2021-10-19 ENCOUNTER — TRANSCRIPTION ENCOUNTER (OUTPATIENT)
Age: 21
End: 2021-10-19

## 2021-10-19 LAB
ALBUMIN SERPL ELPH-MCNC: 3.9 G/DL — SIGNIFICANT CHANGE UP (ref 3.3–5)
ALP SERPL-CCNC: 107 U/L — SIGNIFICANT CHANGE UP (ref 40–120)
ALT FLD-CCNC: 49 U/L — HIGH (ref 10–45)
ANION GAP SERPL CALC-SCNC: 11 MMOL/L — SIGNIFICANT CHANGE UP (ref 5–17)
AST SERPL-CCNC: 73 U/L — HIGH (ref 10–40)
BILIRUB SERPL-MCNC: 4.3 MG/DL — HIGH (ref 0.2–1.2)
BUN SERPL-MCNC: 11 MG/DL — SIGNIFICANT CHANGE UP (ref 7–23)
CALCIUM SERPL-MCNC: 9.2 MG/DL — SIGNIFICANT CHANGE UP (ref 8.4–10.5)
CHLORIDE SERPL-SCNC: 102 MMOL/L — SIGNIFICANT CHANGE UP (ref 96–108)
CO2 SERPL-SCNC: 21 MMOL/L — LOW (ref 22–31)
CREAT SERPL-MCNC: 0.93 MG/DL — SIGNIFICANT CHANGE UP (ref 0.5–1.3)
GLUCOSE SERPL-MCNC: 81 MG/DL — SIGNIFICANT CHANGE UP (ref 70–99)
LDH SERPL L TO P-CCNC: 727 U/L — HIGH (ref 50–242)
POTASSIUM SERPL-MCNC: 3.6 MMOL/L — SIGNIFICANT CHANGE UP (ref 3.5–5.3)
POTASSIUM SERPL-SCNC: 3.6 MMOL/L — SIGNIFICANT CHANGE UP (ref 3.5–5.3)
PROT SERPL-MCNC: 6.2 G/DL — SIGNIFICANT CHANGE UP (ref 6–8.3)
RBC # BLD: 3.16 M/UL — LOW (ref 3.8–5.2)
RETICS #: 315.1 K/UL — HIGH (ref 25–125)
RETICS/RBC NFR: 10 % — HIGH (ref 0.5–2.5)
SODIUM SERPL-SCNC: 134 MMOL/L — LOW (ref 135–145)

## 2021-10-19 PROCEDURE — 99239 HOSP IP/OBS DSCHRG MGMT >30: CPT

## 2021-10-19 RX ADMIN — Medication 30 MILLIGRAM(S): at 13:01

## 2021-10-19 RX ADMIN — MORPHINE SULFATE 4 MILLIGRAM(S): 50 CAPSULE, EXTENDED RELEASE ORAL at 17:41

## 2021-10-19 RX ADMIN — HYDROXYUREA 500 MILLIGRAM(S): 500 CAPSULE ORAL at 11:48

## 2021-10-19 RX ADMIN — ARIPIPRAZOLE 5 MILLIGRAM(S): 15 TABLET ORAL at 11:48

## 2021-10-19 RX ADMIN — SODIUM CHLORIDE 100 MILLILITER(S): 9 INJECTION, SOLUTION INTRAVENOUS at 19:55

## 2021-10-19 RX ADMIN — Medication 650 MILLIGRAM(S): at 12:36

## 2021-10-19 RX ADMIN — Medication 30 MILLIGRAM(S): at 12:45

## 2021-10-19 RX ADMIN — Medication 30 MILLIGRAM(S): at 20:15

## 2021-10-19 RX ADMIN — MORPHINE SULFATE 4 MILLIGRAM(S): 50 CAPSULE, EXTENDED RELEASE ORAL at 23:45

## 2021-10-19 RX ADMIN — Medication 30 MILLIGRAM(S): at 19:55

## 2021-10-19 RX ADMIN — Medication 650 MILLIGRAM(S): at 11:50

## 2021-10-19 RX ADMIN — Medication 30 MILLIGRAM(S): at 04:59

## 2021-10-19 RX ADMIN — MORPHINE SULFATE 4 MILLIGRAM(S): 50 CAPSULE, EXTENDED RELEASE ORAL at 23:27

## 2021-10-19 RX ADMIN — Medication 30 MILLIGRAM(S): at 05:56

## 2021-10-19 RX ADMIN — MORPHINE SULFATE 4 MILLIGRAM(S): 50 CAPSULE, EXTENDED RELEASE ORAL at 18:30

## 2021-10-19 RX ADMIN — SENNA PLUS 2 TABLET(S): 8.6 TABLET ORAL at 21:17

## 2021-10-19 RX ADMIN — SERTRALINE 100 MILLIGRAM(S): 25 TABLET, FILM COATED ORAL at 11:48

## 2021-10-19 RX ADMIN — SODIUM CHLORIDE 100 MILLILITER(S): 9 INJECTION, SOLUTION INTRAVENOUS at 02:46

## 2021-10-19 NOTE — PHYSICAL THERAPY INITIAL EVALUATION ADULT - ADDITIONAL COMMENTS
Pt lives in a 3-story private home with no steps to enter. There are 2 flights of stairs inside with ~10 steps each. Pt resides with her parents. PTA pt was (I) with all ADLs and functional mobility.

## 2021-10-19 NOTE — PHYSICAL THERAPY INITIAL EVALUATION ADULT - PERTINENT HX OF CURRENT PROBLEM, REHAB EVAL
Pt is a 22y/o F with a PMH of HgSS disease, chronic lung disease of prematurity, prior CVA (receives simple blood transfusions), and bipolar disorder. Pt p/w atraumatic left arm pain and bilateral anterior lower rib pain. Patient adm for acute vaso-occlusive crisis.

## 2021-10-19 NOTE — DISCHARGE NOTE PROVIDER - NSDCFUADDAPPT_GEN_ALL_CORE_FT
You will need to follow up with your primary medical doctor and your Hematologist within one week of discharge - please call to make these appointments.

## 2021-10-19 NOTE — DISCHARGE NOTE PROVIDER - NSDCMRMEDTOKEN_GEN_ALL_CORE_FT
Abilify 5 mg oral tablet: 1 tab(s) orally once a day  hydroxyurea 500 mg oral capsule: 1 cap(s) orally once a day  Jadenu 360 mg oral tablet: 4 tab(s) orally once a day  sertraline 100 mg oral tablet: 1 tab(s) orally once a day  traMADol 50 mg oral tablet: 1 tab(s) orally every 6 hours MDD:4 tablets   Abilify 5 mg oral tablet: 1 tab(s) orally once a day  hydroxyurea 500 mg oral capsule: 1 cap(s) orally once a day  ibuprofen 600 mg oral tablet: 1 tab(s) orally every 6 hours, As needed, Mild Pain (1 - 3), Moderate Pain (4 - 6)  Jadenu 360 mg oral tablet: 4 tab(s) orally once a day  sertraline 100 mg oral tablet: 1 tab(s) orally once a day  traMADol 50 mg oral tablet: 1 tab(s) orally every 6 hours MDD:4 tablets

## 2021-10-19 NOTE — OCCUPATIONAL THERAPY INITIAL EVALUATION ADULT - VISUAL ASSESSMENT: TRACKING
· No signs or symptoms of CHF  · Repeat echocardiogram for surveillance of bioprosthetic aortic valve in 9 months on same day as follow-up  · Continue aspirin 81 mg daily   normal

## 2021-10-19 NOTE — OCCUPATIONAL THERAPY INITIAL EVALUATION ADULT - PERTINENT HX OF CURRENT PROBLEM, REHAB EVAL
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 This occurred spontaneously since yesterday. No inciting factors. pain is currently being rated at 7/10 severity; helped by Morphine IV given in ED.

## 2021-10-19 NOTE — DISCHARGE NOTE PROVIDER - CARE PROVIDER_API CALL
Eleonora Richardson)  Internal Medicine  [phone] [place] [address]  Phone: [phone]  Fax: [phone]  Follow Up Time:    Eleonora Richardson)  Internal Medicine  [phone] [place] [address]  Phone: [phone]  Fax: [phone]  Follow Up Time:     Benjamin Loza)  Neurosurgery; Pediatric Neurosurgery  [address] [place] [address], [address]  [place] [address]  Phone: [phone]  Fax: [phone]  Follow Up Time:

## 2021-10-19 NOTE — DISCHARGE NOTE PROVIDER - NSDCCPCAREPLAN_GEN_ALL_CORE_FT
PRINCIPAL DISCHARGE DIAGNOSIS  Diagnosis: Sickle cell crisis  Assessment and Plan of Treatment: You must follow up with your Hematologist within one week of discharge - please call to make an appointment.      SECONDARY DISCHARGE DIAGNOSES  Diagnosis: Bipolar disorder  Assessment and Plan of Treatment: Continue with current medication regimen.     PRINCIPAL DISCHARGE DIAGNOSIS  Diagnosis: Sickle cell crisis  Assessment and Plan of Treatment: stable, You must follow up with your Hematologist within one week of discharge - please call to make an appointment.      SECONDARY DISCHARGE DIAGNOSES  Diagnosis:  (ventriculoperitoneal) shunt status  Assessment and Plan of Treatment: stable, follow up with Neurosurgery    Diagnosis: Bipolar disorder  Assessment and Plan of Treatment: Continue with current home medication regimen.

## 2021-10-19 NOTE — DISCHARGE NOTE PROVIDER - HOSPITAL COURSE
21 year old female with PMHx of HgSS disease, chronic lung disease of prematurity, prior CVA (receives simple blood transfusions), and bipolar disorder, presenting with one day history of atraumatic left arm pain and bilateral anterior lower rib pain. Patient with acute vaso-occlusive crisis.     Problem/Plan - 1:  ·  Problem: Hemoglobin SS disease with vasoocclusive crisis.   ·  Plan: d/w benefits of PCA pump with patient and mom, they are amenable but want to try the Toradol first- not using opiates  -Tylenol and Motrin PRN for mild-mod pain  -continue 1/2NS at 100 cc/hr  -Jadenu per home dose. Hgb near 10  -Incentive spirometer  -Daily sickle cell labs  -Continue with Hydroxyurea and Folic Acid  -DVT PPx  - senna for bowel regimen.     Problem/Plan - 2:  ·  Problem: Bipolar disorder.   ·  Plan: -Continue with Abilify and Sertraline.     21 year old female with PMHx of HgSS disease, chronic lung disease of prematurity, prior CVA (receives simple blood transfusions), and bipolar disorder, presenting with one day history of atraumatic left arm pain and bilateral anterior lower rib pain. Patient with acute vaso-occlusive crisis.  She was seen by Neurosurgery, PT/OT. Shunt setting was changed by NS, Unasyn was discontinued and she received PRBC transfusion and h/h is stable and in base line. She is hemodynamically stable to be discharged home today, spoke to Attending and mother at bedside.     Problem/Plan - 1:  ·  Problem: Hemoglobin SS disease with vasoocclusive crisis.   ·  Plan: d/w benefits of PCA pump with patient and mom, they are amenable but want to try the Toradol first- not using opiates  -Tylenol and Motrin PRN for mild-mod pain  -continue 1/2NS at 100 cc/hr  -Jadenu per home dose. Hgb near 10  -Incentive spirometer  -Daily sickle cell labs  -Continue with Hydroxyurea and Folic Acid  -DVT PPx  - senna for bowel regimen.     Problem/Plan - 2:  ·  Problem: Bipolar disorder.   ·  Plan: -Continue with Abilify and Sertraline.

## 2021-10-19 NOTE — DISCHARGE NOTE PROVIDER - CARE PROVIDERS DIRECT ADDRESSES
,[email or web address],DirectAddress_Unknown

## 2021-10-19 NOTE — OCCUPATIONAL THERAPY INITIAL EVALUATION ADULT - PRECAUTIONS/LIMITATIONS, REHAB EVAL
Also has some non-specific chest tightness, mild severity, nonradiating, not associated with dyspnea. Vital signs in the ED: T 99, HR 92, /55, RR 18, 95% on RA. Not hypoxic. ECG NSR, no ischemic findings. Given multiple IV doses of morphine IV and admitted to the hospital from the CDU for management of sickle cell pain crisis./fall precautions

## 2021-10-20 DIAGNOSIS — R50.9 FEVER, UNSPECIFIED: ICD-10-CM

## 2021-10-20 LAB
ANION GAP SERPL CALC-SCNC: 14 MMOL/L — SIGNIFICANT CHANGE UP (ref 5–17)
APPEARANCE UR: CLEAR — SIGNIFICANT CHANGE UP
APPEARANCE UR: CLEAR — SIGNIFICANT CHANGE UP
BACTERIA # UR AUTO: NEGATIVE — SIGNIFICANT CHANGE UP
BACTERIA # UR AUTO: NEGATIVE — SIGNIFICANT CHANGE UP
BILIRUB UR-MCNC: NEGATIVE — SIGNIFICANT CHANGE UP
BILIRUB UR-MCNC: NEGATIVE — SIGNIFICANT CHANGE UP
BUN SERPL-MCNC: 10 MG/DL — SIGNIFICANT CHANGE UP (ref 7–23)
CALCIUM SERPL-MCNC: 8.8 MG/DL — SIGNIFICANT CHANGE UP (ref 8.4–10.5)
CHLORIDE SERPL-SCNC: 102 MMOL/L — SIGNIFICANT CHANGE UP (ref 96–108)
CO2 SERPL-SCNC: 20 MMOL/L — LOW (ref 22–31)
COLOR SPEC: YELLOW — SIGNIFICANT CHANGE UP
COLOR SPEC: YELLOW — SIGNIFICANT CHANGE UP
CREAT SERPL-MCNC: 0.96 MG/DL — SIGNIFICANT CHANGE UP (ref 0.5–1.3)
DIFF PNL FLD: ABNORMAL
DIFF PNL FLD: ABNORMAL
EPI CELLS # UR: 10 /HPF — HIGH
EPI CELLS # UR: 3 /HPF — SIGNIFICANT CHANGE UP
GLUCOSE SERPL-MCNC: 85 MG/DL — SIGNIFICANT CHANGE UP (ref 70–99)
GLUCOSE UR QL: NEGATIVE — SIGNIFICANT CHANGE UP
GLUCOSE UR QL: NEGATIVE — SIGNIFICANT CHANGE UP
HCT VFR BLD CALC: 24.3 % — LOW (ref 34.5–45)
HGB BLD-MCNC: 8.3 G/DL — LOW (ref 11.5–15.5)
HYALINE CASTS # UR AUTO: 1 /LPF — SIGNIFICANT CHANGE UP (ref 0–2)
HYALINE CASTS # UR AUTO: 3 /LPF — HIGH (ref 0–2)
KETONES UR-MCNC: NEGATIVE — SIGNIFICANT CHANGE UP
KETONES UR-MCNC: NEGATIVE — SIGNIFICANT CHANGE UP
LEUKOCYTE ESTERASE UR-ACNC: ABNORMAL
LEUKOCYTE ESTERASE UR-ACNC: NEGATIVE — SIGNIFICANT CHANGE UP
MCHC RBC-ENTMCNC: 29.1 PG — SIGNIFICANT CHANGE UP (ref 27–34)
MCHC RBC-ENTMCNC: 34.2 GM/DL — SIGNIFICANT CHANGE UP (ref 32–36)
MCV RBC AUTO: 85.3 FL — SIGNIFICANT CHANGE UP (ref 80–100)
NITRITE UR-MCNC: NEGATIVE — SIGNIFICANT CHANGE UP
NITRITE UR-MCNC: NEGATIVE — SIGNIFICANT CHANGE UP
NRBC # BLD: 29 /100 WBCS — HIGH (ref 0–0)
PH UR: 6.5 — SIGNIFICANT CHANGE UP (ref 5–8)
PH UR: 7 — SIGNIFICANT CHANGE UP (ref 5–8)
PLATELET # BLD AUTO: 326 K/UL — SIGNIFICANT CHANGE UP (ref 150–400)
POTASSIUM SERPL-MCNC: 3.9 MMOL/L — SIGNIFICANT CHANGE UP (ref 3.5–5.3)
POTASSIUM SERPL-SCNC: 3.9 MMOL/L — SIGNIFICANT CHANGE UP (ref 3.5–5.3)
PROT UR-MCNC: SIGNIFICANT CHANGE UP
PROT UR-MCNC: SIGNIFICANT CHANGE UP
RAPID RVP RESULT: SIGNIFICANT CHANGE UP
RBC # BLD: 2.85 M/UL — LOW (ref 3.8–5.2)
RBC # FLD: 19.8 % — HIGH (ref 10.3–14.5)
RBC CASTS # UR COMP ASSIST: 4 /HPF — SIGNIFICANT CHANGE UP (ref 0–4)
RBC CASTS # UR COMP ASSIST: 5 /HPF — HIGH (ref 0–4)
SARS-COV-2 RNA SPEC QL NAA+PROBE: SIGNIFICANT CHANGE UP
SODIUM SERPL-SCNC: 136 MMOL/L — SIGNIFICANT CHANGE UP (ref 135–145)
SP GR SPEC: 1.01 — SIGNIFICANT CHANGE UP (ref 1.01–1.02)
SP GR SPEC: 1.01 — SIGNIFICANT CHANGE UP (ref 1.01–1.02)
UROBILINOGEN FLD QL: ABNORMAL
UROBILINOGEN FLD QL: ABNORMAL
WBC # BLD: 9.46 K/UL — SIGNIFICANT CHANGE UP (ref 3.8–10.5)
WBC # FLD AUTO: 9.46 K/UL — SIGNIFICANT CHANGE UP (ref 3.8–10.5)
WBC UR QL: 6 /HPF — HIGH (ref 0–5)
WBC UR QL: 6 /HPF — HIGH (ref 0–5)

## 2021-10-20 PROCEDURE — 71045 X-RAY EXAM CHEST 1 VIEW: CPT | Mod: 26

## 2021-10-20 PROCEDURE — 99233 SBSQ HOSP IP/OBS HIGH 50: CPT

## 2021-10-20 RX ORDER — MORPHINE SULFATE 50 MG/1
30 CAPSULE, EXTENDED RELEASE ORAL
Refills: 0 | Status: DISCONTINUED | OUTPATIENT
Start: 2021-10-20 | End: 2021-10-21

## 2021-10-20 RX ORDER — AZTREONAM 2 G
VIAL (EA) INJECTION
Refills: 0 | Status: DISCONTINUED | OUTPATIENT
Start: 2021-10-20 | End: 2021-10-20

## 2021-10-20 RX ORDER — CIPROFLOXACIN LACTATE 400MG/40ML
400 VIAL (ML) INTRAVENOUS EVERY 12 HOURS
Refills: 0 | Status: DISCONTINUED | OUTPATIENT
Start: 2021-10-20 | End: 2021-10-23

## 2021-10-20 RX ORDER — MORPHINE SULFATE 50 MG/1
30 CAPSULE, EXTENDED RELEASE ORAL
Refills: 0 | Status: DISCONTINUED | OUTPATIENT
Start: 2021-10-20 | End: 2021-10-20

## 2021-10-20 RX ORDER — ACETAMINOPHEN 500 MG
650 TABLET ORAL ONCE
Refills: 0 | Status: COMPLETED | OUTPATIENT
Start: 2021-10-20 | End: 2021-10-20

## 2021-10-20 RX ORDER — NALOXONE HYDROCHLORIDE 4 MG/.1ML
0.1 SPRAY NASAL
Refills: 0 | Status: DISCONTINUED | OUTPATIENT
Start: 2021-10-20 | End: 2021-10-26

## 2021-10-20 RX ADMIN — Medication 650 MILLIGRAM(S): at 05:15

## 2021-10-20 RX ADMIN — SERTRALINE 100 MILLIGRAM(S): 25 TABLET, FILM COATED ORAL at 11:52

## 2021-10-20 RX ADMIN — MORPHINE SULFATE 4 MILLIGRAM(S): 50 CAPSULE, EXTENDED RELEASE ORAL at 09:00

## 2021-10-20 RX ADMIN — SODIUM CHLORIDE 100 MILLILITER(S): 9 INJECTION, SOLUTION INTRAVENOUS at 23:38

## 2021-10-20 RX ADMIN — ARIPIPRAZOLE 5 MILLIGRAM(S): 15 TABLET ORAL at 11:53

## 2021-10-20 RX ADMIN — Medication 200 MILLIGRAM(S): at 17:54

## 2021-10-20 RX ADMIN — Medication 30 MILLIGRAM(S): at 10:40

## 2021-10-20 RX ADMIN — MORPHINE SULFATE 4 MILLIGRAM(S): 50 CAPSULE, EXTENDED RELEASE ORAL at 03:25

## 2021-10-20 RX ADMIN — SENNA PLUS 2 TABLET(S): 8.6 TABLET ORAL at 21:29

## 2021-10-20 RX ADMIN — SODIUM CHLORIDE 100 MILLILITER(S): 9 INJECTION, SOLUTION INTRAVENOUS at 08:43

## 2021-10-20 RX ADMIN — SODIUM CHLORIDE 100 MILLILITER(S): 9 INJECTION, SOLUTION INTRAVENOUS at 22:29

## 2021-10-20 RX ADMIN — MORPHINE SULFATE 30 MILLILITER(S): 50 CAPSULE, EXTENDED RELEASE ORAL at 15:13

## 2021-10-20 RX ADMIN — SODIUM CHLORIDE 100 MILLILITER(S): 9 INJECTION, SOLUTION INTRAVENOUS at 17:54

## 2021-10-20 RX ADMIN — Medication 30 MILLIGRAM(S): at 11:10

## 2021-10-20 RX ADMIN — MORPHINE SULFATE 4 MILLIGRAM(S): 50 CAPSULE, EXTENDED RELEASE ORAL at 08:43

## 2021-10-20 RX ADMIN — MORPHINE SULFATE 4 MILLIGRAM(S): 50 CAPSULE, EXTENDED RELEASE ORAL at 03:07

## 2021-10-20 RX ADMIN — MORPHINE SULFATE 30 MILLILITER(S): 50 CAPSULE, EXTENDED RELEASE ORAL at 19:34

## 2021-10-20 RX ADMIN — ONDANSETRON 4 MILLIGRAM(S): 8 TABLET, FILM COATED ORAL at 23:38

## 2021-10-20 RX ADMIN — HYDROXYUREA 500 MILLIGRAM(S): 500 CAPSULE ORAL at 11:52

## 2021-10-20 RX ADMIN — Medication 650 MILLIGRAM(S): at 22:29

## 2021-10-20 NOTE — PROGRESS NOTE ADULT - PROBLEM SELECTOR PLAN 3
PCA Pump morphine .5 demand 10 min lockout 4 hr limit 4 mg- d/w patient and mom   -Tylenol and Motrin PRN for mild-mod pain  -continue 1/2NS at 100 cc/hr  -Jadenu per home dose. Hgb near 10  -Incentive spirometer  -Daily sickle cell labs  -Continue with Hydroxyurea and Folic Acid  -DVT PPx  - senna for bowel regimen

## 2021-10-20 NOTE — CHART NOTE - NSCHARTNOTEFT_GEN_A_CORE
MEDICINE NP-EPISODIC NOTE    Notified by RN patient with temperature 101.7. Seen and examined patient at bedside. Patient is AOx3, NAD. Denies HA, CP, SOB, cough, N/V, dysuria, or abd pain.    VITAL SIGNS:  Vital Signs Last 24 Hrs  T(C): 38.7 (20 Oct 2021 04:37), Max: 38.7 (20 Oct 2021 04:37)  T(F): 101.7 (20 Oct 2021 04:37), Max: 101.7 (20 Oct 2021 04:37)  HR: 89 (20 Oct 2021 04:37) (62 - 89)  BP: 97/57 (20 Oct 2021 04:37) (97/57 - 114/74)  BP(mean): --  RR: 18 (20 Oct 2021 04:37) (17 - 18)  SpO2: 92% (20 Oct 2021 04:37) (92% - 95%)      LABORATORY:                          9.2    10.08 )-----------( 365      ( 18 Oct 2021 06:45 )             27.1       10-19    134<L>  |  102  |  11  ----------------------------<  81  3.6   |  21<L>  |  0.93    Ca    9.2      19 Oct 2021 07:05  Mg     2.2     10-18    TPro  6.2  /  Alb  3.9  /  TBili  4.3<H>  /  DBili  x   /  AST  73<H>  /  ALT  49<H>  /  AlkPhos  107  10-19    MICROBIOLOGY:           RADIOLOGY:  EXAM:  XR CHEST AP OR PA 1V                            PROCEDURE DATE:  10/15/2021            INTERPRETATION:  EXAMINATION: XR CHEST    CLINICAL INDICATION: Chest pain. History of sickle cell anemia.    TECHNIQUE: Single portable view of the chest was obtained.    COMPARISON: X-ray chest from 10/4/2021.    FINDINGS:    Left Mediport with the tip in the SVC. Partially visualized  shunt shunt catheter.  Heart size is within normal limits.  Clear lungs. No pleural effusion or pneumothorax.  No acute osseous abnormalities.    IMPRESSION:  Clear lungs.          PHYSICAL EXAM:    Constitutional: AOx3. NAD.    Respiratory: clear lungs bilaterally. No wheezing, rhonchi, or crackles.    Cardiovascular: S1 S2. No murmurs.    Gastrointestinal: BS X4 active. soft. nontender.    Extremities/Vascular: +2 pulses bilaterally. No BLE edema.      ASSESSMENT/PLAN:   21F with PMHx of HgSS disease, chronic lung disease of prematurity, prior CVA (receives simple blood transfusions), and bipolar disorder presenting with right arm and right leg pain. This occurred spontaneously since yesterday. No inciting factors. pain is currently being rated at 7/10 severity; helped by Morphine IV given in ED. Also has some non-specific chest tightness, mild severity, nonradiating, not associated with dyspnea. Vital signs in the ED: T 99, HR 92, /55, RR 18, 95% on RA. Not hypoxic. ECG NSR, no ischemic findings. Given multiple IV doses of morphine IV and admitted to the hospital from the CDU for management of sickle cell pain crisis.  (16 Oct 2021 09:52)    Now presents with  new onset fever.    Fever r/o infection    --Tylenol and cooling measures prn for pyrexia  --BC x2, UA/UC, RVP  --CXR if resp symptoms  --F/U primary team in     Juanis Stevenson NP-BC  [id number] MEDICINE NP-EPISODIC NOTE    Notified by RN patient with temperature 101.7. Seen and examined patient at bedside. Patient is AOx3, NAD. Denies HA, CP, SOB, cough, N/V, dysuria, or abd pain.    VITAL SIGNS:  Vital Signs Last 24 Hrs  T(C): 38.7 (20 Oct 2021 04:37), Max: 38.7 (20 Oct 2021 04:37)  T(F): 101.7 (20 Oct 2021 04:37), Max: 101.7 (20 Oct 2021 04:37)  HR: 89 (20 Oct 2021 04:37) (62 - 89)  BP: 97/57 (20 Oct 2021 04:37) (97/57 - 114/74)  BP(mean): --  RR: 18 (20 Oct 2021 04:37) (17 - 18)  SpO2: 92% (20 Oct 2021 04:37) (92% - 95%)      LABORATORY:                          9.2    10.08 )-----------( 365      ( 18 Oct 2021 06:45 )             27.1       10-19    134<L>  |  102  |  11  ----------------------------<  81  3.6   |  21<L>  |  0.93    Ca    9.2      19 Oct 2021 07:05  Mg     2.2     10-18    TPro  6.2  /  Alb  3.9  /  TBili  4.3<H>  /  DBili  x   /  AST  73<H>  /  ALT  49<H>  /  AlkPhos  107  10-19    MICROBIOLOGY:           RADIOLOGY:  EXAM:  XR CHEST AP OR PA 1V                            PROCEDURE DATE:  10/15/2021            INTERPRETATION:  EXAMINATION: XR CHEST    CLINICAL INDICATION: Chest pain. History of sickle cell anemia.    TECHNIQUE: Single portable view of the chest was obtained.    COMPARISON: X-ray chest from 10/4/2021.    FINDINGS:    Left Mediport with the tip in the SVC. Partially visualized  shunt shunt catheter.  Heart size is within normal limits.  Clear lungs. No pleural effusion or pneumothorax.  No acute osseous abnormalities.    IMPRESSION:  Clear lungs.          PHYSICAL EXAM:    Constitutional: AOx3. NAD.    Respiratory: clear lungs bilaterally. No wheezing, rhonchi, or crackles.    Cardiovascular: S1 S2. No murmurs.    Gastrointestinal: BS X4 active. soft. nontender.    Extremities/Vascular: +2 pulses bilaterally. No BLE edema.      ASSESSMENT/PLAN:   21F with PMHx of HgSS disease, chronic lung disease of prematurity, prior CVA (receives simple blood transfusions), and bipolar disorder presenting with right arm and right leg pain. This occurred spontaneously since yesterday. No inciting factors. pain is currently being rated at 7/10 severity; helped by Morphine IV given in ED. Also has some non-specific chest tightness, mild severity, nonradiating, not associated with dyspnea. Vital signs in the ED: T 99, HR 92, /55, RR 18, 95% on RA. Not hypoxic. ECG NSR, no ischemic findings. Given multiple IV doses of morphine IV and admitted to the hospital from the CDU for management of sickle cell pain crisis.  (16 Oct 2021 09:52)    Now presents with  new onset fever. Patient is asymptomatic, hemodynamically stable.    Fever     --Tylenol and cooling measures prn for pyrexia  --f/u CBC  --BC x2, UA/UC, RVP ordered  --CXR if resp symptoms  --F/U primary team in JACOBY Stevenson NP-BC  [id number]

## 2021-10-21 LAB
ANION GAP SERPL CALC-SCNC: 11 MMOL/L — SIGNIFICANT CHANGE UP (ref 5–17)
BUN SERPL-MCNC: 8 MG/DL — SIGNIFICANT CHANGE UP (ref 7–23)
CALCIUM SERPL-MCNC: 8.9 MG/DL — SIGNIFICANT CHANGE UP (ref 8.4–10.5)
CHLORIDE SERPL-SCNC: 105 MMOL/L — SIGNIFICANT CHANGE UP (ref 96–108)
CO2 SERPL-SCNC: 20 MMOL/L — LOW (ref 22–31)
CREAT SERPL-MCNC: 0.93 MG/DL — SIGNIFICANT CHANGE UP (ref 0.5–1.3)
GLUCOSE SERPL-MCNC: 96 MG/DL — SIGNIFICANT CHANGE UP (ref 70–99)
HCT VFR BLD CALC: 23.6 % — LOW (ref 34.5–45)
HGB BLD-MCNC: 8.2 G/DL — LOW (ref 11.5–15.5)
MCHC RBC-ENTMCNC: 29.2 PG — SIGNIFICANT CHANGE UP (ref 27–34)
MCHC RBC-ENTMCNC: 34.7 GM/DL — SIGNIFICANT CHANGE UP (ref 32–36)
MCV RBC AUTO: 84 FL — SIGNIFICANT CHANGE UP (ref 80–100)
NRBC # BLD: 37 /100 WBCS — HIGH (ref 0–0)
PLATELET # BLD AUTO: 289 K/UL — SIGNIFICANT CHANGE UP (ref 150–400)
POTASSIUM SERPL-MCNC: 3.9 MMOL/L — SIGNIFICANT CHANGE UP (ref 3.5–5.3)
POTASSIUM SERPL-SCNC: 3.9 MMOL/L — SIGNIFICANT CHANGE UP (ref 3.5–5.3)
RBC # BLD: 2.81 M/UL — LOW (ref 3.8–5.2)
RBC # BLD: 2.81 M/UL — LOW (ref 3.8–5.2)
RBC # FLD: 19.2 % — HIGH (ref 10.3–14.5)
RETICS #: 234.6 K/UL — HIGH (ref 25–125)
RETICS/RBC NFR: 8.4 % — HIGH (ref 0.5–2.5)
SODIUM SERPL-SCNC: 136 MMOL/L — SIGNIFICANT CHANGE UP (ref 135–145)
WBC # BLD: 7.38 K/UL — SIGNIFICANT CHANGE UP (ref 3.8–10.5)
WBC # FLD AUTO: 7.38 K/UL — SIGNIFICANT CHANGE UP (ref 3.8–10.5)

## 2021-10-21 PROCEDURE — 99233 SBSQ HOSP IP/OBS HIGH 50: CPT

## 2021-10-21 RX ORDER — CHLORHEXIDINE GLUCONATE 213 G/1000ML
1 SOLUTION TOPICAL
Refills: 0 | Status: DISCONTINUED | OUTPATIENT
Start: 2021-10-21 | End: 2021-10-26

## 2021-10-21 RX ORDER — MORPHINE SULFATE 50 MG/1
30 CAPSULE, EXTENDED RELEASE ORAL
Refills: 0 | Status: DISCONTINUED | OUTPATIENT
Start: 2021-10-21 | End: 2021-10-25

## 2021-10-21 RX ADMIN — MORPHINE SULFATE 30 MILLILITER(S): 50 CAPSULE, EXTENDED RELEASE ORAL at 19:17

## 2021-10-21 RX ADMIN — MORPHINE SULFATE 30 MILLILITER(S): 50 CAPSULE, EXTENDED RELEASE ORAL at 07:44

## 2021-10-21 RX ADMIN — HYDROXYUREA 500 MILLIGRAM(S): 500 CAPSULE ORAL at 11:47

## 2021-10-21 RX ADMIN — Medication 30 MILLIGRAM(S): at 22:02

## 2021-10-21 RX ADMIN — MORPHINE SULFATE 30 MILLILITER(S): 50 CAPSULE, EXTENDED RELEASE ORAL at 18:58

## 2021-10-21 RX ADMIN — Medication 30 MILLIGRAM(S): at 17:15

## 2021-10-21 RX ADMIN — SERTRALINE 100 MILLIGRAM(S): 25 TABLET, FILM COATED ORAL at 11:47

## 2021-10-21 RX ADMIN — ARIPIPRAZOLE 5 MILLIGRAM(S): 15 TABLET ORAL at 11:47

## 2021-10-21 RX ADMIN — Medication 30 MILLIGRAM(S): at 22:30

## 2021-10-21 RX ADMIN — Medication 30 MILLIGRAM(S): at 16:52

## 2021-10-21 RX ADMIN — MORPHINE SULFATE 30 MILLILITER(S): 50 CAPSULE, EXTENDED RELEASE ORAL at 10:26

## 2021-10-21 RX ADMIN — Medication 30 MILLIGRAM(S): at 09:15

## 2021-10-21 RX ADMIN — Medication 30 MILLIGRAM(S): at 08:45

## 2021-10-21 RX ADMIN — Medication 200 MILLIGRAM(S): at 05:26

## 2021-10-21 RX ADMIN — Medication 200 MILLIGRAM(S): at 19:03

## 2021-10-21 NOTE — PROGRESS NOTE ADULT - PROBLEM SELECTOR PLAN 3
PCA Pump increase morphine 1mg demand 10 min lockout 6 hr limit 4 mg- d/w patient and mom   -Tylenol and Motrin PRN for mild-mod pain  -continue 1/2NS at 100 cc/hr  -Jadenu per home dose. Hgb near 10  -Incentive spirometer  -Daily sickle cell labs  -Continue with Hydroxyurea and Folic Acid  -DVT PPx  - senna for bowel regimen

## 2021-10-22 DIAGNOSIS — Z98.2 PRESENCE OF CEREBROSPINAL FLUID DRAINAGE DEVICE: ICD-10-CM

## 2021-10-22 LAB
ANION GAP SERPL CALC-SCNC: 11 MMOL/L — SIGNIFICANT CHANGE UP (ref 5–17)
BUN SERPL-MCNC: 9 MG/DL — SIGNIFICANT CHANGE UP (ref 7–23)
CALCIUM SERPL-MCNC: 8.8 MG/DL — SIGNIFICANT CHANGE UP (ref 8.4–10.5)
CHLORIDE SERPL-SCNC: 104 MMOL/L — SIGNIFICANT CHANGE UP (ref 96–108)
CO2 SERPL-SCNC: 22 MMOL/L — SIGNIFICANT CHANGE UP (ref 22–31)
CREAT SERPL-MCNC: 0.91 MG/DL — SIGNIFICANT CHANGE UP (ref 0.5–1.3)
GLUCOSE SERPL-MCNC: 89 MG/DL — SIGNIFICANT CHANGE UP (ref 70–99)
HCT VFR BLD CALC: 21.2 % — LOW (ref 34.5–45)
HGB BLD-MCNC: 7.3 G/DL — LOW (ref 11.5–15.5)
MCHC RBC-ENTMCNC: 29 PG — SIGNIFICANT CHANGE UP (ref 27–34)
MCHC RBC-ENTMCNC: 34.4 GM/DL — SIGNIFICANT CHANGE UP (ref 32–36)
MCV RBC AUTO: 84.1 FL — SIGNIFICANT CHANGE UP (ref 80–100)
NRBC # BLD: 15 /100 WBCS — HIGH (ref 0–0)
PLATELET # BLD AUTO: 248 K/UL — SIGNIFICANT CHANGE UP (ref 150–400)
POTASSIUM SERPL-MCNC: 3.5 MMOL/L — SIGNIFICANT CHANGE UP (ref 3.5–5.3)
POTASSIUM SERPL-SCNC: 3.5 MMOL/L — SIGNIFICANT CHANGE UP (ref 3.5–5.3)
RBC # BLD: 2.52 M/UL — LOW (ref 3.8–5.2)
RBC # BLD: 2.52 M/UL — LOW (ref 3.8–5.2)
RBC # FLD: 19.4 % — HIGH (ref 10.3–14.5)
RETICS #: 189.8 K/UL — HIGH (ref 25–125)
RETICS/RBC NFR: 7.5 % — HIGH (ref 0.5–2.5)
SODIUM SERPL-SCNC: 137 MMOL/L — SIGNIFICANT CHANGE UP (ref 135–145)
WBC # BLD: 6.09 K/UL — SIGNIFICANT CHANGE UP (ref 3.8–10.5)
WBC # FLD AUTO: 6.09 K/UL — SIGNIFICANT CHANGE UP (ref 3.8–10.5)

## 2021-10-22 PROCEDURE — 70450 CT HEAD/BRAIN W/O DYE: CPT | Mod: 26

## 2021-10-22 PROCEDURE — 99233 SBSQ HOSP IP/OBS HIGH 50: CPT

## 2021-10-22 PROCEDURE — 70250 X-RAY EXAM OF SKULL: CPT | Mod: 26,77

## 2021-10-22 PROCEDURE — 71045 X-RAY EXAM CHEST 1 VIEW: CPT | Mod: 26

## 2021-10-22 PROCEDURE — 70250 X-RAY EXAM OF SKULL: CPT | Mod: 26

## 2021-10-22 PROCEDURE — 74018 RADEX ABDOMEN 1 VIEW: CPT | Mod: 26

## 2021-10-22 RX ADMIN — Medication 30 MILLIGRAM(S): at 22:16

## 2021-10-22 RX ADMIN — Medication 30 MILLIGRAM(S): at 16:00

## 2021-10-22 RX ADMIN — MORPHINE SULFATE 30 MILLILITER(S): 50 CAPSULE, EXTENDED RELEASE ORAL at 19:04

## 2021-10-22 RX ADMIN — SERTRALINE 100 MILLIGRAM(S): 25 TABLET, FILM COATED ORAL at 12:16

## 2021-10-22 RX ADMIN — CHLORHEXIDINE GLUCONATE 1 APPLICATION(S): 213 SOLUTION TOPICAL at 10:05

## 2021-10-22 RX ADMIN — Medication 30 MILLIGRAM(S): at 22:48

## 2021-10-22 RX ADMIN — MORPHINE SULFATE 30 MILLILITER(S): 50 CAPSULE, EXTENDED RELEASE ORAL at 07:37

## 2021-10-22 RX ADMIN — Medication 30 MILLIGRAM(S): at 15:21

## 2021-10-22 RX ADMIN — Medication 30 MILLIGRAM(S): at 06:01

## 2021-10-22 RX ADMIN — ARIPIPRAZOLE 5 MILLIGRAM(S): 15 TABLET ORAL at 12:16

## 2021-10-22 RX ADMIN — SODIUM CHLORIDE 100 MILLILITER(S): 9 INJECTION, SOLUTION INTRAVENOUS at 09:42

## 2021-10-22 RX ADMIN — HYDROXYUREA 500 MILLIGRAM(S): 500 CAPSULE ORAL at 12:16

## 2021-10-22 RX ADMIN — Medication 200 MILLIGRAM(S): at 06:02

## 2021-10-22 RX ADMIN — Medication 30 MILLIGRAM(S): at 06:20

## 2021-10-22 NOTE — PROVIDER CONTACT NOTE (MEDICATION) - RECOMMENDATIONS
referred issue to Opal LOPEZ
reemphasized importance of lovenox-- encouraged ambulation and to increase physical activity

## 2021-10-22 NOTE — PROVIDER CONTACT NOTE (MEDICATION) - SITUATION
admitted with diagnosis of sickle cell crisis--UTI
admitted with diagnosis of sickle cell crisis and UTI

## 2021-10-22 NOTE — PROVIDER CONTACT NOTE (MEDICATION) - ASSESSMENT
alert and oriented x3-- no distress noted at present
alert and oriented x3--  no distress noted at present

## 2021-10-22 NOTE — PROVIDER CONTACT NOTE (MEDICATION) - BACKGROUND
history of cerebral palsy--anxiety--depression--strabismus--CVA--s/p  shunt-- hydrocephalus
history of cerebral palsy--anxiety--depression--strabismus--CVA--s/p  shunt-- hydrocephalus

## 2021-10-22 NOTE — PROGRESS NOTE ADULT - PROBLEM SELECTOR PLAN 3
PCA Pump c/w morphine 1mg demand 10 min lockout 6 hr limit 4 mg- d/w patient and mom   -Tylenol and Motrin PRN for mild-mod pain  -continue 1/2NS at 100 cc/hr  -Jadenu per home dose. Hgb near 10  -Incentive spirometer  -Daily sickle cell labs  -Continue with Hydroxyurea and Folic Acid  -DVT PPx  - senna for bowel regimen

## 2021-10-23 LAB
BLD GP AB SCN SERPL QL: NEGATIVE — SIGNIFICANT CHANGE UP
CULTURE RESULTS: SIGNIFICANT CHANGE UP
HCT VFR BLD CALC: 18.1 % — CRITICAL LOW (ref 34.5–45)
HCT VFR BLD CALC: 19.2 % — CRITICAL LOW (ref 34.5–45)
HGB BLD-MCNC: 6.1 G/DL — CRITICAL LOW (ref 11.5–15.5)
HGB BLD-MCNC: 6.7 G/DL — CRITICAL LOW (ref 11.5–15.5)
MCHC RBC-ENTMCNC: 28.1 PG — SIGNIFICANT CHANGE UP (ref 27–34)
MCHC RBC-ENTMCNC: 28.8 PG — SIGNIFICANT CHANGE UP (ref 27–34)
MCHC RBC-ENTMCNC: 33.7 GM/DL — SIGNIFICANT CHANGE UP (ref 32–36)
MCHC RBC-ENTMCNC: 34.9 GM/DL — SIGNIFICANT CHANGE UP (ref 32–36)
MCV RBC AUTO: 82.4 FL — SIGNIFICANT CHANGE UP (ref 80–100)
MCV RBC AUTO: 83.4 FL — SIGNIFICANT CHANGE UP (ref 80–100)
NRBC # BLD: 51 /100 WBCS — HIGH (ref 0–0)
NRBC # BLD: 56 /100 WBCS — HIGH (ref 0–0)
PLATELET # BLD AUTO: 201 K/UL — SIGNIFICANT CHANGE UP (ref 150–400)
PLATELET # BLD AUTO: 242 K/UL — SIGNIFICANT CHANGE UP (ref 150–400)
RBC # BLD: 2.17 M/UL — LOW (ref 3.8–5.2)
RBC # BLD: 2.33 M/UL — LOW (ref 3.8–5.2)
RBC # BLD: 2.33 M/UL — LOW (ref 3.8–5.2)
RBC # FLD: 19 % — HIGH (ref 10.3–14.5)
RBC # FLD: 19.1 % — HIGH (ref 10.3–14.5)
RETICS #: 148.2 K/UL — HIGH (ref 25–125)
RETICS/RBC NFR: 6.4 % — HIGH (ref 0.5–2.5)
RH IG SCN BLD-IMP: POSITIVE — SIGNIFICANT CHANGE UP
SPECIMEN SOURCE: SIGNIFICANT CHANGE UP
WBC # BLD: 7.27 K/UL — SIGNIFICANT CHANGE UP (ref 3.8–10.5)
WBC # BLD: 7.64 K/UL — SIGNIFICANT CHANGE UP (ref 3.8–10.5)
WBC # FLD AUTO: 7.27 K/UL — SIGNIFICANT CHANGE UP (ref 3.8–10.5)
WBC # FLD AUTO: 7.64 K/UL — SIGNIFICANT CHANGE UP (ref 3.8–10.5)

## 2021-10-23 PROCEDURE — 99233 SBSQ HOSP IP/OBS HIGH 50: CPT

## 2021-10-23 PROCEDURE — 71045 X-RAY EXAM CHEST 1 VIEW: CPT | Mod: 26

## 2021-10-23 RX ORDER — ACETAMINOPHEN 500 MG
650 TABLET ORAL ONCE
Refills: 0 | Status: COMPLETED | OUTPATIENT
Start: 2021-10-23 | End: 2021-10-23

## 2021-10-23 RX ORDER — AMPICILLIN SODIUM AND SULBACTAM SODIUM 250; 125 MG/ML; MG/ML
3 INJECTION, POWDER, FOR SUSPENSION INTRAMUSCULAR; INTRAVENOUS EVERY 6 HOURS
Refills: 0 | Status: DISCONTINUED | OUTPATIENT
Start: 2021-10-23 | End: 2021-10-24

## 2021-10-23 RX ORDER — FOLIC ACID 0.8 MG
1 TABLET ORAL DAILY
Refills: 0 | Status: DISCONTINUED | OUTPATIENT
Start: 2021-10-23 | End: 2021-10-26

## 2021-10-23 RX ADMIN — Medication 30 MILLIGRAM(S): at 23:16

## 2021-10-23 RX ADMIN — Medication 650 MILLIGRAM(S): at 05:19

## 2021-10-23 RX ADMIN — Medication 30 MILLIGRAM(S): at 05:15

## 2021-10-23 RX ADMIN — ARIPIPRAZOLE 5 MILLIGRAM(S): 15 TABLET ORAL at 11:45

## 2021-10-23 RX ADMIN — HYDROXYUREA 500 MILLIGRAM(S): 500 CAPSULE ORAL at 12:16

## 2021-10-23 RX ADMIN — Medication 30 MILLIGRAM(S): at 17:29

## 2021-10-23 RX ADMIN — CHLORHEXIDINE GLUCONATE 1 APPLICATION(S): 213 SOLUTION TOPICAL at 11:45

## 2021-10-23 RX ADMIN — Medication 1 MILLIGRAM(S): at 12:16

## 2021-10-23 RX ADMIN — SODIUM CHLORIDE 125 MILLILITER(S): 9 INJECTION, SOLUTION INTRAVENOUS at 13:51

## 2021-10-23 RX ADMIN — Medication 30 MILLIGRAM(S): at 04:49

## 2021-10-23 RX ADMIN — SODIUM CHLORIDE 125 MILLILITER(S): 9 INJECTION, SOLUTION INTRAVENOUS at 19:42

## 2021-10-23 RX ADMIN — SODIUM CHLORIDE 100 MILLILITER(S): 9 INJECTION, SOLUTION INTRAVENOUS at 07:27

## 2021-10-23 RX ADMIN — MORPHINE SULFATE 30 MILLILITER(S): 50 CAPSULE, EXTENDED RELEASE ORAL at 07:27

## 2021-10-23 RX ADMIN — Medication 30 MILLIGRAM(S): at 11:51

## 2021-10-23 RX ADMIN — AMPICILLIN SODIUM AND SULBACTAM SODIUM 200 GRAM(S): 250; 125 INJECTION, POWDER, FOR SUSPENSION INTRAMUSCULAR; INTRAVENOUS at 15:06

## 2021-10-23 RX ADMIN — Medication 30 MILLIGRAM(S): at 11:45

## 2021-10-23 RX ADMIN — Medication 30 MILLIGRAM(S): at 17:45

## 2021-10-23 RX ADMIN — SERTRALINE 100 MILLIGRAM(S): 25 TABLET, FILM COATED ORAL at 11:46

## 2021-10-23 RX ADMIN — SENNA PLUS 2 TABLET(S): 8.6 TABLET ORAL at 22:43

## 2021-10-23 RX ADMIN — AMPICILLIN SODIUM AND SULBACTAM SODIUM 200 GRAM(S): 250; 125 INJECTION, POWDER, FOR SUSPENSION INTRAMUSCULAR; INTRAVENOUS at 22:41

## 2021-10-23 NOTE — CHART NOTE - NSCHARTNOTEFT_GEN_A_CORE
Neurosurgery changed shunt setting from 1 to 1.5. Shunt series and head XR reviewed. Patient should follow-up with Dr. Loza outpatient.

## 2021-10-23 NOTE — PROVIDER CONTACT NOTE (OTHER) - SITUATION
temp 100.6 heart rate 106 ,pt asymptomatic ,pt refused ciprofloxacin ivpb ,pt explained and verbalized risks and benefits of iv cipro

## 2021-10-23 NOTE — PROGRESS NOTE ADULT - PROBLEM SELECTOR PLAN 3
PCA Pump c/w morphine 1mg demand 10 min lockout 6 hr limit 4 mg- d/w patient and mom     -Tylenol and Motrin PRN for mild-mod pain (patient encouraged to utilize non-opioid medications so that I can wean her)  -Increase 1/2NS to 125 cc/hr --> home to decrease Na slightly  -Jadenu per home dose  -Incentive spirometer  -Daily sickle cell labs  -Continue with Hydroxyurea and Folic Acid  -DVT PPx  -senna for bowel regimen  -Transfuse 1 unit pRBC on 10/23 --> baseline hemoglobin 9-10 and patient receives simple transfusions monthly given prior stroke (last one 10/6). Will re-evaluate tomorrow for 2nd unit

## 2021-10-24 DIAGNOSIS — R74.01 ELEVATION OF LEVELS OF LIVER TRANSAMINASE LEVELS: ICD-10-CM

## 2021-10-24 LAB
ALBUMIN SERPL ELPH-MCNC: 3 G/DL — LOW (ref 3.3–5)
ALP SERPL-CCNC: 177 U/L — HIGH (ref 40–120)
ALT FLD-CCNC: 201 U/L — HIGH (ref 10–45)
ANION GAP SERPL CALC-SCNC: 11 MMOL/L — SIGNIFICANT CHANGE UP (ref 5–17)
AST SERPL-CCNC: 281 U/L — HIGH (ref 10–40)
BILIRUB SERPL-MCNC: 5.2 MG/DL — HIGH (ref 0.2–1.2)
BUN SERPL-MCNC: 9 MG/DL — SIGNIFICANT CHANGE UP (ref 7–23)
CALCIUM SERPL-MCNC: 8.1 MG/DL — LOW (ref 8.4–10.5)
CHLORIDE SERPL-SCNC: 106 MMOL/L — SIGNIFICANT CHANGE UP (ref 96–108)
CO2 SERPL-SCNC: 22 MMOL/L — SIGNIFICANT CHANGE UP (ref 22–31)
CREAT SERPL-MCNC: 0.7 MG/DL — SIGNIFICANT CHANGE UP (ref 0.5–1.3)
GLUCOSE SERPL-MCNC: 89 MG/DL — SIGNIFICANT CHANGE UP (ref 70–99)
HCT VFR BLD CALC: 20.7 % — CRITICAL LOW (ref 34.5–45)
HGB BLD-MCNC: 7.4 G/DL — LOW (ref 11.5–15.5)
LDH SERPL L TO P-CCNC: 1420 U/L — HIGH (ref 50–242)
MAGNESIUM SERPL-MCNC: 1.9 MG/DL — SIGNIFICANT CHANGE UP (ref 1.6–2.6)
MCHC RBC-ENTMCNC: 28.9 PG — SIGNIFICANT CHANGE UP (ref 27–34)
MCHC RBC-ENTMCNC: 35.7 GM/DL — SIGNIFICANT CHANGE UP (ref 32–36)
MCV RBC AUTO: 80.9 FL — SIGNIFICANT CHANGE UP (ref 80–100)
NRBC # BLD: 35 /100 WBCS — HIGH (ref 0–0)
PHOSPHATE SERPL-MCNC: 2.4 MG/DL — LOW (ref 2.5–4.5)
PLATELET # BLD AUTO: 222 K/UL — SIGNIFICANT CHANGE UP (ref 150–400)
POTASSIUM SERPL-MCNC: 3.2 MMOL/L — LOW (ref 3.5–5.3)
POTASSIUM SERPL-SCNC: 3.2 MMOL/L — LOW (ref 3.5–5.3)
PROT SERPL-MCNC: 5.2 G/DL — LOW (ref 6–8.3)
RBC # BLD: 2.56 M/UL — LOW (ref 3.8–5.2)
RBC # BLD: 2.56 M/UL — LOW (ref 3.8–5.2)
RBC # FLD: 16.9 % — HIGH (ref 10.3–14.5)
RETICS #: 96 K/UL — SIGNIFICANT CHANGE UP (ref 25–125)
RETICS/RBC NFR: 3.8 % — HIGH (ref 0.5–2.5)
SODIUM SERPL-SCNC: 139 MMOL/L — SIGNIFICANT CHANGE UP (ref 135–145)
WBC # BLD: 9.94 K/UL — SIGNIFICANT CHANGE UP (ref 3.8–10.5)
WBC # FLD AUTO: 9.94 K/UL — SIGNIFICANT CHANGE UP (ref 3.8–10.5)

## 2021-10-24 PROCEDURE — 99233 SBSQ HOSP IP/OBS HIGH 50: CPT

## 2021-10-24 RX ORDER — ACETAMINOPHEN 500 MG
650 TABLET ORAL EVERY 6 HOURS
Refills: 0 | Status: DISCONTINUED | OUTPATIENT
Start: 2021-10-24 | End: 2021-10-24

## 2021-10-24 RX ORDER — POLYETHYLENE GLYCOL 3350 17 G/17G
17 POWDER, FOR SOLUTION ORAL
Refills: 0 | Status: DISCONTINUED | OUTPATIENT
Start: 2021-10-24 | End: 2021-10-26

## 2021-10-24 RX ORDER — POTASSIUM CHLORIDE 20 MEQ
40 PACKET (EA) ORAL ONCE
Refills: 0 | Status: COMPLETED | OUTPATIENT
Start: 2021-10-24 | End: 2021-10-24

## 2021-10-24 RX ORDER — IBUPROFEN 200 MG
600 TABLET ORAL EVERY 6 HOURS
Refills: 0 | Status: DISCONTINUED | OUTPATIENT
Start: 2021-10-24 | End: 2021-10-26

## 2021-10-24 RX ORDER — KETOROLAC TROMETHAMINE 30 MG/ML
15 SYRINGE (ML) INJECTION ONCE
Refills: 0 | Status: DISCONTINUED | OUTPATIENT
Start: 2021-10-24 | End: 2021-10-24

## 2021-10-24 RX ADMIN — AMPICILLIN SODIUM AND SULBACTAM SODIUM 200 GRAM(S): 250; 125 INJECTION, POWDER, FOR SUSPENSION INTRAMUSCULAR; INTRAVENOUS at 03:30

## 2021-10-24 RX ADMIN — MORPHINE SULFATE 30 MILLILITER(S): 50 CAPSULE, EXTENDED RELEASE ORAL at 19:39

## 2021-10-24 RX ADMIN — Medication 15 MILLIGRAM(S): at 11:50

## 2021-10-24 RX ADMIN — AMPICILLIN SODIUM AND SULBACTAM SODIUM 200 GRAM(S): 250; 125 INJECTION, POWDER, FOR SUSPENSION INTRAMUSCULAR; INTRAVENOUS at 10:32

## 2021-10-24 RX ADMIN — Medication 600 MILLIGRAM(S): at 22:46

## 2021-10-24 RX ADMIN — Medication 40 MILLIEQUIVALENT(S): at 10:33

## 2021-10-24 RX ADMIN — CHLORHEXIDINE GLUCONATE 1 APPLICATION(S): 213 SOLUTION TOPICAL at 05:47

## 2021-10-24 RX ADMIN — HYDROXYUREA 500 MILLIGRAM(S): 500 CAPSULE ORAL at 11:30

## 2021-10-24 RX ADMIN — SERTRALINE 100 MILLIGRAM(S): 25 TABLET, FILM COATED ORAL at 11:30

## 2021-10-24 RX ADMIN — Medication 15 MILLIGRAM(S): at 11:28

## 2021-10-24 RX ADMIN — SODIUM CHLORIDE 125 MILLILITER(S): 9 INJECTION, SOLUTION INTRAVENOUS at 22:40

## 2021-10-24 RX ADMIN — MORPHINE SULFATE 30 MILLILITER(S): 50 CAPSULE, EXTENDED RELEASE ORAL at 13:17

## 2021-10-24 RX ADMIN — MORPHINE SULFATE 30 MILLILITER(S): 50 CAPSULE, EXTENDED RELEASE ORAL at 07:23

## 2021-10-24 RX ADMIN — SENNA PLUS 2 TABLET(S): 8.6 TABLET ORAL at 22:40

## 2021-10-24 RX ADMIN — SODIUM CHLORIDE 125 MILLILITER(S): 9 INJECTION, SOLUTION INTRAVENOUS at 11:31

## 2021-10-24 RX ADMIN — Medication 1 MILLIGRAM(S): at 11:30

## 2021-10-24 RX ADMIN — POLYETHYLENE GLYCOL 3350 17 GRAM(S): 17 POWDER, FOR SOLUTION ORAL at 10:32

## 2021-10-24 RX ADMIN — ARIPIPRAZOLE 5 MILLIGRAM(S): 15 TABLET ORAL at 11:30

## 2021-10-24 NOTE — PROGRESS NOTE ADULT - NSPROGADDITIONALINFOA_GEN_ALL_CORE
Enrico Lau  Pager: [phone]  Off-Hours: [phone]  Available on MS Teams
Enrico Lau  Pager: [phone]  Off-Hours: [phone]  Available on MS Teams
d/w mom plan of care

## 2021-10-24 NOTE — PROGRESS NOTE ADULT - PROBLEM SELECTOR PLAN 5
r eye crossing over (symptoms now improved)  CT head unremarkable  Shunt adjusted by neurosurgery and they reviewed  shunt series --> per neurosx outpatient follow up

## 2021-10-24 NOTE — PROGRESS NOTE ADULT - PROBLEM SELECTOR PLAN 3
Assessment complete. VSS no SOB or any distress noted. C/o pain to her left chest under her arm. Tylenol given per orders. Will place call to Dr. Geoffrey Hale for another order for pain medication if this does not help. Call light within reach, pt encouraged to call if any needs arise. No further requests at this time. Will continue to monitor.   Night meds Developed on 10/24. It appears hepatocellular. She has a history of hyperbilirubinemia and slightly higher than baseline. Labs reviewed and not on any hepatoxic meds. I stopped Unasyn in case this is DILI (though unlikely). 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 For now will trend LFTs. If they don't improve would pursue GURJIT sweeney.

## 2021-10-25 LAB
ALBUMIN SERPL ELPH-MCNC: 3 G/DL — LOW (ref 3.3–5)
ALP SERPL-CCNC: 172 U/L — HIGH (ref 40–120)
ALT FLD-CCNC: 185 U/L — HIGH (ref 10–45)
ANION GAP SERPL CALC-SCNC: 10 MMOL/L — SIGNIFICANT CHANGE UP (ref 5–17)
AST SERPL-CCNC: 219 U/L — HIGH (ref 10–40)
BILIRUB DIRECT SERPL-MCNC: 1.7 MG/DL — HIGH (ref 0–0.2)
BILIRUB SERPL-MCNC: 4 MG/DL — HIGH (ref 0.2–1.2)
BUN SERPL-MCNC: 6 MG/DL — LOW (ref 7–23)
CALCIUM SERPL-MCNC: 8.5 MG/DL — SIGNIFICANT CHANGE UP (ref 8.4–10.5)
CHLORIDE SERPL-SCNC: 109 MMOL/L — HIGH (ref 96–108)
CO2 SERPL-SCNC: 21 MMOL/L — LOW (ref 22–31)
CREAT SERPL-MCNC: 0.74 MG/DL — SIGNIFICANT CHANGE UP (ref 0.5–1.3)
CULTURE RESULTS: SIGNIFICANT CHANGE UP
CULTURE RESULTS: SIGNIFICANT CHANGE UP
GLUCOSE SERPL-MCNC: 83 MG/DL — SIGNIFICANT CHANGE UP (ref 70–99)
HCT VFR BLD CALC: 19.3 % — CRITICAL LOW (ref 34.5–45)
HCT VFR BLD CALC: 23.1 % — LOW (ref 34.5–45)
HGB BLD-MCNC: 6.9 G/DL — CRITICAL LOW (ref 11.5–15.5)
HGB BLD-MCNC: 8.2 G/DL — LOW (ref 11.5–15.5)
LDH SERPL L TO P-CCNC: 1114 U/L — HIGH (ref 50–242)
MCHC RBC-ENTMCNC: 28.6 PG — SIGNIFICANT CHANGE UP (ref 27–34)
MCHC RBC-ENTMCNC: 28.8 PG — SIGNIFICANT CHANGE UP (ref 27–34)
MCHC RBC-ENTMCNC: 35.5 GM/DL — SIGNIFICANT CHANGE UP (ref 32–36)
MCHC RBC-ENTMCNC: 35.8 GM/DL — SIGNIFICANT CHANGE UP (ref 32–36)
MCV RBC AUTO: 80.4 FL — SIGNIFICANT CHANGE UP (ref 80–100)
MCV RBC AUTO: 80.5 FL — SIGNIFICANT CHANGE UP (ref 80–100)
NRBC # BLD: 27 /100 WBCS — HIGH (ref 0–0)
NRBC # BLD: 39 /100 WBCS — HIGH (ref 0–0)
PLATELET # BLD AUTO: 245 K/UL — SIGNIFICANT CHANGE UP (ref 150–400)
PLATELET # BLD AUTO: 282 K/UL — SIGNIFICANT CHANGE UP (ref 150–400)
POTASSIUM SERPL-MCNC: 3.3 MMOL/L — LOW (ref 3.5–5.3)
POTASSIUM SERPL-SCNC: 3.3 MMOL/L — LOW (ref 3.5–5.3)
PROT SERPL-MCNC: 5.3 G/DL — LOW (ref 6–8.3)
RBC # BLD: 2.4 M/UL — LOW (ref 3.8–5.2)
RBC # BLD: 2.4 M/UL — LOW (ref 3.8–5.2)
RBC # BLD: 2.87 M/UL — LOW (ref 3.8–5.2)
RBC # FLD: 16.5 % — HIGH (ref 10.3–14.5)
RBC # FLD: 17.1 % — HIGH (ref 10.3–14.5)
RETICS #: 68.9 K/UL — SIGNIFICANT CHANGE UP (ref 25–125)
RETICS/RBC NFR: 2.9 % — HIGH (ref 0.5–2.5)
SODIUM SERPL-SCNC: 140 MMOL/L — SIGNIFICANT CHANGE UP (ref 135–145)
SPECIMEN SOURCE: SIGNIFICANT CHANGE UP
SPECIMEN SOURCE: SIGNIFICANT CHANGE UP
WBC # BLD: 11.03 K/UL — HIGH (ref 3.8–10.5)
WBC # BLD: 12.03 K/UL — HIGH (ref 3.8–10.5)
WBC # FLD AUTO: 11.03 K/UL — HIGH (ref 3.8–10.5)
WBC # FLD AUTO: 12.03 K/UL — HIGH (ref 3.8–10.5)

## 2021-10-25 PROCEDURE — 76705 ECHO EXAM OF ABDOMEN: CPT | Mod: 26,RT

## 2021-10-25 PROCEDURE — 99233 SBSQ HOSP IP/OBS HIGH 50: CPT

## 2021-10-25 RX ORDER — POTASSIUM CHLORIDE 20 MEQ
40 PACKET (EA) ORAL ONCE
Refills: 0 | Status: COMPLETED | OUTPATIENT
Start: 2021-10-25 | End: 2021-10-25

## 2021-10-25 RX ADMIN — CHLORHEXIDINE GLUCONATE 1 APPLICATION(S): 213 SOLUTION TOPICAL at 05:20

## 2021-10-25 RX ADMIN — ARIPIPRAZOLE 5 MILLIGRAM(S): 15 TABLET ORAL at 12:40

## 2021-10-25 RX ADMIN — MORPHINE SULFATE 30 MILLILITER(S): 50 CAPSULE, EXTENDED RELEASE ORAL at 07:15

## 2021-10-25 RX ADMIN — Medication 1 MILLIGRAM(S): at 12:40

## 2021-10-25 RX ADMIN — HYDROXYUREA 500 MILLIGRAM(S): 500 CAPSULE ORAL at 12:40

## 2021-10-25 RX ADMIN — SENNA PLUS 2 TABLET(S): 8.6 TABLET ORAL at 21:31

## 2021-10-25 RX ADMIN — SODIUM CHLORIDE 125 MILLILITER(S): 9 INJECTION, SOLUTION INTRAVENOUS at 08:54

## 2021-10-25 RX ADMIN — Medication 600 MILLIGRAM(S): at 21:32

## 2021-10-25 RX ADMIN — Medication 40 MILLIEQUIVALENT(S): at 12:40

## 2021-10-25 RX ADMIN — SERTRALINE 100 MILLIGRAM(S): 25 TABLET, FILM COATED ORAL at 12:40

## 2021-10-25 RX ADMIN — SODIUM CHLORIDE 125 MILLILITER(S): 9 INJECTION, SOLUTION INTRAVENOUS at 21:31

## 2021-10-25 NOTE — PROVIDER CONTACT NOTE (CRITICAL VALUE NOTIFICATION) - BACKGROUND
Pt has hx CVA,  shunt
pt admitted with sickle cell disease crisis
Pt has hx CVA,  shunt
pt admitted with sickle cell crisis
pt admitted with sickle cell disease crisis

## 2021-10-25 NOTE — PROVIDER CONTACT NOTE (CRITICAL VALUE NOTIFICATION) - RECOMMENDATIONS
repeat CBC sent, pending results
blood consent to be filled, pending type and screen for blood administration
Continue to monitor CBC
repeat CBC
Continue to monitor CBC, transfusion as needed

## 2021-10-25 NOTE — PROVIDER CONTACT NOTE (CRITICAL VALUE NOTIFICATION) - PERSON GIVING RESULT:
[facility] [place] [place] lab
[facility] [place] lab
Atif Arroyo/ mckenna
Hany Arroyo/ mckenna

## 2021-10-25 NOTE — PROVIDER CONTACT NOTE (CRITICAL VALUE NOTIFICATION) - ASSESSMENT
Pt is A/Ox4.
pt resting comfortably in bed, no signs of bleeding, no acute distress noted
no signs of bleeding, vital signs stable
vitals stable, no signs of bleeding, no acute distress noted, pt resting comfortably in bed
Pt is A/Ox4.

## 2021-10-25 NOTE — PROGRESS NOTE ADULT - PROBLEM SELECTOR PLAN 5
r eye crossing over (symptoms now improved)  CT head unremarkable  Shunt adjusted by neurosurgery and they reviewed  shunt series --> per neurosx outpatient follow up    6. C/w lovenox for DVT PPx.   -Dispo: stable/improved for DC to home today after blood transfusion with outpatient follow up with Dr. Eleonora Richardson and her neurosurgeon. -Discussed with ALEKSANDR Price. r eye crossing over (symptoms now improved)  CT head unremarkable  Shunt adjusted by neurosurgery and they reviewed  shunt series --> per neurosx outpatient follow up    6. C/w lovenox for DVT PPx.   -Dispo: stable/improved for DC to home tomorrow after blood transfusion with outpatient follow up with Dr. Eleonora Richardson and her neurosurgeon. -Discussed with ALEKSANDR Price.  -Discussed with Dr. Eleonora Richardson and patient and her mother at bedside.

## 2021-10-25 NOTE — PROVIDER CONTACT NOTE (CRITICAL VALUE NOTIFICATION) - ACTION/TREATMENT ORDERED:
Continue to monitor CBC
repeat CBC
Continue to monitor CBC
blood consent to be filled, pending type and screen for blood administration
repeat cbc sent, pending results

## 2021-10-25 NOTE — PROGRESS NOTE ADULT - PROBLEM SELECTOR PLAN 3
Developed on 10/24. It appears hepatocellular. She has a history of hyperbilirubinemia and slightly higher than baseline. Labs reviewed and not on any hepatoxic meds. I stopped Unasyn in case this is DILI (though unlikely). 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 For now will trend LFTs. If they don't improve would pursue RUQ sono.  -improving. possibly due to hemolysis. -outpatient follow up. no need for sono since no abdominal pain and they're improving. Developed on 10/24. It appears hepatocellular. She has a history of hyperbilirubinemia and slightly higher than baseline. Labs reviewed and not on any hepatoxic meds. I stopped Unasyn in case this is DILI (though unlikely). 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 For now will trend LFTs. If they don't improve would pursue RUQ sono.  -improving. possibly due to hemolysis. -outpatient follow up. no need for sono since no abdominal pain and they're improving. -will get RUQ US inpatient.

## 2021-10-25 NOTE — PROVIDER CONTACT NOTE (CRITICAL VALUE NOTIFICATION) - SITUATION
result was not called to floor. pt h/h 6.7/19.2
Pt was admitted with SC disease
pt H/H 6.1/18.1
pt H/H from 07:20 6.7/19.2
Pt was admitted with SC disease

## 2021-10-26 ENCOUNTER — TRANSCRIPTION ENCOUNTER (OUTPATIENT)
Age: 21
End: 2021-10-26

## 2021-10-26 VITALS
TEMPERATURE: 99 F | OXYGEN SATURATION: 97 % | HEART RATE: 86 BPM | DIASTOLIC BLOOD PRESSURE: 64 MMHG | SYSTOLIC BLOOD PRESSURE: 96 MMHG | RESPIRATION RATE: 18 BRPM

## 2021-10-26 LAB
ALBUMIN SERPL ELPH-MCNC: 3.1 G/DL — LOW (ref 3.3–5)
ALP SERPL-CCNC: 185 U/L — HIGH (ref 40–120)
ALT FLD-CCNC: 167 U/L — HIGH (ref 10–45)
ANION GAP SERPL CALC-SCNC: 11 MMOL/L — SIGNIFICANT CHANGE UP (ref 5–17)
AST SERPL-CCNC: 172 U/L — HIGH (ref 10–40)
BASOPHILS # BLD AUTO: 0.14 K/UL — SIGNIFICANT CHANGE UP (ref 0–0.2)
BASOPHILS NFR BLD AUTO: 0.9 % — SIGNIFICANT CHANGE UP (ref 0–2)
BILIRUB SERPL-MCNC: 2.5 MG/DL — HIGH (ref 0.2–1.2)
BUN SERPL-MCNC: 5 MG/DL — LOW (ref 7–23)
CALCIUM SERPL-MCNC: 8.8 MG/DL — SIGNIFICANT CHANGE UP (ref 8.4–10.5)
CHLORIDE SERPL-SCNC: 108 MMOL/L — SIGNIFICANT CHANGE UP (ref 96–108)
CO2 SERPL-SCNC: 20 MMOL/L — LOW (ref 22–31)
CREAT SERPL-MCNC: 0.7 MG/DL — SIGNIFICANT CHANGE UP (ref 0.5–1.3)
EOSINOPHIL # BLD AUTO: 0 K/UL — SIGNIFICANT CHANGE UP (ref 0–0.5)
EOSINOPHIL NFR BLD AUTO: 0 % — SIGNIFICANT CHANGE UP (ref 0–6)
GLUCOSE SERPL-MCNC: 89 MG/DL — SIGNIFICANT CHANGE UP (ref 70–99)
HAPTOGLOB SERPL-MCNC: <20 MG/DL — LOW (ref 34–200)
HCT VFR BLD CALC: 22.5 % — LOW (ref 34.5–45)
HGB BLD-MCNC: 8.2 G/DL — LOW (ref 11.5–15.5)
LDH SERPL L TO P-CCNC: 1058 U/L — HIGH (ref 50–242)
LYMPHOCYTES # BLD AUTO: 34.2 % — SIGNIFICANT CHANGE UP (ref 13–44)
LYMPHOCYTES # BLD AUTO: 5.36 K/UL — HIGH (ref 1–3.3)
MAGNESIUM SERPL-MCNC: 1.8 MG/DL — SIGNIFICANT CHANGE UP (ref 1.6–2.6)
MCHC RBC-ENTMCNC: 28.8 PG — SIGNIFICANT CHANGE UP (ref 27–34)
MCHC RBC-ENTMCNC: 36.4 GM/DL — HIGH (ref 32–36)
MCV RBC AUTO: 78.9 FL — LOW (ref 80–100)
MONOCYTES # BLD AUTO: 1.13 K/UL — HIGH (ref 0–0.9)
MONOCYTES NFR BLD AUTO: 7.2 % — SIGNIFICANT CHANGE UP (ref 2–14)
NEUTROPHILS # BLD AUTO: 8.77 K/UL — HIGH (ref 1.8–7.4)
NEUTROPHILS NFR BLD AUTO: 51.4 % — SIGNIFICANT CHANGE UP (ref 43–77)
PHOSPHATE SERPL-MCNC: 2.1 MG/DL — LOW (ref 2.5–4.5)
PLATELET # BLD AUTO: 311 K/UL — SIGNIFICANT CHANGE UP (ref 150–400)
POTASSIUM SERPL-MCNC: 3.5 MMOL/L — SIGNIFICANT CHANGE UP (ref 3.5–5.3)
POTASSIUM SERPL-SCNC: 3.5 MMOL/L — SIGNIFICANT CHANGE UP (ref 3.5–5.3)
PROT SERPL-MCNC: 5.5 G/DL — LOW (ref 6–8.3)
RBC # BLD: 2.85 M/UL — LOW (ref 3.8–5.2)
RBC # BLD: 2.85 M/UL — LOW (ref 3.8–5.2)
RBC # FLD: 16.4 % — HIGH (ref 10.3–14.5)
RETICS #: 87.5 K/UL — SIGNIFICANT CHANGE UP (ref 25–125)
RETICS/RBC NFR: 3.1 % — HIGH (ref 0.5–2.5)
SODIUM SERPL-SCNC: 139 MMOL/L — SIGNIFICANT CHANGE UP (ref 135–145)
URATE SERPL-MCNC: 2.5 MG/DL — SIGNIFICANT CHANGE UP (ref 2.5–7)
WBC # BLD: 15.68 K/UL — HIGH (ref 3.8–10.5)
WBC # FLD AUTO: 15.68 K/UL — HIGH (ref 3.8–10.5)

## 2021-10-26 PROCEDURE — 86769 SARS-COV-2 COVID-19 ANTIBODY: CPT

## 2021-10-26 PROCEDURE — 86850 RBC ANTIBODY SCREEN: CPT

## 2021-10-26 PROCEDURE — 83735 ASSAY OF MAGNESIUM: CPT

## 2021-10-26 PROCEDURE — 71045 X-RAY EXAM CHEST 1 VIEW: CPT

## 2021-10-26 PROCEDURE — 83010 ASSAY OF HAPTOGLOBIN QUANT: CPT

## 2021-10-26 PROCEDURE — 86900 BLOOD TYPING SEROLOGIC ABO: CPT

## 2021-10-26 PROCEDURE — 36415 COLL VENOUS BLD VENIPUNCTURE: CPT

## 2021-10-26 PROCEDURE — 80048 BASIC METABOLIC PNL TOTAL CA: CPT

## 2021-10-26 PROCEDURE — 83615 LACTATE (LD) (LDH) ENZYME: CPT

## 2021-10-26 PROCEDURE — 97165 OT EVAL LOW COMPLEX 30 MIN: CPT

## 2021-10-26 PROCEDURE — 70250 X-RAY EXAM OF SKULL: CPT

## 2021-10-26 PROCEDURE — 85027 COMPLETE CBC AUTOMATED: CPT

## 2021-10-26 PROCEDURE — 84550 ASSAY OF BLOOD/URIC ACID: CPT

## 2021-10-26 PROCEDURE — 85025 COMPLETE CBC W/AUTO DIFF WBC: CPT

## 2021-10-26 PROCEDURE — 99285 EMERGENCY DEPT VISIT HI MDM: CPT | Mod: 25

## 2021-10-26 PROCEDURE — 86902 BLOOD TYPE ANTIGEN DONOR EA: CPT

## 2021-10-26 PROCEDURE — 84702 CHORIONIC GONADOTROPIN TEST: CPT

## 2021-10-26 PROCEDURE — 85045 AUTOMATED RETICULOCYTE COUNT: CPT

## 2021-10-26 PROCEDURE — 0225U NFCT DS DNA&RNA 21 SARSCOV2: CPT

## 2021-10-26 PROCEDURE — U0003: CPT

## 2021-10-26 PROCEDURE — 87040 BLOOD CULTURE FOR BACTERIA: CPT

## 2021-10-26 PROCEDURE — 36430 TRANSFUSION BLD/BLD COMPNT: CPT

## 2021-10-26 PROCEDURE — 87086 URINE CULTURE/COLONY COUNT: CPT

## 2021-10-26 PROCEDURE — 76705 ECHO EXAM OF ABDOMEN: CPT

## 2021-10-26 PROCEDURE — 74018 RADEX ABDOMEN 1 VIEW: CPT

## 2021-10-26 PROCEDURE — 70450 CT HEAD/BRAIN W/O DYE: CPT

## 2021-10-26 PROCEDURE — 86901 BLOOD TYPING SEROLOGIC RH(D): CPT

## 2021-10-26 PROCEDURE — 97535 SELF CARE MNGMENT TRAINING: CPT

## 2021-10-26 PROCEDURE — 81001 URINALYSIS AUTO W/SCOPE: CPT

## 2021-10-26 PROCEDURE — 86923 COMPATIBILITY TEST ELECTRIC: CPT

## 2021-10-26 PROCEDURE — 99232 SBSQ HOSP IP/OBS MODERATE 35: CPT

## 2021-10-26 PROCEDURE — 80053 COMPREHEN METABOLIC PANEL: CPT

## 2021-10-26 PROCEDURE — 97161 PT EVAL LOW COMPLEX 20 MIN: CPT

## 2021-10-26 PROCEDURE — P9040: CPT

## 2021-10-26 PROCEDURE — 82248 BILIRUBIN DIRECT: CPT

## 2021-10-26 PROCEDURE — 36000 PLACE NEEDLE IN VEIN: CPT

## 2021-10-26 PROCEDURE — 93005 ELECTROCARDIOGRAM TRACING: CPT

## 2021-10-26 PROCEDURE — 84100 ASSAY OF PHOSPHORUS: CPT

## 2021-10-26 PROCEDURE — U0005: CPT

## 2021-10-26 RX ORDER — IBUPROFEN 200 MG
1 TABLET ORAL
Qty: 0 | Refills: 0 | DISCHARGE
Start: 2021-10-26

## 2021-10-26 RX ORDER — SODIUM,POTASSIUM PHOSPHATES 278-250MG
2 POWDER IN PACKET (EA) ORAL ONCE
Refills: 0 | Status: COMPLETED | OUTPATIENT
Start: 2021-10-26 | End: 2021-10-26

## 2021-10-26 RX ADMIN — CHLORHEXIDINE GLUCONATE 1 APPLICATION(S): 213 SOLUTION TOPICAL at 05:48

## 2021-10-26 RX ADMIN — SODIUM CHLORIDE 125 MILLILITER(S): 9 INJECTION, SOLUTION INTRAVENOUS at 11:36

## 2021-10-26 RX ADMIN — Medication 2 PACKET(S): at 10:39

## 2021-10-26 RX ADMIN — SERTRALINE 100 MILLIGRAM(S): 25 TABLET, FILM COATED ORAL at 11:36

## 2021-10-26 RX ADMIN — ARIPIPRAZOLE 5 MILLIGRAM(S): 15 TABLET ORAL at 11:35

## 2021-10-26 RX ADMIN — Medication 1 MILLIGRAM(S): at 11:35

## 2021-10-26 RX ADMIN — HYDROXYUREA 500 MILLIGRAM(S): 500 CAPSULE ORAL at 11:35

## 2021-10-26 NOTE — PROGRESS NOTE ADULT - PROBLEM SELECTOR PROBLEM 5
(ventriculoperitoneal) shunt status

## 2021-10-26 NOTE — DISCHARGE NOTE NURSING/CASE MANAGEMENT/SOCIAL WORK - PATIENT PORTAL LINK FT
You can access the FollowMyHealth Patient Portal offered by [facility] by registering at the following website: [email or web address]. By joining [facility]’s FollowMyHealth portal, you will also be able to view your health information using other applications (apps) compatible with our system.

## 2021-10-26 NOTE — PROGRESS NOTE ADULT - PROBLEM SELECTOR PROBLEM 1
Fever
Fever
Hemoglobin SS disease with vasoocclusive crisis
Hemoglobin SS disease with vasoocclusive crisis
Fever
Hemoglobin SS disease with vasoocclusive crisis
Fever

## 2021-10-26 NOTE — PROGRESS NOTE ADULT - PROBLEM SELECTOR PROBLEM 3
Acute sickle cell crisis
Transaminitis
Acute sickle cell crisis
Transaminitis
Transaminitis

## 2021-10-26 NOTE — PROGRESS NOTE ADULT - PROBLEM SELECTOR PROBLEM 2
Bipolar disorder
Acute sickle cell crisis
Bipolar disorder
Acute sickle cell crisis
Acute sickle cell crisis

## 2021-10-26 NOTE — PROGRESS NOTE ADULT - PROBLEM SELECTOR PLAN 5
rt eye crossing over (symptoms now improved)  CT head unremarkable  Shunt adjusted by neurosurgery and they reviewed  shunt series --> per neurosx outpatient follow up    6. C/w lovenox for DVT PPx.   -Dispo: stable/improved for DC to home today after blood transfusion yesterday (with good response) with outpatient follow up with Dr. Eleonora Richardson and her neurosurgeon. -Discussed with ALEKSANDR Price.  -Discussed with Dr. Eleonora Richardson and patient and her mother at bedside yesterday.   -35 minutes spent on the discharge process.

## 2021-10-26 NOTE — PROGRESS NOTE ADULT - PROBLEM SELECTOR PLAN 4
-Continue with Abilify and Sertraline
-Continue with Abilify and Sertraline
r eye crossing over (symptoms now improved)  CT head unremarkable  Shunt adjusted by neurosurgery and they reviewed  shunt series --> per neurosx outpatient follow up
r eye crossing over  will r.o obsruction  CTH and x ray shunt series pending  nsg consulted
-Continue with Abilify and Sertraline

## 2021-10-26 NOTE — PROGRESS NOTE ADULT - SUBJECTIVE AND OBJECTIVE BOX
Patient is a 21y old  Female who presents with a chief complaint of pain (24 Oct 2021 04:03)      SUBJECTIVE / OVERNIGHT EVENTS: Patient seen and examined at bedside. No acute events overnight. Patient feeling better with less pain. She denies SOB/CP/Fever. Pain around 3/10. She used approximately 10 mg of morphine in past 24 hours and overall less.    MEDICATIONS  (STANDING):  ARIPiprazole 5 milliGRAM(s) Oral daily  chlorhexidine 4% Liquid 1 Application(s) Topical <User Schedule>  enoxaparin Injectable 40 milliGRAM(s) SubCutaneous daily  folic acid 1 milliGRAM(s) Oral daily  hydroxyurea 500 milliGRAM(s) Oral daily  Jadenu (Deferasirox) 360mg tablet 4 Tablet(s) 4 Tablet(s) Oral daily  morphine PCA (5 mG/mL) 30 milliLiter(s) PCA Continuous PCA Continuous  senna 2 Tablet(s) Oral at bedtime  sertraline 100 milliGRAM(s) Oral daily  sodium chloride 0.45%. 1000 milliLiter(s) (125 mL/Hr) IV Continuous <Continuous>    MEDICATIONS  (PRN):  aluminum hydroxide/magnesium hydroxide/simethicone Suspension 30 milliLiter(s) Oral every 4 hours PRN Dyspepsia  ibuprofen  Tablet. 600 milliGRAM(s) Oral every 6 hours PRN Mild Pain (1 - 3), Moderate Pain (4 - 6)  melatonin 3 milliGRAM(s) Oral at bedtime PRN Insomnia  naloxone Injectable 0.1 milliGRAM(s) IV Push every 3 minutes PRN For ANY of the following changes in patient status:  A. RR LESS THAN 10 breaths per minute, B. Oxygen saturation LESS THAN 90%, C. Sedation score of 6  ondansetron Injectable 4 milliGRAM(s) IV Push every 8 hours PRN Nausea and/or Vomiting  polyethylene glycol 3350 17 Gram(s) Oral two times a day PRN Constipation      CAPILLARY BLOOD GLUCOSE        I&O's Summary    23 Oct 2021 07:01  -  24 Oct 2021 07:00  --------------------------------------------------------  IN: 1140 mL / OUT: 0 mL / NET: 1140 mL        PHYSICAL EXAM:  Vital Signs Last 24 Hrs  T(C): 37.4 (24 Oct 2021 12:15), Max: 37.4 (23 Oct 2021 21:02)  T(F): 99.3 (24 Oct 2021 12:15), Max: 99.4 (23 Oct 2021 23:58)  HR: 94 (24 Oct 2021 12:15) (85 - 95)  BP: 104/65 (24 Oct 2021 12:15) (101/64 - 113/73)  BP(mean): --  RR: 17 (24 Oct 2021 12:15) (17 - 18)  SpO2: 92% (24 Oct 2021 12:15) (91% - 94%)    GEN: female in NAD, appears comfortable, no diaphoresis  EYES: No scleral injection, PERRL, EOMI  ENTM: neck supple & symmetric without tracheal deviation, moist membranes, no gross hearing impairment, thyroid gland not enlarged  CV: +S1/S2, no m/r/g, no abdominal bruit, no LE edema  RESP: breathing comfortably, no respiratory accessory muscle use, CTAB, no w/r/r  GI: normoactive BS, soft, NTND, no rebounding/guarding, no palpable masses    LABS:                        7.4    9.94  )-----------( 222      ( 24 Oct 2021 06:57 )             20.7     10-24    139  |  106  |  9   ----------------------------<  89  3.2<L>   |  22  |  0.70    Ca    8.1<L>      24 Oct 2021 06:57  Phos  2.4     10-24  Mg     1.9     10-24    TPro  5.2<L>  /  Alb  3.0<L>  /  TBili  5.2<H>  /  DBili  x   /  AST  281<H>  /  ALT  201<H>  /  AlkPhos  177<H>  10-24                RADIOLOGY & ADDITIONAL TESTS:  Results Reviewed:   Imaging Personally Reviewed:  Electrocardiogram Personally Reviewed:    COORDINATION OF CARE:  Care Discussed with Consultants/Other Providers [Y/N]:  Prior or Outpatient Records Reviewed [Y/N]:  
Patient is a 21y old  Female who presents with a chief complaint of pain (25 Oct 2021 10:38)        SUBJECTIVE / OVERNIGHT EVENTS: patient says she has no pain and hasn't needed any prn's. she denies cp or sob. she is ambulating and eating. she says her eye/vision is good.       MEDICATIONS  (STANDING):  ARIPiprazole 5 milliGRAM(s) Oral daily  chlorhexidine 4% Liquid 1 Application(s) Topical <User Schedule>  enoxaparin Injectable 40 milliGRAM(s) SubCutaneous daily  folic acid 1 milliGRAM(s) Oral daily  hydroxyurea 500 milliGRAM(s) Oral daily  Jadenu (Deferasirox) 360mg tablet 4 Tablet(s) 4 Tablet(s) Oral daily  senna 2 Tablet(s) Oral at bedtime  sertraline 100 milliGRAM(s) Oral daily  sodium chloride 0.45%. 1000 milliLiter(s) (125 mL/Hr) IV Continuous <Continuous>    MEDICATIONS  (PRN):  aluminum hydroxide/magnesium hydroxide/simethicone Suspension 30 milliLiter(s) Oral every 4 hours PRN Dyspepsia  ibuprofen  Tablet. 600 milliGRAM(s) Oral every 6 hours PRN Mild Pain (1 - 3), Moderate Pain (4 - 6)  melatonin 3 milliGRAM(s) Oral at bedtime PRN Insomnia  naloxone Injectable 0.1 milliGRAM(s) IV Push every 3 minutes PRN For ANY of the following changes in patient status:  A. RR LESS THAN 10 breaths per minute, B. Oxygen saturation LESS THAN 90%, C. Sedation score of 6  ondansetron Injectable 4 milliGRAM(s) IV Push every 8 hours PRN Nausea and/or Vomiting  polyethylene glycol 3350 17 Gram(s) Oral two times a day PRN Constipation      Vital Signs Last 24 Hrs  T(C): 37.2 (25 Oct 2021 11:34), Max: 37.8 (25 Oct 2021 00:23)  T(F): 99 (25 Oct 2021 11:34), Max: 100 (25 Oct 2021 00:23)  HR: 91 (25 Oct 2021 11:34) (91 - 100)  BP: 99/64 (25 Oct 2021 11:34) (95/59 - 111/68)  BP(mean): --  RR: 18 (25 Oct 2021 11:34) (17 - 18)  SpO2: 91% (25 Oct 2021 11:34) (91% - 96%)  CAPILLARY BLOOD GLUCOSE        I&O's Summary    24 Oct 2021 07:01  -  25 Oct 2021 07:00  --------------------------------------------------------  IN: 240 mL / OUT: 0 mL / NET: 240 mL          PHYSICAL EXAM:   GENERAL: NAD, well-developed  HEAD:  Atraumatic, Normocephalic  EYES: conjunctiva and sclera clear  NECK: Supple,   CHEST/LUNG: Clear to auscultation bilaterally; No wheeze  HEART: S1S2 normal. Regular rate and rhythm; No murmurs, rubs, or gallops  ABDOMEN: Soft, Nontender, Nondistended; Bowel sounds present  EXTREMITIES:  No clubbing, cyanosis, or edema  PSYCH/Neuro: AAOx3. Non-focal.   SKIN: No rashes or lesions      LABS:                        6.9    11.03 )-----------( 245      ( 25 Oct 2021 07:58 )             19.3     10-25    140  |  109<H>  |  6<L>  ----------------------------<  83  3.3<L>   |  21<L>  |  0.74    Ca    8.5      25 Oct 2021 07:57  Phos  2.4     10-24  Mg     1.9     10-24    TPro  5.3<L>  /  Alb  3.0<L>  /  TBili  4.0<H>  /  DBili  1.7<H>  /  AST  219<H>  /  ALT  185<H>  /  AlkPhos  172<H>  10-25          < from: Xray Chest 1 View- PORTABLE-Routine (Xray Chest 1 View- PORTABLE-Routine .) (10.23.21 @ 09:00) >  IMPRESSION: Clear lungs.    < end of copied text >        RADIOLOGY & ADDITIONAL TESTS:    Imaging Personally Reviewed: Xray chest.   Consultant(s) Notes Reviewed:  RYAN  Care Discussed with Consultants/Other Providers:  
[facility] Division of Hospital Medicine  Berhane Marroquin  Pager (RORO, 8A-5P): [phone]  Other Times:  [phone]    CC:20 y/o F presenting with pain    SUBJECTIVE / OVERNIGHT EVENTS:  No acute events overnight, no complaints this am  Currently resting comfortably  ADDITIONAL REVIEW OF SYSTEMS:    MEDICATIONS  (STANDING):  ARIPiprazole 5 milliGRAM(s) Oral daily  enoxaparin Injectable 40 milliGRAM(s) SubCutaneous daily  HYDROmorphone PCA (1 mG/mL) 30 milliLiter(s) PCA Continuous PCA Continuous  hydroxyurea 500 milliGRAM(s) Oral daily  Jadenu (Deferasirox) 360mg tablet 4 Tablet(s) 4 Tablet(s) Oral daily  sertraline 100 milliGRAM(s) Oral daily  sodium chloride 0.45%. 1000 milliLiter(s) (100 mL/Hr) IV Continuous <Continuous>    MEDICATIONS  (PRN):  acetaminophen   Tablet .. 650 milliGRAM(s) Oral every 6 hours PRN Temp greater or equal to 38C (100.4F), Mild Pain (1 - 3)  aluminum hydroxide/magnesium hydroxide/simethicone Suspension 30 milliLiter(s) Oral every 4 hours PRN Dyspepsia  melatonin 3 milliGRAM(s) Oral at bedtime PRN Insomnia  ondansetron Injectable 4 milliGRAM(s) IV Push every 8 hours PRN Nausea and/or Vomiting      I&O's Summary    16 Oct 2021 07:01  -  17 Oct 2021 07:00  --------------------------------------------------------  IN: 1100 mL / OUT: 0 mL / NET: 1100 mL        PHYSICAL EXAM:  Vital Signs Last 24 Hrs  T(C): 36.8 (17 Oct 2021 05:06), Max: 37 (16 Oct 2021 21:38)  T(F): 98.2 (17 Oct 2021 05:06), Max: 98.6 (16 Oct 2021 21:38)  HR: 74 (17 Oct 2021 05:06) (65 - 84)  BP: 114/76 (17 Oct 2021 05:06) (114/76 - 134/86)  BP(mean): --  RR: 18 (17 Oct 2021 05:06) (16 - 18)  SpO2: 94% (17 Oct 2021 05:06) (94% - 96%)    CONSTITUTIONAL: NAD, well-developed, well-groomed  EYES: PERRLA; conjunctiva and sclera clear  ENMT: Moist oral mucosa, no pharyngeal injection or exudates  NECK: Supple, no palpable masses  RESPIRATORY: Normal respiratory effort; lungs are clear to auscultation bilaterally  CARDIOVASCULAR: Regular rate and rhythm, normal S1 and S2, no murmur/rub/gallop; No lower extremity edema; Peripheral pulses are 2+ bilaterally  ABDOMEN: Nontender to palpation, normoactive bowel sounds, no rebound/guarding  MUSCULOSKELETAL:  no clubbing or cyanosis of digits; no joint swelling or tenderness to palpation  PSYCH: A+O to person, place, and time; affect appropriate  SKIN: No rashes; no palpable lesions    LABS:                        9.8    13.17 )-----------( 396      ( 17 Oct 2021 06:34 )             27.9     10-17    137  |  104  |  6<L>  ----------------------------<  97  3.7   |  19<L>  |  0.81    Ca    9.3      17 Oct 2021 06:33    TPro  6.7  /  Alb  4.5  /  TBili  2.8<H>  /  DBili  x   /  AST  42<H>  /  ALT  26  /  AlkPhos  72  10-16            
Patient is a 21y old  Female who presents with a chief complaint of pain (26 Oct 2021 10:51)        SUBJECTIVE / OVERNIGHT EVENTS: Patient says she feels well and would like to go home. She denies any pain.       MEDICATIONS  (STANDING):  ARIPiprazole 5 milliGRAM(s) Oral daily  chlorhexidine 4% Liquid 1 Application(s) Topical <User Schedule>  enoxaparin Injectable 40 milliGRAM(s) SubCutaneous daily  folic acid 1 milliGRAM(s) Oral daily  hydroxyurea 500 milliGRAM(s) Oral daily  Jadenu (Deferasirox) 360mg tablet 4 Tablet(s) 4 Tablet(s) Oral daily  senna 2 Tablet(s) Oral at bedtime  sertraline 100 milliGRAM(s) Oral daily  sodium chloride 0.45%. 1000 milliLiter(s) (125 mL/Hr) IV Continuous <Continuous>    MEDICATIONS  (PRN):  aluminum hydroxide/magnesium hydroxide/simethicone Suspension 30 milliLiter(s) Oral every 4 hours PRN Dyspepsia  ibuprofen  Tablet. 600 milliGRAM(s) Oral every 6 hours PRN Mild Pain (1 - 3), Moderate Pain (4 - 6)  melatonin 3 milliGRAM(s) Oral at bedtime PRN Insomnia  naloxone Injectable 0.1 milliGRAM(s) IV Push every 3 minutes PRN For ANY of the following changes in patient status:  A. RR LESS THAN 10 breaths per minute, B. Oxygen saturation LESS THAN 90%, C. Sedation score of 6  ondansetron Injectable 4 milliGRAM(s) IV Push every 8 hours PRN Nausea and/or Vomiting  polyethylene glycol 3350 17 Gram(s) Oral two times a day PRN Constipation      Vital Signs Last 24 Hrs  T(C): 37 (26 Oct 2021 12:08), Max: 37 (26 Oct 2021 12:08)  T(F): 98.6 (26 Oct 2021 12:08), Max: 98.6 (26 Oct 2021 12:08)  HR: 86 (26 Oct 2021 12:08) (70 - 107)  BP: 96/64 (26 Oct 2021 12:08) (96/64 - 113/74)  BP(mean): --  RR: 18 (26 Oct 2021 12:08) (16 - 18)  SpO2: 97% (26 Oct 2021 12:08) (93% - 97%)  CAPILLARY BLOOD GLUCOSE        I&O's Summary    25 Oct 2021 07:01  -  26 Oct 2021 07:00  --------------------------------------------------------  IN: 2070 mL / OUT: 0 mL / NET: 2070 mL    26 Oct 2021 07:01  -  26 Oct 2021 16:11  --------------------------------------------------------  IN: 240 mL / OUT: 0 mL / NET: 240 mL          PHYSICAL EXAM:   GENERAL: NAD, well-developed  HEAD:  Atraumatic, Normocephalic  EYES:   conjunctiva and sclera clear  NECK: Supple   CHEST/LUNG: Clear to auscultation bilaterally; No wheeze  HEART: S1S2 normal. Regular rate and rhythm; No murmurs, rubs, or gallops  ABDOMEN: Soft, Nontender, Nondistended; Bowel sounds present  EXTREMITIES:   No clubbing, cyanosis, or edema  PSYCH/Neuro: AAOx3. Non-focal. Right eye lateral deviation occasionally.   SKIN: No rashes or lesions      LABS:                        8.2    15.68 )-----------( 311      ( 26 Oct 2021 06:47 )             22.5     10-26    139  |  108  |  5<L>  ----------------------------<  89  3.5   |  20<L>  |  0.70    Ca    8.8      26 Oct 2021 06:48  Phos  2.1     10-26  Mg     1.8     10-26    TPro  5.5<L>  /  Alb  3.1<L>  /  TBili  2.5<H>  /  DBili  x   /  AST  172<H>  /  ALT  167<H>  /  AlkPhos  185<H>  10-26      < from: US Abdomen Upper Quadrant Right (10.25.21 @ 17:59) >  IMPRESSION:    Status post cholecystectomy.  Otherwise, unremarkable right upper quadrant ultrasound.    < end of copied text >      RADIOLOGY & ADDITIONAL TESTS:    Imaging Personally Reviewed: RUQ US report.   Consultant(s) Notes Reviewed:    Care Discussed with Consultants/Other Providers:  
Patient is a 21y old  Female who presents with a chief complaint of pain (23 Oct 2021 04:35)      SUBJECTIVE / OVERNIGHT EVENTS: Patient seen and examined at bedside. No acute events overnight. She had low grade fever, otherwise uneventful night. She denies cough or SOB. She states arm pain is improving with medications    MEDICATIONS  (STANDING):  ampicillin/sulbactam  IVPB 3 Gram(s) IV Intermittent every 6 hours  ARIPiprazole 5 milliGRAM(s) Oral daily  chlorhexidine 4% Liquid 1 Application(s) Topical <User Schedule>  enoxaparin Injectable 40 milliGRAM(s) SubCutaneous daily  folic acid 1 milliGRAM(s) Oral daily  hydroxyurea 500 milliGRAM(s) Oral daily  Jadenu (Deferasirox) 360mg tablet 4 Tablet(s) 4 Tablet(s) Oral daily  morphine PCA (5 mG/mL) 30 milliLiter(s) PCA Continuous PCA Continuous  senna 2 Tablet(s) Oral at bedtime  sertraline 100 milliGRAM(s) Oral daily  sodium chloride 0.45%. 1000 milliLiter(s) (125 mL/Hr) IV Continuous <Continuous>    MEDICATIONS  (PRN):  acetaminophen   Tablet .. 650 milliGRAM(s) Oral every 6 hours PRN Mild Pain (1 - 3)  aluminum hydroxide/magnesium hydroxide/simethicone Suspension 30 milliLiter(s) Oral every 4 hours PRN Dyspepsia  ketorolac   Injectable 30 milliGRAM(s) IV Push every 6 hours PRN Moderate Pain (4 - 6)  melatonin 3 milliGRAM(s) Oral at bedtime PRN Insomnia  naloxone Injectable 0.1 milliGRAM(s) IV Push every 3 minutes PRN For ANY of the following changes in patient status:  A. RR LESS THAN 10 breaths per minute, B. Oxygen saturation LESS THAN 90%, C. Sedation score of 6  ondansetron Injectable 4 milliGRAM(s) IV Push every 8 hours PRN Nausea and/or Vomiting      CAPILLARY BLOOD GLUCOSE        I&O's Summary    22 Oct 2021 07:01  -  23 Oct 2021 07:00  --------------------------------------------------------  IN: 3610 mL / OUT: 0 mL / NET: 3610 mL        PHYSICAL EXAM:  Vital Signs Last 24 Hrs  T(C): 36.8 (23 Oct 2021 12:15), Max: 38.1 (23 Oct 2021 04:58)  T(F): 98.3 (23 Oct 2021 12:15), Max: 100.6 (23 Oct 2021 04:58)  HR: 92 (23 Oct 2021 12:15) (92 - 106)  BP: 106/69 (23 Oct 2021 12:15) (98/68 - 106/69)  BP(mean): --  RR: 18 (23 Oct 2021 12:15) (16 - 18)  SpO2: 94% (23 Oct 2021 12:15) (92% - 95%)    GEN: female in NAD, appears comfortable, no diaphoresis  EYES: No scleral injection, PERRL, EOMI  ENTM: neck supple & symmetric without tracheal deviation, moist membranes, no gross hearing impairment, thyroid gland not enlarged  CV: +S1/S2, no m/r/g, no abdominal bruit, no LE edema  RESP: breathing comfortably, no respiratory accessory muscle use, CTAB, no w/r/r  GI: normoactive BS, soft, NTND, no rebounding/guarding, no palpable masses    LABS:                        6.1    7.27  )-----------( 201      ( 23 Oct 2021 08:58 )             18.1     10-22    137  |  104  |  9   ----------------------------<  89  3.5   |  22  |  0.91    Ca    8.8      22 Oct 2021 07:57                  RADIOLOGY & ADDITIONAL TESTS:  Results Reviewed:   Imaging Personally Reviewed:  Electrocardiogram Personally Reviewed:    COORDINATION OF CARE:  Care Discussed with Consultants/Other Providers [Y/N]:  Prior or Outpatient Records Reviewed [Y/N]:  
[facility] Division of Hospital Medicine  Bárbara Liang DO  Pager (KAILEE-F, 8A-5P): [phone]  Other Times:  [phone]    Patient is a 21y old  Female who presents with a chief complaint of pain (18 Oct 2021 13:39)      SUBJECTIVE / OVERNIGHT EVENTS:    patient seen this morning. pain level a 2. passing gas, no BM yet,  no abdominal pain, fevers, sob.    MEDICATIONS  (STANDING):  ARIPiprazole 5 milliGRAM(s) Oral daily  enoxaparin Injectable 40 milliGRAM(s) SubCutaneous daily  hydroxyurea 500 milliGRAM(s) Oral daily  Jadenu (Deferasirox) 360mg tablet 4 Tablet(s) 4 Tablet(s) Oral daily  senna 2 Tablet(s) Oral at bedtime  sertraline 100 milliGRAM(s) Oral daily  sodium chloride 0.45%. 1000 milliLiter(s) (100 mL/Hr) IV Continuous <Continuous>    MEDICATIONS  (PRN):  acetaminophen   Tablet .. 650 milliGRAM(s) Oral every 6 hours PRN Mild Pain (1 - 3)  aluminum hydroxide/magnesium hydroxide/simethicone Suspension 30 milliLiter(s) Oral every 4 hours PRN Dyspepsia  ketorolac   Injectable 30 milliGRAM(s) IV Push every 6 hours PRN Moderate Pain (4 - 6)  melatonin 3 milliGRAM(s) Oral at bedtime PRN Insomnia  morphine  - Injectable 4 milliGRAM(s) IV Push every 3 hours PRN Severe Pain (7 - 10)  ondansetron Injectable 4 milliGRAM(s) IV Push every 8 hours PRN Nausea and/or Vomiting      CAPILLARY BLOOD GLUCOSE        I&O's Summary    18 Oct 2021 07:01  -  19 Oct 2021 07:00  --------------------------------------------------------  IN: 2720 mL / OUT: 0 mL / NET: 2720 mL        PHYSICAL EXAM:  Vital Signs Last 24 Hrs  T(C): 36.8 (19 Oct 2021 04:43), Max: 36.9 (18 Oct 2021 21:07)  T(F): 98.2 (19 Oct 2021 04:43), Max: 98.4 (18 Oct 2021 21:07)  HR: 77 (19 Oct 2021 04:43) (74 - 77)  BP: 109/65 (19 Oct 2021 04:43) (109/65 - 115/74)  BP(mean): --  RR: 18 (19 Oct 2021 04:43) (17 - 18)  SpO2: 94% (19 Oct 2021 04:43) (94% - 95%)    CONSTITUTIONAL: NAD, well-developed, well-groomed  EYES: PERRL; scleral icterus   ENMT: Moist oral mucosa, no pharyngeal injection or exudates  NECK: Supple, no palpable masses  RESPIRATORY: Normal respiratory effort; lungs are clear to auscultation bilaterally  CARDIOVASCULAR: Regular rate and rhythm, normal S1 and S2, no murmur/rub/gallop; No lower extremity edema; Peripheral pulses are 2+ bilaterally  ABDOMEN: Nontender to palpation, normoactive bowel sounds, no rebound/guarding  MUSCULOSKELETAL:  no clubbing or cyanosis of digits; no joint swelling or tenderness to palpation; wrist full ROM  PSYCH: A+O to person, place, and time; affect appropriate  SKIN: No rashes; no palpable lesions        LABS:                        9.2    10.08 )-----------( 365      ( 18 Oct 2021 06:45 )             27.1     10-19    134<L>  |  102  |  11  ----------------------------<  81  3.6   |  21<L>  |  0.93    Ca    9.2      19 Oct 2021 07:05  Mg     2.2     10-18    TPro  6.2  /  Alb  3.9  /  TBili  4.3<H>  /  DBili  x   /  AST  73<H>  /  ALT  49<H>  /  AlkPhos  107  10-19                RADIOLOGY & ADDITIONAL TESTS:  Results Reviewed:   Imaging Personally Reviewed:  Electrocardiogram Personally Reviewed:    COORDINATION OF CARE:  Care Discussed with Consultants/Other Providers [Y/N]:  Prior or Outpatient Records Reviewed [Y/N]:  jose Higginbotham
[facility] Division of Hospital Medicine  Bárbara Liang DO  Pager (RORO, 8A-5P): [phone]  Other Times:  [phone]    Patient is a 21y old  Female who presents with a chief complaint of pain (17 Oct 2021 12:05)      SUBJECTIVE / OVERNIGHT EVENTS:    feels well. still with lower leg pain and left wrist pain. improving. wants to try IV toradol so she can go home. passing gas but no BM in three days.     MEDICATIONS  (STANDING):  ARIPiprazole 5 milliGRAM(s) Oral daily  enoxaparin Injectable 40 milliGRAM(s) SubCutaneous daily  hydroxyurea 500 milliGRAM(s) Oral daily  Jadenu (Deferasirox) 360mg tablet 4 Tablet(s) 4 Tablet(s) Oral daily  senna 2 Tablet(s) Oral at bedtime  sertraline 100 milliGRAM(s) Oral daily  sodium chloride 0.45%. 1000 milliLiter(s) (100 mL/Hr) IV Continuous <Continuous>    MEDICATIONS  (PRN):  acetaminophen   Tablet .. 650 milliGRAM(s) Oral every 6 hours PRN Mild Pain (1 - 3)  aluminum hydroxide/magnesium hydroxide/simethicone Suspension 30 milliLiter(s) Oral every 4 hours PRN Dyspepsia  ketorolac   Injectable 30 milliGRAM(s) IV Push every 6 hours PRN Moderate Pain (4 - 6)  melatonin 3 milliGRAM(s) Oral at bedtime PRN Insomnia  morphine  - Injectable 4 milliGRAM(s) IV Push every 3 hours PRN Severe Pain (7 - 10)  ondansetron Injectable 4 milliGRAM(s) IV Push every 8 hours PRN Nausea and/or Vomiting      CAPILLARY BLOOD GLUCOSE        I&O's Summary    17 Oct 2021 07:01  -  18 Oct 2021 07:00  --------------------------------------------------------  IN: 2660 mL / OUT: 0 mL / NET: 2660 mL        PHYSICAL EXAM:  Vital Signs Last 24 Hrs  T(C): 36.8 (18 Oct 2021 11:39), Max: 37.6 (17 Oct 2021 21:43)  T(F): 98.2 (18 Oct 2021 11:39), Max: 99.7 (17 Oct 2021 21:43)  HR: 74 (18 Oct 2021 11:39) (65 - 77)  BP: 115/74 (18 Oct 2021 11:39) (115/74 - 136/83)  BP(mean): --  RR: 18 (18 Oct 2021 11:39) (18 - 18)  SpO2: 94% (18 Oct 2021 11:39) (94% - 96%)    CONSTITUTIONAL: NAD, well-developed, well-groomed  EYES: PERRL; scleral icterus   ENMT: Moist oral mucosa, no pharyngeal injection or exudates  NECK: Supple, no palpable masses  RESPIRATORY: Normal respiratory effort; lungs are clear to auscultation bilaterally  CARDIOVASCULAR: Regular rate and rhythm, normal S1 and S2, no murmur/rub/gallop; No lower extremity edema; Peripheral pulses are 2+ bilaterally  ABDOMEN: Nontender to palpation, normoactive bowel sounds, no rebound/guarding  MUSCULOSKELETAL:  no clubbing or cyanosis of digits; no joint swelling or tenderness to palpation  PSYCH: A+O to person, place, and time; affect appropriate  SKIN: No rashes; no palpable lesions    LABS:                        9.2    10.08 )-----------( 365      ( 18 Oct 2021 06:45 )             27.1     10-18    139  |  104  |  7   ----------------------------<  79  3.5   |  22  |  0.88    Ca    9.3      18 Oct 2021 06:44  Mg     2.2     10-18    TPro  6.5  /  Alb  4.1  /  TBili  4.2<H>  /  DBili  x   /  AST  51<H>  /  ALT  45  /  AlkPhos  102  10-18                RADIOLOGY & ADDITIONAL TESTS:  Results Reviewed:   Imaging Personally Reviewed:  Electrocardiogram Personally Reviewed:    COORDINATION OF CARE:  Care Discussed with Consultants/Other Providers [Y/N]:  Prior or Outpatient Records Reviewed [Y/N]:  jose Jaimes
[facility] Division of Hospital Medicine  Bárbara Liang DO  Pager (KAILEE-MIRA, 0K-3D): [phone]  Other Times:  [phone]    Patient is a 21y old  Female who presents with a chief complaint of pain (19 Oct 2021 12:26)      SUBJECTIVE / OVERNIGHT EVENTS:    patient seen and examined at bedside.  feels well. no dysuria or urgency,  MEDICATIONS  (STANDING):  ARIPiprazole 5 milliGRAM(s) Oral daily  ciprofloxacin   IVPB 400 milliGRAM(s) IV Intermittent every 12 hours  enoxaparin Injectable 40 milliGRAM(s) SubCutaneous daily  hydroxyurea 500 milliGRAM(s) Oral daily  Jadenu (Deferasirox) 360mg tablet 4 Tablet(s) 4 Tablet(s) Oral daily  morphine PCA (5 mG/mL) 30 milliLiter(s) PCA Continuous PCA Continuous  senna 2 Tablet(s) Oral at bedtime  sertraline 100 milliGRAM(s) Oral daily  sodium chloride 0.45%. 1000 milliLiter(s) (100 mL/Hr) IV Continuous <Continuous>    MEDICATIONS  (PRN):  acetaminophen   Tablet .. 650 milliGRAM(s) Oral every 6 hours PRN Mild Pain (1 - 3)  aluminum hydroxide/magnesium hydroxide/simethicone Suspension 30 milliLiter(s) Oral every 4 hours PRN Dyspepsia  ketorolac   Injectable 30 milliGRAM(s) IV Push every 6 hours PRN Moderate Pain (4 - 6)  melatonin 3 milliGRAM(s) Oral at bedtime PRN Insomnia  naloxone Injectable 0.1 milliGRAM(s) IV Push every 3 minutes PRN For ANY of the following changes in patient status:  A. RR LESS THAN 10 breaths per minute, B. Oxygen saturation LESS THAN 90%, C. Sedation score of 6  ondansetron Injectable 4 milliGRAM(s) IV Push every 8 hours PRN Nausea and/or Vomiting      CAPILLARY BLOOD GLUCOSE        I&O's Summary    19 Oct 2021 07:01  -  20 Oct 2021 07:00  --------------------------------------------------------  IN: 1960 mL / OUT: 0 mL / NET: 1960 mL        PHYSICAL EXAM:  Vital Signs Last 24 Hrs  T(C): 36.9 (20 Oct 2021 11:35), Max: 38.7 (20 Oct 2021 04:37)  T(F): 98.4 (20 Oct 2021 11:35), Max: 101.7 (20 Oct 2021 04:37)  HR: 83 (20 Oct 2021 11:35) (65 - 89)  BP: 104/61 (20 Oct 2021 11:35) (97/57 - 114/74)  BP(mean): --  RR: 18 (20 Oct 2021 11:35) (17 - 18)  SpO2: 93% (20 Oct 2021 11:35) (92% - 95%)      CONSTITUTIONAL: NAD, well-developed, well-groomed  EYES: PERRL; scleral icterus   ENMT: Moist oral mucosa, no pharyngeal injection or exudates  NECK: Supple, no palpable masses  RESPIRATORY: Normal respiratory effort; lungs are clear to auscultation bilaterally  CARDIOVASCULAR: Regular rate and rhythm, normal S1 and S2, no murmur/rub/gallop; No lower extremity edema; Peripheral pulses are 2+ bilaterally  ABDOMEN: Nontender to palpation, normoactive bowel sounds, no rebound/guarding  MUSCULOSKELETAL:  no clubbing or cyanosis of digits; no joint swelling or tenderness to palpation; wrist full ROM  PSYCH: A+O to person, place, and time; affect appropriate  SKIN: No rashes; no palpable lesions      LABS:                        8.3    9.46  )-----------( 326      ( 20 Oct 2021 07:20 )             24.3     10-20    136  |  102  |  10  ----------------------------<  85  3.9   |  20<L>  |  0.96    Ca    8.8      20 Oct 2021 07:17    TPro  6.2  /  Alb  3.9  /  TBili  4.3<H>  /  DBili  x   /  AST  73<H>  /  ALT  49<H>  /  AlkPhos  107  10-19          Urinalysis Basic - ( 20 Oct 2021 06:52 )    Color: Yellow / Appearance: Clear / S.012 / pH: x  Gluc: x / Ketone: Negative  / Bili: Negative / Urobili: 4 mg/dL   Blood: x / Protein: Trace / Nitrite: Negative   Leuk Esterase: Small / RBC: 4 /hpf / WBC 6 /HPF   Sq Epi: x / Non Sq Epi: 10 /hpf / Bacteria: Negative          RADIOLOGY & ADDITIONAL TESTS:  Results Reviewed:   Imaging Personally Reviewed:  Electrocardiogram Personally Reviewed:    COORDINATION OF CARE:  Care Discussed with Consultants/Other Providers [Y/N]:  Prior or Outpatient Records Reviewed [Y/N]:  jose Jaimes
[facility] Division of Hospital Medicine  Bárbara Liang DO  Pager (RORO, 4W-2X): [phone]  Other Times:  [phone]    Patient is a 21y old  Female who presents with a chief complaint of pain (20 Oct 2021 12:57)      SUBJECTIVE / OVERNIGHT EVENTS:    pain is not well controlled.  asking to increase pca pump.     MEDICATIONS  (STANDING):  ARIPiprazole 5 milliGRAM(s) Oral daily  chlorhexidine 4% Liquid 1 Application(s) Topical <User Schedule>  ciprofloxacin   IVPB 400 milliGRAM(s) IV Intermittent every 12 hours  enoxaparin Injectable 40 milliGRAM(s) SubCutaneous daily  hydroxyurea 500 milliGRAM(s) Oral daily  Jadenu (Deferasirox) 360mg tablet 4 Tablet(s) 4 Tablet(s) Oral daily  morphine PCA (5 mG/mL) 30 milliLiter(s) PCA Continuous PCA Continuous  senna 2 Tablet(s) Oral at bedtime  sertraline 100 milliGRAM(s) Oral daily  sodium chloride 0.45%. 1000 milliLiter(s) (100 mL/Hr) IV Continuous <Continuous>    MEDICATIONS  (PRN):  acetaminophen   Tablet .. 650 milliGRAM(s) Oral every 6 hours PRN Mild Pain (1 - 3)  aluminum hydroxide/magnesium hydroxide/simethicone Suspension 30 milliLiter(s) Oral every 4 hours PRN Dyspepsia  ketorolac   Injectable 30 milliGRAM(s) IV Push every 6 hours PRN Moderate Pain (4 - 6)  melatonin 3 milliGRAM(s) Oral at bedtime PRN Insomnia  naloxone Injectable 0.1 milliGRAM(s) IV Push every 3 minutes PRN For ANY of the following changes in patient status:  A. RR LESS THAN 10 breaths per minute, B. Oxygen saturation LESS THAN 90%, C. Sedation score of 6  ondansetron Injectable 4 milliGRAM(s) IV Push every 8 hours PRN Nausea and/or Vomiting      CAPILLARY BLOOD GLUCOSE        I&O's Summary    20 Oct 2021 07:01  -  21 Oct 2021 07:00  --------------------------------------------------------  IN: 3180 mL / OUT: 0 mL / NET: 3180 mL        PHYSICAL EXAM:  Vital Signs Last 24 Hrs  T(C): 36.8 (21 Oct 2021 04:29), Max: 38.7 (20 Oct 2021 22:01)  T(F): 98.2 (21 Oct 2021 04:29), Max: 101.7 (20 Oct 2021 22:01)  HR: 97 (21 Oct 2021 04:29) (82 - 97)  BP: 112/66 (21 Oct 2021 04:29) (101/58 - 112/66)  BP(mean): --  RR: 18 (21 Oct 2021 04:29) (17 - 18)  SpO2: 96% (21 Oct 2021 04:29) (94% - 96%)        LABS:                        8.2    7.38  )-----------( 289      ( 21 Oct 2021 06:42 )             23.6     10-21    136  |  105  |  8   ----------------------------<  96  3.9   |  20<L>  |  0.93    Ca    8.9      21 Oct 2021 06:40            Urinalysis Basic - ( 20 Oct 2021 13:22 )    Color: Yellow / Appearance: Clear / S.013 / pH: x  Gluc: x / Ketone: Negative  / Bili: Negative / Urobili: 2 mg/dL   Blood: x / Protein: Trace / Nitrite: Negative   Leuk Esterase: Negative / RBC: 5 /hpf / WBC 6 /HPF   Sq Epi: x / Non Sq Epi: 3 /hpf / Bacteria: Negative        Culture - Blood (collected 20 Oct 2021 09:21)  Source: .Blood Port Device  Preliminary Report (21 Oct 2021 10:01):    No growth to date.        RADIOLOGY & ADDITIONAL TESTS:  Results Reviewed:   Imaging Personally Reviewed:  Electrocardiogram Personally Reviewed:    COORDINATION OF CARE:  Care Discussed with Consultants/Other Providers [Y/N]:  Prior or Outpatient Records Reviewed [Y/N]:  
[facility] Division of Hospital Medicine  Bárbara Liang DO  Pager (RORO, 6B-9K): [phone]  Other Times:  [phone]    Patient is a 21y old  Female who presents with a chief complaint of pain (21 Oct 2021 12:35)      SUBJECTIVE / OVERNIGHT EVENTS:     feels well. pain level is a 2. feels like r eye is crossed.    MEDICATIONS  (STANDING):  ARIPiprazole 5 milliGRAM(s) Oral daily  chlorhexidine 4% Liquid 1 Application(s) Topical <User Schedule>  ciprofloxacin   IVPB 400 milliGRAM(s) IV Intermittent every 12 hours  enoxaparin Injectable 40 milliGRAM(s) SubCutaneous daily  hydroxyurea 500 milliGRAM(s) Oral daily  Jadenu (Deferasirox) 360mg tablet 4 Tablet(s) 4 Tablet(s) Oral daily  morphine PCA (5 mG/mL) 30 milliLiter(s) PCA Continuous PCA Continuous  senna 2 Tablet(s) Oral at bedtime  sertraline 100 milliGRAM(s) Oral daily  sodium chloride 0.45%. 1000 milliLiter(s) (100 mL/Hr) IV Continuous <Continuous>    MEDICATIONS  (PRN):  acetaminophen   Tablet .. 650 milliGRAM(s) Oral every 6 hours PRN Mild Pain (1 - 3)  aluminum hydroxide/magnesium hydroxide/simethicone Suspension 30 milliLiter(s) Oral every 4 hours PRN Dyspepsia  ketorolac   Injectable 30 milliGRAM(s) IV Push every 6 hours PRN Moderate Pain (4 - 6)  melatonin 3 milliGRAM(s) Oral at bedtime PRN Insomnia  naloxone Injectable 0.1 milliGRAM(s) IV Push every 3 minutes PRN For ANY of the following changes in patient status:  A. RR LESS THAN 10 breaths per minute, B. Oxygen saturation LESS THAN 90%, C. Sedation score of 6  ondansetron Injectable 4 milliGRAM(s) IV Push every 8 hours PRN Nausea and/or Vomiting      CAPILLARY BLOOD GLUCOSE        I&O's Summary    21 Oct 2021 07:01  -  22 Oct 2021 07:00  --------------------------------------------------------  IN: 4350 mL / OUT: 0 mL / NET: 4350 mL        PHYSICAL EXAM:  Vital Signs Last 24 Hrs  T(C): 36.9 (22 Oct 2021 03:32), Max: 36.9 (21 Oct 2021 21:16)  T(F): 98.5 (22 Oct 2021 03:32), Max: 98.5 (22 Oct 2021 03:32)  HR: 97 (22 Oct 2021 03:32) (82 - 97)  BP: 96/60 (22 Oct 2021 03:32) (96/60 - 102/63)  BP(mean): --  RR: 17 (22 Oct 2021 03:32) (17 - 18)  SpO2: 93% (22 Oct 2021 03:32) (93% - 94%)    CONSTITUTIONAL: NAD, well-developed, well-groomed  EYES: PERRL; scleral icterus   ENMT: Moist oral mucosa, no pharyngeal injection or exudates  NECK: Supple, no palpable masses  RESPIRATORY: Normal respiratory effort; lungs are clear to auscultation bilaterally  CARDIOVASCULAR: Regular rate and rhythm, normal S1 and S2, no murmur/rub/gallop; No lower extremity edema; Peripheral pulses are 2+ bilaterally  ABDOMEN: Nontender to palpation, normoactive bowel sounds, no rebound/guarding  MUSCULOSKELETAL:  no clubbing or cyanosis of digits; no joint swelling or tenderness to palpation; wrist full ROM  PSYCH: A+O to person, place, and time; affect appropriate  Neuro: CN 2-12 intact no weakness b/l LE  SKIN: No rashes; no palpable lesions    LABS:                        7.3    6.09  )-----------( 248      ( 22 Oct 2021 07:57 )             21.2     10-22    137  |  104  |  9   ----------------------------<  89  3.5   |  22  |  0.91    Ca    8.8      22 Oct 2021 07:57            Urinalysis Basic - ( 20 Oct 2021 13:22 )    Color: Yellow / Appearance: Clear / S.013 / pH: x  Gluc: x / Ketone: Negative  / Bili: Negative / Urobili: 2 mg/dL   Blood: x / Protein: Trace / Nitrite: Negative   Leuk Esterase: Negative / RBC: 5 /hpf / WBC 6 /HPF   Sq Epi: x / Non Sq Epi: 3 /hpf / Bacteria: Negative        Culture - Blood (collected 20 Oct 2021 12:07)  Source: .Blood Blood-Peripheral  Preliminary Report (21 Oct 2021 13:01):    No growth to date.    Culture - Blood (collected 20 Oct 2021 09:21)  Source: .Blood Port Device  Preliminary Report (21 Oct 2021 10:01):    No growth to date.        RADIOLOGY & ADDITIONAL TESTS:  Results Reviewed:   Imaging Personally Reviewed:  Electrocardiogram Personally Reviewed:    COORDINATION OF CARE:  Care Discussed with Consultants/Other Providers [Y/N]:  Prior or Outpatient Records Reviewed [Y/N]:  med np pk

## 2021-10-26 NOTE — PROGRESS NOTE ADULT - PROBLEM SELECTOR PLAN 3
Developed on 10/24. It appears hepatocellular. She has a history of hyperbilirubinemia and slightly higher than baseline. Labs reviewed and not on any hepatoxic meds. I stopped Unasyn in case this is DILI (though unlikely). 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 For now will trend LFTs. If they don't improve would pursue RUQ sono.  -improving still. possibly due to hemolysis. -outpatient follow up. -no abdominal pain. -will get RUQ US inpatient - which was unremarkable.

## 2021-10-26 NOTE — PROGRESS NOTE ADULT - PROBLEM SELECTOR PLAN 1
Reactive Leukocytosis stable; LDH uptrending.  Hgb 9.8    On Dilaudid PCA pump  -Tylenol and Motrin PRN for mild-mod pain  -continue 1/2NS at 100 cc/hr  -Jadenu per home dose. Hgb near 10  -Incentive spirometer  -Daily sickle cell labs  -Continue with Hydroxyurea and Folic Acid  -DVT PPx
CXR negative for pna  UCX sent, pending  u/a positive but not clean catch/ pending ucx  empiric cipro 400mg iv q12hr
CXR negative for pna  UCX sent  u/a positive but not clean catch/ resend urine and culture  empiric cipro 400mg iv q12hr
CXR negative for pna (repeat CXR 10/23 also negative)  UCX NGTD  Was on Ciprofloxacin and then Unasyn for a total of four days. Fever could be from sickling. Given no source will d/c Unasyn and monitor off antibiotics  -no recent fevers. cultures negative.
d/w benefits of PCA pump with patient and mom, they are amenable but want to try the Toradol first- potential DC later today- not using opiates  -Tylenol and Motrin PRN for mild-mod pain  -continue 1/2NS at 100 cc/hr  -Jadenu per home dose. Hgb near 10  -Incentive spirometer  -Daily sickle cell labs  -Continue with Hydroxyurea and Folic Acid  -DVT PPx  - senna for bowel regimen
d/w benefits of PCA pump with patient and mom, they are amenable but want to try the Toradol first- if does not work will do pca pump   -Tylenol and Motrin PRN for mild-mod pain  -continue 1/2NS at 100 cc/hr  -Jadenu per home dose. Hgb near 10  -Incentive spirometer  -Daily sickle cell labs  -Continue with Hydroxyurea and Folic Acid  -DVT PPx  - senna for bowel regimen
CXR negative for pna (repeat CXR 10/23 also negative)  UCX NGTD  Was on Ciprofloxacin and then Unasyn for a total of four days. Fever could be from sickling. Given no source will d/c Unasyn and monitor off antibiotics
CXR negative for pna (repeat CXR 10/23 also negative)  UCX NGTD  sxs of lower abdominal pain   empiric cipro started, but patient thinks it causes flares in her pain --> start Unasyn 10/23 given fever and to cover for encapsulated organisms, will have added benefit over cipro of providing penetration to lung (cipro is a non-respiratory fluoroquinolone)
CXR negative for pna  UCX NGTD  sxs of lower abdominal pain   empiric cipro 400mg iv q12hr- total 5 day course switch to po on dc
CXR negative for pna (repeat CXR 10/23 also negative)  UCX NGTD  Was on Ciprofloxacin and then Unasyn for a total of four days. Fever could be from sickling. Given no source will d/c Unasyn and monitor off antibiotics  -no recent fevers. cultures negative.

## 2021-10-26 NOTE — PROGRESS NOTE ADULT - ASSESSMENT
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 Patient with acute vaso-occlusive crisis c
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 Patient with acute vaso-occlusive crisis currently improving on PCA. 
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 Patient with acute vaso-occlusive crisis c
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 Patient with acute vaso-occlusive crisis
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 Patient with acute vaso-occlusive crisis
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 Patient with acute vaso-occlusive crisis.
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 Patient with acute vaso-occlusive crisis c
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 Patient with acute vaso-occlusive crisis.

## 2021-10-26 NOTE — PROGRESS NOTE ADULT - PROBLEM/PLAN-1
The patient is a 74y Female complaining of
DISPLAY PLAN FREE TEXT

## 2021-10-26 NOTE — PROGRESS NOTE ADULT - PROBLEM SELECTOR PROBLEM 4
(ventriculoperitoneal) shunt status
Bipolar disorder
Bipolar disorder
 (ventriculoperitoneal) shunt status
Bipolar disorder

## 2021-10-26 NOTE — DISCHARGE NOTE NURSING/CASE MANAGEMENT/SOCIAL WORK - NSDCPEFALRISK_GEN_ALL_CORE
For information on Fall & injury Prevention, visit [email or web address]

## 2021-10-26 NOTE — PROGRESS NOTE ADULT - PROBLEM SELECTOR PLAN 2
-Continue with Abilify and Sertraline
-s/p PCA pump, not requiring any longer.     -Toradol for mild-mod pain PRN  -1/2NS at 125 cc/hr  -Jadenu per home dose  -Incentive spirometer. out of bed to chair.   -Daily sickle cell labs (improving sickling parameters) - improving.   -Continue with Hydroxyurea and Folic Acid  -DVT PPx  -senna for bowel regimen  -Transfuse 1 unit pRBC on 10/23 --> baseline hemoglobin 9-10 and patient receives simple transfusions monthly given prior stroke (last one 10/6). She has a good response.  -yesterday hgb 6.9, gave 1 prbc with improvement to 8.2.   -Given patient improving, DC'd PCA pump. Patient said she's ok with now just taking ibuprofen prn.  -needs outpatient heme follow up. -Discussed with Dr. Eleonora Richardson who agreed with plan.
-Continue with Abilify and Sertraline
-Continue with Abilify and Sertraline
PCA Pump c/w morphine 1mg demand 10 min lockout 6 hr limit 4 mg- d/w patient and mom - will DC since hasn't been needing now.     -Toradol for mild-mod pain PRN  -1/2NS at 125 cc/hr  -Jadenu per home dose  -Incentive spirometer. out of bed to chair.   -Daily sickle cell labs (improving sickling parameters) - improving.   -Continue with Hydroxyurea and Folic Acid  -DVT PPx  -senna for bowel regimen  -Transfuse 1 unit pRBC on 10/23 --> baseline hemoglobin 9-10 and patient receives simple transfusions monthly given prior stroke (last one 10/6). She has a good response.  -today hgb 6.9, will give 1 prbc today.   -Given patient improving, will DC PCA pump. Patient says she's ok with now just taking ibuprofen prn.  -needs outpatient heme follow up.
PCA Pump c/w morphine 1mg demand 10 min lockout 6 hr limit 4 mg- d/w patient and mom     -Toradol for mild-mod pain PRN  -1/2NS at 125 cc/hr  -Jadenu per home dose  -Incentive spirometer  -Daily sickle cell labs (improving sickling parameters)  -Continue with Hydroxyurea and Folic Acid  -DVT PPx  -senna for bowel regimen  -Transfuse 1 unit pRBC on 10/23 --> baseline hemoglobin 9-10 and patient receives simple transfusions monthly given prior stroke (last one 10/6). She has a good response  -Given patient improving, will likely d/c PCA pump and switch to oral opioids soon

## 2021-10-26 NOTE — PROGRESS NOTE ADULT - PROVIDER SPECIALTY LIST ADULT
Internal Medicine
Hospitalist
Internal Medicine
Hospitalist
Hospitalist
Internal Medicine
Hospitalist
Hospitalist

## 2021-10-28 RX ORDER — IBUPROFEN 400 MG/1
400 TABLET, FILM COATED ORAL EVERY 8 HOURS
Qty: 60 | Refills: 3 | Status: ACTIVE | COMMUNITY
Start: 2020-01-29 | End: 1900-01-01

## 2021-10-29 PROCEDURE — 99214 OFFICE O/P EST MOD 30 MIN: CPT | Mod: 95

## 2021-11-03 ENCOUNTER — NON-APPOINTMENT (OUTPATIENT)
Age: 21
End: 2021-11-03

## 2021-11-18 ENCOUNTER — NON-APPOINTMENT (OUTPATIENT)
Age: 21
End: 2021-11-18

## 2021-11-19 ENCOUNTER — APPOINTMENT (OUTPATIENT)
Dept: INTERNAL MEDICINE | Facility: CLINIC | Age: 21
End: 2021-11-19
Payer: MEDICAID

## 2021-11-19 ENCOUNTER — OUTPATIENT (OUTPATIENT)
Dept: OUTPATIENT SERVICES | Facility: HOSPITAL | Age: 21
LOS: 1 days | End: 2021-11-19

## 2021-11-19 VITALS
SYSTOLIC BLOOD PRESSURE: 115 MMHG | DIASTOLIC BLOOD PRESSURE: 69 MMHG | HEIGHT: 62 IN | BODY MASS INDEX: 20.98 KG/M2 | WEIGHT: 114 LBS

## 2021-11-19 DIAGNOSIS — F32.A DEPRESSION, UNSPECIFIED: ICD-10-CM

## 2021-11-19 DIAGNOSIS — Z98.890 OTHER SPECIFIED POSTPROCEDURAL STATES: Chronic | ICD-10-CM

## 2021-11-19 PROCEDURE — 99213 OFFICE O/P EST LOW 20 MIN: CPT

## 2021-11-20 ENCOUNTER — OUTPATIENT (OUTPATIENT)
Dept: OUTPATIENT SERVICES | Facility: HOSPITAL | Age: 21
LOS: 1 days | End: 2021-11-20
Payer: MEDICAID

## 2021-11-20 DIAGNOSIS — Z98.890 OTHER SPECIFIED POSTPROCEDURAL STATES: Chronic | ICD-10-CM

## 2021-11-20 DIAGNOSIS — Z11.52 ENCOUNTER FOR SCREENING FOR COVID-19: ICD-10-CM

## 2021-11-20 DIAGNOSIS — D57.1 SICKLE-CELL DISEASE WITHOUT CRISIS: ICD-10-CM

## 2021-11-20 LAB
ALBUMIN SERPL ELPH-MCNC: 4.5 G/DL — SIGNIFICANT CHANGE UP (ref 3.3–5)
ALP SERPL-CCNC: 116 U/L — SIGNIFICANT CHANGE UP (ref 40–120)
ALT FLD-CCNC: 119 U/L — HIGH (ref 10–45)
ANION GAP SERPL CALC-SCNC: 12 MMOL/L — SIGNIFICANT CHANGE UP (ref 5–17)
AST SERPL-CCNC: 128 U/L — HIGH (ref 10–40)
BILIRUB SERPL-MCNC: 3.5 MG/DL — HIGH (ref 0.2–1.2)
BUN SERPL-MCNC: 8 MG/DL — SIGNIFICANT CHANGE UP (ref 7–23)
CALCIUM SERPL-MCNC: 9.5 MG/DL — SIGNIFICANT CHANGE UP (ref 8.4–10.5)
CHLORIDE SERPL-SCNC: 105 MMOL/L — SIGNIFICANT CHANGE UP (ref 96–108)
CO2 SERPL-SCNC: 21 MMOL/L — LOW (ref 22–31)
CREAT SERPL-MCNC: 0.68 MG/DL — SIGNIFICANT CHANGE UP (ref 0.5–1.3)
FERRITIN SERPL-MCNC: 402 NG/ML — HIGH (ref 15–150)
GLUCOSE SERPL-MCNC: 89 MG/DL — SIGNIFICANT CHANGE UP (ref 70–99)
HCT VFR BLD CALC: 24 % — LOW (ref 34.5–45)
HGB BLD-MCNC: 8.4 G/DL — LOW (ref 11.5–15.5)
MCHC RBC-ENTMCNC: 30.1 PG — SIGNIFICANT CHANGE UP (ref 27–34)
MCHC RBC-ENTMCNC: 35 GM/DL — SIGNIFICANT CHANGE UP (ref 32–36)
MCV RBC AUTO: 86 FL — SIGNIFICANT CHANGE UP (ref 80–100)
NRBC # BLD: 1 /100 WBCS — HIGH (ref 0–0)
PLATELET # BLD AUTO: 382 K/UL — SIGNIFICANT CHANGE UP (ref 150–400)
POTASSIUM SERPL-MCNC: 4 MMOL/L — SIGNIFICANT CHANGE UP (ref 3.5–5.3)
POTASSIUM SERPL-SCNC: 4 MMOL/L — SIGNIFICANT CHANGE UP (ref 3.5–5.3)
PROT SERPL-MCNC: 7.1 G/DL — SIGNIFICANT CHANGE UP (ref 6–8.3)
RBC # BLD: 2.79 M/UL — LOW (ref 3.8–5.2)
RBC # FLD: 19.1 % — HIGH (ref 10.3–14.5)
SARS-COV-2 RNA SPEC QL NAA+PROBE: SIGNIFICANT CHANGE UP
SODIUM SERPL-SCNC: 138 MMOL/L — SIGNIFICANT CHANGE UP (ref 135–145)
WBC # BLD: 9.87 K/UL — SIGNIFICANT CHANGE UP (ref 3.8–10.5)
WBC # FLD AUTO: 9.87 K/UL — SIGNIFICANT CHANGE UP (ref 3.8–10.5)

## 2021-11-20 PROCEDURE — U0005: CPT

## 2021-11-20 PROCEDURE — C9803: CPT

## 2021-11-20 PROCEDURE — U0003: CPT

## 2021-11-21 LAB
HGB A MFR BLD: 49.6 % — LOW (ref 95.8–98)
HGB A2 MFR BLD: 3.3 % — HIGH (ref 2–3.2)
HGB C MFR BLD: 0 % — SIGNIFICANT CHANGE UP
HGB F MFR BLD: 0.2 % — SIGNIFICANT CHANGE UP (ref 0–1)
HGB OTHER MFR BLD ELPH: 0 % — SIGNIFICANT CHANGE UP
HGB S MFR BLD: 46.9 % — HIGH

## 2021-11-22 NOTE — ED PROVIDER NOTE - CHIEF COMPLAINT
The patient is a 17y Female complaining of New onset of atrial fibrillation in the setting of Acute hypoxic hypercapnic respiratory failure. Pt has hx of COPD  Telemonitor  HR was 100. Control x now. May give Lopressor 5 IVP x HR > 125 sustain with strict parameters   [id number] VASC score; 4 points (age +2, HTN, female) Stroke risk was 4.8%  and 6.7% risk of stroke/TIA/systemic embolism.  Start full ac with Heparin if no contraindications. Monitor coag panel and daily CBC  Continue Metoprolol 25 mg BID with strict parameters  TFTs, A1C and lipid panel fasting to ro comorbidities   Echo in the AM to assess structural and functional status   Maintain K+>4 and mag >2 New onset of atrial fibrillation in the setting of Acute hypoxic hypercapnic respiratory failure. Pt has hx of COPD  Telemonitor  HR was 100. Control x now. May give Lopressor 5 IVP x HR > 125 sustain with strict parameters   [id number] VASC score; 4 points (age +2, HTN, female) Stroke risk was 4.8%  and 6.7% risk of stroke/TIA/systemic embolism.    Continue Metoprolol 25 mg BID with strict parameters  TFTs, A1C and lipid panel fasting to ro comorbidities   Echo in the AM to assess structural and functional status   Maintain K+>4 and mag >2

## 2021-11-23 ENCOUNTER — OUTPATIENT (OUTPATIENT)
Dept: OUTPATIENT SERVICES | Facility: HOSPITAL | Age: 21
LOS: 1 days | End: 2021-11-23
Payer: MEDICAID

## 2021-11-23 DIAGNOSIS — D57.1 SICKLE-CELL DISEASE WITHOUT CRISIS: ICD-10-CM

## 2021-11-23 DIAGNOSIS — Z98.890 OTHER SPECIFIED POSTPROCEDURAL STATES: Chronic | ICD-10-CM

## 2021-11-23 LAB
HCT VFR BLD CALC: 23.2 % — LOW (ref 34.5–45)
HGB BLD-MCNC: 8 G/DL — LOW (ref 11.5–15.5)
MCHC RBC-ENTMCNC: 29.7 PG — SIGNIFICANT CHANGE UP (ref 27–34)
MCHC RBC-ENTMCNC: 34.5 GM/DL — SIGNIFICANT CHANGE UP (ref 32–36)
MCV RBC AUTO: 86.2 FL — SIGNIFICANT CHANGE UP (ref 80–100)
NRBC # BLD: 2 /100 WBCS — HIGH (ref 0–0)
PLATELET # BLD AUTO: 336 K/UL — SIGNIFICANT CHANGE UP (ref 150–400)
RBC # BLD: 2.69 M/UL — LOW (ref 3.8–5.2)
RBC # FLD: 19.3 % — HIGH (ref 10.3–14.5)
WBC # BLD: 9.48 K/UL — SIGNIFICANT CHANGE UP (ref 3.8–10.5)
WBC # FLD AUTO: 9.48 K/UL — SIGNIFICANT CHANGE UP (ref 3.8–10.5)

## 2021-11-23 PROCEDURE — 96523 IRRIG DRUG DELIVERY DEVICE: CPT

## 2021-11-23 PROCEDURE — 82728 ASSAY OF FERRITIN: CPT

## 2021-11-23 PROCEDURE — 86901 BLOOD TYPING SEROLOGIC RH(D): CPT

## 2021-11-23 PROCEDURE — 86900 BLOOD TYPING SEROLOGIC ABO: CPT

## 2021-11-23 PROCEDURE — 36430 TRANSFUSION BLD/BLD COMPNT: CPT

## 2021-11-23 PROCEDURE — 80053 COMPREHEN METABOLIC PANEL: CPT

## 2021-11-23 PROCEDURE — 83020 HEMOGLOBIN ELECTROPHORESIS: CPT

## 2021-11-23 PROCEDURE — 85027 COMPLETE CBC AUTOMATED: CPT

## 2021-11-23 PROCEDURE — 86923 COMPATIBILITY TEST ELECTRIC: CPT

## 2021-11-23 PROCEDURE — P9040: CPT

## 2021-11-23 PROCEDURE — 86902 BLOOD TYPE ANTIGEN DONOR EA: CPT

## 2021-11-23 PROCEDURE — 86850 RBC ANTIBODY SCREEN: CPT

## 2021-11-29 DIAGNOSIS — F32.A DEPRESSION, UNSPECIFIED: ICD-10-CM

## 2021-11-29 DIAGNOSIS — E83.111 HEMOCHROMATOSIS DUE TO REPEATED RED BLOOD CELL TRANSFUSIONS: ICD-10-CM

## 2021-11-29 DIAGNOSIS — D57.1 SICKLE-CELL DISEASE WITHOUT CRISIS: ICD-10-CM

## 2021-11-29 DIAGNOSIS — Z92.89 PERSONAL HISTORY OF OTHER MEDICAL TREATMENT: ICD-10-CM

## 2021-12-01 PROCEDURE — G9005: CPT

## 2021-12-02 NOTE — ED PROVIDER NOTE - PMH
Anxiety    Chronic Lung Disease of Premat  follows with [facility]  CP (cerebral palsy)  - mild  CVA (Cerebral Vascular Accident)  Onset date unknown, noted on MRI from 5/2010  Depression    Hydrocephalus    Impacted teeth    Reactive Airway Disease  receives albuterol and xoponex treatments at home  S/P  Shunt  x2 with multiple shunt revisions  Sickle Cell Disease    Strabismus     Suturegard Body: The suture ends were repeatedly re-tightened and re-clamped to achieve the desired tissue expansion.

## 2021-12-07 NOTE — ED PEDIATRIC TRIAGE NOTE - TEMP(CELSIUS)
RE: Plan of Care    Dear SMITH Macario    Thank you for referring Chivo Steele. The following information reflects my assessment and plan of care.           Plan of Care 21   Effective from: 2021  Effective to: 2022    Plan ID: [id number]           Participants     Name Type Comments Contact Info    SMITH Ramirez Referring Provider  [phone]    Meir Portillo PT Physical Therapist             Chivo Steele MRN:[id number] (:1953 68 year old M)            Evaluation     No notes of the expected types and statuses found.        Updated Participants     Name Type Comments Contact Info    SMITH Ramirez Referring Provider  [phone]    Signature pending    Meir Portillo PT Physical Therapist      Electronically signed by Meir Portillo PT at 2021 1508 CST            Please complete the attached form to indicate your approval of the plan of care upon receipt and fax signed form to the fax number below.  Insurance compliance requires your approval be on file.  Should you have any questions, feel free to contact me.     Meir Portillo PT  [facility]  [address] [place] [address]  Phone: [phone]  Fax: [phone]                RE: Plan of Care for Chivo Steele, YOB: 1953     I certify the need for these services, furnished under this plan of treatment and while under my care.  I agree with the plan of care as stated and request that therapy proceed.        __________________________________________________________________________________  MD Signature         Date   Time
37.2

## 2021-12-08 ENCOUNTER — NON-APPOINTMENT (OUTPATIENT)
Age: 21
End: 2021-12-08

## 2021-12-14 ENCOUNTER — NON-APPOINTMENT (OUTPATIENT)
Age: 21
End: 2021-12-14

## 2021-12-15 NOTE — HISTORY OF PRESENT ILLNESS
[FreeTextEntry1] : 22 yo female with HGB SS, no complaints. [de-identified] : 20 yo female with HGB SS on monthly single unit transfusion. The patient  missed her September transfusion, and landed in the hospital in early October. \par Has been taking her HU, 500 mg QD as well as her Jadenu, ferritin 1100s\par No complaints today.\par No longer working, looking for hours with her old job/new job\par \par PE: gen- cheerful female in nad\par vss\par anicteric\par oropharynx- deferred\par lungs- cta\par cor: rrr-m\par abd- benign\par ext: -c/c/e, no ulcers\par \par A/P- 20 yo female with HGB SS, s/p shunt placement at birth\par 1.SCD- continue monthly transfusion, 1-2 units/month to maintain HGB >8\par 2.:Bipolar disorder- Dr. Gomez at [facility]-continue routine f/u\par 3. iron overload- continue jadenu- ferritin stable\par 4. flu shot done at hospital\par 5. COVID vax already done (pfizer X 2)\par 6.F/U 3 months\par 7. re-refer ophtho, routine echo\par \par Eleonora Richardson MD\par

## 2021-12-17 ENCOUNTER — OUTPATIENT (OUTPATIENT)
Dept: OUTPATIENT SERVICES | Facility: HOSPITAL | Age: 21
LOS: 1 days | End: 2021-12-17
Payer: MEDICAID

## 2021-12-17 DIAGNOSIS — D57.1 SICKLE-CELL DISEASE WITHOUT CRISIS: ICD-10-CM

## 2021-12-17 DIAGNOSIS — Z98.890 OTHER SPECIFIED POSTPROCEDURAL STATES: Chronic | ICD-10-CM

## 2021-12-17 DIAGNOSIS — Z11.52 ENCOUNTER FOR SCREENING FOR COVID-19: ICD-10-CM

## 2021-12-17 LAB
ALBUMIN SERPL ELPH-MCNC: 4.2 G/DL — SIGNIFICANT CHANGE UP (ref 3.3–5)
ALP SERPL-CCNC: 80 U/L — SIGNIFICANT CHANGE UP (ref 40–120)
ALT FLD-CCNC: 27 U/L — SIGNIFICANT CHANGE UP (ref 10–45)
ANION GAP SERPL CALC-SCNC: 10 MMOL/L — SIGNIFICANT CHANGE UP (ref 5–17)
AST SERPL-CCNC: 32 U/L — SIGNIFICANT CHANGE UP (ref 10–40)
BILIRUB SERPL-MCNC: 3.3 MG/DL — HIGH (ref 0.2–1.2)
BUN SERPL-MCNC: 8 MG/DL — SIGNIFICANT CHANGE UP (ref 7–23)
CALCIUM SERPL-MCNC: 8.8 MG/DL — SIGNIFICANT CHANGE UP (ref 8.4–10.5)
CHLORIDE SERPL-SCNC: 110 MMOL/L — HIGH (ref 96–108)
CO2 SERPL-SCNC: 21 MMOL/L — LOW (ref 22–31)
CREAT SERPL-MCNC: 0.63 MG/DL — SIGNIFICANT CHANGE UP (ref 0.5–1.3)
FERRITIN SERPL-MCNC: 394 NG/ML — HIGH (ref 15–150)
GLUCOSE SERPL-MCNC: 94 MG/DL — SIGNIFICANT CHANGE UP (ref 70–99)
HCT VFR BLD CALC: 23.7 % — LOW (ref 34.5–45)
HGB BLD-MCNC: 8.2 G/DL — LOW (ref 11.5–15.5)
MCHC RBC-ENTMCNC: 30.3 PG — SIGNIFICANT CHANGE UP (ref 27–34)
MCHC RBC-ENTMCNC: 34.6 GM/DL — SIGNIFICANT CHANGE UP (ref 32–36)
MCV RBC AUTO: 87.5 FL — SIGNIFICANT CHANGE UP (ref 80–100)
NRBC # BLD: 0 /100 WBCS — SIGNIFICANT CHANGE UP (ref 0–0)
PLATELET # BLD AUTO: 377 K/UL — SIGNIFICANT CHANGE UP (ref 150–400)
POTASSIUM SERPL-MCNC: 3.8 MMOL/L — SIGNIFICANT CHANGE UP (ref 3.5–5.3)
POTASSIUM SERPL-SCNC: 3.8 MMOL/L — SIGNIFICANT CHANGE UP (ref 3.5–5.3)
PROT SERPL-MCNC: 6.9 G/DL — SIGNIFICANT CHANGE UP (ref 6–8.3)
RBC # BLD: 2.71 M/UL — LOW (ref 3.8–5.2)
RBC # FLD: 19.9 % — HIGH (ref 10.3–14.5)
SARS-COV-2 RNA SPEC QL NAA+PROBE: SIGNIFICANT CHANGE UP
SODIUM SERPL-SCNC: 141 MMOL/L — SIGNIFICANT CHANGE UP (ref 135–145)
WBC # BLD: 9.96 K/UL — SIGNIFICANT CHANGE UP (ref 3.8–10.5)
WBC # FLD AUTO: 9.96 K/UL — SIGNIFICANT CHANGE UP (ref 3.8–10.5)

## 2021-12-17 PROCEDURE — C9803: CPT

## 2021-12-17 PROCEDURE — U0005: CPT

## 2021-12-17 PROCEDURE — U0003: CPT

## 2021-12-19 LAB
HGB A MFR BLD: 54 % — LOW (ref 95.8–98)
HGB A2 MFR BLD: 3.2 % — SIGNIFICANT CHANGE UP (ref 2–3.2)
HGB C MFR BLD: 0 % — SIGNIFICANT CHANGE UP
HGB F MFR BLD: 0 % — SIGNIFICANT CHANGE UP (ref 0–1)
HGB OTHER MFR BLD ELPH: 0 % — SIGNIFICANT CHANGE UP
HGB S MFR BLD: 42.8 % — HIGH

## 2021-12-20 ENCOUNTER — OUTPATIENT (OUTPATIENT)
Dept: OUTPATIENT SERVICES | Facility: HOSPITAL | Age: 21
LOS: 1 days | End: 2021-12-20
Payer: MEDICAID

## 2021-12-20 DIAGNOSIS — Z98.890 OTHER SPECIFIED POSTPROCEDURAL STATES: Chronic | ICD-10-CM

## 2021-12-20 DIAGNOSIS — D57.1 SICKLE-CELL DISEASE WITHOUT CRISIS: ICD-10-CM

## 2021-12-20 LAB
HCT VFR BLD CALC: 23.1 % — LOW (ref 34.5–45)
HGB BLD-MCNC: 8.2 G/DL — LOW (ref 11.5–15.5)
MCHC RBC-ENTMCNC: 30.5 PG — SIGNIFICANT CHANGE UP (ref 27–34)
MCHC RBC-ENTMCNC: 35.5 GM/DL — SIGNIFICANT CHANGE UP (ref 32–36)
MCV RBC AUTO: 85.9 FL — SIGNIFICANT CHANGE UP (ref 80–100)
NRBC # BLD: 0 /100 WBCS — SIGNIFICANT CHANGE UP (ref 0–0)
PLATELET # BLD AUTO: 341 K/UL — SIGNIFICANT CHANGE UP (ref 150–400)
RBC # BLD: 2.69 M/UL — LOW (ref 3.8–5.2)
RBC # FLD: 20.3 % — HIGH (ref 10.3–14.5)
WBC # BLD: 8.67 K/UL — SIGNIFICANT CHANGE UP (ref 3.8–10.5)
WBC # FLD AUTO: 8.67 K/UL — SIGNIFICANT CHANGE UP (ref 3.8–10.5)

## 2021-12-20 PROCEDURE — 82728 ASSAY OF FERRITIN: CPT

## 2021-12-20 PROCEDURE — 86902 BLOOD TYPE ANTIGEN DONOR EA: CPT

## 2021-12-20 PROCEDURE — 96523 IRRIG DRUG DELIVERY DEVICE: CPT

## 2021-12-20 PROCEDURE — 86901 BLOOD TYPING SEROLOGIC RH(D): CPT

## 2021-12-20 PROCEDURE — 85027 COMPLETE CBC AUTOMATED: CPT

## 2021-12-20 PROCEDURE — 86850 RBC ANTIBODY SCREEN: CPT

## 2021-12-20 PROCEDURE — 86923 COMPATIBILITY TEST ELECTRIC: CPT

## 2021-12-20 PROCEDURE — 36430 TRANSFUSION BLD/BLD COMPNT: CPT

## 2021-12-20 PROCEDURE — 80053 COMPREHEN METABOLIC PANEL: CPT

## 2021-12-20 PROCEDURE — 83020 HEMOGLOBIN ELECTROPHORESIS: CPT

## 2021-12-20 PROCEDURE — 86900 BLOOD TYPING SEROLOGIC ABO: CPT

## 2021-12-20 PROCEDURE — P9040: CPT

## 2021-12-27 ENCOUNTER — NON-APPOINTMENT (OUTPATIENT)
Age: 21
End: 2021-12-27

## 2021-12-31 ENCOUNTER — OUTPATIENT (OUTPATIENT)
Dept: OUTPATIENT SERVICES | Facility: HOSPITAL | Age: 21
LOS: 1 days | End: 2021-12-31
Payer: MEDICAID

## 2021-12-31 DIAGNOSIS — Z20.828 CONTACT WITH AND (SUSPECTED) EXPOSURE TO OTHER VIRAL COMMUNICABLE DISEASES: ICD-10-CM

## 2021-12-31 DIAGNOSIS — Z98.890 OTHER SPECIFIED POSTPROCEDURAL STATES: Chronic | ICD-10-CM

## 2022-01-06 NOTE — ED PEDIATRIC NURSE NOTE - NS ED NURSE DC PT EDUCATION RESOURCES
Called and spoke to Jose Scruggs to let her known that we are still waiting for the result of her covid 19 test   Yes

## 2022-01-14 ENCOUNTER — NON-APPOINTMENT (OUTPATIENT)
Age: 22
End: 2022-01-14

## 2022-01-14 PROCEDURE — 99214 OFFICE O/P EST MOD 30 MIN: CPT | Mod: 95

## 2022-01-18 ENCOUNTER — OUTPATIENT (OUTPATIENT)
Dept: OUTPATIENT SERVICES | Facility: HOSPITAL | Age: 22
LOS: 1 days | End: 2022-01-18
Payer: MEDICAID

## 2022-01-18 DIAGNOSIS — D57.1 SICKLE-CELL DISEASE WITHOUT CRISIS: ICD-10-CM

## 2022-01-18 DIAGNOSIS — Z98.890 OTHER SPECIFIED POSTPROCEDURAL STATES: Chronic | ICD-10-CM

## 2022-01-18 DIAGNOSIS — Z11.52 ENCOUNTER FOR SCREENING FOR COVID-19: ICD-10-CM

## 2022-01-18 LAB
ALBUMIN SERPL ELPH-MCNC: 4.7 G/DL — SIGNIFICANT CHANGE UP (ref 3.3–5)
ALP SERPL-CCNC: 89 U/L — SIGNIFICANT CHANGE UP (ref 40–120)
ALT FLD-CCNC: 23 U/L — SIGNIFICANT CHANGE UP (ref 10–45)
ANION GAP SERPL CALC-SCNC: 11 MMOL/L — SIGNIFICANT CHANGE UP (ref 5–17)
AST SERPL-CCNC: 40 U/L — SIGNIFICANT CHANGE UP (ref 10–40)
BILIRUB SERPL-MCNC: 5.1 MG/DL — HIGH (ref 0.2–1.2)
BUN SERPL-MCNC: 8 MG/DL — SIGNIFICANT CHANGE UP (ref 7–23)
CALCIUM SERPL-MCNC: 9.7 MG/DL — SIGNIFICANT CHANGE UP (ref 8.4–10.5)
CHLORIDE SERPL-SCNC: 105 MMOL/L — SIGNIFICANT CHANGE UP (ref 96–108)
CO2 SERPL-SCNC: 22 MMOL/L — SIGNIFICANT CHANGE UP (ref 22–31)
CREAT SERPL-MCNC: 0.69 MG/DL — SIGNIFICANT CHANGE UP (ref 0.5–1.3)
FERRITIN SERPL-MCNC: 190 NG/ML — HIGH (ref 15–150)
GLUCOSE SERPL-MCNC: 93 MG/DL — SIGNIFICANT CHANGE UP (ref 70–99)
HCT VFR BLD CALC: 22.5 % — LOW (ref 34.5–45)
HGB BLD-MCNC: 8 G/DL — LOW (ref 11.5–15.5)
MCHC RBC-ENTMCNC: 30.8 PG — SIGNIFICANT CHANGE UP (ref 27–34)
MCHC RBC-ENTMCNC: 35.6 GM/DL — SIGNIFICANT CHANGE UP (ref 32–36)
MCV RBC AUTO: 86.5 FL — SIGNIFICANT CHANGE UP (ref 80–100)
NRBC # BLD: 2 /100 WBCS — HIGH (ref 0–0)
PLATELET # BLD AUTO: 331 K/UL — SIGNIFICANT CHANGE UP (ref 150–400)
POTASSIUM SERPL-MCNC: 3.8 MMOL/L — SIGNIFICANT CHANGE UP (ref 3.5–5.3)
POTASSIUM SERPL-SCNC: 3.8 MMOL/L — SIGNIFICANT CHANGE UP (ref 3.5–5.3)
PROT SERPL-MCNC: 7 G/DL — SIGNIFICANT CHANGE UP (ref 6–8.3)
RBC # BLD: 2.6 M/UL — LOW (ref 3.8–5.2)
RBC # FLD: 20 % — HIGH (ref 10.3–14.5)
SARS-COV-2 RNA SPEC QL NAA+PROBE: SIGNIFICANT CHANGE UP
SODIUM SERPL-SCNC: 138 MMOL/L — SIGNIFICANT CHANGE UP (ref 135–145)
WBC # BLD: 8.16 K/UL — SIGNIFICANT CHANGE UP (ref 3.8–10.5)
WBC # FLD AUTO: 8.16 K/UL — SIGNIFICANT CHANGE UP (ref 3.8–10.5)

## 2022-01-18 PROCEDURE — C9803: CPT

## 2022-01-18 PROCEDURE — U0005: CPT

## 2022-01-18 PROCEDURE — U0003: CPT

## 2022-01-19 ENCOUNTER — OUTPATIENT (OUTPATIENT)
Dept: OUTPATIENT SERVICES | Facility: HOSPITAL | Age: 22
LOS: 1 days | End: 2022-01-19
Payer: MEDICAID

## 2022-01-19 DIAGNOSIS — Z98.890 OTHER SPECIFIED POSTPROCEDURAL STATES: Chronic | ICD-10-CM

## 2022-01-19 DIAGNOSIS — D57.1 SICKLE-CELL DISEASE WITHOUT CRISIS: ICD-10-CM

## 2022-01-19 LAB
HCT VFR BLD CALC: 22.4 % — LOW (ref 34.5–45)
HGB A MFR BLD: 51.9 % — LOW (ref 95.8–98)
HGB A2 MFR BLD: 3.1 % — SIGNIFICANT CHANGE UP (ref 2–3.2)
HGB BLD-MCNC: 7.8 G/DL — LOW (ref 11.5–15.5)
HGB C MFR BLD: 0 % — SIGNIFICANT CHANGE UP
HGB F MFR BLD: 0 % — SIGNIFICANT CHANGE UP (ref 0–1)
HGB OTHER MFR BLD ELPH: 0 % — SIGNIFICANT CHANGE UP
HGB S MFR BLD: 45 % — HIGH
MCHC RBC-ENTMCNC: 30.5 PG — SIGNIFICANT CHANGE UP (ref 27–34)
MCHC RBC-ENTMCNC: 34.8 GM/DL — SIGNIFICANT CHANGE UP (ref 32–36)
MCV RBC AUTO: 87.5 FL — SIGNIFICANT CHANGE UP (ref 80–100)
NRBC # BLD: 3 /100 WBCS — HIGH (ref 0–0)
PLATELET # BLD AUTO: 334 K/UL — SIGNIFICANT CHANGE UP (ref 150–400)
RBC # BLD: 2.56 M/UL — LOW (ref 3.8–5.2)
RBC # FLD: 20.1 % — HIGH (ref 10.3–14.5)
WBC # BLD: 7.74 K/UL — SIGNIFICANT CHANGE UP (ref 3.8–10.5)
WBC # FLD AUTO: 7.74 K/UL — SIGNIFICANT CHANGE UP (ref 3.8–10.5)

## 2022-01-19 PROCEDURE — 86901 BLOOD TYPING SEROLOGIC RH(D): CPT

## 2022-01-19 PROCEDURE — 96523 IRRIG DRUG DELIVERY DEVICE: CPT

## 2022-01-19 PROCEDURE — 85027 COMPLETE CBC AUTOMATED: CPT

## 2022-01-19 PROCEDURE — 82728 ASSAY OF FERRITIN: CPT

## 2022-01-19 PROCEDURE — 86923 COMPATIBILITY TEST ELECTRIC: CPT

## 2022-01-19 PROCEDURE — 86902 BLOOD TYPE ANTIGEN DONOR EA: CPT

## 2022-01-19 PROCEDURE — 36430 TRANSFUSION BLD/BLD COMPNT: CPT

## 2022-01-19 PROCEDURE — 86900 BLOOD TYPING SEROLOGIC ABO: CPT

## 2022-01-19 PROCEDURE — 80053 COMPREHEN METABOLIC PANEL: CPT

## 2022-01-19 PROCEDURE — P9040: CPT

## 2022-01-19 PROCEDURE — 83020 HEMOGLOBIN ELECTROPHORESIS: CPT

## 2022-01-19 PROCEDURE — 86850 RBC ANTIBODY SCREEN: CPT

## 2022-02-18 ENCOUNTER — APPOINTMENT (OUTPATIENT)
Dept: INTERNAL MEDICINE | Facility: CLINIC | Age: 22
End: 2022-02-18

## 2022-02-19 ENCOUNTER — OUTPATIENT (OUTPATIENT)
Dept: OUTPATIENT SERVICES | Facility: HOSPITAL | Age: 22
LOS: 1 days | End: 2022-02-19
Payer: MEDICAID

## 2022-02-19 DIAGNOSIS — D57.1 SICKLE-CELL DISEASE WITHOUT CRISIS: ICD-10-CM

## 2022-02-19 DIAGNOSIS — Z11.52 ENCOUNTER FOR SCREENING FOR COVID-19: ICD-10-CM

## 2022-02-19 DIAGNOSIS — Z98.890 OTHER SPECIFIED POSTPROCEDURAL STATES: Chronic | ICD-10-CM

## 2022-02-19 LAB
ALBUMIN SERPL ELPH-MCNC: 4.6 G/DL — SIGNIFICANT CHANGE UP (ref 3.3–5)
ALP SERPL-CCNC: 72 U/L — SIGNIFICANT CHANGE UP (ref 40–120)
ALT FLD-CCNC: 14 U/L — SIGNIFICANT CHANGE UP (ref 10–45)
ANION GAP SERPL CALC-SCNC: 10 MMOL/L — SIGNIFICANT CHANGE UP (ref 5–17)
AST SERPL-CCNC: 45 U/L — HIGH (ref 10–40)
BILIRUB SERPL-MCNC: 6.1 MG/DL — HIGH (ref 0.2–1.2)
BUN SERPL-MCNC: 7 MG/DL — SIGNIFICANT CHANGE UP (ref 7–23)
CALCIUM SERPL-MCNC: 9.7 MG/DL — SIGNIFICANT CHANGE UP (ref 8.4–10.5)
CHLORIDE SERPL-SCNC: 107 MMOL/L — SIGNIFICANT CHANGE UP (ref 96–108)
CO2 SERPL-SCNC: 22 MMOL/L — SIGNIFICANT CHANGE UP (ref 22–31)
CREAT SERPL-MCNC: 0.58 MG/DL — SIGNIFICANT CHANGE UP (ref 0.5–1.3)
FERRITIN SERPL-MCNC: 157 NG/ML — HIGH (ref 15–150)
GLUCOSE SERPL-MCNC: 90 MG/DL — SIGNIFICANT CHANGE UP (ref 70–99)
HCT VFR BLD CALC: 20.6 % — CRITICAL LOW (ref 34.5–45)
HGB BLD-MCNC: 7.2 G/DL — LOW (ref 11.5–15.5)
MCHC RBC-ENTMCNC: 31 PG — SIGNIFICANT CHANGE UP (ref 27–34)
MCHC RBC-ENTMCNC: 35 GM/DL — SIGNIFICANT CHANGE UP (ref 32–36)
MCV RBC AUTO: 88.8 FL — SIGNIFICANT CHANGE UP (ref 80–100)
NRBC # BLD: 3 /100 WBCS — HIGH (ref 0–0)
PLATELET # BLD AUTO: 384 K/UL — SIGNIFICANT CHANGE UP (ref 150–400)
POTASSIUM SERPL-MCNC: 4 MMOL/L — SIGNIFICANT CHANGE UP (ref 3.5–5.3)
POTASSIUM SERPL-SCNC: 4 MMOL/L — SIGNIFICANT CHANGE UP (ref 3.5–5.3)
PROT SERPL-MCNC: 6.8 G/DL — SIGNIFICANT CHANGE UP (ref 6–8.3)
RBC # BLD: 2.32 M/UL — LOW (ref 3.8–5.2)
RBC # FLD: 22 % — HIGH (ref 10.3–14.5)
SARS-COV-2 RNA SPEC QL NAA+PROBE: SIGNIFICANT CHANGE UP
SODIUM SERPL-SCNC: 139 MMOL/L — SIGNIFICANT CHANGE UP (ref 135–145)
WBC # BLD: 9.21 K/UL — SIGNIFICANT CHANGE UP (ref 3.8–10.5)
WBC # FLD AUTO: 9.21 K/UL — SIGNIFICANT CHANGE UP (ref 3.8–10.5)

## 2022-02-19 PROCEDURE — 82728 ASSAY OF FERRITIN: CPT

## 2022-02-19 PROCEDURE — 85027 COMPLETE CBC AUTOMATED: CPT

## 2022-02-19 PROCEDURE — 83020 HEMOGLOBIN ELECTROPHORESIS: CPT

## 2022-02-19 PROCEDURE — 80053 COMPREHEN METABOLIC PANEL: CPT

## 2022-02-20 LAB
HGB A MFR BLD: 38.5 % — LOW (ref 95.8–98)
HGB A2 MFR BLD: 3.2 % — SIGNIFICANT CHANGE UP (ref 2–3.2)
HGB C MFR BLD: 0 % — SIGNIFICANT CHANGE UP
HGB F MFR BLD: 0.8 % — SIGNIFICANT CHANGE UP (ref 0–1)
HGB OTHER MFR BLD ELPH: 0 % — SIGNIFICANT CHANGE UP
HGB S MFR BLD: 57.5 % — HIGH

## 2022-02-22 ENCOUNTER — OUTPATIENT (OUTPATIENT)
Dept: OUTPATIENT SERVICES | Facility: HOSPITAL | Age: 22
LOS: 1 days | End: 2022-02-22
Payer: MEDICAID

## 2022-02-22 DIAGNOSIS — Z98.890 OTHER SPECIFIED POSTPROCEDURAL STATES: Chronic | ICD-10-CM

## 2022-02-22 DIAGNOSIS — D57.1 SICKLE-CELL DISEASE WITHOUT CRISIS: ICD-10-CM

## 2022-02-22 LAB
HCT VFR BLD CALC: 19.8 % — CRITICAL LOW (ref 34.5–45)
HGB BLD-MCNC: 7.1 G/DL — LOW (ref 11.5–15.5)
MCHC RBC-ENTMCNC: 32.3 PG — SIGNIFICANT CHANGE UP (ref 27–34)
MCHC RBC-ENTMCNC: 35.9 GM/DL — SIGNIFICANT CHANGE UP (ref 32–36)
MCV RBC AUTO: 90 FL — SIGNIFICANT CHANGE UP (ref 80–100)
NRBC # BLD: 4 /100 WBCS — HIGH (ref 0–0)
PLATELET # BLD AUTO: 338 K/UL — SIGNIFICANT CHANGE UP (ref 150–400)
RBC # BLD: 2.2 M/UL — LOW (ref 3.8–5.2)
RBC # FLD: 22.5 % — HIGH (ref 10.3–14.5)
WBC # BLD: 9.5 K/UL — SIGNIFICANT CHANGE UP (ref 3.8–10.5)
WBC # FLD AUTO: 9.5 K/UL — SIGNIFICANT CHANGE UP (ref 3.8–10.5)

## 2022-02-22 PROCEDURE — U0003: CPT

## 2022-02-22 PROCEDURE — 86901 BLOOD TYPING SEROLOGIC RH(D): CPT

## 2022-02-22 PROCEDURE — 85027 COMPLETE CBC AUTOMATED: CPT

## 2022-02-22 PROCEDURE — U0005: CPT

## 2022-02-22 PROCEDURE — 86850 RBC ANTIBODY SCREEN: CPT

## 2022-02-22 PROCEDURE — P9040: CPT

## 2022-02-22 PROCEDURE — 96523 IRRIG DRUG DELIVERY DEVICE: CPT

## 2022-02-22 PROCEDURE — C9803: CPT

## 2022-02-22 PROCEDURE — 86900 BLOOD TYPING SEROLOGIC ABO: CPT

## 2022-02-22 PROCEDURE — 86902 BLOOD TYPE ANTIGEN DONOR EA: CPT

## 2022-02-22 PROCEDURE — 36430 TRANSFUSION BLD/BLD COMPNT: CPT

## 2022-02-22 PROCEDURE — 86923 COMPATIBILITY TEST ELECTRIC: CPT

## 2022-02-24 ENCOUNTER — NON-APPOINTMENT (OUTPATIENT)
Age: 22
End: 2022-02-24

## 2022-02-28 ENCOUNTER — APPOINTMENT (OUTPATIENT)
Dept: INTERNAL MEDICINE | Facility: CLINIC | Age: 22
End: 2022-02-28

## 2022-02-28 RX ORDER — MEDROXYPROGESTERONE ACETATE 150 MG/ML
150 INJECTION, SUSPENSION INTRAMUSCULAR
Qty: 1 | Refills: 0 | Status: ACTIVE | COMMUNITY
Start: 2022-02-10

## 2022-03-09 ENCOUNTER — NON-APPOINTMENT (OUTPATIENT)
Age: 22
End: 2022-03-09

## 2022-03-14 ENCOUNTER — APPOINTMENT (OUTPATIENT)
Dept: INTERNAL MEDICINE | Facility: CLINIC | Age: 22
End: 2022-03-14

## 2022-03-14 ENCOUNTER — INPATIENT (INPATIENT)
Facility: HOSPITAL | Age: 22
LOS: 0 days | Discharge: HOME | DRG: 176 | End: 2022-03-15
Attending: STUDENT IN AN ORGANIZED HEALTH CARE EDUCATION/TRAINING PROGRAM | Admitting: HOSPITALIST
Payer: MEDICAID

## 2022-03-14 VITALS
TEMPERATURE: 98 F | SYSTOLIC BLOOD PRESSURE: 111 MMHG | DIASTOLIC BLOOD PRESSURE: 65 MMHG | OXYGEN SATURATION: 98 % | HEIGHT: 62 IN | WEIGHT: 119.93 LBS | RESPIRATION RATE: 16 BRPM | HEART RATE: 85 BPM

## 2022-03-14 DIAGNOSIS — Z79.899 OTHER LONG TERM (CURRENT) DRUG THERAPY: ICD-10-CM

## 2022-03-14 DIAGNOSIS — I26.99 OTHER PULMONARY EMBOLISM WITHOUT ACUTE COR PULMONALE: ICD-10-CM

## 2022-03-14 DIAGNOSIS — Z98.890 OTHER SPECIFIED POSTPROCEDURAL STATES: Chronic | ICD-10-CM

## 2022-03-14 DIAGNOSIS — Z29.9 ENCOUNTER FOR PROPHYLACTIC MEASURES, UNSPECIFIED: ICD-10-CM

## 2022-03-14 DIAGNOSIS — R74.01 ELEVATION OF LEVELS OF LIVER TRANSAMINASE LEVELS: ICD-10-CM

## 2022-03-14 DIAGNOSIS — F31.9 BIPOLAR DISORDER, UNSPECIFIED: ICD-10-CM

## 2022-03-14 DIAGNOSIS — D57.00 HB-SS DISEASE WITH CRISIS, UNSPECIFIED: ICD-10-CM

## 2022-03-14 LAB
ALBUMIN SERPL ELPH-MCNC: 4.7 G/DL — SIGNIFICANT CHANGE UP (ref 3.3–5)
ALP SERPL-CCNC: 87 U/L — SIGNIFICANT CHANGE UP (ref 40–120)
ALT FLD-CCNC: 46 U/L — HIGH (ref 10–45)
ANION GAP SERPL CALC-SCNC: 15 MMOL/L — SIGNIFICANT CHANGE UP (ref 5–17)
ANISOCYTOSIS BLD QL: SIGNIFICANT CHANGE UP
APPEARANCE UR: CLEAR — SIGNIFICANT CHANGE UP
APTT BLD: 22.9 SEC — LOW (ref 27.5–35.5)
APTT BLD: >200 SEC — CRITICAL HIGH (ref 27.5–35.5)
AST SERPL-CCNC: 103 U/L — HIGH (ref 10–40)
BACTERIA # UR AUTO: NEGATIVE — SIGNIFICANT CHANGE UP
BASOPHILS # BLD AUTO: 0 K/UL — SIGNIFICANT CHANGE UP (ref 0–0.2)
BASOPHILS NFR BLD AUTO: 0 % — SIGNIFICANT CHANGE UP (ref 0–2)
BILIRUB SERPL-MCNC: 7.4 MG/DL — HIGH (ref 0.2–1.2)
BILIRUB UR-MCNC: NEGATIVE — SIGNIFICANT CHANGE UP
BLD GP AB SCN SERPL QL: NEGATIVE — SIGNIFICANT CHANGE UP
BUN SERPL-MCNC: 10 MG/DL — SIGNIFICANT CHANGE UP (ref 7–23)
CALCIUM SERPL-MCNC: 9.6 MG/DL — SIGNIFICANT CHANGE UP (ref 8.4–10.5)
CHLORIDE SERPL-SCNC: 107 MMOL/L — SIGNIFICANT CHANGE UP (ref 96–108)
CO2 SERPL-SCNC: 18 MMOL/L — LOW (ref 22–31)
COLOR SPEC: YELLOW — SIGNIFICANT CHANGE UP
CREAT SERPL-MCNC: 0.74 MG/DL — SIGNIFICANT CHANGE UP (ref 0.5–1.3)
DACRYOCYTES BLD QL SMEAR: SLIGHT — SIGNIFICANT CHANGE UP
DIFF PNL FLD: ABNORMAL
EGFR: 117 ML/MIN/1.73M2 — SIGNIFICANT CHANGE UP
EOSINOPHIL # BLD AUTO: 0.06 K/UL — SIGNIFICANT CHANGE UP (ref 0–0.5)
EOSINOPHIL NFR BLD AUTO: 0.9 % — SIGNIFICANT CHANGE UP (ref 0–6)
EPI CELLS # UR: 3 /HPF — SIGNIFICANT CHANGE UP
GIANT PLATELETS BLD QL SMEAR: PRESENT — SIGNIFICANT CHANGE UP
GLUCOSE SERPL-MCNC: 78 MG/DL — SIGNIFICANT CHANGE UP (ref 70–99)
GLUCOSE UR QL: NEGATIVE — SIGNIFICANT CHANGE UP
HCG SERPL-ACNC: <2 MIU/ML — SIGNIFICANT CHANGE UP
HCT VFR BLD CALC: 22 % — LOW (ref 34.5–45)
HGB BLD-MCNC: 7.6 G/DL — LOW (ref 11.5–15.5)
HOWELL-JOLLY BOD BLD QL SMEAR: PRESENT — SIGNIFICANT CHANGE UP
HYALINE CASTS # UR AUTO: 2 /LPF — SIGNIFICANT CHANGE UP (ref 0–2)
INR BLD: 1.38 RATIO — HIGH (ref 0.88–1.16)
INR BLD: 1.39 RATIO — HIGH (ref 0.88–1.16)
KETONES UR-MCNC: NEGATIVE — SIGNIFICANT CHANGE UP
LEUKOCYTE ESTERASE UR-ACNC: ABNORMAL
LIDOCAIN IGE QN: 11 U/L — SIGNIFICANT CHANGE UP (ref 7–60)
LYMPHOCYTES # BLD AUTO: 0.85 K/UL — LOW (ref 1–3.3)
LYMPHOCYTES # BLD AUTO: 11.8 % — LOW (ref 13–44)
MACROCYTES BLD QL: SLIGHT — SIGNIFICANT CHANGE UP
MANUAL SMEAR VERIFICATION: SIGNIFICANT CHANGE UP
MCHC RBC-ENTMCNC: 30.8 PG — SIGNIFICANT CHANGE UP (ref 27–34)
MCHC RBC-ENTMCNC: 34.5 GM/DL — SIGNIFICANT CHANGE UP (ref 32–36)
MCV RBC AUTO: 89.1 FL — SIGNIFICANT CHANGE UP (ref 80–100)
MONOCYTES # BLD AUTO: 0.27 K/UL — SIGNIFICANT CHANGE UP (ref 0–0.9)
MONOCYTES NFR BLD AUTO: 3.7 % — SIGNIFICANT CHANGE UP (ref 2–14)
NEUTROPHILS # BLD AUTO: 5.81 K/UL — SIGNIFICANT CHANGE UP (ref 1.8–7.4)
NEUTROPHILS NFR BLD AUTO: 80 % — HIGH (ref 43–77)
NEUTS BAND # BLD: 0.9 % — SIGNIFICANT CHANGE UP (ref 0–8)
NITRITE UR-MCNC: NEGATIVE — SIGNIFICANT CHANGE UP
NRBC # BLD: 44 /100 — HIGH (ref 0–0)
PH UR: 6 — SIGNIFICANT CHANGE UP (ref 5–8)
PLAT MORPH BLD: NORMAL — SIGNIFICANT CHANGE UP
PLATELET # BLD AUTO: 273 K/UL — SIGNIFICANT CHANGE UP (ref 150–400)
POIKILOCYTOSIS BLD QL AUTO: SIGNIFICANT CHANGE UP
POLYCHROMASIA BLD QL SMEAR: SIGNIFICANT CHANGE UP
POTASSIUM SERPL-MCNC: 4.1 MMOL/L — SIGNIFICANT CHANGE UP (ref 3.5–5.3)
POTASSIUM SERPL-SCNC: 4.1 MMOL/L — SIGNIFICANT CHANGE UP (ref 3.5–5.3)
PROT SERPL-MCNC: 7.6 G/DL — SIGNIFICANT CHANGE UP (ref 6–8.3)
PROT UR-MCNC: NEGATIVE — SIGNIFICANT CHANGE UP
PROTHROM AB SERPL-ACNC: 15.9 SEC — HIGH (ref 10.5–13.4)
PROTHROM AB SERPL-ACNC: 16 SEC — HIGH (ref 10.5–13.4)
RBC # BLD: 2.47 M/UL — LOW (ref 3.8–5.2)
RBC # BLD: 2.47 M/UL — LOW (ref 3.8–5.2)
RBC # FLD: 21.5 % — HIGH (ref 10.3–14.5)
RBC BLD AUTO: ABNORMAL
RBC CASTS # UR COMP ASSIST: 7 /HPF — HIGH (ref 0–4)
RETICS #: 290.5 K/UL — HIGH (ref 25–125)
RETICS/RBC NFR: 11.8 % — HIGH (ref 0.5–2.5)
RH IG SCN BLD-IMP: POSITIVE — SIGNIFICANT CHANGE UP
SCHISTOCYTES BLD QL AUTO: SLIGHT — SIGNIFICANT CHANGE UP
SICKLE CELLS BLD QL SMEAR: SIGNIFICANT CHANGE UP
SODIUM SERPL-SCNC: 140 MMOL/L — SIGNIFICANT CHANGE UP (ref 135–145)
SP GR SPEC: 1.01 — LOW (ref 1.01–1.02)
TARGETS BLD QL SMEAR: SIGNIFICANT CHANGE UP
TROPONIN T, HIGH SENSITIVITY RESULT: <6 NG/L — SIGNIFICANT CHANGE UP (ref 0–51)
UROBILINOGEN FLD QL: ABNORMAL
VARIANT LYMPHS # BLD: 2.7 % — SIGNIFICANT CHANGE UP (ref 0–6)
WBC # BLD: 7.18 K/UL — SIGNIFICANT CHANGE UP (ref 3.8–10.5)
WBC # FLD AUTO: 7.18 K/UL — SIGNIFICANT CHANGE UP (ref 3.8–10.5)
WBC UR QL: 3 /HPF — SIGNIFICANT CHANGE UP (ref 0–5)

## 2022-03-14 PROCEDURE — 99223 1ST HOSP IP/OBS HIGH 75: CPT

## 2022-03-14 PROCEDURE — 99285 EMERGENCY DEPT VISIT HI MDM: CPT

## 2022-03-14 PROCEDURE — 71275 CT ANGIOGRAPHY CHEST: CPT | Mod: 26,MA

## 2022-03-14 PROCEDURE — 93010 ELECTROCARDIOGRAM REPORT: CPT

## 2022-03-14 PROCEDURE — 71046 X-RAY EXAM CHEST 2 VIEWS: CPT | Mod: 26

## 2022-03-14 RX ORDER — SODIUM CHLORIDE 9 MG/ML
1000 INJECTION INTRAMUSCULAR; INTRAVENOUS; SUBCUTANEOUS
Refills: 0 | Status: DISCONTINUED | OUTPATIENT
Start: 2022-03-14 | End: 2022-03-14

## 2022-03-14 RX ORDER — MORPHINE SULFATE 50 MG/1
2 CAPSULE, EXTENDED RELEASE ORAL EVERY 4 HOURS
Refills: 0 | Status: DISCONTINUED | OUTPATIENT
Start: 2022-03-14 | End: 2022-03-15

## 2022-03-14 RX ORDER — ENOXAPARIN SODIUM 100 MG/ML
60 INJECTION SUBCUTANEOUS EVERY 12 HOURS
Refills: 0 | Status: DISCONTINUED | OUTPATIENT
Start: 2022-03-15 | End: 2022-03-15

## 2022-03-14 RX ORDER — MORPHINE SULFATE 50 MG/1
4 CAPSULE, EXTENDED RELEASE ORAL ONCE
Refills: 0 | Status: DISCONTINUED | OUTPATIENT
Start: 2022-03-14 | End: 2022-03-14

## 2022-03-14 RX ORDER — HYDROXYUREA 500 MG/1
500 CAPSULE ORAL DAILY
Refills: 0 | Status: DISCONTINUED | OUTPATIENT
Start: 2022-03-14 | End: 2022-03-15

## 2022-03-14 RX ORDER — ENOXAPARIN SODIUM 100 MG/ML
60 INJECTION SUBCUTANEOUS ONCE
Refills: 0 | Status: COMPLETED | OUTPATIENT
Start: 2022-03-14 | End: 2022-03-14

## 2022-03-14 RX ORDER — SODIUM CHLORIDE 9 MG/ML
1000 INJECTION INTRAMUSCULAR; INTRAVENOUS; SUBCUTANEOUS
Refills: 0 | Status: DISCONTINUED | OUTPATIENT
Start: 2022-03-14 | End: 2022-03-15

## 2022-03-14 RX ORDER — FAMOTIDINE 10 MG/ML
20 INJECTION INTRAVENOUS ONCE
Refills: 0 | Status: COMPLETED | OUTPATIENT
Start: 2022-03-14 | End: 2022-03-14

## 2022-03-14 RX ORDER — SENNA PLUS 8.6 MG/1
2 TABLET ORAL AT BEDTIME
Refills: 0 | Status: DISCONTINUED | OUTPATIENT
Start: 2022-03-14 | End: 2022-03-15

## 2022-03-14 RX ORDER — ARIPIPRAZOLE 15 MG/1
5 TABLET ORAL DAILY
Refills: 0 | Status: DISCONTINUED | OUTPATIENT
Start: 2022-03-14 | End: 2022-03-15

## 2022-03-14 RX ORDER — DEFERASIROX 180 MG/1
2000 GRANULE ORAL DAILY
Refills: 0 | Status: COMPLETED | OUTPATIENT
Start: 2022-03-14 | End: 2022-03-15

## 2022-03-14 RX ORDER — SODIUM CHLORIDE 9 MG/ML
1000 INJECTION INTRAMUSCULAR; INTRAVENOUS; SUBCUTANEOUS ONCE
Refills: 0 | Status: COMPLETED | OUTPATIENT
Start: 2022-03-14 | End: 2022-03-14

## 2022-03-14 RX ORDER — SERTRALINE 25 MG/1
100 TABLET, FILM COATED ORAL DAILY
Refills: 0 | Status: DISCONTINUED | OUTPATIENT
Start: 2022-03-14 | End: 2022-03-15

## 2022-03-14 RX ORDER — ACETAMINOPHEN 500 MG
650 TABLET ORAL EVERY 6 HOURS
Refills: 0 | Status: DISCONTINUED | OUTPATIENT
Start: 2022-03-14 | End: 2022-03-15

## 2022-03-14 RX ADMIN — SODIUM CHLORIDE 1000 MILLILITER(S): 9 INJECTION INTRAMUSCULAR; INTRAVENOUS; SUBCUTANEOUS at 13:53

## 2022-03-14 RX ADMIN — SODIUM CHLORIDE 75 MILLILITER(S): 9 INJECTION INTRAMUSCULAR; INTRAVENOUS; SUBCUTANEOUS at 16:26

## 2022-03-14 RX ADMIN — MORPHINE SULFATE 4 MILLIGRAM(S): 50 CAPSULE, EXTENDED RELEASE ORAL at 16:26

## 2022-03-14 RX ADMIN — FAMOTIDINE 20 MILLIGRAM(S): 10 INJECTION INTRAVENOUS at 12:21

## 2022-03-14 RX ADMIN — SODIUM CHLORIDE 1000 MILLILITER(S): 9 INJECTION INTRAMUSCULAR; INTRAVENOUS; SUBCUTANEOUS at 12:22

## 2022-03-14 RX ADMIN — ENOXAPARIN SODIUM 60 MILLIGRAM(S): 100 INJECTION SUBCUTANEOUS at 21:40

## 2022-03-14 RX ADMIN — MORPHINE SULFATE 4 MILLIGRAM(S): 50 CAPSULE, EXTENDED RELEASE ORAL at 13:53

## 2022-03-14 RX ADMIN — MORPHINE SULFATE 4 MILLIGRAM(S): 50 CAPSULE, EXTENDED RELEASE ORAL at 17:02

## 2022-03-14 RX ADMIN — MORPHINE SULFATE 4 MILLIGRAM(S): 50 CAPSULE, EXTENDED RELEASE ORAL at 12:21

## 2022-03-14 NOTE — H&P ADULT - HISTORY OF PRESENT ILLNESS
22F with PMH of HbSS disease, chronic lung disease of prematurity, prior CVA, acute chest, bipolar disorder presents to the ED with one day of chest pain. Pain is sharp substernal pain radiating down L arm, rated a 9/10. Patient says a few days ago it had started with generalized abdominal pain and then migrated to the substernal chest. Pain is nonexertional, occasionally worse with movement. Patient says this is similar to her usual symptoms of crisis. Patient otherwise denies any fevers or chills, shortness of breath or palpitations. No nausea, vomiting, abdominal pain, diarrhea, or dysuria. Patient denies any melena or hematochezia.     In the ED, T is 98, HR 85, /65, RR 16 satting 98% on room air. Patient received pepcid 20IV X1, morphine 4mg IVX2, NS 1L X1 and then 75cc/hr, and 1 unit of pRBC for Hgb of 7.6.  22F with PMH of HbSS disease, chronic lung disease of prematurity, prior CVA, acute chest, bipolar disorder presents to the ED with one day of chest pain. Pain is sharp substernal pain radiating down L arm, rated a 9/10. Patient says a few days ago it had started with generalized abdominal pain and then migrated to the substernal chest. Pain is nonexertional, occasionally worse with movement. Patient says this is similar to her usual symptoms of crisis. Patient otherwise denies any fevers or chills, shortness of breath or palpitations. No nausea, vomiting, abdominal pain, diarrhea, or dysuria. Patient denies any melena or hematochezia. No recent travels or sick contacts.    In the ED, T is 98, HR 85, /65, RR 16 satting 98% on room air. Patient received pepcid 20IV X1, morphine 4mg IVX2, NS 1L X1 and then 75cc/hr, and 1 unit of pRBC for Hgb of 7.6.

## 2022-03-14 NOTE — H&P ADULT - PROBLEM SELECTOR PLAN 1
Patient presented with chest pain found to have elevated ddimer of 1844  - CTA shows acute subsegmental pulmonary emboli in the right lower lobe. Straightening interventricular septum, echo recommended for further evaluation of potential right heart strain  - f/u urgent TTE  - started on lovenox 60mg BID  - monitor VSS closely Patient presented with chest pain found to have elevated ddimer of 1844. Trop <6, BNP 78  - CTA shows acute subsegmental pulmonary emboli in the right lower lobe. Straightening interventricular septum, echo recommended for further evaluation of potential right heart strain  - f/u urgent TTE  - started on lovenox 60mg BID  - monitor VSS closely

## 2022-03-14 NOTE — ED PROVIDER NOTE - CLINICAL SUMMARY MEDICAL DECISION MAKING FREE TEXT BOX
23 yo female with complicated HbSS presenting with chest pain.  Vitals stable, no distress at this time.  Plan for labs and XR to assess for acs, acute chest.  May be 2/2 scc, will medicate and reassess.  Abd nontender.  Reassess after results.

## 2022-03-14 NOTE — H&P ADULT - NSHPLABSRESULTS_GEN_ALL_CORE
EKG personally reviewed. NSR, no ST/Twave abnormalities. QTc 378  Imaging personally reviewed. CXR with concern for   Labs personally reviewed. EKG personally reviewed. NSR, no ST/Twave abnormalities. QTc 378  Imaging personally reviewed. CXR with mild pulmonary vascular redistribution/congestion.  Labs personally reviewed.

## 2022-03-14 NOTE — H&P ADULT - PROBLEM SELECTOR PLAN 2
Patient presents with acute chest pain admitted for acute sickle cell. CTA concerning for PE, started on AC  - Hgb 7.6 on admission with 11.8 retic %, s/p 1 unit pRBC in the ED (pt receives simple transfusions monthly)  - will start NS at 50cc/hr (caution given concern for pulmonary congestion on CXR) although patient is satting well on room air  - f/u TTE to assess cardiac function and also check for R heart strain  - resume Jadenu 360mg 4tabs qd (nonformulary, will switch to Exjade for now and then switch back to home med)  - continue to trend CBC with diff, CMP, coags  - Continue with Hydroxyurea and Folic Acid  - Incentive spirometer  - pain control with IV morphine for now

## 2022-03-14 NOTE — H&P ADULT - PROBLEM SELECTOR PLAN 3
Patient developed mild transaminitis during previous admission in 10/21 however now LFTs are uptrending  - has a hx hyperbilirubinemia however Tbili is now 7.4 with uptrending LFTs   - would obtain RUQ US for better assessment  - continue to trend

## 2022-03-14 NOTE — ED PROVIDER NOTE - ATTENDING CONTRIBUTION TO CARE
RGUJRAL 23yo f hx listed BIB mother for chest and abd pain x 2 days. States pain is pressure like and radiates to the abdomen. Denies any cough, uri, f/c, sob. No f/c. Patient is transfused every 2 weeks to maintain Hgb 8-9. No n/v/d. No dysuria. Symptoms may be related to sickle cell crisis.   On exam, Patient is awake,alert,oriented x 3. Patient is well appearing and in no acute distress. Patient's chest is clear to ausculation, +s1s2. Abdomen is soft nd/nt +BS. Extremity with no swelling or calf tenderness.   Patient has 2  shunts, denies HA. Mediport site non tender.   Check labs, xray chest to eval.     Pt reassessed, states she feels better. Labs reviewed, spoke to dr. jaquez and mother and recommend transfusing 1 unit prbc and CDU.

## 2022-03-14 NOTE — ED PROVIDER NOTE - PHYSICAL EXAMINATION
General appearance: NAD, conversant, afebrile    Eyes: mildly icteric sclera, ZACARIAS, EOMI   HENT: Atraumatic; oropharynx clear, MMM and no ulcerations, no pharyngeal erythema or exudate   Neck: Trachea midline; Full range of motion, supple   Pulm: CTA bl, normal respiratory effort and no intercostal retractions, normal work of breathing   Chest: RRR, No murmurs, rubs, or gallops, no ttp   Abdomen: Soft, non-tender, non-distended; no guarding or rebound   Back: No cvat, no midline tenderness   Extremities: No peripheral edema    Skin: Dry, normal temperature, turgor and texture; no rash   Psych: Appropriate affect, cooperative; alert and oriented to person, place and time

## 2022-03-14 NOTE — ED ADULT NURSE NOTE - NSICDXPASTMEDICALHX_GEN_ALL_CORE_FT
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

## 2022-03-14 NOTE — H&P ADULT - RS GEN PE MLT RESP DETAILS PC
clear to auscultation bilaterally/no wheezes/diminished breath sounds, L/diminished breath sounds, R

## 2022-03-14 NOTE — ED PROVIDER NOTE - NSICDXPASTMEDICALHX_GEN_ALL_CORE_FT
PAST MEDICAL HISTORY:  Anxiety     Chronic Lung Disease of Premat follows with [facility] Pulmonology    CP (cerebral palsy) - mild    CVA (Cerebral Vascular Accident) Onset date unknown, noted on MRI from 5/2010    Depression     Hydrocephalus     Impacted teeth     Reactive Airway Disease receives albuterol and xoponex treatments at home    S/P  Shunt x2 with multiple shunt revisions    Sickle Cell Disease     Strabismus

## 2022-03-14 NOTE — ED PROVIDER NOTE - PROGRESS NOTE DETAILS
Eleuterio Jimenez is a 52 y.o. male is here today for     Chief Complaint: Lower back pain           The patientis allergic to latex; tylenol with codeine #3 [acetaminophen-codeine]; nickel; and oxybutynin chloride [oxybutynin chloride]. Past Medical History  Curry Kaur  has a past medical history of Chronic back pain; GERD (gastroesophageal reflux disease); History of kidney stones; Hx of blood clots; Kidney stone; and Lumbar spondylosis. Past Surgical History  The patient  has a past surgical history that includes Ureter stent placement (2006); Lithotripsy (2006); Cystocopy (6/14/2013); Cystocopy (07/29/2013); Cystoscopy (12/23/13); other surgical history (Bilateral, 03/26/2018); pr inj dx/ther agnt paravert facet joint, lumbar/sac, 2nd level (Bilateral, 3/26/2018); pr inj dx/ther agnt paravert facet joint, lumbar/sac, 2nd level (Bilateral, 5/21/2018); Colonoscopy; eye surgery (2007); hernia repair (2007); pr inject rx other periph nerve (Left, 9/28/2018); and pr inject rx other periph nerve (Right, 11/30/2018). Family History  This patient's family history includes Cancer in his mother; Heart Disease in his father. Social History  Curry Kaur  reports that he quit smoking about 13 years ago. He has a 12.00 pack-year smoking history. He has never used smokeless tobacco. He reports that he does not drink alcohol or use drugs. Medications    Current Medication      Current Outpatient Prescriptions:     [START ON 2/16/2019] HYDROcodone-acetaminophen (NORCO) 5-325 MG per tablet, Take 1 tablet by mouth 3 times daily as needed for Pain for up to 30 days. ., Disp: 90 tablet, Rfl: 0    allopurinol (ZYLOPRIM) 300 MG tablet, Take 1 tablet by mouth daily, Disp: 90 tablet, Rfl: 3    potassium citrate (UROCIT-K) 10 MEQ (1080 MG) extended release tablet, TAKE 1 TABLET BY MOUTH EVERY MORNING WITH BREAKFAST, Disp: 90 tablet, Rfl: 3    citalopram (CELEXA) 40 MG tablet, Take 40 mg by mouth daily. , Disp: , Rfl:   warfarin (COUMADIN) 2.5 MG tablet, Take 2.5 mg by mouth daily at 1800 Takes 5mg 3 days per week & 2.5mg 4 days per week, Disp: , Rfl:     busPIRone (BUSPAR) 10 MG tablet, Take 30 mg by mouth 2 times daily , Disp: , Rfl:     omeprazole (PRILOSEC) 20 MG capsule, Take 20 mg by mouth daily. , Disp: , Rfl:         Subjective:      Review of Systems   Musculoskeletal: Positive for arthralgias, back pain and myalgias. Neurological: Negative for weakness and numbness. Objective:      Vitals                                  Weight: 252 lb (114.3 kg)   Height: 5' 10\" (1.778 m)            Physical Exam   Constitutional: He is oriented to person, place, and time. He appears well-developed and well-nourished. No distress. HENT:   Head: Normocephalic and atraumatic. Right Ear: External ear normal.   Left Ear: External ear normal.   Nose: Nose normal.   Mouth/Throat: Oropharynx is clear and moist. No oropharyngeal exudate. Eyes: Pupils are equal, round, and reactive to light. Conjunctivae and EOM are normal. Right eye exhibits no discharge. Left eye exhibits no discharge. No scleral icterus. Neck: Normal range of motion and full passive range of motion without pain. Neck supple. No muscular tenderness present. No neck rigidity. No edema, no erythema and normal range of motion present. No thyromegaly present. Cardiovascular: Normal rate, regular rhythm, normal heart sounds and intact distal pulses. Exam reveals no gallop and no friction rub. No murmur heard. Pulmonary/Chest: Effort normal and breath sounds normal. No respiratory distress. He has no wheezes. He has no rales. He exhibits no tenderness. Abdominal: Soft. Bowel sounds are normal. He exhibits no distension. There is no tenderness. There is no rebound and no guarding. Musculoskeletal:        Right hip: He exhibits normal range of motion and no tenderness. Left hip: He exhibits normal range of motion and no tenderness. Right knee: He exhibits normal range of motion. No tenderness found. Left knee: He exhibits normal range of motion. No tenderness found. Right ankle: He exhibits normal range of motion and no swelling. Left ankle: He exhibits normal range of motion and no swelling. Cervical back: He exhibits normal range of motion and no tenderness. Lumbar back: He exhibits decreased range of motion, tenderness and pain. Back:         Right foot: There is normal range of motion and no tenderness. Left foot: There is normal range of motion and no tenderness. Neurological: He is alert and oriented to person, place, and time. He has normal strength and normal reflexes. He is not disoriented. He displays no atrophy. No cranial nerve deficit or sensory deficit. He exhibits normal muscle tone. He displays a negative Romberg sign. Coordination and gait normal.   slr NEG   Skin: Skin is warm. No rash noted. He is not diaphoretic. No erythema. No pallor. Psychiatric: His mood appears not anxious. His affect is not angry, not blunt, not labile and not inappropriate. His speech is not rapid and/or pressured, not delayed, not tangential and not slurred. He is not agitated, not aggressive, not hyperactive, not slowed, not withdrawn, not actively hallucinating and not combative. Thought content is not paranoid and not delusional. Cognition and memory are not impaired. He does not express impulsivity or inappropriate judgment. He does not exhibit a depressed mood. He expresses no homicidal and no suicidal ideation. He expresses no suicidal plans and no homicidal plans. He is communicative. He exhibits normal recent memory and normal remote memory. He is attentive. Nursing note and vitals reviewed. CORETTA test: negative   Yeomans test: negative   Gaenslen test: negative   Assessment:      1. Bulge of lumbar disc without myelopathy    2.  Spondylosis of lumbar region without myelopathy or radiculopathy    3. Spondylosis of thoracic region without myelopathy or radiculopathy    4. Protrusion of intervertebral disc of thoracic region    5. Snoring    6.  Chronic pain syndrome            Plan:  GALDINO @ L4-5       WALT Crouch - CNP  3/29/2019 Terrance Barone MD. pt has a PE. CP is improving. no SOB or hypoxic or tachycardia right now. CT showing possible RH strain. explained this to patient. updated dr jaquez regarding this. she states she does not have a preference for AC. spoke w/ hospitalist, agreed to do lovenox 1 mg/kg now. pt will require echo

## 2022-03-14 NOTE — H&P ADULT - NSICDXPASTMEDICALHX_GEN_ALL_CORE_FT
PAST MEDICAL HISTORY:  Anxiety     Chronic Lung Disease of Premat follows with [facility] Pulmonology    CP (cerebral palsy) - mild    CVA (Cerebral Vascular Accident) Onset date unknown, noted on MRI from 5/2010    Depression     Hydrocephalus     Impacted teeth     Reactive Airway Disease receives albuterol and xoponex treatments at home    S/P  Shunt x2 with multiple shunt revisions    Sickle Cell Disease     Strabismus

## 2022-03-14 NOTE — ED PROVIDER NOTE - OBJECTIVE STATEMENT
23yo female with pmh HbSS disease, chronic lung disease of prematurity, prior CVA (receives simple blood transfusions), acute chest, and bipolar disorder presenting with chest pain.  States a few days ago had generalized abdominal pain which has migrated to the substernal chest.  Non pleuritic, non exertional, but some pain worse with movement.  No sob, cough, fever, back pain, urinary symptoms.

## 2022-03-14 NOTE — ED ADULT NURSE NOTE - OBJECTIVE STATEMENT
Patient  is alert and oriented x3. Color is good and skin warm to touch. She is c/o chest and  abdominal pain. She denies  nausea or vomiting.

## 2022-03-14 NOTE — H&P ADULT - ASSESSMENT
22F with PMH of HbSS disease, chronic lung disease of prematurity, prior CVA, acute chest, bipolar disorder presents to the ED with chest pain 2/2 acute sickle cell crisis, also found to have a new PE.

## 2022-03-15 ENCOUNTER — TRANSCRIPTION ENCOUNTER (OUTPATIENT)
Age: 22
End: 2022-03-15

## 2022-03-15 VITALS
OXYGEN SATURATION: 96 % | HEART RATE: 67 BPM | DIASTOLIC BLOOD PRESSURE: 60 MMHG | TEMPERATURE: 98 F | RESPIRATION RATE: 18 BRPM | SYSTOLIC BLOOD PRESSURE: 99 MMHG

## 2022-03-15 DIAGNOSIS — D57.00 HB-SS DISEASE WITH CRISIS, UNSPECIFIED: ICD-10-CM

## 2022-03-15 DIAGNOSIS — D57.1 SICKLE-CELL DISEASE WITHOUT CRISIS: ICD-10-CM

## 2022-03-15 LAB
ALBUMIN SERPL ELPH-MCNC: 4.1 G/DL — SIGNIFICANT CHANGE UP (ref 3.3–5)
ALP SERPL-CCNC: 79 U/L — SIGNIFICANT CHANGE UP (ref 40–120)
ALT FLD-CCNC: 48 U/L — HIGH (ref 10–45)
ANION GAP SERPL CALC-SCNC: 12 MMOL/L — SIGNIFICANT CHANGE UP (ref 5–17)
APTT BLD: 45.3 SEC — HIGH (ref 27.5–35.5)
AST SERPL-CCNC: 85 U/L — HIGH (ref 10–40)
BILIRUB SERPL-MCNC: 6.2 MG/DL — HIGH (ref 0.2–1.2)
BUN SERPL-MCNC: 10 MG/DL — SIGNIFICANT CHANGE UP (ref 7–23)
CALCIUM SERPL-MCNC: 9.2 MG/DL — SIGNIFICANT CHANGE UP (ref 8.4–10.5)
CHLORIDE SERPL-SCNC: 108 MMOL/L — SIGNIFICANT CHANGE UP (ref 96–108)
CO2 SERPL-SCNC: 20 MMOL/L — LOW (ref 22–31)
CREAT SERPL-MCNC: 0.79 MG/DL — SIGNIFICANT CHANGE UP (ref 0.5–1.3)
EGFR: 108 ML/MIN/1.73M2 — SIGNIFICANT CHANGE UP
GLUCOSE SERPL-MCNC: 90 MG/DL — SIGNIFICANT CHANGE UP (ref 70–99)
HCT VFR BLD CALC: 23.2 % — LOW (ref 34.5–45)
HGB BLD-MCNC: 8.2 G/DL — LOW (ref 11.5–15.5)
INR BLD: 1.4 RATIO — HIGH (ref 0.88–1.16)
MAGNESIUM SERPL-MCNC: 1.8 MG/DL — SIGNIFICANT CHANGE UP (ref 1.6–2.6)
MCHC RBC-ENTMCNC: 30.4 PG — SIGNIFICANT CHANGE UP (ref 27–34)
MCHC RBC-ENTMCNC: 35.3 GM/DL — SIGNIFICANT CHANGE UP (ref 32–36)
MCV RBC AUTO: 85.9 FL — SIGNIFICANT CHANGE UP (ref 80–100)
NRBC # BLD: 44 /100 WBCS — HIGH (ref 0–0)
PHOSPHATE SERPL-MCNC: 3.8 MG/DL — SIGNIFICANT CHANGE UP (ref 2.5–4.5)
PLATELET # BLD AUTO: 249 K/UL — SIGNIFICANT CHANGE UP (ref 150–400)
POTASSIUM SERPL-MCNC: 4.1 MMOL/L — SIGNIFICANT CHANGE UP (ref 3.5–5.3)
POTASSIUM SERPL-SCNC: 4.1 MMOL/L — SIGNIFICANT CHANGE UP (ref 3.5–5.3)
PROT SERPL-MCNC: 6.1 G/DL — SIGNIFICANT CHANGE UP (ref 6–8.3)
PROTHROM AB SERPL-ACNC: 16.1 SEC — HIGH (ref 10.5–13.4)
RBC # BLD: 2.7 M/UL — LOW (ref 3.8–5.2)
RBC # FLD: 19.9 % — HIGH (ref 10.3–14.5)
SARS-COV-2 RNA SPEC QL NAA+PROBE: SIGNIFICANT CHANGE UP
SODIUM SERPL-SCNC: 140 MMOL/L — SIGNIFICANT CHANGE UP (ref 135–145)
WBC # BLD: 6.62 K/UL — SIGNIFICANT CHANGE UP (ref 3.8–10.5)
WBC # FLD AUTO: 6.62 K/UL — SIGNIFICANT CHANGE UP (ref 3.8–10.5)

## 2022-03-15 PROCEDURE — U0005: CPT

## 2022-03-15 PROCEDURE — 96376 TX/PRO/DX INJ SAME DRUG ADON: CPT

## 2022-03-15 PROCEDURE — 96374 THER/PROPH/DIAG INJ IV PUSH: CPT

## 2022-03-15 PROCEDURE — 83880 ASSAY OF NATRIURETIC PEPTIDE: CPT

## 2022-03-15 PROCEDURE — 81001 URINALYSIS AUTO W/SCOPE: CPT

## 2022-03-15 PROCEDURE — 83735 ASSAY OF MAGNESIUM: CPT

## 2022-03-15 PROCEDURE — 80053 COMPREHEN METABOLIC PANEL: CPT

## 2022-03-15 PROCEDURE — 85025 COMPLETE CBC W/AUTO DIFF WBC: CPT

## 2022-03-15 PROCEDURE — 93306 TTE W/DOPPLER COMPLETE: CPT | Mod: 26

## 2022-03-15 PROCEDURE — 85379 FIBRIN DEGRADATION QUANT: CPT

## 2022-03-15 PROCEDURE — 85730 THROMBOPLASTIN TIME PARTIAL: CPT

## 2022-03-15 PROCEDURE — 86901 BLOOD TYPING SEROLOGIC RH(D): CPT

## 2022-03-15 PROCEDURE — 96361 HYDRATE IV INFUSION ADD-ON: CPT

## 2022-03-15 PROCEDURE — 71046 X-RAY EXAM CHEST 2 VIEWS: CPT

## 2022-03-15 PROCEDURE — 83690 ASSAY OF LIPASE: CPT

## 2022-03-15 PROCEDURE — 85610 PROTHROMBIN TIME: CPT

## 2022-03-15 PROCEDURE — 99233 SBSQ HOSP IP/OBS HIGH 50: CPT

## 2022-03-15 PROCEDURE — 71275 CT ANGIOGRAPHY CHEST: CPT | Mod: MA

## 2022-03-15 PROCEDURE — 99285 EMERGENCY DEPT VISIT HI MDM: CPT | Mod: 25

## 2022-03-15 PROCEDURE — 84484 ASSAY OF TROPONIN QUANT: CPT

## 2022-03-15 PROCEDURE — P9040: CPT

## 2022-03-15 PROCEDURE — 96375 TX/PRO/DX INJ NEW DRUG ADDON: CPT

## 2022-03-15 PROCEDURE — 84702 CHORIONIC GONADOTROPIN TEST: CPT

## 2022-03-15 PROCEDURE — 86902 BLOOD TYPE ANTIGEN DONOR EA: CPT

## 2022-03-15 PROCEDURE — 84100 ASSAY OF PHOSPHORUS: CPT

## 2022-03-15 PROCEDURE — 76705 ECHO EXAM OF ABDOMEN: CPT

## 2022-03-15 PROCEDURE — 36430 TRANSFUSION BLD/BLD COMPNT: CPT

## 2022-03-15 PROCEDURE — 86900 BLOOD TYPING SEROLOGIC ABO: CPT

## 2022-03-15 PROCEDURE — 93306 TTE W/DOPPLER COMPLETE: CPT

## 2022-03-15 PROCEDURE — 93005 ELECTROCARDIOGRAM TRACING: CPT

## 2022-03-15 PROCEDURE — 36415 COLL VENOUS BLD VENIPUNCTURE: CPT

## 2022-03-15 PROCEDURE — 85045 AUTOMATED RETICULOCYTE COUNT: CPT

## 2022-03-15 PROCEDURE — 86923 COMPATIBILITY TEST ELECTRIC: CPT

## 2022-03-15 PROCEDURE — 76705 ECHO EXAM OF ABDOMEN: CPT | Mod: 26,RT

## 2022-03-15 PROCEDURE — 86850 RBC ANTIBODY SCREEN: CPT

## 2022-03-15 PROCEDURE — U0003: CPT

## 2022-03-15 RX ORDER — SODIUM CHLORIDE 9 MG/ML
1000 INJECTION, SOLUTION INTRAVENOUS
Refills: 0 | Status: DISCONTINUED | OUTPATIENT
Start: 2022-03-15 | End: 2022-03-15

## 2022-03-15 RX ORDER — APIXABAN 2.5 MG/1
1 TABLET, FILM COATED ORAL
Qty: 60 | Refills: 0
Start: 2022-03-15 | End: 2022-04-13

## 2022-03-15 RX ORDER — CHLORHEXIDINE GLUCONATE 213 G/1000ML
1 SOLUTION TOPICAL
Refills: 0 | Status: DISCONTINUED | OUTPATIENT
Start: 2022-03-15 | End: 2022-03-15

## 2022-03-15 RX ORDER — SODIUM CHLORIDE 9 MG/ML
10 INJECTION INTRAMUSCULAR; INTRAVENOUS; SUBCUTANEOUS
Refills: 0 | Status: DISCONTINUED | OUTPATIENT
Start: 2022-03-15 | End: 2022-03-15

## 2022-03-15 RX ORDER — TRAMADOL HYDROCHLORIDE 50 MG/1
25 TABLET ORAL EVERY 4 HOURS
Refills: 0 | Status: DISCONTINUED | OUTPATIENT
Start: 2022-03-15 | End: 2022-03-15

## 2022-03-15 RX ADMIN — MORPHINE SULFATE 2 MILLIGRAM(S): 50 CAPSULE, EXTENDED RELEASE ORAL at 00:03

## 2022-03-15 RX ADMIN — Medication 100 UNIT(S): at 19:31

## 2022-03-15 RX ADMIN — SODIUM CHLORIDE 50 MILLILITER(S): 9 INJECTION, SOLUTION INTRAVENOUS at 10:23

## 2022-03-15 RX ADMIN — DEFERASIROX 2000 MILLIGRAM(S): 180 GRANULE ORAL at 11:50

## 2022-03-15 RX ADMIN — SENNA PLUS 2 TABLET(S): 8.6 TABLET ORAL at 00:03

## 2022-03-15 RX ADMIN — SERTRALINE 100 MILLIGRAM(S): 25 TABLET, FILM COATED ORAL at 11:48

## 2022-03-15 RX ADMIN — ARIPIPRAZOLE 5 MILLIGRAM(S): 15 TABLET ORAL at 11:49

## 2022-03-15 RX ADMIN — TRAMADOL HYDROCHLORIDE 25 MILLIGRAM(S): 50 TABLET ORAL at 12:34

## 2022-03-15 RX ADMIN — SODIUM CHLORIDE 50 MILLILITER(S): 9 INJECTION INTRAMUSCULAR; INTRAVENOUS; SUBCUTANEOUS at 00:03

## 2022-03-15 RX ADMIN — TRAMADOL HYDROCHLORIDE 25 MILLIGRAM(S): 50 TABLET ORAL at 13:05

## 2022-03-15 RX ADMIN — HYDROXYUREA 500 MILLIGRAM(S): 500 CAPSULE ORAL at 11:47

## 2022-03-15 RX ADMIN — ENOXAPARIN SODIUM 60 MILLIGRAM(S): 100 INJECTION SUBCUTANEOUS at 10:12

## 2022-03-15 RX ADMIN — MORPHINE SULFATE 2 MILLIGRAM(S): 50 CAPSULE, EXTENDED RELEASE ORAL at 00:33

## 2022-03-15 NOTE — PROGRESS NOTE ADULT - PROBLEM SELECTOR PLAN 5
- Home Jadenu is non-formulary, switched to Exjade for now but patient will need to bring in home med

## 2022-03-15 NOTE — DISCHARGE NOTE PROVIDER - NSDCMRMEDTOKEN_GEN_ALL_CORE_FT
Abilify 5 mg oral tablet: 1 tab(s) orally once a day  Eliquis Starter Pack for Treatment of DVT and PE 5 mg oral tablet: 10 mg BID x 7 days   then 5 mg BID from day 8  hydroxyurea 500 mg oral capsule: 1 cap(s) orally once a day  ibuprofen 600 mg oral tablet: 1 tab(s) orally every 6 hours, As needed, Mild Pain (1 - 3), Moderate Pain (4 - 6)  Jadenu 360 mg oral tablet: 4 tab(s) orally once a day  sertraline 100 mg oral tablet: 1 tab(s) orally once a day

## 2022-03-15 NOTE — DISCHARGE NOTE PROVIDER - NSDCCPCAREPLAN_GEN_ALL_CORE_FT
PRINCIPAL DISCHARGE DIAGNOSIS  Diagnosis: Pulmonary embolism  Assessment and Plan of Treatment: Acute subsegmental pulmonary emboli in the right lower lobe.  Straightening interventricular septum, echo recommended for further   evaluation of potential right heart strain.  Observations:  Mitral Valve: Normal mitral valve. Minimal mitral  regurgitation.  Aortic Valve/Aorta: Normal aortic valve.  Normal aortic root size.  Left Atrium: Normal left atrium.  Left Ventricle: Normal left ventricular internal dimensions  and wall thicknesses.  Normal left ventricular systolic function. No segmental  wall motion abnormalities.  Normal diastolic function.  Right Heart: Normal right atrium. Normal right ventricular  size and function.  Normal tricuspid valve. Minimal tricuspid regurgitation.  Normal pulmonic valve. Mild pulmonic regurgitation.  Pericardium/Pleura: Normal pericardium with no pericardial  effusion.  Hemodynamic: Estimated right atrial pressure is normal.  No evidence of pulmonary hypertension.  No PFO seen with color Doppler.  conculsion, normal echocardiogram      SECONDARY DISCHARGE DIAGNOSES  Diagnosis: Transaminitis  Assessment and Plan of Treatment: Status post cholecystectomy.  No biliary ductal dilatation..

## 2022-03-15 NOTE — PROGRESS NOTE ADULT - PROBLEM SELECTOR PLAN 3
improving  - has a hx hyperbilirubinemia   - u/s abd pending, d/w Dr. Richardson can be done outpt should not hold d/c  - continue to trend

## 2022-03-15 NOTE — PROGRESS NOTE ADULT - PROBLEM SELECTOR PLAN 2
Patient presents with acute chest pain admitted for acute sickle cell. CTA concerning for PE, started on AC  - Hgb 8.2 s/p 1 unit PRBC in ED (pt receives simple transfusions monthly)  - changed fluids to 1/2 NS at 50cc/hr x2 12 hrs(caution given concern for pulmonary congestion on CXR) although patient is satting well on room air  - f/u ECHO results   - resume Jadenu 360mg 4tabs qd (nonformulary, will switch to Exjade for now and then switch back to home med)  - continue to trend CBC with diff, CMP, coags  - Continue with Hydroxyurea and Folic Acid  - Incentive spirometer  - d/c'ed IV opioids as pain resolved started on tramadol 25 mg Q4h PRN for severe pain Patient has acute chest pain 2/2 PE & ED admitted for acute sickle cell crisis?? CTA concerning for PE, started on AC  - Patient seen and examined at bedside. Currently states her pain is completely resolved, does not want to take IV morphine any more,   - d/c'ed IV opioids as pain resolved started on tramadol 25 mg Q4h PRN for severe pain  - Hgb 8.2 s/p 1 unit PRBC in ED (pt receives simple transfusions monthly)  - changed fluids to 1/2 NS at 50cc/hr x2 12 hrs(caution given concern for pulmonary congestion on CXR) although patient is satting well on room air  - f/u ECHO results   - resume Jadenu 360mg 4tabs qd (nonformulary, will switch to Exjade for now and then switch back to home med)  - continue to trend CBC with diff, CMP, coags  - Continue with Hydroxyurea and Folic Acid  - Incentive spirometer

## 2022-03-15 NOTE — PATIENT PROFILE ADULT - FALL HARM RISK - UNIVERSAL INTERVENTIONS
Bed in lowest position, wheels locked, appropriate side rails in place/Call bell, personal items and telephone in reach/Instruct patient to call for assistance before getting out of bed or chair/Non-slip footwear when patient is out of bed/[place] to call system/Physically safe environment - no spills, clutter or unnecessary equipment/Purposeful Proactive Rounding/Room/bathroom lighting operational, light cord in reach

## 2022-03-15 NOTE — PROGRESS NOTE ADULT - SUBJECTIVE AND OBJECTIVE BOX
Patient is a 22y old  Female who presents with a chief complaint of chest pain (14 Mar 2022 21:44)      SUBJECTIVE / OVERNIGHT EVENTS: Patient seen and examined at bedside. States that she feels well, denies any CP, SOB, abd pain and n/v. Requesting to be discharged soon     ROS:  All other review of systems negative    Allergies    ceftriaxone (Pruritus)    Intolerances        MEDICATIONS  (STANDING):  ARIPiprazole 5 milliGRAM(s) Oral daily  chlorhexidine 4% Liquid 1 Application(s) Topical <User Schedule>  enoxaparin Injectable 60 milliGRAM(s) SubCutaneous every 12 hours  hydroxyurea 500 milliGRAM(s) Oral daily  senna 2 Tablet(s) Oral at bedtime  sertraline 100 milliGRAM(s) Oral daily  sodium chloride 0.45%. 1000 milliLiter(s) (50 mL/Hr) IV Continuous <Continuous>    MEDICATIONS  (PRN):  acetaminophen     Tablet .. 650 milliGRAM(s) Oral every 6 hours PRN Temp greater or equal to 38C (100.4F), Mild Pain (1 - 3), Moderate Pain (4 - 6)  sodium chloride 0.9% lock flush 10 milliLiter(s) IV Push every 1 hour PRN Pre/post blood products, medications, blood draw, and to maintain line patency  traMADol 25 milliGRAM(s) Oral every 4 hours PRN Severe Pain (7 - 10)      Vital Signs Last 24 Hrs  T(C): 36.8 (15 Mar 2022 12:00), Max: 37.6 (14 Mar 2022 16:45)  T(F): 98.3 (15 Mar 2022 12:00), Max: 99.7 (14 Mar 2022 16:45)  HR: 67 (15 Mar 2022 12:00) (57 - 78)  BP: 99/60 (15 Mar 2022 12:00) (99/60 - 124/66)  BP(mean): 75 (14 Mar 2022 23:30) (75 - 75)  RR: 18 (15 Mar 2022 12:00) (16 - 20)  SpO2: 96% (15 Mar 2022 12:00) (95% - 99%)  CAPILLARY BLOOD GLUCOSE        I&O's Summary    15 Mar 2022 07:01  -  15 Mar 2022 12:11  --------------------------------------------------------  IN: 50 mL / OUT: 0 mL / NET: 50 mL        PHYSICAL EXAM:  GENERAL: NAD, well-developed  HEAD:  Atraumatic, Normocephalic  EYES: EOMI, PERRLA, conjunctiva and sclera clear  NECK: Supple, No JVD  CHEST/LUNG: Clear to auscultation bilaterally; No wheeze  HEART: Regular rate and rhythm; No murmurs, rubs, or gallops  ABDOMEN: Soft, Nontender, Nondistended; Bowel sounds present  EXTREMITIES:  2+ Peripheral Pulses, No clubbing, cyanosis, or edema  NEUROLOGY: AAOx3, non-focal  PSYCH: calm  SKIN: No rashes or lesions    LABS:                        8.2    6.62  )-----------( 249      ( 15 Mar 2022 05:58 )             23.2     03-15    140  |  108  |  10  ----------------------------<  90  4.1   |  20<L>  |  0.79    Ca    9.2      15 Mar 2022 05:58  Phos  3.8     03-15  Mg     1.8     03-15    TPro  6.1  /  Alb  4.1  /  TBili  6.2<H>  /  DBili  x   /  AST  85<H>  /  ALT  48<H>  /  AlkPhos  79  03-15    PT/INR - ( 15 Mar 2022 05:58 )   PT: 16.1 sec;   INR: 1.40 ratio         PTT - ( 15 Mar 2022 05:58 )  PTT:45.3 sec      Urinalysis Basic - ( 14 Mar 2022 14:36 )    Color: Yellow / Appearance: Clear / S.009 / pH: x  Gluc: x / Ketone: Negative  / Bili: Negative / Urobili: 2 mg/dL   Blood: x / Protein: Negative / Nitrite: Negative   Leuk Esterase: Small / RBC: 7 /hpf / WBC 3 /HPF   Sq Epi: x / Non Sq Epi: 3 /hpf / Bacteria: Negative        RADIOLOGY & ADDITIONAL TESTS:    Care Discussed with Consultants/Other Providers: Medicine ACP

## 2022-03-15 NOTE — PROGRESS NOTE ADULT - PROBLEM SELECTOR PLAN 1
Patient presented with chest pain found to have elevated ddimer of 1844. Trop <6, BNP 78  - CTA shows acute subsegmental pulmonary emboli in the right lower lobe. Straightening interventricular septum, echo recommended for further evaluation of potential right heart strain  - s/p TTE pending results (r/o right heart strain & Mediport clot)  - started on lovenox 60mg BID, d/w Dr. Eleonora Richardson will change to Eliquis pending ECHO results  - monitor VSS closely

## 2022-03-15 NOTE — DISCHARGE NOTE PROVIDER - CARE PROVIDER_API CALL
THERON NIXON  Internal Medicine  [address] [place] [address], [id number]  [place] [address]  Phone: ([phone]  Fax: ()-  Follow Up Time: 1-3 days

## 2022-03-15 NOTE — DISCHARGE NOTE NURSING/CASE MANAGEMENT/SOCIAL WORK - NSDCPEFALRISK_GEN_ALL_CORE
For information on Fall & Injury Prevention, visit: [email or web address] OR  [email or web address] OR  https://www.cdc.gov/steadi/patient.html

## 2022-03-15 NOTE — DISCHARGE NOTE NURSING/CASE MANAGEMENT/SOCIAL WORK - PATIENT PORTAL LINK FT
You can access the FollowMyHealth Patient Portal offered by [facility] by registering at the following website: [email or web address]. By joining [facility]’s FollowMyHealth portal, you will also be able to view your health information using other applications (apps) compatible with our system.

## 2022-03-21 ENCOUNTER — APPOINTMENT (OUTPATIENT)
Dept: INTERNAL MEDICINE | Facility: CLINIC | Age: 22
End: 2022-03-21
Payer: MEDICAID

## 2022-03-21 VITALS
DIASTOLIC BLOOD PRESSURE: 60 MMHG | TEMPERATURE: 205.34 F | OXYGEN SATURATION: 97 % | SYSTOLIC BLOOD PRESSURE: 110 MMHG | BODY MASS INDEX: 20.98 KG/M2 | HEART RATE: 64 BPM | HEIGHT: 62 IN | WEIGHT: 114 LBS

## 2022-03-21 PROCEDURE — 99214 OFFICE O/P EST MOD 30 MIN: CPT

## 2022-03-23 NOTE — ED PROVIDER NOTE - CCCP TRG CHIEF CMPLNT
Quality 110: Preventive Care And Screening: Influenza Immunization: Influenza Immunization Administered during Influenza season Detail Level: Detailed Quality 130: Documentation Of Current Medications In The Medical Record: Current Medications Documented Quality 111:Pneumonia Vaccination Status For Older Adults: Pneumococcal Vaccination Previously Received medical evaluation

## 2022-03-24 NOTE — HISTORY OF PRESENT ILLNESS
[FreeTextEntry1] : 22 yo female with HGB SS, no complaints. [de-identified] : 23 yo female with HGB SS on monthly 1-2 units PRBC transfusion. The patient is s/p PE on 3/14/22, and has been treated with xarelto ( now 15 mg bid). This is a first VTE. Port was without clot.\par Echo was WNL.\par Prefers morphine to dilaudid for ER control.\par Has been taking her HU, 500 mg QD as well as her Jadenu\par No physical complaints today, however she is worried and anxious about her health.\par She is back at school, and has a school SW to help her navigate disability/accommodation/mental health issues.\par Still needs Ophtho\par \par PE: gen- cheerful female in nad\par vss- O2 sat 97\par mild nicterus\par lungs- cta\par cor: rrr-m\par abd- benign\par ext: -c/c/e, no ulcers\par \par ferritin- 190\par \par A/P- 23 yo female with HGB SS, s/p shunt placement at birth for CVA\par 1.SCD- continue monthly transfusion, 1-2 units/month to maintain HGB >8\par 2.:Bipolar disorder- Dr. Gomez at [facility]-continue routine f/u\par continue counseling with current SW\par 3. iron overload- ferritin- 190- hold jadenu for now\par 4. PE- conitnue xarelto--> 20 mg per day in two weeks.\par 5.f/u 3 months\par 6. re-refer ophtho\par \par Eleonora Richardson MD\par

## 2022-03-28 ENCOUNTER — RX RENEWAL (OUTPATIENT)
Age: 22
End: 2022-03-28

## 2022-03-31 ENCOUNTER — APPOINTMENT (OUTPATIENT)
Dept: HEMATOLOGY ONCOLOGY | Facility: CLINIC | Age: 22
End: 2022-03-31

## 2022-04-18 ENCOUNTER — NON-APPOINTMENT (OUTPATIENT)
Age: 22
End: 2022-04-18

## 2022-04-19 ENCOUNTER — OUTPATIENT (OUTPATIENT)
Dept: OUTPATIENT SERVICES | Facility: HOSPITAL | Age: 22
LOS: 1 days | End: 2022-04-19
Payer: MEDICAID

## 2022-04-19 DIAGNOSIS — D57.1 SICKLE-CELL DISEASE WITHOUT CRISIS: ICD-10-CM

## 2022-04-19 DIAGNOSIS — Z11.52 ENCOUNTER FOR SCREENING FOR COVID-19: ICD-10-CM

## 2022-04-19 DIAGNOSIS — Z98.890 OTHER SPECIFIED POSTPROCEDURAL STATES: Chronic | ICD-10-CM

## 2022-04-19 LAB
ALBUMIN SERPL ELPH-MCNC: 4.6 G/DL — SIGNIFICANT CHANGE UP (ref 3.3–5)
ALP SERPL-CCNC: 79 U/L — SIGNIFICANT CHANGE UP (ref 40–120)
ALT FLD-CCNC: 20 U/L — SIGNIFICANT CHANGE UP (ref 10–45)
ANION GAP SERPL CALC-SCNC: 13 MMOL/L — SIGNIFICANT CHANGE UP (ref 5–17)
AST SERPL-CCNC: 39 U/L — SIGNIFICANT CHANGE UP (ref 10–40)
BILIRUB SERPL-MCNC: 4.1 MG/DL — HIGH (ref 0.2–1.2)
BUN SERPL-MCNC: 5 MG/DL — LOW (ref 7–23)
CALCIUM SERPL-MCNC: 9.1 MG/DL — SIGNIFICANT CHANGE UP (ref 8.4–10.5)
CHLORIDE SERPL-SCNC: 106 MMOL/L — SIGNIFICANT CHANGE UP (ref 96–108)
CO2 SERPL-SCNC: 19 MMOL/L — LOW (ref 22–31)
CREAT SERPL-MCNC: 0.57 MG/DL — SIGNIFICANT CHANGE UP (ref 0.5–1.3)
EGFR: 132 ML/MIN/1.73M2 — SIGNIFICANT CHANGE UP
FERRITIN SERPL-MCNC: 147 NG/ML — SIGNIFICANT CHANGE UP (ref 15–150)
GLUCOSE SERPL-MCNC: 95 MG/DL — SIGNIFICANT CHANGE UP (ref 70–99)
HCT VFR BLD CALC: 19.3 % — CRITICAL LOW (ref 34.5–45)
HGB BLD-MCNC: 6.8 G/DL — CRITICAL LOW (ref 11.5–15.5)
MCHC RBC-ENTMCNC: 31.9 PG — SIGNIFICANT CHANGE UP (ref 27–34)
MCHC RBC-ENTMCNC: 35.2 GM/DL — SIGNIFICANT CHANGE UP (ref 32–36)
MCV RBC AUTO: 90.6 FL — SIGNIFICANT CHANGE UP (ref 80–100)
NRBC # BLD: 2 /100 WBCS — HIGH (ref 0–0)
PLATELET # BLD AUTO: 355 K/UL — SIGNIFICANT CHANGE UP (ref 150–400)
POTASSIUM SERPL-MCNC: 3.7 MMOL/L — SIGNIFICANT CHANGE UP (ref 3.5–5.3)
POTASSIUM SERPL-SCNC: 3.7 MMOL/L — SIGNIFICANT CHANGE UP (ref 3.5–5.3)
PROT SERPL-MCNC: 7 G/DL — SIGNIFICANT CHANGE UP (ref 6–8.3)
RBC # BLD: 2.13 M/UL — LOW (ref 3.8–5.2)
RBC # FLD: 22.5 % — HIGH (ref 10.3–14.5)
SARS-COV-2 RNA SPEC QL NAA+PROBE: SIGNIFICANT CHANGE UP
SODIUM SERPL-SCNC: 138 MMOL/L — SIGNIFICANT CHANGE UP (ref 135–145)
WBC # BLD: 10.53 K/UL — HIGH (ref 3.8–10.5)
WBC # FLD AUTO: 10.53 K/UL — HIGH (ref 3.8–10.5)

## 2022-04-19 PROCEDURE — C9803: CPT

## 2022-04-19 PROCEDURE — U0005: CPT

## 2022-04-19 PROCEDURE — U0003: CPT

## 2022-04-20 LAB
HGB A MFR BLD: 20.2 % — LOW (ref 95.8–98)
HGB A2 MFR BLD: 3.4 % — HIGH (ref 2–3.2)
HGB C MFR BLD: 0 % — SIGNIFICANT CHANGE UP
HGB F MFR BLD: 1.4 % — HIGH (ref 0–1)
HGB OTHER MFR BLD ELPH: 0 % — SIGNIFICANT CHANGE UP
HGB S MFR BLD: 75 % — HIGH

## 2022-04-22 ENCOUNTER — OUTPATIENT (OUTPATIENT)
Dept: OUTPATIENT SERVICES | Facility: HOSPITAL | Age: 22
LOS: 1 days | End: 2022-04-22
Payer: MEDICAID

## 2022-04-22 DIAGNOSIS — D57.1 SICKLE-CELL DISEASE WITHOUT CRISIS: ICD-10-CM

## 2022-04-22 DIAGNOSIS — Z98.890 OTHER SPECIFIED POSTPROCEDURAL STATES: Chronic | ICD-10-CM

## 2022-04-22 LAB
HCT VFR BLD CALC: 17.3 % — CRITICAL LOW (ref 34.5–45)
HGB BLD-MCNC: 6.3 G/DL — CRITICAL LOW (ref 11.5–15.5)
MCHC RBC-ENTMCNC: 33.2 PG — SIGNIFICANT CHANGE UP (ref 27–34)
MCHC RBC-ENTMCNC: 36.4 GM/DL — HIGH (ref 32–36)
MCV RBC AUTO: 91.1 FL — SIGNIFICANT CHANGE UP (ref 80–100)
NRBC # BLD: 2 /100 WBCS — HIGH (ref 0–0)
PLATELET # BLD AUTO: 308 K/UL — SIGNIFICANT CHANGE UP (ref 150–400)
RBC # BLD: 1.9 M/UL — LOW (ref 3.8–5.2)
RBC # FLD: 22.5 % — HIGH (ref 10.3–14.5)
WBC # BLD: 10.62 K/UL — HIGH (ref 3.8–10.5)
WBC # FLD AUTO: 10.62 K/UL — HIGH (ref 3.8–10.5)

## 2022-04-22 PROCEDURE — 96523 IRRIG DRUG DELIVERY DEVICE: CPT

## 2022-04-22 PROCEDURE — 86850 RBC ANTIBODY SCREEN: CPT

## 2022-04-22 PROCEDURE — 85027 COMPLETE CBC AUTOMATED: CPT

## 2022-04-22 PROCEDURE — 36430 TRANSFUSION BLD/BLD COMPNT: CPT

## 2022-04-22 PROCEDURE — 80053 COMPREHEN METABOLIC PANEL: CPT

## 2022-04-22 PROCEDURE — P9040: CPT

## 2022-04-22 PROCEDURE — 82728 ASSAY OF FERRITIN: CPT

## 2022-04-22 PROCEDURE — 83020 HEMOGLOBIN ELECTROPHORESIS: CPT

## 2022-04-22 PROCEDURE — 86901 BLOOD TYPING SEROLOGIC RH(D): CPT

## 2022-04-22 PROCEDURE — 86902 BLOOD TYPE ANTIGEN DONOR EA: CPT

## 2022-04-22 PROCEDURE — 86900 BLOOD TYPING SEROLOGIC ABO: CPT

## 2022-04-22 PROCEDURE — 86923 COMPATIBILITY TEST ELECTRIC: CPT

## 2022-05-06 ENCOUNTER — EMERGENCY (EMERGENCY)
Facility: HOSPITAL | Age: 22
LOS: 1 days | Discharge: HOME | End: 2022-05-06
Attending: EMERGENCY MEDICINE
Payer: MEDICAID

## 2022-05-06 VITALS
HEART RATE: 68 BPM | OXYGEN SATURATION: 99 % | SYSTOLIC BLOOD PRESSURE: 116 MMHG | TEMPERATURE: 98 F | RESPIRATION RATE: 15 BRPM | DIASTOLIC BLOOD PRESSURE: 76 MMHG

## 2022-05-06 VITALS
OXYGEN SATURATION: 97 % | SYSTOLIC BLOOD PRESSURE: 119 MMHG | WEIGHT: 119.93 LBS | TEMPERATURE: 98 F | HEART RATE: 84 BPM | DIASTOLIC BLOOD PRESSURE: 74 MMHG | HEIGHT: 62 IN | RESPIRATION RATE: 19 BRPM

## 2022-05-06 DIAGNOSIS — Z98.890 OTHER SPECIFIED POSTPROCEDURAL STATES: Chronic | ICD-10-CM

## 2022-05-06 LAB
ALBUMIN SERPL ELPH-MCNC: 4.4 G/DL — SIGNIFICANT CHANGE UP (ref 3.3–5)
ALP SERPL-CCNC: 70 U/L — SIGNIFICANT CHANGE UP (ref 40–120)
ALT FLD-CCNC: 18 U/L — SIGNIFICANT CHANGE UP (ref 10–45)
ANION GAP SERPL CALC-SCNC: 13 MMOL/L — SIGNIFICANT CHANGE UP (ref 5–17)
ANISOCYTOSIS BLD QL: SLIGHT — SIGNIFICANT CHANGE UP
APPEARANCE UR: ABNORMAL
APTT BLD: 22.4 SEC — LOW (ref 27.5–35.5)
AST SERPL-CCNC: 55 U/L — HIGH (ref 10–40)
BACTERIA # UR AUTO: ABNORMAL
BASOPHILS # BLD AUTO: 0.11 K/UL — SIGNIFICANT CHANGE UP (ref 0–0.2)
BASOPHILS NFR BLD AUTO: 0.9 % — SIGNIFICANT CHANGE UP (ref 0–2)
BILIRUB SERPL-MCNC: 4.3 MG/DL — HIGH (ref 0.2–1.2)
BILIRUB UR-MCNC: NEGATIVE — SIGNIFICANT CHANGE UP
BLD GP AB SCN SERPL QL: NEGATIVE — SIGNIFICANT CHANGE UP
BUN SERPL-MCNC: 5 MG/DL — LOW (ref 7–23)
BURR CELLS BLD QL SMEAR: PRESENT — SIGNIFICANT CHANGE UP
CALCIUM SERPL-MCNC: 9.3 MG/DL — SIGNIFICANT CHANGE UP (ref 8.4–10.5)
CHLORIDE SERPL-SCNC: 106 MMOL/L — SIGNIFICANT CHANGE UP (ref 96–108)
CO2 SERPL-SCNC: 18 MMOL/L — LOW (ref 22–31)
COLOR SPEC: ABNORMAL
CREAT SERPL-MCNC: 0.56 MG/DL — SIGNIFICANT CHANGE UP (ref 0.5–1.3)
DIFF PNL FLD: ABNORMAL
EGFR: 132 ML/MIN/1.73M2 — SIGNIFICANT CHANGE UP
ELLIPTOCYTES BLD QL SMEAR: SLIGHT — SIGNIFICANT CHANGE UP
EOSINOPHIL # BLD AUTO: 0.63 K/UL — HIGH (ref 0–0.5)
EOSINOPHIL NFR BLD AUTO: 5.3 % — SIGNIFICANT CHANGE UP (ref 0–6)
EPI CELLS # UR: 10 /HPF — HIGH
GLUCOSE SERPL-MCNC: 75 MG/DL — SIGNIFICANT CHANGE UP (ref 70–99)
GLUCOSE UR QL: NEGATIVE — SIGNIFICANT CHANGE UP
HCT VFR BLD CALC: 22 % — LOW (ref 34.5–45)
HGB BLD-MCNC: 7.6 G/DL — LOW (ref 11.5–15.5)
HYALINE CASTS # UR AUTO: 4 /LPF — HIGH (ref 0–2)
HYPOCHROMIA BLD QL: SLIGHT — SIGNIFICANT CHANGE UP
INR BLD: 1.13 RATIO — SIGNIFICANT CHANGE UP (ref 0.88–1.16)
KETONES UR-MCNC: NEGATIVE — SIGNIFICANT CHANGE UP
LEUKOCYTE ESTERASE UR-ACNC: NEGATIVE — SIGNIFICANT CHANGE UP
LYMPHOCYTES # BLD AUTO: 27.4 % — SIGNIFICANT CHANGE UP (ref 13–44)
LYMPHOCYTES # BLD AUTO: 3.27 K/UL — SIGNIFICANT CHANGE UP (ref 1–3.3)
MACROCYTES BLD QL: SLIGHT — SIGNIFICANT CHANGE UP
MANUAL SMEAR VERIFICATION: SIGNIFICANT CHANGE UP
MCHC RBC-ENTMCNC: 33.9 PG — SIGNIFICANT CHANGE UP (ref 27–34)
MCHC RBC-ENTMCNC: 34.5 GM/DL — SIGNIFICANT CHANGE UP (ref 32–36)
MCV RBC AUTO: 98.2 FL — SIGNIFICANT CHANGE UP (ref 80–100)
METAMYELOCYTES # FLD: 0.9 % — HIGH (ref 0–0)
MICROCYTES BLD QL: SLIGHT — SIGNIFICANT CHANGE UP
MONOCYTES # BLD AUTO: 1.48 K/UL — HIGH (ref 0–0.9)
MONOCYTES NFR BLD AUTO: 12.4 % — SIGNIFICANT CHANGE UP (ref 2–14)
NEUTROPHILS # BLD AUTO: 6.33 K/UL — SIGNIFICANT CHANGE UP (ref 1.8–7.4)
NEUTROPHILS NFR BLD AUTO: 53.1 % — SIGNIFICANT CHANGE UP (ref 43–77)
NEUTS HYPERSEG # BLD: PRESENT — SIGNIFICANT CHANGE UP
NITRITE UR-MCNC: NEGATIVE — SIGNIFICANT CHANGE UP
NRBC # BLD: 12 /100 — HIGH (ref 0–0)
OVALOCYTES BLD QL SMEAR: SLIGHT — SIGNIFICANT CHANGE UP
PH UR: 5.5 — SIGNIFICANT CHANGE UP (ref 5–8)
PLAT MORPH BLD: NORMAL — SIGNIFICANT CHANGE UP
PLATELET # BLD AUTO: 336 K/UL — SIGNIFICANT CHANGE UP (ref 150–400)
POIKILOCYTOSIS BLD QL AUTO: SIGNIFICANT CHANGE UP
POLYCHROMASIA BLD QL SMEAR: SIGNIFICANT CHANGE UP
POTASSIUM SERPL-MCNC: 4.6 MMOL/L — SIGNIFICANT CHANGE UP (ref 3.5–5.3)
POTASSIUM SERPL-SCNC: 4.6 MMOL/L — SIGNIFICANT CHANGE UP (ref 3.5–5.3)
PROT SERPL-MCNC: 7 G/DL — SIGNIFICANT CHANGE UP (ref 6–8.3)
PROT UR-MCNC: ABNORMAL
PROTHROM AB SERPL-ACNC: 13 SEC — SIGNIFICANT CHANGE UP (ref 10.5–13.4)
RBC # BLD: 2.24 M/UL — LOW (ref 3.8–5.2)
RBC # BLD: 2.24 M/UL — LOW (ref 3.8–5.2)
RBC # FLD: 22.8 % — HIGH (ref 10.3–14.5)
RBC BLD AUTO: ABNORMAL
RBC CASTS # UR COMP ASSIST: 432 /HPF — HIGH (ref 0–4)
RETICS #: 479.1 K/UL — HIGH (ref 25–125)
RETICS/RBC NFR: 21.4 % — HIGH (ref 0.5–2.5)
RH IG SCN BLD-IMP: POSITIVE — SIGNIFICANT CHANGE UP
SCHISTOCYTES BLD QL AUTO: SLIGHT — SIGNIFICANT CHANGE UP
SICKLE CELLS BLD QL SMEAR: SIGNIFICANT CHANGE UP
SODIUM SERPL-SCNC: 137 MMOL/L — SIGNIFICANT CHANGE UP (ref 135–145)
SP GR SPEC: 1.01 — SIGNIFICANT CHANGE UP (ref 1.01–1.02)
UROBILINOGEN FLD QL: NEGATIVE — SIGNIFICANT CHANGE UP
WBC # BLD: 11.93 K/UL — HIGH (ref 3.8–10.5)
WBC # FLD AUTO: 11.93 K/UL — HIGH (ref 3.8–10.5)
WBC UR QL: 6 /HPF — HIGH (ref 0–5)

## 2022-05-06 PROCEDURE — 70450 CT HEAD/BRAIN W/O DYE: CPT | Mod: MA

## 2022-05-06 PROCEDURE — 86901 BLOOD TYPING SEROLOGIC RH(D): CPT

## 2022-05-06 PROCEDURE — 70450 CT HEAD/BRAIN W/O DYE: CPT | Mod: 26,MA

## 2022-05-06 PROCEDURE — 86900 BLOOD TYPING SEROLOGIC ABO: CPT

## 2022-05-06 PROCEDURE — 86850 RBC ANTIBODY SCREEN: CPT

## 2022-05-06 PROCEDURE — 81001 URINALYSIS AUTO W/SCOPE: CPT

## 2022-05-06 PROCEDURE — 80053 COMPREHEN METABOLIC PANEL: CPT

## 2022-05-06 PROCEDURE — 99285 EMERGENCY DEPT VISIT HI MDM: CPT

## 2022-05-06 PROCEDURE — 36415 COLL VENOUS BLD VENIPUNCTURE: CPT

## 2022-05-06 PROCEDURE — 99284 EMERGENCY DEPT VISIT MOD MDM: CPT | Mod: 25

## 2022-05-06 PROCEDURE — 85045 AUTOMATED RETICULOCYTE COUNT: CPT

## 2022-05-06 PROCEDURE — 85730 THROMBOPLASTIN TIME PARTIAL: CPT

## 2022-05-06 PROCEDURE — 85025 COMPLETE CBC W/AUTO DIFF WBC: CPT

## 2022-05-06 PROCEDURE — 85610 PROTHROMBIN TIME: CPT

## 2022-05-06 RX ORDER — SODIUM CHLORIDE 9 MG/ML
1000 INJECTION INTRAMUSCULAR; INTRAVENOUS; SUBCUTANEOUS ONCE
Refills: 0 | Status: DISCONTINUED | OUTPATIENT
Start: 2022-05-06 | End: 2022-05-06

## 2022-05-06 RX ORDER — ACETAMINOPHEN 500 MG
650 TABLET ORAL ONCE
Refills: 0 | Status: COMPLETED | OUTPATIENT
Start: 2022-05-06 | End: 2022-05-06

## 2022-05-06 RX ADMIN — Medication 650 MILLIGRAM(S): at 13:17

## 2022-05-06 NOTE — ED PROVIDER NOTE - PATIENT PORTAL LINK FT
You can access the FollowMyHealth Patient Portal offered by [facility] by registering at the following website: [email or web address]. By joining [facility]’s FollowMyHealth portal, you will also be able to view your health information using other applications (apps) compatible with our system.

## 2022-05-06 NOTE — ED PROVIDER NOTE - NSFOLLOWUPINSTRUCTIONS_ED_ALL_ED_FT
Headache    Take Tylenol 650mg every 6 hours for headache.      A headache is pain or discomfort felt around the head or neck area. The specific cause of a headache may not be found as there are many types including tension headaches, migraine headaches, and cluster headaches. Watch your condition for any changes. Things you can do to manage your pain include taking over the counter and prescription medications as instructed by your health care provider, lying down in a dark quiet room, limiting stress, getting regular sleep, and refraining from alcohol and tobacco products.    SEEK IMMEDIATE MEDICAL CARE IF YOU HAVE ANY OF THE FOLLOWING SYMPTOMS: fever, vomiting, stiff neck, loss of vision, problems with speech, muscle weakness, loss of balance, trouble walking, passing out, or confusion.

## 2022-05-06 NOTE — ED ADULT TRIAGE NOTE - CHIEF COMPLAINT QUOTE
headache this am- hx of 2  shunts  ha has been intermittent since Tuesday- called neurosurgeon and told to come to ER for scans

## 2022-05-06 NOTE — ED PROVIDER NOTE - CLINICAL SUMMARY MEDICAL DECISION MAKING FREE TEXT BOX
hx  shunt, hx PE on Xarelto now with headache and sent in by NS for eval. Will get imaging, consult NS and re-eval.

## 2022-05-06 NOTE — CONSULT NOTE ADULT - SUBJECTIVE AND OBJECTIVE BOX
SUBJECTIVE: Pt seen in ED. Appears comfortable in NAD. Denies N/V.    Vital Signs Last 24 Hrs  T(C): 36.6 (06 May 2022 12:08), Max: 36.6 (06 May 2022 10:01)  T(F): 97.9 (06 May 2022 12:08), Max: 97.9 (06 May 2022 10:01)  HR: 72 (06 May 2022 12:08) (72 - 84)  BP: 120/80 (06 May 2022 12:08) (119/74 - 120/80)  BP(mean): --  RR: 16 (06 May 2022 12:08) (16 - 19)  SpO2: 99% (06 May 2022 12:08) (97% - 99%)  IVF: [ X] IVL [ ] NS+K@   DIET: [ X] Regular [ ] CCD [ ] Renal [ ] Puree [ ] Dysphagia [ ] Tube Feeds:     PHYSICAL EXAM:    General: No Acute Distress     Neurological: Awake, alert oriented to person, place and time, Following Commands, PERRL, EOMI, Face Symmetrical, Speech Fluent, Moving all extremities, Muscle Strength normal in all four extremities, No Drift, Sensation to Light Touch Intact    Pulmonary: Clear to Auscultation, No Rales, No Rhonchi, No Wheezes     Cardiovascular: S1, S2, Regular Rate and Rhythm     Gastrointestinal: Soft, Nontender, Nondistended     Incision: Rt post shunt palp and full.  Nontender.  Appears normal    LABS:                        7.6    11.93 )-----------( 336      ( 06 May 2022 11:17 )             22.0    05-06    137  |  106  |  5<L>  ----------------------------<  75  4.6   |  18<L>  |  0.56    Ca    9.3      06 May 2022 11:17    TPro  7.0  /  Alb  4.4  /  TBili  4.3<H>  /  DBili  x   /  AST  55<H>  /  ALT  18  /  AlkPhos  70  05-06    PT/INR - ( 06 May 2022 11:17 )   PT: 13.0 sec;   INR: 1.13 ratio    PTT - ( 06 May 2022 11:17 )  PTT:22.4 sec    COVID-19 PCR: NotDetec (19 Apr 2022 12:57)  COVID-19 PCR: NotDetec (14 Mar 2022 21:51)  COVID-19 PCR: NotDetec (19 Feb 2022 13:19)  COVID-19 PCR: NotDetec (18 Jan 2022 17:25)  COVID-19 PCR: NotDetec (17 Dec 2021 14:09)  COVID-19 PCR: NotDetec (20 Nov 2021 13:04)    IMAGING:   < from: CT Head No Cont (05.06.22 @ 11:26) >  FINDINGS:  VENTRICLES AND SULCI:  Ventricles are unchanged in appearance compared   with the prior. Right parietal shunt catheter with its tip inclose   proximity to the left foramen of Selvin. Right frontal shunt catheter with   its tip in the region of the septum pellucidum unchanged from the prior  INTRA-AXIAL: Encephalomalacia suggested in the region of the right   frontal lobe seen on theprior  EXTRA-AXIAL:  No mass or collection is seen.  VISUALIZED SINUSES:  Clear.  VISUALIZED MASTOIDS:  Clear.  CALVARIUM: Right parietal ha hole.  MISCELLANEOUS:  None.    IMPRESSION:  2 shunt catheters as described with stable appearance to the   ventricles when compared with the prior 10/22/2021.    < end of copied text >    MEDICATIONS  (STANDING):    MEDICATIONS  (PRN):

## 2022-05-06 NOTE — ED PROVIDER NOTE - CARE PROVIDER_API CALL
Benjamin Loza)  Neurosurgery; Pediatric Neurosurgery  [address] [place] [address], [address]  [place] [address]  Phone: [phone]  Fax: [phone]  Follow Up Time: 1-3 Days

## 2022-05-06 NOTE — ED ADULT NURSE NOTE - OBJECTIVE STATEMENT
21 yo female with a PMH of  shunts x 2 presents to the ED ambulatory with steady gait complaining of a headache. Patient states that on Tuesday she was in class when she had an episode of dizziness and a mild headache. Today, she woke up with a headache that starts from her frontal area radiating to the R occipital. Patient denies any visual changes. Patient contacted her neurologist this morning and was advised to go to the ER for scans and further evaluation. Patient otherwise appears well at this time. Patient has a mediport on her L chest wall for blood transfusions, last one being last month. States that she is due for another transfusion soon. Denies vision changes, chest pain, shortness of breath, abdominal pain, nausea, vomiting, diarrhea, fevers, chills, dysuria, hematuria, recent illness travel or fall. 21 yo female with a PMH of bipolar, hbSS disease, chronic lung disease of prematurity, prior CVA [receives blood transfusions via mediport],  shunts x 2 presents to the ED ambulatory with steady gait complaining of a headache. Patient states that on Tuesday she was in class when she had an episode of dizziness and a mild headache. Today, she woke up with a headache that starts from her frontal area radiating to the R occipital. Patient denies any visual changes. Patient contacted her neurologist this morning and was advised to go to the ER for scans and further evaluation. Patient otherwise appears well at this time. Patient has a mediport on her L chest wall for blood transfusions, last one being last month. States that she is due for another transfusion soon. Denies vision changes, chest pain, shortness of breath, abdominal pain, nausea, vomiting, diarrhea, fevers, chills, dysuria, hematuria, recent illness travel or fall.

## 2022-05-06 NOTE — ED ADULT NURSE NOTE - NSIMPLEMENTINTERV_GEN_ALL_ED
Implemented All Fall with Harm Risk Interventions:  [place] to call system. Call bell, personal items and telephone within reach. Instruct patient to call for assistance. Room bathroom lighting operational. Non-slip footwear when patient is off stretcher. Physically safe environment: no spills, clutter or unnecessary equipment. Stretcher in lowest position, wheels locked, appropriate side rails in place. Provide visual cue, wrist band, yellow gown, etc. Monitor gait and stability. Monitor for mental status changes and reorient to person, place, and time. Review medications for side effects contributing to fall risk. Reinforce activity limits and safety measures with patient and family. Provide visual clues: red socks.

## 2022-05-06 NOTE — ED PROVIDER NOTE - PROGRESS NOTE DETAILS
Attending MD Mckenzie: case discussed with Dr. Richardson. Labs and urine results reviewed.  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  Would have give 1 liter of fluid but IV out and I instructed the patient with increase her fluid intake and follow up for her regular transfusion next week.

## 2022-05-06 NOTE — ED PROVIDER NOTE - OBJECTIVE STATEMENT
Attending MD Mckenzie: 21y old Female, ex-[id number], with past medical history of HbSS, chronic transfusion protocols on chelation therapy, ACS, CVA, grade 4 IVH as , hydrocephalus s/p  shunt x2 with 9 revisions, asthma, atrophic spleen, cholecystectomy, 4x eye operations, and bipolar 1 disorder presents with frontal and occiptial HA.  Mild nausea and dizziness of few days ago.

## 2022-05-06 NOTE — CONSULT NOTE ADULT - ASSESSMENT
HPI: Pt was supposed to go to [facility], but ended up her. Hx CVA at about 3mths old with VPS placed then and approx 7 revision since.  Also Hx PE on Xarelto, Sickle Cell anemia p/w Front/Occip HA since Tues releived with meds. Denies NV or dizzyness.    PROCEDURE:  Hx VPS w/mult Revision    PLAN:  Neuro: CT Head today-stable Vents, Hx Leukocytosis, but sl Inc today, but pt asymptomatic and afeb-monitor. Pain control per ED. Shunt strata at 2-confirmed with .   Inc activity/OOB. No further neurosurgical intervention at this time, FU with Dr JANET Loza in 2 weeks.  Above d/w Dr Loza and ED attending and pt and in agreement.

## 2022-05-21 ENCOUNTER — OUTPATIENT (OUTPATIENT)
Dept: OUTPATIENT SERVICES | Facility: HOSPITAL | Age: 22
LOS: 1 days | End: 2022-05-21
Payer: MEDICAID

## 2022-05-21 DIAGNOSIS — Z98.890 OTHER SPECIFIED POSTPROCEDURAL STATES: Chronic | ICD-10-CM

## 2022-05-21 DIAGNOSIS — Z11.52 ENCOUNTER FOR SCREENING FOR COVID-19: ICD-10-CM

## 2022-05-21 DIAGNOSIS — D57.1 SICKLE-CELL DISEASE WITHOUT CRISIS: ICD-10-CM

## 2022-05-21 LAB
HCT VFR BLD CALC: 20.1 % — CRITICAL LOW (ref 34.5–45)
HGB BLD-MCNC: 7.2 G/DL — LOW (ref 11.5–15.5)
MCHC RBC-ENTMCNC: 34.1 PG — HIGH (ref 27–34)
MCHC RBC-ENTMCNC: 35.8 GM/DL — SIGNIFICANT CHANGE UP (ref 32–36)
MCV RBC AUTO: 95.3 FL — SIGNIFICANT CHANGE UP (ref 80–100)
NRBC # BLD: 4 /100 WBCS — HIGH (ref 0–0)
PLATELET # BLD AUTO: 374 K/UL — SIGNIFICANT CHANGE UP (ref 150–400)
RBC # BLD: 2.11 M/UL — LOW (ref 3.8–5.2)
RBC # FLD: 21.6 % — HIGH (ref 10.3–14.5)
SARS-COV-2 RNA SPEC QL NAA+PROBE: SIGNIFICANT CHANGE UP
WBC # BLD: 10.63 K/UL — HIGH (ref 3.8–10.5)
WBC # FLD AUTO: 10.63 K/UL — HIGH (ref 3.8–10.5)

## 2022-05-21 PROCEDURE — C9803: CPT

## 2022-05-21 PROCEDURE — U0003: CPT

## 2022-05-21 PROCEDURE — U0005: CPT

## 2022-05-22 LAB
HGB A MFR BLD: 22.8 % — LOW (ref 95.8–98)
HGB A2 MFR BLD: 3.1 % — SIGNIFICANT CHANGE UP (ref 2–3.2)
HGB C MFR BLD: 0 % — SIGNIFICANT CHANGE UP
HGB F MFR BLD: 1.5 % — HIGH (ref 0–1)
HGB OTHER MFR BLD ELPH: 0 % — SIGNIFICANT CHANGE UP
HGB S MFR BLD: 72.6 % — HIGH

## 2022-05-23 ENCOUNTER — NON-APPOINTMENT (OUTPATIENT)
Age: 22
End: 2022-05-23

## 2022-05-24 ENCOUNTER — OUTPATIENT (OUTPATIENT)
Dept: OUTPATIENT SERVICES | Facility: HOSPITAL | Age: 22
LOS: 1 days | End: 2022-05-24
Payer: MEDICAID

## 2022-05-24 DIAGNOSIS — Z98.890 OTHER SPECIFIED POSTPROCEDURAL STATES: Chronic | ICD-10-CM

## 2022-05-24 DIAGNOSIS — D57.1 SICKLE-CELL DISEASE WITHOUT CRISIS: ICD-10-CM

## 2022-05-24 LAB
ALBUMIN SERPL ELPH-MCNC: 4.6 G/DL — SIGNIFICANT CHANGE UP (ref 3.3–5)
ALP SERPL-CCNC: 64 U/L — SIGNIFICANT CHANGE UP (ref 40–120)
ALT FLD-CCNC: 19 U/L — SIGNIFICANT CHANGE UP (ref 10–45)
ANION GAP SERPL CALC-SCNC: 13 MMOL/L — SIGNIFICANT CHANGE UP (ref 5–17)
AST SERPL-CCNC: 44 U/L — HIGH (ref 10–40)
BILIRUB SERPL-MCNC: 3.8 MG/DL — HIGH (ref 0.2–1.2)
BUN SERPL-MCNC: <4 MG/DL — LOW (ref 7–23)
CALCIUM SERPL-MCNC: 9.1 MG/DL — SIGNIFICANT CHANGE UP (ref 8.4–10.5)
CHLORIDE SERPL-SCNC: 105 MMOL/L — SIGNIFICANT CHANGE UP (ref 96–108)
CO2 SERPL-SCNC: 21 MMOL/L — LOW (ref 22–31)
CREAT SERPL-MCNC: 0.52 MG/DL — SIGNIFICANT CHANGE UP (ref 0.5–1.3)
EGFR: 135 ML/MIN/1.73M2 — SIGNIFICANT CHANGE UP
FERRITIN SERPL-MCNC: 199 NG/ML — HIGH (ref 15–150)
GLUCOSE SERPL-MCNC: 103 MG/DL — HIGH (ref 70–99)
HCT VFR BLD CALC: 19.7 % — CRITICAL LOW (ref 34.5–45)
HGB BLD-MCNC: 7.1 G/DL — LOW (ref 11.5–15.5)
MCHC RBC-ENTMCNC: 34.5 PG — HIGH (ref 27–34)
MCHC RBC-ENTMCNC: 36 GM/DL — SIGNIFICANT CHANGE UP (ref 32–36)
MCV RBC AUTO: 95.6 FL — SIGNIFICANT CHANGE UP (ref 80–100)
NRBC # BLD: 3 /100 WBCS — HIGH (ref 0–0)
PLATELET # BLD AUTO: 289 K/UL — SIGNIFICANT CHANGE UP (ref 150–400)
POTASSIUM SERPL-MCNC: 3.4 MMOL/L — LOW (ref 3.5–5.3)
POTASSIUM SERPL-SCNC: 3.4 MMOL/L — LOW (ref 3.5–5.3)
PROT SERPL-MCNC: 6.8 G/DL — SIGNIFICANT CHANGE UP (ref 6–8.3)
RBC # BLD: 2.06 M/UL — LOW (ref 3.8–5.2)
RBC # FLD: 21.5 % — HIGH (ref 10.3–14.5)
SODIUM SERPL-SCNC: 139 MMOL/L — SIGNIFICANT CHANGE UP (ref 135–145)
WBC # BLD: 10.27 K/UL — SIGNIFICANT CHANGE UP (ref 3.8–10.5)
WBC # FLD AUTO: 10.27 K/UL — SIGNIFICANT CHANGE UP (ref 3.8–10.5)

## 2022-05-24 PROCEDURE — 86900 BLOOD TYPING SEROLOGIC ABO: CPT

## 2022-05-24 PROCEDURE — 82728 ASSAY OF FERRITIN: CPT

## 2022-05-24 PROCEDURE — 86923 COMPATIBILITY TEST ELECTRIC: CPT

## 2022-05-24 PROCEDURE — 85027 COMPLETE CBC AUTOMATED: CPT

## 2022-05-24 PROCEDURE — 96523 IRRIG DRUG DELIVERY DEVICE: CPT

## 2022-05-24 PROCEDURE — 86902 BLOOD TYPE ANTIGEN DONOR EA: CPT

## 2022-05-24 PROCEDURE — 86901 BLOOD TYPING SEROLOGIC RH(D): CPT

## 2022-05-24 PROCEDURE — 80053 COMPREHEN METABOLIC PANEL: CPT

## 2022-05-24 PROCEDURE — 36430 TRANSFUSION BLD/BLD COMPNT: CPT

## 2022-05-24 PROCEDURE — 86850 RBC ANTIBODY SCREEN: CPT

## 2022-05-24 PROCEDURE — P9040: CPT

## 2022-05-24 PROCEDURE — 83020 HEMOGLOBIN ELECTROPHORESIS: CPT

## 2022-06-06 ENCOUNTER — APPOINTMENT (OUTPATIENT)
Dept: INTERNAL MEDICINE | Facility: CLINIC | Age: 22
End: 2022-06-06
Payer: MEDICAID

## 2022-06-06 ENCOUNTER — OUTPATIENT (OUTPATIENT)
Dept: OUTPATIENT SERVICES | Facility: HOSPITAL | Age: 22
LOS: 1 days | End: 2022-06-06

## 2022-06-06 VITALS
DIASTOLIC BLOOD PRESSURE: 65 MMHG | BODY MASS INDEX: 21.53 KG/M2 | HEIGHT: 62 IN | SYSTOLIC BLOOD PRESSURE: 120 MMHG | WEIGHT: 117 LBS

## 2022-06-06 DIAGNOSIS — I26.99 OTHER PULMONARY EMBOLISM WITHOUT ACUTE COR PULMONALE: ICD-10-CM

## 2022-06-06 DIAGNOSIS — F31.9 BIPOLAR DISORDER, UNSPECIFIED: ICD-10-CM

## 2022-06-06 DIAGNOSIS — Z98.890 OTHER SPECIFIED POSTPROCEDURAL STATES: Chronic | ICD-10-CM

## 2022-06-06 DIAGNOSIS — E83.111 HEMOCHROMATOSIS DUE TO REPEATED RED BLOOD CELL TRANSFUSIONS: ICD-10-CM

## 2022-06-06 DIAGNOSIS — D57.1 SICKLE-CELL DISEASE WITHOUT CRISIS: ICD-10-CM

## 2022-06-06 PROCEDURE — 99214 OFFICE O/P EST MOD 30 MIN: CPT

## 2022-06-06 RX ORDER — ARIPIPRAZOLE 5 MG/1
5 TABLET ORAL
Qty: 30 | Refills: 0 | Status: ACTIVE | COMMUNITY
Start: 2022-01-14

## 2022-06-06 RX ORDER — TRAMADOL HYDROCHLORIDE 50 MG/1
50 TABLET, COATED ORAL
Qty: 30 | Refills: 0 | Status: ACTIVE | COMMUNITY
Start: 2020-01-29 | End: 1900-01-01

## 2022-06-06 RX ORDER — SERTRALINE HYDROCHLORIDE 100 MG/1
100 TABLET, FILM COATED ORAL
Qty: 30 | Refills: 0 | Status: ACTIVE | COMMUNITY
Start: 2022-01-14

## 2022-06-06 NOTE — HISTORY OF PRESENT ILLNESS
[FreeTextEntry1] : 22 yo female with HGB SS, no complaints. [de-identified] : 23 yo female with HGB SS on monthly 1-2 units PRBC transfusion. The patient is s/p PE on 3/14/22, and has been treated with xarelto ( now 15 mg bid). This is a first VTE. Port was without clot.\par Also was hospitalized for shunt difficulties. Seen by and followed by neurosurgery. 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 Gomez at [facility]-continue routine f/u\par continue counseling with current SW\par 3. iron overload- ferritin- 199- hold jadenu for now\par 4. PE- conitnue xarelto--> 20 mg QD for total of 6 months\par 5.f/u 3 months\par 6. occasional pain\par tramadol- 50 #30 tabs\par ISTOP checked\par 7. CNS shunt- careful f/u with neurosurgery\par 8. prevnar at next visit\par \par Eleonora Richardson MD\par

## 2022-06-07 ENCOUNTER — NON-APPOINTMENT (OUTPATIENT)
Age: 22
End: 2022-06-07

## 2022-06-07 ENCOUNTER — APPOINTMENT (OUTPATIENT)
Dept: OPHTHALMOLOGY | Facility: CLINIC | Age: 22
End: 2022-06-07
Payer: MEDICAID

## 2022-06-07 PROCEDURE — 99214 OFFICE O/P EST MOD 30 MIN: CPT

## 2022-06-07 PROCEDURE — 92083 EXTENDED VISUAL FIELD XM: CPT

## 2022-06-07 PROCEDURE — 92133 CPTRZD OPH DX IMG PST SGM ON: CPT

## 2022-06-20 ENCOUNTER — OUTPATIENT (OUTPATIENT)
Dept: OUTPATIENT SERVICES | Facility: HOSPITAL | Age: 22
LOS: 1 days | End: 2022-06-20
Payer: MEDICAID

## 2022-06-20 DIAGNOSIS — Z98.890 OTHER SPECIFIED POSTPROCEDURAL STATES: Chronic | ICD-10-CM

## 2022-06-20 DIAGNOSIS — D57.1 SICKLE-CELL DISEASE WITHOUT CRISIS: ICD-10-CM

## 2022-06-20 DIAGNOSIS — Z11.52 ENCOUNTER FOR SCREENING FOR COVID-19: ICD-10-CM

## 2022-06-20 LAB
ALBUMIN SERPL ELPH-MCNC: 4.5 G/DL — SIGNIFICANT CHANGE UP (ref 3.3–5)
ALP SERPL-CCNC: 66 U/L — SIGNIFICANT CHANGE UP (ref 40–120)
ALT FLD-CCNC: 12 U/L — SIGNIFICANT CHANGE UP (ref 10–45)
ANION GAP SERPL CALC-SCNC: 10 MMOL/L — SIGNIFICANT CHANGE UP (ref 5–17)
AST SERPL-CCNC: 33 U/L — SIGNIFICANT CHANGE UP (ref 10–40)
BILIRUB SERPL-MCNC: 4.7 MG/DL — HIGH (ref 0.2–1.2)
BUN SERPL-MCNC: 5 MG/DL — LOW (ref 7–23)
CALCIUM SERPL-MCNC: 9.1 MG/DL — SIGNIFICANT CHANGE UP (ref 8.4–10.5)
CHLORIDE SERPL-SCNC: 106 MMOL/L — SIGNIFICANT CHANGE UP (ref 96–108)
CO2 SERPL-SCNC: 22 MMOL/L — SIGNIFICANT CHANGE UP (ref 22–31)
CREAT SERPL-MCNC: 0.53 MG/DL — SIGNIFICANT CHANGE UP (ref 0.5–1.3)
EGFR: 134 ML/MIN/1.73M2 — SIGNIFICANT CHANGE UP
FERRITIN SERPL-MCNC: 235 NG/ML — HIGH (ref 15–150)
GLUCOSE SERPL-MCNC: 89 MG/DL — SIGNIFICANT CHANGE UP (ref 70–99)
HCT VFR BLD CALC: 20 % — CRITICAL LOW (ref 34.5–45)
HGB BLD-MCNC: 7.2 G/DL — LOW (ref 11.5–15.5)
MCHC RBC-ENTMCNC: 33.6 PG — SIGNIFICANT CHANGE UP (ref 27–34)
MCHC RBC-ENTMCNC: 36 GM/DL — SIGNIFICANT CHANGE UP (ref 32–36)
MCV RBC AUTO: 93.5 FL — SIGNIFICANT CHANGE UP (ref 80–100)
NRBC # BLD: 3 /100 WBCS — HIGH (ref 0–0)
PLATELET # BLD AUTO: 373 K/UL — SIGNIFICANT CHANGE UP (ref 150–400)
POTASSIUM SERPL-MCNC: 3.9 MMOL/L — SIGNIFICANT CHANGE UP (ref 3.5–5.3)
POTASSIUM SERPL-SCNC: 3.9 MMOL/L — SIGNIFICANT CHANGE UP (ref 3.5–5.3)
PROT SERPL-MCNC: 6.9 G/DL — SIGNIFICANT CHANGE UP (ref 6–8.3)
RBC # BLD: 2.14 M/UL — LOW (ref 3.8–5.2)
RBC # FLD: 23.3 % — HIGH (ref 10.3–14.5)
SARS-COV-2 RNA SPEC QL NAA+PROBE: SIGNIFICANT CHANGE UP
SODIUM SERPL-SCNC: 138 MMOL/L — SIGNIFICANT CHANGE UP (ref 135–145)
WBC # BLD: 12.91 K/UL — HIGH (ref 3.8–10.5)
WBC # FLD AUTO: 12.91 K/UL — HIGH (ref 3.8–10.5)

## 2022-06-20 PROCEDURE — U0003: CPT

## 2022-06-20 PROCEDURE — U0005: CPT

## 2022-06-20 PROCEDURE — C9803: CPT

## 2022-06-21 ENCOUNTER — NON-APPOINTMENT (OUTPATIENT)
Age: 22
End: 2022-06-21

## 2022-06-21 ENCOUNTER — OUTPATIENT (OUTPATIENT)
Dept: OUTPATIENT SERVICES | Facility: HOSPITAL | Age: 22
LOS: 1 days | End: 2022-06-21
Payer: MEDICAID

## 2022-06-21 DIAGNOSIS — D57.1 SICKLE-CELL DISEASE WITHOUT CRISIS: ICD-10-CM

## 2022-06-21 DIAGNOSIS — Z98.890 OTHER SPECIFIED POSTPROCEDURAL STATES: Chronic | ICD-10-CM

## 2022-06-21 LAB
HCT VFR BLD CALC: 19.1 % — CRITICAL LOW (ref 34.5–45)
HGB A MFR BLD: 21.6 % — LOW (ref 95.8–98)
HGB A2 MFR BLD: 3.1 % — SIGNIFICANT CHANGE UP (ref 2–3.2)
HGB BLD-MCNC: 6.9 G/DL — CRITICAL LOW (ref 11.5–15.5)
HGB C MFR BLD: 0 % — SIGNIFICANT CHANGE UP
HGB F MFR BLD: 1.1 % — HIGH (ref 0–1)
HGB OTHER MFR BLD ELPH: 0 % — SIGNIFICANT CHANGE UP
HGB S MFR BLD: 74.2 % — HIGH
MCHC RBC-ENTMCNC: 33.8 PG — SIGNIFICANT CHANGE UP (ref 27–34)
MCHC RBC-ENTMCNC: 36.1 GM/DL — HIGH (ref 32–36)
MCV RBC AUTO: 93.6 FL — SIGNIFICANT CHANGE UP (ref 80–100)
NRBC # BLD: 2 /100 WBCS — HIGH (ref 0–0)
PLATELET # BLD AUTO: 306 K/UL — SIGNIFICANT CHANGE UP (ref 150–400)
RBC # BLD: 2.04 M/UL — LOW (ref 3.8–5.2)
RBC # FLD: 24 % — HIGH (ref 10.3–14.5)
WBC # BLD: 12.53 K/UL — HIGH (ref 3.8–10.5)
WBC # FLD AUTO: 12.53 K/UL — HIGH (ref 3.8–10.5)

## 2022-06-21 PROCEDURE — 80053 COMPREHEN METABOLIC PANEL: CPT

## 2022-06-21 PROCEDURE — 86902 BLOOD TYPE ANTIGEN DONOR EA: CPT

## 2022-06-21 PROCEDURE — 96523 IRRIG DRUG DELIVERY DEVICE: CPT

## 2022-06-21 PROCEDURE — 86850 RBC ANTIBODY SCREEN: CPT

## 2022-06-21 PROCEDURE — 82728 ASSAY OF FERRITIN: CPT

## 2022-06-21 PROCEDURE — 85027 COMPLETE CBC AUTOMATED: CPT

## 2022-06-21 PROCEDURE — 86923 COMPATIBILITY TEST ELECTRIC: CPT

## 2022-06-21 PROCEDURE — 86900 BLOOD TYPING SEROLOGIC ABO: CPT

## 2022-06-21 PROCEDURE — 86901 BLOOD TYPING SEROLOGIC RH(D): CPT

## 2022-06-21 PROCEDURE — 83020 HEMOGLOBIN ELECTROPHORESIS: CPT

## 2022-06-21 PROCEDURE — P9040: CPT

## 2022-06-21 PROCEDURE — 36430 TRANSFUSION BLD/BLD COMPNT: CPT

## 2022-07-05 NOTE — ED PEDIATRIC NURSE NOTE - NSSUHOSCREENINGYN_ED_ALL_ED
7d [id number] ADRIÁN Marinelli    [facility] Temperature 100.4. Tylenol given.    Thanks,  [facility] #[id number]   Yes - the patient is able to be screened

## 2022-07-06 NOTE — DISCHARGE NOTE PEDIATRIC - CARE PROVIDER_API CALL
Patient/Caregiver provided printed discharge information. Carmen Lu), Pediatric HematologyOncology; Pediatrics  [id number] [address] [place] [id number]  Phone: [phone]  Fax: [phone]

## 2022-07-09 ENCOUNTER — OUTPATIENT (OUTPATIENT)
Dept: OUTPATIENT SERVICES | Facility: HOSPITAL | Age: 22
LOS: 1 days | End: 2022-07-09
Payer: MEDICAID

## 2022-07-09 DIAGNOSIS — D57.1 SICKLE-CELL DISEASE WITHOUT CRISIS: ICD-10-CM

## 2022-07-09 DIAGNOSIS — Z98.890 OTHER SPECIFIED POSTPROCEDURAL STATES: Chronic | ICD-10-CM

## 2022-07-09 DIAGNOSIS — Z11.52 ENCOUNTER FOR SCREENING FOR COVID-19: ICD-10-CM

## 2022-07-09 LAB
ALBUMIN SERPL ELPH-MCNC: 4.5 G/DL — SIGNIFICANT CHANGE UP (ref 3.3–5)
ALP SERPL-CCNC: 65 U/L — SIGNIFICANT CHANGE UP (ref 40–120)
ALT FLD-CCNC: 11 U/L — SIGNIFICANT CHANGE UP (ref 10–45)
ANION GAP SERPL CALC-SCNC: 13 MMOL/L — SIGNIFICANT CHANGE UP (ref 5–17)
AST SERPL-CCNC: 28 U/L — SIGNIFICANT CHANGE UP (ref 10–40)
BILIRUB SERPL-MCNC: 3.4 MG/DL — HIGH (ref 0.2–1.2)
BUN SERPL-MCNC: 6 MG/DL — LOW (ref 7–23)
CALCIUM SERPL-MCNC: 9 MG/DL — SIGNIFICANT CHANGE UP (ref 8.4–10.5)
CHLORIDE SERPL-SCNC: 109 MMOL/L — HIGH (ref 96–108)
CO2 SERPL-SCNC: 19 MMOL/L — LOW (ref 22–31)
CREAT SERPL-MCNC: 0.56 MG/DL — SIGNIFICANT CHANGE UP (ref 0.5–1.3)
EGFR: 132 ML/MIN/1.73M2 — SIGNIFICANT CHANGE UP
FERRITIN SERPL-MCNC: 304 NG/ML — HIGH (ref 15–150)
GLUCOSE SERPL-MCNC: 98 MG/DL — SIGNIFICANT CHANGE UP (ref 70–99)
HCT VFR BLD CALC: 22.3 % — LOW (ref 34.5–45)
HGB BLD-MCNC: 7.8 G/DL — LOW (ref 11.5–15.5)
MCHC RBC-ENTMCNC: 32.1 PG — SIGNIFICANT CHANGE UP (ref 27–34)
MCHC RBC-ENTMCNC: 35 GM/DL — SIGNIFICANT CHANGE UP (ref 32–36)
MCV RBC AUTO: 91.8 FL — SIGNIFICANT CHANGE UP (ref 80–100)
NRBC # BLD: 1 /100 WBCS — HIGH (ref 0–0)
PLATELET # BLD AUTO: 404 K/UL — HIGH (ref 150–400)
POTASSIUM SERPL-MCNC: 3.7 MMOL/L — SIGNIFICANT CHANGE UP (ref 3.5–5.3)
POTASSIUM SERPL-SCNC: 3.7 MMOL/L — SIGNIFICANT CHANGE UP (ref 3.5–5.3)
PROT SERPL-MCNC: 6.5 G/DL — SIGNIFICANT CHANGE UP (ref 6–8.3)
RBC # BLD: 2.43 M/UL — LOW (ref 3.8–5.2)
RBC # FLD: 18.8 % — HIGH (ref 10.3–14.5)
SARS-COV-2 RNA SPEC QL NAA+PROBE: SIGNIFICANT CHANGE UP
SODIUM SERPL-SCNC: 141 MMOL/L — SIGNIFICANT CHANGE UP (ref 135–145)
WBC # BLD: 9.96 K/UL — SIGNIFICANT CHANGE UP (ref 3.8–10.5)
WBC # FLD AUTO: 9.96 K/UL — SIGNIFICANT CHANGE UP (ref 3.8–10.5)

## 2022-07-09 PROCEDURE — U0005: CPT

## 2022-07-09 PROCEDURE — U0003: CPT

## 2022-07-09 PROCEDURE — C9803: CPT

## 2022-07-09 PROCEDURE — 96523 IRRIG DRUG DELIVERY DEVICE: CPT

## 2022-07-10 LAB
HGB A MFR BLD: 45.8 % — LOW (ref 95.8–98)
HGB A2 MFR BLD: 3 % — SIGNIFICANT CHANGE UP (ref 2–3.2)
HGB C MFR BLD: 0 % — SIGNIFICANT CHANGE UP
HGB F MFR BLD: 1 % — SIGNIFICANT CHANGE UP (ref 0–1)
HGB OTHER MFR BLD ELPH: 0 % — SIGNIFICANT CHANGE UP
HGB S MFR BLD: 50.2 % — HIGH

## 2022-07-11 ENCOUNTER — NON-APPOINTMENT (OUTPATIENT)
Age: 22
End: 2022-07-11

## 2022-07-12 ENCOUNTER — OUTPATIENT (OUTPATIENT)
Dept: OUTPATIENT SERVICES | Facility: HOSPITAL | Age: 22
LOS: 1 days | End: 2022-07-12
Payer: MEDICAID

## 2022-07-12 DIAGNOSIS — Z98.890 OTHER SPECIFIED POSTPROCEDURAL STATES: Chronic | ICD-10-CM

## 2022-07-12 DIAGNOSIS — D57.1 SICKLE-CELL DISEASE WITHOUT CRISIS: ICD-10-CM

## 2022-07-12 LAB
HCT VFR BLD CALC: 21.6 % — LOW (ref 34.5–45)
HGB BLD-MCNC: 7.7 G/DL — LOW (ref 11.5–15.5)
MCHC RBC-ENTMCNC: 32.1 PG — SIGNIFICANT CHANGE UP (ref 27–34)
MCHC RBC-ENTMCNC: 35.6 GM/DL — SIGNIFICANT CHANGE UP (ref 32–36)
MCV RBC AUTO: 90 FL — SIGNIFICANT CHANGE UP (ref 80–100)
NRBC # BLD: 0 /100 WBCS — SIGNIFICANT CHANGE UP (ref 0–0)
PLATELET # BLD AUTO: 403 K/UL — HIGH (ref 150–400)
RBC # BLD: 2.4 M/UL — LOW (ref 3.8–5.2)
RBC # FLD: 19.3 % — HIGH (ref 10.3–14.5)
WBC # BLD: 11.75 K/UL — HIGH (ref 3.8–10.5)
WBC # FLD AUTO: 11.75 K/UL — HIGH (ref 3.8–10.5)

## 2022-07-12 PROCEDURE — 85027 COMPLETE CBC AUTOMATED: CPT

## 2022-07-12 PROCEDURE — 86902 BLOOD TYPE ANTIGEN DONOR EA: CPT

## 2022-07-12 PROCEDURE — 96523 IRRIG DRUG DELIVERY DEVICE: CPT

## 2022-07-12 PROCEDURE — 86850 RBC ANTIBODY SCREEN: CPT

## 2022-07-12 PROCEDURE — P9040: CPT

## 2022-07-12 PROCEDURE — 86901 BLOOD TYPING SEROLOGIC RH(D): CPT

## 2022-07-12 PROCEDURE — 86900 BLOOD TYPING SEROLOGIC ABO: CPT

## 2022-07-12 PROCEDURE — 36430 TRANSFUSION BLD/BLD COMPNT: CPT

## 2022-07-12 PROCEDURE — 80053 COMPREHEN METABOLIC PANEL: CPT

## 2022-07-12 PROCEDURE — 82728 ASSAY OF FERRITIN: CPT

## 2022-07-12 PROCEDURE — 83020 HEMOGLOBIN ELECTROPHORESIS: CPT

## 2022-07-12 PROCEDURE — 86923 COMPATIBILITY TEST ELECTRIC: CPT

## 2022-07-29 PROCEDURE — 99214 OFFICE O/P EST MOD 30 MIN: CPT | Mod: 95

## 2022-08-04 ENCOUNTER — NON-APPOINTMENT (OUTPATIENT)
Age: 22
End: 2022-08-04

## 2022-08-04 NOTE — PATIENT PROFILE ADULT - PATIENT REPRESENTATIVE: ( YOU CAN CHOOSE ANY PERSON THAT CAN ASSIST YOU WITH YOUR HEALTH CARE PREFERENCES, DOES NOT HAVE TO BE A SPOUSE, IMMEDIATE FAMILY OR SIGNIFICANT OTHER/PARTNER)
Kirby Chu is a very pleasant 87 year old female, here for:        [facility] Emergency Department follow up 8/1/22 for hypertension     Hospital blood pressure was 199/90 on admission and 168/81 at discharge  Prior to that her blood pressure was 131-133 systolic on July 31.     Patient has frequent Emergency Department visits for hypertension   Last visit with me was 6/20/22 for hospital follow up for hypertension as well  At follow up visit 2 weeks later, her blood pressure was within acceptable range  On 7/11/22    Having high blood pressure is associated with headache and blurred vision for her  Denies hematuri     Patient verifies regularly taking: Lisinopril 20 mg 2 times a day , lasix 20 mg daily, cardizem 300 mg daily           BP Readings from Last 3 Encounters:   08/04/22 (!) 160/60   07/11/22 120/60   06/20/22 (!) 142/70         Uncontrolled hypertension and left arm pain / Elevated troponin  6/9/2022  -Cardiology saw patient in consultation  -The patient's last cardiac evaluation was 7/2021 at which point time she underwent a stress MPI  that was negative for ischemia and an echocardiogram that showed an EF of 75% and no regional wall  motion normalities.  -Repeat echocardiogram this admission showed normal LV function and moderate concentric left  ventricle hypertrophy        Ascending aortic aneurysm  -CT of chest read by radiology as \"mid ascending thoracic aorta ectatic measuring 38 mm and not  clearly aneurysmal\"         [facility] ED 4/2/22 for elevated blood pressure   Ct brain was negative  At ED follow up visit: lisinopril increased to 10 mg once daily in 4/2022          Kirby denied or did not specifically mention new onset of light-headedness, substernal chest discomfort, dyspnea with normal exertion        BP Readings from Last 3 Encounters:   08/04/22 (!) 160/60   07/11/22 120/60   06/20/22 (!) 142/70     Lab Results   Component Value Date    CREATININESE 1.0 06/17/2022    CREATININESE 1.0  11/30/2021    CREATININESE 0.8 07/19/2021    CREATININE 0.90 06/08/2022    CREATININE 0.91 06/06/2022    CREATININE 0.91 04/02/2022     Lab Results   Component Value Date    BLOODUREANIT 23 (H) 06/17/2022    BLOODUREANIT 19 11/30/2021    BLOODUREANIT 20 07/19/2021        Lab Results   Component Value Date    KPOTASSIUMSE 5.3 06/17/2022    KPOTASSIUMSE 4.8 11/30/2021    KPOTASSIUMSE 5.0 07/22/2021    POTASSIUM 3.9 06/08/2022    POTASSIUM 3.9 06/06/2022    POTASSIUM 3.8 04/02/2022     Lab Results   Component Value Date    NASODIUMSERU 139 06/17/2022    NASODIUMSERU 140 11/30/2021    SODIUM 140 06/08/2022    SODIUM 138 06/06/2022               Lightheadedness  7/12/21  hospitalized        -negative holter  normal complete blood count, comprehensive metabolic panel, troponin  CT brain: negative  ELECTROCARDIOGRAM: normal sinus rhythm  Echocardiogram: normal ejection fraction, Normal left ventricular diastolic function for age.  Mid cavity gradient of with a peak gradient of 80.9mmHg, with  Valsalva.; Moderate aortic sclerosis without stenosis.    Carotid: 50-60% left ica stenosis, below 50% right ica     CT chest:  Ascending aortic aneurysm with aorta measuring 4 cm.      myoview stress: normal          Carotid artery stenosis/peripheral vascular disease.  -CT angiography of head and neck as above with 50% stenosis of left and less than 50% of right.  -Continue serial outpatient follow-up to ensure stability.                ------------------------------------------------------ ------------------------------------------------------   >>>>  Gout   Patient verifies regularly taking:   Allopurinol 100 mg daily  Uric acid is Within normal limits     HPI:   Last  gout episode 5/2018          URIC ACID, SERUM (mg/dL)   Date Value   06/17/2022 5.6   11/30/2021 5.5            >>>> hyperlipidemia  Patient verifies regularly taking:   lovastatin 40 mg qhs  Lipids are well controlled on recent testing.  liver function tests are  normal     Weight remains stable    She is active by walking.    Lab Results   Component Value Date    LDLCHOLCALCU 67 06/17/2022    LDLCHOLCALCU 76 11/30/2021    LDLCHOLDIREC 82 01/20/2016    CHOLESTEROLT 156 06/17/2022    CHOLESTEROLT 166 11/30/2021    HDLCHOLESTER 56 06/17/2022    HDLCHOLESTER 66 11/30/2021    TRIGLYCERIDE 167 06/17/2022    TRIGLYCERIDE 117 11/30/2021       Lab Results   Component Value Date    ALANINEAMINO 16 06/17/2022    ALANINEAMINO 16 11/30/2021    ASPARTATEAMI 20 06/17/2022    ASPARTATEAMI 21 11/30/2021       Wt Readings from Last 5 Encounters:   08/04/22 51.3 kg (113 lb)   06/20/22 52.6 kg (116 lb)   04/06/22 54.4 kg (120 lb)   04/04/22 54 kg (119 lb)   12/13/21 55.8 kg (123 lb)          >>>> Carotid stenosis  >>>> peripheral arterial disease   >>>> thoracic aortic aneurysm   followed by: cardiology, Dr Talamantes:            Takes aspirin 81 mg daily.     HPI:   Echocardiogram 6/2022:   normal LV function and moderate concentric left  ventricle hypertrophy  ASCENDING AORTIC ANEURYSM: 4 cm Incidentally noted on CT 7/2021   carotid ultrasound and VENUS done 10/2018 and 10/2019: Below 50% carotid bilateral stenosis     She has intermittent heart fluttering sensation.    ------------------------------------------------------      Dysthymia  Her mood is now recovered, and she is no longer using zoloft  HPI:  Had dysthymia due to her 's passing and several other family members dying in the last few months. She was  for 66 years.    She lives alone, does not have crying episodes  She just lost her younger sister.     She has adult children that support her, and she feels better when she is spending time with them.        Recent Review Flowsheet Data     Date 8/4/2022    Adult PHQ 2 Score 2    Adult PHQ 2 Interpretation No further screening needed    Little interest or pleasure in activity? Not at all    Feeling down, depressed or hopeless? More than half the days            >>>>  ANEMIA in  2019    Patient verifies regularly taking:  omeprazole 20 mg bid, and aspirin 81 mg daily  Denies any current melena.  Recent hemoglobin was normal    HPI:  Hospitalized 1/2019  colonoscopy/EGD:  1/5/19  Repeat SBFT was negative 2/2019  Seen by Dr. Gallego 4/17/19: felt to be stable   GI AV malformation caused blood loss.        Lab Results   Component Value Date    HEMOGLOBIN 13.4 06/17/2022    HEMOGLOBIN 13.2 06/08/2022    HEMOGLOBIN 14.1 06/06/2022              >>>>  bilateral hand numbness and pain  Diagnosed with carpal tunnel syndrome by Dr. Godwin in 4/2021  She had steroid injection, but holding off on surgery for now.    >>>>  Thyroid nodules  Stable and chronic    TSH (m[iU]/L)   Date Value   06/23/2020 0.96   03/06/2019 1.95     TSH (WITH REFLEX TO FREE T4) (m[iU]/L)   Date Value   06/17/2022 0.30         PAST MEDICAL HISTORY:  -myoview stress test 7/2021: normal   -echocardiogram 7/2021: normal ejection fraction, [+] outflow gradient  -carotid ultrasound 7/2021: >50% left, <50% right stenosis  -holter 7/2021: no significant arrhythmia  -bilateral carpal tunnel syndrome: Dr. Godwin 4/2021  -lumbar degeneration and moderate scoliosis--xray 2014  -echocardiogram 2/2013--nl LVEF, mild LVH, no valve problems, [+] mild diastolic dysfunction.  -Hypertension.   -Vitamin D deficiency, slightly elevated PTH  -myoview stress test 11/2018: negative   -Carotid artery stenosis, 50% right, 50-70% left: 10/2018: NEXT 10/2019  -Becker's esophagus,   -peptic ulcer disease   -iron deficiency anemia: hospitalized with 2U pRBC 1/2019  -hiatal hernia,   -hyperlipidemia   -Osteoporosis left hip--DEXA 2/2015: NEXT IN 2020 (CANNOT TAKE BISPHOSPHONATE)  -BPPV--normal MRI brain/IAC 12/2012.  -screening colonoscopy 2/2016--TA --repeat 2019, pan-diverticulosis  -VENUS normal 6/2016  -macular degeneration: followed by: [facility]  -gout episode 5/2018: resolved.    PAST SURGICAL HISTORY:  -Carotid endarterectomy in 1992   -carotid artery stent  placement   -Cholecystectomy.  -Appendectomy.  -Aorto-bifemoral bypass in .  -left eye vitrectomy 13--[facility]  -left cataract 2015    Social History  6 children, 33 grand and great grand kids.  No cig  No etoh   Her   on 2019       Current Outpatient Medications   Medication Sig Dispense Refill   • cloNIDine (CATAPRES) 0.1 MG tablet Take 1 tablet by mouth 2 times daily as needed (systolic blood pressure above 150). 60 tablet 1   • furosemide (LASIX) 20 MG tablet Take 1 tablet by mouth daily. 90 tablet 0   • lisinopril (ZESTRIL) 10 MG tablet Take 3 tablets by mouth every evening. (Patient taking differently: Take 20 mg by mouth in the morning and 20 mg in the evening.) 270 tablet 1   • Multiple Vitamins-Minerals (PRESERVISION AREDS 2 PO) Take 2 tablets by mouth daily.     • allopurinol (ZYLOPRIM) 100 MG tablet Take 1 tablet by mouth daily. 90 tablet 1   • lovastatin (MEVACOR) 40 MG tablet Take 1 tablet by mouth at bedtime. 90 tablet 1   • omeprazole (PrilOSEC) 20 MG capsule Take 1 capsule by mouth in the morning and 1 capsule before bedtime. 180 capsule 1   • BEVACizumab (AVASTIN) 25 MG/ML injection 1.25 mg by Intravitreal route.     • CALCIUM PO Take 1 tablet by mouth daily.     • VITAMIN D, CHOLECALCIFEROL, PO Take 800 Units by mouth daily.     • dilTIAZem (CARDIZEM CD) 300 MG 24 hr capsule TAKE 1 CAPSULE BY MOUTH DAILY. 90 capsule 3   • Ferrous Sulfate 134 MG Tab Take 134 mg by mouth daily.      • docusate sodium (COLACE) 100 MG capsule Take 100 mg by mouth daily.      • aspirin 81 MG tablet Take by mouth daily.      • MULTIPLE MINERALS-VITAMINS PO Take 1 tablet by mouth daily.        No current facility-administered medications for this visit.          ROS:  The patient denied or did not specifically mention the following symptoms or any other symptoms not listed here.     --General: No fever, no chills  --HENT: no eye concerns, no ear concerns, no nose concerns  --Respiratory: No cough,  no dyspnea on exertion   --Cardiovascular: No chest discomfort, [+] palpitations.  --Gastrointestinal: No abdominal pain, no vomiting, no diarrhea, no melena  --Genitourinary: No dysuria, no frequency, no hematuria  --Musculoskeletal: [+] mild chronic low back pain, [+] mild intermittent chronic shoulder pain  --Neurologic: No syncope, [+] mild vertigo since 2012: lasting a few seconds, occurs a few  times a month, no falls, [+] headache during high blood pressure   --Psychiatric: feels down due to her 's death and younger sister's death, but not depressed  Like she used to be  --Hematologic/Lymphatic/Immunologic: No bruising or bleeding  --Endocrine: No polydipsia/polyuria  --Skin: No rash, [+]  hair loss and brittle nails      -------------------------  PHYSICAL EXAM:  BP (!) 160/60   Pulse 68   Temp 97.7 °F (36.5 °C) (Temporal)   Resp 18   Ht 4' 11\" (1.499 m)   Wt 51.3 kg (113 lb)   LMP  (LMP Unknown)   BMI 22.82 kg/m²   BSA 1.45 m²      GENERAL: She is a well developed, well-nourished female, no acute distress, ambulatory, alert/oriented x 3    HEAD: normocephalic, atraumatic, no scalp lesions, no rashes seen. [+]  Significant diffuse hair thinning.    EYES: PERRL, EOMI, Right eye: [+] injected conjunctiva, no icterus. Left eye: non-injected conjunctiva, no icterus.    NECK: Supple, n , no lymphadenopathy, no thyromegaly, no jvd,   [+] 1+ bilateral carotid bruits.     CHEST: auscultation: clear on the left, on the right, normal respiratory effort    CVS: Regular rhythm, normal heart rate, normal S1, S2; no S3, no S4, no murmur,     ABD: normo-active bowel sounds, soft to palpation, no distension, no tenderness, no hepato-splenomegaly, no hernias,    EXT: no clubbing, cyanosis, edema. No varicosity, no calf tenderness, no calf induration.  negative cooper's sign   DP/PT pulses are 2+ bilaterally, [+]  Brittle nails: easily bendable.        (+) heberden's and marlo nodes  [+] trigger fingers  bilateral hands.     NEURO: CN II-XII intact, no nystagmus; Normal mentation, fluent speech, normal power of upper extremities, normal power of lower extremities.            ASSESSMENT / PLAN:     1. Hypertensive urgency  2. Hospital discharge follow-up  3. Essential hypertension  Available hospital records reviewed  Blood pressure remains elevated    Rule out secondary hypertension: check for pheochromocytoma, hyperaldosteronism, and renal artery occlusion    Start clonidine 0.1 mg po 2 times a day as needed for systolic blood pressure above 150    Continue other medications    Follow up in 2 weeks with me for blood pressure check    Call us as soon as possible if dizziness or somnolence side effects occur      Orders Placed This Encounter   • Plasma Metanephrines   • Catecholamine, Fractionated   • Renin Activity   • Aldosterone, Serum   • cloNIDine (CATAPRES) 0.1 MG tablet     Sig: Take 1 tablet by mouth 2 times daily as needed (systolic blood pressure above 150).     Dispense:  60 tablet     Refill:  1   • US Vasc Renal Duplex     Order Specific Question:   Laterality?     Answer:   Bilateral         Return in about 2 weeks (around 8/18/2022) for FOLLOW UP for ONGOING MEDICAL CONDITIONS.      All of the instruction documented above was provided to the patient in writing via an After-Visit Summary.   The patient indicates understanding of these issues and agrees with the plan.  _______________  Electronically Signed By:  Roman Dreyer, MD. 10:40 AM, 8/4/2022   declines

## 2022-08-12 ENCOUNTER — NON-APPOINTMENT (OUTPATIENT)
Age: 22
End: 2022-08-12

## 2022-08-13 ENCOUNTER — OUTPATIENT (OUTPATIENT)
Dept: OUTPATIENT SERVICES | Facility: HOSPITAL | Age: 22
LOS: 1 days | End: 2022-08-13
Payer: MEDICAID

## 2022-08-13 DIAGNOSIS — Z11.52 ENCOUNTER FOR SCREENING FOR COVID-19: ICD-10-CM

## 2022-08-13 DIAGNOSIS — Z98.890 OTHER SPECIFIED POSTPROCEDURAL STATES: Chronic | ICD-10-CM

## 2022-08-13 DIAGNOSIS — D57.1 SICKLE-CELL DISEASE WITHOUT CRISIS: ICD-10-CM

## 2022-08-13 LAB
ALBUMIN SERPL ELPH-MCNC: 4.7 G/DL — SIGNIFICANT CHANGE UP (ref 3.3–5)
ALP SERPL-CCNC: 67 U/L — SIGNIFICANT CHANGE UP (ref 40–120)
ALT FLD-CCNC: 14 U/L — SIGNIFICANT CHANGE UP (ref 10–45)
ANION GAP SERPL CALC-SCNC: 12 MMOL/L — SIGNIFICANT CHANGE UP (ref 5–17)
AST SERPL-CCNC: 29 U/L — SIGNIFICANT CHANGE UP (ref 10–40)
BILIRUB SERPL-MCNC: 2.9 MG/DL — HIGH (ref 0.2–1.2)
BUN SERPL-MCNC: 8 MG/DL — SIGNIFICANT CHANGE UP (ref 7–23)
CALCIUM SERPL-MCNC: 9 MG/DL — SIGNIFICANT CHANGE UP (ref 8.4–10.5)
CHLORIDE SERPL-SCNC: 108 MMOL/L — SIGNIFICANT CHANGE UP (ref 96–108)
CO2 SERPL-SCNC: 21 MMOL/L — LOW (ref 22–31)
CREAT SERPL-MCNC: 0.5 MG/DL — SIGNIFICANT CHANGE UP (ref 0.5–1.3)
EGFR: 136 ML/MIN/1.73M2 — SIGNIFICANT CHANGE UP
FERRITIN SERPL-MCNC: 456 NG/ML — HIGH (ref 15–150)
GLUCOSE SERPL-MCNC: 92 MG/DL — SIGNIFICANT CHANGE UP (ref 70–99)
HCT VFR BLD CALC: 21.4 % — LOW (ref 34.5–45)
HGB BLD-MCNC: 7.6 G/DL — LOW (ref 11.5–15.5)
MCHC RBC-ENTMCNC: 32.6 PG — SIGNIFICANT CHANGE UP (ref 27–34)
MCHC RBC-ENTMCNC: 35.5 GM/DL — SIGNIFICANT CHANGE UP (ref 32–36)
MCV RBC AUTO: 91.8 FL — SIGNIFICANT CHANGE UP (ref 80–100)
NRBC # BLD: 2 /100 WBCS — HIGH (ref 0–0)
PLATELET # BLD AUTO: 367 K/UL — SIGNIFICANT CHANGE UP (ref 150–400)
POTASSIUM SERPL-MCNC: 3.8 MMOL/L — SIGNIFICANT CHANGE UP (ref 3.5–5.3)
POTASSIUM SERPL-SCNC: 3.8 MMOL/L — SIGNIFICANT CHANGE UP (ref 3.5–5.3)
PROT SERPL-MCNC: 6.9 G/DL — SIGNIFICANT CHANGE UP (ref 6–8.3)
RBC # BLD: 2.33 M/UL — LOW (ref 3.8–5.2)
RBC # FLD: 19.9 % — HIGH (ref 10.3–14.5)
SARS-COV-2 RNA SPEC QL NAA+PROBE: SIGNIFICANT CHANGE UP
SODIUM SERPL-SCNC: 141 MMOL/L — SIGNIFICANT CHANGE UP (ref 135–145)
WBC # BLD: 9.5 K/UL — SIGNIFICANT CHANGE UP (ref 3.8–10.5)
WBC # FLD AUTO: 9.5 K/UL — SIGNIFICANT CHANGE UP (ref 3.8–10.5)

## 2022-08-13 PROCEDURE — U0003: CPT

## 2022-08-13 PROCEDURE — C9803: CPT

## 2022-08-13 PROCEDURE — U0005: CPT

## 2022-08-14 LAB
HGB A MFR BLD: 39.9 % — LOW (ref 95.8–98)
HGB A2 MFR BLD: 3 % — SIGNIFICANT CHANGE UP (ref 2–3.2)
HGB C MFR BLD: 0 % — SIGNIFICANT CHANGE UP
HGB F MFR BLD: 3.9 % — HIGH (ref 0–1)
HGB OTHER MFR BLD ELPH: 0 % — SIGNIFICANT CHANGE UP
HGB S MFR BLD: 53.2 % — HIGH

## 2022-08-15 ENCOUNTER — OUTPATIENT (OUTPATIENT)
Dept: OUTPATIENT SERVICES | Facility: HOSPITAL | Age: 22
LOS: 1 days | End: 2022-08-15
Payer: MEDICAID

## 2022-08-15 DIAGNOSIS — Z98.890 OTHER SPECIFIED POSTPROCEDURAL STATES: Chronic | ICD-10-CM

## 2022-08-15 DIAGNOSIS — D57.1 SICKLE-CELL DISEASE WITHOUT CRISIS: ICD-10-CM

## 2022-08-15 LAB
HCT VFR BLD CALC: 21.8 % — LOW (ref 34.5–45)
HGB BLD-MCNC: 7.8 G/DL — LOW (ref 11.5–15.5)
MCHC RBC-ENTMCNC: 33.6 PG — SIGNIFICANT CHANGE UP (ref 27–34)
MCHC RBC-ENTMCNC: 35.8 GM/DL — SIGNIFICANT CHANGE UP (ref 32–36)
MCV RBC AUTO: 94 FL — SIGNIFICANT CHANGE UP (ref 80–100)
NRBC # BLD: 2 /100 WBCS — HIGH (ref 0–0)
PLATELET # BLD AUTO: 363 K/UL — SIGNIFICANT CHANGE UP (ref 150–400)
RBC # BLD: 2.32 M/UL — LOW (ref 3.8–5.2)
RBC # FLD: 21.2 % — HIGH (ref 10.3–14.5)
WBC # BLD: 11.39 K/UL — HIGH (ref 3.8–10.5)
WBC # FLD AUTO: 11.39 K/UL — HIGH (ref 3.8–10.5)

## 2022-08-15 PROCEDURE — 82728 ASSAY OF FERRITIN: CPT

## 2022-08-15 PROCEDURE — 83020 HEMOGLOBIN ELECTROPHORESIS: CPT

## 2022-08-15 PROCEDURE — 80053 COMPREHEN METABOLIC PANEL: CPT

## 2022-08-15 PROCEDURE — 85027 COMPLETE CBC AUTOMATED: CPT

## 2022-08-15 PROCEDURE — 86900 BLOOD TYPING SEROLOGIC ABO: CPT

## 2022-08-15 PROCEDURE — 86901 BLOOD TYPING SEROLOGIC RH(D): CPT

## 2022-08-15 PROCEDURE — 96523 IRRIG DRUG DELIVERY DEVICE: CPT

## 2022-08-15 PROCEDURE — 86923 COMPATIBILITY TEST ELECTRIC: CPT

## 2022-08-15 PROCEDURE — 36430 TRANSFUSION BLD/BLD COMPNT: CPT

## 2022-08-15 PROCEDURE — 86850 RBC ANTIBODY SCREEN: CPT

## 2022-08-15 PROCEDURE — 86902 BLOOD TYPE ANTIGEN DONOR EA: CPT

## 2022-08-15 PROCEDURE — P9040: CPT

## 2022-08-15 NOTE — ED BEHAVIORAL HEALTH ASSESSMENT NOTE - ORIENTED TO PLACE
Received request via: Patient    Was the patient seen in the last year in this department? Yes    Does the patient have an active prescription (recently filled or refills available) for medication(s) requested? No    
Yes

## 2022-08-19 ENCOUNTER — EMERGENCY (EMERGENCY)
Facility: HOSPITAL | Age: 22
LOS: 1 days | Discharge: HOME | End: 2022-08-19
Attending: STUDENT IN AN ORGANIZED HEALTH CARE EDUCATION/TRAINING PROGRAM | Admitting: STUDENT IN AN ORGANIZED HEALTH CARE EDUCATION/TRAINING PROGRAM

## 2022-08-19 ENCOUNTER — NON-APPOINTMENT (OUTPATIENT)
Age: 22
End: 2022-08-19

## 2022-08-19 VITALS
OXYGEN SATURATION: 98 % | TEMPERATURE: 98 F | RESPIRATION RATE: 17 BRPM | DIASTOLIC BLOOD PRESSURE: 54 MMHG | HEART RATE: 69 BPM | SYSTOLIC BLOOD PRESSURE: 118 MMHG

## 2022-08-19 VITALS
HEART RATE: 81 BPM | RESPIRATION RATE: 16 BRPM | DIASTOLIC BLOOD PRESSURE: 54 MMHG | TEMPERATURE: 97 F | HEIGHT: 62 IN | SYSTOLIC BLOOD PRESSURE: 104 MMHG | OXYGEN SATURATION: 99 %

## 2022-08-19 DIAGNOSIS — Z98.890 OTHER SPECIFIED POSTPROCEDURAL STATES: Chronic | ICD-10-CM

## 2022-08-19 DIAGNOSIS — Z92.89 PERSONAL HISTORY OF OTHER MEDICAL TREATMENT: ICD-10-CM

## 2022-08-19 LAB
ALBUMIN SERPL ELPH-MCNC: 5.3 G/DL — HIGH (ref 3.3–5)
ALP SERPL-CCNC: 69 U/L — SIGNIFICANT CHANGE UP (ref 40–120)
ALT FLD-CCNC: 19 U/L — SIGNIFICANT CHANGE UP (ref 4–33)
ANION GAP SERPL CALC-SCNC: 15 MMOL/L — HIGH (ref 7–14)
APPEARANCE UR: CLEAR — SIGNIFICANT CHANGE UP
APTT BLD: 23.8 SEC — LOW (ref 27–36.3)
AST SERPL-CCNC: 43 U/L — HIGH (ref 4–32)
B PERT DNA SPEC QL NAA+PROBE: SIGNIFICANT CHANGE UP
B PERT+PARAPERT DNA PNL SPEC NAA+PROBE: SIGNIFICANT CHANGE UP
BACTERIA # UR AUTO: NEGATIVE — SIGNIFICANT CHANGE UP
BASOPHILS # BLD AUTO: 0.08 K/UL — SIGNIFICANT CHANGE UP (ref 0–0.2)
BASOPHILS NFR BLD AUTO: 0.7 % — SIGNIFICANT CHANGE UP (ref 0–2)
BILIRUB DIRECT SERPL-MCNC: 0.2 MG/DL — SIGNIFICANT CHANGE UP (ref 0–0.3)
BILIRUB INDIRECT FLD-MCNC: 3.3 MG/DL — HIGH (ref 0–1)
BILIRUB SERPL-MCNC: 3.5 MG/DL — HIGH (ref 0.2–1.2)
BILIRUB SERPL-MCNC: 3.5 MG/DL — HIGH (ref 0.2–1.2)
BILIRUB UR-MCNC: NEGATIVE — SIGNIFICANT CHANGE UP
BLD GP AB SCN SERPL QL: NEGATIVE — SIGNIFICANT CHANGE UP
BORDETELLA PARAPERTUSSIS (RAPRVP): SIGNIFICANT CHANGE UP
BUN SERPL-MCNC: 10 MG/DL — SIGNIFICANT CHANGE UP (ref 7–23)
C PNEUM DNA SPEC QL NAA+PROBE: SIGNIFICANT CHANGE UP
CALCIUM SERPL-MCNC: 9.6 MG/DL — SIGNIFICANT CHANGE UP (ref 8.4–10.5)
CHLORIDE SERPL-SCNC: 106 MMOL/L — SIGNIFICANT CHANGE UP (ref 98–107)
CO2 SERPL-SCNC: 20 MMOL/L — LOW (ref 22–31)
COLOR SPEC: YELLOW — SIGNIFICANT CHANGE UP
CREAT SERPL-MCNC: 0.59 MG/DL — SIGNIFICANT CHANGE UP (ref 0.5–1.3)
DIFF PNL FLD: ABNORMAL
EGFR: 131 ML/MIN/1.73M2 — SIGNIFICANT CHANGE UP
EOSINOPHIL # BLD AUTO: 0.48 K/UL — SIGNIFICANT CHANGE UP (ref 0–0.5)
EOSINOPHIL NFR BLD AUTO: 4.1 % — SIGNIFICANT CHANGE UP (ref 0–6)
EPI CELLS # UR: 2 /HPF — SIGNIFICANT CHANGE UP (ref 0–5)
FLUAV SUBTYP SPEC NAA+PROBE: SIGNIFICANT CHANGE UP
FLUBV RNA SPEC QL NAA+PROBE: SIGNIFICANT CHANGE UP
GLUCOSE SERPL-MCNC: 83 MG/DL — SIGNIFICANT CHANGE UP (ref 70–99)
GLUCOSE UR QL: NEGATIVE — SIGNIFICANT CHANGE UP
HADV DNA SPEC QL NAA+PROBE: SIGNIFICANT CHANGE UP
HAPTOGLOB SERPL-MCNC: <20 MG/DL — LOW (ref 34–200)
HCOV 229E RNA SPEC QL NAA+PROBE: SIGNIFICANT CHANGE UP
HCOV HKU1 RNA SPEC QL NAA+PROBE: SIGNIFICANT CHANGE UP
HCOV NL63 RNA SPEC QL NAA+PROBE: SIGNIFICANT CHANGE UP
HCOV OC43 RNA SPEC QL NAA+PROBE: SIGNIFICANT CHANGE UP
HCT VFR BLD CALC: 26.4 % — LOW (ref 34.5–45)
HGB BLD-MCNC: 9.4 G/DL — LOW (ref 11.5–15.5)
HMPV RNA SPEC QL NAA+PROBE: SIGNIFICANT CHANGE UP
HPIV1 RNA SPEC QL NAA+PROBE: SIGNIFICANT CHANGE UP
HPIV2 RNA SPEC QL NAA+PROBE: SIGNIFICANT CHANGE UP
HPIV3 RNA SPEC QL NAA+PROBE: SIGNIFICANT CHANGE UP
HPIV4 RNA SPEC QL NAA+PROBE: SIGNIFICANT CHANGE UP
HYALINE CASTS # UR AUTO: 1 /LPF — SIGNIFICANT CHANGE UP (ref 0–7)
IANC: 6.72 K/UL — SIGNIFICANT CHANGE UP (ref 1.8–7.4)
IMM GRANULOCYTES NFR BLD AUTO: 0.3 % — SIGNIFICANT CHANGE UP (ref 0–1.5)
INR BLD: 1.15 RATIO — SIGNIFICANT CHANGE UP (ref 0.88–1.16)
KETONES UR-MCNC: NEGATIVE — SIGNIFICANT CHANGE UP
LDH SERPL L TO P-CCNC: 586 U/L — HIGH (ref 135–225)
LEUKOCYTE ESTERASE UR-ACNC: NEGATIVE — SIGNIFICANT CHANGE UP
LYMPHOCYTES # BLD AUTO: 28 % — SIGNIFICANT CHANGE UP (ref 13–44)
LYMPHOCYTES # BLD AUTO: 3.3 K/UL — SIGNIFICANT CHANGE UP (ref 1–3.3)
M PNEUMO DNA SPEC QL NAA+PROBE: SIGNIFICANT CHANGE UP
MCHC RBC-ENTMCNC: 31.8 PG — SIGNIFICANT CHANGE UP (ref 27–34)
MCHC RBC-ENTMCNC: 35.6 GM/DL — SIGNIFICANT CHANGE UP (ref 32–36)
MCV RBC AUTO: 89.2 FL — SIGNIFICANT CHANGE UP (ref 80–100)
MONOCYTES # BLD AUTO: 1.16 K/UL — HIGH (ref 0–0.9)
MONOCYTES NFR BLD AUTO: 9.9 % — SIGNIFICANT CHANGE UP (ref 2–14)
NEUTROPHILS # BLD AUTO: 6.72 K/UL — SIGNIFICANT CHANGE UP (ref 1.8–7.4)
NEUTROPHILS NFR BLD AUTO: 57 % — SIGNIFICANT CHANGE UP (ref 43–77)
NITRITE UR-MCNC: NEGATIVE — SIGNIFICANT CHANGE UP
NRBC # BLD: 0 /100 WBCS — SIGNIFICANT CHANGE UP (ref 0–0)
NRBC # FLD: 0.06 K/UL — HIGH (ref 0–0)
PH UR: 6 — SIGNIFICANT CHANGE UP (ref 5–8)
PLATELET # BLD AUTO: 353 K/UL — SIGNIFICANT CHANGE UP (ref 150–400)
POTASSIUM SERPL-MCNC: 4 MMOL/L — SIGNIFICANT CHANGE UP (ref 3.5–5.3)
POTASSIUM SERPL-SCNC: 4 MMOL/L — SIGNIFICANT CHANGE UP (ref 3.5–5.3)
PROT SERPL-MCNC: 7.9 G/DL — SIGNIFICANT CHANGE UP (ref 6–8.3)
PROT UR-MCNC: ABNORMAL
PROTHROM AB SERPL-ACNC: 13.4 SEC — SIGNIFICANT CHANGE UP (ref 10.5–13.4)
RAPID RVP RESULT: SIGNIFICANT CHANGE UP
RBC # BLD: 2.96 M/UL — LOW (ref 3.8–5.2)
RBC # BLD: 2.96 M/UL — LOW (ref 3.8–5.2)
RBC # FLD: 18.2 % — HIGH (ref 10.3–14.5)
RBC CASTS # UR COMP ASSIST: 1 /HPF — SIGNIFICANT CHANGE UP (ref 0–4)
RETICS #: 381.2 K/UL — HIGH (ref 25–125)
RETICS/RBC NFR: 12.9 % — HIGH (ref 0.5–2.5)
RH IG SCN BLD-IMP: POSITIVE — SIGNIFICANT CHANGE UP
RSV RNA SPEC QL NAA+PROBE: SIGNIFICANT CHANGE UP
RV+EV RNA SPEC QL NAA+PROBE: SIGNIFICANT CHANGE UP
SARS-COV-2 RNA SPEC QL NAA+PROBE: SIGNIFICANT CHANGE UP
SODIUM SERPL-SCNC: 141 MMOL/L — SIGNIFICANT CHANGE UP (ref 135–145)
SP GR SPEC: 1.02 — SIGNIFICANT CHANGE UP (ref 1.01–1.05)
UROBILINOGEN FLD QL: SIGNIFICANT CHANGE UP
WBC # BLD: 11.77 K/UL — HIGH (ref 3.8–10.5)
WBC # FLD AUTO: 11.77 K/UL — HIGH (ref 3.8–10.5)
WBC UR QL: 2 /HPF — SIGNIFICANT CHANGE UP (ref 0–5)

## 2022-08-19 PROCEDURE — 99285 EMERGENCY DEPT VISIT HI MDM: CPT

## 2022-08-19 PROCEDURE — 77011 CT SCAN FOR LOCALIZATION: CPT | Mod: 26

## 2022-08-19 PROCEDURE — 75809 NONVASCULAR SHUNT X-RAY: CPT | Mod: 26

## 2022-08-19 PROCEDURE — 71045 X-RAY EXAM CHEST 1 VIEW: CPT | Mod: 26

## 2022-08-19 RX ORDER — KETAMINE HYDROCHLORIDE 100 MG/ML
15 INJECTION INTRAMUSCULAR; INTRAVENOUS ONCE
Refills: 0 | Status: DISCONTINUED | OUTPATIENT
Start: 2022-08-19 | End: 2022-08-19

## 2022-08-19 RX ORDER — SODIUM CHLORIDE 9 MG/ML
1000 INJECTION, SOLUTION INTRAVENOUS ONCE
Refills: 0 | Status: COMPLETED | OUTPATIENT
Start: 2022-08-19 | End: 2022-08-19

## 2022-08-19 RX ORDER — MORPHINE SULFATE 50 MG/1
4 CAPSULE, EXTENDED RELEASE ORAL ONCE
Refills: 0 | Status: DISCONTINUED | OUTPATIENT
Start: 2022-08-19 | End: 2022-08-19

## 2022-08-19 RX ORDER — KETOROLAC TROMETHAMINE 30 MG/ML
15 SYRINGE (ML) INJECTION ONCE
Refills: 0 | Status: DISCONTINUED | OUTPATIENT
Start: 2022-08-19 | End: 2022-08-19

## 2022-08-19 RX ADMIN — MORPHINE SULFATE 4 MILLIGRAM(S): 50 CAPSULE, EXTENDED RELEASE ORAL at 16:47

## 2022-08-19 RX ADMIN — MORPHINE SULFATE 4 MILLIGRAM(S): 50 CAPSULE, EXTENDED RELEASE ORAL at 13:32

## 2022-08-19 RX ADMIN — Medication 15 MILLIGRAM(S): at 13:32

## 2022-08-19 RX ADMIN — SODIUM CHLORIDE 1000 MILLILITER(S): 9 INJECTION, SOLUTION INTRAVENOUS at 13:32

## 2022-08-19 RX ADMIN — Medication 15 MILLIGRAM(S): at 16:47

## 2022-08-19 NOTE — ED PROVIDER NOTE - NSFOLLOWUPINSTRUCTIONS_ED_ALL_ED_FT
1. You presented to the emergency department for: BODY ACHES.     2. Your evaluation in the emergency department included a physician evaluation. Your work-up did not reveal any findings indicating the need for admission to the hospital or any emergent interventions at this time.     3. It is recommended that you follow-up with HEMATOLOGY Dr. Richardson.     If needed, to arrange an appointment with a primary care provider please call: [phone]    4. Please continue taking your regular medications as prescribed.     5. PLEASE RETURN TO THE EMERGENCY DEPARTMENT IMMEDIATELY IF you develop any fevers not responding to over the counter medications, uncontrollable nausea and vomiting, an inability to tolerate eating and drinking, difficulty breathing, chest pain, a severe increase in your symptoms or pain, or any other new symptoms that concern you.

## 2022-08-19 NOTE — ED PROVIDER NOTE - PHYSICAL EXAMINATION
Gen: NAD, non-toxic appearing  Head: normal appearing  HEENT: normal conjunctiva, oral mucosa moist  Lung: no respiratory distress, speaking in full sentences, CTA b/l     CV: regular rate and rhythm, no murmurs  Abd: soft, non distended, non tender   MSK: no visible deformities  Neuro: No focal deficits, AAOx3  Skin: Warm  Psych: normal affect Gen: NAD, non-toxic appearing  Head: normal appearing  HEENT: normal conjunctiva, oral mucosa moist, CVA shunts no-ttp b/l  Lung: no respiratory distress, speaking in full sentences, CTA b/l     CV: regular rate and rhythm, no murmurs  Abd: soft, non distended, non tender   MSK: no visible deformities  Neuro: No focal deficits, AAOx3  Skin: Warm  Psych: normal affect

## 2022-08-19 NOTE — ED PROVIDER NOTE - PROGRESS NOTE DETAILS
Matt BROCK PGY2: spoke to pt hematologist Dr. Richardson who informed us that pt had recent transfusion and would like transfusion labs. Matt BROKC PGY2: pt now having HA still in pain will give meds and shunt series and nsx cs. Ziggy, PGY3: Spoke with on call doctor for Dr. Richardson. No additional concerns. Patient can follow up in the office on Monday. Patient cleared by neurosurgery. Patient updated on results. Feels comfortable going home. Return precautions given.

## 2022-08-19 NOTE — ED ADULT TRIAGE NOTE - CHIEF COMPLAINT QUOTE
Pt c/o generalized body pain x 2days. Denies CP, fever, SOB. PMHx: sickle cell, acute chest, currently has PE on xarelto, cerebral palsy (mild), CVA Pt c/o generalized body pain x 2days. Denies CP, fever, SOB. PMHx: sickle cell, acute chest, currently has PE on eliquis, cerebral palsy (mild), CVA

## 2022-08-19 NOTE — ED ADULT NURSE REASSESSMENT NOTE - NS ED NURSE REASSESS COMMENT FT1
Received pt in bed awake and alert, pt is having dinner, tolerating PO so far, breathing well on RA and in NAD, pt denies any pain at this time, verbalized that she returned from CT, and her  shunt was reset by the neurologist, also admits talking with the neurologist, pt is expecting to be dc. dad at bedside, pt awaiting final dispo and dc from providers. pt is comfortable at this time.

## 2022-08-19 NOTE — ED ADULT NURSE NOTE - TEMPLATE LIST FOR HEAD TO TOE ASSESSMENT
"SUBJECTIVE:  Estella Marie is a 13 year old female   Chief Complaint   Patient presents with     Musculoskeletal Problem     right shoulder felt pop when throwing baseball.  got x ray at [facility]c clinic, wore a sling for about 3 days lacy hears pop sound in shouler       Active diagnoses this visit:  Right shoulder pain  Injured it while throwing a ball.  Storey a click.    This was 2 weeks ago  Seen at [facility] - Xrays negative  Has been out of phy ed    Onset- as above  Timing - mild intermittent pain  Location  - scapular - right  Severity  - mild  Duration - as above  Quality -  achy  Settings  - variable, rare  Aggravating Factors -  no  Relieving factors -  time  Treatment to Date - rest    Past Medical History:   Diagnosis Date     Headache(784.0) 2/26/2015       No past surgical history on file.    Family History   Problem Relation Age of Onset     Depression Mother        Social History   Substance Use Topics     Smoking status: Never Smoker     Smokeless tobacco: Never Used     Alcohol use Not on file       Current Outpatient Prescriptions   Medication     order for DME     fluticasone (FLONASE) 50 MCG/ACT nasal spray     No current facility-administered medications for this visit.         No Known Allergies    REVIEW OF SYSTEMS  Skin: negative  Eyes: negative  Ears/Nose/Throat: negative  Respiratory: No shortness of breath, dyspnea on exertion, cough, or hemoptysis  Cardiovascular: negative  Gastrointestinal: negative  Genitourinary: negative  Musculoskeletal: as above  Neurologic: negative  Psychiatric: negative  Hematologic/Lymphatic/Immunologic: negative      OBJECTIVE:  /60 (BP Location: Right arm, Patient Position: Chair, Cuff Size: Adult Regular)  Pulse 83  Temp 97.9  F (36.6  C) (Tympanic)  Resp 18  Ht 5' 6.5\" (1.689 m)  Wt 175 lb 6.4 oz (79.6 kg)  SpO2 98%  BMI 27.89 kg/m2     Constitutional: healthy, alert, no distress and cooperative  Head: Normocephalic. No masses, lesions, or " tenderness  Neck: Neck supple. No adenopathy. Thyroid symmetric.  ENT: ENT exam unremarkable  Cardiovascular: PMI normal. No murmurs, clicks gallops or rub  Respiratory: negative, Percussion normal. Good diaphragmatic excursion. Lungs clear  Gastrointestinal: Abdomen soft, non-tender. BS normal. No masses, organomegaly  Musculoskeletal: No tenderness or altered ROM of right shoulder, good strength        1. Right shoulder injury, subsequent encounter  - Resume normal activity  - Stretch prior to sports  - Ibuprofen as needed  - Ice as needed  - FU with any further concerns      Aleyda CAREY  [phone]         General

## 2022-08-19 NOTE — ED PROVIDER NOTE - ATTENDING CONTRIBUTION TO CARE
I have personally performed a history and physical examination of the patient and discussed management with the resident as well as the patient.  I reviewed the resident's note and agree with the documented findings and plan of care.  I have authored and modified critical sections of the Provider Note, including but not limited to HPI, Physical Exam and MDM.    22y old Female, ex-24wga, with past medical history of HbSS, chronic transfusion ACS, CVA, grade 4 IVH as , hydrocephalus s/p  shunt x2 with 9 revisions, asthma, atrophic spleen, cholecystectomy, 4x eye operations, and bipolar 1 disorder PE 3/22 on AC p/w 2 days of diffuse body aches. no specific joint that hurts most. no f/c cp sob abd pain n/v cough dysuria. similar to previous scc. Likely SCC paihn crisis vs uri/covid vs other infectious process vs electrolyte abnormality. Consider shunt complication however neuralgically intact and no physical evidence of infection. Obtain cbc, cmp, reticulocyte, tns, ekg, cxr, ivf, symptomatic control prn. Discussed case with Dr. Richardson (pts hematologist - [phone]), states pt received a blood transfusion 1 week prior, requesting haptoglobin, ldh, pt, ptt, inr in addition. Will consider shunt series pending results and pt symptomatology.

## 2022-08-19 NOTE — ED ADULT NURSE REASSESSMENT NOTE - NS ED NURSE REASSESS COMMENT FT1
patient alert ox4 feels better with pain meds. IV infusing well. awaiting on radiology X-ray and CT and re eval. mom by  bed side.

## 2022-08-19 NOTE — ED PROVIDER NOTE - CLINICAL SUMMARY MEDICAL DECISION MAKING FREE TEXT BOX
23 yo F with complex med hx as above p/w 2 days of diffuse body aches. no f/c cp sob. VSS. benign exam. c/f SCC vs uri/covid vs other infectious process e- abnormality metabolic. plan labs ekg cxr analgesia ivf and r/a

## 2022-08-19 NOTE — ED PROVIDER NOTE - OBJECTIVE STATEMENT
22y old Female, ex-24wga, with past medical history of HbSS, chronic transfusion ACS, CVA, grade 4 IVH as , hydrocephalus s/p  shunt x2 with 9 revisions, asthma, atrophic spleen, cholecystectomy, 4x eye operations, and bipolar 1 disorder PE 3/22 on AC p/w 2 days of diffuse body aches. no specific joint that hurts most. no f/c cp sob abd pain n/v cough dysuria. similar to previous scc. no sore throat congestion sick contacts. no weakness loss of sensation. 22y old Female, ex-24wga, with past medical history of HbSS, chronic transfusion ACS, CVA, grade 4 IVH as , hydrocephalus s/p  shunt x2 with 9 revisions, asthma, atrophic spleen, cholecystectomy, 4x eye operations, and bipolar 1 disorder PE 3/22 on AC p/w 2 days of diffuse body aches. no specific joint that hurts most. no f/c cp sob abd pain n/v cough dysuria. similar to previous scc. no sore throat congestion sick contacts. no weakness loss of sensation. No palliative or provocative factors. No other current complaints at this time.

## 2022-08-19 NOTE — ED ADULT NURSE NOTE - CHIEF COMPLAINT QUOTE
Pt c/o generalized body pain x 2days. Denies CP, fever, SOB. PMHx: sickle cell, acute chest, currently has PE on eliquis, cerebral palsy (mild), CVA

## 2022-08-19 NOTE — ED PROVIDER NOTE - PATIENT PORTAL LINK FT
You can access the FollowMyHealth Patient Portal offered by [facility] by registering at the following website: [email or web address]. By joining [facility]’s FollowMyHealth portal, you will also be able to view your health information using other applications (apps) compatible with our system. High Dose Vitamin A Counseling: Side effects reviewed, pt to contact office should one occur.

## 2022-08-19 NOTE — CONSULT NOTE ADULT - ASSESSMENT
21 YO female presenting with sickle cell crisis, no indication of shunt malfunction. Patient may be discharged when cleared by hematology

## 2022-08-19 NOTE — ED ADULT NURSE NOTE - OBJECTIVE STATEMENT
RN Facilitator: 21 yo female A&Ox4, ambulatory with PHX sickle cell, acute chest, currently has PE on eliquis, cerebral palsy (mild), CVA, C/O generalized body pain worsening x 2 days. Denies fevers, chills, SOB, N/V. MD evaluated, VS as noted. 22 G IV placed to R arm, labs sent, medicated as per orders. Call bell within reach, comfort measures provided, report given to primary area RN.

## 2022-08-19 NOTE — CONSULT NOTE ADULT - SUBJECTIVE AND OBJECTIVE BOX
22y old Female, ex-24wga, with past medical history of HbSS, chronic transfusion ACS, CVA, grade 4 IVH as , hydrocephalus s/p  shunt x2 with 9 revisions, asthma, atrophic spleen, cholecystectomy, 4x eye operations, and bipolar 1 disorder PE 3/22 on AC p/w 2 days of diffuse body aches. no specific joint that hurts most. no f/c cp sob abd pain n/v cough dysuria. similar to previous scc. no sore throat congestion sick contacts. no weakness loss of sensation. No palliative or provocative factors. No other current complaints at this time.    WDWN female in NAD  Pain improved at the time of this exam  Vital Signs Last 24 Hrs  T(C): 36.8 (19 Aug 2022 17:00), Max: 36.8 (19 Aug 2022 14:26)  T(F): 98.2 (19 Aug 2022 17:00), Max: 98.2 (19 Aug 2022 14:26)  HR: 74 (19 Aug 2022 17:00) (67 - 81)  BP: 112/57 (19 Aug 2022 17:00) (104/54 - 118/71)  BP(mean): --  RR: 18 (19 Aug 2022 17:00) (16 - 18)  SpO2: 100% (19 Aug 2022 17:00) (99% - 100%)    Parameters below as of 19 Aug 2022 17:00  Patient On (Oxygen Delivery Method): room air    AAO X 3  PERRLA, EOMI  CN 2-12 grossly intact  RUSH strength 5/5  SILT    Shunt series: No kinking or discontinuity in shunt catheter  Head CT: Stable ventricular size and configuration compared with prior imaging

## 2022-08-20 LAB
CULTURE RESULTS: SIGNIFICANT CHANGE UP
SPECIMEN SOURCE: SIGNIFICANT CHANGE UP

## 2022-08-21 ENCOUNTER — INPATIENT (INPATIENT)
Facility: HOSPITAL | Age: 22
LOS: 5 days | Discharge: HOME | End: 2022-08-27
Attending: HOSPITALIST | Admitting: HOSPITALIST

## 2022-08-21 VITALS
HEIGHT: 62 IN | TEMPERATURE: 99 F | DIASTOLIC BLOOD PRESSURE: 60 MMHG | SYSTOLIC BLOOD PRESSURE: 112 MMHG | RESPIRATION RATE: 16 BRPM | OXYGEN SATURATION: 100 % | HEART RATE: 87 BPM

## 2022-08-21 DIAGNOSIS — R80.9 PROTEINURIA, UNSPECIFIED: ICD-10-CM

## 2022-08-21 DIAGNOSIS — R17 UNSPECIFIED JAUNDICE: ICD-10-CM

## 2022-08-21 DIAGNOSIS — R74.01 ELEVATION OF LEVELS OF LIVER TRANSAMINASE LEVELS: ICD-10-CM

## 2022-08-21 DIAGNOSIS — Z29.9 ENCOUNTER FOR PROPHYLACTIC MEASURES, UNSPECIFIED: ICD-10-CM

## 2022-08-21 DIAGNOSIS — D57.00 HB-SS DISEASE WITH CRISIS, UNSPECIFIED: ICD-10-CM

## 2022-08-21 DIAGNOSIS — R51.9 HEADACHE, UNSPECIFIED: ICD-10-CM

## 2022-08-21 DIAGNOSIS — Z79.899 OTHER LONG TERM (CURRENT) DRUG THERAPY: ICD-10-CM

## 2022-08-21 DIAGNOSIS — Z98.890 OTHER SPECIFIED POSTPROCEDURAL STATES: Chronic | ICD-10-CM

## 2022-08-21 DIAGNOSIS — F41.9 ANXIETY DISORDER, UNSPECIFIED: ICD-10-CM

## 2022-08-21 LAB
ALBUMIN SERPL ELPH-MCNC: 4.9 G/DL — SIGNIFICANT CHANGE UP (ref 3.3–5)
ALP SERPL-CCNC: 65 U/L — SIGNIFICANT CHANGE UP (ref 40–120)
ALT FLD-CCNC: 18 U/L — SIGNIFICANT CHANGE UP (ref 4–33)
ANION GAP SERPL CALC-SCNC: 14 MMOL/L — SIGNIFICANT CHANGE UP (ref 7–14)
APPEARANCE UR: CLEAR — SIGNIFICANT CHANGE UP
APTT BLD: 24.5 SEC — LOW (ref 27–36.3)
AST SERPL-CCNC: 40 U/L — HIGH (ref 4–32)
B PERT DNA SPEC QL NAA+PROBE: SIGNIFICANT CHANGE UP
B PERT+PARAPERT DNA PNL SPEC NAA+PROBE: SIGNIFICANT CHANGE UP
BACTERIA # UR AUTO: NEGATIVE — SIGNIFICANT CHANGE UP
BILIRUB SERPL-MCNC: 4 MG/DL — HIGH (ref 0.2–1.2)
BILIRUB UR-MCNC: NEGATIVE — SIGNIFICANT CHANGE UP
BORDETELLA PARAPERTUSSIS (RAPRVP): SIGNIFICANT CHANGE UP
BUN SERPL-MCNC: 8 MG/DL — SIGNIFICANT CHANGE UP (ref 7–23)
C PNEUM DNA SPEC QL NAA+PROBE: SIGNIFICANT CHANGE UP
CALCIUM SERPL-MCNC: 9.7 MG/DL — SIGNIFICANT CHANGE UP (ref 8.4–10.5)
CHLORIDE SERPL-SCNC: 105 MMOL/L — SIGNIFICANT CHANGE UP (ref 98–107)
CO2 SERPL-SCNC: 21 MMOL/L — LOW (ref 22–31)
COLOR SPEC: YELLOW — SIGNIFICANT CHANGE UP
CREAT SERPL-MCNC: 0.5 MG/DL — SIGNIFICANT CHANGE UP (ref 0.5–1.3)
DIFF PNL FLD: ABNORMAL
EGFR: 136 ML/MIN/1.73M2 — SIGNIFICANT CHANGE UP
EPI CELLS # UR: 2 /HPF — SIGNIFICANT CHANGE UP (ref 0–5)
FLUAV SUBTYP SPEC NAA+PROBE: SIGNIFICANT CHANGE UP
FLUBV RNA SPEC QL NAA+PROBE: SIGNIFICANT CHANGE UP
GLUCOSE SERPL-MCNC: 96 MG/DL — SIGNIFICANT CHANGE UP (ref 70–99)
GLUCOSE UR QL: NEGATIVE — SIGNIFICANT CHANGE UP
HADV DNA SPEC QL NAA+PROBE: SIGNIFICANT CHANGE UP
HCOV 229E RNA SPEC QL NAA+PROBE: SIGNIFICANT CHANGE UP
HCOV HKU1 RNA SPEC QL NAA+PROBE: SIGNIFICANT CHANGE UP
HCOV NL63 RNA SPEC QL NAA+PROBE: SIGNIFICANT CHANGE UP
HCOV OC43 RNA SPEC QL NAA+PROBE: SIGNIFICANT CHANGE UP
HCT VFR BLD CALC: 25.1 % — LOW (ref 34.5–45)
HGB BLD-MCNC: 8.8 G/DL — LOW (ref 11.5–15.5)
HIV 1+2 AB+HIV1 P24 AG SERPL QL IA: SIGNIFICANT CHANGE UP
HMPV RNA SPEC QL NAA+PROBE: SIGNIFICANT CHANGE UP
HPIV1 RNA SPEC QL NAA+PROBE: SIGNIFICANT CHANGE UP
HPIV2 RNA SPEC QL NAA+PROBE: SIGNIFICANT CHANGE UP
HPIV3 RNA SPEC QL NAA+PROBE: SIGNIFICANT CHANGE UP
HPIV4 RNA SPEC QL NAA+PROBE: SIGNIFICANT CHANGE UP
HYALINE CASTS # UR AUTO: 0 /LPF — SIGNIFICANT CHANGE UP (ref 0–7)
INR BLD: 1.2 RATIO — HIGH (ref 0.88–1.16)
KETONES UR-MCNC: NEGATIVE — SIGNIFICANT CHANGE UP
LEUKOCYTE ESTERASE UR-ACNC: NEGATIVE — SIGNIFICANT CHANGE UP
M PNEUMO DNA SPEC QL NAA+PROBE: SIGNIFICANT CHANGE UP
MCHC RBC-ENTMCNC: 31.7 PG — SIGNIFICANT CHANGE UP (ref 27–34)
MCHC RBC-ENTMCNC: 35.1 GM/DL — SIGNIFICANT CHANGE UP (ref 32–36)
MCV RBC AUTO: 90.3 FL — SIGNIFICANT CHANGE UP (ref 80–100)
NITRITE UR-MCNC: NEGATIVE — SIGNIFICANT CHANGE UP
NRBC # BLD: 0 /100 WBCS — SIGNIFICANT CHANGE UP (ref 0–0)
NRBC # FLD: 0.1 K/UL — HIGH (ref 0–0)
PH UR: 6.5 — SIGNIFICANT CHANGE UP (ref 5–8)
PLATELET # BLD AUTO: 360 K/UL — SIGNIFICANT CHANGE UP (ref 150–400)
POTASSIUM SERPL-MCNC: 3.8 MMOL/L — SIGNIFICANT CHANGE UP (ref 3.5–5.3)
POTASSIUM SERPL-SCNC: 3.8 MMOL/L — SIGNIFICANT CHANGE UP (ref 3.5–5.3)
PROT SERPL-MCNC: 7 G/DL — SIGNIFICANT CHANGE UP (ref 6–8.3)
PROT UR-MCNC: ABNORMAL
PROTHROM AB SERPL-ACNC: 13.9 SEC — HIGH (ref 10.5–13.4)
RAPID RVP RESULT: SIGNIFICANT CHANGE UP
RBC # BLD: 2.78 M/UL — LOW (ref 3.8–5.2)
RBC # BLD: 2.78 M/UL — LOW (ref 3.8–5.2)
RBC # FLD: 17.6 % — HIGH (ref 10.3–14.5)
RBC CASTS # UR COMP ASSIST: 2 /HPF — SIGNIFICANT CHANGE UP (ref 0–4)
RETICS #: 304.4 K/UL — HIGH (ref 25–125)
RETICS/RBC NFR: 11 % — HIGH (ref 0.5–2.5)
RSV RNA SPEC QL NAA+PROBE: SIGNIFICANT CHANGE UP
RV+EV RNA SPEC QL NAA+PROBE: SIGNIFICANT CHANGE UP
SARS-COV-2 RNA SPEC QL NAA+PROBE: SIGNIFICANT CHANGE UP
SODIUM SERPL-SCNC: 140 MMOL/L — SIGNIFICANT CHANGE UP (ref 135–145)
SP GR SPEC: 1.02 — SIGNIFICANT CHANGE UP (ref 1.01–1.05)
UROBILINOGEN FLD QL: ABNORMAL
WBC # BLD: 12.59 K/UL — HIGH (ref 3.8–10.5)
WBC # FLD AUTO: 12.59 K/UL — HIGH (ref 3.8–10.5)
WBC UR QL: 3 /HPF — SIGNIFICANT CHANGE UP (ref 0–5)

## 2022-08-21 PROCEDURE — 99223 1ST HOSP IP/OBS HIGH 75: CPT

## 2022-08-21 PROCEDURE — 71045 X-RAY EXAM CHEST 1 VIEW: CPT | Mod: 26

## 2022-08-21 PROCEDURE — 99285 EMERGENCY DEPT VISIT HI MDM: CPT

## 2022-08-21 RX ORDER — NALOXONE HYDROCHLORIDE 4 MG/.1ML
0.1 SPRAY NASAL
Refills: 0 | Status: DISCONTINUED | OUTPATIENT
Start: 2022-08-21 | End: 2022-08-27

## 2022-08-21 RX ORDER — KETOROLAC TROMETHAMINE 30 MG/ML
15 SYRINGE (ML) INJECTION ONCE
Refills: 0 | Status: DISCONTINUED | OUTPATIENT
Start: 2022-08-21 | End: 2022-08-21

## 2022-08-21 RX ORDER — MORPHINE SULFATE 50 MG/1
6 CAPSULE, EXTENDED RELEASE ORAL ONCE
Refills: 0 | Status: DISCONTINUED | OUTPATIENT
Start: 2022-08-21 | End: 2022-08-21

## 2022-08-21 RX ORDER — SODIUM CHLORIDE 9 MG/ML
1000 INJECTION, SOLUTION INTRAVENOUS
Refills: 0 | Status: DISCONTINUED | OUTPATIENT
Start: 2022-08-21 | End: 2022-08-23

## 2022-08-21 RX ORDER — ONDANSETRON 8 MG/1
4 TABLET, FILM COATED ORAL EVERY 6 HOURS
Refills: 0 | Status: DISCONTINUED | OUTPATIENT
Start: 2022-08-21 | End: 2022-08-27

## 2022-08-21 RX ORDER — SODIUM CHLORIDE 9 MG/ML
1000 INJECTION, SOLUTION INTRAVENOUS ONCE
Refills: 0 | Status: COMPLETED | OUTPATIENT
Start: 2022-08-21 | End: 2022-08-21

## 2022-08-21 RX ORDER — SODIUM CHLORIDE 9 MG/ML
1000 INJECTION INTRAMUSCULAR; INTRAVENOUS; SUBCUTANEOUS ONCE
Refills: 0 | Status: COMPLETED | OUTPATIENT
Start: 2022-08-21 | End: 2022-08-21

## 2022-08-21 RX ORDER — MORPHINE SULFATE 50 MG/1
30 CAPSULE, EXTENDED RELEASE ORAL
Refills: 0 | Status: DISCONTINUED | OUTPATIENT
Start: 2022-08-21 | End: 2022-08-27

## 2022-08-21 RX ORDER — HYDROXYUREA 500 MG/1
500 CAPSULE ORAL DAILY
Refills: 0 | Status: DISCONTINUED | OUTPATIENT
Start: 2022-08-21 | End: 2022-08-27

## 2022-08-21 RX ORDER — MORPHINE SULFATE 50 MG/1
4 CAPSULE, EXTENDED RELEASE ORAL ONCE
Refills: 0 | Status: DISCONTINUED | OUTPATIENT
Start: 2022-08-21 | End: 2022-08-21

## 2022-08-21 RX ORDER — SERTRALINE 25 MG/1
100 TABLET, FILM COATED ORAL DAILY
Refills: 0 | Status: DISCONTINUED | OUTPATIENT
Start: 2022-08-22 | End: 2022-08-27

## 2022-08-21 RX ORDER — MORPHINE SULFATE 50 MG/1
8 CAPSULE, EXTENDED RELEASE ORAL ONCE
Refills: 0 | Status: DISCONTINUED | OUTPATIENT
Start: 2022-08-21 | End: 2022-08-21

## 2022-08-21 RX ORDER — ARIPIPRAZOLE 15 MG/1
5 TABLET ORAL DAILY
Refills: 0 | Status: DISCONTINUED | OUTPATIENT
Start: 2022-08-22 | End: 2022-08-27

## 2022-08-21 RX ORDER — RIVAROXABAN 15 MG-20MG
20 KIT ORAL
Refills: 0 | Status: DISCONTINUED | OUTPATIENT
Start: 2022-08-21 | End: 2022-08-27

## 2022-08-21 RX ORDER — DIPHENHYDRAMINE HCL 50 MG
50 CAPSULE ORAL ONCE
Refills: 0 | Status: COMPLETED | OUTPATIENT
Start: 2022-08-21 | End: 2022-08-21

## 2022-08-21 RX ORDER — SODIUM CHLORIDE 9 MG/ML
3 INJECTION INTRAMUSCULAR; INTRAVENOUS; SUBCUTANEOUS EVERY 8 HOURS
Refills: 0 | Status: DISCONTINUED | OUTPATIENT
Start: 2022-08-21 | End: 2022-08-27

## 2022-08-21 RX ORDER — SENNA PLUS 8.6 MG/1
2 TABLET ORAL AT BEDTIME
Refills: 0 | Status: DISCONTINUED | OUTPATIENT
Start: 2022-08-21 | End: 2022-08-27

## 2022-08-21 RX ADMIN — MORPHINE SULFATE 8 MILLIGRAM(S): 50 CAPSULE, EXTENDED RELEASE ORAL at 18:03

## 2022-08-21 RX ADMIN — MORPHINE SULFATE 4 MILLIGRAM(S): 50 CAPSULE, EXTENDED RELEASE ORAL at 15:45

## 2022-08-21 RX ADMIN — Medication 15 MILLIGRAM(S): at 15:45

## 2022-08-21 RX ADMIN — SODIUM CHLORIDE 3 MILLILITER(S): 9 INJECTION INTRAMUSCULAR; INTRAVENOUS; SUBCUTANEOUS at 22:14

## 2022-08-21 RX ADMIN — SODIUM CHLORIDE 1000 MILLILITER(S): 9 INJECTION, SOLUTION INTRAVENOUS at 22:15

## 2022-08-21 RX ADMIN — SODIUM CHLORIDE 1000 MILLILITER(S): 9 INJECTION INTRAMUSCULAR; INTRAVENOUS; SUBCUTANEOUS at 18:03

## 2022-08-21 RX ADMIN — MORPHINE SULFATE 6 MILLIGRAM(S): 50 CAPSULE, EXTENDED RELEASE ORAL at 16:52

## 2022-08-21 RX ADMIN — Medication 50 MILLIGRAM(S): at 19:18

## 2022-08-21 RX ADMIN — SODIUM CHLORIDE 75 MILLILITER(S): 9 INJECTION, SOLUTION INTRAVENOUS at 23:35

## 2022-08-21 NOTE — H&P ADULT - NSHPPHYSICALEXAM_GEN_ALL_CORE
Vital Signs Last 24 Hrs  T(C): 36.5 (21 Aug 2022 20:54), Max: 37.3 (21 Aug 2022 13:59)  T(F): 97.7 (21 Aug 2022 20:54), Max: 99.1 (21 Aug 2022 13:59)  HR: 82 (21 Aug 2022 20:54) (78 - 87)  BP: 121/60 (21 Aug 2022 20:54) (99/51 - 121/60)  BP(mean): --  RR: 18 (21 Aug 2022 20:54) (16 - 18)  SpO2: 98% (21 Aug 2022 20:54) (98% - 100%)    Parameters below as of 21 Aug 2022 20:54  Patient On (Oxygen Delivery Method): room air

## 2022-08-21 NOTE — ED ADULT NURSE NOTE - NSICDXPASTMEDICALHX_GEN_ALL_CORE_FT
PAST MEDICAL HISTORY:  Anxiety     Chronic Lung Disease of Premat follows with [facility]    CP (cerebral palsy) - mild    CVA (Cerebral Vascular Accident) Onset date unknown, noted on MRI from 5/2010    Depression     Hydrocephalus     Impacted teeth     Reactive Airway Disease receives albuterol and xoponex treatments at home    S/P  Shunt x2 with multiple shunt revisions    Sickle Cell Disease     Strabismus

## 2022-08-21 NOTE — ED PROVIDER NOTE - ATTENDING CONTRIBUTION TO CARE
Dr. Liz: 21 yo female with sickle cell disease with chronic transfusions, s/p CVA and IVH, with asthma and atrophic spleen,  shunt for hydrocephalus, bipolar disorder, in ED with 2 days of generalized body pain, feels like her sickle cell pain crises.  Pt was seen in ED for same 2 days ago, had negative shunt series and pain treated well with Toradol and Morphine, but after discharge pain returned and pt came back to ED.  Pt states that at home she has been taking Tramadol, which is minimally helpful but has prevented her from having to come to ED until now.  No fever, CP/SOB, N/V/D, urinary complaints, abdominal pain or other complaints.  On exam pt overall well appearing, in NAD, heart RRR, lungs CTAB, abd NTND, extremities without swelling, strength 5/5 in all extremities and skin without rash.

## 2022-08-21 NOTE — H&P ADULT - PROBLEM SELECTOR PLAN 6
Continue Aripiprazole 5mg and Sertraline 100 mg daily. Continue Aripiprazole 5mg and Sertraline 100 mg daily.  F/up TSH level in the AM (ordered)

## 2022-08-21 NOTE — ED PROVIDER NOTE - OBJECTIVE STATEMENT
22y old Female, ex-24wga, with past medical history of HbSS, chronic transfusion ACS, CVA, grade 4 IVH as , hydrocephalus s/p  shunt x2 with 9 revisions, asthma, atrophic spleen, cholecystectomy, 4x eye operations, and bipolar 1 disorder PE 3/22 on AC p/w with 2 days of generalized body aches. patient was in the ED 22 similar episode, got torodal and morphine which improved the pain and then a few hours later the pain returned intermittently. Patient has taken tylenol which has not provided relief. Patient has also endrosed a pressure headache over her right  shunt site during tis time. Mild improvement with tylenol. Denies chest pain, SOB, fevers, N/V, dysuria. Patient received a blood transfusion 1 week prior and is schedule to see Dr. Richardson tomorrow. 22y old Female, ex-24wga, with past medical history of HbSS, chronic transfusion ACS, CVA, grade 4 IVH as , hydrocephalus s/p  shunt x2 with 9 revisions, asthma, atrophic spleen, cholecystectomy, 4x eye operations, and bipolar 1 disorder PE 3/22 on AC p/w with 2 days of generalized body aches. patient was in the ED 22 similar episode, got torodal and morphine which improved the pain and then a few hours later the pain returned intermittently. Patient has taken tylenol which has not provided relief. Patient has also endrosed a pressure headache over her right  shunt site during tis time. Mild improvement with tylenol. Denies chest pain, SOB, fevers, N/V, dysuria. Patient received a blood transfusion 1 week prior and is scheduled to see Dr. Richardson tomorrow.

## 2022-08-21 NOTE — H&P ADULT - PROBLEM SELECTOR PLAN 2
Patient reporting pressure like headache on right side over  shunt.  Patient reports that pain has currently resolved.  Patient had CT stereotactic scan which showed right frontal and parietal shunt catheters in place. Stable ventricular   size.  Continue monitoring, if headache reoccurs consider Neurosurgery reconsult. Patient reporting pressure like headache on right side over  shunt.  Patient reports that pain has currently resolved.  Patient had CT stereotactic scan which showed right frontal and parietal shunt catheters in place. Stable ventricular   size.  Ensure optimal hydration  F/up electrolytes  Continue monitoring, if headache reoccurs consider Neurosurgery reconsult.

## 2022-08-21 NOTE — ED PROVIDER NOTE - PROGRESS NOTE DETAILS
Patient continues to have pain after 15mg ketorolac and 4mg morphine. Will provide 6mg morphine and reassess.   - Amarjit Bryan PGY-1 Patient endorses slight improvement in pain but both headache and muscle aches still present. Will give 8mg morphine and fluids. Discussed with the patient that if pain persists, will need to be admitted for pain management.  - Amarjit Bryan PGY-1 Patient's pain persists, will be admitted.  - Amarjit Bryan PGY-1

## 2022-08-21 NOTE — ED ADULT NURSE NOTE - COVID-19  TEST TYPE
[facility]   [[facility]     MOLECULAR PCR

## 2022-08-21 NOTE — ED ADULT NURSE REASSESSMENT NOTE - NS ED NURSE REASSESS COMMENT FT1
pt resting, pt reports partial pain relief. stable for transfer to [facility].

## 2022-08-21 NOTE — H&P ADULT - HISTORY OF PRESENT ILLNESS
21 y/o F with  PMH of ex-24wga, sickle cell disease on chronic transfusions, acute chest syndrome, CVA, Grade 4 IVH as , hydrocephalus s/p  shunt x2 with 9 revisions, asthma, atrophic spleen, cholecystectomy, eye operations x4, bipolar disorder, Pulmonary embolism  on Xarelto presents with three days of generalized body aches. Patient presented to the ED on  for similar episode. Patient received Morphine and Toradol with improvement in pain and was discharged. Patient reports that pain returned. Patient states that pain is not located in on specific area and says it is generalized. She reports that it is similar to previous sickle cell crises. Patient denies chest pain and shortness of breath. Patient reports that her last transfusion was one week ago. Patient follows with Dr. Richardson. Patient also reports that earlier she had headache over the tight  shunt area but states that it has resolved. Patient was seen by Neurosurgery on the  for headache and CT scan done was negative. Patient denies fever, chills, abdomina pain, leg pain. Patient reports that she was taken off folic acid.    In ED patient received ketorolac 15 mg IVP, Morphine 4mg IVP, Morphine 6 mg IVP and Morphine 8 mg IVP. Patient also received 1L NS bolus. Hemoglobin was found to be 8.8, absolute reticulocytes  304.4 and reticulocyte percent 11.0. 21 y/o F with  PMH of ex-24wga, sickle cell disease on chronic transfusions, acute chest syndrome, CVA, Grade 4 IVH as , hydrocephalus s/p  shunt x2 with 9 revisions, asthma, atrophic spleen, cholecystectomy, eye operations x4, bipolar disorder, Pulmonary embolism  on Xarelto presents with three days of generalized body aches. Patient presented to the ED on  for similar episode. Patient received Morphine and Toradol with improvement in pain and was discharged. Patient reports that pain returned. Patient states that pain is in her upper back and  that it is similar to previous sickle cell crises. Patient states that in the past she has also had chest pain but currently denies chest pain and shortness of breath. Patient reports that her last transfusion was one week ago. Patient follows with Dr. Richardson. Patient also reports that earlier she had headache over the tight  shunt area but states that it has resolved. Patient states that headache is usually pressure like. Patient was seen by Neurosurgery on the  for headache and CT scan done was negative. Patient denies fever, chills, abdomina pain, leg pain. Patient reports that she was taken off folic acid.    In ED patient received ketorolac 15 mg IVP, Morphine 4mg IVP, Morphine 6 mg IVP and Morphine 8 mg IVP. Patient also received 1L NS bolus. Hemoglobin was found to be 8.8, absolute reticulocytes  304.4 and reticulocyte percent 11.0.

## 2022-08-21 NOTE — ED PROVIDER NOTE - MDM ORDERS SUBMITTED SELECTION
Continue Regimen: Tretnoin 0.025% cream Detail Level: Generalized Render In Strict Bullet Format?: Yes Labs/Medications Labs/Imaging Studies/Medications

## 2022-08-21 NOTE — H&P ADULT - NSHPLABSRESULTS_GEN_ALL_CORE
Vital Signs Last 24 Hrs  T(C): 36.5 (21 Aug 2022 20:54), Max: 37.3 (21 Aug 2022 13:59)  T(F): 97.7 (21 Aug 2022 20:54), Max: 99.1 (21 Aug 2022 13:59)  HR: 82 (21 Aug 2022 20:54) (78 - 87)  BP: 121/60 (21 Aug 2022 20:54) (99/51 - 121/60)  BP(mean): --  RR: 18 (21 Aug 2022 20:54) (16 - 18)  SpO2: 98% (21 Aug 2022 20:54) (98% - 100%)    Parameters below as of 21 Aug 2022 20:54  Patient On (Oxygen Delivery Method): room air                          8.8    12.59 )-----------( 360      ( 21 Aug 2022 15:40 )             25.1   08-21    140  |  105  |  8   ----------------------------<  96  3.8   |  21<L>  |  0.50    Ca    9.7      21 Aug 2022 15:40    TPro  7.0  /  Alb  4.9  /  TBili  4.0<H>  /  DBili  x   /  AST  40<H>  /  ALT  18  /  AlkPhos  65  08-21    PT/INR - ( 21 Aug 2022 15:40 )   PT: 13.9 sec;   INR: 1.20 ratio         PTT - ( 21 Aug 2022 15:40 )  PTT:24.5 sec    Urinalysis Basic - ( 21 Aug 2022 15:53 )    Color: Yellow / Appearance: Clear / S.019 / pH: x  Gluc: x / Ketone: Negative  / Bili: Negative / Urobili: 6 mg/dL   Blood: x / Protein: 30 mg/dL / Nitrite: Negative   Leuk Esterase: Negative / RBC: 2 /HPF / WBC 3 /HPF   Sq Epi: x / Non Sq Epi: 2 /HPF / Bacteria: Negative    RVP and COVID 19 PCR: Negative    CXR ordered.    CT Stereotatic no localization:  FINDINGS:  Right frontal ventriculoperitoneal shunt catheter with its tip in the   right lateral ventricle adjacent to the septum pellucidum. Right parietal   and ventriculoperitoneal shunt catheter in place with its tip in the   right foramen of Monro.  Ventricular size is unchanged since the prior exam. There is no evidence   of acute intraparenchymal hemorrhage or midline shift. No extra-axial   fluid collections. The basal cisterns are patent.    Right parietal ha hole. Otherwise, the calvarium is intact. The   visualized intraorbital compartments, paranasal sinuses and mastoid   complexes appear free of acute disease.    IMPRESSION:  Right frontal and parietal shunt catheters in place. Stable ventricular   size.

## 2022-08-21 NOTE — H&P ADULT - ASSESSMENT
21 y/o F with  PMH of ex-24wga, sickle cell disease on chronic transfusions, acute chest syndrome, CVA, Grade 4 IVH as , hydrocephalus s/p  shunt x2 with 9 revisions, asthma, atrophic spleen, cholecystectomy, eye operations x4, bipolar disorder, Pulmonary embolism  on Xarelto presents with three days of generalized body aches cosistent with sickle cell crisis. [ x ]  Lab studies reviewed  [ x ]  Radiology reviewed  [ x ]  Old records reviewed    21 y/o F with  PMH of ex-24wga, sickle cell disease on chronic transfusions, acute chest syndrome, CVA, Grade 4 IVH as , hydrocephalus s/p  shunt x2 with 9 revisions, asthma, atrophic spleen, cholecystectomy, eye operations x4, bipolar disorder, Pulmonary embolism  on Xarelto presents with three days of generalized body aches - consistent with sickle cell crisis.

## 2022-08-21 NOTE — ED ADULT TRIAGE NOTE - CHIEF COMPLAINT QUOTE
pt c/o body aches and fatigue x 4 days worsening, states feels like sickle cell crisis. pt in no distress. took Tylenol at 10am with no relief. no chest pain, sob. hx. SCC, asthma

## 2022-08-21 NOTE — H&P ADULT - NSICDXPASTMEDICALHX_GEN_ALL_CORE_FT
PAST MEDICAL HISTORY:  Anxiety     Chronic Lung Disease of Premat follows with [facility]    CP (cerebral palsy) - mild    CVA (Cerebral Vascular Accident) Onset date unknown, noted on MRI from 5/2010    Depression     Hydrocephalus     Impacted teeth     Reactive Airway Disease receives albuterol and xoponex treatments at home    S/P  Shunt x2 with multiple shunt revisions    Sickle Cell Disease     Strabismus

## 2022-08-21 NOTE — H&P ADULT - NSHPSOCIALHISTORY_GEN_ALL_CORE
Patient denies use of cigarettes, alcohol and drugs. 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 Patient denies use of cigarettes, alcohol and drugs.   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

## 2022-08-21 NOTE — ED PROVIDER NOTE - NS ED ROS FT
GENERAL: No fever or chills  EYES: No change in vision  HEENT: No trouble swallowing or speaking  CARDIAC: No chest pain  PULMONARY: No cough or SOB  GI: No abdominal pain, no nausea or no vomiting, no diarrhea or constipation  : No changes in urination  SKIN: No rashes  NEURO: no numbness  MSK: No joint pain  Otherwise as HPI or negative.

## 2022-08-21 NOTE — ED PROVIDER NOTE - CLINICAL SUMMARY MEDICAL DECISION MAKING FREE TEXT BOX
22y old Female, ex-24wga, with past medical history of HbSS, chronic transfusion ACS, CVA, hydrocephalus s/p  shunt x2 with 9 revisions, asthma, atrophic spleen, PE 3/22 on AC p/w with 2 days of generalized body aches and headache at  insertion site after recent discharge from the hospital for similar episode. Most likely returning sickle cell crisis, possible viral infection. Low suspicion for  dislocation or infection as no focal deficits at this time and  shunt studies on 8/19 unremarkable. will provide pain analgesics. most likely admission for pain control.

## 2022-08-21 NOTE — ED PROVIDER NOTE - PHYSICAL EXAMINATION
Gen: NAD. A&Ox4. Non-toxic appearing.  HEENT: Normocephalic and atraumatic. PERRL, EOMI, no nasal discharge, mucous membranes moist, no scleral icterus. No tenderness to palpation at  shunt insertion site.  CV: Regular rate and rhythm, +S1/S2, no M/R/G. No significant lower extremity edema. Radial and DP pulses present and symmetrical. Capillary refill less than 2 seconds.  Resp: Normal effort and rate. CTAB, no rales, rhonchi, or wheezes.  GI: Abdomen soft, non-distended, non tender to palpation. No masses appreciated.   MSK: No open wounds and no bruising. No joint tenderness.  Neuro: Following commands, speaking in full sentences, moving extremities spontaneously. CN II-XII intact. Strength and sensation symmetric and intact throughout. Gait normal.  Psych: Appropriate mood, cooperative

## 2022-08-21 NOTE — H&P ADULT - PROBLEM SELECTOR PLAN 1
Admit to medicine, continue monitoring.  Pain likely due to sickle cell crisis  Patient  s/p Morphine 4mg IVP, Morphine 6 mg IVP and Morphine 8 mg IVP in ER  Morphine PCA pump ordered for pain control.  1/2 NS IVF @ 75cc/hr for 24 hours ordered.  Toradol/Ibuprofen PRN for breakthrough pain  Protonix for GI prophylaxis.  Incentive spirometry ordered.  Bowel regimen with senna.  If patient becomes febrile obtain blood cultures. Admit to medicine, continue monitoring.  Pain likely due to sickle cell crisis  Patient  s/p Morphine 4mg IVP, Morphine 6 mg IVP and Morphine 8 mg IVP in ER  Morphine PCA pump ordered for pain control.  1/2 NS IVF @ 75cc/hr for 24 hours ordered.  Toradol/Ibuprofen PRN for breakthrough pain  Protonix for GI prophylaxis.  Incentive spirometry ordered.  Bowel regimen with senna.  If patient becomes febrile obtain blood cultures.  CXR ordered. Admit to medicine, continue monitoring.  Pain likely due to sickle cell crisis  Patient  s/p Morphine 4mg IVP, Morphine 6 mg IVP and Morphine 8 mg IVP in ER  Morphine PCA pump ordered for pain control.  1/2 NS IVF @ 75cc/hr for 24 hours ordered.  Toradol/Ibuprofen PRN for breakthrough pain  Protonix for GI prophylaxis.  Incentive spirometry ordered.  Bowel regimen with senna.  If patient becomes febrile obtain blood cultures.  CXR preliminary report showing clear lungs. Admit to medicine, continue management  Pain likely due to sickle cell crisis  Patient  s/p Morphine 4mg IVP, Morphine 6 mg IVP and Morphine 8 mg IVP in ER  Morphine PCA pump ordered for pain control.  S/P one liter NaCl in the ED.  Additional IVF - LR 1 L bolus, followed by 1/2 NS IVF @ 75cc/hr for 24 hours ordered.  Toradol/Ibuprofen PRN for breakthrough pain  Protonix for GI prophylaxis.  Incentive spirometry ordered.  Bowel regimen with senna.  If patient becomes febrile obtain blood cultures.  CXR preliminary report showing clear lungs.  Hematology consult in the AM (please call)

## 2022-08-21 NOTE — H&P ADULT - PROBLEM SELECTOR PLAN 3
Patient with bilirubin of 4.0.  Patient appears to have scleral icterus. Patient with bilirubin of 4.0.  Patient appears to have scleral icterus.  Bilirubin likely elevated in setting of sickle cell disease.  Continue to trend.

## 2022-08-21 NOTE — ED ADULT NURSE NOTE - OBJECTIVE STATEMENT
pt ambulatory, accompanied by father from home for generalized pain and weakness as pt describes is similar to sickle cell crisis episode. pt aox4, well appearing. pt also has  shunt to right side. pt reports ongoing headache. was here about 2-3 days ago for similar issues. pt breathing even and unlabored. left forearm 22g inserted.

## 2022-08-21 NOTE — H&P ADULT - MUSCULOSKELETAL
ROM intact/strength 5/5 bilateral upper extremities/strength 5/5 bilateral lower extremities details…

## 2022-08-21 NOTE — ED PROVIDER NOTE - NSICDXPASTMEDICALHX_GEN_ALL_CORE_FT
PAST MEDICAL HISTORY:  Anxiety     Chronic Lung Disease of Premat follows with [facility]    CP (cerebral palsy) - mild    CVA (Cerebral Vascular Accident) Onset date unknown, noted on MRI from 5/2010    Depression     Hydrocephalus     Impacted teeth     Reactive Airway Disease receives albuterol and xoponex treatments at home    S/P  Shunt x2 with multiple shunt revisions    Sickle Cell Disease     Strabismus

## 2022-08-21 NOTE — H&P ADULT - NS ATTEND AMEND GEN_ALL_CORE FT
22 year old female, with past history as noted above, with significance for Sickle cell disease, being managed for sickle cell crisis after presenting with generalized muscle aches - c/w prior crises pain.  Ensure optimal hydration; continuing on maintenance for now.  On morphine PCA.  Continues on hydroxyurea.  F/up AM lab-work - including reticulocyte count, LDH, LFTs, haptoglobin level.  Hematology consult in the AM.  Has headache; no issues re R- shunt, per CT.  Hydration may help headache.  Continuing on Xarelto for history of PE.  On Hematology consult in the AM (please call).  F/u for bowel movements since patient on opiate and suffers with constipation.    All other management as prescribed.

## 2022-08-22 LAB
ALBUMIN SERPL ELPH-MCNC: 4.3 G/DL — SIGNIFICANT CHANGE UP (ref 3.3–5)
ALP SERPL-CCNC: 66 U/L — SIGNIFICANT CHANGE UP (ref 40–120)
ALT FLD-CCNC: 17 U/L — SIGNIFICANT CHANGE UP (ref 4–33)
ANION GAP SERPL CALC-SCNC: 11 MMOL/L — SIGNIFICANT CHANGE UP (ref 7–14)
AST SERPL-CCNC: 34 U/L — HIGH (ref 4–32)
BASOPHILS # BLD AUTO: 0.11 K/UL — SIGNIFICANT CHANGE UP (ref 0–0.2)
BASOPHILS NFR BLD AUTO: 1 % — SIGNIFICANT CHANGE UP (ref 0–2)
BILIRUB SERPL-MCNC: 3.3 MG/DL — HIGH (ref 0.2–1.2)
BUN SERPL-MCNC: 10 MG/DL — SIGNIFICANT CHANGE UP (ref 7–23)
C TRACH RRNA SPEC QL NAA+PROBE: SIGNIFICANT CHANGE UP
CALCIUM SERPL-MCNC: 9 MG/DL — SIGNIFICANT CHANGE UP (ref 8.4–10.5)
CHLORIDE SERPL-SCNC: 108 MMOL/L — HIGH (ref 98–107)
CHOLEST SERPL-MCNC: 113 MG/DL — SIGNIFICANT CHANGE UP
CO2 SERPL-SCNC: 21 MMOL/L — LOW (ref 22–31)
CREAT SERPL-MCNC: 0.51 MG/DL — SIGNIFICANT CHANGE UP (ref 0.5–1.3)
EGFR: 135 ML/MIN/1.73M2 — SIGNIFICANT CHANGE UP
EOSINOPHIL # BLD AUTO: 0.58 K/UL — HIGH (ref 0–0.5)
EOSINOPHIL NFR BLD AUTO: 5.3 % — SIGNIFICANT CHANGE UP (ref 0–6)
GLUCOSE SERPL-MCNC: 110 MG/DL — HIGH (ref 70–99)
HAPTOGLOB SERPL-MCNC: <20 MG/DL — LOW (ref 34–200)
HCT VFR BLD CALC: 22.3 % — LOW (ref 34.5–45)
HDLC SERPL-MCNC: 33 MG/DL — LOW
HGB BLD-MCNC: 7.8 G/DL — LOW (ref 11.5–15.5)
IANC: 5.51 K/UL — SIGNIFICANT CHANGE UP (ref 1.8–7.4)
IMM GRANULOCYTES NFR BLD AUTO: 0.4 % — SIGNIFICANT CHANGE UP (ref 0–1.5)
LDH SERPL L TO P-CCNC: 502 U/L — HIGH (ref 135–225)
LIPID PNL WITH DIRECT LDL SERPL: 54 MG/DL — SIGNIFICANT CHANGE UP
LYMPHOCYTES # BLD AUTO: 3.57 K/UL — HIGH (ref 1–3.3)
LYMPHOCYTES # BLD AUTO: 32.5 % — SIGNIFICANT CHANGE UP (ref 13–44)
MAGNESIUM SERPL-MCNC: 1.7 MG/DL — SIGNIFICANT CHANGE UP (ref 1.6–2.6)
MCHC RBC-ENTMCNC: 32 PG — SIGNIFICANT CHANGE UP (ref 27–34)
MCHC RBC-ENTMCNC: 35 GM/DL — SIGNIFICANT CHANGE UP (ref 32–36)
MCV RBC AUTO: 91.4 FL — SIGNIFICANT CHANGE UP (ref 80–100)
MONOCYTES # BLD AUTO: 1.18 K/UL — HIGH (ref 0–0.9)
MONOCYTES NFR BLD AUTO: 10.7 % — SIGNIFICANT CHANGE UP (ref 2–14)
N GONORRHOEA RRNA SPEC QL NAA+PROBE: SIGNIFICANT CHANGE UP
NEUTROPHILS # BLD AUTO: 5.51 K/UL — SIGNIFICANT CHANGE UP (ref 1.8–7.4)
NEUTROPHILS NFR BLD AUTO: 50.1 % — SIGNIFICANT CHANGE UP (ref 43–77)
NON HDL CHOLESTEROL: 80 MG/DL — SIGNIFICANT CHANGE UP
NRBC # BLD: 0 /100 WBCS — SIGNIFICANT CHANGE UP (ref 0–0)
NRBC # FLD: 0.08 K/UL — HIGH (ref 0–0)
PHOSPHATE SERPL-MCNC: 3.5 MG/DL — SIGNIFICANT CHANGE UP (ref 2.5–4.5)
PLATELET # BLD AUTO: 334 K/UL — SIGNIFICANT CHANGE UP (ref 150–400)
POTASSIUM SERPL-MCNC: 3.8 MMOL/L — SIGNIFICANT CHANGE UP (ref 3.5–5.3)
POTASSIUM SERPL-SCNC: 3.8 MMOL/L — SIGNIFICANT CHANGE UP (ref 3.5–5.3)
PROT SERPL-MCNC: 6.4 G/DL — SIGNIFICANT CHANGE UP (ref 6–8.3)
RBC # BLD: 2.44 M/UL — LOW (ref 3.8–5.2)
RBC # BLD: 2.46 M/UL — LOW (ref 3.8–5.2)
RBC # FLD: 17.7 % — HIGH (ref 10.3–14.5)
RETICS #: 269.9 K/UL — HIGH (ref 25–125)
RETICS/RBC NFR: 11 % — HIGH (ref 0.5–2.5)
SODIUM SERPL-SCNC: 140 MMOL/L — SIGNIFICANT CHANGE UP (ref 135–145)
SPECIMEN SOURCE: SIGNIFICANT CHANGE UP
TRIGL SERPL-MCNC: 128 MG/DL — SIGNIFICANT CHANGE UP
TSH SERPL-MCNC: 2.07 UIU/ML — SIGNIFICANT CHANGE UP (ref 0.27–4.2)
WBC # BLD: 10.99 K/UL — HIGH (ref 3.8–10.5)
WBC # FLD AUTO: 10.99 K/UL — HIGH (ref 3.8–10.5)

## 2022-08-22 PROCEDURE — 99233 SBSQ HOSP IP/OBS HIGH 50: CPT

## 2022-08-22 RX ORDER — ACETAMINOPHEN 500 MG
650 TABLET ORAL ONCE
Refills: 0 | Status: COMPLETED | OUTPATIENT
Start: 2022-08-22 | End: 2022-08-22

## 2022-08-22 RX ADMIN — MORPHINE SULFATE 30 MILLILITER(S): 50 CAPSULE, EXTENDED RELEASE ORAL at 08:30

## 2022-08-22 RX ADMIN — ONDANSETRON 4 MILLIGRAM(S): 8 TABLET, FILM COATED ORAL at 15:36

## 2022-08-22 RX ADMIN — SODIUM CHLORIDE 3 MILLILITER(S): 9 INJECTION INTRAMUSCULAR; INTRAVENOUS; SUBCUTANEOUS at 05:06

## 2022-08-22 RX ADMIN — MORPHINE SULFATE 30 MILLILITER(S): 50 CAPSULE, EXTENDED RELEASE ORAL at 20:07

## 2022-08-22 RX ADMIN — HYDROXYUREA 500 MILLIGRAM(S): 500 CAPSULE ORAL at 12:07

## 2022-08-22 RX ADMIN — RIVAROXABAN 20 MILLIGRAM(S): KIT at 18:14

## 2022-08-22 RX ADMIN — ARIPIPRAZOLE 5 MILLIGRAM(S): 15 TABLET ORAL at 12:06

## 2022-08-22 RX ADMIN — SODIUM CHLORIDE 3 MILLILITER(S): 9 INJECTION INTRAMUSCULAR; INTRAVENOUS; SUBCUTANEOUS at 14:22

## 2022-08-22 RX ADMIN — SENNA PLUS 2 TABLET(S): 8.6 TABLET ORAL at 21:09

## 2022-08-22 RX ADMIN — Medication 650 MILLIGRAM(S): at 20:07

## 2022-08-22 RX ADMIN — MORPHINE SULFATE 30 MILLILITER(S): 50 CAPSULE, EXTENDED RELEASE ORAL at 02:47

## 2022-08-22 RX ADMIN — Medication 650 MILLIGRAM(S): at 19:37

## 2022-08-22 RX ADMIN — SODIUM CHLORIDE 75 MILLILITER(S): 9 INJECTION, SOLUTION INTRAVENOUS at 11:18

## 2022-08-22 RX ADMIN — SODIUM CHLORIDE 3 MILLILITER(S): 9 INJECTION INTRAMUSCULAR; INTRAVENOUS; SUBCUTANEOUS at 21:20

## 2022-08-22 RX ADMIN — SERTRALINE 100 MILLIGRAM(S): 25 TABLET, FILM COATED ORAL at 12:06

## 2022-08-22 NOTE — PROGRESS NOTE ADULT - PROBLEM SELECTOR PLAN 1
Admit to medicine, continue management  Pain likely due to sickle cell crisis  Patient  s/p Morphine 4mg IVP, Morphine 6 mg IVP and Morphine 8 mg IVP in ER  Morphine PCA pump ordered for pain control.  S/P one liter NaCl in the ED.  Additional IVF - LR 1 L bolus, followed by 1/2 NS IVF @ 75cc/hr for 24 hours ordered.  Toradol/Ibuprofen PRN for breakthrough pain  Protonix for GI prophylaxis.  Incentive spirometry ordered.  Bowel regimen with senna.  If patient becomes febrile obtain blood cultures.  CXR neg

## 2022-08-22 NOTE — PATIENT PROFILE ADULT - FALL HARM RISK - HARM RISK INTERVENTIONS
Assistance with ambulation/Assistance OOB with selected safe patient handling equipment/Communicate Risk of Fall with Harm to all staff/Reinforce activity limits and safety measures with patient and family/Review medications for side effects contributing to fall risk/Sit up slowly, dangle for a short time, stand at bedside before walking/Tailored Fall Risk Interventions/Toileting schedule using arm’s reach rule for commode and bathroom/Visual Cue: Yellow wristband and red socks/Bed in lowest position, wheels locked, appropriate side rails in place/Call bell, personal items and telephone in reach/Instruct patient to call for assistance before getting out of bed or chair/Non-slip footwear when patient is out of bed/[place] to call system/Physically safe environment - no spills, clutter or unnecessary equipment/Purposeful Proactive Rounding/Room/bathroom lighting operational, light cord in reach

## 2022-08-22 NOTE — PROGRESS NOTE ADULT - PROBLEM SELECTOR PLAN 3
Patient with bilirubin of 4.0.  Patient appears to have scleral icterus.  Bilirubin likely elevated in setting of sickle cell disease.  Continue to trend.

## 2022-08-22 NOTE — PHARMACOTHERAPY INTERVENTION NOTE - COMMENTS
Medication list/dosages in Outpatient Medication Review (OM[facility]) verified with outpatient pharmacy ([facility]). Some medications last filled in [facility]/2022 x 1 month (due for refills).   Of Note:   - Xarelto 20mg QD (6/5/22 x 30 days)  - Hydroxyurea 500mg QD (6/6/22 x 30 days)  - Tramadol 50mg last filled in Oct/2021  Medication list/dosages in Outpatient Medication Review ([facility]) verified with outpatient pharmacy ([facility]). Some medications last filled in [facility]/2022 x 1 month (due for refills).   Of Note:   - Xarelto 20mg QD (6/5/22 x 30 days)  - Hydroxyurea 500mg QD (6/6/22 x 30 days)  - Tramadol 50mg last filled in Oct/2021   - No fill/dispense history on file for Jadenu 360mg (?)

## 2022-08-23 DIAGNOSIS — I95.9 HYPOTENSION, UNSPECIFIED: ICD-10-CM

## 2022-08-23 LAB
ALBUMIN SERPL ELPH-MCNC: 4.4 G/DL — SIGNIFICANT CHANGE UP (ref 3.3–5)
ALP SERPL-CCNC: 57 U/L — SIGNIFICANT CHANGE UP (ref 40–120)
ALT FLD-CCNC: 18 U/L — SIGNIFICANT CHANGE UP (ref 4–33)
ANION GAP SERPL CALC-SCNC: 11 MMOL/L — SIGNIFICANT CHANGE UP (ref 7–14)
AST SERPL-CCNC: 34 U/L — HIGH (ref 4–32)
BILIRUB DIRECT SERPL-MCNC: 0.2 MG/DL — SIGNIFICANT CHANGE UP (ref 0–0.3)
BILIRUB INDIRECT FLD-MCNC: 3.7 MG/DL — HIGH (ref 0–1)
BILIRUB SERPL-MCNC: 3.9 MG/DL — HIGH (ref 0.2–1.2)
BILIRUB SERPL-MCNC: 3.9 MG/DL — HIGH (ref 0.2–1.2)
BUN SERPL-MCNC: 6 MG/DL — LOW (ref 7–23)
CALCIUM SERPL-MCNC: 9.4 MG/DL — SIGNIFICANT CHANGE UP (ref 8.4–10.5)
CHLORIDE SERPL-SCNC: 106 MMOL/L — SIGNIFICANT CHANGE UP (ref 98–107)
CO2 SERPL-SCNC: 22 MMOL/L — SIGNIFICANT CHANGE UP (ref 22–31)
CREAT SERPL-MCNC: 0.51 MG/DL — SIGNIFICANT CHANGE UP (ref 0.5–1.3)
EGFR: 135 ML/MIN/1.73M2 — SIGNIFICANT CHANGE UP
GLUCOSE SERPL-MCNC: 80 MG/DL — SIGNIFICANT CHANGE UP (ref 70–99)
HCT VFR BLD CALC: 22.7 % — LOW (ref 34.5–45)
HGB BLD-MCNC: 7.8 G/DL — LOW (ref 11.5–15.5)
LDH SERPL L TO P-CCNC: 523 U/L — HIGH (ref 135–225)
MAGNESIUM SERPL-MCNC: 1.8 MG/DL — SIGNIFICANT CHANGE UP (ref 1.6–2.6)
MCHC RBC-ENTMCNC: 31.8 PG — SIGNIFICANT CHANGE UP (ref 27–34)
MCHC RBC-ENTMCNC: 34.4 GM/DL — SIGNIFICANT CHANGE UP (ref 32–36)
MCV RBC AUTO: 92.7 FL — SIGNIFICANT CHANGE UP (ref 80–100)
NRBC # BLD: 0 /100 WBCS — SIGNIFICANT CHANGE UP (ref 0–0)
NRBC # FLD: 0.06 K/UL — HIGH (ref 0–0)
PHOSPHATE SERPL-MCNC: 4.2 MG/DL — SIGNIFICANT CHANGE UP (ref 2.5–4.5)
PLATELET # BLD AUTO: 350 K/UL — SIGNIFICANT CHANGE UP (ref 150–400)
POTASSIUM SERPL-MCNC: 3.9 MMOL/L — SIGNIFICANT CHANGE UP (ref 3.5–5.3)
POTASSIUM SERPL-SCNC: 3.9 MMOL/L — SIGNIFICANT CHANGE UP (ref 3.5–5.3)
PROT SERPL-MCNC: 6.5 G/DL — SIGNIFICANT CHANGE UP (ref 6–8.3)
RBC # BLD: 2.45 M/UL — LOW (ref 3.8–5.2)
RBC # BLD: 2.45 M/UL — LOW (ref 3.8–5.2)
RBC # FLD: 17.7 % — HIGH (ref 10.3–14.5)
RETICS #: 322.7 K/UL — HIGH (ref 25–125)
RETICS/RBC NFR: 13.2 % — HIGH (ref 0.5–2.5)
SODIUM SERPL-SCNC: 139 MMOL/L — SIGNIFICANT CHANGE UP (ref 135–145)
WBC # BLD: 12.46 K/UL — HIGH (ref 3.8–10.5)
WBC # FLD AUTO: 12.46 K/UL — HIGH (ref 3.8–10.5)

## 2022-08-23 PROCEDURE — 99233 SBSQ HOSP IP/OBS HIGH 50: CPT

## 2022-08-23 RX ORDER — POLYETHYLENE GLYCOL 3350 17 G/17G
17 POWDER, FOR SOLUTION ORAL DAILY
Refills: 0 | Status: DISCONTINUED | OUTPATIENT
Start: 2022-08-23 | End: 2022-08-27

## 2022-08-23 RX ORDER — DIPHENHYDRAMINE HCL 50 MG
50 CAPSULE ORAL ONCE
Refills: 0 | Status: COMPLETED | OUTPATIENT
Start: 2022-08-23 | End: 2022-08-23

## 2022-08-23 RX ORDER — SODIUM CHLORIDE 9 MG/ML
1000 INJECTION, SOLUTION INTRAVENOUS
Refills: 0 | Status: DISCONTINUED | OUTPATIENT
Start: 2022-08-23 | End: 2022-08-24

## 2022-08-23 RX ORDER — ACETAMINOPHEN 500 MG
650 TABLET ORAL EVERY 6 HOURS
Refills: 0 | Status: DISCONTINUED | OUTPATIENT
Start: 2022-08-23 | End: 2022-08-27

## 2022-08-23 RX ADMIN — SODIUM CHLORIDE 3 MILLILITER(S): 9 INJECTION INTRAMUSCULAR; INTRAVENOUS; SUBCUTANEOUS at 22:00

## 2022-08-23 RX ADMIN — RIVAROXABAN 20 MILLIGRAM(S): KIT at 17:54

## 2022-08-23 RX ADMIN — Medication 650 MILLIGRAM(S): at 09:57

## 2022-08-23 RX ADMIN — Medication 650 MILLIGRAM(S): at 09:27

## 2022-08-23 RX ADMIN — MORPHINE SULFATE 30 MILLILITER(S): 50 CAPSULE, EXTENDED RELEASE ORAL at 20:04

## 2022-08-23 RX ADMIN — MORPHINE SULFATE 30 MILLILITER(S): 50 CAPSULE, EXTENDED RELEASE ORAL at 08:18

## 2022-08-23 RX ADMIN — ARIPIPRAZOLE 5 MILLIGRAM(S): 15 TABLET ORAL at 11:25

## 2022-08-23 RX ADMIN — SENNA PLUS 2 TABLET(S): 8.6 TABLET ORAL at 21:05

## 2022-08-23 RX ADMIN — SODIUM CHLORIDE 100 MILLILITER(S): 9 INJECTION, SOLUTION INTRAVENOUS at 09:27

## 2022-08-23 RX ADMIN — ONDANSETRON 4 MILLIGRAM(S): 8 TABLET, FILM COATED ORAL at 09:30

## 2022-08-23 RX ADMIN — HYDROXYUREA 500 MILLIGRAM(S): 500 CAPSULE ORAL at 11:24

## 2022-08-23 RX ADMIN — SODIUM CHLORIDE 3 MILLILITER(S): 9 INJECTION INTRAMUSCULAR; INTRAVENOUS; SUBCUTANEOUS at 13:13

## 2022-08-23 RX ADMIN — SERTRALINE 100 MILLIGRAM(S): 25 TABLET, FILM COATED ORAL at 11:25

## 2022-08-23 RX ADMIN — SODIUM CHLORIDE 3 MILLILITER(S): 9 INJECTION INTRAMUSCULAR; INTRAVENOUS; SUBCUTANEOUS at 06:14

## 2022-08-23 RX ADMIN — Medication 650 MILLIGRAM(S): at 16:43

## 2022-08-23 RX ADMIN — Medication 650 MILLIGRAM(S): at 17:13

## 2022-08-23 RX ADMIN — Medication 50 MILLIGRAM(S): at 17:53

## 2022-08-23 NOTE — PROGRESS NOTE ADULT - PROBLEM SELECTOR PLAN 1
Pain likely due to sickle cell crisis  Patient  s/p Morphine 4mg IVP, Morphine 6 mg IVP and Morphine 8 mg IVP in ER  on Morphine PCA pump for pain control. cont hydrea  S/P one liter NaCl in the ED.  Additional IVF - LR 1 L bolus, followed by 1/2 NS IVF @ 75cc/hr for 24 hours ordered.  Toradol/Ibuprofen PRN for breakthrough pain  Incentive spirometry ordered.  Bowel regimen with senna. will add miralax as pt with no bm in 2 days  If patient becomes febrile obtain blood cultures.  CXR neg

## 2022-08-23 NOTE — PROVIDER CONTACT NOTE (OTHER) - ASSESSMENT
A&ox4, able to make needs known, denies chest pain, dizziness, just frontal HA, patient states" I am worried that it not my shunt that is acting up, I would like to see a neurologist and have tylenol"

## 2022-08-23 NOTE — PROVIDER CONTACT NOTE (OTHER) - ACTION/TREATMENT ORDERED:
will come to assess patient and add IVF and tylenol. After assessment will determine if pt needs neuro consult

## 2022-08-24 LAB
ANION GAP SERPL CALC-SCNC: 11 MMOL/L — SIGNIFICANT CHANGE UP (ref 7–14)
BUN SERPL-MCNC: 7 MG/DL — SIGNIFICANT CHANGE UP (ref 7–23)
CALCIUM SERPL-MCNC: 9.3 MG/DL — SIGNIFICANT CHANGE UP (ref 8.4–10.5)
CHLORIDE SERPL-SCNC: 107 MMOL/L — SIGNIFICANT CHANGE UP (ref 98–107)
CO2 SERPL-SCNC: 22 MMOL/L — SIGNIFICANT CHANGE UP (ref 22–31)
CREAT SERPL-MCNC: 0.57 MG/DL — SIGNIFICANT CHANGE UP (ref 0.5–1.3)
EGFR: 132 ML/MIN/1.73M2 — SIGNIFICANT CHANGE UP
GLUCOSE SERPL-MCNC: 89 MG/DL — SIGNIFICANT CHANGE UP (ref 70–99)
HCT VFR BLD CALC: 23.6 % — LOW (ref 34.5–45)
HGB BLD-MCNC: 7.9 G/DL — LOW (ref 11.5–15.5)
LDH SERPL L TO P-CCNC: 486 U/L — HIGH (ref 135–225)
MAGNESIUM SERPL-MCNC: 1.8 MG/DL — SIGNIFICANT CHANGE UP (ref 1.6–2.6)
MCHC RBC-ENTMCNC: 31.1 PG — SIGNIFICANT CHANGE UP (ref 27–34)
MCHC RBC-ENTMCNC: 33.5 GM/DL — SIGNIFICANT CHANGE UP (ref 32–36)
MCV RBC AUTO: 92.9 FL — SIGNIFICANT CHANGE UP (ref 80–100)
NRBC # BLD: 0 /100 WBCS — SIGNIFICANT CHANGE UP (ref 0–0)
NRBC # FLD: 0.08 K/UL — HIGH (ref 0–0)
PHOSPHATE SERPL-MCNC: 3.9 MG/DL — SIGNIFICANT CHANGE UP (ref 2.5–4.5)
PLATELET # BLD AUTO: 350 K/UL — SIGNIFICANT CHANGE UP (ref 150–400)
POTASSIUM SERPL-MCNC: 4 MMOL/L — SIGNIFICANT CHANGE UP (ref 3.5–5.3)
POTASSIUM SERPL-SCNC: 4 MMOL/L — SIGNIFICANT CHANGE UP (ref 3.5–5.3)
RBC # BLD: 2.54 M/UL — LOW (ref 3.8–5.2)
RBC # BLD: 2.54 M/UL — LOW (ref 3.8–5.2)
RBC # FLD: 18.3 % — HIGH (ref 10.3–14.5)
RETICS #: 341.4 K/UL — HIGH (ref 25–125)
RETICS/RBC NFR: 13.4 % — HIGH (ref 0.5–2.5)
SODIUM SERPL-SCNC: 140 MMOL/L — SIGNIFICANT CHANGE UP (ref 135–145)
WBC # BLD: 10.46 K/UL — SIGNIFICANT CHANGE UP (ref 3.8–10.5)
WBC # FLD AUTO: 10.46 K/UL — SIGNIFICANT CHANGE UP (ref 3.8–10.5)

## 2022-08-24 PROCEDURE — 99232 SBSQ HOSP IP/OBS MODERATE 35: CPT

## 2022-08-24 RX ORDER — SODIUM CHLORIDE 9 MG/ML
1000 INJECTION, SOLUTION INTRAVENOUS
Refills: 0 | Status: COMPLETED | OUTPATIENT
Start: 2022-08-24 | End: 2022-08-25

## 2022-08-24 RX ORDER — LANOLIN ALCOHOL/MO/W.PET/CERES
3 CREAM (GRAM) TOPICAL AT BEDTIME
Refills: 0 | Status: DISCONTINUED | OUTPATIENT
Start: 2022-08-24 | End: 2022-08-27

## 2022-08-24 RX ADMIN — ONDANSETRON 4 MILLIGRAM(S): 8 TABLET, FILM COATED ORAL at 16:54

## 2022-08-24 RX ADMIN — RIVAROXABAN 20 MILLIGRAM(S): KIT at 16:41

## 2022-08-24 RX ADMIN — SODIUM CHLORIDE 100 MILLILITER(S): 9 INJECTION, SOLUTION INTRAVENOUS at 19:01

## 2022-08-24 RX ADMIN — ARIPIPRAZOLE 5 MILLIGRAM(S): 15 TABLET ORAL at 12:48

## 2022-08-24 RX ADMIN — SODIUM CHLORIDE 3 MILLILITER(S): 9 INJECTION INTRAMUSCULAR; INTRAVENOUS; SUBCUTANEOUS at 14:02

## 2022-08-24 RX ADMIN — MORPHINE SULFATE 30 MILLILITER(S): 50 CAPSULE, EXTENDED RELEASE ORAL at 08:20

## 2022-08-24 RX ADMIN — SERTRALINE 100 MILLIGRAM(S): 25 TABLET, FILM COATED ORAL at 12:48

## 2022-08-24 RX ADMIN — HYDROXYUREA 500 MILLIGRAM(S): 500 CAPSULE ORAL at 12:48

## 2022-08-24 RX ADMIN — Medication 3 MILLIGRAM(S): at 21:44

## 2022-08-24 RX ADMIN — SODIUM CHLORIDE 3 MILLILITER(S): 9 INJECTION INTRAMUSCULAR; INTRAVENOUS; SUBCUTANEOUS at 22:01

## 2022-08-24 RX ADMIN — SENNA PLUS 2 TABLET(S): 8.6 TABLET ORAL at 21:44

## 2022-08-24 RX ADMIN — Medication 650 MILLIGRAM(S): at 16:41

## 2022-08-24 RX ADMIN — MORPHINE SULFATE 30 MILLILITER(S): 50 CAPSULE, EXTENDED RELEASE ORAL at 20:10

## 2022-08-24 RX ADMIN — Medication 650 MILLIGRAM(S): at 17:11

## 2022-08-24 RX ADMIN — SODIUM CHLORIDE 3 MILLILITER(S): 9 INJECTION INTRAMUSCULAR; INTRAVENOUS; SUBCUTANEOUS at 05:45

## 2022-08-24 NOTE — PROGRESS NOTE ADULT - PROBLEM SELECTOR PLAN 1
Pain likely due to sickle cell crisis  Patient  s/p Morphine 4mg IVP, Morphine 6 mg IVP and Morphine 8 mg IVP in ER  on Morphine PCA pump for pain control. cont hydrea  S/P one liter NaCl in the ED.  Additional IVF - LR 1 L bolus, followed by 1/2 NS IVF @ 75cc/hr for 24 hours ordered.  Toradol/Ibuprofen PRN for breakthrough pain  Incentive spirometry ordered.  Bowel regimen with senna. will add miralax as pt with no bm in 2 days  If patient becomes febrile obtain blood cultures.  CXR neg Pain likely due to sickle cell crisis  Patient  s/p Morphine 4mg IVP, Morphine 6 mg IVP and Morphine 8 mg IVP in ER  on Morphine PCA pump for pain control. cont hydrea  S/P one liter NaCl in the ED.  Additional IVF - LR 1 L bolus, followed by 1/2 NS IVF @ 75cc/hr for 24 hours ordered.  Toradol/Ibuprofen PRN for breakthrough pain  Incentive spirometry ordered.  cont Bowel regimen, monitor for bms  If patient becomes febrile obtain blood cultures.  CXR neg  dc planning for tomorrow

## 2022-08-25 LAB
ANION GAP SERPL CALC-SCNC: 10 MMOL/L — SIGNIFICANT CHANGE UP (ref 7–14)
BUN SERPL-MCNC: 6 MG/DL — LOW (ref 7–23)
CALCIUM SERPL-MCNC: 9.6 MG/DL — SIGNIFICANT CHANGE UP (ref 8.4–10.5)
CHLORIDE SERPL-SCNC: 106 MMOL/L — SIGNIFICANT CHANGE UP (ref 98–107)
CO2 SERPL-SCNC: 23 MMOL/L — SIGNIFICANT CHANGE UP (ref 22–31)
CREAT SERPL-MCNC: 0.53 MG/DL — SIGNIFICANT CHANGE UP (ref 0.5–1.3)
EGFR: 134 ML/MIN/1.73M2 — SIGNIFICANT CHANGE UP
GLUCOSE SERPL-MCNC: 95 MG/DL — SIGNIFICANT CHANGE UP (ref 70–99)
HCT VFR BLD CALC: 23.5 % — LOW (ref 34.5–45)
HGB BLD-MCNC: 8 G/DL — LOW (ref 11.5–15.5)
MAGNESIUM SERPL-MCNC: 1.7 MG/DL — SIGNIFICANT CHANGE UP (ref 1.6–2.6)
MCHC RBC-ENTMCNC: 30.8 PG — SIGNIFICANT CHANGE UP (ref 27–34)
MCHC RBC-ENTMCNC: 34 GM/DL — SIGNIFICANT CHANGE UP (ref 32–36)
MCV RBC AUTO: 90.4 FL — SIGNIFICANT CHANGE UP (ref 80–100)
NRBC # BLD: 0 /100 WBCS — SIGNIFICANT CHANGE UP (ref 0–0)
NRBC # FLD: 0.05 K/UL — HIGH (ref 0–0)
PHOSPHATE SERPL-MCNC: 4.4 MG/DL — SIGNIFICANT CHANGE UP (ref 2.5–4.5)
PLATELET # BLD AUTO: 382 K/UL — SIGNIFICANT CHANGE UP (ref 150–400)
POTASSIUM SERPL-MCNC: 3.8 MMOL/L — SIGNIFICANT CHANGE UP (ref 3.5–5.3)
POTASSIUM SERPL-SCNC: 3.8 MMOL/L — SIGNIFICANT CHANGE UP (ref 3.5–5.3)
RBC # BLD: 2.6 M/UL — LOW (ref 3.8–5.2)
RBC # FLD: 17.9 % — HIGH (ref 10.3–14.5)
SODIUM SERPL-SCNC: 139 MMOL/L — SIGNIFICANT CHANGE UP (ref 135–145)
WBC # BLD: 12.45 K/UL — HIGH (ref 3.8–10.5)
WBC # FLD AUTO: 12.45 K/UL — HIGH (ref 3.8–10.5)

## 2022-08-25 PROCEDURE — 99232 SBSQ HOSP IP/OBS MODERATE 35: CPT

## 2022-08-25 RX ORDER — KETOROLAC TROMETHAMINE 30 MG/ML
15 SYRINGE (ML) INJECTION EVERY 6 HOURS
Refills: 0 | Status: DISCONTINUED | OUTPATIENT
Start: 2022-08-25 | End: 2022-08-27

## 2022-08-25 RX ADMIN — SENNA PLUS 2 TABLET(S): 8.6 TABLET ORAL at 21:45

## 2022-08-25 RX ADMIN — SODIUM CHLORIDE 3 MILLILITER(S): 9 INJECTION INTRAMUSCULAR; INTRAVENOUS; SUBCUTANEOUS at 14:00

## 2022-08-25 RX ADMIN — SODIUM CHLORIDE 3 MILLILITER(S): 9 INJECTION INTRAMUSCULAR; INTRAVENOUS; SUBCUTANEOUS at 22:06

## 2022-08-25 RX ADMIN — MORPHINE SULFATE 30 MILLILITER(S): 50 CAPSULE, EXTENDED RELEASE ORAL at 20:03

## 2022-08-25 RX ADMIN — HYDROXYUREA 500 MILLIGRAM(S): 500 CAPSULE ORAL at 11:26

## 2022-08-25 RX ADMIN — ONDANSETRON 4 MILLIGRAM(S): 8 TABLET, FILM COATED ORAL at 10:00

## 2022-08-25 RX ADMIN — MORPHINE SULFATE 30 MILLILITER(S): 50 CAPSULE, EXTENDED RELEASE ORAL at 08:25

## 2022-08-25 RX ADMIN — SODIUM CHLORIDE 3 MILLILITER(S): 9 INJECTION INTRAMUSCULAR; INTRAVENOUS; SUBCUTANEOUS at 06:21

## 2022-08-25 RX ADMIN — POLYETHYLENE GLYCOL 3350 17 GRAM(S): 17 POWDER, FOR SOLUTION ORAL at 11:26

## 2022-08-25 RX ADMIN — SERTRALINE 100 MILLIGRAM(S): 25 TABLET, FILM COATED ORAL at 11:25

## 2022-08-25 RX ADMIN — RIVAROXABAN 20 MILLIGRAM(S): KIT at 17:52

## 2022-08-25 RX ADMIN — Medication 3 MILLIGRAM(S): at 21:45

## 2022-08-25 RX ADMIN — ARIPIPRAZOLE 5 MILLIGRAM(S): 15 TABLET ORAL at 11:25

## 2022-08-25 NOTE — PROGRESS NOTE ADULT - PROBLEM SELECTOR PLAN 1
Pain likely due to sickle cell crisis  Patient  s/p Morphine 4mg IVP, Morphine 6 mg IVP and Morphine 8 mg IVP in ER  on Morphine PCA pump for pain control. Pt does not use pca appropriately, cont hydrea, reports that she still has pain, will add toradol for better pain control .  Incentive spirometry ordered.  cont Bowel regimen, monitor for bms  If patient becomes febrile obtain blood cultures.  CXR neg

## 2022-08-26 ENCOUNTER — TRANSCRIPTION ENCOUNTER (OUTPATIENT)
Age: 22
End: 2022-08-26

## 2022-08-26 PROCEDURE — 99232 SBSQ HOSP IP/OBS MODERATE 35: CPT

## 2022-08-26 PROCEDURE — 70450 CT HEAD/BRAIN W/O DYE: CPT | Mod: 26

## 2022-08-26 RX ORDER — SENNA PLUS 8.6 MG/1
2 TABLET ORAL
Qty: 0 | Refills: 0 | DISCHARGE
Start: 2022-08-26

## 2022-08-26 RX ORDER — LANOLIN ALCOHOL/MO/W.PET/CERES
1 CREAM (GRAM) TOPICAL
Qty: 0 | Refills: 0 | DISCHARGE
Start: 2022-08-26

## 2022-08-26 RX ORDER — POLYETHYLENE GLYCOL 3350 17 G/17G
17 POWDER, FOR SOLUTION ORAL
Qty: 0 | Refills: 0 | DISCHARGE
Start: 2022-08-26

## 2022-08-26 RX ORDER — SODIUM CHLORIDE 9 MG/ML
1000 INJECTION, SOLUTION INTRAVENOUS
Refills: 0 | Status: DISCONTINUED | OUTPATIENT
Start: 2022-08-26 | End: 2022-08-27

## 2022-08-26 RX ADMIN — ARIPIPRAZOLE 5 MILLIGRAM(S): 15 TABLET ORAL at 12:27

## 2022-08-26 RX ADMIN — SODIUM CHLORIDE 3 MILLILITER(S): 9 INJECTION INTRAMUSCULAR; INTRAVENOUS; SUBCUTANEOUS at 05:40

## 2022-08-26 RX ADMIN — MORPHINE SULFATE 30 MILLILITER(S): 50 CAPSULE, EXTENDED RELEASE ORAL at 08:15

## 2022-08-26 RX ADMIN — SODIUM CHLORIDE 100 MILLILITER(S): 9 INJECTION, SOLUTION INTRAVENOUS at 21:32

## 2022-08-26 RX ADMIN — SODIUM CHLORIDE 3 MILLILITER(S): 9 INJECTION INTRAMUSCULAR; INTRAVENOUS; SUBCUTANEOUS at 13:10

## 2022-08-26 RX ADMIN — ONDANSETRON 4 MILLIGRAM(S): 8 TABLET, FILM COATED ORAL at 14:41

## 2022-08-26 RX ADMIN — SODIUM CHLORIDE 3 MILLILITER(S): 9 INJECTION INTRAMUSCULAR; INTRAVENOUS; SUBCUTANEOUS at 22:00

## 2022-08-26 RX ADMIN — MORPHINE SULFATE 30 MILLILITER(S): 50 CAPSULE, EXTENDED RELEASE ORAL at 20:34

## 2022-08-26 RX ADMIN — Medication 3 MILLIGRAM(S): at 21:32

## 2022-08-26 RX ADMIN — HYDROXYUREA 500 MILLIGRAM(S): 500 CAPSULE ORAL at 12:27

## 2022-08-26 RX ADMIN — SERTRALINE 100 MILLIGRAM(S): 25 TABLET, FILM COATED ORAL at 12:27

## 2022-08-26 RX ADMIN — RIVAROXABAN 20 MILLIGRAM(S): KIT at 18:21

## 2022-08-26 NOTE — DISCHARGE NOTE PROVIDER - CARE PROVIDER_API CALL
Eleonora Richardson)  Internal Medicine  [phone] [place] [address]  Phone: [phone]  Fax: [phone]  Follow Up Time:   
Statement Selected

## 2022-08-26 NOTE — CONSULT NOTE ADULT - SUBJECTIVE AND OBJECTIVE BOX
HPI:  23 y/o F with  PMH of ex-24wga, sickle cell disease on chronic transfusions, acute chest syndrome, CVA, Grade 4 IVH as , hydrocephalus s/p  shunt x2 with 9 revisions, asthma, atrophic spleen, cholecystectomy, eye operations x4, bipolar disorder, Pulmonary embolism  on Xarelto presents with three days of generalized body aches. Patient presented to the ED on  for similar episode. Patient received Morphine and Toradol with improvement in pain and was discharged. Patient reports that pain returned. Patient states that pain is in her upper back and  that it is similar to previous sickle cell crises. Patient states that in the past she has also had chest pain but currently denies chest pain and shortness of breath. Patient reports that her last transfusion was one week ago. Patient follows with Dr. Richardson. Patient also reports that earlier she had headache over the tight  shunt area but states that it has resolved. Patient states that headache is usually pressure like. Patient was seen by Neurosurgery on the  for headache and CT scan done was negative. Patient denies fever, chills, abdomina pain, leg pain. Patient reports that she was taken off folic acid.    In ED patient received ketorolac 15 mg IVP, Morphine 4mg IVP, Morphine 6 mg IVP and Morphine 8 mg IVP. Patient also received 1L NS bolus. Hemoglobin was found to be 8.8, absolute reticulocytes  304.4 and reticulocyte percent 11.0. (21 Aug 2022 21:36)       consulted for headaches. Patient recently in ER on  for sickle cell crisis for chest pain, CT head stable ventricles. At time of exam, patient denied headache at the moment, no nausea, no vomiting    PAST MEDICAL & SURGICAL HISTORY:  Sickle Cell Disease      Hydrocephalus      S/P  Shunt  x2 with multiple shunt revisions      Reactive Airway Disease  receives albuterol and xoponex treatments at home      Chronic Lung Disease of Premat  follows with [facility]      Strabismus      CVA (Cerebral Vascular Accident)  Onset date unknown, noted on MRI from 2010      Depression      Anxiety      Impacted teeth      CP (cerebral palsy)  - mild      S/P Tonsillectomy and Adenoide      S/P  Shunt  x6 revisions, last  w/ Dr. Loza      S/P Cholecystectomy  s/p open cholecystectomy       Strabismus Repair  x4      H/O surgical procedure  s/p Mediport placement         FAMILY HISTORY:  Family history of sickle cell trait      Home Medications:  Abilify 5 mg oral tablet: 1 tab(s) orally once a day (22 Aug 2022 08:53)  hydroxyurea 500 mg oral capsule: 1 cap(s) orally once a day (Last filled [facility]/2022 x 1 month).  (22 Aug 2022 08:53)  ibuprofen 600 mg oral tablet: 1 tab(s) orally every 6 hours, As needed, Mild Pain (1 - 3), Moderate Pain (4 - 6)  (Last filled ).  (22 Aug 2022 08:53)  Jadenu 360 mg oral tablet: 4 tab(s) orally once a day (22 Aug 2022 08:50)  sertraline 100 mg oral tablet: 1 tab(s) orally once a day (22 Aug 2022 08:53)  traMADol: 50mg every 6 hours as needed (Last filled Oct/2021 x 20 tablets).  (22 Aug 2022 08:53)  Xarelto 20 mg oral tablet: 1 tab(s) orally once a day (in the evening) (Last filled [facility]/2022 x 1 month).  (22 Aug 2022 08:53)      MEDICATIONS  (STANDING):  ARIPiprazole 5 milliGRAM(s) Oral daily  hydroxyurea 500 milliGRAM(s) Oral daily  melatonin 3 milliGRAM(s) Oral at bedtime  morphine PCA (5 mG/mL) 30 milliLiter(s) PCA Continuous PCA Continuous  polyethylene glycol 3350 17 Gram(s) Oral daily  rivaroxaban 20 milliGRAM(s) Oral with dinner  senna 2 Tablet(s) Oral at bedtime  sertraline 100 milliGRAM(s) Oral daily  sodium chloride 0.45%. 1000 milliLiter(s) (100 mL/Hr) IV Continuous <Continuous>  sodium chloride 0.9% lock flush 3 milliLiter(s) IV Push every 8 hours    MEDICATIONS  (PRN):  acetaminophen     Tablet .. 650 milliGRAM(s) Oral every 6 hours PRN Moderate Pain (4 - 6)  bisacodyl 5 milliGRAM(s) Oral at bedtime PRN Constipation  ketorolac   Injectable 15 milliGRAM(s) IV Push every 6 hours PRN Mild Pain (1 - 3)  naloxone Injectable 0.1 milliGRAM(s) IV Push every 3 minutes PRN For ANY of the following changes in patient status:  A. RR LESS THAN 10 breaths per minute, B. Oxygen saturation LESS THAN 90%, C. Sedation score of 6  ondansetron Injectable 4 milliGRAM(s) IV Push every 6 hours PRN Nausea    Vital Signs Last 24 Hrs  T(C): 36.6 (26 Aug 2022 10:25), Max: 36.9 (26 Aug 2022 02:40)  T(F): 97.9 (26 Aug 2022 10:25), Max: 98.5 (26 Aug 2022 02:40)  HR: 76 (26 Aug 2022 10:25) (72 - 82)  BP: 92/51 (26 Aug 2022 10:25) (92/51 - 103/59)  BP(mean): --  RR: 18 (26 Aug 2022 10:25) (18 - 19)  SpO2: 98% (26 Aug 2022 10:25) (95% - 98%)    Parameters below as of 26 Aug 2022 10:25  Patient On (Oxygen Delivery Method): room air        PHYSICAL EXAM:  Awake Alert Oriented x 3 No distress, Speech is clear  PERRL, EOMI, Tongue midline, No facial drop  Motor:             RUE 5/5        LUE 5/5             RLE 5/5         LLE 5/5  Sensory intac to light touch  No dysmetria  No drift  Right frontal shunt non programmable  Right occipital shunt set to 2.0    LABS:                            8.0    12.45 )-----------( 382      ( 25 Aug 2022 06:10 )             23.5     08-    139  |  106  |  6<L>  ----------------------------<  95  3.8   |  23  |  0.53    Ca    9.6      25 Aug 2022 06:10  Phos  4.4       Mg     1.70             RADIOLOGY:  < from: CT Stereotactic Localization No Cont (22 @ 20:11) >    ACC: [id number] EXAM:  CT GUIDE STEREO LOC                          PROCEDURE DATE:  2022          INTERPRETATION:  CLINICAL INDICATIONS: Evaluate  shunt    Technique: CT of the head was performed without contrast.    Multiple contiguous axial images were acquired from the skullbase to the   vertex without the administration of intravenous contrast.  Coronal and   sagittal reformations were made.    COMPARISON: Prior head CT dated 2022    FINDINGS:  Right frontal ventriculoperitoneal shunt catheter with its tip in the   right lateral ventricle adjacent to the septum pellucidum. Right parietal   and ventriculoperitoneal shunt catheter in place with its tip in the   right foramen of [place]ro.  Ventricular size is unchanged since the prior exam. There is no evidence   of acute intraparenchymal hemorrhage or midline shift. No extra-axial   fluid collections. The basal cisterns are patent.    Right parietal ha hole. Otherwise, the calvarium is intact. The   visualized intraorbital compartments, paranasal sinuses and mastoid   complexes appear free of acute disease.    IMPRESSION:  Right frontal and parietal shunt catheters in place. Stable ventricular   size.    < end of copied text >

## 2022-08-26 NOTE — DISCHARGE NOTE PROVIDER - NSDCMRMEDTOKEN_GEN_ALL_CORE_FT
Abilify 5 mg oral tablet: 1 tab(s) orally once a day  bisacodyl 5 mg oral delayed release tablet: 1 tab(s) orally once a day (at bedtime), As needed, Constipation  hydroxyurea 500 mg oral capsule: 1 cap(s) orally once a day (Last filled [facility]/2022 x 1 month).   ibuprofen 600 mg oral tablet: 1 tab(s) orally every 6 hours, As needed, Mild Pain (1 - 3), Moderate Pain (4 - 6)  (Last filled Nov/2021).   Jadenu 360 mg oral tablet: 4 tab(s) orally once a day  melatonin 3 mg oral tablet: 1 tab(s) orally once a day (at bedtime)  polyethylene glycol 3350 oral powder for reconstitution: 17 gram(s) orally once a day  senna leaf extract oral tablet: 2 tab(s) orally once a day (at bedtime)  sertraline 100 mg oral tablet: 1 tab(s) orally once a day  traMADol: 50mg every 6 hours as needed (Last filled Oct/2021 x 20 tablets).   Xarelto 20 mg oral tablet: 1 tab(s) orally once a day (in the evening) (Last filled [facility]/2022 x 1 month).    Abilify 5 mg oral tablet: 1 tab(s) orally once a day  bisacodyl 5 mg oral delayed release tablet: 1 tab(s) orally once a day (at bedtime), As needed, Constipation  hydroxyurea 500 mg oral capsule: 1 cap(s) orally once a day   ibuprofen 600 mg oral tablet: 1 tab(s) orally every 6 hours, As needed, Mild Pain (1 - 3), Moderate Pain (4 - 6)    Jadenu 360 mg oral tablet: 4 tab(s) orally once a day  melatonin 3 mg oral tablet: 1 tab(s) orally once a day (at bedtime)  polyethylene glycol 3350 oral powder for reconstitution: 17 gram(s) orally once a day  senna leaf extract oral tablet: 2 tab(s) orally once a day (at bedtime)  sertraline 100 mg oral tablet: 1 tab(s) orally once a day  traMADol: 50mg every 6 hours as needed (Last filled Oct/2021 x 20 tablets).   Xarelto 20 mg oral tablet: 1 tab(s) orally once a day (in the evening)

## 2022-08-26 NOTE — CONSULT NOTE ADULT - ASSESSMENT
21 y/o F with  PMH of ex-24wga, sickle cell disease on chronic transfusions, acute chest syndrome, CVA, Grade 4 IVH as , hydrocephalus s/p  shunt x2 with 9 revisions, asthma, atrophic spleen, cholecystectomy, eye operations x4, bipolar disorder, Pulmonary embolism  on Xarelto presents with three days of generalized body aches. Patient presented to the ED on  for similar episode. Patient received Morphine and Toradol with improvement in pain and was discharged.     CT head stable ventricles   re-admitted for chest pain and headache  - reconsulted for intermittent headache, asked to evaluate  shunt

## 2022-08-26 NOTE — CONSULT NOTE ADULT - PROBLEM SELECTOR RECOMMENDATION 9
- At time of examination patient denies headache. Low suspicion for shunt malfunction right now given resolution of headache however patient expressed moderate headache this AM    - Shunt setting confirmed at 2.0 (confirmed with Dr. Ledbetter's office this is the correct setting)    - Can get CT head or Rapid MRI before discharge **Spoke to MRI however no availability until much later today

## 2022-08-26 NOTE — DISCHARGE NOTE PROVIDER - NSDCFUSCHEDAPPT_GEN_ALL_CORE_FT
[facility]  INT[facility] OP [id number] [facility]  Scheduled Appointment: 09/12/2022

## 2022-08-26 NOTE — DISCHARGE NOTE PROVIDER - HOSPITAL COURSE
21 y/o F with  PMH of ex-24wga, sickle cell disease on chronic transfusions, acute chest syndrome, CVA, Grade 4 IVH as , hydrocephalus s/p  shunt x2 with 9 revisions, asthma, atrophic spleen, cholecystectomy, eye operations x4, bipolar disorder, Pulmonary embolism  on Xarelto presents with three days of generalized body aches - consistent with sickle cell crisis.     Problem/Plan - 1:  ·  Problem: Anemia, sickle cell with crisis.   ·  Plan: Pain likely due to sickle cell crisis  Patient  s/p Morphine 4mg IVP, Morphine 6 mg IVP and Morphine 8 mg IVP in ER  on Morphine PCA pump for pain control. cont hydrea  S/P one liter NaCl in the ED.  Additional IVF - LR 1 L bolus, followed by 1/2 NS IVF @ 75cc/hr for 24 hours ordered.  Toradol/Ibuprofen PRN for breakthrough pain  Incentive spirometry ordered.  cont Bowel regimen, monitor for bms  If patient becomes febrile obtain blood cultures.  CXR neg  - pcp f/u in 1-2 weeks     Problem/Plan - 2:  ·  Problem: Headache.   ·  Plan: Patient reporting pressure like headache on right side over  shunt.  Patient reports that pain has currently resolved. If recurs and persists will get nsx to evalulate  Patient had CT stereotactic scan which showed right frontal and parietal shunt catheters in place. Stable ventricular   size.  Ensure optimal hydration  - Neurosurgery consulted to evaluate HA, recommended repeat CTH     Problem/Plan - 3:  ·  Problem: Hypotension.   ·  Plan: cont ivf, cont to monitor bp.     Problem/Plan - 4:  ·  Problem: Elevated bilirubin.   ·  Plan: Patient with bilirubin of 4.0.  Patient appears to have scleral icterus.  Bilirubin likely elevated in setting of sickle cell disease.  Continue to trend.     Problem/Plan - 5:  ·  Problem: Elevated AST (SGOT).   ·  Plan: AST 40.  Continue trending.     Problem/Plan - 6:  ·  Problem: Proteinuria.   ·  Plan: Possibly in setting of sickle cell disease.  Continue monitoring.     Problem/Plan - 7:  ·  Problem: Anxiety and depression.   ·  Plan: Continue Aripiprazole 5mg and Sertraline 100 mg daily.   TSH wnl.     Problem/Plan - 8:  ·  Problem: Need for prophylactic measure.   ·  Plan: Continue Xarelto 20 mg daily.    Dispo: Home with no skilled PT needs    Patient seen and evaluated, no acute somatic complaints. Reviewed discharge medications with patient; All new medications requiring new prescriptions were sent to the pharmacy of patient's choice. Reviewed need for prescription for previous home medications and new prescriptions sent if requested. Medically cleared/stable for discharge as per Dr. Chang on 2022 with close outpatient follow up within 1-2 weeks of discharge. Patient understands and agrees with plan of care. 21 y/o F with  PMH of ex-24wga, sickle cell disease on chronic transfusions, acute chest syndrome, CVA, Grade 4 IVH as , hydrocephalus s/p  shunt x2 with 9 revisions, asthma, atrophic spleen, cholecystectomy, eye operations x4, bipolar disorder, Pulmonary embolism  on Xarelto presents with three days of generalized body aches - consistent with sickle cell crisis.     Problem/Plan - 1:  ·  Problem: Anemia, sickle cell with crisis.   ·  Plan: Pain likely due to sickle cell crisis  Patient  s/p Morphine 4mg IVP, Morphine 6 mg IVP and Morphine 8 mg IVP in ER  on Morphine PCA pump for pain control. cont hydrea  S/P one liter NaCl in the ED.  Additional IVF - LR 1 L bolus, followed by 1/2 NS IVF @ 75cc/hr for 24 hours ordered.  Toradol/Ibuprofen PRN for breakthrough pain  Incentive spirometry ordered.  cont Bowel regimen, monitor for bms  If patient becomes febrile obtain blood cultures.  CXR neg  - pcp f/u in 1-2 weeks     Problem/Plan - 2:  ·  Problem: Headache.   ·  Plan: Patient reporting pressure like headache on right side over  shunt.  Patient reports that pain has currently resolved. If recurs and persists will get nsx to evalulate  Patient had CT stereotactic scan which showed right frontal and parietal shunt catheters in place. Stable ventricular   size.  Ensure optimal hydration  - Neurosurgery consulted to evaluate HA and  shunt, recommended repeat CTH  - Repeat CTH normal, per neurosurgery patient cleared for discharge, no further intervention inpatient     Problem/Plan - 3:  ·  Problem: Hypotension.   ·  Plan: cont ivf, cont to monitor bp.     Problem/Plan - 4:  ·  Problem: Elevated bilirubin.   ·  Plan: Patient with bilirubin of 4.0.  Patient appears to have scleral icterus.  Bilirubin likely elevated in setting of sickle cell disease.  Continue to trend.     Problem/Plan - 5:  ·  Problem: Elevated AST (SGOT).   ·  Plan: AST 40.  Continue trending.     Problem/Plan - 6:  ·  Problem: Proteinuria.   ·  Plan: Possibly in setting of sickle cell disease.  Continue monitoring.     Problem/Plan - 7:  ·  Problem: Anxiety and depression.   ·  Plan: Continue Aripiprazole 5mg and Sertraline 100 mg daily.   TSH wnl.     Problem/Plan - 8:  ·  Problem: Need for prophylactic measure.   ·  Plan: Continue Xarelto 20 mg daily.    Dispo: Home with no skilled PT needs    Patient seen and evaluated, no acute somatic complaints. Reviewed discharge medications with patient; All new medications requiring new prescriptions were sent to the pharmacy of patient's choice. Reviewed need for prescription for previous home medications and new prescriptions sent if requested. Medically cleared/stable for discharge as per Dr. Chang on 2022 with close outpatient follow up within 1-2 weeks of discharge. Patient understands and agrees with plan of care. 21 y/o F with  PMH of ex-24wga, sickle cell disease on chronic transfusions, acute chest syndrome, CVA, Grade 4 IVH as , hydrocephalus s/p  shunt x2 with 9 revisions, asthma, atrophic spleen, cholecystectomy, eye operations x4, bipolar disorder, Pulmonary embolism  on Xarelto presents with three days of generalized body aches - consistent with sickle cell crisis.    Anemia, sickle cell with crisis.   -Pain likely due to sickle cell crisis  -cont hydroxyurea   -IVF hydration  -Toradol/Ibuprofen PRN for breakthrough pain  -Incentive spirometry ordered.  -Bowel regimen  -CXR neg  - PCP f/u in 1-2 weeks    Headache.   -Patient reporting pressure like headache on right side over  shunt.  Patient reports that pain has currently resolved. If recurs and persists will get nsx to evaluate  Patient had CT stereotactic scan which showed right frontal and parietal shunt catheters in place. Stable ventricular   size.  Ensure optimal hydration  - Neurosurgery consulted to evaluate HA and  shunt, recommended repeat CTH  - Repeat CTH normal, per neurosurgery patient cleared for discharge, no further intervention inpatient      Hypotension.   · s/p IVF     Elevated bilirubin  -Patient with bilirubin of 4.0.  Patient appears to have scleral icterus.  Bilirubin likely elevated in setting of sickle cell disease.  Continue to trend.      Anxiety and depression.   -Continue Aripiprazole 5mg and Sertraline 100 mg daily.   TSH wnl.    PE  -continue Xarelto 20 mg daily.    Dispo: Home with no skilled PT needs      Pt is medically stable for discharge discussed w/ Dr. Fragoso. 23 y/o F with  PMH of ex-24wga, sickle cell disease on chronic transfusions, acute chest syndrome, CVA, Grade 4 IVH as , hydrocephalus s/p  shunt x2 with 9 revisions, asthma, atrophic spleen, cholecystectomy, eye operations x4, bipolar disorder, Pulmonary embolism  on Xarelto presents with three days of generalized body aches - consistent with sickle cell crisis.    Anemia, sickle cell with crisis.   -Pain likely due to sickle cell crisis  -cont hydroxyurea   -IVF hydration  -Pt treated with PCA morphine, Toradol/Ibuprofen PRN for breakthrough pain  -Incentive spirometry ordered.  -Bowel regimen  -CXR neg  - PCP f/u in 1-2 weeks  -Pt report pain well-controlled.     Headache.   -Patient reporting pressure like headache on right side over  shunt.  - Neurosurgery consulted to evaluate HA and  shunt, recommended repeat CTH  -Patient had CT stereotactic scan which showed right frontal and parietal shunt catheters in place. Stable ventricular   size.  -Repeat CTH normal, Pt reports pain improved, per neurosurgery patient cleared for discharge, no further intervention inpatient      Hypotension.   · s/p IVF     Elevated bilirubin  -Patient with bilirubin of 4.0.  Patient appears to have scleral icterus.  Bilirubin likely elevated in setting of sickle cell disease.  Continue to trend.      Anxiety and depression.   -Continue Aripiprazole 5mg and Sertraline 100 mg daily.   TSH wnl.    PE  -continue Xarelto 20 mg daily.    Dispo: Home with no skilled PT needs      Pt is medically stable for discharge discussed w/ Dr. Fragoso.  pt to f/u outpatient with her PCP

## 2022-08-26 NOTE — DISCHARGE NOTE PROVIDER - NSDCCPCAREPLAN_GEN_ALL_CORE_FT
PRINCIPAL DISCHARGE DIAGNOSIS  Diagnosis: Sickle cell crisis  Assessment and Plan of Treatment: Your pain was controlled with a PCA pump and medications. Please take your medications as prescribed and follow up with Dr. Richardson in 1-2 weeks for further management.      SECONDARY DISCHARGE DIAGNOSES  Diagnosis: Headache  Assessment and Plan of Treatment: Neurosurgery saw you because you had headaches over where your  shunt is. CT head imaging showed that your shunt is stable and appropriately in place.

## 2022-08-26 NOTE — PROGRESS NOTE ADULT - PROBLEM SELECTOR PLAN 1
Pain likely due to sickle cell crisis  Patient  s/p Morphine 4mg IVP, Morphine 6 mg IVP and Morphine 8 mg IVP in ER  on Morphine PCA pump for pain control. minimal pca use, did not use toradol, cont hydrea.  Incentive spirometry ordered.  cont Bowel regimen, monitor for bms  If patient becomes febrile obtain blood cultures.  CXR neg  wants to go home  dc today, if ct head is ok and if Neuro sx has no objection for dc  dc planning time spent in coordination 40mts ( preparing dc summary and plan)

## 2022-08-26 NOTE — CHART NOTE - NSCHARTNOTEFT_GEN_A_CORE
Discussed with neurosurgery that repeat CTH was normal and has no objection for patient to be discharged. Patient reports that she wants to go home and pain is controlled. However, her mother is adamant that she stay for further pain control. Discussed with patient that she may be discharged as she reports that her pain is well-controlled. However despite her expressing understanding of this, she wants to stay overnight per her mother's wishes. Medicine attending Dr. Chang made aware.
Neurosurgery consulted, will f/u recs

## 2022-08-27 ENCOUNTER — TRANSCRIPTION ENCOUNTER (OUTPATIENT)
Age: 22
End: 2022-08-27

## 2022-08-27 VITALS
SYSTOLIC BLOOD PRESSURE: 101 MMHG | HEART RATE: 73 BPM | RESPIRATION RATE: 17 BRPM | OXYGEN SATURATION: 99 % | TEMPERATURE: 98 F | DIASTOLIC BLOOD PRESSURE: 46 MMHG

## 2022-08-27 LAB
ANION GAP SERPL CALC-SCNC: 12 MMOL/L — SIGNIFICANT CHANGE UP (ref 7–14)
BUN SERPL-MCNC: 8 MG/DL — SIGNIFICANT CHANGE UP (ref 7–23)
CALCIUM SERPL-MCNC: 9.6 MG/DL — SIGNIFICANT CHANGE UP (ref 8.4–10.5)
CHLORIDE SERPL-SCNC: 107 MMOL/L — SIGNIFICANT CHANGE UP (ref 98–107)
CO2 SERPL-SCNC: 22 MMOL/L — SIGNIFICANT CHANGE UP (ref 22–31)
CREAT SERPL-MCNC: 0.58 MG/DL — SIGNIFICANT CHANGE UP (ref 0.5–1.3)
EGFR: 131 ML/MIN/1.73M2 — SIGNIFICANT CHANGE UP
GLUCOSE SERPL-MCNC: 96 MG/DL — SIGNIFICANT CHANGE UP (ref 70–99)
HCT VFR BLD CALC: 22.9 % — LOW (ref 34.5–45)
HGB BLD-MCNC: 7.7 G/DL — LOW (ref 11.5–15.5)
LDH SERPL L TO P-CCNC: 471 U/L — HIGH (ref 135–225)
MAGNESIUM SERPL-MCNC: 1.8 MG/DL — SIGNIFICANT CHANGE UP (ref 1.6–2.6)
MCHC RBC-ENTMCNC: 31.6 PG — SIGNIFICANT CHANGE UP (ref 27–34)
MCHC RBC-ENTMCNC: 33.6 GM/DL — SIGNIFICANT CHANGE UP (ref 32–36)
MCV RBC AUTO: 93.9 FL — SIGNIFICANT CHANGE UP (ref 80–100)
NRBC # BLD: 1 /100 WBCS — HIGH (ref 0–0)
NRBC # FLD: 0.15 K/UL — HIGH (ref 0–0)
PHOSPHATE SERPL-MCNC: 4.2 MG/DL — SIGNIFICANT CHANGE UP (ref 2.5–4.5)
PLATELET # BLD AUTO: 396 K/UL — SIGNIFICANT CHANGE UP (ref 150–400)
POTASSIUM SERPL-MCNC: 4.3 MMOL/L — SIGNIFICANT CHANGE UP (ref 3.5–5.3)
POTASSIUM SERPL-SCNC: 4.3 MMOL/L — SIGNIFICANT CHANGE UP (ref 3.5–5.3)
RBC # BLD: 2.44 M/UL — LOW (ref 3.8–5.2)
RBC # BLD: 2.44 M/UL — LOW (ref 3.8–5.2)
RBC # FLD: 18.3 % — HIGH (ref 10.3–14.5)
RETICS #: 350.6 K/UL — HIGH (ref 25–125)
RETICS/RBC NFR: 14.4 % — HIGH (ref 0.5–2.5)
SODIUM SERPL-SCNC: 141 MMOL/L — SIGNIFICANT CHANGE UP (ref 135–145)
WBC # BLD: 11.41 K/UL — HIGH (ref 3.8–10.5)
WBC # FLD AUTO: 11.41 K/UL — HIGH (ref 3.8–10.5)

## 2022-08-27 PROCEDURE — 99239 HOSP IP/OBS DSCHRG MGMT >30: CPT

## 2022-08-27 RX ORDER — RIVAROXABAN 15 MG-20MG
1 KIT ORAL
Qty: 0 | Refills: 0 | DISCHARGE

## 2022-08-27 RX ORDER — DEFERASIROX 180 MG/1
4 GRANULE ORAL
Qty: 0 | Refills: 0 | DISCHARGE

## 2022-08-27 RX ORDER — HYDROXYUREA 500 MG/1
1 CAPSULE ORAL
Qty: 0 | Refills: 0 | DISCHARGE

## 2022-08-27 RX ADMIN — MORPHINE SULFATE 30 MILLILITER(S): 50 CAPSULE, EXTENDED RELEASE ORAL at 08:36

## 2022-08-27 RX ADMIN — SENNA PLUS 2 TABLET(S): 8.6 TABLET ORAL at 00:50

## 2022-08-27 RX ADMIN — SERTRALINE 100 MILLIGRAM(S): 25 TABLET, FILM COATED ORAL at 11:53

## 2022-08-27 RX ADMIN — HYDROXYUREA 500 MILLIGRAM(S): 500 CAPSULE ORAL at 11:53

## 2022-08-27 RX ADMIN — SODIUM CHLORIDE 3 MILLILITER(S): 9 INJECTION INTRAMUSCULAR; INTRAVENOUS; SUBCUTANEOUS at 05:25

## 2022-08-27 RX ADMIN — ARIPIPRAZOLE 5 MILLIGRAM(S): 15 TABLET ORAL at 11:53

## 2022-08-27 RX ADMIN — SODIUM CHLORIDE 100 MILLILITER(S): 9 INJECTION, SOLUTION INTRAVENOUS at 00:12

## 2022-08-27 NOTE — PROGRESS NOTE ADULT - PROBLEM SELECTOR PROBLEM 5
Elevated AST (SGOT)
Proteinuria
Elevated AST (SGOT)

## 2022-08-27 NOTE — PROGRESS NOTE ADULT - PROBLEM SELECTOR PROBLEM 8
Need for prophylactic measure
Need for prophylactic measure
Medication management
Need for prophylactic measure
Need for prophylactic measure
Medication management

## 2022-08-27 NOTE — PROGRESS NOTE ADULT - PROBLEM SELECTOR PROBLEM 4
Elevated bilirubin
Elevated AST (SGOT)
Elevated bilirubin
Elevated bilirubin

## 2022-08-27 NOTE — PROGRESS NOTE ADULT - PROBLEM SELECTOR PLAN 4
Patient with bilirubin of 4.0.  Patient appears to have scleral icterus.  Bilirubin likely elevated in setting of sickle cell disease.  Continue to trend.
AST 40.  Continue trending.

## 2022-08-27 NOTE — PROGRESS NOTE ADULT - PROBLEM SELECTOR PLAN 8
Patient unable to recall dosages of some of her medications.  Meds to be reconciled, discussed with ACP
Continue Xarelto 20 mg daily.
Continue Xarelto 20 mg daily.    labs are not done yet today  Plan discussed with ACP
Continue Xarelto 20 mg daily.
Patient unable to recall dosages of some of her medications.  Meds to be reconciled, discussed with ACP
Continue Xarelto 20 mg daily.    labs are not done yet today  Plan discussed with ACP

## 2022-08-27 NOTE — PROGRESS NOTE ADULT - ASSESSMENT
[ x ]  Lab studies reviewed  [ x ]  Radiology reviewed  [ x ]  Old records reviewed    23 y/o F with  PMH of ex-24wga, sickle cell disease on chronic transfusions, acute chest syndrome, CVA, Grade 4 IVH as , hydrocephalus s/p  shunt x2 with 9 revisions, asthma, atrophic spleen, cholecystectomy, eye operations x4, bipolar disorder, Pulmonary embolism  on Xarelto presents with three days of generalized body aches - consistent with sickle cell crisis.
[ x ]  Lab studies reviewed  [ x ]  Radiology reviewed  [ x ]  Old records reviewed    21 y/o F with  PMH of ex-24wga, sickle cell disease on chronic transfusions, acute chest syndrome, CVA, Grade 4 IVH as , hydrocephalus s/p  shunt x2 with 9 revisions, asthma, atrophic spleen, cholecystectomy, eye operations x4, bipolar disorder, Pulmonary embolism  on Xarelto presents with three days of generalized body aches - consistent with sickle cell crisis.
[ x ]  Lab studies reviewed  [ x ]  Radiology reviewed  [ x ]  Old records reviewed    23 y/o F with  PMH of ex-24wga, sickle cell disease on chronic transfusions, acute chest syndrome, CVA, Grade 4 IVH as , hydrocephalus s/p  shunt x2 with 9 revisions, asthma, atrophic spleen, cholecystectomy, eye operations x4, bipolar disorder, Pulmonary embolism  on Xarelto presents with three days of generalized body aches - consistent with sickle cell crisis.
21 y/o F with  PMH of ex-24wga, sickle cell disease on chronic transfusions, acute chest syndrome, CVA, Grade 4 IVH as , hydrocephalus s/p  shunt x2 with 9 revisions, asthma, atrophic spleen, cholecystectomy, eye operations x4, bipolar disorder, Pulmonary embolism  on Xarelto presents with three days of generalized body aches - consistent with sickle cell crisis.
[ x ]  Lab studies reviewed  [ x ]  Radiology reviewed  [ x ]  Old records reviewed    21 y/o F with  PMH of ex-24wga, sickle cell disease on chronic transfusions, acute chest syndrome, CVA, Grade 4 IVH as , hydrocephalus s/p  shunt x2 with 9 revisions, asthma, atrophic spleen, cholecystectomy, eye operations x4, bipolar disorder, Pulmonary embolism  on Xarelto presents with three days of generalized body aches - consistent with sickle cell crisis.
[ x ]  Lab studies reviewed  [ x ]  Radiology reviewed  [ x ]  Old records reviewed    23 y/o F with  PMH of ex-24wga, sickle cell disease on chronic transfusions, acute chest syndrome, CVA, Grade 4 IVH as , hydrocephalus s/p  shunt x2 with 9 revisions, asthma, atrophic spleen, cholecystectomy, eye operations x4, bipolar disorder, Pulmonary embolism  on Xarelto presents with three days of generalized body aches - consistent with sickle cell crisis.

## 2022-08-27 NOTE — PROGRESS NOTE ADULT - PROBLEM SELECTOR PLAN 7
Continue Aripiprazole 5mg and Sertraline 100 mg daily.   TSH wnl
Continue Aripiprazole 5mg and Sertraline 100 mg daily.   TSH wnl
Patient unable to recall dosages of some of her medications.  Meds to be reconciled, discussed with ACP
Continue Aripiprazole 5mg and Sertraline 100 mg daily.   TSH wnl

## 2022-08-27 NOTE — PROGRESS NOTE ADULT - PROBLEM SELECTOR PLAN 2
Patient reporting pressure like headache on right side over  shunt.  Patient reports that pain has currently resolved. If recurs and persists will get nsx to evalulate  Patient had CT stereotactic scan which showed right frontal and parietal shunt catheters in place. Stable ventricular   size.   nsx evaluating pt, head ct done, f/u results, if ct head is ok and if Neuro sx has no objection for dc, can be dc today
Patient reporting pressure like headache on right side over  shunt.  Patient reports that pain has currently resolved. If recurs and persists will get nsx to evalulate  Patient had CT stereotactic scan which showed right frontal and parietal shunt catheters in place. Stable ventricular   size.  Ensure optimal hydration  F/up electrolytes
Patient reporting pressure like headache on right side over  shunt.  Patient reports that pain has currently resolved. If recurs and persists will get nsx to evalulate  Patient had CT stereotactic scan which showed right frontal and parietal shunt catheters in place. Stable ventricular   size.  Ensure optimal hydration  F/up electrolytes
Patient reporting pressure like headache on right side over  shunt.  Patient reports that pain has currently resolved. If recurs and persists will get nsx to evalulate  Patient had CT stereotactic scan which showed right frontal and parietal shunt catheters in place. Stable ventricular   size.   nsx evaluating pt, head ct done, no objection from neurosurgery to DC patient
Patient reporting pressure like headache on right side over  shunt.  Patient reports that pain has currently resolved.  Patient had CT stereotactic scan which showed right frontal and parietal shunt catheters in place. Stable ventricular   size.  Ensure optimal hydration  F/up electrolytes  Continue monitoring, if headache reoccurs consider Neurosurgery reconsult.  -no head ache reported
Patient reporting pressure like headache on right side over  shunt.  Patient reports that pain has currently resolved. If recurs and persists will get nsx to evalulate  Patient had CT stereotactic scan which showed right frontal and parietal shunt catheters in place. Stable ventricular   size.  paulino call nsx to evaluate as pt c/o headache

## 2022-08-27 NOTE — DISCHARGE NOTE NURSING/CASE MANAGEMENT/SOCIAL WORK - PATIENT PORTAL LINK FT
You can access the FollowMyHealth Patient Portal offered by [facility] by registering at the following website: [email or web address]. By joining [employer]’s FollowMyHealth portal, you will also be able to view your health information using other applications (apps) compatible with our system.

## 2022-08-27 NOTE — PROGRESS NOTE ADULT - SUBJECTIVE AND OBJECTIVE BOX
Zeeshan Fragoso MD  [employer]  Pager [phone]  Email: [email or web address]  Available on [employer] Teams        PROGRESS NOTE:     Patient is a 22y old  Female who presents with a chief complaint of Sickle cell crisis (26 Aug 2022 13:05)      SUBJECTIVE / OVERNIGHT EVENTS:  Patient seen and examined this morning. Pain subsided, patient feels better and eager to go home. States that she has her pain meds at home.   ADDITIONAL REVIEW OF SYSTEMS:  No f/c/n/v    MEDICATIONS  (STANDING):  ARIPiprazole 5 milliGRAM(s) Oral daily  hydroxyurea 500 milliGRAM(s) Oral daily  melatonin 3 milliGRAM(s) Oral at bedtime  morphine PCA (5 mG/mL) 30 milliLiter(s) PCA Continuous PCA Continuous  polyethylene glycol 3350 17 Gram(s) Oral daily  rivaroxaban 20 milliGRAM(s) Oral with dinner  senna 2 Tablet(s) Oral at bedtime  sertraline 100 milliGRAM(s) Oral daily  sodium chloride 0.45%. 1000 milliLiter(s) (100 mL/Hr) IV Continuous <Continuous>  sodium chloride 0.9% lock flush 3 milliLiter(s) IV Push every 8 hours    MEDICATIONS  (PRN):  acetaminophen     Tablet .. 650 milliGRAM(s) Oral every 6 hours PRN Moderate Pain (4 - 6)  bisacodyl 5 milliGRAM(s) Oral at bedtime PRN Constipation  ketorolac   Injectable 15 milliGRAM(s) IV Push every 6 hours PRN Mild Pain (1 - 3)  naloxone Injectable 0.1 milliGRAM(s) IV Push every 3 minutes PRN For ANY of the following changes in patient status:  A. RR LESS THAN 10 breaths per minute, B. Oxygen saturation LESS THAN 90%, C. Sedation score of 6  ondansetron Injectable 4 milliGRAM(s) IV Push every 6 hours PRN Nausea      CAPILLARY BLOOD GLUCOSE        I&O's Summary    26 Aug 2022 07:01  -  27 Aug 2022 07:00  --------------------------------------------------------  IN: 1920 mL / OUT: 0 mL / NET: 1920 mL        PHYSICAL EXAM:  Vital Signs Last 24 Hrs  T(C): 36.9 (27 Aug 2022 09:33), Max: 37.5 (27 Aug 2022 05:39)  T(F): 98.4 (27 Aug 2022 09:33), Max: 99.5 (27 Aug 2022 05:39)  HR: 73 (27 Aug 2022 09:33) (60 - 92)  BP: 101/46 (27 Aug 2022 09:33) (90/41 - 103/58)  BP(mean): --  RR: 17 (27 Aug 2022 09:33) (17 - 18)  SpO2: 99% (27 Aug 2022 09:33) (95% - 99%)    Parameters below as of 27 Aug 2022 09:33  Patient On (Oxygen Delivery Method): room air        CONSTITUTIONAL: NAD, well-developed  RESPIRATORY: Normal respiratory effort; lungs are clear to auscultation bilaterally  CARDIOVASCULAR: Regular rate and rhythm, normal S1 and S2, no murmur/rub/gallop; No lower extremity edema; Peripheral pulses are 2+ bilaterally  ABDOMEN: Nontender to palpation, normoactive bowel sounds, no rebound/guarding; MUSCLOSKELETAL: no clubbing or cyanosis of digits; no joint swelling or tenderness to palpation  PSYCH: A+O to person, place, and time; affect appropriate    LABS:                        7.7    11.41 )-----------( 396      ( 27 Aug 2022 05:12 )             22.9     08-27    141  |  107  |  8   ----------------------------<  96  4.3   |  22  |  0.58    Ca    9.6      27 Aug 2022 05:12  Phos  4.2     08-27  Mg     1.80     08-27                  RADIOLOGY & ADDITIONAL TESTS:  Results Reviewed:   Imaging Personally Reviewed:  Electrocardiogram Personally Reviewed:    COORDINATION OF CARE:  Care Discussed with Consultants/Other Providers [Y/N]:  Prior or Outpatient Records Reviewed [Y/N]:  
Patient is a 22y old  Female who presents with a chief complaint of Sickle cell crisis (21 Aug 2022 21:36)      SUBJECTIVE / OVERNIGHT EVENTS: Pt seen and examined at 12:05pm, no  overnight events, pt reports that her pain is better today, mom at bedside, No other new issues reported.    MEDICATIONS  (STANDING):  ARIPiprazole 5 milliGRAM(s) Oral daily  hydroxyurea 500 milliGRAM(s) Oral daily  morphine PCA (5 mG/mL) 30 milliLiter(s) PCA Continuous PCA Continuous  rivaroxaban 20 milliGRAM(s) Oral with dinner  senna 2 Tablet(s) Oral at bedtime  sertraline 100 milliGRAM(s) Oral daily  sodium chloride 0.45%. 1000 milliLiter(s) (75 mL/Hr) IV Continuous <Continuous>  sodium chloride 0.9% lock flush 3 milliLiter(s) IV Push every 8 hours    MEDICATIONS  (PRN):  bisacodyl 5 milliGRAM(s) Oral at bedtime PRN Constipation  naloxone Injectable 0.1 milliGRAM(s) IV Push every 3 minutes PRN For ANY of the following changes in patient status:  A. RR LESS THAN 10 breaths per minute, B. Oxygen saturation LESS THAN 90%, C. Sedation score of 6  ondansetron Injectable 4 milliGRAM(s) IV Push every 6 hours PRN Nausea      Vital Signs Last 24 Hrs  T(C): 36.6 (22 Aug 2022 10:31), Max: 36.9 (21 Aug 2022 15:17)  T(F): 97.9 (22 Aug 2022 10:31), Max: 98.4 (21 Aug 2022 15:17)  HR: 91 (22 Aug 2022 10:31) (72 - 91)  BP: 112/53 (22 Aug 2022 10:31) (99/51 - 121/60)  BP(mean): --  RR: 16 (22 Aug 2022 10:31) (14 - 18)  SpO2: 97% (22 Aug 2022 10:31) (97% - 100%)    Parameters below as of 22 Aug 2022 10:31  Patient On (Oxygen Delivery Method): room air      CAPILLARY BLOOD GLUCOSE        I&O's Summary    21 Aug 2022 07:01  -  22 Aug 2022 07:00  --------------------------------------------------------  IN: 0 mL / OUT: 500 mL / NET: -500 mL        PHYSICAL EXAM:  GENERAL: NAD, well-developed  CHEST/LUNG: Clear to auscultation bilaterally; No wheeze  HEART: Regular rate and rhythm; No murmurs, rubs, or gallops  ABDOMEN: Soft, Nontender, Nondistended  EXTREMITIES: no LE edema  PSYCH: Calm  NEUROLOGY: AAOx3  SKIN: No rashes or lesions    LABS:                        7.8    10.99 )-----------( 334      ( 22 Aug 2022 06:05 )             22.3     08-    140  |  108<H>  |  10  ----------------------------<  110<H>  3.8   |  21<L>  |  0.51    Ca    9.0      22 Aug 2022 06:05  Phos  3.5       Mg     1.70         TPro  6.4  /  Alb  4.3  /  TBili  3.3<H>  /  DBili  x   /  AST  34<H>  /  ALT  17  /  AlkPhos  66  08-22    PT/INR - ( 21 Aug 2022 15:40 )   PT: 13.9 sec;   INR: 1.20 ratio         PTT - ( 21 Aug 2022 15:40 )  PTT:24.5 sec      Urinalysis Basic - ( 21 Aug 2022 15:53 )    Color: Yellow / Appearance: Clear / S.019 / pH: x  Gluc: x / Ketone: Negative  / Bili: Negative / Urobili: 6 mg/dL   Blood: x / Protein: 30 mg/dL / Nitrite: Negative   Leuk Esterase: Negative / RBC: 2 /HPF / WBC 3 /HPF   Sq Epi: x / Non Sq Epi: 2 /HPF / Bacteria: Negative        RADIOLOGY & ADDITIONAL TESTS:    Imaging Personally Reviewed:    Consultant(s) Notes Reviewed:      Care Discussed with Consultants/Other Providers:  
Patient is a 22y old  Female who presents with a chief complaint of Sickle cell crisis (22 Aug 2022 14:58)      SUBJECTIVE / OVERNIGHT EVENTS: Pt seen and examined at 11:15am, no overnight events, pt reports that she had headache earlier now better, no bm in 2 days, no abdominal pain, nausea or vomiting, no other complaints, No other new issues reported.        MEDICATIONS  (STANDING):  ARIPiprazole 5 milliGRAM(s) Oral daily  hydroxyurea 500 milliGRAM(s) Oral daily  morphine PCA (5 mG/mL) 30 milliLiter(s) PCA Continuous PCA Continuous  rivaroxaban 20 milliGRAM(s) Oral with dinner  senna 2 Tablet(s) Oral at bedtime  sertraline 100 milliGRAM(s) Oral daily  sodium chloride 0.45%. 1000 milliLiter(s) (100 mL/Hr) IV Continuous <Continuous>  sodium chloride 0.9% lock flush 3 milliLiter(s) IV Push every 8 hours    MEDICATIONS  (PRN):  acetaminophen     Tablet .. 650 milliGRAM(s) Oral every 6 hours PRN Moderate Pain (4 - 6)  bisacodyl 5 milliGRAM(s) Oral at bedtime PRN Constipation  naloxone Injectable 0.1 milliGRAM(s) IV Push every 3 minutes PRN For ANY of the following changes in patient status:  A. RR LESS THAN 10 breaths per minute, B. Oxygen saturation LESS THAN 90%, C. Sedation score of 6  ondansetron Injectable 4 milliGRAM(s) IV Push every 6 hours PRN Nausea      Vital Signs Last 24 Hrs  T(C): 36.8 (23 Aug 2022 13:33), Max: 37 (23 Aug 2022 06:49)  T(F): 98.2 (23 Aug 2022 13:33), Max: 98.6 (23 Aug 2022 06:49)  HR: 76 (23 Aug 2022 13:33) (69 - 90)  BP: 100/51 (23 Aug 2022 13:33) (82/46 - 114/67)  BP(mean): --  RR: 18 (23 Aug 2022 13:33) (17 - 18)  SpO2: 95% (23 Aug 2022 13:33) (95% - 99%)    Parameters below as of 23 Aug 2022 13:33  Patient On (Oxygen Delivery Method): room air      CAPILLARY BLOOD GLUCOSE        I&O's Summary    22 Aug 2022 07:01  -  23 Aug 2022 07:00  --------------------------------------------------------  IN: 1260 mL / OUT: 900 mL / NET: 360 mL    23 Aug 2022 07:01  -  23 Aug 2022 13:36  --------------------------------------------------------  IN: 640 mL / OUT: 300 mL / NET: 340 mL        PHYSICAL EXAM:  GENERAL: NAD, well-developed  CHEST/LUNG: Clear to auscultation bilaterally; No wheeze  HEART: Regular rate and rhythm; No murmurs, rubs, or gallops  ABDOMEN: Soft, Nontender, Nondistended  EXTREMITIES: no LE edema  PSYCH: Calm  NEUROLOGY: AAOx3  SKIN: No rashes or lesions    LABS:                        7.8    12.46 )-----------( 350      ( 23 Aug 2022 05:20 )             22.7     08-    139  |  106  |  6<L>  ----------------------------<  80  3.9   |  22  |  0.51    Ca    9.4      23 Aug 2022 05:20  Phos  4.2     08-  Mg     1.80         TPro  6.5  /  Alb  4.4  /  TBili  3.9<H>  /  DBili  0.2  /  AST  34<H>  /  ALT  18  /  AlkPhos  57  08-23    PT/INR - ( 21 Aug 2022 15:40 )   PT: 13.9 sec;   INR: 1.20 ratio         PTT - ( 21 Aug 2022 15:40 )  PTT:24.5 sec      Urinalysis Basic - ( 21 Aug 2022 15:53 )    Color: Yellow / Appearance: Clear / S.019 / pH: x  Gluc: x / Ketone: Negative  / Bili: Negative / Urobili: 6 mg/dL   Blood: x / Protein: 30 mg/dL / Nitrite: Negative   Leuk Esterase: Negative / RBC: 2 /HPF / WBC 3 /HPF   Sq Epi: x / Non Sq Epi: 2 /HPF / Bacteria: Negative        RADIOLOGY & ADDITIONAL TESTS:    Imaging Personally Reviewed:    Consultant(s) Notes Reviewed:      Care Discussed with Consultants/Other Providers:  
Patient is a 22y old  Female who presents with a chief complaint of Sickle cell crisis (23 Aug 2022 13:35)      SUBJECTIVE / OVERNIGHT EVENTS: Pt seen and examined at 11:30am, no overnight events, pt reports that pain is better, no more headache, had bm last night, does not want to go home today, wants to go home only tomorrow, No other new issues reported.        MEDICATIONS  (STANDING):  ARIPiprazole 5 milliGRAM(s) Oral daily  hydroxyurea 500 milliGRAM(s) Oral daily  morphine PCA (5 mG/mL) 30 milliLiter(s) PCA Continuous PCA Continuous  polyethylene glycol 3350 17 Gram(s) Oral daily  rivaroxaban 20 milliGRAM(s) Oral with dinner  senna 2 Tablet(s) Oral at bedtime  sertraline 100 milliGRAM(s) Oral daily  sodium chloride 0.45%. 1000 milliLiter(s) (100 mL/Hr) IV Continuous <Continuous>  sodium chloride 0.9% lock flush 3 milliLiter(s) IV Push every 8 hours    MEDICATIONS  (PRN):  acetaminophen     Tablet .. 650 milliGRAM(s) Oral every 6 hours PRN Moderate Pain (4 - 6)  bisacodyl 5 milliGRAM(s) Oral at bedtime PRN Constipation  naloxone Injectable 0.1 milliGRAM(s) IV Push every 3 minutes PRN For ANY of the following changes in patient status:  A. RR LESS THAN 10 breaths per minute, B. Oxygen saturation LESS THAN 90%, C. Sedation score of 6  ondansetron Injectable 4 milliGRAM(s) IV Push every 6 hours PRN Nausea      Vital Signs Last 24 Hrs  T(C): 36.6 (24 Aug 2022 14:00), Max: 37.3 (24 Aug 2022 06:07)  T(F): 97.8 (24 Aug 2022 14:00), Max: 99.1 (24 Aug 2022 06:07)  HR: 77 (24 Aug 2022 14:00) (75 - 87)  BP: 94/52 (24 Aug 2022 14:00) (94/52 - 108/58)  BP(mean): --  RR: 17 (24 Aug 2022 14:00) (17 - 189)  SpO2: 98% (24 Aug 2022 14:00) (95% - 99%)    Parameters below as of 24 Aug 2022 14:00  Patient On (Oxygen Delivery Method): room air      CAPILLARY BLOOD GLUCOSE        I&O's Summary    23 Aug 2022 07:01  -  24 Aug 2022 07:00  --------------------------------------------------------  IN: 1640 mL / OUT: 1850 mL / NET: -210 mL    24 Aug 2022 07:01  -  24 Aug 2022 15:11  --------------------------------------------------------  IN: 1080 mL / OUT: 0 mL / NET: 1080 mL        PHYSICAL EXAM:  GENERAL: NAD, well-developed  CHEST/LUNG: Clear to auscultation bilaterally; No wheeze  HEART: Regular rate and rhythm; No murmurs, rubs, or gallops  ABDOMEN: Soft, Nontender, Nondistended  EXTREMITIES: no LE edema  PSYCH: Calm  NEUROLOGY: AAOx3  SKIN: No rashes or lesions  LABS:                        7.9    10.46 )-----------( 350      ( 24 Aug 2022 05:40 )             23.6     08-24    140  |  107  |  7   ----------------------------<  89  4.0   |  22  |  0.57    Ca    9.3      24 Aug 2022 05:40  Phos  3.9     08-24  Mg     1.80     08-24    TPro  6.5  /  Alb  4.4  /  TBili  3.9<H>  /  DBili  0.2  /  AST  34<H>  /  ALT  18  /  AlkPhos  57  08-23              RADIOLOGY & ADDITIONAL TESTS:    Imaging Personally Reviewed:    Consultant(s) Notes Reviewed:      Care Discussed with Consultants/Other Providers:  
Patient is a 22y old  Female who presents with a chief complaint of Sickle cell crisis (24 Aug 2022 15:11)      SUBJECTIVE / OVERNIGHT EVENTS: Pt seen and examined at 11:35am, no overnight events, pt reports that she has "pain all over today", also reports headache not ready to go home, not using pca as prescribed. No other new issues reported.    MEDICATIONS  (STANDING):  ARIPiprazole 5 milliGRAM(s) Oral daily  hydroxyurea 500 milliGRAM(s) Oral daily  melatonin 3 milliGRAM(s) Oral at bedtime  morphine PCA (5 mG/mL) 30 milliLiter(s) PCA Continuous PCA Continuous  polyethylene glycol 3350 17 Gram(s) Oral daily  rivaroxaban 20 milliGRAM(s) Oral with dinner  senna 2 Tablet(s) Oral at bedtime  sertraline 100 milliGRAM(s) Oral daily  sodium chloride 0.45%. 1000 milliLiter(s) (100 mL/Hr) IV Continuous <Continuous>  sodium chloride 0.9% lock flush 3 milliLiter(s) IV Push every 8 hours    MEDICATIONS  (PRN):  acetaminophen     Tablet .. 650 milliGRAM(s) Oral every 6 hours PRN Moderate Pain (4 - 6)  bisacodyl 5 milliGRAM(s) Oral at bedtime PRN Constipation  ketorolac   Injectable 15 milliGRAM(s) IV Push every 6 hours PRN Mild Pain (1 - 3)  naloxone Injectable 0.1 milliGRAM(s) IV Push every 3 minutes PRN For ANY of the following changes in patient status:  A. RR LESS THAN 10 breaths per minute, B. Oxygen saturation LESS THAN 90%, C. Sedation score of 6  ondansetron Injectable 4 milliGRAM(s) IV Push every 6 hours PRN Nausea      Vital Signs Last 24 Hrs  T(C): 36.4 (25 Aug 2022 10:47), Max: 36.8 (25 Aug 2022 02:35)  T(F): 97.6 (25 Aug 2022 10:47), Max: 98.2 (25 Aug 2022 02:35)  HR: 80 (25 Aug 2022 10:47) (77 - 88)  BP: 101/52 (25 Aug 2022 10:47) (94/52 - 101/60)  BP(mean): --  RR: 19 (25 Aug 2022 10:47) (16 - 19)  SpO2: 98% (25 Aug 2022 10:47) (95% - 98%)    Parameters below as of 25 Aug 2022 10:47  Patient On (Oxygen Delivery Method): room air      CAPILLARY BLOOD GLUCOSE        I&O's Summary    24 Aug 2022 07:01  -  25 Aug 2022 07:00  --------------------------------------------------------  IN: 2770 mL / OUT: 0 mL / NET: 2770 mL        PHYSICAL EXAM:  GENERAL: NAD, well-developed  CHEST/LUNG: Clear to auscultation bilaterally; No wheeze  HEART: Regular rate and rhythm; No murmurs, rubs, or gallops  ABDOMEN: Soft, Nontender, Nondistended  EXTREMITIES: no LE edema  PSYCH: Calm  NEUROLOGY: AAOx3  SKIN: No rashes or lesions    LABS:                        8.0    12.45 )-----------( 382      ( 25 Aug 2022 06:10 )             23.5     08-25    139  |  106  |  6<L>  ----------------------------<  95  3.8   |  23  |  0.53    Ca    9.6      25 Aug 2022 06:10  Phos  4.4     08-25  Mg     1.70     08-25                RADIOLOGY & ADDITIONAL TESTS:    Imaging Personally Reviewed:    Consultant(s) Notes Reviewed:      Care Discussed with Consultants/Other Providers:  
Patient is a 22y old  Female who presents with a chief complaint of Sickle cell crisis (26 Aug 2022 10:47)      SUBJECTIVE / OVERNIGHT EVENTS: Pt seen and examined at 10:50am, no overnight events, pt reports that her pain is better, states that she no more headache, minimal use of pca, did not use toradol, wants to go home. No other new issues reported.    MEDICATIONS  (STANDING):  ARIPiprazole 5 milliGRAM(s) Oral daily  hydroxyurea 500 milliGRAM(s) Oral daily  melatonin 3 milliGRAM(s) Oral at bedtime  morphine PCA (5 mG/mL) 30 milliLiter(s) PCA Continuous PCA Continuous  polyethylene glycol 3350 17 Gram(s) Oral daily  rivaroxaban 20 milliGRAM(s) Oral with dinner  senna 2 Tablet(s) Oral at bedtime  sertraline 100 milliGRAM(s) Oral daily  sodium chloride 0.45%. 1000 milliLiter(s) (100 mL/Hr) IV Continuous <Continuous>  sodium chloride 0.9% lock flush 3 milliLiter(s) IV Push every 8 hours    MEDICATIONS  (PRN):  acetaminophen     Tablet .. 650 milliGRAM(s) Oral every 6 hours PRN Moderate Pain (4 - 6)  bisacodyl 5 milliGRAM(s) Oral at bedtime PRN Constipation  ketorolac   Injectable 15 milliGRAM(s) IV Push every 6 hours PRN Mild Pain (1 - 3)  naloxone Injectable 0.1 milliGRAM(s) IV Push every 3 minutes PRN For ANY of the following changes in patient status:  A. RR LESS THAN 10 breaths per minute, B. Oxygen saturation LESS THAN 90%, C. Sedation score of 6  ondansetron Injectable 4 milliGRAM(s) IV Push every 6 hours PRN Nausea      Vital Signs Last 24 Hrs  T(C): 36.6 (26 Aug 2022 10:25), Max: 36.9 (26 Aug 2022 02:40)  T(F): 97.9 (26 Aug 2022 10:25), Max: 98.5 (26 Aug 2022 02:40)  HR: 76 (26 Aug 2022 10:25) (72 - 82)  BP: 92/51 (26 Aug 2022 10:25) (92/51 - 103/59)  BP(mean): --  RR: 18 (26 Aug 2022 10:25) (18 - 19)  SpO2: 98% (26 Aug 2022 10:25) (95% - 98%)    Parameters below as of 26 Aug 2022 10:25  Patient On (Oxygen Delivery Method): room air      CAPILLARY BLOOD GLUCOSE        I&O's Summary    25 Aug 2022 07:01  -  26 Aug 2022 07:00  --------------------------------------------------------  IN: 3420 mL / OUT: 0 mL / NET: 3420 mL        PHYSICAL EXAM:  GENERAL: NAD, well-developed  CHEST/LUNG: Clear to auscultation bilaterally; No wheeze  HEART: Regular rate and rhythm; No murmurs, rubs, or gallops  ABDOMEN: Soft, Nontender, Nondistended  EXTREMITIES: no LE edema  PSYCH: Calm  NEUROLOGY: AAOx3  SKIN: No rashes or lesions    LABS:                        8.0    12.45 )-----------( 382      ( 25 Aug 2022 06:10 )             23.5     08-25    139  |  106  |  6<L>  ----------------------------<  95  3.8   |  23  |  0.53    Ca    9.6      25 Aug 2022 06:10  Phos  4.4     08-25  Mg     1.70     08-25                RADIOLOGY & ADDITIONAL TESTS:    Imaging Personally Reviewed:    Consultant(s) Notes Reviewed:      Care Discussed with Consultants/Other Providers:

## 2022-08-27 NOTE — PROGRESS NOTE ADULT - PROBLEM SELECTOR PROBLEM 1
Anemia, sickle cell with crisis

## 2022-08-27 NOTE — PROGRESS NOTE ADULT - PROBLEM SELECTOR PLAN 1
Pain likely due to sickle cell crisis  Patient  s/p Morphine 4mg IVP, Morphine 6 mg IVP and Morphine 8 mg IVP in ER  on Morphine PCA pump for pain control. minimal pca use, did not use toradol, cont hydrea. Pain much improved  Incentive spirometry ordered.  cont Bowel regimen, monitor for bms  If patient becomes febrile obtain blood cultures.  CXR neg  wants to go home, no objection from neurosurgery to DC patient

## 2022-08-27 NOTE — PROGRESS NOTE ADULT - REASON FOR ADMISSION
Sickle cell crisis

## 2022-08-27 NOTE — PROGRESS NOTE ADULT - PROBLEM SELECTOR PLAN 5
AST 40.  Continue trending.
AST 40.  Continue trending.
rpt lfts in am  Continue trending.
last ast 34  Continue trending.
last ast 34  Continue trending.
Possibly in setting of sickle cell disease.  Continue monitoring.

## 2022-08-27 NOTE — PROGRESS NOTE ADULT - PROBLEM SELECTOR PLAN 6
Possibly in setting of sickle cell disease.  Continue monitoring.
Continue Aripiprazole 5mg and Sertraline 100 mg daily.   TSH wnl
Possibly in setting of sickle cell disease.  Continue monitoring.

## 2022-08-27 NOTE — PROGRESS NOTE ADULT - PROBLEM SELECTOR PROBLEM 7
Anxiety and depression
Medication management

## 2022-09-12 ENCOUNTER — APPOINTMENT (OUTPATIENT)
Dept: INTERNAL MEDICINE | Facility: CLINIC | Age: 22
End: 2022-09-12

## 2022-09-13 ENCOUNTER — OUTPATIENT (OUTPATIENT)
Dept: OUTPATIENT SERVICES | Facility: HOSPITAL | Age: 22
LOS: 1 days | End: 2022-09-13
Payer: MEDICAID

## 2022-09-13 ENCOUNTER — NON-APPOINTMENT (OUTPATIENT)
Age: 22
End: 2022-09-13

## 2022-09-13 DIAGNOSIS — Z98.890 OTHER SPECIFIED POSTPROCEDURAL STATES: Chronic | ICD-10-CM

## 2022-09-13 DIAGNOSIS — Z11.52 ENCOUNTER FOR SCREENING FOR COVID-19: ICD-10-CM

## 2022-09-13 DIAGNOSIS — D57.1 SICKLE-CELL DISEASE WITHOUT CRISIS: ICD-10-CM

## 2022-09-13 LAB
ALBUMIN SERPL ELPH-MCNC: 4.6 G/DL — SIGNIFICANT CHANGE UP (ref 3.3–5)
ALP SERPL-CCNC: 65 U/L — SIGNIFICANT CHANGE UP (ref 40–120)
ALT FLD-CCNC: 15 U/L — SIGNIFICANT CHANGE UP (ref 10–45)
ANION GAP SERPL CALC-SCNC: 13 MMOL/L — SIGNIFICANT CHANGE UP (ref 5–17)
AST SERPL-CCNC: 27 U/L — SIGNIFICANT CHANGE UP (ref 10–40)
BILIRUB SERPL-MCNC: 3.8 MG/DL — HIGH (ref 0.2–1.2)
BUN SERPL-MCNC: 6 MG/DL — LOW (ref 7–23)
CALCIUM SERPL-MCNC: 9.1 MG/DL — SIGNIFICANT CHANGE UP (ref 8.4–10.5)
CHLORIDE SERPL-SCNC: 107 MMOL/L — SIGNIFICANT CHANGE UP (ref 96–108)
CO2 SERPL-SCNC: 22 MMOL/L — SIGNIFICANT CHANGE UP (ref 22–31)
CREAT SERPL-MCNC: 0.59 MG/DL — SIGNIFICANT CHANGE UP (ref 0.5–1.3)
EGFR: 131 ML/MIN/1.73M2 — SIGNIFICANT CHANGE UP
FERRITIN SERPL-MCNC: 792 NG/ML — HIGH (ref 15–150)
GLUCOSE SERPL-MCNC: 94 MG/DL — SIGNIFICANT CHANGE UP (ref 70–99)
HCT VFR BLD CALC: 20.5 % — CRITICAL LOW (ref 34.5–45)
HGB BLD-MCNC: 7.1 G/DL — LOW (ref 11.5–15.5)
MCHC RBC-ENTMCNC: 32.7 PG — SIGNIFICANT CHANGE UP (ref 27–34)
MCHC RBC-ENTMCNC: 34.6 GM/DL — SIGNIFICANT CHANGE UP (ref 32–36)
MCV RBC AUTO: 94.5 FL — SIGNIFICANT CHANGE UP (ref 80–100)
NRBC # BLD: 2 /100 WBCS — HIGH (ref 0–0)
PLATELET # BLD AUTO: 403 K/UL — HIGH (ref 150–400)
POTASSIUM SERPL-MCNC: 3.6 MMOL/L — SIGNIFICANT CHANGE UP (ref 3.5–5.3)
POTASSIUM SERPL-SCNC: 3.6 MMOL/L — SIGNIFICANT CHANGE UP (ref 3.5–5.3)
PROT SERPL-MCNC: 6.9 G/DL — SIGNIFICANT CHANGE UP (ref 6–8.3)
RBC # BLD: 2.17 M/UL — LOW (ref 3.8–5.2)
RBC # FLD: 20.9 % — HIGH (ref 10.3–14.5)
SARS-COV-2 RNA SPEC QL NAA+PROBE: SIGNIFICANT CHANGE UP
SODIUM SERPL-SCNC: 142 MMOL/L — SIGNIFICANT CHANGE UP (ref 135–145)
WBC # BLD: 11.61 K/UL — HIGH (ref 3.8–10.5)
WBC # FLD AUTO: 11.61 K/UL — HIGH (ref 3.8–10.5)

## 2022-09-13 PROCEDURE — U0003: CPT

## 2022-09-13 PROCEDURE — U0005: CPT

## 2022-09-13 PROCEDURE — C9803: CPT

## 2022-09-14 ENCOUNTER — OUTPATIENT (OUTPATIENT)
Dept: OUTPATIENT SERVICES | Facility: HOSPITAL | Age: 22
LOS: 1 days | End: 2022-09-14
Payer: MEDICAID

## 2022-09-14 DIAGNOSIS — D57.1 SICKLE-CELL DISEASE WITHOUT CRISIS: ICD-10-CM

## 2022-09-14 DIAGNOSIS — Z98.890 OTHER SPECIFIED POSTPROCEDURAL STATES: Chronic | ICD-10-CM

## 2022-09-14 LAB
HCT VFR BLD CALC: 19.9 % — CRITICAL LOW (ref 34.5–45)
HGB A MFR BLD: 37.8 % — LOW (ref 95.8–98)
HGB A2 MFR BLD: 2.9 % — SIGNIFICANT CHANGE UP (ref 2–3.2)
HGB BLD-MCNC: 7 G/DL — CRITICAL LOW (ref 11.5–15.5)
HGB C MFR BLD: 0 % — SIGNIFICANT CHANGE UP
HGB F MFR BLD: 1.3 % — HIGH (ref 0–1)
HGB OTHER MFR BLD ELPH: 0 % — SIGNIFICANT CHANGE UP
HGB S MFR BLD: 58 % — HIGH
MCHC RBC-ENTMCNC: 33.2 PG — SIGNIFICANT CHANGE UP (ref 27–34)
MCHC RBC-ENTMCNC: 35.2 GM/DL — SIGNIFICANT CHANGE UP (ref 32–36)
MCV RBC AUTO: 94.3 FL — SIGNIFICANT CHANGE UP (ref 80–100)
NRBC # BLD: 2 /100 WBCS — HIGH (ref 0–0)
PLATELET # BLD AUTO: 356 K/UL — SIGNIFICANT CHANGE UP (ref 150–400)
RBC # BLD: 2.11 M/UL — LOW (ref 3.8–5.2)
RBC # FLD: 20.6 % — HIGH (ref 10.3–14.5)
WBC # BLD: 10.52 K/UL — HIGH (ref 3.8–10.5)
WBC # FLD AUTO: 10.52 K/UL — HIGH (ref 3.8–10.5)

## 2022-09-14 PROCEDURE — 86900 BLOOD TYPING SEROLOGIC ABO: CPT

## 2022-09-14 PROCEDURE — 83020 HEMOGLOBIN ELECTROPHORESIS: CPT

## 2022-09-14 PROCEDURE — P9040: CPT

## 2022-09-14 PROCEDURE — 86902 BLOOD TYPE ANTIGEN DONOR EA: CPT

## 2022-09-14 PROCEDURE — 86923 COMPATIBILITY TEST ELECTRIC: CPT

## 2022-09-14 PROCEDURE — 80053 COMPREHEN METABOLIC PANEL: CPT

## 2022-09-14 PROCEDURE — 85027 COMPLETE CBC AUTOMATED: CPT

## 2022-09-14 PROCEDURE — 86901 BLOOD TYPING SEROLOGIC RH(D): CPT

## 2022-09-14 PROCEDURE — 36430 TRANSFUSION BLD/BLD COMPNT: CPT

## 2022-09-14 PROCEDURE — 96523 IRRIG DRUG DELIVERY DEVICE: CPT

## 2022-09-14 PROCEDURE — 82728 ASSAY OF FERRITIN: CPT

## 2022-09-14 PROCEDURE — 86850 RBC ANTIBODY SCREEN: CPT

## 2022-09-26 NOTE — ED ADULT NURSE NOTE - BOWEL SOUNDS RLQ
[FreeTextEntry1] : Hand osteoarthritis: No weakness or focal deficits on exam. Recommended daily stretches and hand strengthening exercises. Refer to OT if persistent. \par HLD: Check lipids. On atorvastatin. \par HCM: Up to date on mammogram, pap smear, colonoscopy. Up to date on shingrix. Check labs. Will discuss results.\par Heart murmur: She will f/u with Dr Aldana for possible echo. \par 
present

## 2022-10-04 NOTE — ED ADULT NURSE NOTE - NSFALLRSKOUTCOME_ED_ALL_ED
Physician Progress Note      PATIENT:               Gardenia Wiggins  CSN #:                  [id number]  :                       1957  ADMIT DATE:       2022 1:17 PM  [address] DATE:  RESPONDING  PROVIDER #:        Elsie Ohara          QUERY TEXT:    Pt admitted  with sepsis and Left Hallux infection. Pt noted to have   fever and elevated WBC. Differential considerations include asymmetric   pulmonary edema and multfocal pneumonia documented from  through present. If possible, please document in the progress notes and discharge summary if   you are evaluating and/or treating any of the following:    Note: CAP and HCAP indicate where the pneumonia was acquired, not a specific   type. The medical record reflects the following:  Risk Factors: 72 y.o. male with diabetic foot ulcer, Sepsis, MRSA infection of   left foot, DM, osteomyelitis. S/P I&D & Debridement @ bedside in ED on    by Poditry and I&D & Debridemtn by Podiatry on of left foot.   Clinical Indicators: Per  CXR: Significant progression of diffuse   interstitial abnormality with new right lower lung and left upper lung   alveolar consolidation compared to 2022  Treatment:  Cefepime and vanc initially, then [address], Daptomycin ordered on   Options provided:  -- Pneumonia developed following admission and was not POA  -- Pneumonia ruled out  -- Other - I will add my own diagnosis  -- Disagree - Not applicable / Not valid  -- Disagree - Clinically unable to determine / Unknown  -- Refer to Clinical Documentation Reviewer    PROVIDER RESPONSE TEXT:    Clinical correlate is pulmonary edema from fluid overload - unlikely Pneumonia    Query created by: Kev Andrews on 10/3/2022 5:12 PM      Electronically signed by:  Elsie Ohara 10/4/2022 2:49 PM Fall with Harm Risk

## 2022-10-05 NOTE — ED BEHAVIORAL HEALTH ASSESSMENT NOTE - ACTIVATING EVENTS/STRESSORS
Quality 111:Pneumonia Vaccination Status For Older Adults: Pneumococcal vaccine (PPSV23) was not administered on or after patient’s 60th birthday and before the end of the measurement period, reason not otherwise specified
Other
Detail Level: Detailed
Quality 110: Preventive Care And Screening: Influenza Immunization: Influenza Immunization Administered during Influenza season

## 2022-10-06 ENCOUNTER — NON-APPOINTMENT (OUTPATIENT)
Age: 22
End: 2022-10-06

## 2022-10-07 ENCOUNTER — OUTPATIENT (OUTPATIENT)
Dept: OUTPATIENT SERVICES | Facility: HOSPITAL | Age: 22
LOS: 1 days | End: 2022-10-07
Payer: MEDICAID

## 2022-10-07 DIAGNOSIS — Z98.890 OTHER SPECIFIED POSTPROCEDURAL STATES: Chronic | ICD-10-CM

## 2022-10-08 ENCOUNTER — OUTPATIENT (OUTPATIENT)
Dept: OUTPATIENT SERVICES | Facility: HOSPITAL | Age: 22
LOS: 1 days | End: 2022-10-08
Payer: MEDICAID

## 2022-10-08 DIAGNOSIS — Z98.890 OTHER SPECIFIED POSTPROCEDURAL STATES: Chronic | ICD-10-CM

## 2022-10-08 DIAGNOSIS — Z11.52 ENCOUNTER FOR SCREENING FOR COVID-19: ICD-10-CM

## 2022-10-08 DIAGNOSIS — D57.1 SICKLE-CELL DISEASE WITHOUT CRISIS: ICD-10-CM

## 2022-10-08 LAB
ALBUMIN SERPL ELPH-MCNC: 4.8 G/DL — SIGNIFICANT CHANGE UP (ref 3.3–5)
ALP SERPL-CCNC: 63 U/L — SIGNIFICANT CHANGE UP (ref 40–120)
ALT FLD-CCNC: 15 U/L — SIGNIFICANT CHANGE UP (ref 10–45)
ANION GAP SERPL CALC-SCNC: 12 MMOL/L — SIGNIFICANT CHANGE UP (ref 5–17)
AST SERPL-CCNC: 31 U/L — SIGNIFICANT CHANGE UP (ref 10–40)
BILIRUB SERPL-MCNC: 4.6 MG/DL — HIGH (ref 0.2–1.2)
BUN SERPL-MCNC: 8 MG/DL — SIGNIFICANT CHANGE UP (ref 7–23)
CALCIUM SERPL-MCNC: 9.1 MG/DL — SIGNIFICANT CHANGE UP (ref 8.4–10.5)
CHLORIDE SERPL-SCNC: 104 MMOL/L — SIGNIFICANT CHANGE UP (ref 96–108)
CO2 SERPL-SCNC: 23 MMOL/L — SIGNIFICANT CHANGE UP (ref 22–31)
CREAT SERPL-MCNC: 0.54 MG/DL — SIGNIFICANT CHANGE UP (ref 0.5–1.3)
EGFR: 133 ML/MIN/1.73M2 — SIGNIFICANT CHANGE UP
FERRITIN SERPL-MCNC: 1135 NG/ML — HIGH (ref 15–150)
GLUCOSE SERPL-MCNC: 113 MG/DL — HIGH (ref 70–99)
HCT VFR BLD CALC: 23 % — LOW (ref 34.5–45)
HGB BLD-MCNC: 8.1 G/DL — LOW (ref 11.5–15.5)
MCHC RBC-ENTMCNC: 31.9 PG — SIGNIFICANT CHANGE UP (ref 27–34)
MCHC RBC-ENTMCNC: 35.2 GM/DL — SIGNIFICANT CHANGE UP (ref 32–36)
MCV RBC AUTO: 90.6 FL — SIGNIFICANT CHANGE UP (ref 80–100)
NRBC # BLD: 0 /100 WBCS — SIGNIFICANT CHANGE UP (ref 0–0)
PLATELET # BLD AUTO: 411 K/UL — HIGH (ref 150–400)
POTASSIUM SERPL-MCNC: 3.7 MMOL/L — SIGNIFICANT CHANGE UP (ref 3.5–5.3)
POTASSIUM SERPL-SCNC: 3.7 MMOL/L — SIGNIFICANT CHANGE UP (ref 3.5–5.3)
PROT SERPL-MCNC: 7.2 G/DL — SIGNIFICANT CHANGE UP (ref 6–8.3)
RBC # BLD: 2.54 M/UL — LOW (ref 3.8–5.2)
RBC # FLD: 17.6 % — HIGH (ref 10.3–14.5)
SARS-COV-2 RNA SPEC QL NAA+PROBE: SIGNIFICANT CHANGE UP
SODIUM SERPL-SCNC: 139 MMOL/L — SIGNIFICANT CHANGE UP (ref 135–145)
WBC # BLD: 11.15 K/UL — HIGH (ref 3.8–10.5)
WBC # FLD AUTO: 11.15 K/UL — HIGH (ref 3.8–10.5)

## 2022-10-08 PROCEDURE — U0003: CPT

## 2022-10-08 PROCEDURE — C9803: CPT

## 2022-10-08 PROCEDURE — 96523 IRRIG DRUG DELIVERY DEVICE: CPT

## 2022-10-08 PROCEDURE — U0005: CPT

## 2022-10-10 ENCOUNTER — OUTPATIENT (OUTPATIENT)
Dept: OUTPATIENT SERVICES | Facility: HOSPITAL | Age: 22
LOS: 1 days | End: 2022-10-10
Payer: MEDICAID

## 2022-10-10 DIAGNOSIS — D57.1 SICKLE-CELL DISEASE WITHOUT CRISIS: ICD-10-CM

## 2022-10-10 DIAGNOSIS — Z98.890 OTHER SPECIFIED POSTPROCEDURAL STATES: Chronic | ICD-10-CM

## 2022-10-10 LAB
HCT VFR BLD CALC: 22.6 % — LOW (ref 34.5–45)
HGB A MFR BLD: 54.5 % — LOW (ref 95.8–98)
HGB A2 MFR BLD: 3 % — SIGNIFICANT CHANGE UP (ref 2–3.2)
HGB BLD-MCNC: 7.9 G/DL — LOW (ref 11.5–15.5)
HGB C MFR BLD: 0 % — SIGNIFICANT CHANGE UP
HGB F MFR BLD: 0 % — SIGNIFICANT CHANGE UP (ref 0–1)
HGB OTHER MFR BLD ELPH: 0 % — SIGNIFICANT CHANGE UP
HGB S MFR BLD: 42.5 % — HIGH
MCHC RBC-ENTMCNC: 31.5 PG — SIGNIFICANT CHANGE UP (ref 27–34)
MCHC RBC-ENTMCNC: 35 GM/DL — SIGNIFICANT CHANGE UP (ref 32–36)
MCV RBC AUTO: 90 FL — SIGNIFICANT CHANGE UP (ref 80–100)
NRBC # BLD: 0 /100 WBCS — SIGNIFICANT CHANGE UP (ref 0–0)
PLATELET # BLD AUTO: 378 K/UL — SIGNIFICANT CHANGE UP (ref 150–400)
RBC # BLD: 2.51 M/UL — LOW (ref 3.8–5.2)
RBC # FLD: 18.3 % — HIGH (ref 10.3–14.5)
WBC # BLD: 9.95 K/UL — SIGNIFICANT CHANGE UP (ref 3.8–10.5)
WBC # FLD AUTO: 9.95 K/UL — SIGNIFICANT CHANGE UP (ref 3.8–10.5)

## 2022-10-10 PROCEDURE — 82728 ASSAY OF FERRITIN: CPT

## 2022-10-10 PROCEDURE — 86901 BLOOD TYPING SEROLOGIC RH(D): CPT

## 2022-10-10 PROCEDURE — 86923 COMPATIBILITY TEST ELECTRIC: CPT

## 2022-10-10 PROCEDURE — 83020 HEMOGLOBIN ELECTROPHORESIS: CPT

## 2022-10-10 PROCEDURE — 80053 COMPREHEN METABOLIC PANEL: CPT

## 2022-10-10 PROCEDURE — 86902 BLOOD TYPE ANTIGEN DONOR EA: CPT

## 2022-10-10 PROCEDURE — 85027 COMPLETE CBC AUTOMATED: CPT

## 2022-10-10 PROCEDURE — 36430 TRANSFUSION BLD/BLD COMPNT: CPT

## 2022-10-10 PROCEDURE — 86900 BLOOD TYPING SEROLOGIC ABO: CPT

## 2022-10-10 PROCEDURE — 86850 RBC ANTIBODY SCREEN: CPT

## 2022-10-10 PROCEDURE — P9040: CPT

## 2022-10-10 PROCEDURE — 96523 IRRIG DRUG DELIVERY DEVICE: CPT

## 2022-10-15 ENCOUNTER — OUTPATIENT (OUTPATIENT)
Dept: OUTPATIENT SERVICES | Age: 22
LOS: 1 days | End: 2022-10-15

## 2022-10-15 ENCOUNTER — APPOINTMENT (OUTPATIENT)
Dept: MRI IMAGING | Facility: HOSPITAL | Age: 22
End: 2022-10-15

## 2022-10-15 DIAGNOSIS — G91.9 HYDROCEPHALUS, UNSPECIFIED: ICD-10-CM

## 2022-10-15 DIAGNOSIS — Z98.890 OTHER SPECIFIED POSTPROCEDURAL STATES: Chronic | ICD-10-CM

## 2022-10-15 PROCEDURE — 70551 MRI BRAIN STEM W/O DYE: CPT | Mod: 26

## 2022-10-31 PROCEDURE — 99214 OFFICE O/P EST MOD 30 MIN: CPT | Mod: 95

## 2022-10-31 NOTE — ED BEHAVIORAL HEALTH ASSESSMENT NOTE - NS ED [facility] MED ROS NEUROLOGICAL
Hospitalist Discharge Summary     Patient ID:  Kang Mcfarlane  [id number]  17 y.o.  1976    PCP on record: Taco Trammell MD    Admit date: 10/29/2022  Discharge date and time: 10/31/2022    Please note that this dictation was completed with [employer], the [employer] voice recognition software. Quite often unanticipated grammatical, syntax, homophones, and other interpretive errors are inadvertently transcribed by the computer software. Please disregard these errors. Please excuse any errors that have escaped final proofreading. Admission Diagnoses: Numbness [R20.0]    Discharge Diagnoses:    Principal Problem:    Acute CVA (cerebrovascular accident) ([address] (10/6/2022)    Active Problems:    Numbness (10/29/2022)         Hospital Course:     #Headache with left arm numbness POA resolved  #Recent history of left ganglial capsular infarct  #Recent history of dysarthria with facial droop now resolved  -CT head stable area of decreased activity unless below ganglia no acute normality noted  -Patient was discharged on aspirin Plavix for 21 days with statin. Patient endorsed noncompliance with the medication. TTE last TTE without any evidence intra-arterial shunt. LDL 93 A1c 5.6. Repeat MRI with subacute left basilar ganglia infarct without significant mass-effect or hemorrhage. Suspect finding from previous stroke. Aspirin test 380 and P2Y12 182.   We will continue aspirin Plavix and high-dose statin and follow-up with neurology further management of CVA  -Assessed PT OT and speech no rehab needed and defied  - Patient agreed and verbalized understanding       #Hypertension  -Resume home regimen      #Tobacco use  Patient was counseled extensively on the need to abstain from tobacco, its addictive tendencies, its deleterious effects on the lungs, cardiovascular  as well as its financial & social sequelae            CONSULTATIONS:  IP CONSULT TO NEUROLOGY    Excerpted HPI from H&P of Benja Holt MD:    This is a 54-year-old woman with a past medical history significant for hypertension, anxiety/depression, dyslipidemia, status post recent CVA without any significant deficit, presented at the emergency room with headache and left arm numbness. The headache is located at the left side of the head. The headache started two days ago, constant, not throbbing and 6 out of 10 in severity. The patient also complained of left arm numbness and tingling. She also stated that she is unsteady on her feet. Because of that, the patient came to the emergency room for further evaluation. When the patient arrived at the emergency room, CT scan of the head was obtained which showed no acute pathology. The patient was last admitted to the hospital from 10/06/2022 to 10/08/2022. The patient at that time presented with slurred speech and unsteady gait and subsequent evaluation revealed acute stroke confirmed with an MRI of the brain. The patient was discharged home on aspirin and Plavix. The patient stated that he has not taken his aspirin and Plavix as prescribed. The patient was subsequently referred to the hospitalist service for admission. ______________________________________________________________________  DISCHARGE SUMMARY/HOSPITAL COURSE:  for full details see H&P, daily progress notes, labs, consult notes. _______________________________________________________________________  Patient seen and examined by me on discharge day. Pertinent Findings:  Gen:    Not in distress  Chest: Clear lungs  CVS:   Regular rhythm. No edema  Abd:  Soft, not distended, not tender  Neuro:  Alert with good insight.   Oriented to person, place, and time   _______________________________________________________________________  DISCHARGE MEDICATIONS:   Current Discharge Medication List        START taking these medications    Details   butalbital-acetaminophen-caff (FIORICET) -40 mg per capsule Take 1 Capsule by mouth every six (6) hours as needed for Headache. Qty: 14 Capsule, Refills: 0  Start date: 10/31/2022           CONTINUE these medications which have CHANGED    Details   atorvastatin (LIPITOR) 80 mg tablet Take 1 Tablet by mouth nightly. Qty: 30 Tablet, Refills: 1  Start date: 10/31/2022      clopidogreL (PLAVIX) 75 mg tab Take 1 Tablet by mouth daily for 30 days. Qty: 30 Tablet, Refills: 0  Start date: 10/31/2022, End date: 11/30/2022           CONTINUE these medications which have NOT CHANGED    Details   aspirin 81 mg chewable tablet Take 1 Tablet by mouth daily. Qty: 30 Tablet, Refills: 1      amLODIPine (NORVASC) 10 mg tablet Take 1 Tablet by mouth daily. Indications: high blood pressure  Qty: 30 Tablet, Refills: 0      hydroCHLOROthiazide (MICROZIDE) 12.5 mg capsule Take 1 Capsule by mouth daily. Indications: high blood pressure  Qty: 90 Capsule, Refills: 0    Associated Diagnoses: Primary hypertension      diclofenac (VOLTAREN) 1 % gel Apply  to affected area four (4) times daily as needed for Pain.      pantoprazole (PROTONIX) 20 mg tablet Take 20 mg by mouth Daily (before breakfast). fluticasone propionate (FLONASE) 50 mcg/actuation nasal spray 2 Sprays by Both Nostrils route daily as needed for Allergies. hydrOXYzine HCL (ATARAX) 25 mg tablet Take 25 mg by mouth three (3) times daily as needed for Anxiety. lidocaine (LIDODERM) 5 % 1 Patch by TransDERmal route daily as needed for Pain. Apply patch to the affected area for 12 hours a day and remove for 12 hours a day. cyclobenzaprine (FLEXERIL) 10 mg tablet Take 1 Tablet by mouth three (3) times daily as needed for Muscle Spasm(s). Qty: 20 Tablet, Refills: 0      cariprazine (VRAYLAR) 3 mg capsule Take 3 mg by mouth daily.              My Recommended Diet, Activity, Wound Care, and follow-up labs are listed in the patient's Discharge Insturctions which I have personally completed and reviewed. _______________________________________________________________________  DISPOSITION:     Home with Family: x   Home with HH/PT/OT/RN:    SNF/LTC:    JOSÉ:    OTHER:        Condition at Discharge:  Stable  _______________________________________________________________________  Follow up with:   PCP : Raegan Mesa MD  Follow-up Information       Follow up With Specialties Details Why Contact Info    Jack Jensen NP Nurse Practitioner Follow up on 11/8/2022 You have a virtual appointment scheduled with Monika Friend NP on 11/08/22 at 10:30 am.  A nurse will call you prior to the appointment.   Please have your phone available for the call on 11/08/22. Art  [phone]      [facility]  Follow up Outpatient Occupational Therapy- please follow up with them and schedule an appointment, you may need to see your primary care doctor first before starting therapy [address] [place] [id number]  [phone]    Raegan Mesa, [address]  [phone]                  Total time in minutes spent coordinating this discharge (includes going over instructions, follow-up, prescriptions, and preparing report for sign off to her PCP) :  55 minutes    Signed:  Italia Da Silva MD No complaints

## 2022-11-04 ENCOUNTER — NON-APPOINTMENT (OUTPATIENT)
Age: 22
End: 2022-11-04

## 2022-11-05 ENCOUNTER — OUTPATIENT (OUTPATIENT)
Dept: OUTPATIENT SERVICES | Facility: HOSPITAL | Age: 22
LOS: 1 days | End: 2022-11-05
Payer: MEDICAID

## 2022-11-05 DIAGNOSIS — Z98.890 OTHER SPECIFIED POSTPROCEDURAL STATES: Chronic | ICD-10-CM

## 2022-11-05 DIAGNOSIS — D57.1 SICKLE-CELL DISEASE WITHOUT CRISIS: ICD-10-CM

## 2022-11-05 DIAGNOSIS — Z11.52 ENCOUNTER FOR SCREENING FOR COVID-19: ICD-10-CM

## 2022-11-05 LAB
24R-OH-CALCIDIOL SERPL-MCNC: 60.6 NG/ML — SIGNIFICANT CHANGE UP (ref 30–80)
ALBUMIN SERPL ELPH-MCNC: 4.6 G/DL — SIGNIFICANT CHANGE UP (ref 3.3–5)
ALP SERPL-CCNC: 56 U/L — SIGNIFICANT CHANGE UP (ref 40–120)
ALT FLD-CCNC: 13 U/L — SIGNIFICANT CHANGE UP (ref 10–45)
ANION GAP SERPL CALC-SCNC: 13 MMOL/L — SIGNIFICANT CHANGE UP (ref 5–17)
AST SERPL-CCNC: 31 U/L — SIGNIFICANT CHANGE UP (ref 10–40)
BILIRUB SERPL-MCNC: 3.8 MG/DL — HIGH (ref 0.2–1.2)
BUN SERPL-MCNC: 6 MG/DL — LOW (ref 7–23)
CALCIUM SERPL-MCNC: 9.2 MG/DL — SIGNIFICANT CHANGE UP (ref 8.4–10.5)
CHLORIDE SERPL-SCNC: 108 MMOL/L — SIGNIFICANT CHANGE UP (ref 96–108)
CO2 SERPL-SCNC: 19 MMOL/L — LOW (ref 22–31)
CREAT SERPL-MCNC: 0.57 MG/DL — SIGNIFICANT CHANGE UP (ref 0.5–1.3)
EGFR: 132 ML/MIN/1.73M2 — SIGNIFICANT CHANGE UP
FERRITIN SERPL-MCNC: 1095 NG/ML — HIGH (ref 15–150)
GLUCOSE SERPL-MCNC: 112 MG/DL — HIGH (ref 70–99)
HCT VFR BLD CALC: 22.1 % — LOW (ref 34.5–45)
HGB BLD-MCNC: 7.8 G/DL — LOW (ref 11.5–15.5)
MCHC RBC-ENTMCNC: 32.6 PG — SIGNIFICANT CHANGE UP (ref 27–34)
MCHC RBC-ENTMCNC: 35.3 GM/DL — SIGNIFICANT CHANGE UP (ref 32–36)
MCV RBC AUTO: 92.5 FL — SIGNIFICANT CHANGE UP (ref 80–100)
NRBC # BLD: 0 /100 WBCS — SIGNIFICANT CHANGE UP (ref 0–0)
PLATELET # BLD AUTO: 381 K/UL — SIGNIFICANT CHANGE UP (ref 150–400)
POTASSIUM SERPL-MCNC: 3.7 MMOL/L — SIGNIFICANT CHANGE UP (ref 3.5–5.3)
POTASSIUM SERPL-SCNC: 3.7 MMOL/L — SIGNIFICANT CHANGE UP (ref 3.5–5.3)
PROT SERPL-MCNC: 7 G/DL — SIGNIFICANT CHANGE UP (ref 6–8.3)
RBC # BLD: 2.39 M/UL — LOW (ref 3.8–5.2)
RBC # FLD: 21.2 % — HIGH (ref 10.3–14.5)
SARS-COV-2 RNA SPEC QL NAA+PROBE: SIGNIFICANT CHANGE UP
SODIUM SERPL-SCNC: 140 MMOL/L — SIGNIFICANT CHANGE UP (ref 135–145)
WBC # BLD: 11.03 K/UL — HIGH (ref 3.8–10.5)
WBC # FLD AUTO: 11.03 K/UL — HIGH (ref 3.8–10.5)

## 2022-11-05 PROCEDURE — U0005: CPT

## 2022-11-05 PROCEDURE — 82728 ASSAY OF FERRITIN: CPT

## 2022-11-05 PROCEDURE — C9803: CPT

## 2022-11-05 PROCEDURE — 86901 BLOOD TYPING SEROLOGIC RH(D): CPT

## 2022-11-05 PROCEDURE — 85027 COMPLETE CBC AUTOMATED: CPT

## 2022-11-05 PROCEDURE — 86900 BLOOD TYPING SEROLOGIC ABO: CPT

## 2022-11-05 PROCEDURE — 80053 COMPREHEN METABOLIC PANEL: CPT

## 2022-11-05 PROCEDURE — 82306 VITAMIN D 25 HYDROXY: CPT

## 2022-11-05 PROCEDURE — 83020 HEMOGLOBIN ELECTROPHORESIS: CPT

## 2022-11-05 PROCEDURE — 86850 RBC ANTIBODY SCREEN: CPT

## 2022-11-05 PROCEDURE — U0003: CPT

## 2022-11-07 ENCOUNTER — OUTPATIENT (OUTPATIENT)
Dept: OUTPATIENT SERVICES | Facility: HOSPITAL | Age: 22
LOS: 1 days | End: 2022-11-07
Payer: MEDICAID

## 2022-11-07 DIAGNOSIS — Z98.890 OTHER SPECIFIED POSTPROCEDURAL STATES: Chronic | ICD-10-CM

## 2022-11-07 DIAGNOSIS — D57.1 SICKLE-CELL DISEASE WITHOUT CRISIS: ICD-10-CM

## 2022-11-07 LAB
HCT VFR BLD CALC: 22.1 % — LOW (ref 34.5–45)
HGB A MFR BLD: 48.1 % — LOW (ref 95.8–98)
HGB A2 MFR BLD: 3.1 % — SIGNIFICANT CHANGE UP (ref 2–3.2)
HGB BLD-MCNC: 7.9 G/DL — LOW (ref 11.5–15.5)
HGB C MFR BLD: 0 % — SIGNIFICANT CHANGE UP
HGB F MFR BLD: 0.8 % — SIGNIFICANT CHANGE UP (ref 0–1)
HGB OTHER MFR BLD ELPH: 0 % — SIGNIFICANT CHANGE UP
HGB S MFR BLD: 48 % — HIGH
MCHC RBC-ENTMCNC: 32.4 PG — SIGNIFICANT CHANGE UP (ref 27–34)
MCHC RBC-ENTMCNC: 35.7 GM/DL — SIGNIFICANT CHANGE UP (ref 32–36)
MCV RBC AUTO: 90.6 FL — SIGNIFICANT CHANGE UP (ref 80–100)
NRBC # BLD: 1 /100 WBCS — HIGH (ref 0–0)
PLATELET # BLD AUTO: 356 K/UL — SIGNIFICANT CHANGE UP (ref 150–400)
RBC # BLD: 2.44 M/UL — LOW (ref 3.8–5.2)
RBC # FLD: 21.1 % — HIGH (ref 10.3–14.5)
WBC # BLD: 10.62 K/UL — HIGH (ref 3.8–10.5)
WBC # FLD AUTO: 10.62 K/UL — HIGH (ref 3.8–10.5)

## 2022-11-07 PROCEDURE — 36430 TRANSFUSION BLD/BLD COMPNT: CPT

## 2022-11-07 PROCEDURE — 86923 COMPATIBILITY TEST ELECTRIC: CPT

## 2022-11-07 PROCEDURE — 86902 BLOOD TYPE ANTIGEN DONOR EA: CPT

## 2022-11-07 PROCEDURE — 85027 COMPLETE CBC AUTOMATED: CPT

## 2022-11-07 PROCEDURE — P9040: CPT

## 2022-11-07 PROCEDURE — 96523 IRRIG DRUG DELIVERY DEVICE: CPT

## 2022-11-10 NOTE — ED ADULT TRIAGE NOTE - TEMPERATURE IN FAHRENHEIT (DEGREES F)
Verbal/written post procedure instructions were given to patient/caregiver./Instructed patient/caregiver to follow-up with primary care physician./Instructed patient/caregiver regarding signs and symptoms of infection./Elevate the injured extremity as instructed./Keep the cast/splint/dressing clean and dry.
96.8

## 2022-11-10 NOTE — ED PROVIDER NOTE - CARDIAC RHYTHM
regular
Pt will complete functional toilet transfer with independence and appropriate AD within 4 weeks.

## 2022-12-03 ENCOUNTER — OUTPATIENT (OUTPATIENT)
Dept: OUTPATIENT SERVICES | Facility: HOSPITAL | Age: 22
LOS: 1 days | End: 2022-12-03
Payer: MEDICAID

## 2022-12-03 DIAGNOSIS — Z98.890 OTHER SPECIFIED POSTPROCEDURAL STATES: Chronic | ICD-10-CM

## 2022-12-03 DIAGNOSIS — D57.1 SICKLE-CELL DISEASE WITHOUT CRISIS: ICD-10-CM

## 2022-12-03 DIAGNOSIS — Z11.52 ENCOUNTER FOR SCREENING FOR COVID-19: ICD-10-CM

## 2022-12-03 LAB
24R-OH-CALCIDIOL SERPL-MCNC: 43.1 NG/ML — SIGNIFICANT CHANGE UP (ref 30–80)
ALBUMIN SERPL ELPH-MCNC: 4.6 G/DL — SIGNIFICANT CHANGE UP (ref 3.3–5)
ALP SERPL-CCNC: 58 U/L — SIGNIFICANT CHANGE UP (ref 40–120)
ALT FLD-CCNC: 15 U/L — SIGNIFICANT CHANGE UP (ref 10–45)
ANION GAP SERPL CALC-SCNC: 11 MMOL/L — SIGNIFICANT CHANGE UP (ref 5–17)
AST SERPL-CCNC: 30 U/L — SIGNIFICANT CHANGE UP (ref 10–40)
BILIRUB SERPL-MCNC: 3.1 MG/DL — HIGH (ref 0.2–1.2)
BUN SERPL-MCNC: 4 MG/DL — LOW (ref 7–23)
CALCIUM SERPL-MCNC: 9.1 MG/DL — SIGNIFICANT CHANGE UP (ref 8.4–10.5)
CHLORIDE SERPL-SCNC: 109 MMOL/L — HIGH (ref 96–108)
CO2 SERPL-SCNC: 21 MMOL/L — LOW (ref 22–31)
CREAT SERPL-MCNC: 0.57 MG/DL — SIGNIFICANT CHANGE UP (ref 0.5–1.3)
EGFR: 132 ML/MIN/1.73M2 — SIGNIFICANT CHANGE UP
FERRITIN SERPL-MCNC: 1585 NG/ML — HIGH (ref 15–150)
GLUCOSE SERPL-MCNC: 87 MG/DL — SIGNIFICANT CHANGE UP (ref 70–99)
HCT VFR BLD CALC: 20.6 % — CRITICAL LOW (ref 34.5–45)
HGB BLD-MCNC: 7.3 G/DL — LOW (ref 11.5–15.5)
MCHC RBC-ENTMCNC: 31.7 PG — SIGNIFICANT CHANGE UP (ref 27–34)
MCHC RBC-ENTMCNC: 35.4 GM/DL — SIGNIFICANT CHANGE UP (ref 32–36)
MCV RBC AUTO: 89.6 FL — SIGNIFICANT CHANGE UP (ref 80–100)
NRBC # BLD: 2 /100 WBCS — HIGH (ref 0–0)
PLATELET # BLD AUTO: 367 K/UL — SIGNIFICANT CHANGE UP (ref 150–400)
POTASSIUM SERPL-MCNC: 3.7 MMOL/L — SIGNIFICANT CHANGE UP (ref 3.5–5.3)
POTASSIUM SERPL-SCNC: 3.7 MMOL/L — SIGNIFICANT CHANGE UP (ref 3.5–5.3)
PROT SERPL-MCNC: 7 G/DL — SIGNIFICANT CHANGE UP (ref 6–8.3)
RBC # BLD: 2.3 M/UL — LOW (ref 3.8–5.2)
RBC # FLD: 20.7 % — HIGH (ref 10.3–14.5)
SARS-COV-2 RNA SPEC QL NAA+PROBE: DETECTED
SODIUM SERPL-SCNC: 141 MMOL/L — SIGNIFICANT CHANGE UP (ref 135–145)
WBC # BLD: 9.13 K/UL — SIGNIFICANT CHANGE UP (ref 3.8–10.5)
WBC # FLD AUTO: 9.13 K/UL — SIGNIFICANT CHANGE UP (ref 3.8–10.5)

## 2022-12-03 PROCEDURE — 83020 HEMOGLOBIN ELECTROPHORESIS: CPT

## 2022-12-03 PROCEDURE — 82728 ASSAY OF FERRITIN: CPT

## 2022-12-03 PROCEDURE — 86900 BLOOD TYPING SEROLOGIC ABO: CPT

## 2022-12-03 PROCEDURE — 86901 BLOOD TYPING SEROLOGIC RH(D): CPT

## 2022-12-03 PROCEDURE — U0005: CPT

## 2022-12-03 PROCEDURE — U0003: CPT

## 2022-12-03 PROCEDURE — 86850 RBC ANTIBODY SCREEN: CPT

## 2022-12-03 PROCEDURE — 85027 COMPLETE CBC AUTOMATED: CPT

## 2022-12-03 PROCEDURE — 80053 COMPREHEN METABOLIC PANEL: CPT

## 2022-12-03 PROCEDURE — 96523 IRRIG DRUG DELIVERY DEVICE: CPT

## 2022-12-03 PROCEDURE — C9803: CPT

## 2022-12-03 PROCEDURE — 82306 VITAMIN D 25 HYDROXY: CPT

## 2022-12-03 PROCEDURE — 86923 COMPATIBILITY TEST ELECTRIC: CPT

## 2022-12-04 LAB
HGB A MFR BLD: 41.6 % — LOW (ref 95.8–98)
HGB A2 MFR BLD: 2.9 % — SIGNIFICANT CHANGE UP (ref 2–3.2)
HGB C MFR BLD: 0 % — SIGNIFICANT CHANGE UP
HGB F MFR BLD: 1.1 % — HIGH (ref 0–1)
HGB OTHER MFR BLD ELPH: 0 % — SIGNIFICANT CHANGE UP
HGB S MFR BLD: 54.4 % — HIGH

## 2022-12-05 DIAGNOSIS — U07.1 COVID-19: ICD-10-CM

## 2022-12-19 ENCOUNTER — NON-APPOINTMENT (OUTPATIENT)
Age: 22
End: 2022-12-19

## 2022-12-20 ENCOUNTER — APPOINTMENT (OUTPATIENT)
Dept: INTERNAL MEDICINE | Facility: CLINIC | Age: 22
End: 2022-12-20

## 2022-12-20 ENCOUNTER — OUTPATIENT (OUTPATIENT)
Dept: OUTPATIENT SERVICES | Facility: HOSPITAL | Age: 22
LOS: 1 days | End: 2022-12-20

## 2022-12-20 ENCOUNTER — NON-APPOINTMENT (OUTPATIENT)
Age: 22
End: 2022-12-20

## 2022-12-20 VITALS
WEIGHT: 119 LBS | BODY MASS INDEX: 21.9 KG/M2 | OXYGEN SATURATION: 97 % | HEART RATE: 81 BPM | HEIGHT: 62 IN | SYSTOLIC BLOOD PRESSURE: 120 MMHG | DIASTOLIC BLOOD PRESSURE: 60 MMHG

## 2022-12-20 DIAGNOSIS — Z98.890 OTHER SPECIFIED POSTPROCEDURAL STATES: Chronic | ICD-10-CM

## 2022-12-20 DIAGNOSIS — I26.99 OTHER PULMONARY EMBOLISM W/OUT ACUTE COR PULMONALE: ICD-10-CM

## 2022-12-20 DIAGNOSIS — D57.1 SICKLE-CELL DISEASE W/OUT CRISIS: ICD-10-CM

## 2022-12-20 DIAGNOSIS — E83.111 HEMOCHROMATOSIS DUE TO REPEATED RED BLOOD CELL TRANSFUSIONS: ICD-10-CM

## 2022-12-20 DIAGNOSIS — Z92.89 PERSONAL HISTORY OF OTHER MEDICAL TREATMENT: ICD-10-CM

## 2022-12-20 PROCEDURE — 99214 OFFICE O/P EST MOD 30 MIN: CPT

## 2022-12-20 RX ORDER — RIVAROXABAN 15 MG/1
15 TABLET, FILM COATED ORAL
Qty: 42 | Refills: 0 | Status: COMPLETED | COMMUNITY
Start: 2022-03-16 | End: 2022-12-20

## 2022-12-20 RX ORDER — MOLNUPIRAVIR 200 MG/1
200 CAPSULE ORAL
Qty: 40 | Refills: 0 | Status: COMPLETED | COMMUNITY
Start: 2022-12-05 | End: 2022-12-20

## 2022-12-20 RX ORDER — RIVAROXABAN 20 MG/1
20 TABLET, FILM COATED ORAL
Qty: 30 | Refills: 5 | Status: COMPLETED | COMMUNITY
Start: 2022-03-21 | End: 2022-12-20

## 2022-12-20 RX ORDER — ONDANSETRON 8 MG/1
8 TABLET, ORALLY DISINTEGRATING ORAL 3 TIMES DAILY
Qty: 90 | Refills: 6 | Status: COMPLETED | COMMUNITY
Start: 2019-01-24 | End: 2022-12-20

## 2022-12-20 RX ORDER — POLYETHYLENE GLYCOL 3350 17 G/17G
17 POWDER, FOR SOLUTION ORAL
Qty: 1 | Refills: 3 | Status: COMPLETED | COMMUNITY
Start: 2020-01-29 | End: 2022-12-20

## 2022-12-22 DIAGNOSIS — E83.111 HEMOCHROMATOSIS DUE TO REPEATED RED BLOOD CELL TRANSFUSIONS: ICD-10-CM

## 2022-12-22 DIAGNOSIS — I26.99 OTHER PULMONARY EMBOLISM WITHOUT ACUTE COR PULMONALE: ICD-10-CM

## 2022-12-22 DIAGNOSIS — D57.1 SICKLE-CELL DISEASE WITHOUT CRISIS: ICD-10-CM

## 2022-12-22 DIAGNOSIS — Z92.89 PERSONAL HISTORY OF OTHER MEDICAL TREATMENT: ICD-10-CM

## 2022-12-22 NOTE — HISTORY OF PRESENT ILLNESS
[FreeTextEntry1] : 23 yo female with HGB SS, no complaints. [de-identified] : 23 yo female with HGB SS on monthly 1-2 units PRBC transfusion. The patient is s/p PE on 3/14/22, and has been treated with xarelto for 9 months ( has not been in for an appt in 6 months). This is a first VTE. Port was without clot. Had covid 12/3/22. Now with intermittent cough. Non- productive, no fever. \par Also was hospitalized for shunt difficulties. Seen by and followed by neurosurgery. No abnormalities observed on MRI.\par Echo was WNL.\par Prefers morphine to dilaudid for ER control.\par Has been taking her HU, 500 mg QD, Jadenu has been on hold \par  ferritin levels are now 1538\par ophtho UTD\par \par PE: gen- cheerful female in nad\par vss-\par anicteric\par lungs- cta\par cor: rrr-m\par abd- benign\par ext: -c/c/e, no ulcers\par \par A/P- 23 yo female with HGB SS, s/p shunt placement at birth for CVA\par 1.SCD- continue monthly transfusion, 1-2 units/month to maintain HGB >8\par Continue  mg QD\par 2.:[facility] disorder- [facility] f/u\par continue counseling with current SW\par 3. iron overload- ferritin is elevated- will restart Jadenu 720 mg QD\par 4. PE- completed 9 month course- will DC xarelto-\par 5.f/u 3 months\par 6. CNS shunt- careful f/u with neurosurgery\par 7. prevnar at next visit\par \par Eleonora Richardson MD\par

## 2022-12-25 ENCOUNTER — INPATIENT (INPATIENT)
Facility: HOSPITAL | Age: 22
LOS: 4 days | Discharge: HOME | End: 2022-12-30
Attending: STUDENT IN AN ORGANIZED HEALTH CARE EDUCATION/TRAINING PROGRAM | Admitting: STUDENT IN AN ORGANIZED HEALTH CARE EDUCATION/TRAINING PROGRAM
Payer: MEDICAID

## 2022-12-25 VITALS
OXYGEN SATURATION: 100 % | TEMPERATURE: 99 F | DIASTOLIC BLOOD PRESSURE: 52 MMHG | HEART RATE: 95 BPM | SYSTOLIC BLOOD PRESSURE: 115 MMHG | RESPIRATION RATE: 20 BRPM

## 2022-12-25 DIAGNOSIS — D57.00 HB-SS DISEASE WITH CRISIS, UNSPECIFIED: ICD-10-CM

## 2022-12-25 DIAGNOSIS — Z86.69 PERSONAL HISTORY OF OTHER DISEASES OF THE NERVOUS SYSTEM AND SENSE ORGANS: ICD-10-CM

## 2022-12-25 DIAGNOSIS — F32.9 MAJOR DEPRESSIVE DISORDER, SINGLE EPISODE, UNSPECIFIED: ICD-10-CM

## 2022-12-25 DIAGNOSIS — Z29.9 ENCOUNTER FOR PROPHYLACTIC MEASURES, UNSPECIFIED: ICD-10-CM

## 2022-12-25 DIAGNOSIS — Z98.890 OTHER SPECIFIED POSTPROCEDURAL STATES: Chronic | ICD-10-CM

## 2022-12-25 LAB
ALBUMIN SERPL ELPH-MCNC: 4.3 G/DL — SIGNIFICANT CHANGE UP (ref 3.3–5)
ALP SERPL-CCNC: 59 U/L — SIGNIFICANT CHANGE UP (ref 40–120)
ALT FLD-CCNC: 22 U/L — SIGNIFICANT CHANGE UP (ref 4–33)
ANION GAP SERPL CALC-SCNC: 12 MMOL/L — SIGNIFICANT CHANGE UP (ref 7–14)
AST SERPL-CCNC: 45 U/L — HIGH (ref 4–32)
B PERT DNA SPEC QL NAA+PROBE: SIGNIFICANT CHANGE UP
B PERT+PARAPERT DNA PNL SPEC NAA+PROBE: SIGNIFICANT CHANGE UP
BASOPHILS # BLD AUTO: 0.08 K/UL — SIGNIFICANT CHANGE UP (ref 0–0.2)
BASOPHILS NFR BLD AUTO: 0.5 % — SIGNIFICANT CHANGE UP (ref 0–2)
BILIRUB SERPL-MCNC: 4.1 MG/DL — HIGH (ref 0.2–1.2)
BLD GP AB SCN SERPL QL: NEGATIVE — SIGNIFICANT CHANGE UP
BORDETELLA PARAPERTUSSIS (RAPRVP): SIGNIFICANT CHANGE UP
BUN SERPL-MCNC: 8 MG/DL — SIGNIFICANT CHANGE UP (ref 7–23)
C PNEUM DNA SPEC QL NAA+PROBE: SIGNIFICANT CHANGE UP
CALCIUM SERPL-MCNC: 9.1 MG/DL — SIGNIFICANT CHANGE UP (ref 8.4–10.5)
CHLORIDE SERPL-SCNC: 107 MMOL/L — SIGNIFICANT CHANGE UP (ref 98–107)
CO2 SERPL-SCNC: 22 MMOL/L — SIGNIFICANT CHANGE UP (ref 22–31)
CREAT SERPL-MCNC: 0.53 MG/DL — SIGNIFICANT CHANGE UP (ref 0.5–1.3)
EGFR: 134 ML/MIN/1.73M2 — SIGNIFICANT CHANGE UP
EOSINOPHIL # BLD AUTO: 0.64 K/UL — HIGH (ref 0–0.5)
EOSINOPHIL NFR BLD AUTO: 4.2 % — SIGNIFICANT CHANGE UP (ref 0–6)
FLUAV SUBTYP SPEC NAA+PROBE: SIGNIFICANT CHANGE UP
FLUBV RNA SPEC QL NAA+PROBE: SIGNIFICANT CHANGE UP
GLUCOSE SERPL-MCNC: 110 MG/DL — HIGH (ref 70–99)
HADV DNA SPEC QL NAA+PROBE: SIGNIFICANT CHANGE UP
HCG SERPL-ACNC: <5 MIU/ML — SIGNIFICANT CHANGE UP
HCOV 229E RNA SPEC QL NAA+PROBE: SIGNIFICANT CHANGE UP
HCOV HKU1 RNA SPEC QL NAA+PROBE: SIGNIFICANT CHANGE UP
HCOV NL63 RNA SPEC QL NAA+PROBE: SIGNIFICANT CHANGE UP
HCOV OC43 RNA SPEC QL NAA+PROBE: SIGNIFICANT CHANGE UP
HCT VFR BLD CALC: 20.3 % — CRITICAL LOW (ref 34.5–45)
HGB BLD-MCNC: 7 G/DL — CRITICAL LOW (ref 11.5–15.5)
HMPV RNA SPEC QL NAA+PROBE: SIGNIFICANT CHANGE UP
HPIV1 RNA SPEC QL NAA+PROBE: SIGNIFICANT CHANGE UP
HPIV2 RNA SPEC QL NAA+PROBE: SIGNIFICANT CHANGE UP
HPIV3 RNA SPEC QL NAA+PROBE: SIGNIFICANT CHANGE UP
HPIV4 RNA SPEC QL NAA+PROBE: SIGNIFICANT CHANGE UP
IANC: 9.01 K/UL — HIGH (ref 1.8–7.4)
IMM GRANULOCYTES NFR BLD AUTO: 2.6 % — HIGH (ref 0–0.9)
LYMPHOCYTES # BLD AUTO: 22.6 % — SIGNIFICANT CHANGE UP (ref 13–44)
LYMPHOCYTES # BLD AUTO: 3.41 K/UL — HIGH (ref 1–3.3)
M PNEUMO DNA SPEC QL NAA+PROBE: SIGNIFICANT CHANGE UP
MCHC RBC-ENTMCNC: 32.4 PG — SIGNIFICANT CHANGE UP (ref 27–34)
MCHC RBC-ENTMCNC: 34.5 GM/DL — SIGNIFICANT CHANGE UP (ref 32–36)
MCV RBC AUTO: 94 FL — SIGNIFICANT CHANGE UP (ref 80–100)
MONOCYTES # BLD AUTO: 1.54 K/UL — HIGH (ref 0–0.9)
MONOCYTES NFR BLD AUTO: 10.2 % — SIGNIFICANT CHANGE UP (ref 2–14)
NEUTROPHILS # BLD AUTO: 9.01 K/UL — HIGH (ref 1.8–7.4)
NEUTROPHILS NFR BLD AUTO: 59.9 % — SIGNIFICANT CHANGE UP (ref 43–77)
NRBC # BLD: 4 /100 WBCS — HIGH (ref 0–0)
NRBC # FLD: 0.56 K/UL — HIGH (ref 0–0)
PLATELET # BLD AUTO: 338 K/UL — SIGNIFICANT CHANGE UP (ref 150–400)
POTASSIUM SERPL-MCNC: 3.7 MMOL/L — SIGNIFICANT CHANGE UP (ref 3.5–5.3)
POTASSIUM SERPL-SCNC: 3.7 MMOL/L — SIGNIFICANT CHANGE UP (ref 3.5–5.3)
PROT SERPL-MCNC: 6.9 G/DL — SIGNIFICANT CHANGE UP (ref 6–8.3)
RAPID RVP RESULT: SIGNIFICANT CHANGE UP
RBC # BLD: 2.16 M/UL — LOW (ref 3.8–5.2)
RBC # BLD: 2.16 M/UL — LOW (ref 3.8–5.2)
RBC # FLD: 23.5 % — HIGH (ref 10.3–14.5)
RETICS #: 665.3 K/UL — HIGH (ref 25–125)
RETICS/RBC NFR: >22.2 % — HIGH (ref 0.5–2.5)
RH IG SCN BLD-IMP: POSITIVE — SIGNIFICANT CHANGE UP
RSV RNA SPEC QL NAA+PROBE: SIGNIFICANT CHANGE UP
RV+EV RNA SPEC QL NAA+PROBE: SIGNIFICANT CHANGE UP
SARS-COV-2 RNA SPEC QL NAA+PROBE: SIGNIFICANT CHANGE UP
SODIUM SERPL-SCNC: 141 MMOL/L — SIGNIFICANT CHANGE UP (ref 135–145)
WBC # BLD: 15.07 K/UL — HIGH (ref 3.8–10.5)
WBC # FLD AUTO: 15.07 K/UL — HIGH (ref 3.8–10.5)

## 2022-12-25 PROCEDURE — 99223 1ST HOSP IP/OBS HIGH 75: CPT

## 2022-12-25 PROCEDURE — 99285 EMERGENCY DEPT VISIT HI MDM: CPT

## 2022-12-25 PROCEDURE — 71046 X-RAY EXAM CHEST 2 VIEWS: CPT | Mod: 26

## 2022-12-25 RX ORDER — MORPHINE SULFATE 50 MG/1
30 CAPSULE, EXTENDED RELEASE ORAL
Refills: 0 | Status: DISCONTINUED | OUTPATIENT
Start: 2022-12-25 | End: 2022-12-25

## 2022-12-25 RX ORDER — DIPHENHYDRAMINE HCL 50 MG
25 CAPSULE ORAL EVERY 4 HOURS
Refills: 0 | Status: DISCONTINUED | OUTPATIENT
Start: 2022-12-25 | End: 2022-12-30

## 2022-12-25 RX ORDER — CHLORHEXIDINE GLUCONATE 213 G/1000ML
1 SOLUTION TOPICAL DAILY
Refills: 0 | Status: DISCONTINUED | OUTPATIENT
Start: 2022-12-25 | End: 2022-12-30

## 2022-12-25 RX ORDER — MORPHINE SULFATE 50 MG/1
8 CAPSULE, EXTENDED RELEASE ORAL ONCE
Refills: 0 | Status: DISCONTINUED | OUTPATIENT
Start: 2022-12-25 | End: 2022-12-25

## 2022-12-25 RX ORDER — TRAMADOL HYDROCHLORIDE 50 MG/1
0 TABLET ORAL
Qty: 0 | Refills: 0 | DISCHARGE

## 2022-12-25 RX ORDER — KETOROLAC TROMETHAMINE 30 MG/ML
15 SYRINGE (ML) INJECTION EVERY 8 HOURS
Refills: 0 | Status: DISCONTINUED | OUTPATIENT
Start: 2022-12-25 | End: 2022-12-25

## 2022-12-25 RX ORDER — MORPHINE SULFATE 50 MG/1
30 CAPSULE, EXTENDED RELEASE ORAL
Refills: 0 | Status: DISCONTINUED | OUTPATIENT
Start: 2022-12-25 | End: 2022-12-28

## 2022-12-25 RX ORDER — KETOROLAC TROMETHAMINE 30 MG/ML
15 SYRINGE (ML) INJECTION EVERY 8 HOURS
Refills: 0 | Status: DISCONTINUED | OUTPATIENT
Start: 2022-12-25 | End: 2022-12-28

## 2022-12-25 RX ORDER — HYDROXYUREA 500 MG/1
500 CAPSULE ORAL DAILY
Refills: 0 | Status: DISCONTINUED | OUTPATIENT
Start: 2022-12-25 | End: 2022-12-30

## 2022-12-25 RX ORDER — SERTRALINE 25 MG/1
100 TABLET, FILM COATED ORAL DAILY
Refills: 0 | Status: DISCONTINUED | OUTPATIENT
Start: 2022-12-25 | End: 2022-12-30

## 2022-12-25 RX ORDER — HYDROMORPHONE HYDROCHLORIDE 2 MG/ML
1 INJECTION INTRAMUSCULAR; INTRAVENOUS; SUBCUTANEOUS ONCE
Refills: 0 | Status: DISCONTINUED | OUTPATIENT
Start: 2022-12-25 | End: 2022-12-25

## 2022-12-25 RX ORDER — SODIUM CHLORIDE 9 MG/ML
1000 INJECTION, SOLUTION INTRAVENOUS
Refills: 0 | Status: DISCONTINUED | OUTPATIENT
Start: 2022-12-25 | End: 2022-12-30

## 2022-12-25 RX ORDER — MORPHINE SULFATE 50 MG/1
2 CAPSULE, EXTENDED RELEASE ORAL EVERY 4 HOURS
Refills: 0 | Status: DISCONTINUED | OUTPATIENT
Start: 2022-12-25 | End: 2022-12-25

## 2022-12-25 RX ORDER — MORPHINE SULFATE 50 MG/1
4 CAPSULE, EXTENDED RELEASE ORAL ONCE
Refills: 0 | Status: DISCONTINUED | OUTPATIENT
Start: 2022-12-25 | End: 2022-12-25

## 2022-12-25 RX ORDER — ACETAMINOPHEN 500 MG
650 TABLET ORAL EVERY 6 HOURS
Refills: 0 | Status: DISCONTINUED | OUTPATIENT
Start: 2022-12-25 | End: 2022-12-25

## 2022-12-25 RX ORDER — HYDROMORPHONE HYDROCHLORIDE 2 MG/ML
2 INJECTION INTRAMUSCULAR; INTRAVENOUS; SUBCUTANEOUS EVERY 4 HOURS
Refills: 0 | Status: DISCONTINUED | OUTPATIENT
Start: 2022-12-25 | End: 2022-12-25

## 2022-12-25 RX ORDER — ENOXAPARIN SODIUM 100 MG/ML
30 INJECTION SUBCUTANEOUS EVERY 24 HOURS
Refills: 0 | Status: DISCONTINUED | OUTPATIENT
Start: 2022-12-25 | End: 2022-12-25

## 2022-12-25 RX ORDER — NALOXONE HYDROCHLORIDE 4 MG/.1ML
0.1 SPRAY NASAL
Refills: 0 | Status: DISCONTINUED | OUTPATIENT
Start: 2022-12-25 | End: 2022-12-30

## 2022-12-25 RX ORDER — ACETAMINOPHEN 500 MG
650 TABLET ORAL EVERY 6 HOURS
Refills: 0 | Status: DISCONTINUED | OUTPATIENT
Start: 2022-12-25 | End: 2022-12-26

## 2022-12-25 RX ORDER — ONDANSETRON 8 MG/1
4 TABLET, FILM COATED ORAL EVERY 6 HOURS
Refills: 0 | Status: DISCONTINUED | OUTPATIENT
Start: 2022-12-25 | End: 2022-12-30

## 2022-12-25 RX ORDER — ARIPIPRAZOLE 15 MG/1
5 TABLET ORAL DAILY
Refills: 0 | Status: DISCONTINUED | OUTPATIENT
Start: 2022-12-25 | End: 2022-12-30

## 2022-12-25 RX ORDER — KETOROLAC TROMETHAMINE 30 MG/ML
15 SYRINGE (ML) INJECTION ONCE
Refills: 0 | Status: DISCONTINUED | OUTPATIENT
Start: 2022-12-25 | End: 2022-12-25

## 2022-12-25 RX ORDER — FOLIC ACID 0.8 MG
1 TABLET ORAL DAILY
Refills: 0 | Status: DISCONTINUED | OUTPATIENT
Start: 2022-12-25 | End: 2022-12-30

## 2022-12-25 RX ORDER — ENOXAPARIN SODIUM 100 MG/ML
40 INJECTION SUBCUTANEOUS EVERY 24 HOURS
Refills: 0 | Status: DISCONTINUED | OUTPATIENT
Start: 2022-12-25 | End: 2022-12-30

## 2022-12-25 RX ORDER — RIVAROXABAN 15 MG-20MG
1 KIT ORAL
Qty: 0 | Refills: 0 | DISCHARGE

## 2022-12-25 RX ORDER — SODIUM CHLORIDE 9 MG/ML
1000 INJECTION, SOLUTION INTRAVENOUS ONCE
Refills: 0 | Status: COMPLETED | OUTPATIENT
Start: 2022-12-25 | End: 2022-12-25

## 2022-12-25 RX ADMIN — SODIUM CHLORIDE 100 MILLILITER(S): 9 INJECTION, SOLUTION INTRAVENOUS at 07:01

## 2022-12-25 RX ADMIN — Medication 15 MILLIGRAM(S): at 02:52

## 2022-12-25 RX ADMIN — MORPHINE SULFATE 30 MILLILITER(S): 50 CAPSULE, EXTENDED RELEASE ORAL at 13:05

## 2022-12-25 RX ADMIN — MORPHINE SULFATE 2 MILLIGRAM(S): 50 CAPSULE, EXTENDED RELEASE ORAL at 09:33

## 2022-12-25 RX ADMIN — ENOXAPARIN SODIUM 40 MILLIGRAM(S): 100 INJECTION SUBCUTANEOUS at 11:42

## 2022-12-25 RX ADMIN — Medication 1 MILLIGRAM(S): at 11:41

## 2022-12-25 RX ADMIN — HYDROXYUREA 500 MILLIGRAM(S): 500 CAPSULE ORAL at 12:16

## 2022-12-25 RX ADMIN — SODIUM CHLORIDE 100 MILLILITER(S): 9 INJECTION, SOLUTION INTRAVENOUS at 16:34

## 2022-12-25 RX ADMIN — ARIPIPRAZOLE 5 MILLIGRAM(S): 15 TABLET ORAL at 12:16

## 2022-12-25 RX ADMIN — SODIUM CHLORIDE 1000 MILLILITER(S): 9 INJECTION, SOLUTION INTRAVENOUS at 03:13

## 2022-12-25 RX ADMIN — MORPHINE SULFATE 4 MILLIGRAM(S): 50 CAPSULE, EXTENDED RELEASE ORAL at 02:14

## 2022-12-25 RX ADMIN — MORPHINE SULFATE 4 MILLIGRAM(S): 50 CAPSULE, EXTENDED RELEASE ORAL at 04:59

## 2022-12-25 RX ADMIN — MORPHINE SULFATE 2 MILLIGRAM(S): 50 CAPSULE, EXTENDED RELEASE ORAL at 10:03

## 2022-12-25 RX ADMIN — HYDROMORPHONE HYDROCHLORIDE 1 MILLIGRAM(S): 2 INJECTION INTRAMUSCULAR; INTRAVENOUS; SUBCUTANEOUS at 03:13

## 2022-12-25 RX ADMIN — MORPHINE SULFATE 30 MILLILITER(S): 50 CAPSULE, EXTENDED RELEASE ORAL at 20:12

## 2022-12-25 RX ADMIN — Medication 1 TABLET(S): at 11:41

## 2022-12-25 RX ADMIN — CHLORHEXIDINE GLUCONATE 1 APPLICATION(S): 213 SOLUTION TOPICAL at 14:16

## 2022-12-25 RX ADMIN — SERTRALINE 100 MILLIGRAM(S): 25 TABLET, FILM COATED ORAL at 12:16

## 2022-12-25 NOTE — H&P ADULT - NSHPSOCIALHISTORY_GEN_ALL_CORE
Marital Status: Single, lives with her parents    Occupation: Student - goes to [employer]    Tobacco Use: denies    ETOH Use: denies    Drug Use: denies    Flu Vaccine:  9/2022                          Pneumonia Vaccine: denies                                 COVID Vaccine: Pfizer Vaccine (with 2 boosters)

## 2022-12-25 NOTE — ED PROVIDER NOTE - PLAN OF CARE
Ms. Gavin is a 23 y/o female with PMHx sickle cell disease on chronic transfusions, acute chest syndrome, CVA, Grade 4 IVH as , hydrocephalus s/p  shunt x2 with 9 revisions, asthma, atrophic spleen, cholecystectomy, eye operations x4, bipolar disorder, pulmonary embolism 3/22 s/p Xarelto who presents with one day of worsening extremity and chest pain, concern for sickle cell crisis. Will obtain labs, imaging, and treat pain.

## 2022-12-25 NOTE — ED PROVIDER NOTE - CARE PLAN
Principal Discharge DX:	Sickle cell crisis  Assessment and plan of treatment:	Ms. Gavin is a 21 y/o female with PMHx sickle cell disease on chronic transfusions, acute chest syndrome, CVA, Grade 4 IVH as , hydrocephalus s/p  shunt x2 with 9 revisions, asthma, atrophic spleen, cholecystectomy, eye operations x4, bipolar disorder, pulmonary embolism 3/22 s/p Xarelto who presents with one day of worsening extremity and chest pain, concern for sickle cell crisis. Will obtain labs, imaging, and treat pain.   1

## 2022-12-25 NOTE — H&P ADULT - PROBLEM/PLAN-1
Wound Evaluated By: RONNY, LT Detail Level: Detailed Add 83267 Cpt? (Important Note: In 2017 The Use Of 53288 Is Being Tracked By Cms To Determine Future Global Period Reimbursement For Global Periods): no DISPLAY PLAN FREE TEXT

## 2022-12-25 NOTE — H&P ADULT - HISTORY OF PRESENT ILLNESS
23 y/o female, with a PmHx of PE on Xarelto (stopped Xarelto 4 days ago as per PCP - no longer needed it), Sickle Cell Disease, Acute Chest Syndrome, Hydrocephalus s/p  Shunt x 2, CVA, Depression, Anxiety, Bipolar, Asthma, Atrophic Spleen, Cholecystectomy, Multiple bilateral eye surgeries, presented with chest pain and extremity pain. Pt states at around 12am this morning, she woke up from sleep with severe pain in her chest, arms and legs. She states the pain was a 10/10 so she tried to take a Naproxen (usually helps her with the pain) but this time the pain was not being relieved so she came to the [facility] ED for pain control. She denies any fever, chills, sob, HA, dizziness, blurred vision, abd pain, recent travel. Currently, she states the pain is about a 5/10 after getting Morphine (states she doesn't tolerate Dilaudid well - doesn't help and Morphine is the only thing that usually works when she is in crisis).  Pt states her last crisis was in October. She appears comfortable at this time and is now being admitted to medicine for sickle cell crisis.

## 2022-12-25 NOTE — H&P ADULT - NS ATTEND AMEND GEN_ALL_CORE FT
22 year old woman w/ history of remote PE recently discontinued Xarelto, history of SS disease presenting w/ SS crises. Patient w/ pain in her legs, lower abdomen, and arms. No concern for acute chest. Admission for pain control.     - morphine PCA   - toradol for mild pain  - incentive spirometry   - benadryl prn for itching     **Added Egg Allergy to Chart Rash***

## 2022-12-25 NOTE — ED PROVIDER NOTE - NS ED ROS FT
REVIEW OF SYSTEMS:    CONSTITUTIONAL: No weakness, fevers or chills  EYES/ENT: No visual changes;  No vertigo or throat pain   NECK: No pain or stiffness  RESPIRATORY: No cough, wheezing, hemoptysis; No shortness of breath  CARDIOVASCULAR: +chest pain  GASTROINTESTINAL: No abdominal or epigastric pain. No nausea, vomiting, or hematemesis; No diarrhea or constipation. No melena or hematochezia.  GENITOURINARY: No dysuria, frequency or hematuria  NEUROLOGICAL: No numbness or weakness  SKIN: No itching, rashes

## 2022-12-25 NOTE — H&P ADULT - RESPIRATORY
normal/clear to auscultation bilaterally/no wheezes/no rales/no rhonchi/airway patent/breath sounds equal/good air movement

## 2022-12-25 NOTE — H&P ADULT - PROBLEM SELECTOR PLAN 1
EKG: NSR @ 90, no changes  WBC: 15.07        Hgb: 7.0/20.3          Retic % > 22.2        Absolute Retic: 665.3  Glucose: 110         Tot Bili: 4.1            HCG < 5.0  12/25 CXR - clear lungs  - on 0.45% NS @ 75cc/hr  - pain control  - cont hydroxyurea  - folic acid and MVI  - monitor H & H (keep active type and screen but hold off on transfusion for now)

## 2022-12-25 NOTE — ED PROVIDER NOTE - CLINICAL SUMMARY MEDICAL DECISION MAKING FREE TEXT BOX
Ms. Gavin is a 23 y/o female with PMHx sickle cell disease on chronic transfusions, acute chest syndrome, CVA, Grade 4 IVH as , hydrocephalus s/p  shunt x2 with 9 revisions, asthma, atrophic spleen, cholecystectomy, eye operations x4, bipolar disorder, pulmonary embolism 3/22 s/p Xarelto who presents with one day of worsening extremity and chest pain, concern for sickle cell crisis. Gave morphine and toradol. Ms. Gavin is a 23 y/o female with PMHx sickle cell disease on chronic transfusions, acute chest syndrome, CVA, Grade 4 IVH as , hydrocephalus s/p  shunt x2 with 9 revisions, asthma, atrophic spleen, cholecystectomy, eye operations x4, bipolar disorder, pulmonary embolism 3/22 s/p Xarelto who presents with one day of worsening extremity and chest pain, concern for sickle cell crisis. Gave morphine x2 and dilaudid x1 and toradol. Also gave fluids. Pain still in pain so will plan to admit to medicine for further workup of sickle cell crisis. Will likely require blood transfusion so will obtain type & screen as well.

## 2022-12-25 NOTE — ED PROVIDER NOTE - OBJECTIVE STATEMENT
Ms. Gavin is a 23 y/o female with PMHx sickle cell disease on chronic transfusions, acute chest syndrome, CVA, Grade 4 IVH as , hydrocephalus s/p  shunt x2 with 9 revisions, asthma, atrophic spleen, cholecystectomy, eye operations x4, bipolar disorder, pulmonary embolism 3/22 s/p Xarelto who presents with one day of worsening extremity and chest pain. The patient reports stopping Xarelto 4 days ago at the instruction of her PCP. She reports waking up with pain around midnight, took tylenol without relief, so came to the ED. Pain is in her chest and four extremities, typical of her sickle cell pain. Denies fever, chills, shortness of breath, cough, nausea, vomiting, abdominal pain, weakness, or leg swelling.

## 2022-12-25 NOTE — ED ADULT TRIAGE NOTE - CHIEF COMPLAINT QUOTE
Pt with PMH of sickle cell anemia comes in c/o pain all over her body but more on her chest and arms with SOB since around Midnight. Took Tylenol 500mg an hour ago without relief. Will obtain EKG.

## 2022-12-25 NOTE — ED PROVIDER NOTE - NSICDXPASTMEDICALHX_GEN_ALL_CORE_FT
PAST MEDICAL HISTORY:  Anxiety     Chronic Lung Disease of Premat follows with [facility] Pulmonology    CP (cerebral palsy) - mild    CVA (Cerebral Vascular Accident) Onset date unknown, noted on MRI from 5/2010    Depression     Hydrocephalus     Impacted teeth     Reactive Airway Disease receives albuterol and xoponex treatments at home    S/P  Shunt x2 with multiple shunt revisions    Sickle Cell Disease     Strabismus

## 2022-12-25 NOTE — H&P ADULT - ASSESSMENT
21 y/o female, with a PmHx of PE on Xarelto (stopped Xarelto 4 days ago as per PCP - no longer needed it), Sickle Cell Disease, Acute Chest Syndrome, Hydrocephalus s/p  Shunt x 2, CVA, Depression, Anxiety, Bipolar, Asthma, Atrophic Spleen, Cholecystectomy, Multiple bilateral eye surgeries, presented with chest pain and extremity pain. Admitted to medicine for Sickle Cell Crisis.

## 2022-12-25 NOTE — H&P ADULT - NSHPPHYSICALEXAM_GEN_ALL_CORE
Vital Signs Last 24 Hrs  T(C): 36.4 (25 Dec 2022 07:24), Max: 37.2 (25 Dec 2022 01:10)  T(F): 97.6 (25 Dec 2022 07:24), Max: 98.9 (25 Dec 2022 01:10)  HR: 71 (25 Dec 2022 07:24) (71 - 95)  BP: 99/44 (25 Dec 2022 07:24) (99/44 - 115/52)  BP(mean): --  RR: 15 (25 Dec 2022 07:24) (15 - 20)  SpO2: 97% (25 Dec 2022 07:24) (95% - 100%)    Parameters below as of 25 Dec 2022 07:24  Patient On (Oxygen Delivery Method): room air    EKG: NSR @ 90, no changes

## 2022-12-25 NOTE — ED PROVIDER NOTE - PROGRESS NOTE DETAILS
Seifer PGY-2: Gave morphine x2 and dilaudid x1 with improvement in pain from an 8 to a 4. Patient still asking for pain medication so will plan to admit to medicine.

## 2022-12-25 NOTE — PATIENT PROFILE ADULT - FALL HARM RISK - HARM RISK INTERVENTIONS

## 2022-12-25 NOTE — H&P ADULT - NSICDXPASTMEDICALHX_GEN_ALL_CORE_FT
PAST MEDICAL HISTORY:  Acute chest syndrome     Anxiety     Chronic Lung Disease of Premat follows with [facility]    CVA (Cerebral Vascular Accident) Onset date unknown, noted on MRI from 5/2010    Depression     Hydrocephalus     Impacted teeth     Pulmonary embolus     Reactive Airway Disease receives albuterol and xoponex treatments at home    Sickle Cell Disease     Strabismus

## 2022-12-25 NOTE — H&P ADULT - NSICDXPASTSURGICALHX_GEN_ALL_CORE_FT
PAST SURGICAL HISTORY:  S/P Cholecystectomy s/p open cholecystectomy 2012    S/P Tonsillectomy and Adenoide     S/P  Shunt x6 revisions, last 2012 w/ Dr. Loza    Strabismus Repair x4

## 2022-12-25 NOTE — ED PROVIDER NOTE - ATTENDING CONTRIBUTION TO CARE
DR. MONSON, ATTENDING MD-  I performed a face to face bedside interview with the patient regarding history of present illness, review of symptoms and past medical history. I completed an independent physical exam.  I have discussed the patient's plan of care with the resident.   Documentation as above in the note.    23 y/o female h/o scd DR. MONSON, ATTENDING MD-  I performed a face to face bedside interview with the patient regarding history of present illness, review of symptoms and past medical history. I completed an independent physical exam.  I have discussed the patient's plan of care with the resident.   Documentation as above in the note.    21 y/o female h/o scd, pe, cva, hydrocephalus with  shunt here with c/o pain.  Pt states pain is diffusely located throughout her body.  This feels similar to pain crises in the past.  Does not feel similar to PE or acute chest in the past.  Pt is well appearing, not in extremis, easy wob, ctabil.  Obtain ucg cbc cmp ekg cxr retic count give pain med reassess.

## 2022-12-25 NOTE — ED PROVIDER NOTE - PHYSICAL EXAMINATION
PHYSICAL EXAM:  GENERAL: NAD, lying in bed comfortably  HEAD:  Atraumatic, Normocephalic  EYES: EOMI, PERRLA, conjunctiva and sclera clear  ENT: Moist mucous membranes  NECK: Supple, No JVD  CHEST/LUNG: Clear to auscultation bilaterally; No rales, rhonchi, wheezing, or rubs. Unlabored respirations  HEART: Regular rate and rhythm; No murmurs, rubs, or gallops  ABDOMEN: Bowel sounds present; Soft, Nontender, Nondistended.  EXTREMITIES: 2+ Peripheral Pulses, brisk capillary refill. No clubbing, cyanosis, or edema  NERVOUS SYSTEM: Alert & Oriented X3, speech clear. No deficits   MSK: FROM all 4 extremities, full and equal strength  SKIN: No rashes or lesions

## 2022-12-25 NOTE — ED ADULT NURSE NOTE - OBJECTIVE STATEMENT
23yo female received in room 26. pt A&Ox4, ambulatory, hx of acute chest syndrome, PE on Xarelto, SCC, CVA, hydrocephalus with  Shunt, asthma, bipolar, c/o bilateral upper and lower extremities and chest pain x 1 day, took tylenols at home with little relief. 22G IV placed in right hand, lab drawn and sent. MD bryant in progress. Medicated as per order. NSR on monitor. Side rails up, bed at lowest position, call bell within reach, patient oriented to the unit, safety maintained.

## 2022-12-25 NOTE — ED ADULT NURSE REASSESSMENT NOTE - NS ED NURSE REASSESS COMMENT FT1
Report given to [facility]. Pt c/o pain, MD Hurd paged for pain med. Pt respiration even and non-labored. In no apparent distress. Pt brought over to ESSU 2 by writer.

## 2022-12-25 NOTE — ED ADULT NURSE NOTE - NSICDXPASTMEDICALHX_GEN_ALL_CORE_FT
PAST MEDICAL HISTORY:  Anxiety     Chronic Lung Disease of Premat follows with [facility]monology    CP (cerebral palsy) - mild    CVA (Cerebral Vascular Accident) Onset date unknown, noted on MRI from 5/2010    Depression     Hydrocephalus     Impacted teeth     Reactive Airway Disease receives albuterol and xoponex treatments at home    S/P  Shunt x2 with multiple shunt revisions    Sickle Cell Disease     Strabismus

## 2022-12-26 LAB
ANION GAP SERPL CALC-SCNC: 9 MMOL/L — SIGNIFICANT CHANGE UP (ref 7–14)
ANISOCYTOSIS BLD QL: SIGNIFICANT CHANGE UP
APTT BLD: 24.3 SEC — LOW (ref 27–36.3)
BASOPHILS # BLD AUTO: 0 K/UL — SIGNIFICANT CHANGE UP (ref 0–0.2)
BASOPHILS NFR BLD AUTO: 0 % — SIGNIFICANT CHANGE UP (ref 0–2)
BUN SERPL-MCNC: 5 MG/DL — LOW (ref 7–23)
CALCIUM SERPL-MCNC: 8.8 MG/DL — SIGNIFICANT CHANGE UP (ref 8.4–10.5)
CHLORIDE SERPL-SCNC: 110 MMOL/L — HIGH (ref 98–107)
CO2 SERPL-SCNC: 22 MMOL/L — SIGNIFICANT CHANGE UP (ref 22–31)
CREAT SERPL-MCNC: 0.55 MG/DL — SIGNIFICANT CHANGE UP (ref 0.5–1.3)
CULTURE RESULTS: SIGNIFICANT CHANGE UP
DACRYOCYTES BLD QL SMEAR: SLIGHT — SIGNIFICANT CHANGE UP
EGFR: 133 ML/MIN/1.73M2 — SIGNIFICANT CHANGE UP
EOSINOPHIL # BLD AUTO: 0.98 K/UL — HIGH (ref 0–0.5)
EOSINOPHIL NFR BLD AUTO: 9 % — HIGH (ref 0–6)
GIANT PLATELETS BLD QL SMEAR: PRESENT — SIGNIFICANT CHANGE UP
GLUCOSE SERPL-MCNC: 96 MG/DL — SIGNIFICANT CHANGE UP (ref 70–99)
HCT VFR BLD CALC: 17.8 % — CRITICAL LOW (ref 34.5–45)
HGB BLD-MCNC: 6.3 G/DL — CRITICAL LOW (ref 11.5–15.5)
HYPOCHROMIA BLD QL: SLIGHT — SIGNIFICANT CHANGE UP
IANC: 5.49 K/UL — SIGNIFICANT CHANGE UP (ref 1.8–7.4)
INR BLD: 1.18 RATIO — HIGH (ref 0.88–1.16)
LDH SERPL L TO P-CCNC: 416 U/L — HIGH (ref 135–225)
LYMPHOCYTES # BLD AUTO: 27.9 % — SIGNIFICANT CHANGE UP (ref 13–44)
LYMPHOCYTES # BLD AUTO: 3.03 K/UL — SIGNIFICANT CHANGE UP (ref 1–3.3)
MACROCYTES BLD QL: SIGNIFICANT CHANGE UP
MAGNESIUM SERPL-MCNC: 1.8 MG/DL — SIGNIFICANT CHANGE UP (ref 1.6–2.6)
MANUAL SMEAR VERIFICATION: SIGNIFICANT CHANGE UP
MCHC RBC-ENTMCNC: 33 PG — SIGNIFICANT CHANGE UP (ref 27–34)
MCHC RBC-ENTMCNC: 35.4 GM/DL — SIGNIFICANT CHANGE UP (ref 32–36)
MCV RBC AUTO: 93.2 FL — SIGNIFICANT CHANGE UP (ref 80–100)
MONOCYTES # BLD AUTO: 0.68 K/UL — SIGNIFICANT CHANGE UP (ref 0–0.9)
MONOCYTES NFR BLD AUTO: 6.3 % — SIGNIFICANT CHANGE UP (ref 2–14)
NEUTROPHILS # BLD AUTO: 5.48 K/UL — SIGNIFICANT CHANGE UP (ref 1.8–7.4)
NEUTROPHILS NFR BLD AUTO: 50.5 % — SIGNIFICANT CHANGE UP (ref 43–77)
NRBC # BLD: 12 /100 — HIGH (ref 0–0)
OVALOCYTES BLD QL SMEAR: SLIGHT — SIGNIFICANT CHANGE UP
PLAT MORPH BLD: NORMAL — SIGNIFICANT CHANGE UP
PLATELET # BLD AUTO: 324 K/UL — SIGNIFICANT CHANGE UP (ref 150–400)
PLATELET COUNT - ESTIMATE: NORMAL — SIGNIFICANT CHANGE UP
POIKILOCYTOSIS BLD QL AUTO: SIGNIFICANT CHANGE UP
POLYCHROMASIA BLD QL SMEAR: SIGNIFICANT CHANGE UP
POTASSIUM SERPL-MCNC: 3.9 MMOL/L — SIGNIFICANT CHANGE UP (ref 3.5–5.3)
POTASSIUM SERPL-SCNC: 3.9 MMOL/L — SIGNIFICANT CHANGE UP (ref 3.5–5.3)
PROTHROM AB SERPL-ACNC: 13.7 SEC — HIGH (ref 10.5–13.4)
RBC # BLD: 1.91 M/UL — LOW (ref 3.8–5.2)
RBC # BLD: 1.91 M/UL — LOW (ref 3.8–5.2)
RBC # FLD: 23.5 % — HIGH (ref 10.3–14.5)
RBC BLD AUTO: ABNORMAL
RETICS #: 605.1 K/UL — HIGH (ref 25–125)
RETICS/RBC NFR: >22.2 % — HIGH (ref 0.5–2.5)
SICKLE CELLS BLD QL SMEAR: SIGNIFICANT CHANGE UP
SMUDGE CELLS # BLD: PRESENT — SIGNIFICANT CHANGE UP
SODIUM SERPL-SCNC: 141 MMOL/L — SIGNIFICANT CHANGE UP (ref 135–145)
SPECIMEN SOURCE: SIGNIFICANT CHANGE UP
TARGETS BLD QL SMEAR: SLIGHT — SIGNIFICANT CHANGE UP
VARIANT LYMPHS # BLD: 6.3 % — HIGH (ref 0–6)
WBC # BLD: 10.85 K/UL — HIGH (ref 3.8–10.5)
WBC # FLD AUTO: 10.85 K/UL — HIGH (ref 3.8–10.5)

## 2022-12-26 PROCEDURE — 99233 SBSQ HOSP IP/OBS HIGH 50: CPT

## 2022-12-26 RX ORDER — ACETAMINOPHEN 500 MG
650 TABLET ORAL EVERY 6 HOURS
Refills: 0 | Status: DISCONTINUED | OUTPATIENT
Start: 2022-12-26 | End: 2022-12-30

## 2022-12-26 RX ORDER — LANOLIN ALCOHOL/MO/W.PET/CERES
3 CREAM (GRAM) TOPICAL AT BEDTIME
Refills: 0 | Status: DISCONTINUED | OUTPATIENT
Start: 2022-12-26 | End: 2022-12-30

## 2022-12-26 RX ADMIN — HYDROXYUREA 500 MILLIGRAM(S): 500 CAPSULE ORAL at 12:03

## 2022-12-26 RX ADMIN — Medication 1 TABLET(S): at 12:03

## 2022-12-26 RX ADMIN — Medication 3 MILLIGRAM(S): at 22:19

## 2022-12-26 RX ADMIN — SODIUM CHLORIDE 100 MILLILITER(S): 9 INJECTION, SOLUTION INTRAVENOUS at 11:59

## 2022-12-26 RX ADMIN — MORPHINE SULFATE 30 MILLILITER(S): 50 CAPSULE, EXTENDED RELEASE ORAL at 20:08

## 2022-12-26 RX ADMIN — Medication 1 MILLIGRAM(S): at 12:03

## 2022-12-26 RX ADMIN — Medication 15 MILLIGRAM(S): at 02:56

## 2022-12-26 RX ADMIN — Medication 15 MILLIGRAM(S): at 02:26

## 2022-12-26 RX ADMIN — ARIPIPRAZOLE 5 MILLIGRAM(S): 15 TABLET ORAL at 14:00

## 2022-12-26 RX ADMIN — CHLORHEXIDINE GLUCONATE 1 APPLICATION(S): 213 SOLUTION TOPICAL at 12:03

## 2022-12-26 RX ADMIN — Medication 650 MILLIGRAM(S): at 07:20

## 2022-12-26 RX ADMIN — MORPHINE SULFATE 30 MILLILITER(S): 50 CAPSULE, EXTENDED RELEASE ORAL at 08:16

## 2022-12-26 RX ADMIN — Medication 15 MILLIGRAM(S): at 20:19

## 2022-12-26 RX ADMIN — Medication 15 MILLIGRAM(S): at 20:34

## 2022-12-26 RX ADMIN — Medication 650 MILLIGRAM(S): at 08:20

## 2022-12-26 RX ADMIN — ENOXAPARIN SODIUM 40 MILLIGRAM(S): 100 INJECTION SUBCUTANEOUS at 12:04

## 2022-12-26 RX ADMIN — SODIUM CHLORIDE 100 MILLILITER(S): 9 INJECTION, SOLUTION INTRAVENOUS at 22:19

## 2022-12-26 RX ADMIN — SERTRALINE 100 MILLIGRAM(S): 25 TABLET, FILM COATED ORAL at 14:00

## 2022-12-26 NOTE — PROGRESS NOTE ADULT - PROBLEM SELECTOR PLAN 3
- has 2  shunts  - will consult NSx if headache does not improve   - CTM - has 2  shunts  - will obtain shuntogram, NSx consult if headache does not improve   - CTM

## 2022-12-27 LAB
A1C WITH ESTIMATED AVERAGE GLUCOSE RESULT: 4.3 % — SIGNIFICANT CHANGE UP (ref 4–5.6)
ALBUMIN SERPL ELPH-MCNC: 3.7 G/DL — SIGNIFICANT CHANGE UP (ref 3.3–5)
ALP SERPL-CCNC: 50 U/L — SIGNIFICANT CHANGE UP (ref 40–120)
ALT FLD-CCNC: 19 U/L — SIGNIFICANT CHANGE UP (ref 4–33)
ANION GAP SERPL CALC-SCNC: 9 MMOL/L — SIGNIFICANT CHANGE UP (ref 7–14)
AST SERPL-CCNC: 36 U/L — HIGH (ref 4–32)
BILIRUB SERPL-MCNC: 3.8 MG/DL — HIGH (ref 0.2–1.2)
BUN SERPL-MCNC: 4 MG/DL — LOW (ref 7–23)
CALCIUM SERPL-MCNC: 8.8 MG/DL — SIGNIFICANT CHANGE UP (ref 8.4–10.5)
CHLORIDE SERPL-SCNC: 112 MMOL/L — HIGH (ref 98–107)
CHOLEST SERPL-MCNC: 102 MG/DL — SIGNIFICANT CHANGE UP
CO2 SERPL-SCNC: 21 MMOL/L — LOW (ref 22–31)
CREAT SERPL-MCNC: 0.58 MG/DL — SIGNIFICANT CHANGE UP (ref 0.5–1.3)
EGFR: 131 ML/MIN/1.73M2 — SIGNIFICANT CHANGE UP
ESTIMATED AVERAGE GLUCOSE: 77 — SIGNIFICANT CHANGE UP
GLUCOSE SERPL-MCNC: 85 MG/DL — SIGNIFICANT CHANGE UP (ref 70–99)
HCT VFR BLD CALC: 20.8 % — CRITICAL LOW (ref 34.5–45)
HDLC SERPL-MCNC: 30 MG/DL — LOW
HGB BLD-MCNC: 7.3 G/DL — LOW (ref 11.5–15.5)
LDH SERPL L TO P-CCNC: 441 U/L — HIGH (ref 135–225)
LIPID PNL WITH DIRECT LDL SERPL: 52 MG/DL — SIGNIFICANT CHANGE UP
MCHC RBC-ENTMCNC: 32.2 PG — SIGNIFICANT CHANGE UP (ref 27–34)
MCHC RBC-ENTMCNC: 35.1 GM/DL — SIGNIFICANT CHANGE UP (ref 32–36)
MCV RBC AUTO: 91.6 FL — SIGNIFICANT CHANGE UP (ref 80–100)
NON HDL CHOLESTEROL: 72 MG/DL — SIGNIFICANT CHANGE UP
NRBC # BLD: 7 /100 WBCS — HIGH (ref 0–0)
NRBC # FLD: 0.7 K/UL — HIGH (ref 0–0)
PLATELET # BLD AUTO: 340 K/UL — SIGNIFICANT CHANGE UP (ref 150–400)
POTASSIUM SERPL-MCNC: 4 MMOL/L — SIGNIFICANT CHANGE UP (ref 3.5–5.3)
POTASSIUM SERPL-SCNC: 4 MMOL/L — SIGNIFICANT CHANGE UP (ref 3.5–5.3)
PROT SERPL-MCNC: 6.1 G/DL — SIGNIFICANT CHANGE UP (ref 6–8.3)
RBC # BLD: 2.27 M/UL — LOW (ref 3.8–5.2)
RBC # BLD: 2.27 M/UL — LOW (ref 3.8–5.2)
RBC # FLD: 21.1 % — HIGH (ref 10.3–14.5)
RETICS #: 538.7 K/UL — HIGH (ref 25–125)
RETICS/RBC NFR: >22.2 % — HIGH (ref 0.5–2.5)
SODIUM SERPL-SCNC: 142 MMOL/L — SIGNIFICANT CHANGE UP (ref 135–145)
TRIGL SERPL-MCNC: 102 MG/DL — SIGNIFICANT CHANGE UP
TSH SERPL-MCNC: 0.35 UIU/ML — SIGNIFICANT CHANGE UP (ref 0.27–4.2)
WBC # BLD: 10.37 K/UL — SIGNIFICANT CHANGE UP (ref 3.8–10.5)
WBC # FLD AUTO: 10.37 K/UL — SIGNIFICANT CHANGE UP (ref 3.8–10.5)

## 2022-12-27 PROCEDURE — 99233 SBSQ HOSP IP/OBS HIGH 50: CPT

## 2022-12-27 RX ADMIN — Medication 650 MILLIGRAM(S): at 17:46

## 2022-12-27 RX ADMIN — ENOXAPARIN SODIUM 40 MILLIGRAM(S): 100 INJECTION SUBCUTANEOUS at 12:00

## 2022-12-27 RX ADMIN — ARIPIPRAZOLE 5 MILLIGRAM(S): 15 TABLET ORAL at 12:01

## 2022-12-27 RX ADMIN — HYDROXYUREA 500 MILLIGRAM(S): 500 CAPSULE ORAL at 12:00

## 2022-12-27 RX ADMIN — Medication 15 MILLIGRAM(S): at 13:05

## 2022-12-27 RX ADMIN — Medication 15 MILLIGRAM(S): at 13:20

## 2022-12-27 RX ADMIN — Medication 1 TABLET(S): at 12:01

## 2022-12-27 RX ADMIN — SERTRALINE 100 MILLIGRAM(S): 25 TABLET, FILM COATED ORAL at 12:01

## 2022-12-27 RX ADMIN — Medication 650 MILLIGRAM(S): at 17:16

## 2022-12-27 RX ADMIN — SODIUM CHLORIDE 100 MILLILITER(S): 9 INJECTION, SOLUTION INTRAVENOUS at 12:00

## 2022-12-27 RX ADMIN — Medication 1 MILLIGRAM(S): at 12:01

## 2022-12-27 RX ADMIN — MORPHINE SULFATE 30 MILLILITER(S): 50 CAPSULE, EXTENDED RELEASE ORAL at 20:16

## 2022-12-27 RX ADMIN — MORPHINE SULFATE 30 MILLILITER(S): 50 CAPSULE, EXTENDED RELEASE ORAL at 08:17

## 2022-12-27 RX ADMIN — CHLORHEXIDINE GLUCONATE 1 APPLICATION(S): 213 SOLUTION TOPICAL at 12:00

## 2022-12-28 LAB
ALBUMIN SERPL ELPH-MCNC: 3.9 G/DL — SIGNIFICANT CHANGE UP (ref 3.3–5)
ALP SERPL-CCNC: 52 U/L — SIGNIFICANT CHANGE UP (ref 40–120)
ALT FLD-CCNC: 18 U/L — SIGNIFICANT CHANGE UP (ref 4–33)
ANION GAP SERPL CALC-SCNC: 9 MMOL/L — SIGNIFICANT CHANGE UP (ref 7–14)
AST SERPL-CCNC: 40 U/L — HIGH (ref 4–32)
BILIRUB SERPL-MCNC: 3.8 MG/DL — HIGH (ref 0.2–1.2)
BUN SERPL-MCNC: 4 MG/DL — LOW (ref 7–23)
CALCIUM SERPL-MCNC: 9.4 MG/DL — SIGNIFICANT CHANGE UP (ref 8.4–10.5)
CHLORIDE SERPL-SCNC: 104 MMOL/L — SIGNIFICANT CHANGE UP (ref 98–107)
CO2 SERPL-SCNC: 24 MMOL/L — SIGNIFICANT CHANGE UP (ref 22–31)
CREAT SERPL-MCNC: 0.56 MG/DL — SIGNIFICANT CHANGE UP (ref 0.5–1.3)
EGFR: 132 ML/MIN/1.73M2 — SIGNIFICANT CHANGE UP
GLUCOSE SERPL-MCNC: 89 MG/DL — SIGNIFICANT CHANGE UP (ref 70–99)
HCT VFR BLD CALC: 22.3 % — LOW (ref 34.5–45)
HGB BLD-MCNC: 8.2 G/DL — LOW (ref 11.5–15.5)
LDH SERPL L TO P-CCNC: 479 U/L — HIGH (ref 135–225)
MAGNESIUM SERPL-MCNC: 1.6 MG/DL — SIGNIFICANT CHANGE UP (ref 1.6–2.6)
MCHC RBC-ENTMCNC: 32.9 PG — SIGNIFICANT CHANGE UP (ref 27–34)
MCHC RBC-ENTMCNC: 36.8 GM/DL — HIGH (ref 32–36)
MCV RBC AUTO: 89.6 FL — SIGNIFICANT CHANGE UP (ref 80–100)
NRBC # BLD: 7 /100 WBCS — HIGH (ref 0–0)
NRBC # FLD: 0.64 K/UL — HIGH (ref 0–0)
PHOSPHATE SERPL-MCNC: 3.6 MG/DL — SIGNIFICANT CHANGE UP (ref 2.5–4.5)
PLATELET # BLD AUTO: 378 K/UL — SIGNIFICANT CHANGE UP (ref 150–400)
POTASSIUM SERPL-MCNC: 3.8 MMOL/L — SIGNIFICANT CHANGE UP (ref 3.5–5.3)
POTASSIUM SERPL-SCNC: 3.8 MMOL/L — SIGNIFICANT CHANGE UP (ref 3.5–5.3)
PROT SERPL-MCNC: 6 G/DL — SIGNIFICANT CHANGE UP (ref 6–8.3)
RBC # BLD: 2.49 M/UL — LOW (ref 3.8–5.2)
RBC # BLD: 2.49 M/UL — LOW (ref 3.8–5.2)
RBC # FLD: 21.4 % — HIGH (ref 10.3–14.5)
RETICS/RBC NFR: >22.2 % — HIGH (ref 0.5–2.5)
SODIUM SERPL-SCNC: 137 MMOL/L — SIGNIFICANT CHANGE UP (ref 135–145)
WBC # BLD: 9.73 K/UL — SIGNIFICANT CHANGE UP (ref 3.8–10.5)
WBC # FLD AUTO: 9.73 K/UL — SIGNIFICANT CHANGE UP (ref 3.8–10.5)

## 2022-12-28 PROCEDURE — 99233 SBSQ HOSP IP/OBS HIGH 50: CPT

## 2022-12-28 RX ORDER — MORPHINE SULFATE 50 MG/1
30 CAPSULE, EXTENDED RELEASE ORAL
Refills: 0 | Status: DISCONTINUED | OUTPATIENT
Start: 2022-12-28 | End: 2022-12-30

## 2022-12-28 RX ORDER — MORPHINE SULFATE 50 MG/1
30 CAPSULE, EXTENDED RELEASE ORAL
Refills: 0 | Status: DISCONTINUED | OUTPATIENT
Start: 2022-12-28 | End: 2022-12-28

## 2022-12-28 RX ADMIN — Medication 1 TABLET(S): at 11:42

## 2022-12-28 RX ADMIN — MORPHINE SULFATE 30 MILLILITER(S): 50 CAPSULE, EXTENDED RELEASE ORAL at 20:04

## 2022-12-28 RX ADMIN — ENOXAPARIN SODIUM 40 MILLIGRAM(S): 100 INJECTION SUBCUTANEOUS at 11:41

## 2022-12-28 RX ADMIN — HYDROXYUREA 500 MILLIGRAM(S): 500 CAPSULE ORAL at 11:41

## 2022-12-28 RX ADMIN — CHLORHEXIDINE GLUCONATE 1 APPLICATION(S): 213 SOLUTION TOPICAL at 11:42

## 2022-12-28 RX ADMIN — Medication 15 MILLIGRAM(S): at 12:50

## 2022-12-28 RX ADMIN — MORPHINE SULFATE 30 MILLILITER(S): 50 CAPSULE, EXTENDED RELEASE ORAL at 08:21

## 2022-12-28 RX ADMIN — Medication 1 MILLIGRAM(S): at 11:42

## 2022-12-28 RX ADMIN — Medication 15 MILLIGRAM(S): at 13:05

## 2022-12-28 RX ADMIN — MORPHINE SULFATE 30 MILLILITER(S): 50 CAPSULE, EXTENDED RELEASE ORAL at 16:01

## 2022-12-28 RX ADMIN — Medication 3 MILLIGRAM(S): at 20:55

## 2022-12-28 RX ADMIN — ARIPIPRAZOLE 5 MILLIGRAM(S): 15 TABLET ORAL at 11:41

## 2022-12-28 RX ADMIN — SODIUM CHLORIDE 100 MILLILITER(S): 9 INJECTION, SOLUTION INTRAVENOUS at 20:54

## 2022-12-28 RX ADMIN — SERTRALINE 100 MILLIGRAM(S): 25 TABLET, FILM COATED ORAL at 11:41

## 2022-12-29 LAB
ALBUMIN SERPL ELPH-MCNC: 3.7 G/DL — SIGNIFICANT CHANGE UP (ref 3.3–5)
ALP SERPL-CCNC: 49 U/L — SIGNIFICANT CHANGE UP (ref 40–120)
ALT FLD-CCNC: 18 U/L — SIGNIFICANT CHANGE UP (ref 4–33)
ANION GAP SERPL CALC-SCNC: 11 MMOL/L — SIGNIFICANT CHANGE UP (ref 7–14)
AST SERPL-CCNC: 44 U/L — HIGH (ref 4–32)
BILIRUB SERPL-MCNC: 3.3 MG/DL — HIGH (ref 0.2–1.2)
BLD GP AB SCN SERPL QL: NEGATIVE — SIGNIFICANT CHANGE UP
BUN SERPL-MCNC: 5 MG/DL — LOW (ref 7–23)
CALCIUM SERPL-MCNC: 8.9 MG/DL — SIGNIFICANT CHANGE UP (ref 8.4–10.5)
CHLORIDE SERPL-SCNC: 107 MMOL/L — SIGNIFICANT CHANGE UP (ref 98–107)
CO2 SERPL-SCNC: 22 MMOL/L — SIGNIFICANT CHANGE UP (ref 22–31)
CREAT SERPL-MCNC: 0.56 MG/DL — SIGNIFICANT CHANGE UP (ref 0.5–1.3)
EGFR: 132 ML/MIN/1.73M2 — SIGNIFICANT CHANGE UP
GLUCOSE SERPL-MCNC: 92 MG/DL — SIGNIFICANT CHANGE UP (ref 70–99)
HCT VFR BLD CALC: 19.5 % — CRITICAL LOW (ref 34.5–45)
HCT VFR BLD CALC: 21.3 % — LOW (ref 34.5–45)
HGB BLD-MCNC: 7 G/DL — CRITICAL LOW (ref 11.5–15.5)
HGB BLD-MCNC: 7.7 G/DL — LOW (ref 11.5–15.5)
LDH SERPL L TO P-CCNC: 500 U/L — HIGH (ref 135–225)
MCHC RBC-ENTMCNC: 32 PG — SIGNIFICANT CHANGE UP (ref 27–34)
MCHC RBC-ENTMCNC: 32.3 PG — SIGNIFICANT CHANGE UP (ref 27–34)
MCHC RBC-ENTMCNC: 35.9 GM/DL — SIGNIFICANT CHANGE UP (ref 32–36)
MCHC RBC-ENTMCNC: 36.2 GM/DL — HIGH (ref 32–36)
MCV RBC AUTO: 88.4 FL — SIGNIFICANT CHANGE UP (ref 80–100)
MCV RBC AUTO: 89.9 FL — SIGNIFICANT CHANGE UP (ref 80–100)
NRBC # BLD: 5 /100 WBCS — HIGH (ref 0–0)
NRBC # BLD: 5 /100 WBCS — HIGH (ref 0–0)
NRBC # FLD: 0.45 K/UL — HIGH (ref 0–0)
NRBC # FLD: 0.48 K/UL — HIGH (ref 0–0)
PLATELET # BLD AUTO: 332 K/UL — SIGNIFICANT CHANGE UP (ref 150–400)
PLATELET # BLD AUTO: 368 K/UL — SIGNIFICANT CHANGE UP (ref 150–400)
POTASSIUM SERPL-MCNC: 3.7 MMOL/L — SIGNIFICANT CHANGE UP (ref 3.5–5.3)
POTASSIUM SERPL-SCNC: 3.7 MMOL/L — SIGNIFICANT CHANGE UP (ref 3.5–5.3)
PROT SERPL-MCNC: 6 G/DL — SIGNIFICANT CHANGE UP (ref 6–8.3)
RBC # BLD: 2.17 M/UL — LOW (ref 3.8–5.2)
RBC # BLD: 2.17 M/UL — LOW (ref 3.8–5.2)
RBC # BLD: 2.41 M/UL — LOW (ref 3.8–5.2)
RBC # FLD: 21 % — HIGH (ref 10.3–14.5)
RBC # FLD: 21.3 % — HIGH (ref 10.3–14.5)
RETICS #: 428.8 K/UL — HIGH (ref 25–125)
RETICS/RBC NFR: 19.8 % — HIGH (ref 0.5–2.5)
RH IG SCN BLD-IMP: POSITIVE — SIGNIFICANT CHANGE UP
SODIUM SERPL-SCNC: 140 MMOL/L — SIGNIFICANT CHANGE UP (ref 135–145)
WBC # BLD: 9.42 K/UL — SIGNIFICANT CHANGE UP (ref 3.8–10.5)
WBC # BLD: 9.62 K/UL — SIGNIFICANT CHANGE UP (ref 3.8–10.5)
WBC # FLD AUTO: 9.42 K/UL — SIGNIFICANT CHANGE UP (ref 3.8–10.5)
WBC # FLD AUTO: 9.62 K/UL — SIGNIFICANT CHANGE UP (ref 3.8–10.5)

## 2022-12-29 PROCEDURE — 99233 SBSQ HOSP IP/OBS HIGH 50: CPT

## 2022-12-29 RX ADMIN — MORPHINE SULFATE 30 MILLILITER(S): 50 CAPSULE, EXTENDED RELEASE ORAL at 20:14

## 2022-12-29 RX ADMIN — Medication 1 MILLIGRAM(S): at 12:27

## 2022-12-29 RX ADMIN — ENOXAPARIN SODIUM 40 MILLIGRAM(S): 100 INJECTION SUBCUTANEOUS at 12:26

## 2022-12-29 RX ADMIN — HYDROXYUREA 500 MILLIGRAM(S): 500 CAPSULE ORAL at 12:27

## 2022-12-29 RX ADMIN — Medication 1 TABLET(S): at 12:27

## 2022-12-29 RX ADMIN — SERTRALINE 100 MILLIGRAM(S): 25 TABLET, FILM COATED ORAL at 12:27

## 2022-12-29 RX ADMIN — CHLORHEXIDINE GLUCONATE 1 APPLICATION(S): 213 SOLUTION TOPICAL at 12:27

## 2022-12-29 RX ADMIN — MORPHINE SULFATE 30 MILLILITER(S): 50 CAPSULE, EXTENDED RELEASE ORAL at 08:10

## 2022-12-29 RX ADMIN — ARIPIPRAZOLE 5 MILLIGRAM(S): 15 TABLET ORAL at 12:26

## 2022-12-30 ENCOUNTER — TRANSCRIPTION ENCOUNTER (OUTPATIENT)
Age: 22
End: 2022-12-30

## 2022-12-30 VITALS
OXYGEN SATURATION: 95 % | TEMPERATURE: 99 F | SYSTOLIC BLOOD PRESSURE: 97 MMHG | HEART RATE: 88 BPM | DIASTOLIC BLOOD PRESSURE: 50 MMHG | RESPIRATION RATE: 17 BRPM

## 2022-12-30 LAB
ANION GAP SERPL CALC-SCNC: 10 MMOL/L — SIGNIFICANT CHANGE UP (ref 7–14)
BUN SERPL-MCNC: 5 MG/DL — LOW (ref 7–23)
CALCIUM SERPL-MCNC: 9.2 MG/DL — SIGNIFICANT CHANGE UP (ref 8.4–10.5)
CHLORIDE SERPL-SCNC: 107 MMOL/L — SIGNIFICANT CHANGE UP (ref 98–107)
CO2 SERPL-SCNC: 24 MMOL/L — SIGNIFICANT CHANGE UP (ref 22–31)
CREAT SERPL-MCNC: 0.57 MG/DL — SIGNIFICANT CHANGE UP (ref 0.5–1.3)
EGFR: 132 ML/MIN/1.73M2 — SIGNIFICANT CHANGE UP
GLUCOSE SERPL-MCNC: 86 MG/DL — SIGNIFICANT CHANGE UP (ref 70–99)
HCT VFR BLD CALC: 20.7 % — CRITICAL LOW (ref 34.5–45)
HGB BLD-MCNC: 7.6 G/DL — LOW (ref 11.5–15.5)
MAGNESIUM SERPL-MCNC: 1.6 MG/DL — SIGNIFICANT CHANGE UP (ref 1.6–2.6)
MCHC RBC-ENTMCNC: 32.6 PG — SIGNIFICANT CHANGE UP (ref 27–34)
MCHC RBC-ENTMCNC: 36.7 GM/DL — HIGH (ref 32–36)
MCV RBC AUTO: 88.8 FL — SIGNIFICANT CHANGE UP (ref 80–100)
MRSA PCR RESULT.: SIGNIFICANT CHANGE UP
NRBC # BLD: 4 /100 WBCS — HIGH (ref 0–0)
NRBC # FLD: 0.35 K/UL — HIGH (ref 0–0)
PHOSPHATE SERPL-MCNC: 4.2 MG/DL — SIGNIFICANT CHANGE UP (ref 2.5–4.5)
PLATELET # BLD AUTO: 364 K/UL — SIGNIFICANT CHANGE UP (ref 150–400)
POTASSIUM SERPL-MCNC: 4 MMOL/L — SIGNIFICANT CHANGE UP (ref 3.5–5.3)
POTASSIUM SERPL-SCNC: 4 MMOL/L — SIGNIFICANT CHANGE UP (ref 3.5–5.3)
RBC # BLD: 2.33 M/UL — LOW (ref 3.8–5.2)
RBC # FLD: 20.6 % — HIGH (ref 10.3–14.5)
S AUREUS DNA NOSE QL NAA+PROBE: SIGNIFICANT CHANGE UP
SODIUM SERPL-SCNC: 141 MMOL/L — SIGNIFICANT CHANGE UP (ref 135–145)
WBC # BLD: 9.61 K/UL — SIGNIFICANT CHANGE UP (ref 3.8–10.5)
WBC # FLD AUTO: 9.61 K/UL — SIGNIFICANT CHANGE UP (ref 3.8–10.5)

## 2022-12-30 PROCEDURE — 99239 HOSP IP/OBS DSCHRG MGMT >30: CPT

## 2022-12-30 RX ORDER — FOLIC ACID 0.8 MG
1 TABLET ORAL
Qty: 0 | Refills: 0 | DISCHARGE
Start: 2022-12-30

## 2022-12-30 RX ADMIN — HYDROXYUREA 500 MILLIGRAM(S): 500 CAPSULE ORAL at 12:11

## 2022-12-30 RX ADMIN — Medication 1 TABLET(S): at 12:12

## 2022-12-30 RX ADMIN — SODIUM CHLORIDE 100 MILLILITER(S): 9 INJECTION, SOLUTION INTRAVENOUS at 02:05

## 2022-12-30 RX ADMIN — ARIPIPRAZOLE 5 MILLIGRAM(S): 15 TABLET ORAL at 12:12

## 2022-12-30 RX ADMIN — Medication 1 MILLIGRAM(S): at 12:12

## 2022-12-30 RX ADMIN — SERTRALINE 100 MILLIGRAM(S): 25 TABLET, FILM COATED ORAL at 12:12

## 2022-12-30 RX ADMIN — ENOXAPARIN SODIUM 40 MILLIGRAM(S): 100 INJECTION SUBCUTANEOUS at 12:12

## 2022-12-30 RX ADMIN — CHLORHEXIDINE GLUCONATE 1 APPLICATION(S): 213 SOLUTION TOPICAL at 12:11

## 2022-12-30 RX ADMIN — MORPHINE SULFATE 30 MILLILITER(S): 50 CAPSULE, EXTENDED RELEASE ORAL at 08:06

## 2022-12-30 NOTE — PROGRESS NOTE ADULT - PROBLEM SELECTOR PROBLEM 3
History of hydrocephalus

## 2022-12-30 NOTE — PROGRESS NOTE ADULT - SUBJECTIVE AND OBJECTIVE BOX
Patient is a 22y old  Female who presents with a chief complaint of Sickle Cell Crisis (25 Dec 2022 07:36)    Jaime Lawton MD   [facility] Medicine   Pager [phone]  Reachable on [employer] Teams     SUBJECTIVE / OVERNIGHT EVENTS:  Patient seen and examined this morning. She states that she has mild headache today, but the pain in the rest of her body is much better. No chest pain, shortness of breath, fevers, chills, vision changes, lightheadedness.     MEDICATIONS  (STANDING):  ARIPiprazole 5 milliGRAM(s) Oral daily  chlorhexidine 2% Cloths 1 Application(s) Topical daily  Deferasirox 360mg tablet 4 Tablet(s) 4 Tablet(s) Oral daily  enoxaparin Injectable 40 milliGRAM(s) SubCutaneous every 24 hours  folic acid 1 milliGRAM(s) Oral daily  hydroxyurea 500 milliGRAM(s) Oral daily  morphine PCA (5 mG/mL) 30 milliLiter(s) PCA Continuous PCA Continuous  multivitamin 1 Tablet(s) Oral daily  sertraline 100 milliGRAM(s) Oral daily  sodium chloride 0.45%. 1000 milliLiter(s) (100 mL/Hr) IV Continuous <Continuous>    MEDICATIONS  (PRN):  acetaminophen     Tablet .. 650 milliGRAM(s) Oral every 6 hours PRN Temp greater or equal to 38C (100.4F), Mild Pain (1 - 3)  diphenhydrAMINE 25 milliGRAM(s) Oral every 4 hours PRN Rash and/or Itching  ketorolac   Injectable 15 milliGRAM(s) IV Push every 8 hours PRN Mild Pain (1 - 3)  naloxone Injectable 0.1 milliGRAM(s) IV Push every 3 minutes PRN For ANY of the following changes in patient status:  A. RR LESS THAN 10 breaths per minute, B. Oxygen saturation LESS THAN 90%, C. Sedation score of 6  ondansetron Injectable 4 milliGRAM(s) IV Push every 6 hours PRN Nausea      Vital Signs Last 24 Hrs  T(C): 36.6 (26 Dec 2022 10:50), Max: 37.1 (26 Dec 2022 02:05)  T(F): 97.8 (26 Dec 2022 10:50), Max: 98.8 (26 Dec 2022 02:05)  HR: 83 (26 Dec 2022 10:50) (66 - 83)  BP: 110/52 (26 Dec 2022 10:50) (97/52 - 115/50)  BP(mean): --  RR: 16 (26 Dec 2022 10:50) (16 - 18)  SpO2: 94% (26 Dec 2022 10:50) (91% - 97%)  CAPILLARY BLOOD GLUCOSE        I&O's Summary      General: young woman sitting up in NAD, awake and alert  Eyes: conjunctiva clear, nonicteric sclera  HENMT: MMM  Respiratory: No respiratory distress, CTABL, No rales, rhonchi, wheezing.  Cardiovascular: S1,S2; Regular rate and rhythm; No m/g/r.   Gastrointestinal: Soft, Nontender, Nondistended; +BS.   Extremities: No c/c/e; warm to touch  Neurological: Walking around room without difficulties, moving all 4 extremities; Sensation to LT grossly in tact.  Skin: No rashes, No erythema   Psych:  appropriate mood and affect    LABS:                        6.3    10.85 )-----------( 324      ( 26 Dec 2022 07:02 )             17.8     12-26    141  |  110<H>  |  5<L>  ----------------------------<  96  3.9   |  22  |  0.55    Ca    8.8      26 Dec 2022 07:02  Mg     1.80     12-26    TPro  6.9  /  Alb  4.3  /  TBili  4.1<H>  /  DBili  x   /  AST  45<H>  /  ALT  22  /  AlkPhos  59  12-25    PT/INR - ( 26 Dec 2022 07:02 )   PT: 13.7 sec;   INR: 1.18 ratio         PTT - ( 26 Dec 2022 07:02 )  PTT:24.3 sec          RADIOLOGY & ADDITIONAL TESTS:    Imaging Personally Reviewed:    Consultant(s) Notes Reviewed:      Care Discussed with Consultants/Other Providers:  
Patient is a 22y old  Female who presents with a chief complaint of Sickle Cell Crisis (28 Dec 2022 13:31)      SUBJECTIVE / OVERNIGHT EVENTS: No acute events overnight. Pt has no new complaints     ADDITIONAL REVIEW OF SYSTEMS:    MEDICATIONS  (STANDING):  ARIPiprazole 5 milliGRAM(s) Oral daily  chlorhexidine 2% Cloths 1 Application(s) Topical daily  Deferasirox 360mg tablet 4 Tablet(s) 4 Tablet(s) Oral daily  enoxaparin Injectable 40 milliGRAM(s) SubCutaneous every 24 hours  folic acid 1 milliGRAM(s) Oral daily  hydroxyurea 500 milliGRAM(s) Oral daily  morphine PCA (5 mG/mL) 30 milliLiter(s) PCA Continuous PCA Continuous  multivitamin 1 Tablet(s) Oral daily  sertraline 100 milliGRAM(s) Oral daily  sodium chloride 0.45%. 1000 milliLiter(s) (100 mL/Hr) IV Continuous <Continuous>    MEDICATIONS  (PRN):  acetaminophen     Tablet .. 650 milliGRAM(s) Oral every 6 hours PRN Temp greater or equal to 38C (100.4F), Mild Pain (1 - 3)  diphenhydrAMINE 25 milliGRAM(s) Oral every 4 hours PRN Rash and/or Itching  ketorolac   Injectable 15 milliGRAM(s) IV Push every 8 hours PRN Mild Pain (1 - 3)  melatonin 3 milliGRAM(s) Oral at bedtime PRN Insomnia  naloxone Injectable 0.1 milliGRAM(s) IV Push every 3 minutes PRN For ANY of the following changes in patient status:  A. RR LESS THAN 10 breaths per minute, B. Oxygen saturation LESS THAN 90%, C. Sedation score of 6  ondansetron Injectable 4 milliGRAM(s) IV Push every 6 hours PRN Nausea      CAPILLARY BLOOD GLUCOSE        I&O's Summary      PHYSICAL EXAM:  Vital Signs Last 24 Hrs  T(C): 36.9 (29 Dec 2022 06:00), Max: 37.1 (29 Dec 2022 02:17)  T(F): 98.4 (29 Dec 2022 06:00), Max: 98.7 (29 Dec 2022 02:17)  HR: 64 (29 Dec 2022 06:00) (64 - 83)  BP: 98/48 (29 Dec 2022 06:00) (70/55 - 109/59)  BP(mean): --  RR: 16 (29 Dec 2022 06:00) (16 - 18)  SpO2: 94% (29 Dec 2022 06:00) (94% - 99%)    Parameters below as of 29 Dec 2022 06:00  Patient On (Oxygen Delivery Method): room air      CONSTITUTIONAL: NAD  EYES: PERRLA; conjunctiva and sclera clear  ENMT: Moist oral mucosa, no pharyngeal injection or exudates; normal dentition  NECK: Supple, no palpable masses; no thyromegaly  RESPIRATORY: Normal respiratory effort; lungs are clear to auscultation bilaterally  CARDIOVASCULAR: Regular rate and rhythm, normal S1 and S2, no murmur/rub/gallop; No lower extremity edema; Peripheral pulses are 2+ bilaterally  ABDOMEN: Nontender to palpation, normoactive bowel sounds, no rebound/guarding; No hepatosplenomegaly  MUSCULOSKELETAL:  Normal gait; no clubbing or cyanosis of digits; no joint swelling or tenderness to palpation  PSYCH: A+O to person, place, and time; affect appropriate  NEUROLOGY: CN 2-12 are intact and symmetric; no gross sensory deficits   SKIN: No rashes; no palpable lesions    LABS:                        7.0    9.42  )-----------( 332      ( 29 Dec 2022 06:02 )             19.5     12-29    140  |  107  |  5<L>  ----------------------------<  92  3.7   |  22  |  0.56    Ca    8.9      29 Dec 2022 06:02  Phos  3.6     12-28  Mg     1.60     12-28    TPro  6.0  /  Alb  3.7  /  TBili  3.3<H>  /  DBili  x   /  AST  44<H>  /  ALT  18  /  AlkPhos  49  12-29                RADIOLOGY & ADDITIONAL TESTS:  Results Reviewed:   Imaging Personally Reviewed:  Electrocardiogram Personally Reviewed:    COORDINATION OF CARE:  Care Discussed with Consultants/Other Providers [Y/N]:  Prior or Outpatient Records Reviewed [Y/N]:  
Patient is a 22y old  Female who presents with a chief complaint of Sickle Cell Crisis (27 Dec 2022 12:44)      SUBJECTIVE / OVERNIGHT EVENTS: No acute events overnight. Pt has no new complaints     ADDITIONAL REVIEW OF SYSTEMS:    MEDICATIONS  (STANDING):  ARIPiprazole 5 milliGRAM(s) Oral daily  chlorhexidine 2% Cloths 1 Application(s) Topical daily  Deferasirox 360mg tablet 4 Tablet(s) 4 Tablet(s) Oral daily  enoxaparin Injectable 40 milliGRAM(s) SubCutaneous every 24 hours  folic acid 1 milliGRAM(s) Oral daily  hydroxyurea 500 milliGRAM(s) Oral daily  morphine PCA (5 mG/mL) 30 milliLiter(s) PCA Continuous PCA Continuous  multivitamin 1 Tablet(s) Oral daily  sertraline 100 milliGRAM(s) Oral daily  sodium chloride 0.45%. 1000 milliLiter(s) (100 mL/Hr) IV Continuous <Continuous>    MEDICATIONS  (PRN):  acetaminophen     Tablet .. 650 milliGRAM(s) Oral every 6 hours PRN Temp greater or equal to 38C (100.4F), Mild Pain (1 - 3)  diphenhydrAMINE 25 milliGRAM(s) Oral every 4 hours PRN Rash and/or Itching  ketorolac   Injectable 15 milliGRAM(s) IV Push every 8 hours PRN Mild Pain (1 - 3)  melatonin 3 milliGRAM(s) Oral at bedtime PRN Insomnia  naloxone Injectable 0.1 milliGRAM(s) IV Push every 3 minutes PRN For ANY of the following changes in patient status:  A. RR LESS THAN 10 breaths per minute, B. Oxygen saturation LESS THAN 90%, C. Sedation score of 6  ondansetron Injectable 4 milliGRAM(s) IV Push every 6 hours PRN Nausea      CAPILLARY BLOOD GLUCOSE        I&O's Summary      PHYSICAL EXAM:  Vital Signs Last 24 Hrs  T(C): 36.7 (28 Dec 2022 06:13), Max: 36.8 (27 Dec 2022 14:20)  T(F): 98.1 (28 Dec 2022 06:13), Max: 98.2 (27 Dec 2022 14:20)  HR: 78 (28 Dec 2022 06:13) (63 - 78)  BP: 110/55 (28 Dec 2022 06:13) (98/56 - 110/55)  BP(mean): --  RR: 18 (28 Dec 2022 06:13) (16 - 18)  SpO2: 95% (28 Dec 2022 06:13) (94% - 99%)    Parameters below as of 28 Dec 2022 06:13  Patient On (Oxygen Delivery Method): nasal cannula  O2 Flow (L/min): 2    CONSTITUTIONAL: NAD  EYES: PERRLA; conjunctiva and sclera clear  ENMT: Moist oral mucosa, no pharyngeal injection or exudates; normal dentition  NECK: Supple, no palpable masses; no thyromegaly  RESPIRATORY: Normal respiratory effort; lungs are clear to auscultation bilaterally  CARDIOVASCULAR: Regular rate and rhythm, normal S1 and S2, no murmur/rub/gallop; No lower extremity edema; Peripheral pulses are 2+ bilaterally  ABDOMEN: Nontender to palpation, normoactive bowel sounds, no rebound/guarding; No hepatosplenomegaly  MUSCULOSKELETAL:  Normal gait; no clubbing or cyanosis of digits; no joint swelling or tenderness to palpation  PSYCH: A+O to person, place, and time; affect appropriate  NEUROLOGY: CN 2-12 are intact and symmetric; no gross sensory deficits   SKIN: No rashes; no palpable lesions    LABS:                        8.2    9.73  )-----------( 378      ( 28 Dec 2022 07:47 )             22.3     12-28    137  |  104  |  4<L>  ----------------------------<  89  3.8   |  24  |  0.56    Ca    9.4      28 Dec 2022 07:47  Phos  3.6     12-28  Mg     1.60     12-28    TPro  6.0  /  Alb  3.9  /  TBili  3.8<H>  /  DBili  x   /  AST  40<H>  /  ALT  18  /  AlkPhos  52  12-28              Culture - Nose (collected 25 Dec 2022 13:50)  Source: .Nose Nose  Final Report (26 Dec 2022 21:49):    No staphylococcus aureus isolated.    "PCR is more Sensitive for identifying MRSA/MSSA."        RADIOLOGY & ADDITIONAL TESTS:  Results Reviewed:   Imaging Personally Reviewed:  Electrocardiogram Personally Reviewed:    COORDINATION OF CARE:  Care Discussed with Consultants/Other Providers [Y/N]:  Prior or Outpatient Records Reviewed [Y/N]:  
Patient is a 22y old  Female who presents with a chief complaint of Sickle Cell Crisis (26 Dec 2022 13:49)      SUBJECTIVE / OVERNIGHT EVENTS: No acute events overnight. Pt has no new complaints     ADDITIONAL REVIEW OF SYSTEMS:    MEDICATIONS  (STANDING):  ARIPiprazole 5 milliGRAM(s) Oral daily  chlorhexidine 2% Cloths 1 Application(s) Topical daily  Deferasirox 360mg tablet 4 Tablet(s) 4 Tablet(s) Oral daily  enoxaparin Injectable 40 milliGRAM(s) SubCutaneous every 24 hours  folic acid 1 milliGRAM(s) Oral daily  hydroxyurea 500 milliGRAM(s) Oral daily  morphine PCA (5 mG/mL) 30 milliLiter(s) PCA Continuous PCA Continuous  multivitamin 1 Tablet(s) Oral daily  sertraline 100 milliGRAM(s) Oral daily  sodium chloride 0.45%. 1000 milliLiter(s) (100 mL/Hr) IV Continuous <Continuous>    MEDICATIONS  (PRN):  acetaminophen     Tablet .. 650 milliGRAM(s) Oral every 6 hours PRN Temp greater or equal to 38C (100.4F), Mild Pain (1 - 3)  diphenhydrAMINE 25 milliGRAM(s) Oral every 4 hours PRN Rash and/or Itching  ketorolac   Injectable 15 milliGRAM(s) IV Push every 8 hours PRN Mild Pain (1 - 3)  melatonin 3 milliGRAM(s) Oral at bedtime PRN Insomnia  naloxone Injectable 0.1 milliGRAM(s) IV Push every 3 minutes PRN For ANY of the following changes in patient status:  A. RR LESS THAN 10 breaths per minute, B. Oxygen saturation LESS THAN 90%, C. Sedation score of 6  ondansetron Injectable 4 milliGRAM(s) IV Push every 6 hours PRN Nausea      CAPILLARY BLOOD GLUCOSE        I&O's Summary    26 Dec 2022 07:01  -  27 Dec 2022 07:00  --------------------------------------------------------  IN: 325 mL / OUT: 0 mL / NET: 325 mL        PHYSICAL EXAM:  Vital Signs Last 24 Hrs  T(C): 36.6 (27 Dec 2022 06:50), Max: 37.6 (27 Dec 2022 01:00)  T(F): 97.9 (27 Dec 2022 06:50), Max: 99.7 (27 Dec 2022 01:00)  HR: 70 (27 Dec 2022 06:50) (70 - 94)  BP: 103/44 (27 Dec 2022 06:50) (93/44 - 112/58)  BP(mean): --  RR: 17 (27 Dec 2022 06:50) (17 - 18)  SpO2: 94% (27 Dec 2022 06:50) (94% - 95%)    Parameters below as of 27 Dec 2022 06:50  Patient On (Oxygen Delivery Method): room air      CONSTITUTIONAL: NAD  EYES: PERRLA; conjunctiva and sclera clear  ENMT: Moist oral mucosa, no pharyngeal injection or exudates; normal dentition  NECK: Supple, no palpable masses; no thyromegaly  RESPIRATORY: Normal respiratory effort; lungs are clear to auscultation bilaterally  CARDIOVASCULAR: Regular rate and rhythm, normal S1 and S2, no murmur/rub/gallop; No lower extremity edema; Peripheral pulses are 2+ bilaterally  ABDOMEN: Nontender to palpation, normoactive bowel sounds, no rebound/guarding; No hepatosplenomegaly  MUSCULOSKELETAL:  Normal gait; no clubbing or cyanosis of digits; no joint swelling or tenderness to palpation  PSYCH: A+O to person, place, and time; affect appropriate  NEUROLOGY: CN 2-12 are intact and symmetric; no gross sensory deficits   SKIN: No rashes; no palpable lesions    LABS:                        7.3    10.37 )-----------( 340      ( 27 Dec 2022 06:50 )             20.8     12-27    142  |  112<H>  |  4<L>  ----------------------------<  85  4.0   |  21<L>  |  0.58    Ca    8.8      27 Dec 2022 06:50  Mg     1.80     12-26    TPro  6.1  /  Alb  3.7  /  TBili  3.8<H>  /  DBili  x   /  AST  36<H>  /  ALT  19  /  AlkPhos  50  12-27    PT/INR - ( 26 Dec 2022 07:02 )   PT: 13.7 sec;   INR: 1.18 ratio         PTT - ( 26 Dec 2022 07:02 )  PTT:24.3 sec          Culture - Nose (collected 25 Dec 2022 13:50)  Source: .Nose Nose  Final Report (26 Dec 2022 21:49):    No staphylococcus aureus isolated.    "PCR is more Sensitive for identifying MRSA/MSSA."        RADIOLOGY & ADDITIONAL TESTS:  Results Reviewed:   Imaging Personally Reviewed:  Electrocardiogram Personally Reviewed:    COORDINATION OF CARE:  Care Discussed with Consultants/Other Providers [Y/N]:  Prior or Outpatient Records Reviewed [Y/N]:  
Patient is a 22y old  Female who presents with a chief complaint of Sickle Cell Crisis (30 Dec 2022 12:03)      SUBJECTIVE / OVERNIGHT EVENTS: No acute events overnight. Pt has no new complaints. She states her pain now resolved.    I spoke to patient's mother via phone. She states pt baseline Hb around 10 and want to keep Hb>8. This was corroborated by shay - Dr Richardson yesterday. I discussed transfusion goals usually different for sickle cell patients. After discussion, it was decided to transfuse 1unit prior to discharge as Hb 7.6 this AM         ADDITIONAL REVIEW OF SYSTEMS:    MEDICATIONS  (STANDING):  ARIPiprazole 5 milliGRAM(s) Oral daily  chlorhexidine 2% Cloths 1 Application(s) Topical daily  Deferasirox 360mg tablet 4 Tablet(s) 4 Tablet(s) Oral daily  enoxaparin Injectable 40 milliGRAM(s) SubCutaneous every 24 hours  folic acid 1 milliGRAM(s) Oral daily  hydroxyurea 500 milliGRAM(s) Oral daily  morphine PCA (5 mG/mL) 30 milliLiter(s) PCA Continuous PCA Continuous  multivitamin 1 Tablet(s) Oral daily  sertraline 100 milliGRAM(s) Oral daily  sodium chloride 0.45%. 1000 milliLiter(s) (100 mL/Hr) IV Continuous <Continuous>    MEDICATIONS  (PRN):  acetaminophen     Tablet .. 650 milliGRAM(s) Oral every 6 hours PRN Temp greater or equal to 38C (100.4F), Mild Pain (1 - 3)  diphenhydrAMINE 25 milliGRAM(s) Oral every 4 hours PRN Rash and/or Itching  ketorolac   Injectable 15 milliGRAM(s) IV Push every 8 hours PRN Mild Pain (1 - 3)  melatonin 3 milliGRAM(s) Oral at bedtime PRN Insomnia  naloxone Injectable 0.1 milliGRAM(s) IV Push every 3 minutes PRN For ANY of the following changes in patient status:  A. RR LESS THAN 10 breaths per minute, B. Oxygen saturation LESS THAN 90%, C. Sedation score of 6  ondansetron Injectable 4 milliGRAM(s) IV Push every 6 hours PRN Nausea      CAPILLARY BLOOD GLUCOSE        I&O's Summary      PHYSICAL EXAM:  Vital Signs Last 24 Hrs  T(C): 36.7 (30 Dec 2022 07:40), Max: 36.8 (29 Dec 2022 14:54)  T(F): 98.1 (30 Dec 2022 07:40), Max: 98.2 (29 Dec 2022 14:54)  HR: 66 (30 Dec 2022 07:40) (66 - 80)  BP: 105/59 (30 Dec 2022 07:40) (102/50 - 119/57)  BP(mean): --  RR: 17 (30 Dec 2022 07:40) (16 - 18)  SpO2: 96% (30 Dec 2022 07:40) (95% - 98%)    Parameters below as of 30 Dec 2022 07:40  Patient On (Oxygen Delivery Method): nasal cannula  O2 Flow (L/min): 2    CONSTITUTIONAL: NAD  EYES: PERRLA; conjunctiva and sclera clear  ENMT: Moist oral mucosa, no pharyngeal injection or exudates; normal dentition  NECK: Supple, no palpable masses; no thyromegaly  RESPIRATORY: Normal respiratory effort; lungs are clear to auscultation bilaterally  CARDIOVASCULAR: Regular rate and rhythm, normal S1 and S2, no murmur/rub/gallop; No lower extremity edema; Peripheral pulses are 2+ bilaterally  ABDOMEN: Nontender to palpation, normoactive bowel sounds, no rebound/guarding; No hepatosplenomegaly  MUSCULOSKELETAL:  Normal gait; no clubbing or cyanosis of digits; no joint swelling or tenderness to palpation  PSYCH: A+O to person, place, and time; affect appropriate  NEUROLOGY: CN 2-12 are intact and symmetric; no gross sensory deficits   SKIN: No rashes; no palpable lesions    LABS:                        7.6    9.61  )-----------( 364      ( 30 Dec 2022 06:00 )             20.7     12-30    141  |  107  |  5<L>  ----------------------------<  86  4.0   |  24  |  0.57    Ca    9.2      30 Dec 2022 06:00  Phos  4.2     12-30  Mg     1.60     12-30    TPro  6.0  /  Alb  3.7  /  TBili  3.3<H>  /  DBili  x   /  AST  44<H>  /  ALT  18  /  AlkPhos  49  12-29                RADIOLOGY & ADDITIONAL TESTS:  Results Reviewed:   Imaging Personally Reviewed:  Electrocardiogram Personally Reviewed:    COORDINATION OF CARE:  Care Discussed with Consultants/Other Providers [Y/N]:  Prior or Outpatient Records Reviewed [Y/N]:

## 2022-12-30 NOTE — DISCHARGE NOTE PROVIDER - NSDCCPCAREPLAN_GEN_ALL_CORE_FT
PRINCIPAL DISCHARGE DIAGNOSIS  Diagnosis: Sickle cell crisis  Assessment and Plan of Treatment: Follow up with your Hematologist Dr. Richardson after discharge from the hospital.

## 2022-12-30 NOTE — DISCHARGE NOTE PROVIDER - HOSPITAL COURSE
23 y/o female, with a PmHx of PE on Xarelto, Sickle Cell Disease, Acute Chest Syndrome, Hydrocephalus s/p  Shunt, CVA, Depression, Anxiety, Bipolar, Asthma, Atrophic Spleen, Cholecystectomy, presented with chest pain and extremity pain after stopping her Xarelto as per her PCP 4 days ago. Admitted to medicine for Sickle Cell crisis. Pt's pain was managed with Morphine PCA, Toradol.  Pt 's s/p 1 unit PRBC on 12/26 for hgb 6.3, 1 unit PRBC 12/30 for hgb 7.6. 23 y/o female, with a PmHx of PE on Xarelto, Sickle Cell Disease, Acute Chest Syndrome, Hydrocephalus s/p  Shunt, CVA, Depression, Anxiety, Bipolar, Asthma, Atrophic Spleen, Cholecystectomy, presented with chest pain and extremity pain after stopping her Xarelto as per her PCP 4 days ago. Admitted to medicine for Sickle Cell crisis. Pt's pain was managed with Morphine PCA, Toradol.  Pt 's s/p 1 unit PRBC on 12/26 for hgb 6.3, 1 unit PRBC 12/30 for hgb 7.6.    Pt is medically stable for discharge, d/c home today.

## 2022-12-30 NOTE — PROGRESS NOTE ADULT - PROBLEM SELECTOR PLAN 4
DVTppx: Lovenox 40mg sc daily

## 2022-12-30 NOTE — DISCHARGE NOTE NURSING/CASE MANAGEMENT/SOCIAL WORK - PATIENT PORTAL LINK FT
You can access the FollowMyHealth Patient Portal offered by [facility] by registering at the following website: [email or web address]. By joining [facility]’s FollowMyHealth portal, you will also be able to view your health information using other applications (apps) compatible with our system.

## 2022-12-30 NOTE — PROGRESS NOTE ADULT - ASSESSMENT
23 y/o female, with a PmHx of PE on Xarelto (stopped Xarelto 4 days ago as per PCP - no longer needed it), Sickle Cell Disease, Acute Chest Syndrome, Hydrocephalus s/p  Shunt x 2, CVA, Depression, Anxiety, Bipolar, Asthma, Atrophic Spleen, Cholecystectomy, Multiple bilateral eye surgeries, presented with chest pain and extremity pain. Admitted to medicine for Sickle Cell Crisis.
21 y/o female, with a PmHx of PE on Xarelto (stopped Xarelto 4 days ago as per PCP - no longer needed it), Sickle Cell Disease, Acute Chest Syndrome, Hydrocephalus s/p  Shunt x 2, CVA, Depression, Anxiety, Bipolar, Asthma, Atrophic Spleen, Cholecystectomy, Multiple bilateral eye surgeries, presented with chest pain and extremity pain. Admitted to medicine for Sickle Cell Crisis.
23 y/o female, with a PmHx of PE on Xarelto (stopped Xarelto 4 days ago as per PCP - no longer needed it), Sickle Cell Disease, Acute Chest Syndrome, Hydrocephalus s/p  Shunt x 2, CVA, Depression, Anxiety, Bipolar, Asthma, Atrophic Spleen, Cholecystectomy, Multiple bilateral eye surgeries, presented with chest pain and extremity pain. Admitted to medicine for Sickle Cell Crisis.

## 2022-12-30 NOTE — DISCHARGE NOTE PROVIDER - CARE PROVIDER_API CALL
Eleonora Richardson)  Internal Medicine  [phone] [place] [address]  Phone: [phone]  Fax: [phone]  Follow Up Time:

## 2022-12-30 NOTE — DISCHARGE NOTE PROVIDER - NSDCFUSCHEDAPPT_GEN_ALL_CORE_FT
[facility]  INTMED OP [id number] [facility]  Scheduled Appointment: 03/13/2023

## 2022-12-30 NOTE — PROVIDER CONTACT NOTE (CRITICAL VALUE NOTIFICATION) - ASSESSMENT
Pt otherwise asymptomatic. pt denies feeling lightheaded or dizzy.
pt asymptomatic
Pt reports feeling better today then yesterday but shared she gets a blood transfusion every 3 weeks outpt. and today is the day she was supposed to go for it

## 2022-12-30 NOTE — DISCHARGE NOTE PROVIDER - NSDCMRMEDTOKEN_GEN_ALL_CORE_FT
Abilify 5 mg oral tablet: 1 tab(s) orally once a day  folic acid 1 mg oral tablet: 1 tab(s) orally once a day  hydroxyurea 500 mg oral capsule: 1 cap(s) orally once a day   Jadenu 360 mg oral tablet: 4 tab(s) orally once a day  Multiple Vitamins oral tablet: 1 tab(s) orally once a day  naproxen 500 mg oral tablet: 1 tab(s) orally 2 times a day, As Needed  sertraline 100 mg oral tablet: 1 tab(s) orally once a day  traMADol 50 mg oral tablet: 1 tab(s) orally every 6 hours, As Needed

## 2022-12-30 NOTE — DISCHARGE NOTE NURSING/CASE MANAGEMENT/SOCIAL WORK - NSDCPEFALRISK_GEN_ALL_CORE
For information on Fall & Injury Prevention, visit: [email or web address] OR  [email or web address] OR  https://www.cdc.gov/steadi/patient.html

## 2022-12-30 NOTE — PROVIDER CONTACT NOTE (CRITICAL VALUE NOTIFICATION) - ACTION/TREATMENT ORDERED:
ACP notified, complete consent and ordered 1 unit of PRBCs
Repeat BMP and type and screen
1 unit of PRBCs

## 2022-12-30 NOTE — PROGRESS NOTE ADULT - PROBLEM SELECTOR PLAN 2
- cont home medications

## 2022-12-30 NOTE — PROGRESS NOTE ADULT - PROBLEM SELECTOR PLAN 1
12/25 CXR - clear lungs  - Pt gets regular transfusions out pt  - s/p 1u PRBCs with appropriate response  - c/w 0.45% NS @ 75cc/hr  - pain control with PCA pump  - cont hydroxyurea, Deferasirox  - folic acid and MVI
12/25 CXR - clear lungs  - Pt gets regular transfusions out pt  - s/p 1u PRBCs with appropriate response  - c/w 0.45% NS @ 75cc/hr  - pain control with PCA pump  - cont hydroxyurea, Deferasirox  - folic acid and MVI  -
12/25 CXR - clear lungs  - Pt gets regular transfusions out pt  - s/p 1u PRBCs with appropriate response  - c/w 0.45% NS @ 75cc/hr  - pain control with PCA pump  - cont hydroxyurea, Deferasirox  - folic acid and MVI    - Hb 7.0 this morning. Discussed with outpt hematologist- Pt gets regular transfusions out pt and could transfuse to goal Hb 8.0. Repeat CBC and Type and screen pending. Will transfuse prn to goal
12/25 CXR - clear lungs  - Pt gets regular transfusions out pt  - s/p 1u PRBCs with appropriate response  - c/w 0.45% NS @ 75cc/hr  - pain control with PCA pump  - cont hydroxyurea, Deferasirox  - folic acid and MVI    - Hb 7.0 this morning. Discussed with outpt hematologist- Pt gets regular transfusions out pt and could transfuse to goal Hb 8.0. Repeat CBC and Type and screen pending. Will transfuse prn to goal
12/25 CXR - clear lungs  - c/w 0.45% NS @ 75cc/hr  - pain control with PCA pump  - cont hydroxyurea, Deferasirox  - folic acid and MVI  - Hgb 6.3 today from baseline of 8s, retic count appropriate. Plan for 1U PRBC transfusion today.

## 2023-01-03 ENCOUNTER — NON-APPOINTMENT (OUTPATIENT)
Age: 23
End: 2023-01-03

## 2023-01-23 ENCOUNTER — OUTPATIENT (OUTPATIENT)
Dept: OUTPATIENT SERVICES | Facility: HOSPITAL | Age: 23
LOS: 1 days | End: 2023-01-23
Payer: MEDICAID

## 2023-01-23 DIAGNOSIS — D57.1 SICKLE-CELL DISEASE WITHOUT CRISIS: ICD-10-CM

## 2023-01-23 PROBLEM — I26.99 OTHER PULMONARY EMBOLISM WITHOUT ACUTE COR PULMONALE: Chronic | Status: ACTIVE | Noted: 2022-12-25

## 2023-01-23 PROBLEM — D57.01 HB-SS DISEASE WITH ACUTE CHEST SYNDROME: Chronic | Status: ACTIVE | Noted: 2022-12-25

## 2023-01-23 LAB
ALBUMIN SERPL ELPH-MCNC: 4.7 G/DL — SIGNIFICANT CHANGE UP (ref 3.3–5)
ALP SERPL-CCNC: 62 U/L — SIGNIFICANT CHANGE UP (ref 40–120)
ALT FLD-CCNC: 11 U/L — SIGNIFICANT CHANGE UP (ref 10–45)
ANION GAP SERPL CALC-SCNC: 12 MMOL/L — SIGNIFICANT CHANGE UP (ref 5–17)
AST SERPL-CCNC: 25 U/L — SIGNIFICANT CHANGE UP (ref 10–40)
BILIRUB SERPL-MCNC: 3.3 MG/DL — HIGH (ref 0.2–1.2)
BUN SERPL-MCNC: 8 MG/DL — SIGNIFICANT CHANGE UP (ref 7–23)
CALCIUM SERPL-MCNC: 9.1 MG/DL — SIGNIFICANT CHANGE UP (ref 8.4–10.5)
CHLORIDE SERPL-SCNC: 109 MMOL/L — HIGH (ref 96–108)
CO2 SERPL-SCNC: 21 MMOL/L — LOW (ref 22–31)
CREAT SERPL-MCNC: 0.58 MG/DL — SIGNIFICANT CHANGE UP (ref 0.5–1.3)
EGFR: 130 ML/MIN/1.73M2 — SIGNIFICANT CHANGE UP
GLUCOSE SERPL-MCNC: 119 MG/DL — HIGH (ref 70–99)
HCT VFR BLD CALC: 22.2 % — LOW (ref 34.5–45)
HGB BLD-MCNC: 7.7 G/DL — LOW (ref 11.5–15.5)
MCHC RBC-ENTMCNC: 32.1 PG — SIGNIFICANT CHANGE UP (ref 27–34)
MCHC RBC-ENTMCNC: 34.7 GM/DL — SIGNIFICANT CHANGE UP (ref 32–36)
MCV RBC AUTO: 92.5 FL — SIGNIFICANT CHANGE UP (ref 80–100)
NRBC # BLD: 2 /100 WBCS — HIGH (ref 0–0)
PLATELET # BLD AUTO: 397 K/UL — SIGNIFICANT CHANGE UP (ref 150–400)
POTASSIUM SERPL-MCNC: 3.8 MMOL/L — SIGNIFICANT CHANGE UP (ref 3.5–5.3)
POTASSIUM SERPL-SCNC: 3.8 MMOL/L — SIGNIFICANT CHANGE UP (ref 3.5–5.3)
PROT SERPL-MCNC: 7 G/DL — SIGNIFICANT CHANGE UP (ref 6–8.3)
RBC # BLD: 2.4 M/UL — LOW (ref 3.8–5.2)
RBC # FLD: 20.5 % — HIGH (ref 10.3–14.5)
SODIUM SERPL-SCNC: 142 MMOL/L — SIGNIFICANT CHANGE UP (ref 135–145)
WBC # BLD: 11.68 K/UL — HIGH (ref 3.8–10.5)
WBC # FLD AUTO: 11.68 K/UL — HIGH (ref 3.8–10.5)

## 2023-01-24 ENCOUNTER — NON-APPOINTMENT (OUTPATIENT)
Age: 23
End: 2023-01-24

## 2023-01-24 LAB
24R-OH-CALCIDIOL SERPL-MCNC: 27.4 NG/ML — LOW (ref 30–80)
FERRITIN SERPL-MCNC: 960 NG/ML — HIGH (ref 15–150)
HGB A MFR BLD: 38.4 % — LOW (ref 95.8–98)
HGB A2 MFR BLD: 2.9 % — SIGNIFICANT CHANGE UP (ref 2–3.2)
HGB C MFR BLD: 0 % — SIGNIFICANT CHANGE UP
HGB F MFR BLD: 0.9 % — SIGNIFICANT CHANGE UP (ref 0–1)
HGB OTHER MFR BLD ELPH: 0 % — SIGNIFICANT CHANGE UP
HGB S MFR BLD: 57.8 % — HIGH

## 2023-01-25 ENCOUNTER — OUTPATIENT (OUTPATIENT)
Dept: OUTPATIENT SERVICES | Facility: HOSPITAL | Age: 23
LOS: 1 days | End: 2023-01-25
Payer: MEDICAID

## 2023-01-25 DIAGNOSIS — D57.1 SICKLE-CELL DISEASE WITHOUT CRISIS: ICD-10-CM

## 2023-01-25 LAB
HCT VFR BLD CALC: 20.2 % — CRITICAL LOW (ref 34.5–45)
HCT VFR BLD CALC: 20.4 % — CRITICAL LOW (ref 34.5–45)
HCT VFR BLD CALC: 21.8 % — LOW (ref 34.5–45)
HGB BLD-MCNC: 7.2 G/DL — LOW (ref 11.5–15.5)
HGB BLD-MCNC: 7.3 G/DL — LOW (ref 11.5–15.5)
HGB BLD-MCNC: 7.7 G/DL — LOW (ref 11.5–15.5)
MCHC RBC-ENTMCNC: 32.1 PG — SIGNIFICANT CHANGE UP (ref 27–34)
MCHC RBC-ENTMCNC: 32.6 PG — SIGNIFICANT CHANGE UP (ref 27–34)
MCHC RBC-ENTMCNC: 32.7 PG — SIGNIFICANT CHANGE UP (ref 27–34)
MCHC RBC-ENTMCNC: 35.3 GM/DL — SIGNIFICANT CHANGE UP (ref 32–36)
MCHC RBC-ENTMCNC: 35.6 GM/DL — SIGNIFICANT CHANGE UP (ref 32–36)
MCHC RBC-ENTMCNC: 35.8 GM/DL — SIGNIFICANT CHANGE UP (ref 32–36)
MCV RBC AUTO: 90.8 FL — SIGNIFICANT CHANGE UP (ref 80–100)
MCV RBC AUTO: 91.1 FL — SIGNIFICANT CHANGE UP (ref 80–100)
MCV RBC AUTO: 91.8 FL — SIGNIFICANT CHANGE UP (ref 80–100)
NRBC # BLD: 2 /100 WBCS — HIGH (ref 0–0)
PLATELET # BLD AUTO: 325 K/UL — SIGNIFICANT CHANGE UP (ref 150–400)
PLATELET # BLD AUTO: 348 K/UL — SIGNIFICANT CHANGE UP (ref 150–400)
PLATELET # BLD AUTO: 355 K/UL — SIGNIFICANT CHANGE UP (ref 150–400)
RBC # BLD: 2.2 M/UL — LOW (ref 3.8–5.2)
RBC # BLD: 2.24 M/UL — LOW (ref 3.8–5.2)
RBC # BLD: 2.4 M/UL — LOW (ref 3.8–5.2)
RBC # FLD: 20.1 % — HIGH (ref 10.3–14.5)
RBC # FLD: 20.3 % — HIGH (ref 10.3–14.5)
RBC # FLD: 20.3 % — HIGH (ref 10.3–14.5)
WBC # BLD: 11.41 K/UL — HIGH (ref 3.8–10.5)
WBC # BLD: 11.46 K/UL — HIGH (ref 3.8–10.5)
WBC # BLD: 9.96 K/UL — SIGNIFICANT CHANGE UP (ref 3.8–10.5)
WBC # FLD AUTO: 11.41 K/UL — HIGH (ref 3.8–10.5)
WBC # FLD AUTO: 11.46 K/UL — HIGH (ref 3.8–10.5)
WBC # FLD AUTO: 9.96 K/UL — SIGNIFICANT CHANGE UP (ref 3.8–10.5)

## 2023-01-25 PROCEDURE — 86900 BLOOD TYPING SEROLOGIC ABO: CPT

## 2023-01-25 PROCEDURE — 86850 RBC ANTIBODY SCREEN: CPT

## 2023-01-25 PROCEDURE — 96523 IRRIG DRUG DELIVERY DEVICE: CPT

## 2023-01-25 PROCEDURE — 86902 BLOOD TYPE ANTIGEN DONOR EA: CPT

## 2023-01-25 PROCEDURE — 86923 COMPATIBILITY TEST ELECTRIC: CPT

## 2023-01-25 PROCEDURE — 85027 COMPLETE CBC AUTOMATED: CPT

## 2023-01-25 PROCEDURE — 36430 TRANSFUSION BLD/BLD COMPNT: CPT

## 2023-01-25 PROCEDURE — 80053 COMPREHEN METABOLIC PANEL: CPT

## 2023-01-25 PROCEDURE — 82728 ASSAY OF FERRITIN: CPT

## 2023-01-25 PROCEDURE — 82306 VITAMIN D 25 HYDROXY: CPT

## 2023-01-25 PROCEDURE — P9040: CPT

## 2023-01-25 PROCEDURE — 86901 BLOOD TYPING SEROLOGIC RH(D): CPT

## 2023-01-25 PROCEDURE — 83020 HEMOGLOBIN ELECTROPHORESIS: CPT

## 2023-02-09 NOTE — PROVIDER CONTACT NOTE (CRITICAL VALUE NOTIFICATION) - SITUATION
Patient arrives via EMS from [facility]. Patient was at ENT and had his ears cleaned today. Since then the patient reports bilateral hearing loss. Patient has hearing aids, which are functioning properly per patient.
Pt Hgb 7.0 and Hct 19.5
Lab called with critical result from morning labs of Hgb 6.3 & Hct 17.8
Hct 20.7

## 2023-02-14 ENCOUNTER — OUTPATIENT (OUTPATIENT)
Dept: OUTPATIENT SERVICES | Facility: HOSPITAL | Age: 23
LOS: 1 days | End: 2023-02-14
Payer: MEDICAID

## 2023-02-14 ENCOUNTER — NON-APPOINTMENT (OUTPATIENT)
Age: 23
End: 2023-02-14

## 2023-02-14 DIAGNOSIS — D57.1 SICKLE-CELL DISEASE WITHOUT CRISIS: ICD-10-CM

## 2023-02-14 LAB
24R-OH-CALCIDIOL SERPL-MCNC: 30.1 NG/ML — SIGNIFICANT CHANGE UP (ref 30–80)
ALBUMIN SERPL ELPH-MCNC: 4.9 G/DL — SIGNIFICANT CHANGE UP (ref 3.3–5)
ALP SERPL-CCNC: 60 U/L — SIGNIFICANT CHANGE UP (ref 40–120)
ALT FLD-CCNC: 21 U/L — SIGNIFICANT CHANGE UP (ref 10–45)
ANION GAP SERPL CALC-SCNC: 11 MMOL/L — SIGNIFICANT CHANGE UP (ref 5–17)
AST SERPL-CCNC: 43 U/L — HIGH (ref 10–40)
BILIRUB SERPL-MCNC: 3.4 MG/DL — HIGH (ref 0.2–1.2)
BUN SERPL-MCNC: 7 MG/DL — SIGNIFICANT CHANGE UP (ref 7–23)
CALCIUM SERPL-MCNC: 9.2 MG/DL — SIGNIFICANT CHANGE UP (ref 8.4–10.5)
CHLORIDE SERPL-SCNC: 108 MMOL/L — SIGNIFICANT CHANGE UP (ref 96–108)
CO2 SERPL-SCNC: 21 MMOL/L — LOW (ref 22–31)
CREAT SERPL-MCNC: 0.6 MG/DL — SIGNIFICANT CHANGE UP (ref 0.5–1.3)
EGFR: 129 ML/MIN/1.73M2 — SIGNIFICANT CHANGE UP
FERRITIN SERPL-MCNC: 959 NG/ML — HIGH (ref 15–150)
GLUCOSE SERPL-MCNC: 92 MG/DL — SIGNIFICANT CHANGE UP (ref 70–99)
HCT VFR BLD CALC: 21 % — CRITICAL LOW (ref 34.5–45)
HGB BLD-MCNC: 7.5 G/DL — LOW (ref 11.5–15.5)
MCHC RBC-ENTMCNC: 32.3 PG — SIGNIFICANT CHANGE UP (ref 27–34)
MCHC RBC-ENTMCNC: 35.7 GM/DL — SIGNIFICANT CHANGE UP (ref 32–36)
MCV RBC AUTO: 90.5 FL — SIGNIFICANT CHANGE UP (ref 80–100)
NRBC # BLD: 2 /100 WBCS — HIGH (ref 0–0)
PLATELET # BLD AUTO: 407 K/UL — HIGH (ref 150–400)
POTASSIUM SERPL-MCNC: 3.7 MMOL/L — SIGNIFICANT CHANGE UP (ref 3.5–5.3)
POTASSIUM SERPL-SCNC: 3.7 MMOL/L — SIGNIFICANT CHANGE UP (ref 3.5–5.3)
PROT SERPL-MCNC: 7.3 G/DL — SIGNIFICANT CHANGE UP (ref 6–8.3)
RBC # BLD: 2.32 M/UL — LOW (ref 3.8–5.2)
RBC # FLD: 21.4 % — HIGH (ref 10.3–14.5)
SODIUM SERPL-SCNC: 140 MMOL/L — SIGNIFICANT CHANGE UP (ref 135–145)
WBC # BLD: 13.84 K/UL — HIGH (ref 3.8–10.5)
WBC # FLD AUTO: 13.84 K/UL — HIGH (ref 3.8–10.5)

## 2023-02-15 ENCOUNTER — OUTPATIENT (OUTPATIENT)
Dept: OUTPATIENT SERVICES | Facility: HOSPITAL | Age: 23
LOS: 1 days | End: 2023-02-15
Payer: MEDICAID

## 2023-02-15 DIAGNOSIS — D57.1 SICKLE-CELL DISEASE WITHOUT CRISIS: ICD-10-CM

## 2023-02-15 LAB
HCT VFR BLD CALC: 19.7 % — CRITICAL LOW (ref 34.5–45)
HGB A MFR BLD: 27.7 % — LOW (ref 95.8–98)
HGB A2 MFR BLD: 2.9 % — SIGNIFICANT CHANGE UP (ref 2–3.2)
HGB BLD-MCNC: 7.1 G/DL — LOW (ref 11.5–15.5)
HGB C MFR BLD: 0 % — SIGNIFICANT CHANGE UP
HGB F MFR BLD: 1 % — SIGNIFICANT CHANGE UP (ref 0–1)
HGB OTHER MFR BLD ELPH: 0 % — SIGNIFICANT CHANGE UP
HGB S MFR BLD: 68.4 % — HIGH
MCHC RBC-ENTMCNC: 33 PG — SIGNIFICANT CHANGE UP (ref 27–34)
MCHC RBC-ENTMCNC: 36 GM/DL — SIGNIFICANT CHANGE UP (ref 32–36)
MCV RBC AUTO: 91.6 FL — SIGNIFICANT CHANGE UP (ref 80–100)
NRBC # BLD: 2 /100 WBCS — HIGH (ref 0–0)
PLATELET # BLD AUTO: 364 K/UL — SIGNIFICANT CHANGE UP (ref 150–400)
RBC # BLD: 2.15 M/UL — LOW (ref 3.8–5.2)
RBC # FLD: 21.3 % — HIGH (ref 10.3–14.5)
WBC # BLD: 12.65 K/UL — HIGH (ref 3.8–10.5)
WBC # FLD AUTO: 12.65 K/UL — HIGH (ref 3.8–10.5)

## 2023-02-15 PROCEDURE — 86900 BLOOD TYPING SEROLOGIC ABO: CPT

## 2023-02-15 PROCEDURE — 85027 COMPLETE CBC AUTOMATED: CPT

## 2023-02-15 PROCEDURE — 86901 BLOOD TYPING SEROLOGIC RH(D): CPT

## 2023-02-15 PROCEDURE — 86902 BLOOD TYPE ANTIGEN DONOR EA: CPT

## 2023-02-15 PROCEDURE — 82306 VITAMIN D 25 HYDROXY: CPT

## 2023-02-15 PROCEDURE — 80053 COMPREHEN METABOLIC PANEL: CPT

## 2023-02-15 PROCEDURE — 86850 RBC ANTIBODY SCREEN: CPT

## 2023-02-15 PROCEDURE — 36430 TRANSFUSION BLD/BLD COMPNT: CPT

## 2023-02-15 PROCEDURE — P9040: CPT

## 2023-02-15 PROCEDURE — 96523 IRRIG DRUG DELIVERY DEVICE: CPT

## 2023-02-15 PROCEDURE — 83020 HEMOGLOBIN ELECTROPHORESIS: CPT

## 2023-02-15 PROCEDURE — 82728 ASSAY OF FERRITIN: CPT

## 2023-02-15 PROCEDURE — 86923 COMPATIBILITY TEST ELECTRIC: CPT

## 2023-03-09 RX ORDER — NAPROXEN 500 MG/1
500 TABLET ORAL
Qty: 30 | Refills: 3 | Status: ACTIVE | COMMUNITY
Start: 2022-12-20 | End: 1900-01-01

## 2023-03-09 RX ORDER — HYDROXYUREA 500 MG/1
500 CAPSULE ORAL
Qty: 30 | Refills: 6 | Status: ACTIVE | COMMUNITY
Start: 2021-06-14 | End: 1900-01-01

## 2023-03-09 RX ORDER — TOPIRAMATE 25 MG/1
25 TABLET, FILM COATED ORAL TWICE DAILY
Qty: 60 | Refills: 3 | Status: ACTIVE | COMMUNITY
Start: 2022-12-20 | End: 1900-01-01

## 2023-03-09 RX ORDER — DEFERASIROX 360 MG/1
360 TABLET, FILM COATED ORAL DAILY
Qty: 60 | Refills: 6 | Status: ACTIVE | COMMUNITY
Start: 2020-04-23 | End: 1900-01-01

## 2023-03-11 ENCOUNTER — OUTPATIENT (OUTPATIENT)
Dept: OUTPATIENT SERVICES | Facility: HOSPITAL | Age: 23
LOS: 1 days | End: 2023-03-11
Payer: MEDICAID

## 2023-03-11 DIAGNOSIS — D57.1 SICKLE-CELL DISEASE WITHOUT CRISIS: ICD-10-CM

## 2023-03-11 LAB
ALBUMIN SERPL ELPH-MCNC: 4.6 G/DL — SIGNIFICANT CHANGE UP (ref 3.3–5)
ALP SERPL-CCNC: 55 U/L — SIGNIFICANT CHANGE UP (ref 40–120)
ALT FLD-CCNC: 15 U/L — SIGNIFICANT CHANGE UP (ref 10–45)
ANION GAP SERPL CALC-SCNC: 12 MMOL/L — SIGNIFICANT CHANGE UP (ref 5–17)
AST SERPL-CCNC: 30 U/L — SIGNIFICANT CHANGE UP (ref 10–40)
BILIRUB SERPL-MCNC: 3.2 MG/DL — HIGH (ref 0.2–1.2)
BUN SERPL-MCNC: 6 MG/DL — LOW (ref 7–23)
CALCIUM SERPL-MCNC: 9.2 MG/DL — SIGNIFICANT CHANGE UP (ref 8.4–10.5)
CHLORIDE SERPL-SCNC: 107 MMOL/L — SIGNIFICANT CHANGE UP (ref 96–108)
CO2 SERPL-SCNC: 20 MMOL/L — LOW (ref 22–31)
CREAT SERPL-MCNC: 0.54 MG/DL — SIGNIFICANT CHANGE UP (ref 0.5–1.3)
EGFR: 133 ML/MIN/1.73M2 — SIGNIFICANT CHANGE UP
FERRITIN SERPL-MCNC: 1057 NG/ML — HIGH (ref 15–150)
GLUCOSE SERPL-MCNC: 80 MG/DL — SIGNIFICANT CHANGE UP (ref 70–99)
HCT VFR BLD CALC: 20.3 % — CRITICAL LOW (ref 34.5–45)
HGB BLD-MCNC: 7.2 G/DL — LOW (ref 11.5–15.5)
MCHC RBC-ENTMCNC: 33 PG — SIGNIFICANT CHANGE UP (ref 27–34)
MCHC RBC-ENTMCNC: 35.5 GM/DL — SIGNIFICANT CHANGE UP (ref 32–36)
MCV RBC AUTO: 93.1 FL — SIGNIFICANT CHANGE UP (ref 80–100)
NRBC # BLD: 2 /100 WBCS — HIGH (ref 0–0)
PLATELET # BLD AUTO: 395 K/UL — SIGNIFICANT CHANGE UP (ref 150–400)
POTASSIUM SERPL-MCNC: 3.9 MMOL/L — SIGNIFICANT CHANGE UP (ref 3.5–5.3)
POTASSIUM SERPL-SCNC: 3.9 MMOL/L — SIGNIFICANT CHANGE UP (ref 3.5–5.3)
PROT SERPL-MCNC: 6.9 G/DL — SIGNIFICANT CHANGE UP (ref 6–8.3)
RBC # BLD: 2.18 M/UL — LOW (ref 3.8–5.2)
RBC # FLD: 19.8 % — HIGH (ref 10.3–14.5)
SODIUM SERPL-SCNC: 139 MMOL/L — SIGNIFICANT CHANGE UP (ref 135–145)
WBC # BLD: 11.47 K/UL — HIGH (ref 3.8–10.5)
WBC # FLD AUTO: 11.47 K/UL — HIGH (ref 3.8–10.5)

## 2023-03-12 LAB
HGB A MFR BLD: 35.6 % — LOW (ref 95.8–98)
HGB A2 MFR BLD: 2.9 % — SIGNIFICANT CHANGE UP (ref 2–3.2)
HGB C MFR BLD: 0 % — SIGNIFICANT CHANGE UP
HGB F MFR BLD: 0.9 % — SIGNIFICANT CHANGE UP (ref 0–1)
HGB OTHER MFR BLD ELPH: 0 % — SIGNIFICANT CHANGE UP
HGB S MFR BLD: 60.6 % — HIGH

## 2023-03-13 ENCOUNTER — NON-APPOINTMENT (OUTPATIENT)
Age: 23
End: 2023-03-13

## 2023-03-13 ENCOUNTER — APPOINTMENT (OUTPATIENT)
Dept: INTERNAL MEDICINE | Facility: CLINIC | Age: 23
End: 2023-03-13

## 2023-03-14 ENCOUNTER — OUTPATIENT (OUTPATIENT)
Dept: OUTPATIENT SERVICES | Facility: HOSPITAL | Age: 23
LOS: 1 days | End: 2023-03-14
Payer: MEDICAID

## 2023-03-14 DIAGNOSIS — D57.1 SICKLE-CELL DISEASE WITHOUT CRISIS: ICD-10-CM

## 2023-03-14 LAB
HCT VFR BLD CALC: 20.3 % — CRITICAL LOW (ref 34.5–45)
HGB BLD-MCNC: 7.3 G/DL — LOW (ref 11.5–15.5)
MCHC RBC-ENTMCNC: 33.6 PG — SIGNIFICANT CHANGE UP (ref 27–34)
MCHC RBC-ENTMCNC: 36 GM/DL — SIGNIFICANT CHANGE UP (ref 32–36)
MCV RBC AUTO: 93.5 FL — SIGNIFICANT CHANGE UP (ref 80–100)
NRBC # BLD: 4 /100 WBCS — HIGH (ref 0–0)
PLATELET # BLD AUTO: 349 K/UL — SIGNIFICANT CHANGE UP (ref 150–400)
RBC # BLD: 2.17 M/UL — LOW (ref 3.8–5.2)
RBC # FLD: 20.9 % — HIGH (ref 10.3–14.5)
WBC # BLD: 11.54 K/UL — HIGH (ref 3.8–10.5)
WBC # FLD AUTO: 11.54 K/UL — HIGH (ref 3.8–10.5)

## 2023-03-14 PROCEDURE — 36430 TRANSFUSION BLD/BLD COMPNT: CPT

## 2023-03-14 PROCEDURE — 86902 BLOOD TYPE ANTIGEN DONOR EA: CPT

## 2023-03-14 PROCEDURE — 83020 HEMOGLOBIN ELECTROPHORESIS: CPT

## 2023-03-14 PROCEDURE — 80053 COMPREHEN METABOLIC PANEL: CPT

## 2023-03-14 PROCEDURE — 85027 COMPLETE CBC AUTOMATED: CPT

## 2023-03-14 PROCEDURE — 86923 COMPATIBILITY TEST ELECTRIC: CPT

## 2023-03-14 PROCEDURE — 86900 BLOOD TYPING SEROLOGIC ABO: CPT

## 2023-03-14 PROCEDURE — 96523 IRRIG DRUG DELIVERY DEVICE: CPT

## 2023-03-14 PROCEDURE — 86901 BLOOD TYPING SEROLOGIC RH(D): CPT

## 2023-03-14 PROCEDURE — 86850 RBC ANTIBODY SCREEN: CPT

## 2023-03-14 PROCEDURE — P9040: CPT

## 2023-03-14 PROCEDURE — 82728 ASSAY OF FERRITIN: CPT

## 2023-03-24 ENCOUNTER — APPOINTMENT (OUTPATIENT)
Dept: MRI IMAGING | Facility: HOSPITAL | Age: 23
End: 2023-03-24
Payer: MEDICAID

## 2023-03-28 NOTE — PHYSICAL EXAM
oral [Mediport] : Mediport [No focal deficits] : no focal deficits [Normal] : affect appropriate [de-identified] : tenderness to palpation of L thigh [100: Normal, no complaints, no evidence of disease.] : 100: Normal, no complaints, no evidence of disease. Lack of inpatient Psychiatry bed... Lack of inpatient Psychiatry bed...

## 2023-03-30 NOTE — ED PEDIATRIC NURSE REASSESSMENT NOTE - GASTROINTESTINAL WDL
-- DO NOT REPLY / DO NOT REPLY ALL --  -- Message is from Engagement Center Operations ([facility]) --    General Patient Message: Patient returning call she received, please contact patient.      Caller Information       Type Contact Phone/Fax    03/30/2023 04:43 PM CDT Phone (Incoming) Naya Cardozo (Self) [phone] (M)        Alternative phone number: no    Can a detailed message be left? Yes    Message Turnaround: [place]:    Refer to site's KB page for routing instructions    Please give this turnaround time to the caller:   \"You can expect to receive a response 2-3 business days after your provider's clinical team reviews the message\"               Abdomen soft, nontender, nondistended, bowel sounds present in all 4 quadrants.

## 2023-04-06 NOTE — ED BEHAVIORAL HEALTH ASSESSMENT NOTE - DIFFERENTIAL
Bed in lowest position, wheels locked, appropriate side rails in place/Call bell, personal items and telephone in reach/Instruct patient to call for assistance before getting out of bed or chair/Non-slip footwear when patient is out of bed/[employer] to call system/Physically safe environment - no spills, clutter or unnecessary equipment/Purposeful Proactive Rounding/Room/bathroom lighting operational, light cord in reach Depressive disorder NOS  Borderline personality disorder

## 2023-04-08 ENCOUNTER — OUTPATIENT (OUTPATIENT)
Dept: OUTPATIENT SERVICES | Facility: HOSPITAL | Age: 23
LOS: 1 days | End: 2023-04-08
Payer: MEDICAID

## 2023-04-08 DIAGNOSIS — D57.1 SICKLE-CELL DISEASE WITHOUT CRISIS: ICD-10-CM

## 2023-04-08 LAB
24R-OH-CALCIDIOL SERPL-MCNC: 23.3 NG/ML — LOW (ref 30–80)
ALBUMIN SERPL ELPH-MCNC: 4.8 G/DL — SIGNIFICANT CHANGE UP (ref 3.3–5)
ALP SERPL-CCNC: 56 U/L — SIGNIFICANT CHANGE UP (ref 40–120)
ALT FLD-CCNC: 12 U/L — SIGNIFICANT CHANGE UP (ref 10–45)
ANION GAP SERPL CALC-SCNC: 11 MMOL/L — SIGNIFICANT CHANGE UP (ref 5–17)
AST SERPL-CCNC: 33 U/L — SIGNIFICANT CHANGE UP (ref 10–40)
BILIRUB SERPL-MCNC: 5.2 MG/DL — HIGH (ref 0.2–1.2)
BUN SERPL-MCNC: 6 MG/DL — LOW (ref 7–23)
CALCIUM SERPL-MCNC: 9.4 MG/DL — SIGNIFICANT CHANGE UP (ref 8.4–10.5)
CHLORIDE SERPL-SCNC: 107 MMOL/L — SIGNIFICANT CHANGE UP (ref 96–108)
CO2 SERPL-SCNC: 22 MMOL/L — SIGNIFICANT CHANGE UP (ref 22–31)
CREAT SERPL-MCNC: 0.56 MG/DL — SIGNIFICANT CHANGE UP (ref 0.5–1.3)
EGFR: 131 ML/MIN/1.73M2 — SIGNIFICANT CHANGE UP
FERRITIN SERPL-MCNC: 903 NG/ML — HIGH (ref 15–150)
GLUCOSE SERPL-MCNC: 81 MG/DL — SIGNIFICANT CHANGE UP (ref 70–99)
HCT VFR BLD CALC: 21 % — CRITICAL LOW (ref 34.5–45)
HGB BLD-MCNC: 7.4 G/DL — LOW (ref 11.5–15.5)
MCHC RBC-ENTMCNC: 32 PG — SIGNIFICANT CHANGE UP (ref 27–34)
MCHC RBC-ENTMCNC: 35.2 GM/DL — SIGNIFICANT CHANGE UP (ref 32–36)
MCV RBC AUTO: 90.9 FL — SIGNIFICANT CHANGE UP (ref 80–100)
NRBC # BLD: 2 /100 WBCS — HIGH (ref 0–0)
PLATELET # BLD AUTO: 399 K/UL — SIGNIFICANT CHANGE UP (ref 150–400)
POTASSIUM SERPL-MCNC: 4 MMOL/L — SIGNIFICANT CHANGE UP (ref 3.5–5.3)
POTASSIUM SERPL-SCNC: 4 MMOL/L — SIGNIFICANT CHANGE UP (ref 3.5–5.3)
PROT SERPL-MCNC: 6.9 G/DL — SIGNIFICANT CHANGE UP (ref 6–8.3)
RBC # BLD: 2.31 M/UL — LOW (ref 3.8–5.2)
RBC # FLD: 20.1 % — HIGH (ref 10.3–14.5)
SODIUM SERPL-SCNC: 140 MMOL/L — SIGNIFICANT CHANGE UP (ref 135–145)
WBC # BLD: 9.9 K/UL — SIGNIFICANT CHANGE UP (ref 3.8–10.5)
WBC # FLD AUTO: 9.9 K/UL — SIGNIFICANT CHANGE UP (ref 3.8–10.5)

## 2023-04-09 LAB
HGB A MFR BLD: 34.3 % — LOW (ref 95.8–98)
HGB A2 MFR BLD: 3.1 % — SIGNIFICANT CHANGE UP (ref 2–3.2)
HGB C MFR BLD: 0 % — SIGNIFICANT CHANGE UP
HGB F MFR BLD: 1 % — SIGNIFICANT CHANGE UP (ref 0–1)
HGB OTHER MFR BLD ELPH: 0 % — SIGNIFICANT CHANGE UP
HGB S MFR BLD: 61.6 % — HIGH

## 2023-04-10 ENCOUNTER — OUTPATIENT (OUTPATIENT)
Dept: OUTPATIENT SERVICES | Facility: HOSPITAL | Age: 23
LOS: 1 days | End: 2023-04-10
Payer: MEDICAID

## 2023-04-10 DIAGNOSIS — D57.1 SICKLE-CELL DISEASE WITHOUT CRISIS: ICD-10-CM

## 2023-04-10 LAB
HCT VFR BLD CALC: 21.9 % — LOW (ref 34.5–45)
HGB BLD-MCNC: 7.7 G/DL — LOW (ref 11.5–15.5)
MCHC RBC-ENTMCNC: 32.6 PG — SIGNIFICANT CHANGE UP (ref 27–34)
MCHC RBC-ENTMCNC: 35.2 GM/DL — SIGNIFICANT CHANGE UP (ref 32–36)
MCV RBC AUTO: 92.8 FL — SIGNIFICANT CHANGE UP (ref 80–100)
NRBC # BLD: 2 /100 WBCS — HIGH (ref 0–0)
PLATELET # BLD AUTO: 386 K/UL — SIGNIFICANT CHANGE UP (ref 150–400)
RBC # BLD: 2.36 M/UL — LOW (ref 3.8–5.2)
RBC # FLD: 20.2 % — HIGH (ref 10.3–14.5)
WBC # BLD: 15.79 K/UL — HIGH (ref 3.8–10.5)
WBC # FLD AUTO: 15.79 K/UL — HIGH (ref 3.8–10.5)

## 2023-04-10 PROCEDURE — 96523 IRRIG DRUG DELIVERY DEVICE: CPT

## 2023-04-10 PROCEDURE — 85027 COMPLETE CBC AUTOMATED: CPT

## 2023-04-10 PROCEDURE — 86923 COMPATIBILITY TEST ELECTRIC: CPT

## 2023-04-10 PROCEDURE — 82728 ASSAY OF FERRITIN: CPT

## 2023-04-10 PROCEDURE — 86900 BLOOD TYPING SEROLOGIC ABO: CPT

## 2023-04-10 PROCEDURE — P9040: CPT

## 2023-04-10 PROCEDURE — 83020 HEMOGLOBIN ELECTROPHORESIS: CPT

## 2023-04-10 PROCEDURE — 86902 BLOOD TYPE ANTIGEN DONOR EA: CPT

## 2023-04-10 PROCEDURE — 86901 BLOOD TYPING SEROLOGIC RH(D): CPT

## 2023-04-10 PROCEDURE — 80053 COMPREHEN METABOLIC PANEL: CPT

## 2023-04-10 PROCEDURE — 36430 TRANSFUSION BLD/BLD COMPNT: CPT

## 2023-04-10 PROCEDURE — 82306 VITAMIN D 25 HYDROXY: CPT

## 2023-04-10 PROCEDURE — 86850 RBC ANTIBODY SCREEN: CPT

## 2023-05-04 ENCOUNTER — NON-APPOINTMENT (OUTPATIENT)
Age: 23
End: 2023-05-04

## 2023-05-08 ENCOUNTER — OUTPATIENT (OUTPATIENT)
Dept: OUTPATIENT SERVICES | Facility: HOSPITAL | Age: 23
LOS: 1 days | End: 2023-05-08
Payer: MEDICAID

## 2023-05-08 DIAGNOSIS — D57.1 SICKLE-CELL DISEASE WITHOUT CRISIS: ICD-10-CM

## 2023-05-08 LAB
ALBUMIN SERPL ELPH-MCNC: 4.7 G/DL — SIGNIFICANT CHANGE UP (ref 3.3–5)
ALP SERPL-CCNC: 48 U/L — SIGNIFICANT CHANGE UP (ref 40–120)
ALT FLD-CCNC: 10 U/L — SIGNIFICANT CHANGE UP (ref 10–45)
ANION GAP SERPL CALC-SCNC: 13 MMOL/L — SIGNIFICANT CHANGE UP (ref 5–17)
AST SERPL-CCNC: 40 U/L — SIGNIFICANT CHANGE UP (ref 10–40)
BILIRUB SERPL-MCNC: 4.5 MG/DL — HIGH (ref 0.2–1.2)
BUN SERPL-MCNC: 5 MG/DL — LOW (ref 7–23)
CALCIUM SERPL-MCNC: 9.1 MG/DL — SIGNIFICANT CHANGE UP (ref 8.4–10.5)
CHLORIDE SERPL-SCNC: 107 MMOL/L — SIGNIFICANT CHANGE UP (ref 96–108)
CO2 SERPL-SCNC: 19 MMOL/L — LOW (ref 22–31)
CREAT SERPL-MCNC: 0.65 MG/DL — SIGNIFICANT CHANGE UP (ref 0.5–1.3)
EGFR: 127 ML/MIN/1.73M2 — SIGNIFICANT CHANGE UP
FERRITIN SERPL-MCNC: 814 NG/ML — HIGH (ref 15–150)
GLUCOSE SERPL-MCNC: 88 MG/DL — SIGNIFICANT CHANGE UP (ref 70–99)
HCT VFR BLD CALC: 20.3 % — CRITICAL LOW (ref 34.5–45)
HGB BLD-MCNC: 7.1 G/DL — LOW (ref 11.5–15.5)
MCHC RBC-ENTMCNC: 31.6 PG — SIGNIFICANT CHANGE UP (ref 27–34)
MCHC RBC-ENTMCNC: 35 GM/DL — SIGNIFICANT CHANGE UP (ref 32–36)
MCV RBC AUTO: 90.2 FL — SIGNIFICANT CHANGE UP (ref 80–100)
NRBC # BLD: 3 /100 WBCS — HIGH (ref 0–0)
PLATELET # BLD AUTO: 353 K/UL — SIGNIFICANT CHANGE UP (ref 150–400)
POTASSIUM SERPL-MCNC: 4.2 MMOL/L — SIGNIFICANT CHANGE UP (ref 3.5–5.3)
POTASSIUM SERPL-SCNC: 4.2 MMOL/L — SIGNIFICANT CHANGE UP (ref 3.5–5.3)
PROT SERPL-MCNC: 6.6 G/DL — SIGNIFICANT CHANGE UP (ref 6–8.3)
RBC # BLD: 2.25 M/UL — LOW (ref 3.8–5.2)
RBC # FLD: 20.6 % — HIGH (ref 10.3–14.5)
SODIUM SERPL-SCNC: 139 MMOL/L — SIGNIFICANT CHANGE UP (ref 135–145)
WBC # BLD: 8.97 K/UL — SIGNIFICANT CHANGE UP (ref 3.8–10.5)
WBC # FLD AUTO: 8.97 K/UL — SIGNIFICANT CHANGE UP (ref 3.8–10.5)

## 2023-05-09 LAB
HGB A MFR BLD: 34.2 % — LOW (ref 95.8–98)
HGB A2 MFR BLD: 3 % — SIGNIFICANT CHANGE UP (ref 2–3.2)
HGB C MFR BLD: 0 % — SIGNIFICANT CHANGE UP
HGB F MFR BLD: 1 % — SIGNIFICANT CHANGE UP (ref 0–1)
HGB OTHER MFR BLD ELPH: 0 % — SIGNIFICANT CHANGE UP
HGB S MFR BLD: 61.8 % — HIGH

## 2023-05-10 ENCOUNTER — OUTPATIENT (OUTPATIENT)
Dept: OUTPATIENT SERVICES | Facility: HOSPITAL | Age: 23
LOS: 1 days | End: 2023-05-10
Payer: MEDICAID

## 2023-05-10 DIAGNOSIS — D57.1 SICKLE-CELL DISEASE WITHOUT CRISIS: ICD-10-CM

## 2023-05-10 LAB
HCT VFR BLD CALC: 20.6 % — CRITICAL LOW (ref 34.5–45)
HGB BLD-MCNC: 7 G/DL — CRITICAL LOW (ref 11.5–15.5)
MCHC RBC-ENTMCNC: 30.8 PG — SIGNIFICANT CHANGE UP (ref 27–34)
MCHC RBC-ENTMCNC: 34 GM/DL — SIGNIFICANT CHANGE UP (ref 32–36)
MCV RBC AUTO: 90.7 FL — SIGNIFICANT CHANGE UP (ref 80–100)
NRBC # BLD: 3 /100 WBCS — HIGH (ref 0–0)
PLATELET # BLD AUTO: 348 K/UL — SIGNIFICANT CHANGE UP (ref 150–400)
RBC # BLD: 2.27 M/UL — LOW (ref 3.8–5.2)
RBC # FLD: 22 % — HIGH (ref 10.3–14.5)
WBC # BLD: 10.69 K/UL — HIGH (ref 3.8–10.5)
WBC # FLD AUTO: 10.69 K/UL — HIGH (ref 3.8–10.5)

## 2023-05-10 PROCEDURE — 86902 BLOOD TYPE ANTIGEN DONOR EA: CPT

## 2023-05-10 PROCEDURE — 80053 COMPREHEN METABOLIC PANEL: CPT

## 2023-05-10 PROCEDURE — 36430 TRANSFUSION BLD/BLD COMPNT: CPT

## 2023-05-10 PROCEDURE — 82728 ASSAY OF FERRITIN: CPT

## 2023-05-10 PROCEDURE — 86923 COMPATIBILITY TEST ELECTRIC: CPT

## 2023-05-10 PROCEDURE — 86901 BLOOD TYPING SEROLOGIC RH(D): CPT

## 2023-05-10 PROCEDURE — 83020 HEMOGLOBIN ELECTROPHORESIS: CPT

## 2023-05-10 PROCEDURE — 85027 COMPLETE CBC AUTOMATED: CPT

## 2023-05-10 PROCEDURE — P9040: CPT

## 2023-05-10 PROCEDURE — 86900 BLOOD TYPING SEROLOGIC ABO: CPT

## 2023-05-10 PROCEDURE — 96523 IRRIG DRUG DELIVERY DEVICE: CPT

## 2023-05-10 PROCEDURE — 86850 RBC ANTIBODY SCREEN: CPT

## 2023-05-24 ENCOUNTER — OUTPATIENT (OUTPATIENT)
Dept: OUTPATIENT SERVICES | Age: 23
LOS: 1 days | End: 2023-05-24

## 2023-05-24 ENCOUNTER — APPOINTMENT (OUTPATIENT)
Dept: MRI IMAGING | Facility: HOSPITAL | Age: 23
End: 2023-05-24

## 2023-05-24 DIAGNOSIS — G91.9 HYDROCEPHALUS, UNSPECIFIED: ICD-10-CM

## 2023-05-25 PROCEDURE — 70551 MRI BRAIN STEM W/O DYE: CPT | Mod: 26

## 2023-06-14 ENCOUNTER — NON-APPOINTMENT (OUTPATIENT)
Age: 23
End: 2023-06-14

## 2023-06-14 RX ORDER — AMOXICILLIN 500 MG/1
500 TABLET, FILM COATED ORAL
Qty: 20 | Refills: 0 | Status: ACTIVE | COMMUNITY
Start: 2018-07-17 | End: 1900-01-01

## 2023-06-15 NOTE — ED BEHAVIORAL HEALTH ASSESSMENT NOTE - NS ED [facility] DEMOGRAPHICS MEDICAL RECORD REVIEWED CONSENT OBTAINED N DETAILS
06/15/23 1300   Appointment Info   Signing Clinician's Name / Credentials (PT) Humaira St, PT, DPT   Living Environment   People in Home spouse   Current Living Arrangements house   Home Accessibility stairs to enter home;stairs within home   Number of Stairs, Within Home, Primary greater than 10 stairs   Stair Railings, Within Home, Primary railings safe and in good condition   Transportation Anticipated family or friend will provide   Living Environment Comments Pt lives at home with wife, per pt lots of stairs, 1 flight to bedroom.   Self-Care   Usual Activity Tolerance good   Current Activity Tolerance moderate   Regular Exercise Yes   Activity/Exercise Type walking   Equipment Currently Used at Home cane, straight;walker, rolling   Fall history within last six months yes   Number of times patient has fallen within last six months 1   Activity/Exercise/Self-Care Comment Per pt he was IND with ADL's prior to admission.  Pt had weaned himself off of use of 4WW (just using it outside now) and cane (intermittently using if fatigued).  Had been going to OP PT 1x/week.   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 currently undergoing chemotherapy with Keytruda and gamma knife radiation, seizures 2/2 brain metastases (on Keppra), right hemiparesis, HTN, and history of prostate cancer s/p prostatectomy (2009) who presents to the ER for evaluation of speech, vision, and memory changes over the last 3-4 days   Existing Precautions/Restrictions fall;immunosuppressed   Weight-Bearing Status - LUE full weight-bearing   Weight-Bearing Status - RUE full weight-bearing   Weight-Bearing Status - LLE full  weight-bearing   Weight-Bearing Status - RLE full weight-bearing   General Observations Activity: up with assist   Cognition   Affect/Mental Status (Cognition) WFL   Orientation Status (Cognition) oriented x 3   Follows Commands (Cognition) follows multi-step commands   Cognitive Status Comments Pt with improved conitiono/clarity but still having some diffuclties word finding   Pain Assessment   Patient Currently in Pain No   Integumentary/Edema   Integumentary/Edema no deficits were identifed   Posture    Posture Protracted shoulders;Forward head position   Range of Motion (ROM)   Range of Motion ROM is WNL   Strength (Manual Muscle Testing)   Strength Comments L LE 5/5, R LE 4/5, more weakness noted in R side with faitgue (more foot drop/drag noted)   Bed Mobility   Comment, (Bed Mobility) IND   Transfers   Comment, (Transfers) IND with walker   Gait/Stairs (Locomotion)   Comment, (Gait/Stairs) CGA with use of 4WW   Balance   Balance Comments close SBA for standing balance, intermittent LOB with SLS   Sensory Examination   Sensory Perception Comments Pt reports numbness in R foot/ankle   Coordination   Coordination no deficits were identified   Muscle Tone   Muscle Tone no deficits were identified   Clinical Impression   Criteria for Skilled Therapeutic Intervention Yes, treatment indicated   PT Diagnosis (PT) Impaired functional mobility   Influenced by the following impairments Decreased strength, balance and activity tolerance   Functional limitations due to impairments Inability to complete functional mobility at baseline level of functioning   Clinical Presentation (PT Evaluation Complexity) Stable/Uncomplicated   Clinical Presentation Rationale Clinical judgement, improving mentation   Clinical Decision Making (Complexity) low complexity   Planned Therapy Interventions (PT) balance training;bed mobility training;gait training;home exercise program;neuromuscular re-education;stair  training;strengthening;transfer training   Anticipated Equipment Needs at Discharge (PT)   (Has walkers and cane at home)   Risk & Benefits of therapy have been explained evaluation/treatment results reviewed;care plan/treatment goals reviewed;risks/benefits reviewed;current/potential barriers reviewed;participants voiced agreement with care plan;participants included;patient   Clinical Impression Comments Pt would benefit from IP PT to progress strength, balance and overall safety with mobility.   PT Total Evaluation Time   PT Eval, Low Complexity Minutes (01968) 10   Physical Therapy Goals   PT Frequency 6x/week   PT Predicted Duration/Target Date for Goal Attainment 06/22/23   PT Goals Bed Mobility;Transfers;Gait;Stairs;PT Goal 1   PT: Bed Mobility Independent   PT: Transfers Modified independent   PT: Gait Modified independent;Greater than 200 feet   PT: Stairs Supervision/stand-by assist;Greater than 10 stairs   PT: Goal 1 Pt will be able to ambulate in room without use of AD and no foot drag in order to demosntrate IND with short distance mobility.   Interventions   Interventions Quick Adds Therapeutic Activity;Gait Training;Therapeutic Procedure   Therapeutic Activity   Therapeutic Activities: dynamic activities to improve functional performance Minutes (76927) 10   Treatment Detail/Skilled Intervention PT: Pt educated on ambulation in halls with family and use of 4WW for safety at this time.  Educated on completion of seated toe taps, LAQ and STS for LE strength especially on R side.  SBA for mobility in room, slow for word finding intermittently.  Pt left sitting up in chair all needs met.   Gait Training   Gait Training Minutes (65513) 12   Symptoms Noted During/After Treatment (Gait Training) fatigue   Treatment Detail/Skilled Intervention PT: Focus on IND with ambulation.  Pt ambulated 400' with use of 4WW and CGA, poor control of R knee, dragging R foot by end of walk due to faitgue.   PT Discharge  Planning   PT Plan PT: IND with ambulation with 4WW, stairs, R side strength   PT Discharge Recommendation (DC Rec) home with assist;home with outpatient physical therapy   PT Rationale for DC Rec Pt has good support at home, pendintg progress with PT should be able to d/c home wtih assist and continue OP PT.   PT Brief overview of current status CGA with 4WW   Total Session Time   Timed Code Treatment Minutes 22   Total Session Time (sum of timed and untimed services) 32      Danger to self/others

## 2023-06-16 ENCOUNTER — NON-APPOINTMENT (OUTPATIENT)
Age: 23
End: 2023-06-16

## 2023-06-17 ENCOUNTER — OUTPATIENT (OUTPATIENT)
Dept: OUTPATIENT SERVICES | Facility: HOSPITAL | Age: 23
LOS: 1 days | End: 2023-06-17
Payer: MEDICAID

## 2023-06-17 DIAGNOSIS — D57.1 SICKLE-CELL DISEASE WITHOUT CRISIS: ICD-10-CM

## 2023-06-17 LAB
ALBUMIN SERPL ELPH-MCNC: 4.8 G/DL — SIGNIFICANT CHANGE UP (ref 3.3–5)
ALP SERPL-CCNC: 56 U/L — SIGNIFICANT CHANGE UP (ref 40–120)
ALT FLD-CCNC: 13 U/L — SIGNIFICANT CHANGE UP (ref 10–45)
ANION GAP SERPL CALC-SCNC: 12 MMOL/L — SIGNIFICANT CHANGE UP (ref 5–17)
AST SERPL-CCNC: 34 U/L — SIGNIFICANT CHANGE UP (ref 10–40)
BILIRUB SERPL-MCNC: 5.5 MG/DL — HIGH (ref 0.2–1.2)
BUN SERPL-MCNC: 7 MG/DL — SIGNIFICANT CHANGE UP (ref 7–23)
CALCIUM SERPL-MCNC: 9.1 MG/DL — SIGNIFICANT CHANGE UP (ref 8.4–10.5)
CHLORIDE SERPL-SCNC: 111 MMOL/L — HIGH (ref 96–108)
CO2 SERPL-SCNC: 20 MMOL/L — LOW (ref 22–31)
CREAT SERPL-MCNC: 0.6 MG/DL — SIGNIFICANT CHANGE UP (ref 0.5–1.3)
EGFR: 129 ML/MIN/1.73M2 — SIGNIFICANT CHANGE UP
FERRITIN SERPL-MCNC: 966 NG/ML — HIGH (ref 15–150)
GLUCOSE SERPL-MCNC: 99 MG/DL — SIGNIFICANT CHANGE UP (ref 70–99)
HCT VFR BLD CALC: 21.8 % — LOW (ref 34.5–45)
HGB BLD-MCNC: 7.5 G/DL — LOW (ref 11.5–15.5)
MCHC RBC-ENTMCNC: 29.8 PG — SIGNIFICANT CHANGE UP (ref 27–34)
MCHC RBC-ENTMCNC: 34.4 GM/DL — SIGNIFICANT CHANGE UP (ref 32–36)
MCV RBC AUTO: 86.5 FL — SIGNIFICANT CHANGE UP (ref 80–100)
NRBC # BLD: 0 /100 WBCS — SIGNIFICANT CHANGE UP (ref 0–0)
PLATELET # BLD AUTO: 374 K/UL — SIGNIFICANT CHANGE UP (ref 150–400)
POTASSIUM SERPL-MCNC: 3.8 MMOL/L — SIGNIFICANT CHANGE UP (ref 3.5–5.3)
POTASSIUM SERPL-SCNC: 3.8 MMOL/L — SIGNIFICANT CHANGE UP (ref 3.5–5.3)
PROT SERPL-MCNC: 6.9 G/DL — SIGNIFICANT CHANGE UP (ref 6–8.3)
RBC # BLD: 2.52 M/UL — LOW (ref 3.8–5.2)
RBC # FLD: 20.3 % — HIGH (ref 10.3–14.5)
SODIUM SERPL-SCNC: 143 MMOL/L — SIGNIFICANT CHANGE UP (ref 135–145)
WBC # BLD: 11.34 K/UL — HIGH (ref 3.8–10.5)
WBC # FLD AUTO: 11.34 K/UL — HIGH (ref 3.8–10.5)

## 2023-06-19 ENCOUNTER — OUTPATIENT (OUTPATIENT)
Dept: OUTPATIENT SERVICES | Facility: HOSPITAL | Age: 23
LOS: 1 days | End: 2023-06-19
Payer: MEDICAID

## 2023-06-19 DIAGNOSIS — D57.1 SICKLE-CELL DISEASE WITHOUT CRISIS: ICD-10-CM

## 2023-06-19 LAB
HCT VFR BLD CALC: 20.2 % — CRITICAL LOW (ref 34.5–45)
HGB A MFR BLD: 44.6 % — LOW (ref 95.8–98)
HGB A2 MFR BLD: 3 % — SIGNIFICANT CHANGE UP (ref 2–3.2)
HGB BLD-MCNC: 7.4 G/DL — LOW (ref 11.5–15.5)
HGB C MFR BLD: 0 % — SIGNIFICANT CHANGE UP
HGB F MFR BLD: 0.8 % — SIGNIFICANT CHANGE UP (ref 0–1)
HGB OTHER MFR BLD ELPH: 0 % — SIGNIFICANT CHANGE UP
HGB S MFR BLD: 51.6 % — HIGH
MCHC RBC-ENTMCNC: 31.5 PG — SIGNIFICANT CHANGE UP (ref 27–34)
MCHC RBC-ENTMCNC: 36.6 GM/DL — HIGH (ref 32–36)
MCV RBC AUTO: 86 FL — SIGNIFICANT CHANGE UP (ref 80–100)
NRBC # BLD: 2 /100 WBCS — HIGH (ref 0–0)
PLATELET # BLD AUTO: 358 K/UL — SIGNIFICANT CHANGE UP (ref 150–400)
RBC # BLD: 2.35 M/UL — LOW (ref 3.8–5.2)
RBC # FLD: 21.6 % — HIGH (ref 10.3–14.5)
WBC # BLD: 11.98 K/UL — HIGH (ref 3.8–10.5)
WBC # FLD AUTO: 11.98 K/UL — HIGH (ref 3.8–10.5)

## 2023-06-19 PROCEDURE — 96523 IRRIG DRUG DELIVERY DEVICE: CPT

## 2023-06-19 PROCEDURE — 83020 HEMOGLOBIN ELECTROPHORESIS: CPT

## 2023-06-19 PROCEDURE — 86923 COMPATIBILITY TEST ELECTRIC: CPT

## 2023-06-19 PROCEDURE — 86900 BLOOD TYPING SEROLOGIC ABO: CPT

## 2023-06-19 PROCEDURE — 86850 RBC ANTIBODY SCREEN: CPT

## 2023-06-19 PROCEDURE — P9040: CPT

## 2023-06-19 PROCEDURE — 85027 COMPLETE CBC AUTOMATED: CPT

## 2023-06-19 PROCEDURE — 86902 BLOOD TYPE ANTIGEN DONOR EA: CPT

## 2023-06-19 PROCEDURE — 82728 ASSAY OF FERRITIN: CPT

## 2023-06-19 PROCEDURE — 36430 TRANSFUSION BLD/BLD COMPNT: CPT

## 2023-06-19 PROCEDURE — 80053 COMPREHEN METABOLIC PANEL: CPT

## 2023-06-19 PROCEDURE — 86901 BLOOD TYPING SEROLOGIC RH(D): CPT

## 2023-07-03 ENCOUNTER — NON-APPOINTMENT (OUTPATIENT)
Age: 23
End: 2023-07-03

## 2023-07-07 ENCOUNTER — APPOINTMENT (OUTPATIENT)
Dept: HEMATOLOGY ONCOLOGY | Facility: CLINIC | Age: 23
End: 2023-07-07

## 2023-07-07 ENCOUNTER — OUTPATIENT (OUTPATIENT)
Dept: OUTPATIENT SERVICES | Facility: HOSPITAL | Age: 23
LOS: 1 days | End: 2023-07-07
Payer: MEDICAID

## 2023-07-07 DIAGNOSIS — Z01.83 ENCOUNTER FOR BLOOD TYPING: ICD-10-CM

## 2023-07-08 ENCOUNTER — OUTPATIENT (OUTPATIENT)
Dept: OUTPATIENT SERVICES | Facility: HOSPITAL | Age: 23
LOS: 1 days | Discharge: HOME | End: 2023-07-08

## 2023-07-08 ENCOUNTER — APPOINTMENT (OUTPATIENT)
Dept: INFUSION THERAPY | Facility: HOSPITAL | Age: 23
End: 2023-07-08

## 2023-07-08 DIAGNOSIS — D64.9 ANEMIA, UNSPECIFIED: ICD-10-CM

## 2023-07-08 PROCEDURE — 86923 COMPATIBILITY TEST ELECTRIC: CPT

## 2023-07-08 PROCEDURE — 86902 BLOOD TYPE ANTIGEN DONOR EA: CPT

## 2023-07-08 PROCEDURE — 86901 BLOOD TYPING SEROLOGIC RH(D): CPT

## 2023-07-08 PROCEDURE — 86900 BLOOD TYPING SEROLOGIC ABO: CPT

## 2023-07-08 PROCEDURE — 86850 RBC ANTIBODY SCREEN: CPT

## 2023-07-11 DIAGNOSIS — Z51.89 ENCOUNTER FOR OTHER SPECIFIED AFTERCARE: ICD-10-CM

## 2023-07-19 ENCOUNTER — OUTPATIENT (OUTPATIENT)
Dept: OUTPATIENT SERVICES | Facility: HOSPITAL | Age: 23
LOS: 1 days | End: 2023-07-19
Payer: MEDICAID

## 2023-07-19 ENCOUNTER — APPOINTMENT (OUTPATIENT)
Dept: RADIOLOGY | Facility: HOSPITAL | Age: 23
End: 2023-07-19
Payer: MEDICAID

## 2023-07-19 DIAGNOSIS — G91.1 OBSTRUCTIVE HYDROCEPHALUS: ICD-10-CM

## 2023-07-19 PROCEDURE — 74019 RADEX ABDOMEN 2 VIEWS: CPT

## 2023-07-19 PROCEDURE — 70260 X-RAY EXAM OF SKULL: CPT | Mod: 26

## 2023-07-19 PROCEDURE — 70360 X-RAY EXAM OF NECK: CPT | Mod: 26

## 2023-07-19 PROCEDURE — 74019 RADEX ABDOMEN 2 VIEWS: CPT | Mod: 26

## 2023-07-19 PROCEDURE — 71046 X-RAY EXAM CHEST 2 VIEWS: CPT | Mod: 26

## 2023-07-19 PROCEDURE — 71046 X-RAY EXAM CHEST 2 VIEWS: CPT

## 2023-07-19 PROCEDURE — 70360 X-RAY EXAM OF NECK: CPT

## 2023-07-19 PROCEDURE — 70260 X-RAY EXAM OF SKULL: CPT

## 2023-07-21 NOTE — H&P ADULT - NEGATIVE CARDIOVASCULAR SYMPTOMS
Case Management Discharge Planning Note    Patient name Sophia Ybarra  Location [address] [place] [address] [address] 820-01 MRN [id number]  : 1951 Date 2023       Current Admission Date: 2023  Current Admission Diagnosis:Tuberculosis   Patient Active Problem List    Diagnosis Date Noted   • Elevated liver enzymes 2023   • Hypercalcemia 2023   • Patient incapable of making informed decisions 2023   • Pulmonary cryptococcosis ([address]) 2023   • Tuberculosis 2023   • Dysphagia 2023   • Sinus tachycardia 2023   • ILD (interstitial lung disease) ([address]) 2023   • Fever 2023   • Herpes stomatitis 2023   • Agitation 2023   • GI bleed 2023   • Septic arthritis of knee, left ([address]) 2023   • Gram-positive bacteremia 2023   • Thrombocytopenia ([address]) 2023   • Rheumatoid arthritis ([address]) 05/15/2023   • Encephalopathy 05/15/2023   • Acute pain of left knee 05/15/2023   • Resting tremor 2023   • PVC (premature ventricular contraction) 2023   • Syncope and collapse 2023   • History of pulmonary embolism 2023   • Schizoaffective disorder, bipolar type ([address]) 2023   • Chest pain syndrome 2022   • Type 2 myocardial infarction ([address]) 2022   • Hyperlipemia 2022   • Rheumatoid arthritis flare ([address]) 10/07/2022   • Elevated troponin level not due myocardial infarction 10/07/2022   • Abnormal CT of the chest 2022   • History of pneumonia 2022   • Prediabetes 2022   • Chronic diastolic congestive heart failure ([address]) 2022   • Osteoporosis 2022   • SOB (shortness of breath) 2022   • Anemia 2022   • Hypoalbuminemia 7308   • Diastolic CHF ([address])    • Postural dizziness with presyncope 2022   • Rheumatoid arthritis involving multiple sites with positive rheumatoid factor ([address]) 10/29/2021   • History of Bell's palsy 2020   • Stenosis of left vertebral artery 12/18/2020   • Positive QuantiFERON-TB Gold test 10/01/2019   • Class 2 obesity due to excess calories without serious comorbidity with body mass index (BMI) of 36.0 to 36.9 in adult 04/16/2019   • Sacral mass 05/24/2018   • Soft tissue mass 03/28/2018   • Low bone density 03/19/2018   • Endometrial polyp 12/28/2017   • Thickened endometrium 12/28/2017   • MDD (major depressive disorder), recurrent, severe, with psychosis ([facility]) 07/21/2016      LOS (days): 37  Geometric Mean LOS (GMLOS) (days):   Days to GMLOS:     OBJECTIVE:  Risk of Unplanned Readmission Score: 33.14         Current admission status: Inpatient   Preferred Pharmacy:   [address] [place] [address] [address]  [address] [address]  Phone: [phone] Fax: [phone]    Primary Care Provider: Madisyn Bautista DO    Primary Insurance: [facility]  Secondary Insurance:     DISCHARGE DETAILS:                                                                                                 Additional Comments: TT received fro DOS resident Rafita Guadalupe that she is addressing the IM zyprexa as pt cannot leave the hospital to a facility on IV/IM antipsycotic/sedative/narcotics. Pending is chest xray results as pt vcannot be accepted at [facility] until chest xray is negative and Cultures are negative, Resident called reading room and requested expedited read on Chest xray. no palpitations/no dyspnea on exertion/no orthopnea/no paroxysmal nocturnal dyspnea/no peripheral edema

## 2023-08-06 ENCOUNTER — EMERGENCY (EMERGENCY)
Facility: HOSPITAL | Age: 23
LOS: 1 days | Discharge: HOME | End: 2023-08-06
Attending: EMERGENCY MEDICINE | Admitting: EMERGENCY MEDICINE
Payer: MEDICAID

## 2023-08-06 VITALS — SYSTOLIC BLOOD PRESSURE: 98 MMHG | HEART RATE: 72 BPM | DIASTOLIC BLOOD PRESSURE: 49 MMHG

## 2023-08-06 VITALS
OXYGEN SATURATION: 100 % | TEMPERATURE: 99 F | SYSTOLIC BLOOD PRESSURE: 101 MMHG | RESPIRATION RATE: 17 BRPM | DIASTOLIC BLOOD PRESSURE: 62 MMHG | HEART RATE: 63 BPM

## 2023-08-06 DIAGNOSIS — Z98.2 PRESENCE OF CEREBROSPINAL FLUID DRAINAGE DEVICE: ICD-10-CM

## 2023-08-06 PROCEDURE — 99285 EMERGENCY DEPT VISIT HI MDM: CPT

## 2023-08-06 PROCEDURE — 75809 NONVASCULAR SHUNT X-RAY: CPT | Mod: 26

## 2023-08-06 PROCEDURE — 77011 CT SCAN FOR LOCALIZATION: CPT | Mod: 26

## 2023-08-06 PROCEDURE — 99284 EMERGENCY DEPT VISIT MOD MDM: CPT

## 2023-08-06 RX ORDER — ACETAMINOPHEN 500 MG
975 TABLET ORAL ONCE
Refills: 0 | Status: COMPLETED | OUTPATIENT
Start: 2023-08-06 | End: 2023-08-06

## 2023-08-06 RX ADMIN — Medication 975 MILLIGRAM(S): at 12:05

## 2023-08-06 RX ADMIN — Medication 500 MILLIGRAM(S): at 12:06

## 2023-08-06 NOTE — ED PROVIDER NOTE - CARE PLAN
1 Principal Discharge DX:	Headache, migraine  Secondary Diagnosis:	 (ventriculoperitoneal) shunt status

## 2023-08-06 NOTE — CONSULT NOTE ADULT - ASSESSMENT
Assessment and Recommendations:  23y female with past medical history of  shunts, sickle cell dz, ophthalmology consulted to rule out papilledema in setting of headaches    #Rule OUt papilledema  - Va 20/20 OU. PERRL no APD. IOP wnl  - COlor plates 12/12 OU  - No papilledema on DFE. Sharp disc margins  - Lack of papilledema does not rule out increased intracranial pressure. Recommend imaging, followed by lumbar puncture/neurology management if findings suggestive of increased intracranial pressure.  - Rest of care per neurosurgery.         Outpatient Follow-up: Patient should follow-up with his/her ophthalmologist or with [facility] Department of Ophthalmology within 1 week of after discharge at:    [address] [facility]  [place] [address]  [phone]    Carmita Brown MD PGY 3  Available on Microsoft Teams Assessment and Recommendations:  23y female with past medical history of  shunts, sickle cell dz, ophthalmology consulted to rule out papilledema in setting of headaches    #Rule OUt papilledema  - Va 20/20 OU. PERRL no APD. IOP wnl  - COlor plates 12/12 OU  - No papilledema on DFE. Sharp disc margins  - Lack of papilledema does not rule out increased intracranial pressure. Recommend imaging, followed by lumbar puncture/neurology management if findings suggestive of increased intracranial pressure.  - Rest of care per neurosurgery.         Outpatient Follow-up: Patient should follow-up with his/her ophthalmologist or with [facility] Department of Ophthalmology within 1 week of after discharge at:    [address] [facility]  [place] [address]  [phone]    Carmita Brown MD PGY 3  Available on Microsoft Teams Assessment and Recommendations:  23y female with past medical history of  shunts, sickle cell dz, ophthalmology consulted to rule out papilledema in setting of headaches    #Rule OUt papilledema  - Va 20/20 OU. PERRL no APD. IOP wnl  - COlor plates 12/12 OU  - No papilledema on DFE. Sharp disc margins  - Lack of papilledema does not rule out increased intracranial pressure. Recommend imaging, followed by lumbar puncture/neurology management if findings suggestive of increased intracranial pressure.  - Rest of care per neurosurgery.         Outpatient Follow-up: Patient should follow-up with his/her ophthalmologist or with [facility] Department of Ophthalmology within 1 week of after discharge at:    [address] [facility]  [place] [address]  [phone]    Carmita Brown MD PGY 3  Available on Microsoft Teams Assessment and Recommendations:  23y female with past medical history of  shunts, sickle cell dz, ophthalmology consulted to rule out papilledema in setting of headaches    #Rule OUt papilledema  - Va 20/20 OU. PERRL no APD. IOP wnl  - COlor plates 12/12 OU  - No papilledema on DFE. Sharp disc margins  - Lack of papilledema does not rule out increased intracranial pressure. Recommend imaging, followed by lumbar puncture/neurology management if findings suggestive of increased intracranial pressure.  - Rest of care per neurosurgery.         Outpatient Follow-up: Patient should follow-up with his/her ophthalmologist or with [facility] Department of Ophthalmology within 1 week of after discharge at:    [address] [facility]  [place] [address]  [phone]    Carmita Brown MD PGY 3  Available on [employer] Teams

## 2023-08-06 NOTE — ED ADULT NURSE NOTE - NSFALLUNIVINTERV_ED_ALL_ED
Bed/Stretcher in lowest position, wheels locked, appropriate side rails in place/Call bell, personal items and telephone in reach/Instruct patient to call for assistance before getting out of bed/chair/stretcher/Non-slip footwear applied when patient is off stretcher/[place] to call system/Physically safe environment - no spills, clutter or unnecessary equipment/Purposeful proactive rounding/Room/bathroom lighting operational, light cord in reach

## 2023-08-06 NOTE — ED PROVIDER NOTE - NSFOLLOWUPINSTRUCTIONS_ED_ALL_ED_FT
You were seen in the Emergency Room today because of concerns for  shunt malfunction. A copy of your results is included in your discharge paperwork.     Please follow-up with your Primary Care Physician within the week.   We also recommend you following up with Dr. Loza for further management and workup.     WHAT YOU NEED TO KNOW:  An acute headache is pain or discomfort that may start suddenly and get worse quickly. You may have an acute headache only when you feel stress or eat certain foods. Other acute headache pain can happen every day, and sometimes several times a day.     DISCHARGE INSTRUCTIONS:  Please return to the Emergency Room if:   •You have severe pain.  •You have numbness or weakness on one side of your face or body.  •You have a headache that occurs after a blow to the head, a fall, or other trauma.   •You have a headache, are forgetful or confused, or have trouble speaking.  •You have a headache, stiff neck, and a fever.    Call your doctor if:   •You have a constant headache and are vomiting.  •You have a headache each day that does not get better, even after treatment.  •You have changes in your headaches, or new symptoms that occur when you have a headache.  •You have questions or concerns about your condition or care.    Manage your symptoms:   •Apply heat or ice on the headache area. Use a heat or ice pack. For an ice pack, you can also put crushed ice in a plastic bag. Cover the pack or bag with a towel before you apply it to your skin. Ice and heat both help decrease pain, and heat also helps decrease muscle spasms. Apply heat for 20 to 30 minutes every 2 hours. Apply ice for 15 to 20 minutes every hour. Apply heat or ice for as long and for as many days as directed. You may alternate heat and ice.  •Relax your muscles. Lie down in a comfortable position and close your eyes. Relax your muscles slowly. Start at your toes and work your way up your body.  •Keep a record of your headaches. Write down when your headaches start and stop. Include your symptoms and what you were doing when the headache began. Record what you ate or drank for 24 hours before the headache started. Describe the pain and where it hurts. Keep track of what you did to treat your headache and if it worked.     Prevent an acute headache:   •Avoid anything that triggers an acute headache. Examples include exposure to chemicals, going to high altitude, or not getting enough sleep. Create a regular sleep routine. Go to sleep at the same time and wake up at the same time each day. Do not use electronic devices before bedtime. These may trigger a headache or prevent you from sleeping well.  •Do not smoke. Nicotine and other chemicals in cigarettes and cigars can trigger an acute headache or make it worse. Ask your healthcare provider for information if you currently smoke and need help to quit. E-cigarettes or smokeless tobacco still contain nicotine.   •Limit alcohol as directed. Alcohol can trigger an acute headache or make it worse. If you have cluster headaches, do not drink alcohol during an episode.   •Exercise as directed. Exercise can reduce tension and help with headache pain. Aim for 30 minutes of physical activity on most days of the week. Your healthcare provider can help you create an exercise plan.  •Eat a variety of healthy foods. Healthy foods include fruits, vegetables, low-fat dairy products, lean meats, fish, whole grains, and cooked beans.     Follow up with your healthcare provider as directed: Bring your headache record with you when you see your healthcare provider. Write down your questions so you remember to ask them during your visits.

## 2023-08-06 NOTE — ED PROVIDER NOTE - CLINICAL SUMMARY MEDICAL DECISION MAKING FREE TEXT BOX
24 y/o F pt with PMHx of sickle cell disease complicated by ACS/CVAs/multiple eye surgeries, hydrocephalus s/p  shunt x2 with 7 revisions, atrophic spleen s/p splenectomy presents to the ED with headache and neck pain x1 month. With history of multiple revisions of  shunts in the past and patient notes similar symptoms prior to revision, concern for  shunt malfunction.    Plan: CT stereotactic localization with no contrast, X-ray shuntogram  shunt, consult neurosurgery for further recommendations. 22 y/o F pt with PMHx of sickle cell disease complicated by ACS/CVAs/multiple eye surgeries, hydrocephalus s/p  shunt x2 with 7 revisions, atrophic spleen s/p splenectomy presents to the ED with headache and neck pain x1 month. With history of multiple revisions of  shunts in the past and patient notes similar symptoms prior to revision, concern for  shunt malfunction.    Plan: CT stereotactic localization with no contrast, X-ray shuntogram  shunt, consult neurosurgery for further recommendations.    Wil, PGY3 - 22yo woman with PMH sickle cell,  shunts x2 with multiple revision, presenting due to acute on chronic headache and neck pain. Patient neck ROM intact on exam, in no acute distress, no focal motor weakness or changes in sensation, CN intact. HPI and exam not consistent with meningitis, encephalitis. No indications of  malfunction based on recent imaging, neurosurgery consulted at 9:08 and in agreement with imaging, will reassess need for labs at that time. patient in no acute distress, declining pain meds. *The above represents an initial assessment/impression. Please refer to progress notes for potential changes in patient clinical course* 24 y/o F pt with PMHx of sickle cell disease complicated by ACS/CVAs/multiple eye surgeries, hydrocephalus s/p  shunt x2 with 7 revisions, atrophic spleen s/p splenectomy presents to the ED with headache and neck pain x1 month. With history of multiple revisions of  shunts in the past and patient notes similar symptoms prior to revision, concern for  shunt malfunction.    Plan: CT stereotactic localization with no contrast, X-ray shuntogram  shunt, consult neurosurgery for further recommendations.    Wil, PGY3 - 22yo woman with PMH sickle cell,  shunts x2 with multiple revision, presenting due to acute on chronic headache and neck pain. Patient neck ROM intact on exam, in no acute distress, no focal motor weakness or changes in sensation, CN intact. HPI and exam not consistent with meningitis, encephalitis. No indications of  malfunction based on recent imaging, neurosurgery consulted at 9:08 and in agreement with imaging, will reassess need for labs at that time. patient in no acute distress, declining pain meds at this time. *The above represents an initial assessment/impression. Please refer to progress notes for potential changes in patient clinical course*

## 2023-08-06 NOTE — ED ADULT TRIAGE NOTE - CHIEF COMPLAINT QUOTE
Pt has  shunt c/o head and neck pain. Pt was seen by neuro surgeon and had MRI showing  shunt "not working properly" Pt has follow up appointment with surgeon on the 8/14 but states pain has worsened.

## 2023-08-06 NOTE — ED PROVIDER NOTE - PROGRESS NOTE DETAILS
Wil, PGY3 - neurosurg Zofia called back, states imaging appears to be stable. Will talk to attg and give final recs Wil, PGY3 - neurosurgery requesting opthalmology to see patient for r/o papilledema. ophtho Kevin consulted, will come see patient. Wil, PGY3 - ophthalmology states there is no papilledema. Patient stable for discharge. Understands the Emergency Room work-up and discharge precautions. Will follow-up with neurosurg Dr. Loza

## 2023-08-06 NOTE — CONSULT NOTE ADULT - SUBJECTIVE AND OBJECTIVE BOX
NEUROSURGERY CONSULT    HPI: 24yo woman with PMHx PE no longer on Xarelto, sickle cell disease complicated by acute chest syndrome/CVAs/multiple eye surgeries, hydrocephalus s/p  Shunt x2 with multiple revisions, atrophic spleen s/p splenectomy, presenting due to headache and neck pain x1 month, concerns that her  shunts is malfunctioning per recent MRI. Last MRI in system is from 5/2023, which shows "Ventricular catheters and ventricular configuration remain unchanged since 2022". MRI from 10/2022 shows "Redemonstration of right parietal and frontal approach ventriculostomy catheters, unchanged in position. Ventricular size is unchanged, without hydrocephalus." Neurosurgeon Dr. Loza office #[phone] saw patient in office in May. Patient denies fevers, chest pain, SOB, N/V.    RADIOLOGY:   < from: CT Stereotactic Localization No Cont (08.06.23 @ 11:35) >    FINDINGS: A right-sided anterior parietal approach [place] reservoir   catheter is seen with the tip approximating the right frontal   periventricular margin. Catheter position is unchanged. Dystrophic  calcifications are also seen in the right frontal lobe adjacent to the   catheter.    Redemonstrated is a right-sided posterior parietal approach   ventriculoperitoneal shunt catheter which appears unchanged in position.   The tip approximates the foramen of Monro.    Ventricular size and configuration remains dysmorphic and slitlike and   unchanged when compared to the most recent prior MRI studies.    There is no acute intracranial hemorrhage, shift of the midline   structures, or herniation.No abnormal extra axial fluid collections are   seen.    The paranasal sinuses and tympanomastoid cavities are clear. The orbits   appear unremarkable.    IMPRESSION: Stable ventricular size and configuration when compared with   5/4/2023.    No acute intracranial hemorrhage.      Vital Signs Last 24 Hrs  T(C): 36.7 (06 Aug 2023 12:51), Max: 37.1 (06 Aug 2023 08:19)  T(F): 98.1 (06 Aug 2023 12:51), Max: 98.8 (06 Aug 2023 08:19)  HR: 72 (06 Aug 2023 13:31) (63 - 72)  BP: 98/49 (06 Aug 2023 13:31) (92/48 - 107/46)  BP(mean): --  RR: 16 (06 Aug 2023 12:51) (16 - 17)  SpO2: 98% (06 Aug 2023 12:51) (98% - 100%)    Parameters below as of 06 Aug 2023 12:51  Patient On (Oxygen Delivery Method): room air      PHYSICAL EXAM:    AOx3, appropriate, follows commands  PERRL, EOMI, face symmetrical   RUSH x 4 with good strength   Sensation intact to light touch   No pronator drift   Incision C/D/I   R occipital shunt strata at 2.0, pumps and slowly refills (slit vents, expected)   R frontal shunt ligated in 2013 (no distal catheter)

## 2023-08-06 NOTE — ED PROVIDER NOTE - ATTENDING CONTRIBUTION TO CARE
22yo woman with PMH PE no longer on Xarelto, sickle cell disease complicated by acute chest syndrome/CVAs/multiple eye surgeries, hydrocephalus s/p VPeritoneal Shunt x2 with multiple revisions, atrophic spleen s/p splenectomy, presenting due to headache and neck pain x1 month, concerns that her  shunts is malfunctioning per recent MRI. Last MRI in system is from 5/2023, which shows "Ventricular catheters and ventricular configuration remain unchanged since 2022". MRI from 10/2022 shows "Redemonstration of right parietal and frontal approach ventriculostomy catheters, unchanged in position. Ventricular size is unchanged, without hydrocephalus." Neurosurgeon Dr. Loza office #[phone] saw patient in office in May. Patient denies fevers, chest pain, SOB, N/V.    Leslie: I have reviewed and discussed the medical student’s documentation and findings with the student. After personally examining the patient, my findings have been added to this documentation.

## 2023-08-06 NOTE — CONSULT NOTE ADULT - ASSESSMENT
23y female ex 22 wkr with Hx IVH, CVA, PE (no longer on AC), previously had a right frontal nonprogrammable shunt and a right occipital programmable shunt. Since 2013, the right occipital shunt has been functional and the right frontal shunt was ligated (no distal catheter, shunt was not in use or function at that time), currently patient has a ventriculoperitoneal shunt strata valve at 2.0 in the right occipital region as her functioning shunt. The previous right frontal shunt is dormant. Her ventricles are unchanged on CT and her working shunt pumps and slowly refills which is expected because she has slit ventricles. She does not have nausea, vomiting, changes in vision, or papilledema as examined by ophtho. This does not appear to be a shunt malfunction.

## 2023-08-06 NOTE — ED PROVIDER NOTE - NSICDXPASTMEDICALHX_GEN_ALL_CORE_FT
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

## 2023-08-06 NOTE — ED PROVIDER NOTE - MUSCULOSKELETAL NECK EXAM
tenderness to the right side of the neck over the  shunt, notable swelling over  shunt, FROM, no midline tenderness/no deformity, pain or tenderness. no restriction of movement

## 2023-08-06 NOTE — ED PROVIDER NOTE - OBJECTIVE STATEMENT
Wil, PGY3 -   22yo woman with PMH PE no longer on Xarelto, sickle cell disease complicated by acute chest syndrome/CVAs/multiple eye surgeries, hydrocephalus s/p VPeritoneal Shunt x2 with multiple revisions, atrophic spleen s/p splenectomy, presenting due to headache and neck pain x1 month, concerns that her  shunts is malfunctioning per recent MRI. Last MRI in system is from 5/2023, which shows "Ventricular catheters and ventricular configuration remain unchanged since 2022". MRI from 10/2022 shows "Redemonstration of right parietal and frontal approach ventriculostomy catheters, unchanged in position. Ventricular size is unchanged, without hydrocephalus." Neurosurgeon Dr. Loza office #[phone] saw patient in office in May. Patient denies fevers, chest pain, SOB, N/V.

## 2023-08-06 NOTE — ED PROVIDER NOTE - PATIENT PORTAL LINK FT
You can access the FollowMyHealth Patient Portal offered by [facility] by registering at the following website: [email or web address]. By joining [employer]s FollowMyHealth portal, you will also be able to view your health information using other applications (apps) compatible with our system.

## 2023-08-06 NOTE — ED ADULT NURSE NOTE - NSICDXPASTMEDICALHX_GEN_ALL_CORE_FT
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

## 2023-08-06 NOTE — CONSULT NOTE ADULT - PROBLEM SELECTOR RECOMMENDATION 9
- ok to dc home, not a shunt malfunction     [id number]    Case discussed with attending neurosurgeon Dr. Corey

## 2023-08-08 ENCOUNTER — APPOINTMENT (OUTPATIENT)
Dept: OPHTHALMOLOGY | Facility: CLINIC | Age: 23
End: 2023-08-08

## 2023-08-10 ENCOUNTER — OUTPATIENT (OUTPATIENT)
Dept: OUTPATIENT SERVICES | Facility: HOSPITAL | Age: 23
LOS: 1 days | End: 2023-08-10
Payer: MEDICAID

## 2023-08-10 DIAGNOSIS — D57.1 SICKLE-CELL DISEASE WITHOUT CRISIS: ICD-10-CM

## 2023-08-11 ENCOUNTER — APPOINTMENT (OUTPATIENT)
Dept: HEMATOLOGY ONCOLOGY | Facility: CLINIC | Age: 23
End: 2023-08-11

## 2023-08-12 ENCOUNTER — RESULT REVIEW (OUTPATIENT)
Age: 23
End: 2023-08-12

## 2023-08-12 ENCOUNTER — APPOINTMENT (OUTPATIENT)
Dept: INFUSION THERAPY | Facility: HOSPITAL | Age: 23
End: 2023-08-12

## 2023-08-12 LAB
ANISOCYTOSIS BLD QL: SLIGHT — SIGNIFICANT CHANGE UP
BASOPHILS # BLD AUTO: 0.11 K/UL — SIGNIFICANT CHANGE UP (ref 0–0.2)
BASOPHILS NFR BLD AUTO: 1.2 % — SIGNIFICANT CHANGE UP (ref 0–2)
ELLIPTOCYTES BLD QL SMEAR: SLIGHT — SIGNIFICANT CHANGE UP
EOSINOPHIL # BLD AUTO: 0.35 K/UL — SIGNIFICANT CHANGE UP (ref 0–0.5)
EOSINOPHIL NFR BLD AUTO: 3.7 % — SIGNIFICANT CHANGE UP (ref 0–6)
HCT VFR BLD CALC: 20.1 % — CRITICAL LOW (ref 34.5–45)
HGB BLD-MCNC: 7.1 G/DL — LOW (ref 11.5–15.5)
HYPOCHROMIA BLD QL: SLIGHT — SIGNIFICANT CHANGE UP
IMM GRANULOCYTES NFR BLD AUTO: 0.2 % — SIGNIFICANT CHANGE UP (ref 0–0.9)
LYMPHOCYTES # BLD AUTO: 2.97 K/UL — SIGNIFICANT CHANGE UP (ref 1–3.3)
LYMPHOCYTES # BLD AUTO: 31.3 % — SIGNIFICANT CHANGE UP (ref 13–44)
MCHC RBC-ENTMCNC: 31.1 PG — SIGNIFICANT CHANGE UP (ref 27–34)
MCHC RBC-ENTMCNC: 35.3 G/DL — SIGNIFICANT CHANGE UP (ref 32–36)
MCV RBC AUTO: 88.2 FL — SIGNIFICANT CHANGE UP (ref 80–100)
MONOCYTES # BLD AUTO: 1.13 K/UL — HIGH (ref 0–0.9)
MONOCYTES NFR BLD AUTO: 11.9 % — SIGNIFICANT CHANGE UP (ref 2–14)
NEUTROPHILS # BLD AUTO: 4.9 K/UL — SIGNIFICANT CHANGE UP (ref 1.8–7.4)
NEUTROPHILS NFR BLD AUTO: 51.7 % — SIGNIFICANT CHANGE UP (ref 43–77)
NRBC # BLD: 0 /100 WBCS — SIGNIFICANT CHANGE UP (ref 0–0)
PAPPENHEIMER BOD BLD QL SMEAR: PRESENT — SIGNIFICANT CHANGE UP
PLAT MORPH BLD: NORMAL — SIGNIFICANT CHANGE UP
PLATELET # BLD AUTO: 333 K/UL — SIGNIFICANT CHANGE UP (ref 150–400)
POIKILOCYTOSIS BLD QL AUTO: SIGNIFICANT CHANGE UP
POLYCHROMASIA BLD QL SMEAR: SLIGHT — SIGNIFICANT CHANGE UP
RBC # BLD: 2.28 M/UL — LOW (ref 3.8–5.2)
RBC # FLD: 19 % — HIGH (ref 10.3–14.5)
RBC BLD AUTO: ABNORMAL
SCHISTOCYTES BLD QL AUTO: SLIGHT — SIGNIFICANT CHANGE UP
SICKLE CELLS BLD QL SMEAR: SLIGHT — SIGNIFICANT CHANGE UP
TARGETS BLD QL SMEAR: SLIGHT — SIGNIFICANT CHANGE UP
WBC # BLD: 9.48 K/UL — SIGNIFICANT CHANGE UP (ref 3.8–10.5)
WBC # FLD AUTO: 9.48 K/UL — SIGNIFICANT CHANGE UP (ref 3.8–10.5)

## 2023-08-12 PROCEDURE — 86850 RBC ANTIBODY SCREEN: CPT

## 2023-08-12 PROCEDURE — 86901 BLOOD TYPING SEROLOGIC RH(D): CPT

## 2023-08-12 PROCEDURE — 86902 BLOOD TYPE ANTIGEN DONOR EA: CPT

## 2023-08-12 PROCEDURE — 86923 COMPATIBILITY TEST ELECTRIC: CPT

## 2023-08-12 PROCEDURE — 86900 BLOOD TYPING SEROLOGIC ABO: CPT

## 2023-09-12 ENCOUNTER — OUTPATIENT (OUTPATIENT)
Dept: OUTPATIENT SERVICES | Facility: HOSPITAL | Age: 23
LOS: 1 days | Discharge: HOME | End: 2023-09-12

## 2023-09-12 DIAGNOSIS — D64.9 ANEMIA, UNSPECIFIED: ICD-10-CM

## 2023-09-14 ENCOUNTER — APPOINTMENT (OUTPATIENT)
Dept: HEMATOLOGY ONCOLOGY | Facility: CLINIC | Age: 23
End: 2023-09-14

## 2023-09-14 ENCOUNTER — OUTPATIENT (OUTPATIENT)
Dept: OUTPATIENT SERVICES | Facility: HOSPITAL | Age: 23
LOS: 1 days | End: 2023-09-14
Payer: MEDICAID

## 2023-09-14 DIAGNOSIS — D64.9 ANEMIA, UNSPECIFIED: ICD-10-CM

## 2023-09-14 DIAGNOSIS — D57.1 SICKLE-CELL DISEASE WITHOUT CRISIS: ICD-10-CM

## 2023-09-15 ENCOUNTER — APPOINTMENT (OUTPATIENT)
Dept: HEMATOLOGY ONCOLOGY | Facility: CLINIC | Age: 23
End: 2023-09-15

## 2023-09-16 ENCOUNTER — APPOINTMENT (OUTPATIENT)
Dept: INFUSION THERAPY | Facility: HOSPITAL | Age: 23
End: 2023-09-16

## 2023-09-16 PROCEDURE — 86850 RBC ANTIBODY SCREEN: CPT

## 2023-09-16 PROCEDURE — 86902 BLOOD TYPE ANTIGEN DONOR EA: CPT

## 2023-09-16 PROCEDURE — 86901 BLOOD TYPING SEROLOGIC RH(D): CPT

## 2023-09-16 PROCEDURE — 86900 BLOOD TYPING SEROLOGIC ABO: CPT

## 2023-09-16 PROCEDURE — 86923 COMPATIBILITY TEST ELECTRIC: CPT

## 2023-09-18 DIAGNOSIS — Z51.89 ENCOUNTER FOR OTHER SPECIFIED AFTERCARE: ICD-10-CM

## 2023-09-18 DIAGNOSIS — D57.3 SICKLE-CELL TRAIT: ICD-10-CM

## 2023-09-21 PROBLEM — Z00.00 ENCOUNTER FOR PREVENTIVE HEALTH EXAMINATION: Status: ACTIVE | Noted: 2023-09-21

## 2023-09-22 PROCEDURE — 99214 OFFICE O/P EST MOD 30 MIN: CPT | Mod: 95

## 2023-09-26 ENCOUNTER — APPOINTMENT (OUTPATIENT)
Dept: CT IMAGING | Facility: IMAGING CENTER | Age: 23
End: 2023-09-26

## 2023-09-26 ENCOUNTER — APPOINTMENT (OUTPATIENT)
Dept: CT IMAGING | Facility: HOSPITAL | Age: 23
End: 2023-09-26
Payer: MEDICAID

## 2023-09-26 ENCOUNTER — OUTPATIENT (OUTPATIENT)
Dept: OUTPATIENT SERVICES | Facility: HOSPITAL | Age: 23
LOS: 1 days | End: 2023-09-26
Payer: MEDICAID

## 2023-09-26 DIAGNOSIS — G91.9 HYDROCEPHALUS, UNSPECIFIED: ICD-10-CM

## 2023-09-26 PROCEDURE — 74150 CT ABDOMEN W/O CONTRAST: CPT

## 2023-09-26 PROCEDURE — 74150 CT ABDOMEN W/O CONTRAST: CPT | Mod: 26

## 2023-09-26 PROCEDURE — 71250 CT THORAX DX C-: CPT | Mod: 26

## 2023-09-26 PROCEDURE — 70490 CT SOFT TISSUE NECK W/O DYE: CPT | Mod: 26

## 2023-09-26 PROCEDURE — 71250 CT THORAX DX C-: CPT

## 2023-09-26 PROCEDURE — 70490 CT SOFT TISSUE NECK W/O DYE: CPT

## 2023-09-26 NOTE — ED PROVIDER NOTE - GASTROINTESTINAL, MLM
Patient tolerated Lupron IM injection in the right dorsagluteal without any complications. Discharge instructions given to patient-verbalizes understanding. Ambulated off unit per self with belongings. Abdomen soft, non-tender, no guarding.

## 2023-09-29 ENCOUNTER — OUTPATIENT (OUTPATIENT)
Dept: OUTPATIENT SERVICES | Facility: HOSPITAL | Age: 23
LOS: 1 days | End: 2023-09-29
Payer: MEDICAID

## 2023-09-29 ENCOUNTER — APPOINTMENT (OUTPATIENT)
Dept: HEMATOLOGY ONCOLOGY | Facility: CLINIC | Age: 23
End: 2023-09-29

## 2023-09-29 ENCOUNTER — OUTPATIENT (OUTPATIENT)
Dept: OUTPATIENT SERVICES | Facility: HOSPITAL | Age: 23
LOS: 1 days | End: 2023-09-29

## 2023-09-29 VITALS
DIASTOLIC BLOOD PRESSURE: 69 MMHG | TEMPERATURE: 99 F | WEIGHT: 110.01 LBS | HEIGHT: 61 IN | RESPIRATION RATE: 18 BRPM | SYSTOLIC BLOOD PRESSURE: 118 MMHG | OXYGEN SATURATION: 98 % | HEART RATE: 76 BPM

## 2023-09-29 DIAGNOSIS — F41.9 ANXIETY DISORDER, UNSPECIFIED: ICD-10-CM

## 2023-09-29 DIAGNOSIS — G91.9 HYDROCEPHALUS, UNSPECIFIED: ICD-10-CM

## 2023-09-29 DIAGNOSIS — D64.9 ANEMIA, UNSPECIFIED: ICD-10-CM

## 2023-09-29 DIAGNOSIS — Z95.828 PRESENCE OF OTHER VASCULAR IMPLANTS AND GRAFTS: Chronic | ICD-10-CM

## 2023-09-29 LAB
ALBUMIN SERPL ELPH-MCNC: 4.9 G/DL — SIGNIFICANT CHANGE UP (ref 3.3–5)
ALP SERPL-CCNC: 53 U/L — SIGNIFICANT CHANGE UP (ref 40–120)
ALT FLD-CCNC: 18 U/L — SIGNIFICANT CHANGE UP (ref 4–33)
ANION GAP SERPL CALC-SCNC: 11 MMOL/L — SIGNIFICANT CHANGE UP (ref 7–14)
AST SERPL-CCNC: 35 U/L — HIGH (ref 4–32)
BILIRUB SERPL-MCNC: 2.1 MG/DL — HIGH (ref 0.2–1.2)
BUN SERPL-MCNC: 10 MG/DL — SIGNIFICANT CHANGE UP (ref 7–23)
CALCIUM SERPL-MCNC: 9.5 MG/DL — SIGNIFICANT CHANGE UP (ref 8.4–10.5)
CHLORIDE SERPL-SCNC: 104 MMOL/L — SIGNIFICANT CHANGE UP (ref 98–107)
CO2 SERPL-SCNC: 23 MMOL/L — SIGNIFICANT CHANGE UP (ref 22–31)
CREAT SERPL-MCNC: 0.6 MG/DL — SIGNIFICANT CHANGE UP (ref 0.5–1.3)
EGFR: 129 ML/MIN/1.73M2 — SIGNIFICANT CHANGE UP
GLUCOSE SERPL-MCNC: 80 MG/DL — SIGNIFICANT CHANGE UP (ref 70–99)
HCG SERPL-ACNC: <1 MIU/ML — SIGNIFICANT CHANGE UP
HCT VFR BLD CALC: 24.8 % — LOW (ref 34.5–45)
HGB BLD-MCNC: 8.3 G/DL — LOW (ref 11.5–15.5)
MCHC RBC-ENTMCNC: 31 PG — SIGNIFICANT CHANGE UP (ref 27–34)
MCHC RBC-ENTMCNC: 33.5 GM/DL — SIGNIFICANT CHANGE UP (ref 32–36)
MCV RBC AUTO: 92.5 FL — SIGNIFICANT CHANGE UP (ref 80–100)
NRBC # BLD: 1 /100 WBCS — HIGH (ref 0–0)
NRBC # FLD: 0.15 K/UL — HIGH (ref 0–0)
PLATELET # BLD AUTO: 445 K/UL — HIGH (ref 150–400)
POTASSIUM SERPL-MCNC: 4.4 MMOL/L — SIGNIFICANT CHANGE UP (ref 3.5–5.3)
POTASSIUM SERPL-SCNC: 4.4 MMOL/L — SIGNIFICANT CHANGE UP (ref 3.5–5.3)
PROT SERPL-MCNC: 7.2 G/DL — SIGNIFICANT CHANGE UP (ref 6–8.3)
RBC # BLD: 2.68 M/UL — LOW (ref 3.8–5.2)
RBC # FLD: 17.6 % — HIGH (ref 10.3–14.5)
SODIUM SERPL-SCNC: 138 MMOL/L — SIGNIFICANT CHANGE UP (ref 135–145)
WBC # BLD: 12.69 K/UL — HIGH (ref 3.8–10.5)
WBC # FLD AUTO: 12.69 K/UL — HIGH (ref 3.8–10.5)

## 2023-09-29 RX ORDER — HYDROXYUREA 500 MG/1
1 CAPSULE ORAL
Qty: 0 | Refills: 0 | DISCHARGE

## 2023-09-29 RX ORDER — SODIUM CHLORIDE 9 MG/ML
1000 INJECTION, SOLUTION INTRAVENOUS
Refills: 0 | Status: DISCONTINUED | OUTPATIENT
Start: 2023-10-09 | End: 2023-10-10

## 2023-09-29 RX ORDER — TRAMADOL HYDROCHLORIDE 50 MG/1
1 TABLET ORAL
Qty: 0 | Refills: 0 | DISCHARGE

## 2023-09-29 NOTE — H&P PST ADULT - PROBLEM SELECTOR PLAN 1
Patient tentatively scheduled for Removal of calcified distal shunt tubing on 10/6/23.  Pre-op instructions provided. Pt given verbal and written instructions with teach back on chlorhexidine shampoo and pepcid. Pt verbalized understanding with return demonstration.     CBC, CMP, HCG, T&S were done at [facility].  Copy of Echocardiogram requested.   No further evaluations requested Patient tentatively scheduled for Removal of calcified distal shunt tubing on 10/6/23.  Pre-op instructions provided. Pt given verbal and written instructions with teach back on chlorhexidine shampoo and pepcid. Pt verbalized understanding with return demonstration.     CBC, CMP, HCG, T&S were done at [facility].  Copy of Echocardiogram requested.   No further evaluations requested  Patient to return for repeat T&S on Oct 3, 2023. Patient tentatively scheduled for Removal of calcified distal shunt tubing on 10/6/23.  Pre-op instructions provided. Pt given verbal and written instructions with teach back on chlorhexidine shampoo and pepcid. Pt verbalized understanding with return demonstration.     CBC, CMP, HCG, T&S were done at [facility].  Copy of Echocardiogram requested.   Medical evaluation requested (hx of SCD, Anemia, blood transfusions, Asthma, high risk procedure.) Patient has an appointment Oct 3rd.   Patient to return for repeat T&S on Oct 3, 2023.

## 2023-09-29 NOTE — H&P PST ADULT - HISTORY OF PRESENT ILLNESS
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 Patient is scheduled for Removal of calcified distal shunt tubing.  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 Patient is scheduled for Removal of calcified distal shunt tubing.  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 Patient is scheduled for Removal of calcified distal shunt tubing.

## 2023-09-29 NOTE — H&P PST ADULT - TRANSFUSION HX COMMENT, PROFILE
Hx of Sickle cell disease- receives monthly blood transfusion Hx of Sickle cell disease- receives monthly blood transfusion-last one Sept 2023

## 2023-09-29 NOTE — H&P PST ADULT - NEGATIVE GENERAL GENITOURINARY SYMPTOMS
no hematuria/no flank pain L/no flank pain R/no urine discoloration/no dysuria/no urinary hesitancy/normal urinary frequency/no nocturia
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
## 2023-09-29 NOTE — H&P PST ADULT - NSICDXPASTSURGICALHX_GEN_ALL_CORE_FT
PAST SURGICAL HISTORY:  S/P Cholecystectomy s/p open cholecystectomy 2012    S/P Tonsillectomy and Adenoide     S/P  Shunt x6 revisions, last 2012 w/ Dr. Loza    Strabismus Repair x4     PAST SURGICAL HISTORY:  Port-A-Cath in place     S/P Cholecystectomy s/p open cholecystectomy 2012    S/P Tonsillectomy and Adenoide     S/P  Shunt x6 revisions, last 2012 w/ Dr. Loza    Strabismus Repair x4

## 2023-09-30 ENCOUNTER — APPOINTMENT (OUTPATIENT)
Dept: INFUSION THERAPY | Facility: HOSPITAL | Age: 23
End: 2023-09-30

## 2023-10-04 ENCOUNTER — OUTPATIENT (OUTPATIENT)
Dept: OUTPATIENT SERVICES | Facility: HOSPITAL | Age: 23
LOS: 1 days | End: 2023-10-04
Payer: MEDICAID

## 2023-10-04 DIAGNOSIS — Z95.828 PRESENCE OF OTHER VASCULAR IMPLANTS AND GRAFTS: Chronic | ICD-10-CM

## 2023-10-04 DIAGNOSIS — Z01.818 ENCOUNTER FOR OTHER PREPROCEDURAL EXAMINATION: ICD-10-CM

## 2023-10-05 PROBLEM — G91.9 HYDROCEPHALUS, UNSPECIFIED: Chronic | Status: ACTIVE | Noted: 2023-09-29
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
## 2023-10-08 ENCOUNTER — TRANSCRIPTION ENCOUNTER (OUTPATIENT)
Age: 23
End: 2023-10-08

## 2023-10-09 ENCOUNTER — TRANSCRIPTION ENCOUNTER (OUTPATIENT)
Age: 23
End: 2023-10-09

## 2023-10-09 ENCOUNTER — OUTPATIENT (OUTPATIENT)
Dept: INPATIENT UNIT | Facility: HOSPITAL | Age: 23
LOS: 1 days | Discharge: HOME | End: 2023-10-09

## 2023-10-09 VITALS
WEIGHT: 110.01 LBS | DIASTOLIC BLOOD PRESSURE: 59 MMHG | TEMPERATURE: 98 F | SYSTOLIC BLOOD PRESSURE: 103 MMHG | RESPIRATION RATE: 16 BRPM | HEIGHT: 61 IN | OXYGEN SATURATION: 99 % | HEART RATE: 63 BPM

## 2023-10-09 DIAGNOSIS — G91.9 HYDROCEPHALUS, UNSPECIFIED: ICD-10-CM

## 2023-10-09 DIAGNOSIS — Z95.828 PRESENCE OF OTHER VASCULAR IMPLANTS AND GRAFTS: Chronic | ICD-10-CM

## 2023-10-09 PROBLEM — Z92.89 PERSONAL HISTORY OF OTHER MEDICAL TREATMENT: Chronic | Status: ACTIVE | Noted: 2023-09-29

## 2023-10-09 LAB — HCG UR QL: NEGATIVE — SIGNIFICANT CHANGE UP

## 2023-10-09 DEVICE — SURGIFLO MATRIX WITH THROMBIN KIT
Type: IMPLANTABLE DEVICE | Status: NON-FUNCTIONAL
Removed: 2023-10-09

## 2023-10-09 RX ORDER — OXYCODONE HYDROCHLORIDE 5 MG/1
5 TABLET ORAL ONCE
Refills: 0 | Status: DISCONTINUED | OUTPATIENT
Start: 2023-10-09 | End: 2023-10-09

## 2023-10-09 RX ORDER — OXYCODONE HYDROCHLORIDE 5 MG/1
5 TABLET ORAL EVERY 6 HOURS
Refills: 0 | Status: DISCONTINUED | OUTPATIENT
Start: 2023-10-09 | End: 2023-10-10

## 2023-10-09 RX ORDER — OXYCODONE HYDROCHLORIDE 5 MG/1
10 TABLET ORAL EVERY 6 HOURS
Refills: 0 | Status: DISCONTINUED | OUTPATIENT
Start: 2023-10-09 | End: 2023-10-10

## 2023-10-09 RX ORDER — LANOLIN ALCOHOL/MO/W.PET/CERES
10 CREAM (GRAM) TOPICAL AT BEDTIME
Refills: 0 | Status: DISCONTINUED | OUTPATIENT
Start: 2023-10-09 | End: 2023-10-09

## 2023-10-09 RX ORDER — ACETAMINOPHEN 500 MG
750 TABLET ORAL ONCE
Refills: 0 | Status: DISCONTINUED | OUTPATIENT
Start: 2023-10-09 | End: 2023-10-09

## 2023-10-09 RX ORDER — SERTRALINE 25 MG/1
50 TABLET, FILM COATED ORAL DAILY
Refills: 0 | Status: DISCONTINUED | OUTPATIENT
Start: 2023-10-09 | End: 2023-10-10

## 2023-10-09 RX ORDER — FENTANYL CITRATE 50 UG/ML
25 INJECTION INTRAVENOUS
Refills: 0 | Status: DISCONTINUED | OUTPATIENT
Start: 2023-10-09 | End: 2023-10-09

## 2023-10-09 RX ORDER — ACETAMINOPHEN 500 MG
325 TABLET ORAL EVERY 4 HOURS
Refills: 0 | Status: DISCONTINUED | OUTPATIENT
Start: 2023-10-09 | End: 2023-10-10

## 2023-10-09 RX ORDER — LANOLIN ALCOHOL/MO/W.PET/CERES
9 CREAM (GRAM) TOPICAL AT BEDTIME
Refills: 0 | Status: DISCONTINUED | OUTPATIENT
Start: 2023-10-09 | End: 2023-10-10

## 2023-10-09 RX ORDER — ONDANSETRON 8 MG/1
4 TABLET, FILM COATED ORAL ONCE
Refills: 0 | Status: DISCONTINUED | OUTPATIENT
Start: 2023-10-09 | End: 2023-10-09

## 2023-10-09 RX ORDER — SENNA PLUS 8.6 MG/1
2 TABLET ORAL AT BEDTIME
Refills: 0 | Status: DISCONTINUED | OUTPATIENT
Start: 2023-10-09 | End: 2023-10-10

## 2023-10-09 RX ORDER — DEFERASIROX 180 MG/1
720 GRANULE ORAL DAILY
Refills: 0 | Status: DISCONTINUED | OUTPATIENT
Start: 2023-10-09 | End: 2023-10-10

## 2023-10-09 RX ADMIN — Medication 325 MILLIGRAM(S): at 20:24

## 2023-10-09 RX ADMIN — FENTANYL CITRATE 25 MICROGRAM(S): 50 INJECTION INTRAVENOUS at 15:17

## 2023-10-09 RX ADMIN — Medication 9 MILLIGRAM(S): at 22:30

## 2023-10-09 RX ADMIN — SODIUM CHLORIDE 30 MILLILITER(S): 9 INJECTION, SOLUTION INTRAVENOUS at 15:05

## 2023-10-09 RX ADMIN — Medication 325 MILLIGRAM(S): at 21:26

## 2023-10-09 RX ADMIN — FENTANYL CITRATE 25 MICROGRAM(S): 50 INJECTION INTRAVENOUS at 15:27

## 2023-10-09 RX ADMIN — SODIUM CHLORIDE 30 MILLILITER(S): 9 INJECTION, SOLUTION INTRAVENOUS at 10:27

## 2023-10-09 NOTE — ASU PREOP CHECKLIST - 1.
warm blanket  , patient has left mediport ,  right sided  shunt . warm blanket  , patient has left mediport ,  right sided  shunt . uHCG negative,

## 2023-10-09 NOTE — ASU PREOP CHECKLIST - NS PREOP CHK MONITOR ANESTHESIA CONSENT
Discharge Instructions for Paracentesis    You had a procedure today called a paracentesis in which excess fluid in your abdomen, called ascites, was removed. An Interventional Radiologist used ultrasound to identify the largest pocket of fluid, numbed the area, and put a small catheter in your abdomen to drain as much fluid as possible. The procedure may have been done to take a sample of the fluid for diagnostic purposes or it may have been done to drain the excess fluid from your abdomen to make you feel more comfortable and breathe easier.       Ascites is buildup of excess fluid in the abdomen.       During and After the procedure  During the procedure, registered nurses and radiology technologists were present at all times to monitor your vital signs, maintain a sterile environment and make sure you were as comfortable as possible. Additionally, warm blankets were provided for your comfort. You were monitored closely after the procedure to make sure your vitals returned to baseline and that you were able to safely leave the facility in the care of your friends or family.     Home Care  You may shower the day of your paracentesis. When you are able to shower, do not scrub the procedure site but wash gently. After your shower, remove the wet bandage and pat the area dry. Reapply a bandage of your choice if you wish.     Take it easy and do not lift anything heavier than a gallon of milk for 24 hours after the procedure. Avoid strenuous physical activity for 24 hours.  You will have a small bandage over the puncture site. You may remove the bandage in 24 hours preferrably after you have showered.  Check the puncture site for the signs of infection listed below.    When to Call Your Doctor  Call your doctor right away if you have any of the following:  Dizziness or lightheadedness  Sudden or increased shortness of breath  Sudden chest pain  Fever of 100.4°F or higher, or chills  Increasing redness, tenderness, or  swelling at the procedure site     Follow-Up  If your fluid was sent to the lab, the results should be ready approximately 3-5 business days after the procedure. Make a follow-up appointment as directed by the physician who ordered the procedure. Please call the radiology nurse at [phone] if you have any questions regarding this procedure.     Thank you for using Interventional [facility]iology today. We hope you had a good experience with your healthcare needs.     done

## 2023-10-09 NOTE — PATIENT PROFILE ADULT - FALL HARM RISK - FACTORS NURSING JUDGEMENT
Saray berger/ Dr. Radford's office called they explained that the PT was having LLQ pain and that it felt like a burning pain, like something was going to explode. He has no swelling, no fever but slight redness at the site. I contacted Dr. Obrien and he said that the patient could be seen in the office tomorrow (11/15/19). But that the PD cath had been removed. He advised that if they were concerned that he could proceed to the ED.    Yes

## 2023-10-09 NOTE — PATIENT PROFILE ADULT - FALL HARM RISK - HARM RISK INTERVENTIONS

## 2023-10-09 NOTE — PATIENT PROFILE ADULT - PATIENT'S SEXUAL ORIENTATION
Heterosexual Principal Discharge DX:	Abdominal pain  Secondary Diagnosis:	COVID-19 virus infection   1

## 2023-10-09 NOTE — BRIEF OPERATIVE NOTE - OPERATION/FINDINGS
Removal of calcified distal tubing of non-functioning ventriculoperitoneal shunt. 7 incisions created and closed with Monocryl.

## 2023-10-09 NOTE — ASU PATIENT PROFILE, ADULT - FALL HARM RISK - UNIVERSAL INTERVENTIONS
Bed in lowest position, wheels locked, appropriate side rails in place/Call bell, personal items and telephone in reach/Instruct patient to call for assistance before getting out of bed or chair/Non-slip footwear when patient is out of bed/Frost to call system/Physically safe environment - no spills, clutter or unnecessary equipment/Purposeful Proactive Rounding/Room/bathroom lighting operational, light cord in reach

## 2023-10-09 NOTE — BRIEF OPERATIVE NOTE - NSICDXBRIEFPROCEDURE_GEN_ALL_CORE_FT
PROCEDURES:  Creation, removal, or replacement, shunt, ventriculoperitoneal 09-Oct-2023 15:19:00  Susanne Gutierrez

## 2023-10-09 NOTE — ASU PATIENT PROFILE, ADULT - NSICDXPASTSURGICALHX_GEN_ALL_CORE_FT
PAST SURGICAL HISTORY:  Port-A-Cath in place     S/P Cholecystectomy s/p open cholecystectomy 2012    S/P Tonsillectomy and Adenoide     S/P  Shunt x6 revisions, last 2012 w/ Dr. Loza    Strabismus Repair x4
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
## 2023-10-10 ENCOUNTER — TRANSCRIPTION ENCOUNTER (OUTPATIENT)
Age: 23
End: 2023-10-10

## 2023-10-10 VITALS
HEART RATE: 73 BPM | OXYGEN SATURATION: 98 % | SYSTOLIC BLOOD PRESSURE: 116 MMHG | RESPIRATION RATE: 17 BRPM | TEMPERATURE: 99 F | DIASTOLIC BLOOD PRESSURE: 61 MMHG

## 2023-10-10 RX ORDER — ACETAMINOPHEN 500 MG
750 TABLET ORAL ONCE
Refills: 0 | Status: COMPLETED | OUTPATIENT
Start: 2023-10-10 | End: 2023-10-10

## 2023-10-10 RX ORDER — ACETAMINOPHEN 500 MG
325 TABLET ORAL ONCE
Refills: 0 | Status: COMPLETED | OUTPATIENT
Start: 2023-10-10 | End: 2023-10-10

## 2023-10-10 RX ADMIN — Medication 325 MILLIGRAM(S): at 10:36

## 2023-10-10 RX ADMIN — Medication 300 MILLIGRAM(S): at 02:16

## 2023-10-10 RX ADMIN — Medication 325 MILLIGRAM(S): at 11:00

## 2023-10-10 RX ADMIN — DEFERASIROX 720 MILLIGRAM(S): 180 GRANULE ORAL at 12:15

## 2023-10-10 RX ADMIN — SERTRALINE 50 MILLIGRAM(S): 25 TABLET, FILM COATED ORAL at 12:15

## 2023-10-10 RX ADMIN — Medication 750 MILLIGRAM(S): at 03:59

## 2023-10-10 RX ADMIN — Medication 325 MILLIGRAM(S): at 09:09

## 2023-10-10 RX ADMIN — Medication 325 MILLIGRAM(S): at 09:29

## 2023-10-10 NOTE — DISCHARGE NOTE PROVIDER - HOSPITAL COURSE
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 Patient is scheduled for Removal of calcified distal shunt tubing.     10/10 POD#1 s/p removal of calcified shunt tubing, exam stable, d/c planning

## 2023-10-10 NOTE — DISCHARGE NOTE PROVIDER - NSDCCPCAREPLAN_GEN_ALL_CORE_FT
PRINCIPAL DISCHARGE DIAGNOSIS  Diagnosis: Hydrocephalus, unspecified  Assessment and Plan of Treatment:

## 2023-10-10 NOTE — DISCHARGE NOTE PROVIDER - NSDCHOSPICE_GEN_A_CORE
"Clinic Care Coordination Contact  [facility]: Post-Discharge Note  SITUATION                                                      Admission:    Admission Date: 11/07/21   Reason for Admission: Fever  Discharge:   Discharge Date: 11/11/21  Discharge Diagnosis: Sepsis    BACKGROUND                                                      Stephen Solorio is a 79 year old male with history of type II diabetes, rheumatoid arthritis, gout, primary osteoarthritis of both knees, and hyponatremia who presents with fever. The patient had knee surgery on Monday at the [facility] and was discharged to [place]. Since then he has been in rehabilitation. Yesterday he developed chills and he was unable to sleep last night. Today, the nurse at [facility] found him to be hypoxic at 80% and lethargic, so he was sent to the ED. His daughter also reports that he has been more confused than usual. He has also been dealing with a flare up of his gout. Yesterday the patient was able to eat, but today, he has not been eating and only drinking a little bit of water. He is also experiencing a little bit of a cough and some shortness of breath. He is not vomiting and has not had any episodes of diarrhea. Normally, he is able to walk and talk normally.    ASSESSMENT           Discharge Assessment  How are you doing now that you are home?: \" I am feeling ok just feeling a little bit tired\"  How are your symptoms? (Red Flag symptoms escalate to triage hotline per guidelines): Improved  Do you feel your condition is stable enough to be safe at home until your provider visit?: Yes  Does the patient have their discharge instructions? : Yes  Does the patient have questions regarding their discharge instructions? : No  Were you started on any new medications or were there changes to any of your previous medications? : Yes  Does the patient have all of their medications?: Yes  Do you have questions regarding any of your " medications? : No  Do you have all of your needed medical supplies or equipment (DME)?  (i.e. oxygen tank, CPAP, cane, etc.): Yes  Discharge follow-up appointment scheduled within 14 calendar days? : Yes  Discharge Follow Up Appointment Date: 11/17/21  Discharge Follow Up Appointment Scheduled with?: Specialty Care Provider    Post-op ([facility] CTA Only)  If the patient had a surgery or procedure, do they have any questions for a nurse?: No             PLAN                                                      Outpatient Plan: Follow up with primary care provider, ORACIO GRANT, in 1 week and check CBC and BMP then, and follow-up with  orthopedics as scheduled concerning knee replacement.    Future Appointments   Date Time Provider Department Center   11/12/2021  2:30 PM [facility] REGISTERED DIETICIAN [facility]    11/17/2021 12:15 PM Hermes Kruger MD [facility]   1/6/2022  3:00 PM [facility]    1/13/2022 10:15 AM [facility]   1/13/2022 11:00 AM Logan Velasquez MD [facility]   1/13/2022 12:30 PM [facility]    1/21/2022  3:00 PM Lauryn Goodman MD [facility]         For any urgent concerns, please contact our 24 hour nurse triage line: [phone] ([phone])         Lola Amado                 No

## 2023-10-10 NOTE — DISCHARGE NOTE PROVIDER - NSDCFUADDINST_GEN_ALL_CORE_FT
- You had surgery on 10/9/23. The surgery you had was removal of calcified shunt tubing.    - Remove bandage from incision site on post op day 3 if it was not removed by the surgical team prior to discharge. Once removed, incision site does not need a bandage or ointment on it. If you have steri strips (small, skinny beige strips), they will eventually fall off over time. Do not pull at steri strips. If steri strips are more than penitentiary off, you may remove them. Do not touch or scratch incision to prevent infection.    - Shower daily with shampoo/soap on post operative day 4 (10/13/23) Avoid long soaks and do not submerge incision in water (no baths.) Allow soap and water to run over the incision. Pat incision area dry with clean towel- do not scrub. Please shower regularly to ensure incision stays clean to avoid post operative infections.  You may have a body shower daily, as long as your head incision is covered by a shower cap and does not get wet until post op day 4.     - Notify your surgeon if you notice increased redness, drainage or your incision area opening.     - Return to ER immediately for high fevers, severe headache, vomiting, lethargy or weakness    - Please call your neurosurgeon following discharge to make follow up appointment in 1 week after discharge unless otherwise specified. See contact information.    - Prescription post operative medication has been sent to [facility] in the hospital. All post operative prescriptions should be picked up before departing the hospital. You can also take over the counter tylenol for pain as needed.     - Ambulate as tolerate. Continue with all "activities of daily living." Avoid strenuous activity or heavy lifting until cleared for additional activity at your follow up appointment. You cannot drive while taking narcotics (oxycodone, valium, etc.)     - Do not return to work or school until cleared by your neurosurgeon at your follow up visit unless specified to you during your hospital stay    -  If your incision is closed with clear sutures, these are absorbable and will dissolve over time. If you see metallic staples or black stitches, these will need to be removed in the office anywhere from 7-14 days after surgery. Do not pick or scratch at incision.     - Do not take any blood thinning medications such as aspirin, motrin, ibuprofen, warfarin, coumadin, plavix, heparin, lovenox, Xarelto, Eliquis etc. until cleared by your neurosurgeon    - Surgery, anesthesia, and pain medications can cause constipation. Please take over the counter stool softeners daily until regular bowel movements are achieved. Examples include Miralax, Senna, Colace, Milk of Magnesia, or Dulcolax suppositories. Please consult drug store pharmacist or pediatrician if there are question about dosing if the patient is pediatric.   - You had surgery on 10/9/23. The surgery you had was removal of calcified shunt tubing.    - Remove bandage from incision site on post op day 3 if it was not removed by the surgical team prior to discharge. Once removed, incision site does not need a bandage or ointment on it. If you have steri strips (small, skinny beige strips), they will eventually fall off over time. Do not pull at steri strips. If steri strips are more than care home off, you may remove them. Do not touch or scratch incision to prevent infection.    - Shower daily with shampoo/soap on post operative day 4 (10/13/23) Avoid long soaks and do not submerge incision in water (no baths.) Allow soap and water to run over the incision. Pat incision area dry with clean towel- do not scrub. Please shower regularly to ensure incision stays clean to avoid post operative infections.  You may have a body shower daily, as long as your head incision is covered by a shower cap and does not get wet until post op day 4.     - Notify your surgeon if you notice increased redness, drainage or your incision area opening.     - Return to ER immediately for high fevers, severe headache, vomiting, lethargy or weakness    - Please call your neurosurgeon following discharge to make follow up appointment in 1 week after discharge unless otherwise specified. See contact information.    - Prescription post operative medication has been sent to [facility] in the hospital. All post operative prescriptions should be picked up before departing the hospital. You can also take over the counter tylenol for pain as needed.     - Ambulate as tolerate. Continue with all "activities of daily living." Avoid strenuous activity or heavy lifting until cleared for additional activity at your follow up appointment. You cannot drive while taking narcotics (oxycodone, valium, etc.)     - Do not return to work or school until cleared by your neurosurgeon at your follow up visit unless specified to you during your hospital stay    -  If your incision is closed with clear sutures, these are absorbable and will dissolve over time. If you see metallic staples or black stitches, these will need to be removed in the office anywhere from 7-14 days after surgery. Do not pick or scratch at incision.     - Do not take any blood thinning medications such as aspirin, warfarin, coumadin, plavix, heparin, lovenox, Xarelto, Eliquis etc. until cleared by your neurosurgeon    - Surgery, anesthesia, and pain medications can cause constipation. Please take over the counter stool softeners daily until regular bowel movements are achieved. Examples include Miralax, Senna, Colace, Milk of Magnesia, or Dulcolax suppositories. Please consult drug store pharmacist or pediatrician if there are question about dosing if the patient is pediatric.

## 2023-10-10 NOTE — DISCHARGE NOTE NURSING/CASE MANAGEMENT/SOCIAL WORK - NSDCPNINST_GEN_ALL_CORE
Notify Dr Ledbetter if you experience any increase in pain not relieved with medication, any redness, drainage or swelling around incision or any fever >100.5.  Drink plenty of fluids.  No heavy lifting or straining.

## 2023-10-10 NOTE — DISCHARGE NOTE NURSING/CASE MANAGEMENT/SOCIAL WORK - NSDCPEFALRISK_GEN_ALL_CORE
For information on Fall & Injury Prevention, visit: [email or web address] OR  [email or web address] OR  https://www.cdc.gov/steadi/patient.html

## 2023-10-10 NOTE — PROGRESS NOTE ADULT - ASSESSMENT
Patient seen and examined at bedside s/p removal of calcified distal catheter of non-functioning Right ventriculoperitoneal shunt, recovering well.  -PACU --> Floor  -Q4 neurochecks  -Q4 vitals  -Pain control  -Advance diet as tolerated    
23y F s/p removal of distal calcidfied shunt tubing of nonfunctioning  shunt.    - pain control  - dc planning to home today

## 2023-10-10 NOTE — DISCHARGE NOTE PROVIDER - CARE PROVIDER_API CALL
James Ledbetter  Pediatric Neurosurgery  [address] [place] [address], [address]  [place], [place] [id number]  Phone: [phone]  Fax: [phone]  Follow Up Time: 1 week

## 2023-10-10 NOTE — DISCHARGE NOTE PROVIDER - NSDCMRMEDTOKEN_GEN_ALL_CORE_FT
Jadenu 360 mg oral tablet: 2 tab(s) orally once a day  melatonin 10 mg oral tablet: 1 tab(s) orally once a day (at bedtime) LD 9/29/2023  Tylenol 325 mg oral tablet: 2 tab(s) orally every 6 hours as needed for  mild pain as needed  Zoloft 50 mg oral tablet: 1 tab(s) orally once a day   Jadenu 360 mg oral tablet: 2 tab(s) orally once a day  melatonin 10 mg oral tablet: 1 tab(s) orally once a day (at bedtime) LD 9/29/2023  naproxen 500 mg oral tablet: 1 tab(s) orally every 12 hours as needed for  mild pain  Tylenol 325 mg oral tablet: 2 tab(s) orally every 6 hours as needed for  mild pain as needed  Zoloft 50 mg oral tablet: 1 tab(s) orally once a day

## 2023-10-10 NOTE — DISCHARGE NOTE PROVIDER - NSDCCPTREATMENT_GEN_ALL_CORE_FT
PRINCIPAL PROCEDURE  Procedure: Creation, removal, or replacement, shunt, ventriculoperitoneal  Findings and Treatment:

## 2023-10-10 NOTE — DISCHARGE NOTE NURSING/CASE MANAGEMENT/SOCIAL WORK - PATIENT PORTAL LINK FT
You can access the FollowMyHealth Patient Portal offered by [facility] by registering at the following website: [email or web address]. By joining [facility]’s FollowMyHealth portal, you will also be able to view your health information using other applications (apps) compatible with our system.

## 2023-10-10 NOTE — PROGRESS NOTE ADULT - SUBJECTIVE AND OBJECTIVE BOX
ANESTHESIA POSTOP CHECK    23y Female POSTOP DAY 1 S/P     Vital Signs Last 24 Hrs  T(C): 37.2 (10 Oct 2023 08:43), Max: 37.4 (10 Oct 2023 01:43)  T(F): 99 (10 Oct 2023 08:43), Max: 99.3 (10 Oct 2023 01:43)  HR: 73 (10 Oct 2023 08:43) (70 - 108)  BP: 116/61 (10 Oct 2023 08:43) (110/55 - 147/72)  BP(mean): 79 (09 Oct 2023 18:30) (74 - 93)  RR: 17 (10 Oct 2023 08:43) (15 - 23)  SpO2: 98% (10 Oct 2023 08:43) (94% - 100%)                [X] NO APPARENT ANESTHESIA COMPLICATIONS      
PAST 24HR EVENTS: no issues o/n, s/p dital shunt removal of calcified tubing.     HPI: 23y Female       Vital Signs Last 24 Hrs  T(C): 37.4 (10 Oct 2023 01:43), Max: 37.4 (10 Oct 2023 01:43)  T(F): 99.3 (10 Oct 2023 01:43), Max: 99.3 (10 Oct 2023 01:43)  HR: 73 (10 Oct 2023 01:43) (63 - 108)  BP: 110/55 (10 Oct 2023 01:43) (103/59 - 147/72)  BP(mean): 79 (09 Oct 2023 18:30) (74 - 93)  RR: 16 (10 Oct 2023 01:43) (15 - 23)  SpO2: 97% (10 Oct 2023 01:43) (94% - 100%)    Parameters below as of 10 Oct 2023 01:43  Patient On (Oxygen Delivery Method): room air          MEDS:   acetaminophen     Tablet .. 325 milliGRAM(s) Oral every 4 hours PRN  deferasirox Tablet 720 milliGRAM(s) Oral daily  lactated ringers. 1000 milliLiter(s) IV Continuous <Continuous>  melatonin 9 milliGRAM(s) Oral at bedtime  oxyCODONE    IR 10 milliGRAM(s) Oral every 6 hours PRN  oxyCODONE    IR 5 milliGRAM(s) Oral every 6 hours PRN  senna 2 Tablet(s) Oral at bedtime  sertraline 50 milliGRAM(s) Oral daily      LABS:              RADIOLOGY:       PHYSICAL EXAM: awake, A&ox3, fc  perrl  rosa 5/5  silt  incision clean, dry dressing
Patient seen and examined s/p removal of calcified distal catheter of non-functioning Right ventriculoperitoneal shunt.     AOx3, EOMI, no facial, no drift, RUSH 5/5    T(C): 37.1 (10-09-23 @ 15:05), Max: 37.1 (10-09-23 @ 15:05)  HR: 105 (10-09-23 @ 15:45) (63 - 108)  BP: 127/76 (10-09-23 @ 15:45) (103/59 - 140/81)  RR: 23 (10-09-23 @ 15:45) (15 - 23)  SpO2: 97% (10-09-23 @ 15:45) (97% - 100%)                    CAPILLARY BLOOD GLUCOSE

## 2023-10-17 ENCOUNTER — RX RENEWAL (OUTPATIENT)
Age: 23
End: 2023-10-17

## 2023-10-17 PROBLEM — M54.9 DORSALGIA, UNSPECIFIED: Chronic | Status: ACTIVE | Noted: 2023-09-29

## 2023-10-17 PROBLEM — J45.909 UNSPECIFIED ASTHMA, UNCOMPLICATED: Chronic | Status: ACTIVE | Noted: 2023-09-29

## 2023-10-27 ENCOUNTER — APPOINTMENT (OUTPATIENT)
Dept: HEMATOLOGY ONCOLOGY | Facility: CLINIC | Age: 23
End: 2023-10-27

## 2023-10-28 ENCOUNTER — APPOINTMENT (OUTPATIENT)
Dept: INFUSION THERAPY | Facility: HOSPITAL | Age: 23
End: 2023-10-28

## 2023-10-28 ENCOUNTER — OUTPATIENT (OUTPATIENT)
Dept: OUTPATIENT SERVICES | Facility: HOSPITAL | Age: 23
LOS: 1 days | End: 2023-10-28

## 2023-10-28 DIAGNOSIS — D64.9 ANEMIA, UNSPECIFIED: ICD-10-CM

## 2023-10-28 DIAGNOSIS — Z95.828 PRESENCE OF OTHER VASCULAR IMPLANTS AND GRAFTS: Chronic | ICD-10-CM

## 2023-10-28 PROCEDURE — 86901 BLOOD TYPING SEROLOGIC RH(D): CPT

## 2023-10-28 PROCEDURE — 86850 RBC ANTIBODY SCREEN: CPT

## 2023-10-28 PROCEDURE — 86902 BLOOD TYPE ANTIGEN DONOR EA: CPT

## 2023-10-28 PROCEDURE — 86923 COMPATIBILITY TEST ELECTRIC: CPT

## 2023-10-28 PROCEDURE — 86900 BLOOD TYPING SEROLOGIC ABO: CPT

## 2023-11-22 ENCOUNTER — OUTPATIENT (OUTPATIENT)
Dept: OUTPATIENT SERVICES | Facility: HOSPITAL | Age: 23
LOS: 1 days | Discharge: HOME | End: 2023-11-22

## 2023-11-22 DIAGNOSIS — D57.3 SICKLE-CELL TRAIT: ICD-10-CM

## 2023-11-22 DIAGNOSIS — Z95.828 PRESENCE OF OTHER VASCULAR IMPLANTS AND GRAFTS: Chronic | ICD-10-CM

## 2023-11-22 DIAGNOSIS — D64.9 ANEMIA, UNSPECIFIED: ICD-10-CM

## 2023-11-25 ENCOUNTER — INPATIENT (INPATIENT)
Facility: HOSPITAL | Age: 23
LOS: 6 days | Discharge: HOME | End: 2023-12-02
Attending: INTERNAL MEDICINE | Admitting: INTERNAL MEDICINE
Payer: MEDICAID

## 2023-11-25 VITALS
SYSTOLIC BLOOD PRESSURE: 92 MMHG | OXYGEN SATURATION: 79 % | RESPIRATION RATE: 26 BRPM | DIASTOLIC BLOOD PRESSURE: 41 MMHG | TEMPERATURE: 100 F | HEART RATE: 111 BPM | HEIGHT: 61 IN

## 2023-11-25 DIAGNOSIS — Z95.828 PRESENCE OF OTHER VASCULAR IMPLANTS AND GRAFTS: Chronic | ICD-10-CM

## 2023-11-25 DIAGNOSIS — R07.9 CHEST PAIN, UNSPECIFIED: ICD-10-CM

## 2023-11-25 LAB
ALBUMIN SERPL ELPH-MCNC: 4.5 G/DL — SIGNIFICANT CHANGE UP (ref 3.3–5)
ALBUMIN SERPL ELPH-MCNC: 4.5 G/DL — SIGNIFICANT CHANGE UP (ref 3.3–5)
ALP SERPL-CCNC: 47 U/L — SIGNIFICANT CHANGE UP (ref 40–120)
ALP SERPL-CCNC: 47 U/L — SIGNIFICANT CHANGE UP (ref 40–120)
ALT FLD-CCNC: 23 U/L — SIGNIFICANT CHANGE UP (ref 4–33)
ALT FLD-CCNC: 23 U/L — SIGNIFICANT CHANGE UP (ref 4–33)
ANION GAP SERPL CALC-SCNC: 10 MMOL/L — SIGNIFICANT CHANGE UP (ref 7–14)
ANION GAP SERPL CALC-SCNC: 10 MMOL/L — SIGNIFICANT CHANGE UP (ref 7–14)
APPEARANCE UR: CLEAR — SIGNIFICANT CHANGE UP
APPEARANCE UR: CLEAR — SIGNIFICANT CHANGE UP
APTT BLD: 25.6 SEC — SIGNIFICANT CHANGE UP (ref 24.5–35.6)
APTT BLD: 25.6 SEC — SIGNIFICANT CHANGE UP (ref 24.5–35.6)
AST SERPL-CCNC: 41 U/L — HIGH (ref 4–32)
AST SERPL-CCNC: 41 U/L — HIGH (ref 4–32)
B PERT DNA SPEC QL NAA+PROBE: SIGNIFICANT CHANGE UP
B PERT DNA SPEC QL NAA+PROBE: SIGNIFICANT CHANGE UP
B PERT+PARAPERT DNA PNL SPEC NAA+PROBE: SIGNIFICANT CHANGE UP
B PERT+PARAPERT DNA PNL SPEC NAA+PROBE: SIGNIFICANT CHANGE UP
BACTERIA # UR AUTO: ABNORMAL /HPF
BACTERIA # UR AUTO: ABNORMAL /HPF
BASOPHILS # BLD AUTO: 0.11 K/UL — SIGNIFICANT CHANGE UP (ref 0–0.2)
BASOPHILS # BLD AUTO: 0.11 K/UL — SIGNIFICANT CHANGE UP (ref 0–0.2)
BASOPHILS NFR BLD AUTO: 0.4 % — SIGNIFICANT CHANGE UP (ref 0–2)
BASOPHILS NFR BLD AUTO: 0.4 % — SIGNIFICANT CHANGE UP (ref 0–2)
BILIRUB SERPL-MCNC: 4.6 MG/DL — HIGH (ref 0.2–1.2)
BILIRUB SERPL-MCNC: 4.6 MG/DL — HIGH (ref 0.2–1.2)
BILIRUB UR-MCNC: NEGATIVE — SIGNIFICANT CHANGE UP
BILIRUB UR-MCNC: NEGATIVE — SIGNIFICANT CHANGE UP
BLD GP AB SCN SERPL QL: NEGATIVE — SIGNIFICANT CHANGE UP
BLD GP AB SCN SERPL QL: NEGATIVE — SIGNIFICANT CHANGE UP
BLOOD GAS VENOUS COMPREHENSIVE RESULT: SIGNIFICANT CHANGE UP
BLOOD GAS VENOUS COMPREHENSIVE RESULT: SIGNIFICANT CHANGE UP
BORDETELLA PARAPERTUSSIS (RAPRVP): SIGNIFICANT CHANGE UP
BORDETELLA PARAPERTUSSIS (RAPRVP): SIGNIFICANT CHANGE UP
BUN SERPL-MCNC: 10 MG/DL — SIGNIFICANT CHANGE UP (ref 7–23)
BUN SERPL-MCNC: 10 MG/DL — SIGNIFICANT CHANGE UP (ref 7–23)
C PNEUM DNA SPEC QL NAA+PROBE: SIGNIFICANT CHANGE UP
C PNEUM DNA SPEC QL NAA+PROBE: SIGNIFICANT CHANGE UP
CALCIUM SERPL-MCNC: 9.1 MG/DL — SIGNIFICANT CHANGE UP (ref 8.4–10.5)
CALCIUM SERPL-MCNC: 9.1 MG/DL — SIGNIFICANT CHANGE UP (ref 8.4–10.5)
CAST: 0 /LPF — SIGNIFICANT CHANGE UP (ref 0–4)
CAST: 0 /LPF — SIGNIFICANT CHANGE UP (ref 0–4)
CHLORIDE SERPL-SCNC: 103 MMOL/L — SIGNIFICANT CHANGE UP (ref 98–107)
CHLORIDE SERPL-SCNC: 103 MMOL/L — SIGNIFICANT CHANGE UP (ref 98–107)
CO2 SERPL-SCNC: 22 MMOL/L — SIGNIFICANT CHANGE UP (ref 22–31)
CO2 SERPL-SCNC: 22 MMOL/L — SIGNIFICANT CHANGE UP (ref 22–31)
COLOR SPEC: YELLOW — SIGNIFICANT CHANGE UP
COLOR SPEC: YELLOW — SIGNIFICANT CHANGE UP
CREAT SERPL-MCNC: 0.65 MG/DL — SIGNIFICANT CHANGE UP (ref 0.5–1.3)
CREAT SERPL-MCNC: 0.65 MG/DL — SIGNIFICANT CHANGE UP (ref 0.5–1.3)
DIFF PNL FLD: NEGATIVE — SIGNIFICANT CHANGE UP
DIFF PNL FLD: NEGATIVE — SIGNIFICANT CHANGE UP
EGFR: 127 ML/MIN/1.73M2 — SIGNIFICANT CHANGE UP
EGFR: 127 ML/MIN/1.73M2 — SIGNIFICANT CHANGE UP
EOSINOPHIL # BLD AUTO: 0.08 K/UL — SIGNIFICANT CHANGE UP (ref 0–0.5)
EOSINOPHIL # BLD AUTO: 0.08 K/UL — SIGNIFICANT CHANGE UP (ref 0–0.5)
EOSINOPHIL NFR BLD AUTO: 0.3 % — SIGNIFICANT CHANGE UP (ref 0–6)
EOSINOPHIL NFR BLD AUTO: 0.3 % — SIGNIFICANT CHANGE UP (ref 0–6)
FLUAV SUBTYP SPEC NAA+PROBE: SIGNIFICANT CHANGE UP
FLUAV SUBTYP SPEC NAA+PROBE: SIGNIFICANT CHANGE UP
FLUBV RNA SPEC QL NAA+PROBE: SIGNIFICANT CHANGE UP
FLUBV RNA SPEC QL NAA+PROBE: SIGNIFICANT CHANGE UP
GLUCOSE SERPL-MCNC: 99 MG/DL — SIGNIFICANT CHANGE UP (ref 70–99)
GLUCOSE SERPL-MCNC: 99 MG/DL — SIGNIFICANT CHANGE UP (ref 70–99)
GLUCOSE UR QL: NEGATIVE MG/DL — SIGNIFICANT CHANGE UP
GLUCOSE UR QL: NEGATIVE MG/DL — SIGNIFICANT CHANGE UP
HADV DNA SPEC QL NAA+PROBE: SIGNIFICANT CHANGE UP
HADV DNA SPEC QL NAA+PROBE: SIGNIFICANT CHANGE UP
HCG SERPL-ACNC: <1 MIU/ML — SIGNIFICANT CHANGE UP
HCG SERPL-ACNC: <1 MIU/ML — SIGNIFICANT CHANGE UP
HCOV 229E RNA SPEC QL NAA+PROBE: SIGNIFICANT CHANGE UP
HCOV 229E RNA SPEC QL NAA+PROBE: SIGNIFICANT CHANGE UP
HCOV HKU1 RNA SPEC QL NAA+PROBE: SIGNIFICANT CHANGE UP
HCOV HKU1 RNA SPEC QL NAA+PROBE: SIGNIFICANT CHANGE UP
HCOV NL63 RNA SPEC QL NAA+PROBE: SIGNIFICANT CHANGE UP
HCOV NL63 RNA SPEC QL NAA+PROBE: SIGNIFICANT CHANGE UP
HCOV OC43 RNA SPEC QL NAA+PROBE: SIGNIFICANT CHANGE UP
HCOV OC43 RNA SPEC QL NAA+PROBE: SIGNIFICANT CHANGE UP
HCT VFR BLD CALC: 23.4 % — LOW (ref 34.5–45)
HCT VFR BLD CALC: 23.4 % — LOW (ref 34.5–45)
HGB BLD-MCNC: 8 G/DL — LOW (ref 11.5–15.5)
HGB BLD-MCNC: 8 G/DL — LOW (ref 11.5–15.5)
HMPV RNA SPEC QL NAA+PROBE: SIGNIFICANT CHANGE UP
HMPV RNA SPEC QL NAA+PROBE: SIGNIFICANT CHANGE UP
HPIV1 RNA SPEC QL NAA+PROBE: SIGNIFICANT CHANGE UP
HPIV1 RNA SPEC QL NAA+PROBE: SIGNIFICANT CHANGE UP
HPIV2 RNA SPEC QL NAA+PROBE: SIGNIFICANT CHANGE UP
HPIV2 RNA SPEC QL NAA+PROBE: SIGNIFICANT CHANGE UP
HPIV3 RNA SPEC QL NAA+PROBE: SIGNIFICANT CHANGE UP
HPIV3 RNA SPEC QL NAA+PROBE: SIGNIFICANT CHANGE UP
HPIV4 RNA SPEC QL NAA+PROBE: SIGNIFICANT CHANGE UP
HPIV4 RNA SPEC QL NAA+PROBE: SIGNIFICANT CHANGE UP
IANC: 24.96 K/UL — HIGH (ref 1.8–7.4)
IANC: 24.96 K/UL — HIGH (ref 1.8–7.4)
IMM GRANULOCYTES NFR BLD AUTO: 0.9 % — SIGNIFICANT CHANGE UP (ref 0–0.9)
IMM GRANULOCYTES NFR BLD AUTO: 0.9 % — SIGNIFICANT CHANGE UP (ref 0–0.9)
INR BLD: 1.18 RATIO — SIGNIFICANT CHANGE UP (ref 0.85–1.18)
INR BLD: 1.18 RATIO — SIGNIFICANT CHANGE UP (ref 0.85–1.18)
KETONES UR-MCNC: NEGATIVE MG/DL — SIGNIFICANT CHANGE UP
KETONES UR-MCNC: NEGATIVE MG/DL — SIGNIFICANT CHANGE UP
LEUKOCYTE ESTERASE UR-ACNC: ABNORMAL
LEUKOCYTE ESTERASE UR-ACNC: ABNORMAL
LYMPHOCYTES # BLD AUTO: 1.02 K/UL — SIGNIFICANT CHANGE UP (ref 1–3.3)
LYMPHOCYTES # BLD AUTO: 1.02 K/UL — SIGNIFICANT CHANGE UP (ref 1–3.3)
LYMPHOCYTES # BLD AUTO: 3.5 % — LOW (ref 13–44)
LYMPHOCYTES # BLD AUTO: 3.5 % — LOW (ref 13–44)
M PNEUMO DNA SPEC QL NAA+PROBE: SIGNIFICANT CHANGE UP
M PNEUMO DNA SPEC QL NAA+PROBE: SIGNIFICANT CHANGE UP
MAGNESIUM SERPL-MCNC: 1.6 MG/DL — SIGNIFICANT CHANGE UP (ref 1.6–2.6)
MAGNESIUM SERPL-MCNC: 1.6 MG/DL — SIGNIFICANT CHANGE UP (ref 1.6–2.6)
MCHC RBC-ENTMCNC: 30.5 PG — SIGNIFICANT CHANGE UP (ref 27–34)
MCHC RBC-ENTMCNC: 30.5 PG — SIGNIFICANT CHANGE UP (ref 27–34)
MCHC RBC-ENTMCNC: 34.2 GM/DL — SIGNIFICANT CHANGE UP (ref 32–36)
MCHC RBC-ENTMCNC: 34.2 GM/DL — SIGNIFICANT CHANGE UP (ref 32–36)
MCV RBC AUTO: 89.3 FL — SIGNIFICANT CHANGE UP (ref 80–100)
MCV RBC AUTO: 89.3 FL — SIGNIFICANT CHANGE UP (ref 80–100)
MONOCYTES # BLD AUTO: 2.42 K/UL — HIGH (ref 0–0.9)
MONOCYTES # BLD AUTO: 2.42 K/UL — HIGH (ref 0–0.9)
MONOCYTES NFR BLD AUTO: 8.4 % — SIGNIFICANT CHANGE UP (ref 2–14)
MONOCYTES NFR BLD AUTO: 8.4 % — SIGNIFICANT CHANGE UP (ref 2–14)
NEUTROPHILS # BLD AUTO: 24.96 K/UL — HIGH (ref 1.8–7.4)
NEUTROPHILS # BLD AUTO: 24.96 K/UL — HIGH (ref 1.8–7.4)
NEUTROPHILS NFR BLD AUTO: 86.5 % — HIGH (ref 43–77)
NEUTROPHILS NFR BLD AUTO: 86.5 % — HIGH (ref 43–77)
NITRITE UR-MCNC: NEGATIVE — SIGNIFICANT CHANGE UP
NITRITE UR-MCNC: NEGATIVE — SIGNIFICANT CHANGE UP
NRBC # BLD: 0 /100 WBCS — SIGNIFICANT CHANGE UP (ref 0–0)
NRBC # BLD: 0 /100 WBCS — SIGNIFICANT CHANGE UP (ref 0–0)
NRBC # FLD: 0.08 K/UL — HIGH (ref 0–0)
NRBC # FLD: 0.08 K/UL — HIGH (ref 0–0)
NT-PROBNP SERPL-SCNC: <36 PG/ML — SIGNIFICANT CHANGE UP
NT-PROBNP SERPL-SCNC: <36 PG/ML — SIGNIFICANT CHANGE UP
PH UR: 7.5 — SIGNIFICANT CHANGE UP (ref 5–8)
PH UR: 7.5 — SIGNIFICANT CHANGE UP (ref 5–8)
PLATELET # BLD AUTO: 399 K/UL — SIGNIFICANT CHANGE UP (ref 150–400)
PLATELET # BLD AUTO: 399 K/UL — SIGNIFICANT CHANGE UP (ref 150–400)
POTASSIUM SERPL-MCNC: 4.1 MMOL/L — SIGNIFICANT CHANGE UP (ref 3.5–5.3)
POTASSIUM SERPL-MCNC: 4.1 MMOL/L — SIGNIFICANT CHANGE UP (ref 3.5–5.3)
POTASSIUM SERPL-SCNC: 4.1 MMOL/L — SIGNIFICANT CHANGE UP (ref 3.5–5.3)
POTASSIUM SERPL-SCNC: 4.1 MMOL/L — SIGNIFICANT CHANGE UP (ref 3.5–5.3)
PROT SERPL-MCNC: 7 G/DL — SIGNIFICANT CHANGE UP (ref 6–8.3)
PROT SERPL-MCNC: 7 G/DL — SIGNIFICANT CHANGE UP (ref 6–8.3)
PROT UR-MCNC: 30 MG/DL
PROT UR-MCNC: 30 MG/DL
PROTHROM AB SERPL-ACNC: 13.2 SEC — HIGH (ref 9.5–13)
PROTHROM AB SERPL-ACNC: 13.2 SEC — HIGH (ref 9.5–13)
RAPID RVP RESULT: SIGNIFICANT CHANGE UP
RAPID RVP RESULT: SIGNIFICANT CHANGE UP
RBC # BLD: 2.62 M/UL — LOW (ref 3.8–5.2)
RBC # BLD: 2.62 M/UL — LOW (ref 3.8–5.2)
RBC # FLD: 18.7 % — HIGH (ref 10.3–14.5)
RBC # FLD: 18.7 % — HIGH (ref 10.3–14.5)
RBC CASTS # UR COMP ASSIST: 1 /HPF — SIGNIFICANT CHANGE UP (ref 0–4)
RBC CASTS # UR COMP ASSIST: 1 /HPF — SIGNIFICANT CHANGE UP (ref 0–4)
RH IG SCN BLD-IMP: POSITIVE — SIGNIFICANT CHANGE UP
RH IG SCN BLD-IMP: POSITIVE — SIGNIFICANT CHANGE UP
RSV RNA SPEC QL NAA+PROBE: SIGNIFICANT CHANGE UP
RSV RNA SPEC QL NAA+PROBE: SIGNIFICANT CHANGE UP
RV+EV RNA SPEC QL NAA+PROBE: SIGNIFICANT CHANGE UP
RV+EV RNA SPEC QL NAA+PROBE: SIGNIFICANT CHANGE UP
SARS-COV-2 RNA SPEC QL NAA+PROBE: SIGNIFICANT CHANGE UP
SARS-COV-2 RNA SPEC QL NAA+PROBE: SIGNIFICANT CHANGE UP
SODIUM SERPL-SCNC: 135 MMOL/L — SIGNIFICANT CHANGE UP (ref 135–145)
SODIUM SERPL-SCNC: 135 MMOL/L — SIGNIFICANT CHANGE UP (ref 135–145)
SP GR SPEC: 1.07 — HIGH (ref 1–1.03)
SP GR SPEC: 1.07 — HIGH (ref 1–1.03)
SQUAMOUS # UR AUTO: 4 /HPF — SIGNIFICANT CHANGE UP (ref 0–5)
SQUAMOUS # UR AUTO: 4 /HPF — SIGNIFICANT CHANGE UP (ref 0–5)
TROPONIN T, HIGH SENSITIVITY RESULT: <6 NG/L — SIGNIFICANT CHANGE UP
TROPONIN T, HIGH SENSITIVITY RESULT: <6 NG/L — SIGNIFICANT CHANGE UP
UROBILINOGEN FLD QL: 1 MG/DL — SIGNIFICANT CHANGE UP (ref 0.2–1)
UROBILINOGEN FLD QL: 1 MG/DL — SIGNIFICANT CHANGE UP (ref 0.2–1)
WBC # BLD: 28.85 K/UL — HIGH (ref 3.8–10.5)
WBC # BLD: 28.85 K/UL — HIGH (ref 3.8–10.5)
WBC # FLD AUTO: 28.85 K/UL — HIGH (ref 3.8–10.5)
WBC # FLD AUTO: 28.85 K/UL — HIGH (ref 3.8–10.5)
WBC UR QL: 27 /HPF — HIGH (ref 0–5)
WBC UR QL: 27 /HPF — HIGH (ref 0–5)

## 2023-11-25 PROCEDURE — 71275 CT ANGIOGRAPHY CHEST: CPT | Mod: 26,MA

## 2023-11-25 PROCEDURE — 71046 X-RAY EXAM CHEST 2 VIEWS: CPT | Mod: 26

## 2023-11-25 PROCEDURE — 99285 EMERGENCY DEPT VISIT HI MDM: CPT

## 2023-11-25 RX ORDER — MORPHINE SULFATE 50 MG/1
4 CAPSULE, EXTENDED RELEASE ORAL ONCE
Refills: 0 | Status: DISCONTINUED | OUTPATIENT
Start: 2023-11-25 | End: 2023-11-25

## 2023-11-25 RX ORDER — ONDANSETRON 8 MG/1
4 TABLET, FILM COATED ORAL EVERY 6 HOURS
Refills: 0 | Status: DISCONTINUED | OUTPATIENT
Start: 2023-11-25 | End: 2023-12-02

## 2023-11-25 RX ORDER — PIPERACILLIN AND TAZOBACTAM 4; .5 G/20ML; G/20ML
3.38 INJECTION, POWDER, LYOPHILIZED, FOR SOLUTION INTRAVENOUS ONCE
Refills: 0 | Status: COMPLETED | OUTPATIENT
Start: 2023-11-25 | End: 2023-11-25

## 2023-11-25 RX ORDER — FOLIC ACID 0.8 MG
1 TABLET ORAL DAILY
Refills: 0 | Status: DISCONTINUED | OUTPATIENT
Start: 2023-11-25 | End: 2023-12-02

## 2023-11-25 RX ORDER — KETOROLAC TROMETHAMINE 30 MG/ML
15 SYRINGE (ML) INJECTION ONCE
Refills: 0 | Status: DISCONTINUED | OUTPATIENT
Start: 2023-11-25 | End: 2023-11-25

## 2023-11-25 RX ORDER — KETOROLAC TROMETHAMINE 30 MG/ML
15 SYRINGE (ML) INJECTION EVERY 8 HOURS
Refills: 0 | Status: DISCONTINUED | OUTPATIENT
Start: 2023-11-25 | End: 2023-11-26

## 2023-11-25 RX ORDER — MORPHINE SULFATE 50 MG/1
4 CAPSULE, EXTENDED RELEASE ORAL EVERY 4 HOURS
Refills: 0 | Status: DISCONTINUED | OUTPATIENT
Start: 2023-11-25 | End: 2023-11-26

## 2023-11-25 RX ORDER — ACETAMINOPHEN 500 MG
975 TABLET ORAL ONCE
Refills: 0 | Status: COMPLETED | OUTPATIENT
Start: 2023-11-25 | End: 2023-11-25

## 2023-11-25 RX ORDER — MORPHINE SULFATE 50 MG/1
30 CAPSULE, EXTENDED RELEASE ORAL
Refills: 0 | Status: DISCONTINUED | OUTPATIENT
Start: 2023-11-25 | End: 2023-11-25

## 2023-11-25 RX ORDER — SODIUM CHLORIDE 9 MG/ML
500 INJECTION INTRAMUSCULAR; INTRAVENOUS; SUBCUTANEOUS ONCE
Refills: 0 | Status: COMPLETED | OUTPATIENT
Start: 2023-11-25 | End: 2023-11-25

## 2023-11-25 RX ORDER — NALOXONE HYDROCHLORIDE 4 MG/.1ML
0.1 SPRAY NASAL
Refills: 0 | Status: DISCONTINUED | OUTPATIENT
Start: 2023-11-25 | End: 2023-12-02

## 2023-11-25 RX ORDER — SODIUM CHLORIDE 9 MG/ML
1000 INJECTION, SOLUTION INTRAVENOUS
Refills: 0 | Status: DISCONTINUED | OUTPATIENT
Start: 2023-11-25 | End: 2023-11-26

## 2023-11-25 RX ADMIN — Medication 15 MILLIGRAM(S): at 16:52

## 2023-11-25 RX ADMIN — Medication 15 MILLIGRAM(S): at 22:14

## 2023-11-25 RX ADMIN — PIPERACILLIN AND TAZOBACTAM 200 GRAM(S): 4; .5 INJECTION, POWDER, LYOPHILIZED, FOR SOLUTION INTRAVENOUS at 21:05

## 2023-11-25 RX ADMIN — SODIUM CHLORIDE 500 MILLILITER(S): 9 INJECTION INTRAMUSCULAR; INTRAVENOUS; SUBCUTANEOUS at 21:06

## 2023-11-25 RX ADMIN — Medication 975 MILLIGRAM(S): at 19:31

## 2023-11-25 RX ADMIN — MORPHINE SULFATE 4 MILLIGRAM(S): 50 CAPSULE, EXTENDED RELEASE ORAL at 16:52
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
## 2023-11-25 NOTE — ED ADULT NURSE NOTE - NSFALLUNIVINTERV_ED_ALL_ED
Bed/Stretcher in lowest position, wheels locked, appropriate side rails in place/Call bell, personal items and telephone in reach/Instruct patient to call for assistance before getting out of bed/chair/stretcher/Non-slip footwear applied when patient is off stretcher/[place] to call system/Physically safe environment - no spills, clutter or unnecessary equipment/Purposeful proactive rounding/Room/bathroom lighting operational, light cord in reach

## 2023-11-25 NOTE — ED PROVIDER NOTE - ATTENDING CONTRIBUTION TO CARE
Dr. Barajas, Attending Physician-  I performed a face to face bedside interview with patient regarding history of present illness, review of symptoms and past medical history. I completed an independent physical exam.  I have discussed patient's plan of care with the resident.    23F, SCD, prior PE (no longer on A/C), prior transfusions, who presents with chest tightness. Reports chest tightness since this morning. Atraumatic. No fevers/chills/cough. No URI sxs. No recent travel or sick contacts. Tolerating PO. Stooling/voiding at baseline. No leg swelling. On OCPS. Physical: Afebrile, well appearing, neck supple, no rash, rrr, ctabl, abdomen soft and ndnt, no le edema, stable gait. Plan: labs, CTPE. Will assess H/H for anemia. EKG non-ischemic at this time.

## 2023-11-25 NOTE — ED PROVIDER NOTE - PROGRESS NOTE DETAILS
James, PGY-3, EM: received sign out on this patient. renal infarct present. Given CP and hx of acute chest in setting of fever, would admit. s/w Dr. Granado he accepts, would place on tele w/ continuous pulse ox

## 2023-11-25 NOTE — ED ADULT TRIAGE NOTE - CHIEF COMPLAINT QUOTE
pt c/o acute left lower chest pain today 10/10, pt med hx Sickle cell disease, PE no longer on blood thinners. pt short of breath, poor inspiration effort with poor o2 sat ,80 reading, placed on 100% NRB.

## 2023-11-25 NOTE — ED PROVIDER NOTE - OBJECTIVE STATEMENT
23Y F PMH sickle cell disease w h/o acute chest, hydrocephalus s/p  shunt with recent revision, PE 1 year ago not currently on AC, presenting with 1 day of chest pain and SOB. Patient reports that symptoms began suddenly a few hours ago and have been worsening throughout the day. Notes that her last blood transfusion was about 1 month ago and she feels she may be due for transfusion now. Denies fevers, chills, vomiting, abdominal pain, urinary symptoms, leg pain or swelling.

## 2023-11-25 NOTE — ED PROVIDER NOTE - CLINICAL SUMMARY MEDICAL DECISION MAKING FREE TEXT BOX
Oriented - self; Oriented - place; Oriented - time/Age appropriate behavior
23Y F PMH sickle cell disease w h/o acute chest, hydrocephalus s/p  shunt with recent revision, PE 1 year ago not currently on AC, presenting with 1 day of chest pain and SOB. Patient satting 80% at triage, placed on 15L NRB. On arrival to room, pulse ox placed and patient satting 100% on RA without oxygen. Heart and lungs clear. No leg pain or swelling. DDx broad and includes but not limited to PE, acute chest, pneumonia, viral illness, anemia. Will obtain labs including coags, T&S, RVP, CXR, and CTA to evaluate PE.

## 2023-11-26 DIAGNOSIS — G91.9 HYDROCEPHALUS, UNSPECIFIED: ICD-10-CM

## 2023-11-26 DIAGNOSIS — Z86.711 PERSONAL HISTORY OF PULMONARY EMBOLISM: ICD-10-CM

## 2023-11-26 DIAGNOSIS — D57.00 HB-SS DISEASE WITH CRISIS, UNSPECIFIED: ICD-10-CM

## 2023-11-26 DIAGNOSIS — A41.9 SEPSIS, UNSPECIFIED ORGANISM: ICD-10-CM

## 2023-11-26 DIAGNOSIS — Z29.9 ENCOUNTER FOR PROPHYLACTIC MEASURES, UNSPECIFIED: ICD-10-CM

## 2023-11-26 DIAGNOSIS — F41.9 ANXIETY DISORDER, UNSPECIFIED: ICD-10-CM

## 2023-11-26 DIAGNOSIS — Z98.2 PRESENCE OF CEREBROSPINAL FLUID DRAINAGE DEVICE: ICD-10-CM

## 2023-11-26 LAB
ALBUMIN SERPL ELPH-MCNC: 3.7 G/DL — SIGNIFICANT CHANGE UP (ref 3.3–5)
ALBUMIN SERPL ELPH-MCNC: 3.7 G/DL — SIGNIFICANT CHANGE UP (ref 3.3–5)
ALP SERPL-CCNC: 39 U/L — LOW (ref 40–120)
ALP SERPL-CCNC: 39 U/L — LOW (ref 40–120)
ALT FLD-CCNC: 19 U/L — SIGNIFICANT CHANGE UP (ref 4–33)
ALT FLD-CCNC: 19 U/L — SIGNIFICANT CHANGE UP (ref 4–33)
ANION GAP SERPL CALC-SCNC: 13 MMOL/L — SIGNIFICANT CHANGE UP (ref 7–14)
ANION GAP SERPL CALC-SCNC: 13 MMOL/L — SIGNIFICANT CHANGE UP (ref 7–14)
APTT BLD: 26.5 SEC — SIGNIFICANT CHANGE UP (ref 24.5–35.6)
APTT BLD: 26.5 SEC — SIGNIFICANT CHANGE UP (ref 24.5–35.6)
AST SERPL-CCNC: 31 U/L — SIGNIFICANT CHANGE UP (ref 4–32)
AST SERPL-CCNC: 31 U/L — SIGNIFICANT CHANGE UP (ref 4–32)
BASOPHILS # BLD AUTO: 0.04 K/UL — SIGNIFICANT CHANGE UP (ref 0–0.2)
BASOPHILS # BLD AUTO: 0.04 K/UL — SIGNIFICANT CHANGE UP (ref 0–0.2)
BASOPHILS NFR BLD AUTO: 0.2 % — SIGNIFICANT CHANGE UP (ref 0–2)
BASOPHILS NFR BLD AUTO: 0.2 % — SIGNIFICANT CHANGE UP (ref 0–2)
BILIRUB DIRECT SERPL-MCNC: 0.3 MG/DL — SIGNIFICANT CHANGE UP (ref 0–0.3)
BILIRUB DIRECT SERPL-MCNC: 0.3 MG/DL — SIGNIFICANT CHANGE UP (ref 0–0.3)
BILIRUB INDIRECT FLD-MCNC: 4.1 MG/DL — HIGH (ref 0–1)
BILIRUB INDIRECT FLD-MCNC: 4.1 MG/DL — HIGH (ref 0–1)
BILIRUB SERPL-MCNC: 4.4 MG/DL — HIGH (ref 0.2–1.2)
BLD GP AB SCN SERPL QL: NEGATIVE — SIGNIFICANT CHANGE UP
BLD GP AB SCN SERPL QL: NEGATIVE — SIGNIFICANT CHANGE UP
BUN SERPL-MCNC: 11 MG/DL — SIGNIFICANT CHANGE UP (ref 7–23)
BUN SERPL-MCNC: 11 MG/DL — SIGNIFICANT CHANGE UP (ref 7–23)
CALCIUM SERPL-MCNC: 7.3 MG/DL — LOW (ref 8.4–10.5)
CALCIUM SERPL-MCNC: 7.3 MG/DL — LOW (ref 8.4–10.5)
CHLORIDE SERPL-SCNC: 98 MMOL/L — SIGNIFICANT CHANGE UP (ref 98–107)
CHLORIDE SERPL-SCNC: 98 MMOL/L — SIGNIFICANT CHANGE UP (ref 98–107)
CO2 SERPL-SCNC: 16 MMOL/L — LOW (ref 22–31)
CO2 SERPL-SCNC: 16 MMOL/L — LOW (ref 22–31)
CREAT SERPL-MCNC: 0.57 MG/DL — SIGNIFICANT CHANGE UP (ref 0.5–1.3)
CREAT SERPL-MCNC: 0.57 MG/DL — SIGNIFICANT CHANGE UP (ref 0.5–1.3)
EGFR: 131 ML/MIN/1.73M2 — SIGNIFICANT CHANGE UP
EGFR: 131 ML/MIN/1.73M2 — SIGNIFICANT CHANGE UP
EOSINOPHIL # BLD AUTO: 0.01 K/UL — SIGNIFICANT CHANGE UP (ref 0–0.5)
EOSINOPHIL # BLD AUTO: 0.01 K/UL — SIGNIFICANT CHANGE UP (ref 0–0.5)
EOSINOPHIL NFR BLD AUTO: 0 % — SIGNIFICANT CHANGE UP (ref 0–6)
EOSINOPHIL NFR BLD AUTO: 0 % — SIGNIFICANT CHANGE UP (ref 0–6)
GLUCOSE SERPL-MCNC: 89 MG/DL — SIGNIFICANT CHANGE UP (ref 70–99)
GLUCOSE SERPL-MCNC: 89 MG/DL — SIGNIFICANT CHANGE UP (ref 70–99)
GRAM STN FLD: ABNORMAL
GRAM STN FLD: ABNORMAL
HAPTOGLOB SERPL-MCNC: <20 MG/DL — LOW (ref 34–200)
HAPTOGLOB SERPL-MCNC: <20 MG/DL — LOW (ref 34–200)
HCT VFR BLD CALC: 18.7 % — CRITICAL LOW (ref 34.5–45)
HCT VFR BLD CALC: 18.7 % — CRITICAL LOW (ref 34.5–45)
HCT VFR BLD CALC: 23.7 % — LOW (ref 34.5–45)
HCT VFR BLD CALC: 23.7 % — LOW (ref 34.5–45)
HGB BLD-MCNC: 6.3 G/DL — CRITICAL LOW (ref 11.5–15.5)
HGB BLD-MCNC: 6.3 G/DL — CRITICAL LOW (ref 11.5–15.5)
HGB BLD-MCNC: 8.2 G/DL — LOW (ref 11.5–15.5)
HGB BLD-MCNC: 8.2 G/DL — LOW (ref 11.5–15.5)
IANC: 18.09 K/UL — HIGH (ref 1.8–7.4)
IANC: 18.09 K/UL — HIGH (ref 1.8–7.4)
IMM GRANULOCYTES NFR BLD AUTO: 0.9 % — SIGNIFICANT CHANGE UP (ref 0–0.9)
IMM GRANULOCYTES NFR BLD AUTO: 0.9 % — SIGNIFICANT CHANGE UP (ref 0–0.9)
INR BLD: 1.54 RATIO — HIGH (ref 0.85–1.18)
INR BLD: 1.54 RATIO — HIGH (ref 0.85–1.18)
LDH SERPL L TO P-CCNC: 305 U/L — HIGH (ref 135–225)
LDH SERPL L TO P-CCNC: 305 U/L — HIGH (ref 135–225)
LYMPHOCYTES # BLD AUTO: 1.59 K/UL — SIGNIFICANT CHANGE UP (ref 1–3.3)
LYMPHOCYTES # BLD AUTO: 1.59 K/UL — SIGNIFICANT CHANGE UP (ref 1–3.3)
LYMPHOCYTES # BLD AUTO: 7.5 % — LOW (ref 13–44)
LYMPHOCYTES # BLD AUTO: 7.5 % — LOW (ref 13–44)
MAGNESIUM SERPL-MCNC: 1.6 MG/DL — SIGNIFICANT CHANGE UP (ref 1.6–2.6)
MAGNESIUM SERPL-MCNC: 1.6 MG/DL — SIGNIFICANT CHANGE UP (ref 1.6–2.6)
MCHC RBC-ENTMCNC: 30.7 PG — SIGNIFICANT CHANGE UP (ref 27–34)
MCHC RBC-ENTMCNC: 30.7 PG — SIGNIFICANT CHANGE UP (ref 27–34)
MCHC RBC-ENTMCNC: 30.8 PG — SIGNIFICANT CHANGE UP (ref 27–34)
MCHC RBC-ENTMCNC: 30.8 PG — SIGNIFICANT CHANGE UP (ref 27–34)
MCHC RBC-ENTMCNC: 33.7 GM/DL — SIGNIFICANT CHANGE UP (ref 32–36)
MCHC RBC-ENTMCNC: 33.7 GM/DL — SIGNIFICANT CHANGE UP (ref 32–36)
MCHC RBC-ENTMCNC: 34.6 GM/DL — SIGNIFICANT CHANGE UP (ref 32–36)
MCHC RBC-ENTMCNC: 34.6 GM/DL — SIGNIFICANT CHANGE UP (ref 32–36)
MCV RBC AUTO: 89.1 FL — SIGNIFICANT CHANGE UP (ref 80–100)
MCV RBC AUTO: 89.1 FL — SIGNIFICANT CHANGE UP (ref 80–100)
MCV RBC AUTO: 91.2 FL — SIGNIFICANT CHANGE UP (ref 80–100)
MCV RBC AUTO: 91.2 FL — SIGNIFICANT CHANGE UP (ref 80–100)
METHOD TYPE: SIGNIFICANT CHANGE UP
METHOD TYPE: SIGNIFICANT CHANGE UP
MONOCYTES # BLD AUTO: 1.25 K/UL — HIGH (ref 0–0.9)
MONOCYTES # BLD AUTO: 1.25 K/UL — HIGH (ref 0–0.9)
MONOCYTES NFR BLD AUTO: 5.9 % — SIGNIFICANT CHANGE UP (ref 2–14)
MONOCYTES NFR BLD AUTO: 5.9 % — SIGNIFICANT CHANGE UP (ref 2–14)
NEUTROPHILS # BLD AUTO: 18.09 K/UL — HIGH (ref 1.8–7.4)
NEUTROPHILS # BLD AUTO: 18.09 K/UL — HIGH (ref 1.8–7.4)
NEUTROPHILS NFR BLD AUTO: 85.5 % — HIGH (ref 43–77)
NEUTROPHILS NFR BLD AUTO: 85.5 % — HIGH (ref 43–77)
NRBC # BLD: 0 /100 WBCS — SIGNIFICANT CHANGE UP (ref 0–0)
NRBC # FLD: 0.03 K/UL — HIGH (ref 0–0)
NRBC # FLD: 0.03 K/UL — HIGH (ref 0–0)
NRBC # FLD: 0.06 K/UL — HIGH (ref 0–0)
NRBC # FLD: 0.06 K/UL — HIGH (ref 0–0)
OSMOLALITY UR: 211 MOSM/KG — SIGNIFICANT CHANGE UP (ref 50–1200)
OSMOLALITY UR: 211 MOSM/KG — SIGNIFICANT CHANGE UP (ref 50–1200)
PHOSPHATE SERPL-MCNC: 2.8 MG/DL — SIGNIFICANT CHANGE UP (ref 2.5–4.5)
PHOSPHATE SERPL-MCNC: 2.8 MG/DL — SIGNIFICANT CHANGE UP (ref 2.5–4.5)
PLATELET # BLD AUTO: 291 K/UL — SIGNIFICANT CHANGE UP (ref 150–400)
PLATELET # BLD AUTO: 291 K/UL — SIGNIFICANT CHANGE UP (ref 150–400)
PLATELET # BLD AUTO: 307 K/UL — SIGNIFICANT CHANGE UP (ref 150–400)
PLATELET # BLD AUTO: 307 K/UL — SIGNIFICANT CHANGE UP (ref 150–400)
POTASSIUM SERPL-MCNC: 3.3 MMOL/L — LOW (ref 3.5–5.3)
POTASSIUM SERPL-MCNC: 3.3 MMOL/L — LOW (ref 3.5–5.3)
POTASSIUM SERPL-SCNC: 3.3 MMOL/L — LOW (ref 3.5–5.3)
POTASSIUM SERPL-SCNC: 3.3 MMOL/L — LOW (ref 3.5–5.3)
PROT SERPL-MCNC: 5.8 G/DL — LOW (ref 6–8.3)
PROT SERPL-MCNC: 5.8 G/DL — LOW (ref 6–8.3)
PROTEUS SP DNA BLD POS QL NAA+NON-PROBE: SIGNIFICANT CHANGE UP
PROTEUS SP DNA BLD POS QL NAA+NON-PROBE: SIGNIFICANT CHANGE UP
PROTHROM AB SERPL-ACNC: 17.2 SEC — HIGH (ref 9.5–13)
PROTHROM AB SERPL-ACNC: 17.2 SEC — HIGH (ref 9.5–13)
RBC # BLD: 2.05 M/UL — LOW (ref 3.8–5.2)
RBC # BLD: 2.66 M/UL — LOW (ref 3.8–5.2)
RBC # BLD: 2.66 M/UL — LOW (ref 3.8–5.2)
RBC # FLD: 17.5 % — HIGH (ref 10.3–14.5)
RBC # FLD: 17.5 % — HIGH (ref 10.3–14.5)
RBC # FLD: 18.9 % — HIGH (ref 10.3–14.5)
RBC # FLD: 18.9 % — HIGH (ref 10.3–14.5)
RETICS #: 314.7 K/UL — HIGH (ref 25–125)
RETICS #: 314.7 K/UL — HIGH (ref 25–125)
RETICS/RBC NFR: 15.5 % — HIGH (ref 0.5–2.5)
RETICS/RBC NFR: 15.5 % — HIGH (ref 0.5–2.5)
RH IG SCN BLD-IMP: POSITIVE — SIGNIFICANT CHANGE UP
RH IG SCN BLD-IMP: POSITIVE — SIGNIFICANT CHANGE UP
SODIUM SERPL-SCNC: 127 MMOL/L — LOW (ref 135–145)
SODIUM SERPL-SCNC: 127 MMOL/L — LOW (ref 135–145)
SPECIMEN SOURCE: SIGNIFICANT CHANGE UP
SPECIMEN SOURCE: SIGNIFICANT CHANGE UP
WBC # BLD: 19.54 K/UL — HIGH (ref 3.8–10.5)
WBC # BLD: 19.54 K/UL — HIGH (ref 3.8–10.5)
WBC # BLD: 21.16 K/UL — HIGH (ref 3.8–10.5)
WBC # BLD: 21.16 K/UL — HIGH (ref 3.8–10.5)
WBC # FLD AUTO: 19.54 K/UL — HIGH (ref 3.8–10.5)
WBC # FLD AUTO: 19.54 K/UL — HIGH (ref 3.8–10.5)
WBC # FLD AUTO: 21.16 K/UL — HIGH (ref 3.8–10.5)
WBC # FLD AUTO: 21.16 K/UL — HIGH (ref 3.8–10.5)

## 2023-11-26 PROCEDURE — 99223 1ST HOSP IP/OBS HIGH 75: CPT

## 2023-11-26 PROCEDURE — 99254 IP/OBS CNSLTJ NEW/EST MOD 60: CPT | Mod: GC

## 2023-11-26 PROCEDURE — 70450 CT HEAD/BRAIN W/O DYE: CPT | Mod: 26

## 2023-11-26 PROCEDURE — 71046 X-RAY EXAM CHEST 2 VIEWS: CPT | Mod: 26

## 2023-11-26 RX ORDER — CEFEPIME 1 G/1
2000 INJECTION, POWDER, FOR SOLUTION INTRAMUSCULAR; INTRAVENOUS ONCE
Refills: 0 | Status: COMPLETED | OUTPATIENT
Start: 2023-11-26 | End: 2023-11-26

## 2023-11-26 RX ORDER — VANCOMYCIN HCL 1 G
750 VIAL (EA) INTRAVENOUS EVERY 12 HOURS
Refills: 0 | Status: DISCONTINUED | OUTPATIENT
Start: 2023-11-26 | End: 2023-11-27

## 2023-11-26 RX ORDER — SODIUM CHLORIDE 9 MG/ML
1000 INJECTION, SOLUTION INTRAVENOUS
Refills: 0 | Status: DISCONTINUED | OUTPATIENT
Start: 2023-11-26 | End: 2023-12-01

## 2023-11-26 RX ORDER — ENOXAPARIN SODIUM 100 MG/ML
40 INJECTION SUBCUTANEOUS EVERY 24 HOURS
Refills: 0 | Status: DISCONTINUED | OUTPATIENT
Start: 2023-11-26 | End: 2023-12-02

## 2023-11-26 RX ORDER — ACETAMINOPHEN 500 MG
750 TABLET ORAL ONCE
Refills: 0 | Status: COMPLETED | OUTPATIENT
Start: 2023-11-26 | End: 2023-11-26

## 2023-11-26 RX ORDER — MORPHINE SULFATE 50 MG/1
2 CAPSULE, EXTENDED RELEASE ORAL ONCE
Refills: 0 | Status: DISCONTINUED | OUTPATIENT
Start: 2023-11-26 | End: 2023-11-26

## 2023-11-26 RX ORDER — MORPHINE SULFATE 50 MG/1
4 CAPSULE, EXTENDED RELEASE ORAL EVERY 4 HOURS
Refills: 0 | Status: DISCONTINUED | OUTPATIENT
Start: 2023-11-26 | End: 2023-11-27

## 2023-11-26 RX ORDER — LANOLIN ALCOHOL/MO/W.PET/CERES
9 CREAM (GRAM) TOPICAL AT BEDTIME
Refills: 0 | Status: DISCONTINUED | OUTPATIENT
Start: 2023-11-26 | End: 2023-12-02

## 2023-11-26 RX ORDER — CEFEPIME 1 G/1
INJECTION, POWDER, FOR SOLUTION INTRAMUSCULAR; INTRAVENOUS
Refills: 0 | Status: DISCONTINUED | OUTPATIENT
Start: 2023-11-26 | End: 2023-11-27

## 2023-11-26 RX ORDER — CEFEPIME 1 G/1
INJECTION, POWDER, FOR SOLUTION INTRAMUSCULAR; INTRAVENOUS
Refills: 0 | Status: DISCONTINUED | OUTPATIENT
Start: 2023-11-26 | End: 2023-11-26

## 2023-11-26 RX ORDER — INFLUENZA VIRUS VACCINE 15; 15; 15; 15 UG/.5ML; UG/.5ML; UG/.5ML; UG/.5ML
0.5 SUSPENSION INTRAMUSCULAR ONCE
Refills: 0 | Status: DISCONTINUED | OUTPATIENT
Start: 2023-11-26 | End: 2023-12-02

## 2023-11-26 RX ORDER — POTASSIUM CHLORIDE 20 MEQ
20 PACKET (EA) ORAL
Refills: 0 | Status: COMPLETED | OUTPATIENT
Start: 2023-11-26 | End: 2023-11-26

## 2023-11-26 RX ORDER — MORPHINE SULFATE 50 MG/1
6 CAPSULE, EXTENDED RELEASE ORAL EVERY 4 HOURS
Refills: 0 | Status: DISCONTINUED | OUTPATIENT
Start: 2023-11-26 | End: 2023-11-27

## 2023-11-26 RX ORDER — SERTRALINE 25 MG/1
50 TABLET, FILM COATED ORAL DAILY
Refills: 0 | Status: DISCONTINUED | OUTPATIENT
Start: 2023-11-26 | End: 2023-12-02

## 2023-11-26 RX ORDER — CEFEPIME 1 G/1
2000 INJECTION, POWDER, FOR SOLUTION INTRAMUSCULAR; INTRAVENOUS EVERY 8 HOURS
Refills: 0 | Status: DISCONTINUED | OUTPATIENT
Start: 2023-11-26 | End: 2023-11-27

## 2023-11-26 RX ADMIN — MORPHINE SULFATE 6 MILLIGRAM(S): 50 CAPSULE, EXTENDED RELEASE ORAL at 20:13

## 2023-11-26 RX ADMIN — Medication 250 MILLIGRAM(S): at 18:37

## 2023-11-26 RX ADMIN — Medication 20 MILLIEQUIVALENT(S): at 11:21

## 2023-11-26 RX ADMIN — Medication 300 MILLIGRAM(S): at 10:20

## 2023-11-26 RX ADMIN — Medication 20 MILLIEQUIVALENT(S): at 09:03

## 2023-11-26 RX ADMIN — SODIUM CHLORIDE 135 MILLILITER(S): 9 INJECTION, SOLUTION INTRAVENOUS at 15:32

## 2023-11-26 RX ADMIN — MORPHINE SULFATE 6 MILLIGRAM(S): 50 CAPSULE, EXTENDED RELEASE ORAL at 16:01

## 2023-11-26 RX ADMIN — Medication 9 MILLIGRAM(S): at 21:38

## 2023-11-26 RX ADMIN — Medication 1 MILLIGRAM(S): at 11:21

## 2023-11-26 RX ADMIN — Medication 300 MILLIGRAM(S): at 23:38

## 2023-11-26 RX ADMIN — CEFEPIME 100 MILLIGRAM(S): 1 INJECTION, POWDER, FOR SOLUTION INTRAMUSCULAR; INTRAVENOUS at 15:31

## 2023-11-26 RX ADMIN — Medication 750 MILLIGRAM(S): at 11:15

## 2023-11-26 RX ADMIN — MORPHINE SULFATE 4 MILLIGRAM(S): 50 CAPSULE, EXTENDED RELEASE ORAL at 12:40

## 2023-11-26 RX ADMIN — CEFEPIME 100 MILLIGRAM(S): 1 INJECTION, POWDER, FOR SOLUTION INTRAMUSCULAR; INTRAVENOUS at 21:38

## 2023-11-26 RX ADMIN — CEFEPIME 100 MILLIGRAM(S): 1 INJECTION, POWDER, FOR SOLUTION INTRAMUSCULAR; INTRAVENOUS at 04:33

## 2023-11-26 RX ADMIN — MORPHINE SULFATE 4 MILLIGRAM(S): 50 CAPSULE, EXTENDED RELEASE ORAL at 19:40

## 2023-11-26 RX ADMIN — Medication 250 MILLIGRAM(S): at 05:28

## 2023-11-26 RX ADMIN — MORPHINE SULFATE 2 MILLIGRAM(S): 50 CAPSULE, EXTENDED RELEASE ORAL at 04:07

## 2023-11-26 RX ADMIN — SERTRALINE 50 MILLIGRAM(S): 25 TABLET, FILM COATED ORAL at 11:21

## 2023-11-26 RX ADMIN — MORPHINE SULFATE 6 MILLIGRAM(S): 50 CAPSULE, EXTENDED RELEASE ORAL at 09:03

## 2023-11-26 RX ADMIN — MORPHINE SULFATE 6 MILLIGRAM(S): 50 CAPSULE, EXTENDED RELEASE ORAL at 19:43

## 2023-11-26 RX ADMIN — MORPHINE SULFATE 6 MILLIGRAM(S): 50 CAPSULE, EXTENDED RELEASE ORAL at 15:31

## 2023-11-26 RX ADMIN — Medication 300 MILLIGRAM(S): at 16:20

## 2023-11-26 RX ADMIN — SODIUM CHLORIDE 75 MILLILITER(S): 9 INJECTION, SOLUTION INTRAVENOUS at 00:18

## 2023-11-26 RX ADMIN — MORPHINE SULFATE 4 MILLIGRAM(S): 50 CAPSULE, EXTENDED RELEASE ORAL at 18:49

## 2023-11-26 RX ADMIN — ENOXAPARIN SODIUM 40 MILLIGRAM(S): 100 INJECTION SUBCUTANEOUS at 09:03

## 2023-11-26 RX ADMIN — Medication 300 MILLIGRAM(S): at 04:09

## 2023-11-26 RX ADMIN — MORPHINE SULFATE 4 MILLIGRAM(S): 50 CAPSULE, EXTENDED RELEASE ORAL at 23:48

## 2023-11-26 RX ADMIN — MORPHINE SULFATE 6 MILLIGRAM(S): 50 CAPSULE, EXTENDED RELEASE ORAL at 09:33

## 2023-11-26 RX ADMIN — MORPHINE SULFATE 4 MILLIGRAM(S): 50 CAPSULE, EXTENDED RELEASE ORAL at 12:16

## 2023-11-26 RX ADMIN — MORPHINE SULFATE 4 MILLIGRAM(S): 50 CAPSULE, EXTENDED RELEASE ORAL at 00:48

## 2023-11-26 RX ADMIN — Medication 750 MILLIGRAM(S): at 16:50

## 2023-11-26 NOTE — H&P ADULT - ASSESSMENT
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

## 2023-11-26 NOTE — H&P ADULT - NSHPLABSRESULTS_GEN_ALL_CORE
8.0    28.85 )-----------( 399      ( 25 Nov 2023 16:45 )             23.4   11-25    135  |  103  |  10  ----------------------------<  99  4.1   |  22  |  0.65    Ca    9.1      25 Nov 2023 16:45  Mg     1.60     11-25    TPro  7.0  /  Alb  4.5  /  TBili  4.6<H>  /  DBili  x   /  AST  41<H>  /  ALT  23  /  AlkPhos  47  11-25  < from: CT Angio Chest PE Protocol w/ IV Cont (11.25.23 @ 19:09) >    IMPRESSION:    No acute pulmonary embolism or acute pulmonary disease    Sequela of sickle cell disease as described. Wedge-shaped hypoenhancement   of the left upper renal pole likely represents a renal infarct    --- End of Report ---    < end of copied text >    < from: Xray Chest 2 Views PA/Lat (11.25.23 @ 17:49) >    IMPRESSION:  Lines and tubes as above.   Clear lungs.    --- End of Report ---    < end of copied text >

## 2023-11-26 NOTE — PROGRESS NOTE ADULT - SUBJECTIVE AND OBJECTIVE BOX
PROGRESS NOTE:     Patient is a 23y old  Female who presents with a chief complaint of VOC (26 Nov 2023 01:57)      SUBJECTIVE / OVERNIGHT EVENTS:    OVERNIGHT: No acute overnight events.      Patient was examined at bedside and feels well this morning. Denies fever, chills, chest pain, SOB, nausea, vomiting. ROS otherwise negative and pt is amenable to current treatment plan.      REVIEW OF SYSTEMS:    CONSTITUTIONAL:  No weakness, fevers, or chills  EYES/ENT:  No visual changes, vertigo, or throat pain   NECK:  No pain or stiffness  RESPIRATORY:  No SOB, cough, wheezing, hemoptysis  CARDIOVASCULAR:  No chest pain or palpitations  GASTROINTESTINAL:  No abdominal pain, nausea, vomiting, or hematemesis; No diarrhea or constipation. No melena or hematochezia.  GENITOURINARY:  No dysuria, change in frequency, or hematuria  NEUROLOGICAL:  No numbness or weakness  SKIN:  No itching or rashes      MEDICATIONS  (STANDING):  cefepime   IVPB      cefepime   IVPB 2000 milliGRAM(s) IV Intermittent every 8 hours  enoxaparin Injectable 40 milliGRAM(s) SubCutaneous every 24 hours  folic acid 1 milliGRAM(s) Oral daily  influenza   Vaccine 0.5 milliLiter(s) IntraMuscular once  melatonin 9 milliGRAM(s) Oral at bedtime  potassium chloride    Tablet ER 20 milliEquivalent(s) Oral every 2 hours  sertraline 50 milliGRAM(s) Oral daily  sodium chloride 0.45%. 1000 milliLiter(s) (75 mL/Hr) IV Continuous <Continuous>  vancomycin  IVPB 750 milliGRAM(s) IV Intermittent every 12 hours    MEDICATIONS  (PRN):  morphine  - Injectable 6 milliGRAM(s) IV Push every 4 hours PRN Severe Pain (7 - 10)  morphine  - Injectable 4 milliGRAM(s) IV Push every 4 hours PRN Moderate Pain (4 - 6)  naloxone Injectable 0.1 milliGRAM(s) IV Push every 3 minutes PRN For ANY of the following changes in patient status:  A. RR LESS THAN 10 breaths per minute, B. Oxygen saturation LESS THAN 90%, C. Sedation score of 6  ondansetron Injectable 4 milliGRAM(s) IV Push every 6 hours PRN Nausea      CAPILLARY BLOOD GLUCOSE        I&O's Summary      PHYSICAL EXAM:  Vital Signs Last 24 Hrs  T(C): 39.1 (26 Nov 2023 09:52), Max: 39.9 (25 Nov 2023 22:07)  T(F): 102.3 (26 Nov 2023 09:52), Max: 103.8 (25 Nov 2023 22:07)  HR: 105 (26 Nov 2023 09:52) (83 - 117)  BP: 110/56 (26 Nov 2023 09:52) (92/41 - 115/46)  BP(mean): 64 (26 Nov 2023 06:04) (59 - 68)  RR: 20 (26 Nov 2023 09:52) (16 - 30)  SpO2: 97% (26 Nov 2023 09:52) (79% - 100%)    Parameters below as of 26 Nov 2023 09:52  Patient On (Oxygen Delivery Method): room air        CONSTITUTIONAL: NAD; well-developed  HEENT: PERRL, clear conjunctiva  RESPIRATORY: Normal respiratory effort; lungs are clear to auscultation bilaterally; No Crackles/Rhonchi/Wheezing  CARDIOVASCULAR: Regular rate and rhythm, normal S1 and S2, no murmur/rub/gallop; No lower extremity edema; Peripheral pulses are 2+ bilaterally  ABDOMEN: Nontender to palpation, normoactive bowel sounds, no rebound/guarding; No hepatosplenomegaly  MUSCULOSKELETAL: no clubbing or cyanosis of digits; no joint swelling or tenderness to palpation  EXTREMITY: Lower extremities Non-tender to palpation; non-erythematous B/L  NEURO: A&Ox3; no focal deficits   PSYCH: normal mood; Affect appropriate    LABS:                        6.3    21.16 )-----------( 291      ( 26 Nov 2023 06:55 )             18.7     11-26    127<L>  |  98  |  11  ----------------------------<  89  3.3<L>   |  16<L>  |  0.57    Ca    7.3<L>      26 Nov 2023 06:55  Phos  2.8     11-26  Mg     1.60     11-26    TPro  5.8<L>  /  Alb  3.7  /  TBili  4.4<H>  /  DBili  0.3  /  AST  31  /  ALT  19  /  AlkPhos  39<L>  11-26    PT/INR - ( 26 Nov 2023 06:55 )   PT: 17.2 sec;   INR: 1.54 ratio         PTT - ( 26 Nov 2023 06:55 )  PTT:26.5 sec      Urinalysis Basic - ( 26 Nov 2023 06:55 )    Color: x / Appearance: x / SG: x / pH: x  Gluc: 89 mg/dL / Ketone: x  / Bili: x / Urobili: x   Blood: x / Protein: x / Nitrite: x   Leuk Esterase: x / RBC: x / WBC x   Sq Epi: x / Non Sq Epi: x / Bacteria: x          RADIOLOGY & ADDITIONAL TESTS:    N/A PROGRESS NOTE:     Patient is a 23y old  Female who presents with a chief complaint of VOC (26 Nov 2023 01:57)      SUBJECTIVE / OVERNIGHT EVENTS:    OVERNIGHT: No acute overnight events.      Patient was examined at bedside and feels well this morning. Pain well-controlled. Denies fever, chills, SOB, nausea, vomiting. ROS otherwise negative and pt is amenable to current treatment plan.      REVIEW OF SYSTEMS:    CONSTITUTIONAL:  Fevers, no chills, weakness  EYES/ENT:  No visual changes, vertigo, or throat pain   NECK:  No pain or stiffness  RESPIRATORY:  No SOB, cough, wheezing, hemoptysis  CARDIOVASCULAR:  Chest pain, no palpitations  GASTROINTESTINAL:  No abdominal pain, nausea, vomiting, or hematemesis; No diarrhea or constipation. No melena or hematochezia.  GENITOURINARY:  No dysuria, change in frequency, or hematuria  NEUROLOGICAL:  No numbness or weakness  SKIN:  No itching or rashes      MEDICATIONS  (STANDING):  cefepime   IVPB      cefepime   IVPB 2000 milliGRAM(s) IV Intermittent every 8 hours  enoxaparin Injectable 40 milliGRAM(s) SubCutaneous every 24 hours  folic acid 1 milliGRAM(s) Oral daily  influenza   Vaccine 0.5 milliLiter(s) IntraMuscular once  melatonin 9 milliGRAM(s) Oral at bedtime  potassium chloride    Tablet ER 20 milliEquivalent(s) Oral every 2 hours  sertraline 50 milliGRAM(s) Oral daily  sodium chloride 0.45%. 1000 milliLiter(s) (75 mL/Hr) IV Continuous <Continuous>  vancomycin  IVPB 750 milliGRAM(s) IV Intermittent every 12 hours    MEDICATIONS  (PRN):  morphine  - Injectable 6 milliGRAM(s) IV Push every 4 hours PRN Severe Pain (7 - 10)  morphine  - Injectable 4 milliGRAM(s) IV Push every 4 hours PRN Moderate Pain (4 - 6)  naloxone Injectable 0.1 milliGRAM(s) IV Push every 3 minutes PRN For ANY of the following changes in patient status:  A. RR LESS THAN 10 breaths per minute, B. Oxygen saturation LESS THAN 90%, C. Sedation score of 6  ondansetron Injectable 4 milliGRAM(s) IV Push every 6 hours PRN Nausea      CAPILLARY BLOOD GLUCOSE        I&O's Summary      PHYSICAL EXAM:  Vital Signs Last 24 Hrs  T(C): 39.1 (26 Nov 2023 09:52), Max: 39.9 (25 Nov 2023 22:07)  T(F): 102.3 (26 Nov 2023 09:52), Max: 103.8 (25 Nov 2023 22:07)  HR: 105 (26 Nov 2023 09:52) (83 - 117)  BP: 110/56 (26 Nov 2023 09:52) (92/41 - 115/46)  BP(mean): 64 (26 Nov 2023 06:04) (59 - 68)  RR: 20 (26 Nov 2023 09:52) (16 - 30)  SpO2: 97% (26 Nov 2023 09:52) (79% - 100%)    Parameters below as of 26 Nov 2023 09:52  Patient On (Oxygen Delivery Method): room air        CONSTITUTIONAL: NAD; well-developed  HEENT: PERRL, clear conjunctiva  RESPIRATORY: Normal respiratory effort; lungs are clear to auscultation bilaterally; No Crackles/Rhonchi/Wheezing  CARDIOVASCULAR: Regular rate and rhythm, normal S1 and S2, no murmur/rub/gallop; No lower extremity edema; Peripheral pulses are 2+ bilaterally  ABDOMEN: Nontender to palpation, normoactive bowel sounds, no rebound/guarding; No hepatosplenomegaly  MUSCULOSKELETAL: no clubbing or cyanosis of digits; no joint swelling or tenderness to palpation  EXTREMITY: Lower extremities Non-tender to palpation; non-erythematous B/L  NEURO: A&Ox3; no focal deficits   PSYCH: normal mood; Affect appropriate    LABS:                        6.3    21.16 )-----------( 291      ( 26 Nov 2023 06:55 )             18.7     11-26    127<L>  |  98  |  11  ----------------------------<  89  3.3<L>   |  16<L>  |  0.57    Ca    7.3<L>      26 Nov 2023 06:55  Phos  2.8     11-26  Mg     1.60     11-26    TPro  5.8<L>  /  Alb  3.7  /  TBili  4.4<H>  /  DBili  0.3  /  AST  31  /  ALT  19  /  AlkPhos  39<L>  11-26    PT/INR - ( 26 Nov 2023 06:55 )   PT: 17.2 sec;   INR: 1.54 ratio         PTT - ( 26 Nov 2023 06:55 )  PTT:26.5 sec      Urinalysis Basic - ( 26 Nov 2023 06:55 )    Color: x / Appearance: x / SG: x / pH: x  Gluc: 89 mg/dL / Ketone: x  / Bili: x / Urobili: x   Blood: x / Protein: x / Nitrite: x   Leuk Esterase: x / RBC: x / WBC x   Sq Epi: x / Non Sq Epi: x / Bacteria: x          RADIOLOGY & ADDITIONAL TESTS:    N/A

## 2023-11-26 NOTE — H&P ADULT - PROBLEM SELECTOR PLAN 3
c/w zoloft + melatonin    Has previously been on aripirazole for ?bipolar disorder? however has not received medication since end of september

## 2023-11-26 NOTE — PATIENT PROFILE ADULT - FALL HARM RISK - UNIVERSAL INTERVENTIONS
Bed in lowest position, wheels locked, appropriate side rails in place/Call bell, personal items and telephone in reach/Instruct patient to call for assistance before getting out of bed or chair/Non-slip footwear when patient is out of bed/[place]a to call system/Physically safe environment - no spills, clutter or unnecessary equipment/Purposeful Proactive Rounding/Room/bathroom lighting operational, light cord in reach

## 2023-11-26 NOTE — H&P ADULT - NSHPREVIEWOFSYSTEMS_GEN_ALL_CORE
REVIEW OF SYSTEMS:  Constitutional: [X] fevers [ ] chills [ ] weight loss [ ] weight gain  HEENT: [ ] vision problems [ ]  eye pain [ ]  nasal congestion [ ] rhinorrhea [ ] sore throat [ ] dysphagia  CV: [X] chest pain [ ] orthopnea [ ] palpitations   Resp: [ ] cough, [ ] dyspnea, [ ] wheezing, [ ] hemoptysis  GI: [ ] nausea, [ ] vomiting, [ ] diarrhea, [ ] constipation, [ ] abdominal pain  : [ ] dysuria [ ] nocturia [ ] hematuria [ ] increased urinary frequency  Musculoskeletal: [ ] back pain [ ] myalgias [ ] arthralgias [ ] fracture  Skin: [ ] rash [ ] itch  Neurological: [ ] headache [ ] dizziness [ ] syncope [ ] weakness [ ] numbness  Psychiatric: [ ] anxiety [ ] depression  Endocrine: [ ] diabetes [ ] thyroid problem  Hematologic/Lymphatic: [ ] anemia [ ] bleeding problem  Allergic/Immunologic: [ ] itchy eyes [ ] nasal discharge [ ] hives [ ] angioedema  [X] All other systems negative  [ ] Unable to assess ROS because ________

## 2023-11-26 NOTE — H&P ADULT - NSHPSOCIALHISTORY_GEN_ALL_CORE
lives with parents, attends [employer], studies communications, independent with all ADLs, sexually active, rare drinking, never smoker
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
## 2023-11-26 NOTE — H&P ADULT - HISTORY OF PRESENT ILLNESS
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 Patient states starting today she developed 10/10 left lower chest pain. Associated with SOB as well. She denies fevers, chills, vomiting, abdominal pain, dysuria, leg pain, or swelling. She denies trauma, cough, recent travel, or sick contacts. States this does not feel like any prior acute chest syndromes that she has had, as her most recent ACS was when she was a child. She denies any neurologic deficit, weakness, numbness, tingling, AH/VH. Endorses a headache which feels like a typical tension headache. States she does not feel like she had when she had prior  shunt malfunctions. Last transfusion was 2u prbcs 10/28.    Initially presenting to the ED with tachycardia, tachypnea, desaturation to 79% on room air, hypotensive to 92/41 and febrile to 103.8. Received toradol x2, 975mg tylenol, 4mg morphine, zosyn, 500cc NS Bolus, and started on 75cc/hr 1/2NS.
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
## 2023-11-26 NOTE — H&P ADULT - PROBLEM SELECTOR PLAN 1
Not meeting criteria for Acute chest given no evidence of new pulmonary consolidation    Pain control w/ morphine for severe pain  Toradol moderate pain  IVFs w/ 1/2NS at 75 cc/hr  f/u rpt cxr at 24hrs (hypoxemia on room air is an indication for a 24hr re-screen for ACS) Not meeting criteria for Acute chest given no evidence of new pulmonary consolidation    Pain control w/ morphine for severe pain  Toradol moderate pain  IVFs w/ 1/2NS at 75 cc/hr  f/u rpt cxr at 24hrs (hypoxemia on room air is an indication for a 24hr re-screen for ACS)  blood transfusion consent in chart

## 2023-11-26 NOTE — CHART NOTE - NSCHARTNOTEFT_GEN_A_CORE
Spoke with outpatient hematologist Dr. Berger.    Does not follow here at [facility].    Maintains patient's hemoglobin at ~9, last transfusion 2 weeks ago.    On chelation for iron overload, last ferritin ~1500.    Informed of renal infarct left sided and hemoglobin 6.3.         Rayo Koo MD  PGY-2 Spoke with outpatient hematologist Dr. Berger [phone]    Does not follow here at [facility].    Maintains patient's hemoglobin at ~9, last transfusion 2 weeks ago.    On chelation for iron overload, last ferritin ~1500.    Informed of renal infarct left sided and hemoglobin 6.3.         Rayo Koo MD  PGY-2

## 2023-11-26 NOTE — H&P ADULT - NSHPPHYSICALEXAM_GEN_ALL_CORE
Vital Signs Last 24 Hrs  T(C): 38.2 (26 Nov 2023 00:40), Max: 39.9 (25 Nov 2023 22:07)  T(F): 100.7 (26 Nov 2023 00:40), Max: 103.8 (25 Nov 2023 22:07)  HR: 95 (26 Nov 2023 00:40) (90 - 117)  BP: 100/37 (26 Nov 2023 00:40) (92/41 - 115/46)  BP(mean): 68 (25 Nov 2023 22:07) (59 - 68)  RR: 20 (26 Nov 2023 00:40) (16 - 26)  SpO2: 98% (26 Nov 2023 00:40) (79% - 100%)    Parameters below as of 26 Nov 2023 00:40  Patient On (Oxygen Delivery Method): room air        General: WN/WD NAD  Neurology: A&Ox3, nonfocal, RUSH x 4  Eyes: PERRLA/ EOMI, Gross vision intact  ENT/Neck: Neck supple, trachea midline, No JVD, Gross hearing intact  Respiratory: CTA B/L, No wheezing, rales, rhonchi  CV: RRR, +S1/S2, -S3/S4, no murmurs, rubs or gallops  Abdominal: Soft, NT, ND +BS, No HSM  MSK: 5/5 strength UE/LE bilaterally  Extremities: No edema, 2+ peripheral pulses  Skin: No Rashes, Hematoma, Ecchymosis  Incisions:   Tubes:

## 2023-11-26 NOTE — CONSULT NOTE ADULT - ATTENDING COMMENTS
This is a 23 year old woman with a history of Sickle Cell disease following with Dr. Berger. Receives outpatient transfusions every 3 weeks at the [facility].  Patient was last transfused as optimization prior to a  shunt revision in October, had not had a chance  to return for repeat transfusion this november yet.  Painted admitted for shortness of breath, sat % on RA, patient hada CT scan demonstrating a clear lung field.  Does not make requirement for acute chest syndrome.  No indications for red cell transfusion, but a simple transfusion can be done for sickle cell ischemic pain crisis.  Currently patient is recovering and feels better than yesterday, so this is also unnecessary at the current time. Recommend 25% venti mask given patient does not like the nasal canula delivery system, and recommend patient increase in hypotonic saline to 1.5 times maintenance rate (135ml/hr).

## 2023-11-26 NOTE — H&P ADULT - PROBLEM SELECTOR PLAN 2
iso suspected UTI vs  shunt line infection given recent revision 10/23 of one of 2  shunts    c/w Vanc cefepime  f/u bctx  f/u uctx  c/w IVF iso suspected UTI vs  shunt line infection given recent revision 10/23 of one of 2  shunts    c/w Vanc + cefepime  f/u bctx x2   f/u uctx  c/w IVF

## 2023-11-26 NOTE — H&P ADULT - PROBLEM SELECTOR PLAN 6
Activity:   Bowel reg: miralax, senna  Consultants:  Diet: Regular  DVT ppx: Lovenox  Dispo: pending clinical improvement  Directive:  Electrolytes: (goal Mg, Phos, K: 2, 3, 4)  Family:  Gtts: IVFs  Hardware:  shunt

## 2023-11-26 NOTE — PROGRESS NOTE ADULT - PROBLEM SELECTOR PLAN 1
- P/w severe chest pain, febrile to 103.8F in ED  - CXR -> Clear lungs; will repeat on 11/26 at 5pm given initial hypoxemia  - CTA chest -> No evidence of PE or acute pulmonary disease  - Not meeting criteria for acute chest syndrome given no evidence of new pulmonary consolidation  - Hgb 6.3 -> 1U pRBCs (11/26)  - C/w IV morphine 4mg q4 prn (moderate pain); 6mg q4 prn (severe pain)  - C/w 1/2NS @ 75 cc/hr  - Hematology consulted, will appreciate recs

## 2023-11-26 NOTE — H&P ADULT - ATTENDING COMMENTS
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 Pt found meet sepsis criteria. Pt admitted for SCC and sepsis.      #Sepsis  - Unclear source as U/A mildly positive and pt not endorsing urinary symptoms. RVP negative. Concern for possible  shunt infection in setting of headache and recent revision although pt thinks related to dehydration, tension HA.   - will cover empirically with vanc and cefepime (reported ceftriaxone allergy related to pruritis however has tolerated ceftriaxone and cefazolin in 2020)  - F/u bcx, ucx  - f/u CTH for evaluation of shunt    #SCC  - CTA negative for PE and no evidence of pna. 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   - Hb stable.  - pain control with IV morphine and toradol prn.  - per pharmacy, morphine pca unavailable. Pt and mother declining IV dilaudid pca and prefer IV push morphine prn for now. 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 Pt found meet sepsis criteria. Pt admitted for SCC and sepsis.      #Sepsis  - Unclear source as U/A mildly positive and pt not endorsing urinary symptoms. RVP negative. Concern for possible  shunt infection in setting of headache and recent revision although pt thinks related to dehydration, tension HA.   - will cover empirically with vanc and cefepime (reported ceftriaxone allergy related to pruritis however has tolerated ceftriaxone and cefazolin in 2020)  - F/u bcx, ucx  - f/u CTH for evaluation of shunt    #SCC  - CTA negative for PE and no evidence of pna. 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   - Hb stable.  - pain control with IV morphine.  - per pharmacy, morphine pca unavailable. Pt and mother declining IV dilaudid pca and prefer IV push morphine prn for now. 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 Pt found to meet sepsis criteria. Pt admitted for SCC and sepsis.      #Sepsis  - Unclear source as U/A mildly positive and pt not endorsing urinary symptoms. RVP negative. Concern for possible  shunt infection in setting of headache and recent revision although pt thinks related to dehydration, tension HA.   - will cover empirically with vanc and cefepime (reported ceftriaxone allergy related to pruritis however has tolerated ceftriaxone and cefazolin in 2020)  - F/u bcx, ucx  - f/u CTH for evaluation of shunt    #SCC  - CTA negative for PE and no evidence of pna. 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   - Hb stable.  - pain control with IV morphine.  - per pharmacy, morphine pca unavailable. Pt and mother declining IV dilaudid pca and prefer IV push morphine prn for now.

## 2023-11-26 NOTE — H&P ADULT - PROBLEM SELECTOR PLAN 5
f/u CT head    per nsxy 11/26  shunt is likely not infected, however should obtain a CT head, if evidence of ventriculomegaly then reconsult is warranted.

## 2023-11-26 NOTE — PROGRESS NOTE ADULT - PROBLEM SELECTOR PLAN 5
- Per neurosurgery on 11/26,  shunt is likely not infected, however should obtain a CT head, if evidence of ventriculomegaly then reconsult is warranted  - CT head unremarkable

## 2023-11-26 NOTE — PROGRESS NOTE ADULT - PROBLEM SELECTOR PLAN 2
- In ED, pt was febrile, hypotensive, tachycardic, and tachypneic  - Suspected 2/2 UTI vs  shunt line infection given recent revision 10/23 of one of 2  shunts  - MRSA, urine cx, blood cx pending  - C/w empiric vancomycin, cefepime  - C/w IVF

## 2023-11-26 NOTE — CONSULT NOTE ADULT - ASSESSMENT
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 Patient gets 2U pRBCs outpatient every 3 weeks, and follows with Dr. Berger at [facility].     Labs at admission: WBC 21.2, Hgb 6.3 (baseline 8), , , T bili 4,4, I bili 4.1. U/A+ for UTI CT chest ruled out PE and confirmed clear lungs. Patient on RA.    PLAN:   # Sickle Cell Disease  # Rule out ACS  - Low suspicion for ACS given patient is on RA and CTA chest did not show infiltrates  - continue home Deferasirox for iron chelation  - Would recommend discussing with Dr. Berger starting hydroxyurea outpatient  - Infectious workup and management per primary team  - Please call hematology urgently if patient becomes hypoxic - may need an exchange transfusion  - Please give and encourage use of incentive spirometry  - Trend CBC w/ diff daily with retic count, and hemolysis labs daily (LDH, CMP, fractionated bilirubin, Phos, Ca)  - F/u hemoglobin electrophoresis  - Get daily chest x-ray  - Please increase rate of half NS to 135cc/hr and place patient on 2L NC - this will reduce the sickling of RBCs improving symptoms  - Pain management per primary team  - Patient will follow with Dr. Berger upon hospital discharge    Note incomplete until attending signs    ***************************************************************  Amada Rush, PGY4  Fellow Hematology/Oncology  pager: [phone]   Available on [employer] Teams  After 5pm or on weekends please contact  to page on-call fellow   ***************************************************************

## 2023-11-27 LAB
ALBUMIN SERPL ELPH-MCNC: 4.1 G/DL — SIGNIFICANT CHANGE UP (ref 3.3–5)
ALBUMIN SERPL ELPH-MCNC: 4.1 G/DL — SIGNIFICANT CHANGE UP (ref 3.3–5)
ALP SERPL-CCNC: 50 U/L — SIGNIFICANT CHANGE UP (ref 40–120)
ALP SERPL-CCNC: 50 U/L — SIGNIFICANT CHANGE UP (ref 40–120)
ALT FLD-CCNC: 29 U/L — SIGNIFICANT CHANGE UP (ref 4–33)
ALT FLD-CCNC: 29 U/L — SIGNIFICANT CHANGE UP (ref 4–33)
ANION GAP SERPL CALC-SCNC: 12 MMOL/L — SIGNIFICANT CHANGE UP (ref 7–14)
ANION GAP SERPL CALC-SCNC: 12 MMOL/L — SIGNIFICANT CHANGE UP (ref 7–14)
AST SERPL-CCNC: 36 U/L — HIGH (ref 4–32)
AST SERPL-CCNC: 36 U/L — HIGH (ref 4–32)
BASOPHILS # BLD AUTO: 0.07 K/UL — SIGNIFICANT CHANGE UP (ref 0–0.2)
BASOPHILS # BLD AUTO: 0.07 K/UL — SIGNIFICANT CHANGE UP (ref 0–0.2)
BASOPHILS NFR BLD AUTO: 0.4 % — SIGNIFICANT CHANGE UP (ref 0–2)
BASOPHILS NFR BLD AUTO: 0.4 % — SIGNIFICANT CHANGE UP (ref 0–2)
BILIRUB SERPL-MCNC: 5.4 MG/DL — HIGH (ref 0.2–1.2)
BILIRUB SERPL-MCNC: 5.4 MG/DL — HIGH (ref 0.2–1.2)
BUN SERPL-MCNC: 8 MG/DL — SIGNIFICANT CHANGE UP (ref 7–23)
BUN SERPL-MCNC: 8 MG/DL — SIGNIFICANT CHANGE UP (ref 7–23)
CALCIUM SERPL-MCNC: 8.7 MG/DL — SIGNIFICANT CHANGE UP (ref 8.4–10.5)
CALCIUM SERPL-MCNC: 8.7 MG/DL — SIGNIFICANT CHANGE UP (ref 8.4–10.5)
CHLORIDE SERPL-SCNC: 104 MMOL/L — SIGNIFICANT CHANGE UP (ref 98–107)
CHLORIDE SERPL-SCNC: 104 MMOL/L — SIGNIFICANT CHANGE UP (ref 98–107)
CO2 SERPL-SCNC: 19 MMOL/L — LOW (ref 22–31)
CO2 SERPL-SCNC: 19 MMOL/L — LOW (ref 22–31)
CREAT SERPL-MCNC: 0.55 MG/DL — SIGNIFICANT CHANGE UP (ref 0.5–1.3)
CREAT SERPL-MCNC: 0.55 MG/DL — SIGNIFICANT CHANGE UP (ref 0.5–1.3)
EGFR: 132 ML/MIN/1.73M2 — SIGNIFICANT CHANGE UP
EGFR: 132 ML/MIN/1.73M2 — SIGNIFICANT CHANGE UP
EOSINOPHIL # BLD AUTO: 0.06 K/UL — SIGNIFICANT CHANGE UP (ref 0–0.5)
EOSINOPHIL # BLD AUTO: 0.06 K/UL — SIGNIFICANT CHANGE UP (ref 0–0.5)
EOSINOPHIL NFR BLD AUTO: 0.3 % — SIGNIFICANT CHANGE UP (ref 0–6)
EOSINOPHIL NFR BLD AUTO: 0.3 % — SIGNIFICANT CHANGE UP (ref 0–6)
GLUCOSE SERPL-MCNC: 115 MG/DL — HIGH (ref 70–99)
GLUCOSE SERPL-MCNC: 115 MG/DL — HIGH (ref 70–99)
GRAM STN FLD: ABNORMAL
HAPTOGLOB SERPL-MCNC: 78 MG/DL — SIGNIFICANT CHANGE UP (ref 34–200)
HAPTOGLOB SERPL-MCNC: 78 MG/DL — SIGNIFICANT CHANGE UP (ref 34–200)
HCT VFR BLD CALC: 23.9 % — LOW (ref 34.5–45)
HCT VFR BLD CALC: 23.9 % — LOW (ref 34.5–45)
HEMOGLOBIN INTERPRETATION: SIGNIFICANT CHANGE UP
HEMOGLOBIN INTERPRETATION: SIGNIFICANT CHANGE UP
HGB A MFR BLD: 60.6 % — LOW (ref 95–97.6)
HGB A MFR BLD: 60.6 % — LOW (ref 95–97.6)
HGB A2 MFR BLD: 2.5 % — SIGNIFICANT CHANGE UP (ref 2.4–3.5)
HGB A2 MFR BLD: 2.5 % — SIGNIFICANT CHANGE UP (ref 2.4–3.5)
HGB BLD-MCNC: 8.3 G/DL — LOW (ref 11.5–15.5)
HGB BLD-MCNC: 8.3 G/DL — LOW (ref 11.5–15.5)
HGB F MFR BLD: <1 % — SIGNIFICANT CHANGE UP (ref 0–1.5)
HGB F MFR BLD: <1 % — SIGNIFICANT CHANGE UP (ref 0–1.5)
HGB S MFR BLD: 36 % — HIGH
HGB S MFR BLD: 36 % — HIGH
IANC: 15.9 K/UL — HIGH (ref 1.8–7.4)
IANC: 15.9 K/UL — HIGH (ref 1.8–7.4)
IMM GRANULOCYTES NFR BLD AUTO: 0.3 % — SIGNIFICANT CHANGE UP (ref 0–0.9)
IMM GRANULOCYTES NFR BLD AUTO: 0.3 % — SIGNIFICANT CHANGE UP (ref 0–0.9)
LDH SERPL L TO P-CCNC: 352 U/L — HIGH (ref 135–225)
LDH SERPL L TO P-CCNC: 352 U/L — HIGH (ref 135–225)
LYMPHOCYTES # BLD AUTO: 1.19 K/UL — SIGNIFICANT CHANGE UP (ref 1–3.3)
LYMPHOCYTES # BLD AUTO: 1.19 K/UL — SIGNIFICANT CHANGE UP (ref 1–3.3)
LYMPHOCYTES # BLD AUTO: 6.5 % — LOW (ref 13–44)
LYMPHOCYTES # BLD AUTO: 6.5 % — LOW (ref 13–44)
MAGNESIUM SERPL-MCNC: 1.9 MG/DL — SIGNIFICANT CHANGE UP (ref 1.6–2.6)
MAGNESIUM SERPL-MCNC: 1.9 MG/DL — SIGNIFICANT CHANGE UP (ref 1.6–2.6)
MCHC RBC-ENTMCNC: 30.7 PG — SIGNIFICANT CHANGE UP (ref 27–34)
MCHC RBC-ENTMCNC: 30.7 PG — SIGNIFICANT CHANGE UP (ref 27–34)
MCHC RBC-ENTMCNC: 34.7 GM/DL — SIGNIFICANT CHANGE UP (ref 32–36)
MCHC RBC-ENTMCNC: 34.7 GM/DL — SIGNIFICANT CHANGE UP (ref 32–36)
MCV RBC AUTO: 88.5 FL — SIGNIFICANT CHANGE UP (ref 80–100)
MCV RBC AUTO: 88.5 FL — SIGNIFICANT CHANGE UP (ref 80–100)
MONOCYTES # BLD AUTO: 1.03 K/UL — HIGH (ref 0–0.9)
MONOCYTES # BLD AUTO: 1.03 K/UL — HIGH (ref 0–0.9)
MONOCYTES NFR BLD AUTO: 5.6 % — SIGNIFICANT CHANGE UP (ref 2–14)
MONOCYTES NFR BLD AUTO: 5.6 % — SIGNIFICANT CHANGE UP (ref 2–14)
MRSA PCR RESULT.: SIGNIFICANT CHANGE UP
MRSA PCR RESULT.: SIGNIFICANT CHANGE UP
NEUTROPHILS # BLD AUTO: 15.9 K/UL — HIGH (ref 1.8–7.4)
NEUTROPHILS # BLD AUTO: 15.9 K/UL — HIGH (ref 1.8–7.4)
NEUTROPHILS NFR BLD AUTO: 86.9 % — HIGH (ref 43–77)
NEUTROPHILS NFR BLD AUTO: 86.9 % — HIGH (ref 43–77)
NRBC # BLD: 0 /100 WBCS — SIGNIFICANT CHANGE UP (ref 0–0)
NRBC # BLD: 0 /100 WBCS — SIGNIFICANT CHANGE UP (ref 0–0)
NRBC # FLD: 0.02 K/UL — HIGH (ref 0–0)
NRBC # FLD: 0.02 K/UL — HIGH (ref 0–0)
PHOSPHATE SERPL-MCNC: 1.1 MG/DL — LOW (ref 2.5–4.5)
PHOSPHATE SERPL-MCNC: 1.1 MG/DL — LOW (ref 2.5–4.5)
PLATELET # BLD AUTO: 318 K/UL — SIGNIFICANT CHANGE UP (ref 150–400)
PLATELET # BLD AUTO: 318 K/UL — SIGNIFICANT CHANGE UP (ref 150–400)
POTASSIUM SERPL-MCNC: 3.9 MMOL/L — SIGNIFICANT CHANGE UP (ref 3.5–5.3)
POTASSIUM SERPL-MCNC: 3.9 MMOL/L — SIGNIFICANT CHANGE UP (ref 3.5–5.3)
POTASSIUM SERPL-SCNC: 3.9 MMOL/L — SIGNIFICANT CHANGE UP (ref 3.5–5.3)
POTASSIUM SERPL-SCNC: 3.9 MMOL/L — SIGNIFICANT CHANGE UP (ref 3.5–5.3)
PROT SERPL-MCNC: 6.1 G/DL — SIGNIFICANT CHANGE UP (ref 6–8.3)
PROT SERPL-MCNC: 6.1 G/DL — SIGNIFICANT CHANGE UP (ref 6–8.3)
RBC # BLD: 2.7 M/UL — LOW (ref 3.8–5.2)
RBC # FLD: 17.3 % — HIGH (ref 10.3–14.5)
RBC # FLD: 17.3 % — HIGH (ref 10.3–14.5)
RETICS #: 280.5 K/UL — HIGH (ref 25–125)
RETICS #: 280.5 K/UL — HIGH (ref 25–125)
RETICS/RBC NFR: 10.4 % — HIGH (ref 0.5–2.5)
RETICS/RBC NFR: 10.4 % — HIGH (ref 0.5–2.5)
S AUREUS DNA NOSE QL NAA+PROBE: SIGNIFICANT CHANGE UP
S AUREUS DNA NOSE QL NAA+PROBE: SIGNIFICANT CHANGE UP
SODIUM SERPL-SCNC: 135 MMOL/L — SIGNIFICANT CHANGE UP (ref 135–145)
SODIUM SERPL-SCNC: 135 MMOL/L — SIGNIFICANT CHANGE UP (ref 135–145)
WBC # BLD: 18.31 K/UL — HIGH (ref 3.8–10.5)
WBC # BLD: 18.31 K/UL — HIGH (ref 3.8–10.5)
WBC # FLD AUTO: 18.31 K/UL — HIGH (ref 3.8–10.5)
WBC # FLD AUTO: 18.31 K/UL — HIGH (ref 3.8–10.5)

## 2023-11-27 PROCEDURE — 71045 X-RAY EXAM CHEST 1 VIEW: CPT | Mod: 26

## 2023-11-27 PROCEDURE — 99233 SBSQ HOSP IP/OBS HIGH 50: CPT | Mod: GC

## 2023-11-27 RX ORDER — POTASSIUM PHOSPHATE, MONOBASIC POTASSIUM PHOSPHATE, DIBASIC 236; 224 MG/ML; MG/ML
30 INJECTION, SOLUTION INTRAVENOUS ONCE
Refills: 0 | Status: COMPLETED | OUTPATIENT
Start: 2023-11-27 | End: 2023-11-27

## 2023-11-27 RX ORDER — ACETAMINOPHEN 500 MG
650 TABLET ORAL EVERY 6 HOURS
Refills: 0 | Status: DISCONTINUED | OUTPATIENT
Start: 2023-11-27 | End: 2023-11-29

## 2023-11-27 RX ORDER — MORPHINE SULFATE 50 MG/1
30 CAPSULE, EXTENDED RELEASE ORAL
Refills: 0 | Status: DISCONTINUED | OUTPATIENT
Start: 2023-11-27 | End: 2023-12-01

## 2023-11-27 RX ORDER — DEFERASIROX 180 MG/1
720 GRANULE ORAL DAILY
Refills: 0 | Status: DISCONTINUED | OUTPATIENT
Start: 2023-11-27 | End: 2023-12-02

## 2023-11-27 RX ORDER — ACETAMINOPHEN 500 MG
650 TABLET ORAL ONCE
Refills: 0 | Status: COMPLETED | OUTPATIENT
Start: 2023-11-27 | End: 2023-11-27

## 2023-11-27 RX ORDER — MEROPENEM 1 G/30ML
1000 INJECTION INTRAVENOUS EVERY 8 HOURS
Refills: 0 | Status: DISCONTINUED | OUTPATIENT
Start: 2023-11-27 | End: 2023-11-28

## 2023-11-27 RX ORDER — POTASSIUM PHOSPHATE, MONOBASIC POTASSIUM PHOSPHATE, DIBASIC 236; 224 MG/ML; MG/ML
40 INJECTION, SOLUTION INTRAVENOUS ONCE
Refills: 0 | Status: DISCONTINUED | OUTPATIENT
Start: 2023-11-27 | End: 2023-11-27

## 2023-11-27 RX ADMIN — SERTRALINE 50 MILLIGRAM(S): 25 TABLET, FILM COATED ORAL at 11:32

## 2023-11-27 RX ADMIN — SODIUM CHLORIDE 135 MILLILITER(S): 9 INJECTION, SOLUTION INTRAVENOUS at 11:42

## 2023-11-27 RX ADMIN — SODIUM CHLORIDE 135 MILLILITER(S): 9 INJECTION, SOLUTION INTRAVENOUS at 05:52

## 2023-11-27 RX ADMIN — DEFERASIROX 720 MILLIGRAM(S): 180 GRANULE ORAL at 11:32

## 2023-11-27 RX ADMIN — MORPHINE SULFATE 30 MILLILITER(S): 50 CAPSULE, EXTENDED RELEASE ORAL at 15:01

## 2023-11-27 RX ADMIN — Medication 9 MILLIGRAM(S): at 21:32

## 2023-11-27 RX ADMIN — MORPHINE SULFATE 4 MILLIGRAM(S): 50 CAPSULE, EXTENDED RELEASE ORAL at 00:18

## 2023-11-27 RX ADMIN — Medication 1 MILLIGRAM(S): at 11:32

## 2023-11-27 RX ADMIN — Medication 750 MILLIGRAM(S): at 00:08

## 2023-11-27 RX ADMIN — Medication 250 MILLIGRAM(S): at 05:52

## 2023-11-27 RX ADMIN — POTASSIUM PHOSPHATE, MONOBASIC POTASSIUM PHOSPHATE, DIBASIC 83.33 MILLIMOLE(S): 236; 224 INJECTION, SOLUTION INTRAVENOUS at 11:35

## 2023-11-27 RX ADMIN — SODIUM CHLORIDE 135 MILLILITER(S): 9 INJECTION, SOLUTION INTRAVENOUS at 21:21

## 2023-11-27 RX ADMIN — MORPHINE SULFATE 4 MILLIGRAM(S): 50 CAPSULE, EXTENDED RELEASE ORAL at 05:48

## 2023-11-27 RX ADMIN — Medication 650 MILLIGRAM(S): at 06:50

## 2023-11-27 RX ADMIN — Medication 650 MILLIGRAM(S): at 23:00

## 2023-11-27 RX ADMIN — Medication 650 MILLIGRAM(S): at 22:21

## 2023-11-27 RX ADMIN — ENOXAPARIN SODIUM 40 MILLIGRAM(S): 100 INJECTION SUBCUTANEOUS at 11:31

## 2023-11-27 RX ADMIN — MORPHINE SULFATE 30 MILLILITER(S): 50 CAPSULE, EXTENDED RELEASE ORAL at 20:32

## 2023-11-27 RX ADMIN — CEFEPIME 100 MILLIGRAM(S): 1 INJECTION, POWDER, FOR SOLUTION INTRAMUSCULAR; INTRAVENOUS at 05:23

## 2023-11-27 RX ADMIN — MORPHINE SULFATE 6 MILLIGRAM(S): 50 CAPSULE, EXTENDED RELEASE ORAL at 11:31

## 2023-11-27 RX ADMIN — MEROPENEM 100 MILLIGRAM(S): 1 INJECTION INTRAVENOUS at 21:26

## 2023-11-27 RX ADMIN — MEROPENEM 100 MILLIGRAM(S): 1 INJECTION INTRAVENOUS at 14:06

## 2023-11-27 RX ADMIN — MORPHINE SULFATE 4 MILLIGRAM(S): 50 CAPSULE, EXTENDED RELEASE ORAL at 06:18

## 2023-11-27 NOTE — PROVIDER CONTACT NOTE (OTHER) - BACKGROUND
23 year old patient admitted with sickle cell disease, hydrocephalus, SOB and chest pain,
Admitted for chest pain 2/2 sickle cell crisis
pt admitted for chest pain and SOB
Admitted for chest pain 2/2 sickle cell crisis.

## 2023-11-27 NOTE — PROGRESS NOTE ADULT - SUBJECTIVE AND OBJECTIVE BOX
PROGRESS NOTE:     Patient is a 23y old  Female who presents with a chief complaint of VOC (26 Nov 2023 13:34)      SUBJECTIVE / OVERNIGHT EVENTS:    OVERNIGHT: No acute overnight events.      Patient was examined at bedside and feels well this morning. Denies fever, chills, chest pain, SOB, nausea, vomiting. ROS otherwise negative and pt is amenable to current treatment plan.      REVIEW OF SYSTEMS:    CONSTITUTIONAL:  No weakness, fevers, or chills  EYES/ENT:  No visual changes, vertigo, or throat pain   NECK:  No pain or stiffness  RESPIRATORY:  No SOB, cough, wheezing, hemoptysis  CARDIOVASCULAR:  No chest pain or palpitations  GASTROINTESTINAL:  No abdominal pain, nausea, vomiting, or hematemesis; No diarrhea or constipation. No melena or hematochezia.  GENITOURINARY:  No dysuria, change in frequency, or hematuria  NEUROLOGICAL:  No numbness or weakness  SKIN:  No itching or rashes      MEDICATIONS  (STANDING):  cefepime   IVPB      cefepime   IVPB 2000 milliGRAM(s) IV Intermittent every 8 hours  deferasirox Tablet 720 milliGRAM(s) Oral daily  enoxaparin Injectable 40 milliGRAM(s) SubCutaneous every 24 hours  folic acid 1 milliGRAM(s) Oral daily  influenza   Vaccine 0.5 milliLiter(s) IntraMuscular once  melatonin 9 milliGRAM(s) Oral at bedtime  potassium phosphate IVPB 40 milliMole(s) IV Intermittent once  sertraline 50 milliGRAM(s) Oral daily  sodium chloride 0.45%. 1000 milliLiter(s) (135 mL/Hr) IV Continuous <Continuous>  vancomycin  IVPB 750 milliGRAM(s) IV Intermittent every 12 hours    MEDICATIONS  (PRN):  morphine  - Injectable 4 milliGRAM(s) IV Push every 4 hours PRN Moderate Pain (4 - 6)  morphine  - Injectable 6 milliGRAM(s) IV Push every 4 hours PRN Severe Pain (7 - 10)  naloxone Injectable 0.1 milliGRAM(s) IV Push every 3 minutes PRN For ANY of the following changes in patient status:  A. RR LESS THAN 10 breaths per minute, B. Oxygen saturation LESS THAN 90%, C. Sedation score of 6  ondansetron Injectable 4 milliGRAM(s) IV Push every 6 hours PRN Nausea      CAPILLARY BLOOD GLUCOSE        I&O's Summary      PHYSICAL EXAM:  Vital Signs Last 24 Hrs  T(C): 39.6 (27 Nov 2023 05:46), Max: 39.6 (26 Nov 2023 23:03)  T(F): 103.2 (27 Nov 2023 05:46), Max: 103.2 (26 Nov 2023 23:03)  HR: 114 (27 Nov 2023 05:46) (105 - 117)  BP: 107/51 (27 Nov 2023 05:46) (104/43 - 115/56)  BP(mean): --  RR: 20 (27 Nov 2023 05:46) (20 - 27)  SpO2: 93% (27 Nov 2023 05:46) (93% - 98%)    Parameters below as of 27 Nov 2023 05:46  Patient On (Oxygen Delivery Method): room air        CONSTITUTIONAL: NAD; well-developed  HEENT: PERRL, clear conjunctiva  RESPIRATORY: Normal respiratory effort; lungs are clear to auscultation bilaterally; No Crackles/Rhonchi/Wheezing  CARDIOVASCULAR: Regular rate and rhythm, normal S1 and S2, no murmur/rub/gallop; No lower extremity edema; Peripheral pulses are 2+ bilaterally  ABDOMEN: Nontender to palpation, normoactive bowel sounds, no rebound/guarding; No hepatosplenomegaly  MUSCULOSKELETAL: no clubbing or cyanosis of digits; no joint swelling or tenderness to palpation  EXTREMITY: Lower extremities Non-tender to palpation; non-erythematous B/L  NEURO: A&Ox3; no focal deficits   PSYCH: normal mood; Affect appropriate    LABS:                        8.3    18.31 )-----------( 318      ( 27 Nov 2023 05:35 )             23.9     11-27    135  |  104  |  8   ----------------------------<  115<H>  3.9   |  19<L>  |  0.55    Ca    8.7      27 Nov 2023 05:35  Phos  1.1     11-27  Mg     1.90     11-27    TPro  6.1  /  Alb  4.1  /  TBili  5.4<H>  /  DBili  x   /  AST  36<H>  /  ALT  29  /  AlkPhos  50  11-27    PT/INR - ( 26 Nov 2023 06:55 )   PT: 17.2 sec;   INR: 1.54 ratio         PTT - ( 26 Nov 2023 06:55 )  PTT:26.5 sec      Urinalysis Basic - ( 27 Nov 2023 05:35 )    Color: x / Appearance: x / SG: x / pH: x  Gluc: 115 mg/dL / Ketone: x  / Bili: x / Urobili: x   Blood: x / Protein: x / Nitrite: x   Leuk Esterase: x / RBC: x / WBC x   Sq Epi: x / Non Sq Epi: x / Bacteria: x        Culture - Blood (collected 25 Nov 2023 19:48)  Source: .Blood Blood  Gram Stain (26 Nov 2023 16:54):    Growth in anaerobic bottle: Gram Negative Rods  Preliminary Report (26 Nov 2023 16:54):    Growth in anaerobic bottle: Gram Negative Rods    Direct identification is available within approximately 3-5    hours either by Blood Panel Multiplexed PCR or Direct    MALDI-TOF. Details: [email or web address]  Organism: Blood Culture PCR (26 Nov 2023 18:25)  Organism: Blood Culture PCR (26 Nov 2023 18:25)    Culture - Blood (collected 25 Nov 2023 19:48)  Source: .Blood Blood  Gram Stain (27 Nov 2023 07:45):    Growth in anaerobic bottle: Gram Negative Rods    Growth in aerobic bottle: Gram Negative Rods  Preliminary Report (27 Nov 2023 07:45):    Growth in anaerobic bottle: Gram Negative Rods    Growth in aerobic bottle: Gram Negative Rods        RADIOLOGY & ADDITIONAL TESTS:    N/A PROGRESS NOTE:     Patient is a 23y old  Female who presents with a chief complaint of VOC (26 Nov 2023 13:34)      SUBJECTIVE / OVERNIGHT EVENTS:    OVERNIGHT: No acute overnight events.      Patient was examined at bedside and feels well this morning. Pain is well-controlled. Continues to have fever and chest pain, but denies SOB, nausea, vomiting. ROS otherwise negative and pt is amenable to current treatment plan.      REVIEW OF SYSTEMS:    CONSTITUTIONAL:  Fevers, no chills, weakness  EYES/ENT:  No visual changes, vertigo, or throat pain   NECK:  No pain or stiffness  RESPIRATORY:  No SOB, cough, wheezing, hemoptysis  CARDIOVASCULAR:  Chest pain, no palpitations  GASTROINTESTINAL:  No abdominal pain, nausea, vomiting, or hematemesis; No diarrhea or constipation. No melena or hematochezia.  GENITOURINARY:  No dysuria, change in frequency, or hematuria  NEUROLOGICAL:  No numbness or weakness  SKIN:  No itching or rashes      MEDICATIONS  (STANDING):  cefepime   IVPB      cefepime   IVPB 2000 milliGRAM(s) IV Intermittent every 8 hours  deferasirox Tablet 720 milliGRAM(s) Oral daily  enoxaparin Injectable 40 milliGRAM(s) SubCutaneous every 24 hours  folic acid 1 milliGRAM(s) Oral daily  influenza   Vaccine 0.5 milliLiter(s) IntraMuscular once  melatonin 9 milliGRAM(s) Oral at bedtime  potassium phosphate IVPB 40 milliMole(s) IV Intermittent once  sertraline 50 milliGRAM(s) Oral daily  sodium chloride 0.45%. 1000 milliLiter(s) (135 mL/Hr) IV Continuous <Continuous>  vancomycin  IVPB 750 milliGRAM(s) IV Intermittent every 12 hours    MEDICATIONS  (PRN):  morphine  - Injectable 4 milliGRAM(s) IV Push every 4 hours PRN Moderate Pain (4 - 6)  morphine  - Injectable 6 milliGRAM(s) IV Push every 4 hours PRN Severe Pain (7 - 10)  naloxone Injectable 0.1 milliGRAM(s) IV Push every 3 minutes PRN For ANY of the following changes in patient status:  A. RR LESS THAN 10 breaths per minute, B. Oxygen saturation LESS THAN 90%, C. Sedation score of 6  ondansetron Injectable 4 milliGRAM(s) IV Push every 6 hours PRN Nausea      CAPILLARY BLOOD GLUCOSE        I&O's Summary      PHYSICAL EXAM:  Vital Signs Last 24 Hrs  T(C): 39.6 (27 Nov 2023 05:46), Max: 39.6 (26 Nov 2023 23:03)  T(F): 103.2 (27 Nov 2023 05:46), Max: 103.2 (26 Nov 2023 23:03)  HR: 114 (27 Nov 2023 05:46) (105 - 117)  BP: 107/51 (27 Nov 2023 05:46) (104/43 - 115/56)  BP(mean): --  RR: 20 (27 Nov 2023 05:46) (20 - 27)  SpO2: 93% (27 Nov 2023 05:46) (93% - 98%)    Parameters below as of 27 Nov 2023 05:46  Patient On (Oxygen Delivery Method): room air        CONSTITUTIONAL: NAD; well-developed  HEENT: PERRL, clear conjunctiva  RESPIRATORY: Normal respiratory effort; lungs are clear to auscultation bilaterally; No Crackles/Rhonchi/Wheezing  CARDIOVASCULAR: Regular rate and rhythm, normal S1 and S2, no murmur/rub/gallop; No lower extremity edema; Peripheral pulses are 2+ bilaterally  ABDOMEN: Nontender to palpation, normoactive bowel sounds, no rebound/guarding; No hepatosplenomegaly  MUSCULOSKELETAL: no clubbing or cyanosis of digits; no joint swelling or tenderness to palpation  EXTREMITY: Lower extremities Non-tender to palpation; non-erythematous B/L  NEURO: A&Ox3; no focal deficits   PSYCH: normal mood; Affect appropriate    LABS:                        8.3    18.31 )-----------( 318      ( 27 Nov 2023 05:35 )             23.9     11-27    135  |  104  |  8   ----------------------------<  115<H>  3.9   |  19<L>  |  0.55    Ca    8.7      27 Nov 2023 05:35  Phos  1.1     11-27  Mg     1.90     11-27    TPro  6.1  /  Alb  4.1  /  TBili  5.4<H>  /  DBili  x   /  AST  36<H>  /  ALT  29  /  AlkPhos  50  11-27    PT/INR - ( 26 Nov 2023 06:55 )   PT: 17.2 sec;   INR: 1.54 ratio         PTT - ( 26 Nov 2023 06:55 )  PTT:26.5 sec      Urinalysis Basic - ( 27 Nov 2023 05:35 )    Color: x / Appearance: x / SG: x / pH: x  Gluc: 115 mg/dL / Ketone: x  / Bili: x / Urobili: x   Blood: x / Protein: x / Nitrite: x   Leuk Esterase: x / RBC: x / WBC x   Sq Epi: x / Non Sq Epi: x / Bacteria: x        Culture - Blood (collected 25 Nov 2023 19:48)  Source: .Blood Blood  Gram Stain (26 Nov 2023 16:54):    Growth in anaerobic bottle: Gram Negative Rods  Preliminary Report (26 Nov 2023 16:54):    Growth in anaerobic bottle: Gram Negative Rods    Direct identification is available within approximately 3-5    hours either by Blood Panel Multiplexed PCR or Direct    MALDI-TOF. Details: [email or web address]  Organism: Blood Culture PCR (26 Nov 2023 18:25)  Organism: Blood Culture PCR (26 Nov 2023 18:25)    Culture - Blood (collected 25 Nov 2023 19:48)  Source: .Blood Blood  Gram Stain (27 Nov 2023 07:45):    Growth in anaerobic bottle: Gram Negative Rods    Growth in aerobic bottle: Gram Negative Rods  Preliminary Report (27 Nov 2023 07:45):    Growth in anaerobic bottle: Gram Negative Rods    Growth in aerobic bottle: Gram Negative Rods        RADIOLOGY & ADDITIONAL TESTS:    N/A

## 2023-11-27 NOTE — PROGRESS NOTE ADULT - PROBLEM SELECTOR PLAN 2
- In ED, pt was febrile, hypotensive, tachycardic, and tachypneic  - Suspected 2/2 UTI vs  shunt line infection given recent revision 10/23 of one of 2  shunts  - Urine culture ( ) -> Proteus mirabilis  - Blood culture ( ) -> Gram negative rods  - S/p empiric vancomycin, cefepime  - Start IV meropenem iso persistent fevers 2/2 likely P. mirabilis bacteremia  - C/w IVF, as above - In ED, pt was febrile, hypotensive, tachycardic, and tachypneic  - Suspected 2/2 UTI vs  shunt line infection given recent revision 10/23 of one of 2  shunts  - Urine culture (11/25) -> Proteus mirabilis  - Blood culture (11/25) -> Gram negative rods  - S/p empiric vancomycin, cefepime  - Start IV meropenem iso persistent fevers 2/2 likely P. mirabilis bacteremia  - C/w IVF, as above

## 2023-11-27 NOTE — PROGRESS NOTE ADULT - SUBJECTIVE AND OBJECTIVE BOX
Lencho Matthew MD PGY-5 Hematology Oncology  Reachable on TEAMS    INTERVAL HPI/OVERNIGHT EVENTS:  Patient S&E at bedside. Persistent chest pain that she came in with that is pleuritic. Fever in AM.     VITAL SIGNS:  T(F): 99.2 (11-27-23 @ 16:40)  HR: 95 (11-27-23 @ 16:40)  BP: 98/43 (11-27-23 @ 16:40)  RR: 18 (11-27-23 @ 16:40)  SpO2: 96% (11-27-23 @ 16:40)  Wt(kg): --    PHYSICAL EXAM:    Constitutional: NAD  Eyes: EOMI, sclera non-icteric  Neck: supple, no masses, no JVD  Respiratory: CTA b/l  Cardiovascular: RRR, no M/R/G  Gastrointestinal: soft, NTND, no masses palpable, + BS  Extremities: no c/c/e  Neurological: no focal deficits      MEDICATIONS  (STANDING):  deferasirox Tablet 720 milliGRAM(s) Oral daily  enoxaparin Injectable 40 milliGRAM(s) SubCutaneous every 24 hours  folic acid 1 milliGRAM(s) Oral daily  influenza   Vaccine 0.5 milliLiter(s) IntraMuscular once  melatonin 9 milliGRAM(s) Oral at bedtime  meropenem  IVPB 1000 milliGRAM(s) IV Intermittent every 8 hours  morphine PCA (5 mG/mL) 30 milliLiter(s) PCA Continuous PCA Continuous  sertraline 50 milliGRAM(s) Oral daily  sodium chloride 0.45%. 1000 milliLiter(s) (135 mL/Hr) IV Continuous <Continuous>    MEDICATIONS  (PRN):  naloxone Injectable 0.1 milliGRAM(s) IV Push every 3 minutes PRN For ANY of the following changes in patient status:  A. RR LESS THAN 10 breaths per minute, B. Oxygen saturation LESS THAN 90%, C. Sedation score of 6  ondansetron Injectable 4 milliGRAM(s) IV Push every 6 hours PRN Nausea      Allergies    ceftriaxone (Pruritus)    Intolerances        LABS:                        8.3    18.31 )-----------( 318      ( 27 Nov 2023 05:35 )             23.9     11-27    135  |  104  |  8   ----------------------------<  115<H>  3.9   |  19<L>  |  0.55    Ca    8.7      27 Nov 2023 05:35  Phos  1.1     11-27  Mg     1.90     11-27    TPro  6.1  /  Alb  4.1  /  TBili  5.4<H>  /  DBili  x   /  AST  36<H>  /  ALT  29  /  AlkPhos  50  11-27    PT/INR - ( 26 Nov 2023 06:55 )   PT: 17.2 sec;   INR: 1.54 ratio         PTT - ( 26 Nov 2023 06:55 )  PTT:26.5 sec  Urinalysis Basic - ( 27 Nov 2023 05:35 )    Color: x / Appearance: x / SG: x / pH: x  Gluc: 115 mg/dL / Ketone: x  / Bili: x / Urobili: x   Blood: x / Protein: x / Nitrite: x   Leuk Esterase: x / RBC: x / WBC x   Sq Epi: x / Non Sq Epi: x / Bacteria: x        RADIOLOGY & ADDITIONAL TESTS:  Studies reviewed.

## 2023-11-27 NOTE — PROGRESS NOTE ADULT - PROBLEM SELECTOR PLAN 1
- P/w severe chest pain, febrile to 103.8F in ED  - CXR -> Clear lungs; will repeat on 11/26 at 5pm given initial hypoxemia  - CTA chest -> No evidence of PE or acute pulmonary disease  - Not meeting criteria for acute chest syndrome given no evidence of new pulmonary consolidation  - Hgb 6.3 -> 1U pRBCs (11/26)  - S/p IV morphine 4mg q4 prn (moderate pain); 6mg q4 prn (severe pain) -> started morphine PCA pump  - C/w 1/2NS @ 135 cc/hr  - Hematology consulted, recommend c/w home deferasirox, obtain daily CXRs, start 2L venturi mask, call if pt becomes hypoxic for possible exchange transfusion - P/w severe chest pain, febrile to 103.8F in ED  - CXR -> Clear lungs; repeat CXR unremarkable  - CTA chest -> No evidence of PE or acute pulmonary disease  - Not meeting criteria for acute chest syndrome given no evidence of new pulmonary consolidation  - Hgb 6.3 -> 1U pRBCs (11/26)  - S/p IV morphine 4mg q4 prn (moderate pain); 6mg q4 prn (severe pain) -> started morphine PCA pump  - C/w 1/2NS @ 135 cc/hr  - Hematology consulted, recommend c/w home deferasirox, obtain daily CXRs, start 2L venturi mask, call if pt becomes hypoxic for possible exchange transfusion

## 2023-11-28 LAB
-  AMOXICILLIN/CLAVULANIC ACID: SIGNIFICANT CHANGE UP
-  AMOXICILLIN/CLAVULANIC ACID: SIGNIFICANT CHANGE UP
-  AMPICILLIN/SULBACTAM: SIGNIFICANT CHANGE UP
-  AMPICILLIN: SIGNIFICANT CHANGE UP
-  AZTREONAM: SIGNIFICANT CHANGE UP
-  CEFAZOLIN: SIGNIFICANT CHANGE UP
-  CEFEPIME: SIGNIFICANT CHANGE UP
-  CEFOXITIN: SIGNIFICANT CHANGE UP
-  CEFTRIAXONE: SIGNIFICANT CHANGE UP
-  CEFUROXIME: SIGNIFICANT CHANGE UP
-  CEFUROXIME: SIGNIFICANT CHANGE UP
-  CIPROFLOXACIN: SIGNIFICANT CHANGE UP
-  ERTAPENEM: SIGNIFICANT CHANGE UP
-  GENTAMICIN: SIGNIFICANT CHANGE UP
-  LEVOFLOXACIN: SIGNIFICANT CHANGE UP
-  MEROPENEM: SIGNIFICANT CHANGE UP
-  NITROFURANTOIN: SIGNIFICANT CHANGE UP
-  NITROFURANTOIN: SIGNIFICANT CHANGE UP
-  PIPERACILLIN/TAZOBACTAM: SIGNIFICANT CHANGE UP
-  TOBRAMYCIN: SIGNIFICANT CHANGE UP
-  TRIMETHOPRIM/SULFAMETHOXAZOLE: SIGNIFICANT CHANGE UP
ALBUMIN SERPL ELPH-MCNC: 3.7 G/DL — SIGNIFICANT CHANGE UP (ref 3.3–5)
ALBUMIN SERPL ELPH-MCNC: 3.7 G/DL — SIGNIFICANT CHANGE UP (ref 3.3–5)
ALP SERPL-CCNC: 42 U/L — SIGNIFICANT CHANGE UP (ref 40–120)
ALP SERPL-CCNC: 42 U/L — SIGNIFICANT CHANGE UP (ref 40–120)
ALT FLD-CCNC: 24 U/L — SIGNIFICANT CHANGE UP (ref 4–33)
ALT FLD-CCNC: 24 U/L — SIGNIFICANT CHANGE UP (ref 4–33)
ANION GAP SERPL CALC-SCNC: 9 MMOL/L — SIGNIFICANT CHANGE UP (ref 7–14)
ANION GAP SERPL CALC-SCNC: 9 MMOL/L — SIGNIFICANT CHANGE UP (ref 7–14)
AST SERPL-CCNC: 28 U/L — SIGNIFICANT CHANGE UP (ref 4–32)
AST SERPL-CCNC: 28 U/L — SIGNIFICANT CHANGE UP (ref 4–32)
BASOPHILS # BLD AUTO: 0.04 K/UL — SIGNIFICANT CHANGE UP (ref 0–0.2)
BASOPHILS # BLD AUTO: 0.04 K/UL — SIGNIFICANT CHANGE UP (ref 0–0.2)
BASOPHILS NFR BLD AUTO: 0.3 % — SIGNIFICANT CHANGE UP (ref 0–2)
BASOPHILS NFR BLD AUTO: 0.3 % — SIGNIFICANT CHANGE UP (ref 0–2)
BILIRUB DIRECT SERPL-MCNC: 0.7 MG/DL — HIGH (ref 0–0.3)
BILIRUB DIRECT SERPL-MCNC: 0.7 MG/DL — HIGH (ref 0–0.3)
BILIRUB INDIRECT FLD-MCNC: 2.9 MG/DL — HIGH (ref 0–1)
BILIRUB INDIRECT FLD-MCNC: 2.9 MG/DL — HIGH (ref 0–1)
BILIRUB SERPL-MCNC: 3.6 MG/DL — HIGH (ref 0.2–1.2)
BUN SERPL-MCNC: 4 MG/DL — LOW (ref 7–23)
BUN SERPL-MCNC: 4 MG/DL — LOW (ref 7–23)
CALCIUM SERPL-MCNC: 8.6 MG/DL — SIGNIFICANT CHANGE UP (ref 8.4–10.5)
CALCIUM SERPL-MCNC: 8.6 MG/DL — SIGNIFICANT CHANGE UP (ref 8.4–10.5)
CHLORIDE SERPL-SCNC: 103 MMOL/L — SIGNIFICANT CHANGE UP (ref 98–107)
CHLORIDE SERPL-SCNC: 103 MMOL/L — SIGNIFICANT CHANGE UP (ref 98–107)
CO2 SERPL-SCNC: 21 MMOL/L — LOW (ref 22–31)
CO2 SERPL-SCNC: 21 MMOL/L — LOW (ref 22–31)
CREAT SERPL-MCNC: 0.47 MG/DL — LOW (ref 0.5–1.3)
CREAT SERPL-MCNC: 0.47 MG/DL — LOW (ref 0.5–1.3)
CULTURE RESULTS: ABNORMAL
EGFR: 137 ML/MIN/1.73M2 — SIGNIFICANT CHANGE UP
EGFR: 137 ML/MIN/1.73M2 — SIGNIFICANT CHANGE UP
EOSINOPHIL # BLD AUTO: 0.15 K/UL — SIGNIFICANT CHANGE UP (ref 0–0.5)
EOSINOPHIL # BLD AUTO: 0.15 K/UL — SIGNIFICANT CHANGE UP (ref 0–0.5)
EOSINOPHIL NFR BLD AUTO: 1.1 % — SIGNIFICANT CHANGE UP (ref 0–6)
EOSINOPHIL NFR BLD AUTO: 1.1 % — SIGNIFICANT CHANGE UP (ref 0–6)
GLUCOSE SERPL-MCNC: 106 MG/DL — HIGH (ref 70–99)
GLUCOSE SERPL-MCNC: 106 MG/DL — HIGH (ref 70–99)
HCT VFR BLD CALC: 21.1 % — LOW (ref 34.5–45)
HCT VFR BLD CALC: 21.1 % — LOW (ref 34.5–45)
HGB BLD-MCNC: 7.4 G/DL — LOW (ref 11.5–15.5)
HGB BLD-MCNC: 7.4 G/DL — LOW (ref 11.5–15.5)
IANC: 10.92 K/UL — HIGH (ref 1.8–7.4)
IANC: 10.92 K/UL — HIGH (ref 1.8–7.4)
IMM GRANULOCYTES NFR BLD AUTO: 0.4 % — SIGNIFICANT CHANGE UP (ref 0–0.9)
IMM GRANULOCYTES NFR BLD AUTO: 0.4 % — SIGNIFICANT CHANGE UP (ref 0–0.9)
LDH SERPL L TO P-CCNC: 308 U/L — HIGH (ref 135–225)
LDH SERPL L TO P-CCNC: 308 U/L — HIGH (ref 135–225)
LYMPHOCYTES # BLD AUTO: 0.94 K/UL — LOW (ref 1–3.3)
LYMPHOCYTES # BLD AUTO: 0.94 K/UL — LOW (ref 1–3.3)
LYMPHOCYTES # BLD AUTO: 7.1 % — LOW (ref 13–44)
LYMPHOCYTES # BLD AUTO: 7.1 % — LOW (ref 13–44)
MAGNESIUM SERPL-MCNC: 1.8 MG/DL — SIGNIFICANT CHANGE UP (ref 1.6–2.6)
MAGNESIUM SERPL-MCNC: 1.8 MG/DL — SIGNIFICANT CHANGE UP (ref 1.6–2.6)
MCHC RBC-ENTMCNC: 30.1 PG — SIGNIFICANT CHANGE UP (ref 27–34)
MCHC RBC-ENTMCNC: 30.1 PG — SIGNIFICANT CHANGE UP (ref 27–34)
MCHC RBC-ENTMCNC: 35.1 GM/DL — SIGNIFICANT CHANGE UP (ref 32–36)
MCHC RBC-ENTMCNC: 35.1 GM/DL — SIGNIFICANT CHANGE UP (ref 32–36)
MCV RBC AUTO: 85.8 FL — SIGNIFICANT CHANGE UP (ref 80–100)
MCV RBC AUTO: 85.8 FL — SIGNIFICANT CHANGE UP (ref 80–100)
METHOD TYPE: SIGNIFICANT CHANGE UP
MONOCYTES # BLD AUTO: 1.09 K/UL — HIGH (ref 0–0.9)
MONOCYTES # BLD AUTO: 1.09 K/UL — HIGH (ref 0–0.9)
MONOCYTES NFR BLD AUTO: 8.3 % — SIGNIFICANT CHANGE UP (ref 2–14)
MONOCYTES NFR BLD AUTO: 8.3 % — SIGNIFICANT CHANGE UP (ref 2–14)
NEUTROPHILS # BLD AUTO: 10.92 K/UL — HIGH (ref 1.8–7.4)
NEUTROPHILS # BLD AUTO: 10.92 K/UL — HIGH (ref 1.8–7.4)
NEUTROPHILS NFR BLD AUTO: 82.8 % — HIGH (ref 43–77)
NEUTROPHILS NFR BLD AUTO: 82.8 % — HIGH (ref 43–77)
NRBC # BLD: 0 /100 WBCS — SIGNIFICANT CHANGE UP (ref 0–0)
NRBC # BLD: 0 /100 WBCS — SIGNIFICANT CHANGE UP (ref 0–0)
NRBC # FLD: 0 K/UL — SIGNIFICANT CHANGE UP (ref 0–0)
NRBC # FLD: 0 K/UL — SIGNIFICANT CHANGE UP (ref 0–0)
ORGANISM # SPEC MICROSCOPIC CNT: ABNORMAL
PHOSPHATE SERPL-MCNC: 1 MG/DL — CRITICAL LOW (ref 2.5–4.5)
PHOSPHATE SERPL-MCNC: 1 MG/DL — CRITICAL LOW (ref 2.5–4.5)
PLATELET # BLD AUTO: 286 K/UL — SIGNIFICANT CHANGE UP (ref 150–400)
PLATELET # BLD AUTO: 286 K/UL — SIGNIFICANT CHANGE UP (ref 150–400)
POTASSIUM SERPL-MCNC: 3.6 MMOL/L — SIGNIFICANT CHANGE UP (ref 3.5–5.3)
POTASSIUM SERPL-MCNC: 3.6 MMOL/L — SIGNIFICANT CHANGE UP (ref 3.5–5.3)
POTASSIUM SERPL-SCNC: 3.6 MMOL/L — SIGNIFICANT CHANGE UP (ref 3.5–5.3)
POTASSIUM SERPL-SCNC: 3.6 MMOL/L — SIGNIFICANT CHANGE UP (ref 3.5–5.3)
PROT SERPL-MCNC: 6.2 G/DL — SIGNIFICANT CHANGE UP (ref 6–8.3)
PROT SERPL-MCNC: 6.2 G/DL — SIGNIFICANT CHANGE UP (ref 6–8.3)
RBC # BLD: 2.46 M/UL — LOW (ref 3.8–5.2)
RBC # FLD: 17.7 % — HIGH (ref 10.3–14.5)
RBC # FLD: 17.7 % — HIGH (ref 10.3–14.5)
RETICS #: 231.2 K/UL — HIGH (ref 25–125)
RETICS #: 231.2 K/UL — HIGH (ref 25–125)
RETICS/RBC NFR: 9.4 % — HIGH (ref 0.5–2.5)
RETICS/RBC NFR: 9.4 % — HIGH (ref 0.5–2.5)
SODIUM SERPL-SCNC: 133 MMOL/L — LOW (ref 135–145)
SODIUM SERPL-SCNC: 133 MMOL/L — LOW (ref 135–145)
SPECIMEN SOURCE: SIGNIFICANT CHANGE UP
WBC # BLD: 13.19 K/UL — HIGH (ref 3.8–10.5)
WBC # BLD: 13.19 K/UL — HIGH (ref 3.8–10.5)
WBC # FLD AUTO: 13.19 K/UL — HIGH (ref 3.8–10.5)
WBC # FLD AUTO: 13.19 K/UL — HIGH (ref 3.8–10.5)

## 2023-11-28 PROCEDURE — 99254 IP/OBS CNSLTJ NEW/EST MOD 60: CPT

## 2023-11-28 PROCEDURE — 76770 US EXAM ABDO BACK WALL COMP: CPT | Mod: 26

## 2023-11-28 PROCEDURE — 99233 SBSQ HOSP IP/OBS HIGH 50: CPT | Mod: GC

## 2023-11-28 RX ORDER — CEFTRIAXONE 500 MG/1
2000 INJECTION, POWDER, FOR SOLUTION INTRAMUSCULAR; INTRAVENOUS EVERY 24 HOURS
Refills: 0 | Status: DISCONTINUED | OUTPATIENT
Start: 2023-11-28 | End: 2023-12-02

## 2023-11-28 RX ORDER — SODIUM,POTASSIUM PHOSPHATES 278-250MG
2 POWDER IN PACKET (EA) ORAL
Refills: 0 | Status: COMPLETED | OUTPATIENT
Start: 2023-11-28 | End: 2023-11-28

## 2023-11-28 RX ORDER — SERTRALINE 25 MG/1
1 TABLET, FILM COATED ORAL
Refills: 0 | DISCHARGE

## 2023-11-28 RX ORDER — ACETAMINOPHEN 500 MG
2 TABLET ORAL
Qty: 0 | Refills: 0 | DISCHARGE

## 2023-11-28 RX ORDER — POTASSIUM PHOSPHATE, MONOBASIC POTASSIUM PHOSPHATE, DIBASIC 236; 224 MG/ML; MG/ML
30 INJECTION, SOLUTION INTRAVENOUS ONCE
Refills: 0 | Status: COMPLETED | OUTPATIENT
Start: 2023-11-28 | End: 2023-11-28

## 2023-11-28 RX ORDER — LANOLIN ALCOHOL/MO/W.PET/CERES
1 CREAM (GRAM) TOPICAL
Refills: 0 | DISCHARGE

## 2023-11-28 RX ORDER — SENNA PLUS 8.6 MG/1
2 TABLET ORAL AT BEDTIME
Refills: 0 | Status: DISCONTINUED | OUTPATIENT
Start: 2023-11-28 | End: 2023-12-02

## 2023-11-28 RX ORDER — DEFERASIROX 180 MG/1
2 GRANULE ORAL
Refills: 0 | DISCHARGE

## 2023-11-28 RX ADMIN — SODIUM CHLORIDE 135 MILLILITER(S): 9 INJECTION, SOLUTION INTRAVENOUS at 04:48

## 2023-11-28 RX ADMIN — MORPHINE SULFATE 30 MILLILITER(S): 50 CAPSULE, EXTENDED RELEASE ORAL at 08:17

## 2023-11-28 RX ADMIN — SERTRALINE 50 MILLIGRAM(S): 25 TABLET, FILM COATED ORAL at 12:00

## 2023-11-28 RX ADMIN — Medication 9 MILLIGRAM(S): at 21:36

## 2023-11-28 RX ADMIN — DEFERASIROX 720 MILLIGRAM(S): 180 GRANULE ORAL at 12:00

## 2023-11-28 RX ADMIN — ENOXAPARIN SODIUM 40 MILLIGRAM(S): 100 INJECTION SUBCUTANEOUS at 12:01

## 2023-11-28 RX ADMIN — Medication 2 PACKET(S): at 12:00

## 2023-11-28 RX ADMIN — SODIUM CHLORIDE 135 MILLILITER(S): 9 INJECTION, SOLUTION INTRAVENOUS at 11:59

## 2023-11-28 RX ADMIN — Medication 2 PACKET(S): at 09:13

## 2023-11-28 RX ADMIN — CEFTRIAXONE 100 MILLIGRAM(S): 500 INJECTION, POWDER, FOR SOLUTION INTRAMUSCULAR; INTRAVENOUS at 12:00

## 2023-11-28 RX ADMIN — POTASSIUM PHOSPHATE, MONOBASIC POTASSIUM PHOSPHATE, DIBASIC 83.33 MILLIMOLE(S): 236; 224 INJECTION, SOLUTION INTRAVENOUS at 09:42

## 2023-11-28 RX ADMIN — Medication 1 MILLIGRAM(S): at 12:00

## 2023-11-28 RX ADMIN — Medication 2 PACKET(S): at 18:32

## 2023-11-28 RX ADMIN — MEROPENEM 100 MILLIGRAM(S): 1 INJECTION INTRAVENOUS at 07:02

## 2023-11-28 RX ADMIN — Medication 650 MILLIGRAM(S): at 07:02

## 2023-11-28 RX ADMIN — SODIUM CHLORIDE 135 MILLILITER(S): 9 INJECTION, SOLUTION INTRAVENOUS at 19:48

## 2023-11-28 RX ADMIN — Medication 650 MILLIGRAM(S): at 15:26

## 2023-11-28 NOTE — CONSULT NOTE ADULT - ASSESSMENT
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  Had fever and pyuria.  BC and UC sent, both +Proteus.  Hx CT that showed possible left renal infarct.  Follow up sonogram for evaluation of perinephric abscess was negative for collection but did show left renal non-obstructing stone.  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  Had fever and pyuria.  BC and UC sent, both +Proteus.  Hx CT that showed possible left renal infarct.  Follow up sonogram for evaluation of perinephric abscess was negative for collection but did show left renal non-obstructing stone.  Antibiotics adjusted to ceftriaxone    Sepsis secondary to Proteus UTI with bacteremia  - agree with ceftriaxone 2 g daily  - if 11/27 BC negative, will switch to oral antibiotics, cefpodoxime  - post-coital antibiotics as outpatient can be addressed as prophylaxis if this becomes recurrent

## 2023-11-28 NOTE — PROGRESS NOTE ADULT - PROBLEM SELECTOR PLAN 2
- In ED, pt was febrile, hypotensive, tachycardic, and tachypneic  - Suspected 2/2 UTI vs  shunt line infection given recent revision 10/23 of one of 2  shunts  - Urine, blood culture (11/25) -> Pansensitive Proteus mirabilis  - S/p empiric vancomycin, cefepime, meropenem  - Deescalated to IV ceftriaxone following culture abx sensitivity results  - Renal, bladder US -> 1.3cm nonobstructing stone in upper pole of right kidney, otherwise unremarkable  - ID consulted, will appreciate recs  - C/w IVF, as above

## 2023-11-28 NOTE — PROVIDER CONTACT NOTE (OTHER) - SITUATION
/46
patient has an oral temperature of 103 F
/37 HR 95
10/10 chest pain, fever
temp 103.1, , /41, respirations 30
patient's oral temperature 101.6 F

## 2023-11-28 NOTE — PROGRESS NOTE ADULT - SUBJECTIVE AND OBJECTIVE BOX
PROGRESS NOTE:     Patient is a 23y old  Female who presents with a chief complaint of VOC (27 Nov 2023 17:06)      SUBJECTIVE / OVERNIGHT EVENTS:    OVERNIGHT: No acute overnight events.      Patient was examined at bedside and feels well this morning. Denies fever, chills, chest pain, SOB, nausea, vomiting. ROS otherwise negative and pt is amenable to current treatment plan.      REVIEW OF SYSTEMS:    CONSTITUTIONAL:  No weakness, fevers, or chills  EYES/ENT:  No visual changes, vertigo, or throat pain   NECK:  No pain or stiffness  RESPIRATORY:  No SOB, cough, wheezing, hemoptysis  CARDIOVASCULAR:  No chest pain or palpitations  GASTROINTESTINAL:  No abdominal pain, nausea, vomiting, or hematemesis; No diarrhea or constipation. No melena or hematochezia.  GENITOURINARY:  No dysuria, change in frequency, or hematuria  NEUROLOGICAL:  No numbness or weakness  SKIN:  No itching or rashes      MEDICATIONS  (STANDING):  deferasirox Tablet 720 milliGRAM(s) Oral daily  enoxaparin Injectable 40 milliGRAM(s) SubCutaneous every 24 hours  folic acid 1 milliGRAM(s) Oral daily  influenza   Vaccine 0.5 milliLiter(s) IntraMuscular once  melatonin 9 milliGRAM(s) Oral at bedtime  meropenem  IVPB 1000 milliGRAM(s) IV Intermittent every 8 hours  morphine PCA (5 mG/mL) 30 milliLiter(s) PCA Continuous PCA Continuous  sertraline 50 milliGRAM(s) Oral daily  sodium chloride 0.45%. 1000 milliLiter(s) (135 mL/Hr) IV Continuous <Continuous>    MEDICATIONS  (PRN):  acetaminophen     Tablet .. 650 milliGRAM(s) Oral every 6 hours PRN Temp greater or equal to 38C (100.4F)  naloxone Injectable 0.1 milliGRAM(s) IV Push every 3 minutes PRN For ANY of the following changes in patient status:  A. RR LESS THAN 10 breaths per minute, B. Oxygen saturation LESS THAN 90%, C. Sedation score of 6  ondansetron Injectable 4 milliGRAM(s) IV Push every 6 hours PRN Nausea      CAPILLARY BLOOD GLUCOSE        I&O's Summary      PHYSICAL EXAM:  Vital Signs Last 24 Hrs  T(C): 39.4 (28 Nov 2023 07:00), Max: 39.4 (27 Nov 2023 13:56)  T(F): 103 (28 Nov 2023 07:00), Max: 103 (27 Nov 2023 13:56)  HR: 101 (28 Nov 2023 07:00) (70 - 110)  BP: 101/55 (28 Nov 2023 07:00) (98/43 - 114/58)  BP(mean): --  RR: 18 (28 Nov 2023 07:00) (18 - 22)  SpO2: 96% (28 Nov 2023 07:00) (95% - 98%)    Parameters below as of 28 Nov 2023 07:00  Patient On (Oxygen Delivery Method): room air        CONSTITUTIONAL: NAD; well-developed  HEENT: PERRL, clear conjunctiva  RESPIRATORY: Normal respiratory effort; lungs are clear to auscultation bilaterally; No Crackles/Rhonchi/Wheezing  CARDIOVASCULAR: Regular rate and rhythm, normal S1 and S2, no murmur/rub/gallop; No lower extremity edema; Peripheral pulses are 2+ bilaterally  ABDOMEN: Nontender to palpation, normoactive bowel sounds, no rebound/guarding; No hepatosplenomegaly  MUSCULOSKELETAL: no clubbing or cyanosis of digits; no joint swelling or tenderness to palpation  EXTREMITY: Lower extremities Non-tender to palpation; non-erythematous B/L  NEURO: A&Ox3; no focal deficits   PSYCH: normal mood; Affect appropriate    LABS:                        7.4    x     )-----------( 286      ( 28 Nov 2023 07:20 )             21.1     11-27    135  |  104  |  8   ----------------------------<  115<H>  3.9   |  19<L>  |  0.55    Ca    8.7      27 Nov 2023 05:35  Phos  1.1     11-27  Mg     1.90     11-27    TPro  6.1  /  Alb  4.1  /  TBili  5.4<H>  /  DBili  x   /  AST  36<H>  /  ALT  29  /  AlkPhos  50  11-27          Urinalysis Basic - ( 27 Nov 2023 05:35 )    Color: x / Appearance: x / SG: x / pH: x  Gluc: 115 mg/dL / Ketone: x  / Bili: x / Urobili: x   Blood: x / Protein: x / Nitrite: x   Leuk Esterase: x / RBC: x / WBC x   Sq Epi: x / Non Sq Epi: x / Bacteria: x        Culture - Blood (collected 25 Nov 2023 19:48)  Source: .Blood Blood  Gram Stain (26 Nov 2023 16:54):    Growth in anaerobic bottle: Gram Negative Rods  Preliminary Report (27 Nov 2023 11:43):    Growth in anaerobic bottle: Proteus mirabilis    Direct identification is available within approximately 3-5    hours either by Blood Panel Multiplexed PCR or Direct    MALDI-TOF. Details: [email or web address]  Organism: Blood Culture PCR (26 Nov 2023 18:25)  Organism: Blood Culture PCR (26 Nov 2023 18:25)    Culture - Blood (collected 25 Nov 2023 19:48)  Source: .Blood Blood  Gram Stain (27 Nov 2023 07:45):    Growth in anaerobic bottle: Gram Negative Rods    Growth in aerobic bottle: Gram Negative Rods  Preliminary Report (27 Nov 2023 20:45):    Growth in aerobic and anaerobic bottles: Proteus mirabilis    See previous culture [id number]    Culture - Urine (collected 25 Nov 2023 19:23)  Source: Clean Catch Clean Catch (Midstream)  Preliminary Report (27 Nov 2023 21:07):    >100,000 CFU/ml Proteus mirabilis        RADIOLOGY & ADDITIONAL TESTS:    N/A PROGRESS NOTE:     Patient is a 23y old  Female who presents with a chief complaint of VOC (27 Nov 2023 17:06)      SUBJECTIVE / OVERNIGHT EVENTS:    OVERNIGHT: No acute overnight events.      Patient was examined at bedside and feels well this morning. Pain is well-controlled. Continues to have fever and chest pain, but denies SOB, nausea, vomiting. ROS otherwise negative and pt is amenable to current treatment plan.      REVIEW OF SYSTEMS:    CONSTITUTIONAL:  Fevers, no chills, weakness  EYES/ENT:  No visual changes, vertigo, or throat pain   NECK:  No pain or stiffness  RESPIRATORY:  No SOB, cough, wheezing, hemoptysis  CARDIOVASCULAR:  Chest pain, no palpitations  GASTROINTESTINAL:  No abdominal pain, nausea, vomiting, or hematemesis; No diarrhea or constipation. No melena or hematochezia.  GENITOURINARY:  No dysuria, change in frequency, or hematuria  NEUROLOGICAL:  No numbness or weakness  SKIN:  No itching or rashes      MEDICATIONS  (STANDING):  deferasirox Tablet 720 milliGRAM(s) Oral daily  enoxaparin Injectable 40 milliGRAM(s) SubCutaneous every 24 hours  folic acid 1 milliGRAM(s) Oral daily  influenza   Vaccine 0.5 milliLiter(s) IntraMuscular once  melatonin 9 milliGRAM(s) Oral at bedtime  meropenem  IVPB 1000 milliGRAM(s) IV Intermittent every 8 hours  morphine PCA (5 mG/mL) 30 milliLiter(s) PCA Continuous PCA Continuous  sertraline 50 milliGRAM(s) Oral daily  sodium chloride 0.45%. 1000 milliLiter(s) (135 mL/Hr) IV Continuous <Continuous>    MEDICATIONS  (PRN):  acetaminophen     Tablet .. 650 milliGRAM(s) Oral every 6 hours PRN Temp greater or equal to 38C (100.4F)  naloxone Injectable 0.1 milliGRAM(s) IV Push every 3 minutes PRN For ANY of the following changes in patient status:  A. RR LESS THAN 10 breaths per minute, B. Oxygen saturation LESS THAN 90%, C. Sedation score of 6  ondansetron Injectable 4 milliGRAM(s) IV Push every 6 hours PRN Nausea      CAPILLARY BLOOD GLUCOSE        I&O's Summary      PHYSICAL EXAM:  Vital Signs Last 24 Hrs  T(C): 39.4 (28 Nov 2023 07:00), Max: 39.4 (27 Nov 2023 13:56)  T(F): 103 (28 Nov 2023 07:00), Max: 103 (27 Nov 2023 13:56)  HR: 101 (28 Nov 2023 07:00) (70 - 110)  BP: 101/55 (28 Nov 2023 07:00) (98/43 - 114/58)  BP(mean): --  RR: 18 (28 Nov 2023 07:00) (18 - 22)  SpO2: 96% (28 Nov 2023 07:00) (95% - 98%)    Parameters below as of 28 Nov 2023 07:00  Patient On (Oxygen Delivery Method): room air        CONSTITUTIONAL: NAD; well-developed  HEENT: PERRL, clear conjunctiva  RESPIRATORY: Normal respiratory effort; lungs are clear to auscultation bilaterally; No Crackles/Rhonchi/Wheezing  CARDIOVASCULAR: Regular rate and rhythm, normal S1 and S2, no murmur/rub/gallop; No lower extremity edema; Peripheral pulses are 2+ bilaterally  ABDOMEN: Nontender to palpation, normoactive bowel sounds, no rebound/guarding; No hepatosplenomegaly  MUSCULOSKELETAL: no clubbing or cyanosis of digits; no joint swelling or tenderness to palpation  EXTREMITY: Lower extremities Non-tender to palpation; non-erythematous B/L  NEURO: A&Ox3; no focal deficits   PSYCH: normal mood; Affect appropriate    LABS:                        7.4    x     )-----------( 286      ( 28 Nov 2023 07:20 )             21.1     11-27    135  |  104  |  8   ----------------------------<  115<H>  3.9   |  19<L>  |  0.55    Ca    8.7      27 Nov 2023 05:35  Phos  1.1     11-27  Mg     1.90     11-27    TPro  6.1  /  Alb  4.1  /  TBili  5.4<H>  /  DBili  x   /  AST  36<H>  /  ALT  29  /  AlkPhos  50  11-27          Urinalysis Basic - ( 27 Nov 2023 05:35 )    Color: x / Appearance: x / SG: x / pH: x  Gluc: 115 mg/dL / Ketone: x  / Bili: x / Urobili: x   Blood: x / Protein: x / Nitrite: x   Leuk Esterase: x / RBC: x / WBC x   Sq Epi: x / Non Sq Epi: x / Bacteria: x        Culture - Blood (collected 25 Nov 2023 19:48)  Source: .Blood Blood  Gram Stain (26 Nov 2023 16:54):    Growth in anaerobic bottle: Gram Negative Rods  Preliminary Report (27 Nov 2023 11:43):    Growth in anaerobic bottle: Proteus mirabilis    Direct identification is available within approximately 3-5    hours either by Blood Panel Multiplexed PCR or Direct    MALDI-TOF. Details: [email or web address]  Organism: Blood Culture PCR (26 Nov 2023 18:25)  Organism: Blood Culture PCR (26 Nov 2023 18:25)    Culture - Blood (collected 25 Nov 2023 19:48)  Source: .Blood Blood  Gram Stain (27 Nov 2023 07:45):    Growth in anaerobic bottle: Gram Negative Rods    Growth in aerobic bottle: Gram Negative Rods  Preliminary Report (27 Nov 2023 20:45):    Growth in aerobic and anaerobic bottles: Proteus mirabilis    See previous culture [id number]    Culture - Urine (collected 25 Nov 2023 19:23)  Source: Clean Catch Clean Catch (Midstream)  Preliminary Report (27 Nov 2023 21:07):    >100,000 CFU/ml Proteus mirabilis        RADIOLOGY & ADDITIONAL TESTS:    N/A

## 2023-11-28 NOTE — PROVIDER CONTACT NOTE (OTHER) - DATE AND TIME:
26-Nov-2023 04:28
25-Nov-2023 23:32
27-Nov-2023 12:43
26-Nov-2023 00:42
26-Nov-2023 16:38
27-Nov-2023 22:07
28-Nov-2023 07:18

## 2023-11-28 NOTE — PROVIDER CONTACT NOTE (OTHER) - ASSESSMENT
Patient complains of feeling cold, otherwise asymptomatic.
Patient reports feeling cold, but otherwise is asymptomatic.
/37 HR 95
/46
pt is a&ox4. Denies any chest pain/SOB, is complaining of a 10/10 generalized body pain. Pt is refusing ice packs bc the coldness increase the pain.

## 2023-11-28 NOTE — PROVIDER CONTACT NOTE (OTHER) - REASON
/37 HR 95
/46
pt temp 102.1
temp 103.1, , /41, respirations 30
10/10 chest pain, fever
patient has an oral temperature of 103 F
patient's oral temperature 101.6 F

## 2023-11-28 NOTE — PROVIDER CONTACT NOTE (OTHER) - NAME OF MD/NP/PA/DO NOTIFIED:
Fantasma Allen
Brendan Lee
Brendan Lee, TEAMS
George Mace
Jamin Sosa
Sergio Reeves, TEAMS
BRENDA Mace
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
## 2023-11-28 NOTE — PROVIDER CONTACT NOTE (OTHER) - ACTION/TREATMENT ORDERED:
Provider made aware. Meds ordered and given. Continue to trend BP/temp.
IV tylenol given
No response as of yet, will notify day shift nurse at change of shift to follow up. PRN PO 650mg tylenol was administered.
PO 650mg tylenol administered.
Provider made aware; OK to give PRN morphine 4mg IVP
Chest xray, PCA, meropenem
Provider at bedside and made aware.

## 2023-11-28 NOTE — PROGRESS NOTE ADULT - PROBLEM SELECTOR PLAN 1
- P/w severe chest pain, febrile to 103.8F in ED  - CXR -> Clear lungs; repeat CXR unremarkable  - CTA chest -> No evidence of PE or acute pulmonary disease  - Not meeting criteria for acute chest syndrome given no evidence of new pulmonary consolidation  - Hgb 6.3 -> 1U pRBCs (11/26)  - S/p IV morphine 4mg q4 prn (moderate pain); 6mg q4 prn (severe pain) -> c/w morphine PCA pump  - C/w 1/2NS @ 135 cc/hr  - Hematology consulted, recommend c/w home deferasirox, 2L venturi mask, repeat CXR and call if pt becomes hypoxic for possible exchange transfusion

## 2023-11-29 LAB
ALBUMIN SERPL ELPH-MCNC: 3.8 G/DL — SIGNIFICANT CHANGE UP (ref 3.3–5)
ALBUMIN SERPL ELPH-MCNC: 3.8 G/DL — SIGNIFICANT CHANGE UP (ref 3.3–5)
ALP SERPL-CCNC: 46 U/L — SIGNIFICANT CHANGE UP (ref 40–120)
ALP SERPL-CCNC: 46 U/L — SIGNIFICANT CHANGE UP (ref 40–120)
ALT FLD-CCNC: 27 U/L — SIGNIFICANT CHANGE UP (ref 4–33)
ALT FLD-CCNC: 27 U/L — SIGNIFICANT CHANGE UP (ref 4–33)
ANION GAP SERPL CALC-SCNC: 10 MMOL/L — SIGNIFICANT CHANGE UP (ref 7–14)
ANION GAP SERPL CALC-SCNC: 10 MMOL/L — SIGNIFICANT CHANGE UP (ref 7–14)
AST SERPL-CCNC: 35 U/L — HIGH (ref 4–32)
AST SERPL-CCNC: 35 U/L — HIGH (ref 4–32)
BILIRUB SERPL-MCNC: 2.1 MG/DL — HIGH (ref 0.2–1.2)
BILIRUB SERPL-MCNC: 2.1 MG/DL — HIGH (ref 0.2–1.2)
BUN SERPL-MCNC: 4 MG/DL — LOW (ref 7–23)
BUN SERPL-MCNC: 4 MG/DL — LOW (ref 7–23)
CALCIUM SERPL-MCNC: 8.9 MG/DL — SIGNIFICANT CHANGE UP (ref 8.4–10.5)
CALCIUM SERPL-MCNC: 8.9 MG/DL — SIGNIFICANT CHANGE UP (ref 8.4–10.5)
CHLORIDE SERPL-SCNC: 105 MMOL/L — SIGNIFICANT CHANGE UP (ref 98–107)
CHLORIDE SERPL-SCNC: 105 MMOL/L — SIGNIFICANT CHANGE UP (ref 98–107)
CO2 SERPL-SCNC: 23 MMOL/L — SIGNIFICANT CHANGE UP (ref 22–31)
CO2 SERPL-SCNC: 23 MMOL/L — SIGNIFICANT CHANGE UP (ref 22–31)
CREAT SERPL-MCNC: 0.47 MG/DL — LOW (ref 0.5–1.3)
CREAT SERPL-MCNC: 0.47 MG/DL — LOW (ref 0.5–1.3)
EGFR: 137 ML/MIN/1.73M2 — SIGNIFICANT CHANGE UP
EGFR: 137 ML/MIN/1.73M2 — SIGNIFICANT CHANGE UP
GLUCOSE SERPL-MCNC: 100 MG/DL — HIGH (ref 70–99)
GLUCOSE SERPL-MCNC: 100 MG/DL — HIGH (ref 70–99)
HAPTOGLOB SERPL-MCNC: 95 MG/DL — SIGNIFICANT CHANGE UP (ref 34–200)
HAPTOGLOB SERPL-MCNC: 95 MG/DL — SIGNIFICANT CHANGE UP (ref 34–200)
HCT VFR BLD CALC: 21.3 % — LOW (ref 34.5–45)
HCT VFR BLD CALC: 21.3 % — LOW (ref 34.5–45)
HGB BLD-MCNC: 7.5 G/DL — LOW (ref 11.5–15.5)
HGB BLD-MCNC: 7.5 G/DL — LOW (ref 11.5–15.5)
LDH SERPL L TO P-CCNC: 326 U/L — HIGH (ref 135–225)
LDH SERPL L TO P-CCNC: 326 U/L — HIGH (ref 135–225)
MAGNESIUM SERPL-MCNC: 2 MG/DL — SIGNIFICANT CHANGE UP (ref 1.6–2.6)
MAGNESIUM SERPL-MCNC: 2 MG/DL — SIGNIFICANT CHANGE UP (ref 1.6–2.6)
MCHC RBC-ENTMCNC: 29.8 PG — SIGNIFICANT CHANGE UP (ref 27–34)
MCHC RBC-ENTMCNC: 29.8 PG — SIGNIFICANT CHANGE UP (ref 27–34)
MCHC RBC-ENTMCNC: 35.2 GM/DL — SIGNIFICANT CHANGE UP (ref 32–36)
MCHC RBC-ENTMCNC: 35.2 GM/DL — SIGNIFICANT CHANGE UP (ref 32–36)
MCV RBC AUTO: 84.5 FL — SIGNIFICANT CHANGE UP (ref 80–100)
MCV RBC AUTO: 84.5 FL — SIGNIFICANT CHANGE UP (ref 80–100)
NRBC # BLD: 0 /100 WBCS — SIGNIFICANT CHANGE UP (ref 0–0)
NRBC # BLD: 0 /100 WBCS — SIGNIFICANT CHANGE UP (ref 0–0)
NRBC # FLD: 0 K/UL — SIGNIFICANT CHANGE UP (ref 0–0)
NRBC # FLD: 0 K/UL — SIGNIFICANT CHANGE UP (ref 0–0)
PHOSPHATE SERPL-MCNC: 2.2 MG/DL — LOW (ref 2.5–4.5)
PHOSPHATE SERPL-MCNC: 2.2 MG/DL — LOW (ref 2.5–4.5)
PLATELET # BLD AUTO: 352 K/UL — SIGNIFICANT CHANGE UP (ref 150–400)
PLATELET # BLD AUTO: 352 K/UL — SIGNIFICANT CHANGE UP (ref 150–400)
POTASSIUM SERPL-MCNC: 3.8 MMOL/L — SIGNIFICANT CHANGE UP (ref 3.5–5.3)
POTASSIUM SERPL-MCNC: 3.8 MMOL/L — SIGNIFICANT CHANGE UP (ref 3.5–5.3)
POTASSIUM SERPL-SCNC: 3.8 MMOL/L — SIGNIFICANT CHANGE UP (ref 3.5–5.3)
POTASSIUM SERPL-SCNC: 3.8 MMOL/L — SIGNIFICANT CHANGE UP (ref 3.5–5.3)
PROT SERPL-MCNC: 6.4 G/DL — SIGNIFICANT CHANGE UP (ref 6–8.3)
PROT SERPL-MCNC: 6.4 G/DL — SIGNIFICANT CHANGE UP (ref 6–8.3)
RBC # BLD: 2.52 M/UL — LOW (ref 3.8–5.2)
RBC # FLD: 18.9 % — HIGH (ref 10.3–14.5)
RBC # FLD: 18.9 % — HIGH (ref 10.3–14.5)
RETICS #: 158 K/UL — HIGH (ref 25–125)
RETICS #: 158 K/UL — HIGH (ref 25–125)
RETICS/RBC NFR: 6.3 % — HIGH (ref 0.5–2.5)
RETICS/RBC NFR: 6.3 % — HIGH (ref 0.5–2.5)
SODIUM SERPL-SCNC: 138 MMOL/L — SIGNIFICANT CHANGE UP (ref 135–145)
SODIUM SERPL-SCNC: 138 MMOL/L — SIGNIFICANT CHANGE UP (ref 135–145)
WBC # BLD: 10.13 K/UL — SIGNIFICANT CHANGE UP (ref 3.8–10.5)
WBC # BLD: 10.13 K/UL — SIGNIFICANT CHANGE UP (ref 3.8–10.5)
WBC # FLD AUTO: 10.13 K/UL — SIGNIFICANT CHANGE UP (ref 3.8–10.5)
WBC # FLD AUTO: 10.13 K/UL — SIGNIFICANT CHANGE UP (ref 3.8–10.5)

## 2023-11-29 PROCEDURE — 99232 SBSQ HOSP IP/OBS MODERATE 35: CPT

## 2023-11-29 PROCEDURE — 99233 SBSQ HOSP IP/OBS HIGH 50: CPT | Mod: GC

## 2023-11-29 RX ORDER — SODIUM,POTASSIUM PHOSPHATES 278-250MG
2 POWDER IN PACKET (EA) ORAL
Refills: 0 | Status: COMPLETED | OUTPATIENT
Start: 2023-11-29 | End: 2023-11-30

## 2023-11-29 RX ORDER — SODIUM,POTASSIUM PHOSPHATES 278-250MG
1 POWDER IN PACKET (EA) ORAL ONCE
Refills: 0 | Status: DISCONTINUED | OUTPATIENT
Start: 2023-11-29 | End: 2023-11-29

## 2023-11-29 RX ORDER — ACETAMINOPHEN 500 MG
650 TABLET ORAL EVERY 6 HOURS
Refills: 0 | Status: DISCONTINUED | OUTPATIENT
Start: 2023-11-29 | End: 2023-12-02

## 2023-11-29 RX ADMIN — SODIUM CHLORIDE 135 MILLILITER(S): 9 INJECTION, SOLUTION INTRAVENOUS at 19:21

## 2023-11-29 RX ADMIN — Medication 9 MILLIGRAM(S): at 22:26

## 2023-11-29 RX ADMIN — DEFERASIROX 720 MILLIGRAM(S): 180 GRANULE ORAL at 12:08

## 2023-11-29 RX ADMIN — CEFTRIAXONE 100 MILLIGRAM(S): 500 INJECTION, POWDER, FOR SOLUTION INTRAMUSCULAR; INTRAVENOUS at 12:06

## 2023-11-29 RX ADMIN — MORPHINE SULFATE 30 MILLILITER(S): 50 CAPSULE, EXTENDED RELEASE ORAL at 08:25

## 2023-11-29 RX ADMIN — MORPHINE SULFATE 30 MILLILITER(S): 50 CAPSULE, EXTENDED RELEASE ORAL at 20:18

## 2023-11-29 RX ADMIN — SODIUM CHLORIDE 135 MILLILITER(S): 9 INJECTION, SOLUTION INTRAVENOUS at 12:06

## 2023-11-29 RX ADMIN — Medication 2 PACKET(S): at 19:15

## 2023-11-29 RX ADMIN — ENOXAPARIN SODIUM 40 MILLIGRAM(S): 100 INJECTION SUBCUTANEOUS at 12:07

## 2023-11-29 RX ADMIN — SERTRALINE 50 MILLIGRAM(S): 25 TABLET, FILM COATED ORAL at 12:08

## 2023-11-29 RX ADMIN — Medication 1 MILLIGRAM(S): at 12:08

## 2023-11-29 NOTE — PROGRESS NOTE ADULT - PROBLEM SELECTOR PLAN 1
- P/w severe chest pain, febrile to 103.8F in ED  - CXR -> Clear lungs; repeat CXR unremarkable  - CTA chest -> No evidence of PE or acute pulmonary disease  - Not meeting criteria for acute chest syndrome given no evidence of new pulmonary consolidation  - Hgb 6.3 -> 1U pRBCs (11/26)  - S/p IV morphine 4mg q4 prn (moderate pain); 6mg q4 prn (severe pain) -> c/w morphine PCA pump  - C/w 1/2NS @ 135 cc/hr  - Hematology consulted, recommend c/w home deferasirox, 2L venturi mask, repeat CXR and call if pt becomes hypoxic for possible exchange transfusion  - CTM hgb, hemolysis labs daily

## 2023-11-29 NOTE — PROGRESS NOTE ADULT - SUBJECTIVE AND OBJECTIVE BOX
PROGRESS NOTE:     Patient is a 23y old  Female who presents with a chief complaint of VOC (28 Nov 2023 11:58)      SUBJECTIVE / OVERNIGHT EVENTS:    OVERNIGHT: No acute overnight events.      Patient was examined at bedside and feels well this morning. Denies fever, chills, chest pain, SOB, nausea, vomiting. ROS otherwise negative and pt is amenable to current treatment plan.      REVIEW OF SYSTEMS:    CONSTITUTIONAL:  No weakness, fevers, or chills  EYES/ENT:  No visual changes, vertigo, or throat pain   NECK:  No pain or stiffness  RESPIRATORY:  No SOB, cough, wheezing, hemoptysis  CARDIOVASCULAR:  No chest pain or palpitations  GASTROINTESTINAL:  No abdominal pain, nausea, vomiting, or hematemesis; No diarrhea or constipation. No melena or hematochezia.  GENITOURINARY:  No dysuria, change in frequency, or hematuria  NEUROLOGICAL:  No numbness or weakness  SKIN:  No itching or rashes      MEDICATIONS  (STANDING):  cefTRIAXone   IVPB 2000 milliGRAM(s) IV Intermittent every 24 hours  deferasirox Tablet 720 milliGRAM(s) Oral daily  enoxaparin Injectable 40 milliGRAM(s) SubCutaneous every 24 hours  folic acid 1 milliGRAM(s) Oral daily  influenza   Vaccine 0.5 milliLiter(s) IntraMuscular once  melatonin 9 milliGRAM(s) Oral at bedtime  morphine PCA (5 mG/mL) 30 milliLiter(s) PCA Continuous PCA Continuous  potassium phosphate / sodium phosphate Powder (PHOS-NaK) 2 Packet(s) Oral two times a day  sertraline 50 milliGRAM(s) Oral daily  sodium chloride 0.45%. 1000 milliLiter(s) (135 mL/Hr) IV Continuous <Continuous>    MEDICATIONS  (PRN):  acetaminophen     Tablet .. 650 milliGRAM(s) Oral every 6 hours PRN Temp greater or equal to 38C (100.4F), Mild Pain (1 - 3)  naloxone Injectable 0.1 milliGRAM(s) IV Push every 3 minutes PRN For ANY of the following changes in patient status:  A. RR LESS THAN 10 breaths per minute, B. Oxygen saturation LESS THAN 90%, C. Sedation score of 6  ondansetron Injectable 4 milliGRAM(s) IV Push every 6 hours PRN Nausea  senna 2 Tablet(s) Oral at bedtime PRN Constipation      CAPILLARY BLOOD GLUCOSE        I&O's Summary      PHYSICAL EXAM:  Vital Signs Last 24 Hrs  T(C): 36.2 (29 Nov 2023 10:42), Max: 39.9 (28 Nov 2023 15:00)  T(F): 97.2 (29 Nov 2023 10:42), Max: 103.8 (28 Nov 2023 15:00)  HR: 72 (29 Nov 2023 10:42) (72 - 100)  BP: 104/51 (29 Nov 2023 10:42) (100/51 - 108/53)  BP(mean): --  RR: 17 (29 Nov 2023 10:42) (17 - 20)  SpO2: 98% (29 Nov 2023 10:42) (95% - 99%)    Parameters below as of 29 Nov 2023 10:42  Patient On (Oxygen Delivery Method): room air        CONSTITUTIONAL: NAD; well-developed  HEENT: PERRL, clear conjunctiva  RESPIRATORY: Normal respiratory effort; lungs are clear to auscultation bilaterally; No Crackles/Rhonchi/Wheezing  CARDIOVASCULAR: Regular rate and rhythm, normal S1 and S2, no murmur/rub/gallop; No lower extremity edema; Peripheral pulses are 2+ bilaterally  ABDOMEN: Nontender to palpation, normoactive bowel sounds, no rebound/guarding; No hepatosplenomegaly  MUSCULOSKELETAL: no clubbing or cyanosis of digits; no joint swelling or tenderness to palpation  EXTREMITY: Lower extremities Non-tender to palpation; non-erythematous B/L  NEURO: A&Ox3; no focal deficits   PSYCH: normal mood; Affect appropriate    LABS:                        7.5    10.13 )-----------( 352      ( 29 Nov 2023 06:58 )             21.3     11-29    138  |  105  |  4<L>  ----------------------------<  100<H>  3.8   |  23  |  0.47<L>    Ca    8.9      29 Nov 2023 06:58  Phos  2.2     11-29  Mg     2.00     11-29    TPro  6.4  /  Alb  3.8  /  TBili  2.1<H>  /  DBili  x   /  AST  35<H>  /  ALT  27  /  AlkPhos  46  11-29          Urinalysis Basic - ( 29 Nov 2023 06:58 )    Color: x / Appearance: x / SG: x / pH: x  Gluc: 100 mg/dL / Ketone: x  / Bili: x / Urobili: x   Blood: x / Protein: x / Nitrite: x   Leuk Esterase: x / RBC: x / WBC x   Sq Epi: x / Non Sq Epi: x / Bacteria: x        Culture - Blood (collected 27 Nov 2023 18:25)  Source: .Blood Blood-Peripheral  Preliminary Report (29 Nov 2023 01:03):    No growth at 24 hours    Culture - Blood (collected 27 Nov 2023 18:25)  Source: .Blood Blood-Peripheral  Preliminary Report (29 Nov 2023 01:03):    No growth at 24 hours        RADIOLOGY & ADDITIONAL TESTS:    N/A PROGRESS NOTE:     Patient is a 23y old  Female who presents with a chief complaint of VOC (28 Nov 2023 11:58)      SUBJECTIVE / OVERNIGHT EVENTS:    OVERNIGHT: No acute overnight events.      Patient was examined at bedside and feels well this morning. Pain is well-controlled. Continues to have fever but denies SOB, nausea, vomiting. ROS otherwise negative and pt is amenable to current treatment plan.      REVIEW OF SYSTEMS:    CONSTITUTIONAL:  Fevers, no chills, weakness  EYES/ENT:  No visual changes, vertigo, or throat pain   NECK:  No pain or stiffness  RESPIRATORY:  No SOB, cough, wheezing, hemoptysis  CARDIOVASCULAR:  No chest pain or palpitations  GASTROINTESTINAL:  No abdominal pain, nausea, vomiting, or hematemesis; No diarrhea or constipation. No melena or hematochezia.  GENITOURINARY:  No dysuria, change in frequency, or hematuria  NEUROLOGICAL:  No numbness or weakness  SKIN:  No itching or rashes      MEDICATIONS  (STANDING):  cefTRIAXone   IVPB 2000 milliGRAM(s) IV Intermittent every 24 hours  deferasirox Tablet 720 milliGRAM(s) Oral daily  enoxaparin Injectable 40 milliGRAM(s) SubCutaneous every 24 hours  folic acid 1 milliGRAM(s) Oral daily  influenza   Vaccine 0.5 milliLiter(s) IntraMuscular once  melatonin 9 milliGRAM(s) Oral at bedtime  morphine PCA (5 mG/mL) 30 milliLiter(s) PCA Continuous PCA Continuous  potassium phosphate / sodium phosphate Powder (PHOS-NaK) 2 Packet(s) Oral two times a day  sertraline 50 milliGRAM(s) Oral daily  sodium chloride 0.45%. 1000 milliLiter(s) (135 mL/Hr) IV Continuous <Continuous>    MEDICATIONS  (PRN):  acetaminophen     Tablet .. 650 milliGRAM(s) Oral every 6 hours PRN Temp greater or equal to 38C (100.4F), Mild Pain (1 - 3)  naloxone Injectable 0.1 milliGRAM(s) IV Push every 3 minutes PRN For ANY of the following changes in patient status:  A. RR LESS THAN 10 breaths per minute, B. Oxygen saturation LESS THAN 90%, C. Sedation score of 6  ondansetron Injectable 4 milliGRAM(s) IV Push every 6 hours PRN Nausea  senna 2 Tablet(s) Oral at bedtime PRN Constipation      CAPILLARY BLOOD GLUCOSE        I&O's Summary      PHYSICAL EXAM:  Vital Signs Last 24 Hrs  T(C): 36.2 (29 Nov 2023 10:42), Max: 39.9 (28 Nov 2023 15:00)  T(F): 97.2 (29 Nov 2023 10:42), Max: 103.8 (28 Nov 2023 15:00)  HR: 72 (29 Nov 2023 10:42) (72 - 100)  BP: 104/51 (29 Nov 2023 10:42) (100/51 - 108/53)  BP(mean): --  RR: 17 (29 Nov 2023 10:42) (17 - 20)  SpO2: 98% (29 Nov 2023 10:42) (95% - 99%)    Parameters below as of 29 Nov 2023 10:42  Patient On (Oxygen Delivery Method): room air        CONSTITUTIONAL: NAD; well-developed  HEENT: PERRL, clear conjunctiva  RESPIRATORY: Normal respiratory effort; lungs are clear to auscultation bilaterally; No Crackles/Rhonchi/Wheezing  CARDIOVASCULAR: Regular rate and rhythm, normal S1 and S2, no murmur/rub/gallop; No lower extremity edema; Peripheral pulses are 2+ bilaterally  ABDOMEN: Nontender to palpation, normoactive bowel sounds, no rebound/guarding; No hepatosplenomegaly  MUSCULOSKELETAL: no clubbing or cyanosis of digits; no joint swelling or tenderness to palpation  EXTREMITY: Lower extremities Non-tender to palpation; non-erythematous B/L  NEURO: A&Ox3; no focal deficits   PSYCH: normal mood; Affect appropriate    LABS:                        7.5    10.13 )-----------( 352      ( 29 Nov 2023 06:58 )             21.3     11-29    138  |  105  |  4<L>  ----------------------------<  100<H>  3.8   |  23  |  0.47<L>    Ca    8.9      29 Nov 2023 06:58  Phos  2.2     11-29  Mg     2.00     11-29    TPro  6.4  /  Alb  3.8  /  TBili  2.1<H>  /  DBili  x   /  AST  35<H>  /  ALT  27  /  AlkPhos  46  11-29          Urinalysis Basic - ( 29 Nov 2023 06:58 )    Color: x / Appearance: x / SG: x / pH: x  Gluc: 100 mg/dL / Ketone: x  / Bili: x / Urobili: x   Blood: x / Protein: x / Nitrite: x   Leuk Esterase: x / RBC: x / WBC x   Sq Epi: x / Non Sq Epi: x / Bacteria: x        Culture - Blood (collected 27 Nov 2023 18:25)  Source: .Blood Blood-Peripheral  Preliminary Report (29 Nov 2023 01:03):    No growth at 24 hours    Culture - Blood (collected 27 Nov 2023 18:25)  Source: .Blood Blood-Peripheral  Preliminary Report (29 Nov 2023 01:03):    No growth at 24 hours        RADIOLOGY & ADDITIONAL TESTS:    N/A

## 2023-11-29 NOTE — PROGRESS NOTE ADULT - PROBLEM SELECTOR PLAN 2
- In ED, pt was febrile, hypotensive, tachycardic, and tachypneic  - Suspected 2/2 UTI vs  shunt line infection given recent revision 10/23 of one of 2  shunts  - Urine, blood culture (11/25) -> Pansensitive Proteus mirabilis  - S/p empiric vancomycin, cefepime, meropenem  - Deescalated to IV ceftriaxone following culture abx sensitivity results  - Renal, bladder US -> 1.3cm nonobstructing stone in upper pole of right kidney, otherwise unremarkable  - ID consulted, recommend c/w IV ceftriaxone, can transition to PO cefpodoxime 200mg BID for 10d once afebrile for >24hrs  - C/w IVF, as above
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
## 2023-11-30 LAB
ALBUMIN SERPL ELPH-MCNC: 3.6 G/DL — SIGNIFICANT CHANGE UP (ref 3.3–5)
ALBUMIN SERPL ELPH-MCNC: 3.6 G/DL — SIGNIFICANT CHANGE UP (ref 3.3–5)
ALP SERPL-CCNC: 47 U/L — SIGNIFICANT CHANGE UP (ref 40–120)
ALP SERPL-CCNC: 47 U/L — SIGNIFICANT CHANGE UP (ref 40–120)
ALT FLD-CCNC: 26 U/L — SIGNIFICANT CHANGE UP (ref 4–33)
ALT FLD-CCNC: 26 U/L — SIGNIFICANT CHANGE UP (ref 4–33)
ANION GAP SERPL CALC-SCNC: 9 MMOL/L — SIGNIFICANT CHANGE UP (ref 7–14)
ANION GAP SERPL CALC-SCNC: 9 MMOL/L — SIGNIFICANT CHANGE UP (ref 7–14)
AST SERPL-CCNC: 31 U/L — SIGNIFICANT CHANGE UP (ref 4–32)
AST SERPL-CCNC: 31 U/L — SIGNIFICANT CHANGE UP (ref 4–32)
BILIRUB SERPL-MCNC: 1.2 MG/DL — SIGNIFICANT CHANGE UP (ref 0.2–1.2)
BILIRUB SERPL-MCNC: 1.2 MG/DL — SIGNIFICANT CHANGE UP (ref 0.2–1.2)
BLD GP AB SCN SERPL QL: NEGATIVE — SIGNIFICANT CHANGE UP
BLD GP AB SCN SERPL QL: NEGATIVE — SIGNIFICANT CHANGE UP
BUN SERPL-MCNC: 3 MG/DL — LOW (ref 7–23)
BUN SERPL-MCNC: 3 MG/DL — LOW (ref 7–23)
CALCIUM SERPL-MCNC: 8.9 MG/DL — SIGNIFICANT CHANGE UP (ref 8.4–10.5)
CALCIUM SERPL-MCNC: 8.9 MG/DL — SIGNIFICANT CHANGE UP (ref 8.4–10.5)
CHLORIDE SERPL-SCNC: 106 MMOL/L — SIGNIFICANT CHANGE UP (ref 98–107)
CHLORIDE SERPL-SCNC: 106 MMOL/L — SIGNIFICANT CHANGE UP (ref 98–107)
CO2 SERPL-SCNC: 25 MMOL/L — SIGNIFICANT CHANGE UP (ref 22–31)
CO2 SERPL-SCNC: 25 MMOL/L — SIGNIFICANT CHANGE UP (ref 22–31)
CREAT SERPL-MCNC: 0.46 MG/DL — LOW (ref 0.5–1.3)
CREAT SERPL-MCNC: 0.46 MG/DL — LOW (ref 0.5–1.3)
EGFR: 138 ML/MIN/1.73M2 — SIGNIFICANT CHANGE UP
EGFR: 138 ML/MIN/1.73M2 — SIGNIFICANT CHANGE UP
GLUCOSE SERPL-MCNC: 96 MG/DL — SIGNIFICANT CHANGE UP (ref 70–99)
GLUCOSE SERPL-MCNC: 96 MG/DL — SIGNIFICANT CHANGE UP (ref 70–99)
HAPTOGLOB SERPL-MCNC: 77 MG/DL — SIGNIFICANT CHANGE UP (ref 34–200)
HAPTOGLOB SERPL-MCNC: 77 MG/DL — SIGNIFICANT CHANGE UP (ref 34–200)
HCT VFR BLD CALC: 20.8 % — CRITICAL LOW (ref 34.5–45)
HCT VFR BLD CALC: 20.8 % — CRITICAL LOW (ref 34.5–45)
HGB BLD-MCNC: 7 G/DL — CRITICAL LOW (ref 11.5–15.5)
HGB BLD-MCNC: 7 G/DL — CRITICAL LOW (ref 11.5–15.5)
LDH SERPL L TO P-CCNC: 316 U/L — HIGH (ref 135–225)
LDH SERPL L TO P-CCNC: 316 U/L — HIGH (ref 135–225)
MAGNESIUM SERPL-MCNC: 2 MG/DL — SIGNIFICANT CHANGE UP (ref 1.6–2.6)
MAGNESIUM SERPL-MCNC: 2 MG/DL — SIGNIFICANT CHANGE UP (ref 1.6–2.6)
MCHC RBC-ENTMCNC: 29.2 PG — SIGNIFICANT CHANGE UP (ref 27–34)
MCHC RBC-ENTMCNC: 29.2 PG — SIGNIFICANT CHANGE UP (ref 27–34)
MCHC RBC-ENTMCNC: 33.7 GM/DL — SIGNIFICANT CHANGE UP (ref 32–36)
MCHC RBC-ENTMCNC: 33.7 GM/DL — SIGNIFICANT CHANGE UP (ref 32–36)
MCV RBC AUTO: 86.7 FL — SIGNIFICANT CHANGE UP (ref 80–100)
MCV RBC AUTO: 86.7 FL — SIGNIFICANT CHANGE UP (ref 80–100)
NRBC # BLD: 0 /100 WBCS — SIGNIFICANT CHANGE UP (ref 0–0)
NRBC # BLD: 0 /100 WBCS — SIGNIFICANT CHANGE UP (ref 0–0)
NRBC # FLD: 0.09 K/UL — HIGH (ref 0–0)
NRBC # FLD: 0.09 K/UL — HIGH (ref 0–0)
PHOSPHATE SERPL-MCNC: 2.9 MG/DL — SIGNIFICANT CHANGE UP (ref 2.5–4.5)
PHOSPHATE SERPL-MCNC: 2.9 MG/DL — SIGNIFICANT CHANGE UP (ref 2.5–4.5)
PLATELET # BLD AUTO: 405 K/UL — HIGH (ref 150–400)
PLATELET # BLD AUTO: 405 K/UL — HIGH (ref 150–400)
POTASSIUM SERPL-MCNC: 3.5 MMOL/L — SIGNIFICANT CHANGE UP (ref 3.5–5.3)
POTASSIUM SERPL-MCNC: 3.5 MMOL/L — SIGNIFICANT CHANGE UP (ref 3.5–5.3)
POTASSIUM SERPL-SCNC: 3.5 MMOL/L — SIGNIFICANT CHANGE UP (ref 3.5–5.3)
POTASSIUM SERPL-SCNC: 3.5 MMOL/L — SIGNIFICANT CHANGE UP (ref 3.5–5.3)
PROT SERPL-MCNC: 6.2 G/DL — SIGNIFICANT CHANGE UP (ref 6–8.3)
PROT SERPL-MCNC: 6.2 G/DL — SIGNIFICANT CHANGE UP (ref 6–8.3)
RBC # BLD: 2.4 M/UL — LOW (ref 3.8–5.2)
RBC # FLD: 19.7 % — HIGH (ref 10.3–14.5)
RBC # FLD: 19.7 % — HIGH (ref 10.3–14.5)
RETICS #: 140.2 K/UL — HIGH (ref 25–125)
RETICS #: 140.2 K/UL — HIGH (ref 25–125)
RETICS/RBC NFR: 5.8 % — HIGH (ref 0.5–2.5)
RETICS/RBC NFR: 5.8 % — HIGH (ref 0.5–2.5)
RH IG SCN BLD-IMP: POSITIVE — SIGNIFICANT CHANGE UP
RH IG SCN BLD-IMP: POSITIVE — SIGNIFICANT CHANGE UP
SODIUM SERPL-SCNC: 140 MMOL/L — SIGNIFICANT CHANGE UP (ref 135–145)
SODIUM SERPL-SCNC: 140 MMOL/L — SIGNIFICANT CHANGE UP (ref 135–145)
WBC # BLD: 11.64 K/UL — HIGH (ref 3.8–10.5)
WBC # BLD: 11.64 K/UL — HIGH (ref 3.8–10.5)
WBC # FLD AUTO: 11.64 K/UL — HIGH (ref 3.8–10.5)
WBC # FLD AUTO: 11.64 K/UL — HIGH (ref 3.8–10.5)

## 2023-11-30 PROCEDURE — 99233 SBSQ HOSP IP/OBS HIGH 50: CPT | Mod: GC

## 2023-11-30 RX ADMIN — SODIUM CHLORIDE 135 MILLILITER(S): 9 INJECTION, SOLUTION INTRAVENOUS at 12:00

## 2023-11-30 RX ADMIN — DEFERASIROX 720 MILLIGRAM(S): 180 GRANULE ORAL at 12:09

## 2023-11-30 RX ADMIN — Medication 9 MILLIGRAM(S): at 21:58

## 2023-11-30 RX ADMIN — MORPHINE SULFATE 30 MILLILITER(S): 50 CAPSULE, EXTENDED RELEASE ORAL at 20:25

## 2023-11-30 RX ADMIN — MORPHINE SULFATE 30 MILLILITER(S): 50 CAPSULE, EXTENDED RELEASE ORAL at 03:12

## 2023-11-30 RX ADMIN — Medication 650 MILLIGRAM(S): at 21:58

## 2023-11-30 RX ADMIN — Medication 1 MILLIGRAM(S): at 12:01

## 2023-11-30 RX ADMIN — CEFTRIAXONE 100 MILLIGRAM(S): 500 INJECTION, POWDER, FOR SOLUTION INTRAMUSCULAR; INTRAVENOUS at 11:59

## 2023-11-30 RX ADMIN — MORPHINE SULFATE 30 MILLILITER(S): 50 CAPSULE, EXTENDED RELEASE ORAL at 08:31

## 2023-11-30 RX ADMIN — ENOXAPARIN SODIUM 40 MILLIGRAM(S): 100 INJECTION SUBCUTANEOUS at 12:00

## 2023-11-30 RX ADMIN — SERTRALINE 50 MILLIGRAM(S): 25 TABLET, FILM COATED ORAL at 12:02

## 2023-11-30 RX ADMIN — Medication 2 PACKET(S): at 05:24
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
## 2023-11-30 NOTE — PROGRESS NOTE ADULT - SUBJECTIVE AND OBJECTIVE BOX
PROGRESS NOTE:     Patient is a 23y old  Female who presents with a chief complaint of VOC (29 Nov 2023 11:05)      SUBJECTIVE / OVERNIGHT EVENTS:    OVERNIGHT: No acute overnight events.      Patient was examined at bedside and feels well this morning. Denies fever, chills, chest pain, SOB, nausea, vomiting. ROS otherwise negative and pt is amenable to current treatment plan.      REVIEW OF SYSTEMS:    CONSTITUTIONAL:  No weakness, fevers, or chills  EYES/ENT:  No visual changes, vertigo, or throat pain   NECK:  No pain or stiffness  RESPIRATORY:  No SOB, cough, wheezing, hemoptysis  CARDIOVASCULAR:  No chest pain or palpitations  GASTROINTESTINAL:  No abdominal pain, nausea, vomiting, or hematemesis; No diarrhea or constipation. No melena or hematochezia.  GENITOURINARY:  No dysuria, change in frequency, or hematuria  NEUROLOGICAL:  No numbness or weakness  SKIN:  No itching or rashes      MEDICATIONS  (STANDING):  cefTRIAXone   IVPB 2000 milliGRAM(s) IV Intermittent every 24 hours  deferasirox Tablet 720 milliGRAM(s) Oral daily  enoxaparin Injectable 40 milliGRAM(s) SubCutaneous every 24 hours  folic acid 1 milliGRAM(s) Oral daily  influenza   Vaccine 0.5 milliLiter(s) IntraMuscular once  melatonin 9 milliGRAM(s) Oral at bedtime  morphine PCA (5 mG/mL) 30 milliLiter(s) PCA Continuous PCA Continuous  sertraline 50 milliGRAM(s) Oral daily  sodium chloride 0.45%. 1000 milliLiter(s) (135 mL/Hr) IV Continuous <Continuous>    MEDICATIONS  (PRN):  acetaminophen     Tablet .. 650 milliGRAM(s) Oral every 6 hours PRN Temp greater or equal to 38C (100.4F), Mild Pain (1 - 3)  naloxone Injectable 0.1 milliGRAM(s) IV Push every 3 minutes PRN For ANY of the following changes in patient status:  A. RR LESS THAN 10 breaths per minute, B. Oxygen saturation LESS THAN 90%, C. Sedation score of 6  ondansetron Injectable 4 milliGRAM(s) IV Push every 6 hours PRN Nausea  senna 2 Tablet(s) Oral at bedtime PRN Constipation      CAPILLARY BLOOD GLUCOSE        I&O's Summary      PHYSICAL EXAM:  Vital Signs Last 24 Hrs  T(C): 36.9 (30 Nov 2023 07:00), Max: 37.3 (29 Nov 2023 18:24)  T(F): 98.4 (30 Nov 2023 07:00), Max: 99.1 (29 Nov 2023 18:24)  HR: 86 (30 Nov 2023 07:00) (65 - 86)  BP: 99/49 (30 Nov 2023 07:00) (90/51 - 104/51)  BP(mean): --  RR: 18 (30 Nov 2023 07:00) (17 - 18)  SpO2: 100% (30 Nov 2023 07:00) (96% - 100%)    Parameters below as of 30 Nov 2023 07:00  Patient On (Oxygen Delivery Method): room air        CONSTITUTIONAL: NAD; well-developed  HEENT: PERRL, clear conjunctiva  RESPIRATORY: Normal respiratory effort; lungs are clear to auscultation bilaterally; No Crackles/Rhonchi/Wheezing  CARDIOVASCULAR: Regular rate and rhythm, normal S1 and S2, no murmur/rub/gallop; No lower extremity edema; Peripheral pulses are 2+ bilaterally  ABDOMEN: Nontender to palpation, normoactive bowel sounds, no rebound/guarding; No hepatosplenomegaly  MUSCULOSKELETAL: no clubbing or cyanosis of digits; no joint swelling or tenderness to palpation  EXTREMITY: Lower extremities Non-tender to palpation; non-erythematous B/L  NEURO: A&Ox3; no focal deficits   PSYCH: normal mood; Affect appropriate    LABS:                        7.0    11.64 )-----------( 405      ( 30 Nov 2023 05:35 )             20.8     11-30    140  |  106  |  3<L>  ----------------------------<  96  3.5   |  25  |  0.46<L>    Ca    8.9      30 Nov 2023 05:35  Phos  2.9     11-30  Mg     2.00     11-30    TPro  6.2  /  Alb  3.6  /  TBili  1.2  /  DBili  x   /  AST  31  /  ALT  26  /  AlkPhos  47  11-30          Urinalysis Basic - ( 30 Nov 2023 05:35 )    Color: x / Appearance: x / SG: x / pH: x  Gluc: 96 mg/dL / Ketone: x  / Bili: x / Urobili: x   Blood: x / Protein: x / Nitrite: x   Leuk Esterase: x / RBC: x / WBC x   Sq Epi: x / Non Sq Epi: x / Bacteria: x        Culture - Blood (collected 27 Nov 2023 18:25)  Source: .Blood Blood-Peripheral  Preliminary Report (30 Nov 2023 01:02):    No growth at 48 Hours    Culture - Blood (collected 27 Nov 2023 18:25)  Source: .Blood Blood-Peripheral  Preliminary Report (30 Nov 2023 01:02):    No growth at 48 Hours        RADIOLOGY & ADDITIONAL TESTS:    N/A PROGRESS NOTE:     Patient is a 23y old  Female who presents with a chief complaint of VOC (29 Nov 2023 11:05)      SUBJECTIVE / OVERNIGHT EVENTS:    OVERNIGHT: No acute overnight events.      Patient was examined at bedside and feels well this morning. Pain increased today but controlled on PCA pump. No fevers for 24hrs. Denies SOB, nausea, vomiting. ROS otherwise negative and pt is amenable to current treatment plan.      REVIEW OF SYSTEMS:    CONSTITUTIONAL:  No fevers, chills, or weakness  EYES/ENT:  No visual changes, vertigo, or throat pain   NECK:  No pain or stiffness  RESPIRATORY:  No SOB, cough, wheezing, hemoptysis  CARDIOVASCULAR:  No chest pain or palpitations  GASTROINTESTINAL:  No abdominal pain, nausea, vomiting, or hematemesis; No diarrhea or constipation. No melena or hematochezia.  GENITOURINARY:  No dysuria, change in frequency, or hematuria  NEUROLOGICAL:  No numbness or weakness  SKIN:  No itching or rashes      MEDICATIONS  (STANDING):  cefTRIAXone   IVPB 2000 milliGRAM(s) IV Intermittent every 24 hours  deferasirox Tablet 720 milliGRAM(s) Oral daily  enoxaparin Injectable 40 milliGRAM(s) SubCutaneous every 24 hours  folic acid 1 milliGRAM(s) Oral daily  influenza   Vaccine 0.5 milliLiter(s) IntraMuscular once  melatonin 9 milliGRAM(s) Oral at bedtime  morphine PCA (5 mG/mL) 30 milliLiter(s) PCA Continuous PCA Continuous  sertraline 50 milliGRAM(s) Oral daily  sodium chloride 0.45%. 1000 milliLiter(s) (135 mL/Hr) IV Continuous <Continuous>    MEDICATIONS  (PRN):  acetaminophen     Tablet .. 650 milliGRAM(s) Oral every 6 hours PRN Temp greater or equal to 38C (100.4F), Mild Pain (1 - 3)  naloxone Injectable 0.1 milliGRAM(s) IV Push every 3 minutes PRN For ANY of the following changes in patient status:  A. RR LESS THAN 10 breaths per minute, B. Oxygen saturation LESS THAN 90%, C. Sedation score of 6  ondansetron Injectable 4 milliGRAM(s) IV Push every 6 hours PRN Nausea  senna 2 Tablet(s) Oral at bedtime PRN Constipation      CAPILLARY BLOOD GLUCOSE        I&O's Summary      PHYSICAL EXAM:  Vital Signs Last 24 Hrs  T(C): 36.9 (30 Nov 2023 07:00), Max: 37.3 (29 Nov 2023 18:24)  T(F): 98.4 (30 Nov 2023 07:00), Max: 99.1 (29 Nov 2023 18:24)  HR: 86 (30 Nov 2023 07:00) (65 - 86)  BP: 99/49 (30 Nov 2023 07:00) (90/51 - 104/51)  BP(mean): --  RR: 18 (30 Nov 2023 07:00) (17 - 18)  SpO2: 100% (30 Nov 2023 07:00) (96% - 100%)    Parameters below as of 30 Nov 2023 07:00  Patient On (Oxygen Delivery Method): room air        CONSTITUTIONAL: NAD; well-developed  HEENT: PERRL, clear conjunctiva  RESPIRATORY: Normal respiratory effort; lungs are clear to auscultation bilaterally; No Crackles/Rhonchi/Wheezing  CARDIOVASCULAR: Regular rate and rhythm, normal S1 and S2, no murmur/rub/gallop; No lower extremity edema; Peripheral pulses are 2+ bilaterally  ABDOMEN: Nontender to palpation, normoactive bowel sounds, no rebound/guarding; No hepatosplenomegaly  MUSCULOSKELETAL: no clubbing or cyanosis of digits; no joint swelling or tenderness to palpation  EXTREMITY: Lower extremities Non-tender to palpation; non-erythematous B/L  NEURO: A&Ox3; no focal deficits   PSYCH: normal mood; Affect appropriate    LABS:                        7.0    11.64 )-----------( 405      ( 30 Nov 2023 05:35 )             20.8     11-30    140  |  106  |  3<L>  ----------------------------<  96  3.5   |  25  |  0.46<L>    Ca    8.9      30 Nov 2023 05:35  Phos  2.9     11-30  Mg     2.00     11-30    TPro  6.2  /  Alb  3.6  /  TBili  1.2  /  DBili  x   /  AST  31  /  ALT  26  /  AlkPhos  47  11-30          Urinalysis Basic - ( 30 Nov 2023 05:35 )    Color: x / Appearance: x / SG: x / pH: x  Gluc: 96 mg/dL / Ketone: x  / Bili: x / Urobili: x   Blood: x / Protein: x / Nitrite: x   Leuk Esterase: x / RBC: x / WBC x   Sq Epi: x / Non Sq Epi: x / Bacteria: x        Culture - Blood (collected 27 Nov 2023 18:25)  Source: .Blood Blood-Peripheral  Preliminary Report (30 Nov 2023 01:02):    No growth at 48 Hours    Culture - Blood (collected 27 Nov 2023 18:25)  Source: .Blood Blood-Peripheral  Preliminary Report (30 Nov 2023 01:02):    No growth at 48 Hours        RADIOLOGY & ADDITIONAL TESTS:    N/A

## 2023-12-01 ENCOUNTER — APPOINTMENT (OUTPATIENT)
Dept: HEMATOLOGY ONCOLOGY | Facility: CLINIC | Age: 23
End: 2023-12-01

## 2023-12-01 ENCOUNTER — TRANSCRIPTION ENCOUNTER (OUTPATIENT)
Age: 23
End: 2023-12-01

## 2023-12-01 LAB
ALBUMIN SERPL ELPH-MCNC: 4.1 G/DL — SIGNIFICANT CHANGE UP (ref 3.3–5)
ALBUMIN SERPL ELPH-MCNC: 4.1 G/DL — SIGNIFICANT CHANGE UP (ref 3.3–5)
ALP SERPL-CCNC: 54 U/L — SIGNIFICANT CHANGE UP (ref 40–120)
ALP SERPL-CCNC: 54 U/L — SIGNIFICANT CHANGE UP (ref 40–120)
ALT FLD-CCNC: 37 U/L — HIGH (ref 4–33)
ALT FLD-CCNC: 37 U/L — HIGH (ref 4–33)
ANION GAP SERPL CALC-SCNC: 11 MMOL/L — SIGNIFICANT CHANGE UP (ref 7–14)
ANION GAP SERPL CALC-SCNC: 11 MMOL/L — SIGNIFICANT CHANGE UP (ref 7–14)
AST SERPL-CCNC: 38 U/L — HIGH (ref 4–32)
AST SERPL-CCNC: 38 U/L — HIGH (ref 4–32)
BILIRUB SERPL-MCNC: 1.5 MG/DL — HIGH (ref 0.2–1.2)
BILIRUB SERPL-MCNC: 1.5 MG/DL — HIGH (ref 0.2–1.2)
BUN SERPL-MCNC: 5 MG/DL — LOW (ref 7–23)
BUN SERPL-MCNC: 5 MG/DL — LOW (ref 7–23)
CALCIUM SERPL-MCNC: 9.9 MG/DL — SIGNIFICANT CHANGE UP (ref 8.4–10.5)
CALCIUM SERPL-MCNC: 9.9 MG/DL — SIGNIFICANT CHANGE UP (ref 8.4–10.5)
CHLORIDE SERPL-SCNC: 105 MMOL/L — SIGNIFICANT CHANGE UP (ref 98–107)
CHLORIDE SERPL-SCNC: 105 MMOL/L — SIGNIFICANT CHANGE UP (ref 98–107)
CO2 SERPL-SCNC: 25 MMOL/L — SIGNIFICANT CHANGE UP (ref 22–31)
CO2 SERPL-SCNC: 25 MMOL/L — SIGNIFICANT CHANGE UP (ref 22–31)
CREAT SERPL-MCNC: 0.48 MG/DL — LOW (ref 0.5–1.3)
CREAT SERPL-MCNC: 0.48 MG/DL — LOW (ref 0.5–1.3)
EGFR: 136 ML/MIN/1.73M2 — SIGNIFICANT CHANGE UP
EGFR: 136 ML/MIN/1.73M2 — SIGNIFICANT CHANGE UP
GLUCOSE SERPL-MCNC: 96 MG/DL — SIGNIFICANT CHANGE UP (ref 70–99)
GLUCOSE SERPL-MCNC: 96 MG/DL — SIGNIFICANT CHANGE UP (ref 70–99)
HAPTOGLOB SERPL-MCNC: 70 MG/DL — SIGNIFICANT CHANGE UP (ref 34–200)
HAPTOGLOB SERPL-MCNC: 70 MG/DL — SIGNIFICANT CHANGE UP (ref 34–200)
HCT VFR BLD CALC: 27.5 % — LOW (ref 34.5–45)
HCT VFR BLD CALC: 27.5 % — LOW (ref 34.5–45)
HGB BLD-MCNC: 9.2 G/DL — LOW (ref 11.5–15.5)
HGB BLD-MCNC: 9.2 G/DL — LOW (ref 11.5–15.5)
LDH SERPL L TO P-CCNC: 351 U/L — HIGH (ref 135–225)
LDH SERPL L TO P-CCNC: 351 U/L — HIGH (ref 135–225)
MAGNESIUM SERPL-MCNC: 2.2 MG/DL — SIGNIFICANT CHANGE UP (ref 1.6–2.6)
MAGNESIUM SERPL-MCNC: 2.2 MG/DL — SIGNIFICANT CHANGE UP (ref 1.6–2.6)
MCHC RBC-ENTMCNC: 29 PG — SIGNIFICANT CHANGE UP (ref 27–34)
MCHC RBC-ENTMCNC: 29 PG — SIGNIFICANT CHANGE UP (ref 27–34)
MCHC RBC-ENTMCNC: 33.5 GM/DL — SIGNIFICANT CHANGE UP (ref 32–36)
MCHC RBC-ENTMCNC: 33.5 GM/DL — SIGNIFICANT CHANGE UP (ref 32–36)
MCV RBC AUTO: 86.8 FL — SIGNIFICANT CHANGE UP (ref 80–100)
MCV RBC AUTO: 86.8 FL — SIGNIFICANT CHANGE UP (ref 80–100)
NRBC # BLD: 1 /100 WBCS — HIGH (ref 0–0)
NRBC # BLD: 1 /100 WBCS — HIGH (ref 0–0)
NRBC # FLD: 0.14 K/UL — HIGH (ref 0–0)
NRBC # FLD: 0.14 K/UL — HIGH (ref 0–0)
PHOSPHATE SERPL-MCNC: 3.5 MG/DL — SIGNIFICANT CHANGE UP (ref 2.5–4.5)
PHOSPHATE SERPL-MCNC: 3.5 MG/DL — SIGNIFICANT CHANGE UP (ref 2.5–4.5)
PLATELET # BLD AUTO: 472 K/UL — HIGH (ref 150–400)
PLATELET # BLD AUTO: 472 K/UL — HIGH (ref 150–400)
POTASSIUM SERPL-MCNC: 4.1 MMOL/L — SIGNIFICANT CHANGE UP (ref 3.5–5.3)
POTASSIUM SERPL-MCNC: 4.1 MMOL/L — SIGNIFICANT CHANGE UP (ref 3.5–5.3)
POTASSIUM SERPL-SCNC: 4.1 MMOL/L — SIGNIFICANT CHANGE UP (ref 3.5–5.3)
POTASSIUM SERPL-SCNC: 4.1 MMOL/L — SIGNIFICANT CHANGE UP (ref 3.5–5.3)
PROT SERPL-MCNC: 7.2 G/DL — SIGNIFICANT CHANGE UP (ref 6–8.3)
PROT SERPL-MCNC: 7.2 G/DL — SIGNIFICANT CHANGE UP (ref 6–8.3)
RBC # BLD: 3.17 M/UL — LOW (ref 3.8–5.2)
RBC # FLD: 19.7 % — HIGH (ref 10.3–14.5)
RBC # FLD: 19.7 % — HIGH (ref 10.3–14.5)
RETICS #: 190.8 K/UL — HIGH (ref 25–125)
RETICS #: 190.8 K/UL — HIGH (ref 25–125)
RETICS/RBC NFR: 6 % — HIGH (ref 0.5–2.5)
RETICS/RBC NFR: 6 % — HIGH (ref 0.5–2.5)
SODIUM SERPL-SCNC: 141 MMOL/L — SIGNIFICANT CHANGE UP (ref 135–145)
SODIUM SERPL-SCNC: 141 MMOL/L — SIGNIFICANT CHANGE UP (ref 135–145)
WBC # BLD: 11.97 K/UL — HIGH (ref 3.8–10.5)
WBC # BLD: 11.97 K/UL — HIGH (ref 3.8–10.5)
WBC # FLD AUTO: 11.97 K/UL — HIGH (ref 3.8–10.5)
WBC # FLD AUTO: 11.97 K/UL — HIGH (ref 3.8–10.5)

## 2023-12-01 PROCEDURE — 99232 SBSQ HOSP IP/OBS MODERATE 35: CPT | Mod: GC

## 2023-12-01 RX ORDER — MORPHINE SULFATE 50 MG/1
30 CAPSULE, EXTENDED RELEASE ORAL EVERY 4 HOURS
Refills: 0 | Status: DISCONTINUED | OUTPATIENT
Start: 2023-12-01 | End: 2023-12-02

## 2023-12-01 RX ORDER — MORPHINE SULFATE 50 MG/1
15 CAPSULE, EXTENDED RELEASE ORAL EVERY 4 HOURS
Refills: 0 | Status: DISCONTINUED | OUTPATIENT
Start: 2023-12-01 | End: 2023-12-02

## 2023-12-01 RX ORDER — MORPHINE SULFATE 50 MG/1
2 CAPSULE, EXTENDED RELEASE ORAL EVERY 4 HOURS
Refills: 0 | Status: DISCONTINUED | OUTPATIENT
Start: 2023-12-01 | End: 2023-12-02

## 2023-12-01 RX ORDER — MORPHINE SULFATE 50 MG/1
20 CAPSULE, EXTENDED RELEASE ORAL EVERY 4 HOURS
Refills: 0 | Status: DISCONTINUED | OUTPATIENT
Start: 2023-12-01 | End: 2023-12-01

## 2023-12-01 RX ADMIN — ENOXAPARIN SODIUM 40 MILLIGRAM(S): 100 INJECTION SUBCUTANEOUS at 11:44

## 2023-12-01 RX ADMIN — DEFERASIROX 720 MILLIGRAM(S): 180 GRANULE ORAL at 11:44

## 2023-12-01 RX ADMIN — MORPHINE SULFATE 30 MILLILITER(S): 50 CAPSULE, EXTENDED RELEASE ORAL at 08:13

## 2023-12-01 RX ADMIN — Medication 9 MILLIGRAM(S): at 22:23

## 2023-12-01 RX ADMIN — CEFTRIAXONE 100 MILLIGRAM(S): 500 INJECTION, POWDER, FOR SOLUTION INTRAMUSCULAR; INTRAVENOUS at 11:43

## 2023-12-01 RX ADMIN — SERTRALINE 50 MILLIGRAM(S): 25 TABLET, FILM COATED ORAL at 11:43

## 2023-12-01 RX ADMIN — SODIUM CHLORIDE 135 MILLILITER(S): 9 INJECTION, SOLUTION INTRAVENOUS at 06:59

## 2023-12-01 RX ADMIN — Medication 1 MILLIGRAM(S): at 11:44

## 2023-12-01 NOTE — DISCHARGE NOTE PROVIDER - HOSPITAL COURSE
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 Patient states starting today she developed 10/10 left lower chest pain. Associated with SOB as well. She denies fevers, chills, vomiting, abdominal pain, dysuria, leg pain, or swelling. She denies trauma, cough, recent travel, or sick contacts. States this does not feel like any prior acute chest syndromes that she has had, as her most recent ACS was when she was a child. She denies any neurologic deficit, weakness, numbness, tingling, AH/VH. Endorses a headache which feels like a typical tension headache. States she does not feel like she had when she had prior  shunt malfunctions. Last transfusion was 2u prbcs 10/28.    Initially presenting to the ED with tachycardia, tachypnea, desaturation to 79% on room air, hypotensive to 92/41 and febrile to 103.8. Received toradol x2, 975mg tylenol, 4mg morphine, zosyn, 500cc NS Bolus, and started on 75cc/hr 1/2NS.    (26 Nov 2023 01:57)    Hospital Course:  Patient was started on empiric vancomycin, cefepime. Pain regimen was transitioned from IV morphine to morphine PCA pump for better pain control. 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 Patient states starting today she developed 10/10 left lower chest pain. Associated with SOB as well. She denies fevers, chills, vomiting, abdominal pain, dysuria, leg pain, or swelling. She denies trauma, cough, recent travel, or sick contacts. States this does not feel like any prior acute chest syndromes that she has had, as her most recent ACS was when she was a child. She denies any neurologic deficit, weakness, numbness, tingling, AH/VH. Endorses a headache which feels like a typical tension headache. States she does not feel like she had when she had prior  shunt malfunctions. Last transfusion was 2u prbcs 10/28.    Initially presenting to the ED with tachycardia, tachypnea, desaturation to 79% on room air, hypotensive to 92/41 and febrile to 103.8. Received toradol x2, 975mg tylenol, 4mg morphine, zosyn, 500cc NS Bolus, and started on 75cc/hr 1/2NS.    (26 Nov 2023 01:57)    Hospital Course:  Patient was started on empiric vancomycin, cefepime. Pain regimen was transitioned from IV morphine to morphine PCA pump for better pain control. Urine and blood cultures returned positive for Proteus mirabilis. Due to persistent fevers, antibiotics were briefly escalated to meropenem while awaiting antibiotic sensitivities. Culture sensitiviites yielded pansensitive Proteus so antibiotic were then deescalated to ceftriaxone. Due ongoing fevers, ID was consulted and recommended continuing ceftriaxone and to transition to PO cefpodoxime 200mg BID for 10d following being afebrile for >24hr. PCA was then discontinued to monitor pain control on oral regimen of morphine with _____. PAtient was ultimately deemed stable for discharge with instructions to follow up with hematology.    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 Patient states starting today she developed 10/10 left lower chest pain. Associated with SOB as well. She denies fevers, chills, vomiting, abdominal pain, dysuria, leg pain, or swelling. She denies trauma, cough, recent travel, or sick contacts. States this does not feel like any prior acute chest syndromes that she has had, as her most recent ACS was when she was a child. She denies any neurologic deficit, weakness, numbness, tingling, AH/VH. Endorses a headache which feels like a typical tension headache. States she does not feel like she had when she had prior  shunt malfunctions. Last transfusion was 2u prbcs 10/28.    Initially presenting to the ED with tachycardia, tachypnea, desaturation to 79% on room air, hypotensive to 92/41 and febrile to 103.8. Received toradol x2, 975mg tylenol, 4mg morphine, zosyn, 500cc NS Bolus, and started on 75cc/hr 1/2NS.    (26 Nov 2023 01:57)    Hospital Course:  Patient was started on empiric vancomycin, cefepime. Pain regimen was transitioned from IV morphine to morphine PCA pump for better pain control. Urine and blood cultures returned positive for Proteus mirabilis. Due to persistent fevers, antibiotics were briefly escalated to meropenem while awaiting antibiotic sensitivities. Culture sensitivities yielded pansensitive Proteus so antibiotic were then deescalated to ceftriaxone. Due ongoing fevers, ID was consulted and recommended continuing ceftriaxone and to transition to PO cefpodoxime 200mg BID for 10d following being afebrile for >24hr. PCA was then discontinued to monitor pain control on oral regimen of morphine with _____. Patient was ultimately deemed stable for discharge with instructions to follow up with hematology.    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 Patient states starting today she developed 10/10 left lower chest pain. Associated with SOB as well. She denies fevers, chills, vomiting, abdominal pain, dysuria, leg pain, or swelling. She denies trauma, cough, recent travel, or sick contacts. States this does not feel like any prior acute chest syndromes that she has had, as her most recent ACS was when she was a child. She denies any neurologic deficit, weakness, numbness, tingling, AH/VH. Endorses a headache which feels like a typical tension headache. States she does not feel like she had when she had prior  shunt malfunctions. Last transfusion was 2u prbcs 10/28.    Initially presenting to the ED with tachycardia, tachypnea, desaturation to 79% on room air, hypotensive to 92/41 and febrile to 103.8. Received toradol x2, 975mg tylenol, 4mg morphine, zosyn, 500cc NS Bolus, and started on 75cc/hr 1/2NS.    (26 Nov 2023 01:57)    Hospital Course:  Patient was started on empiric vancomycin, cefepime. Pain regimen was transitioned from IV morphine to morphine PCA pump for better pain control. Urine and blood cultures returned positive for Proteus mirabilis. Due to persistent fevers, antibiotics were briefly escalated to meropenem while awaiting antibiotic sensitivities. Culture sensitivities yielded pansensitive Proteus so antibiotic were then deescalated to ceftriaxone. Due ongoing fevers, ID was consulted and recommended continuing ceftriaxone and to transition to PO cefpodoxime 200mg BID for 10d following being afebrile for >24hr. PCA was then discontinued and patient did not require any additional PO or IV opiates, will discharge with PO tylenol/ibuprofen PRN pain. Patient was ultimately deemed stable for discharge with instructions to follow up with hematology.    Important Medication Changes and Reason:  - START Cefpodoxime 200mg BID for 9 days    Active or Pending Issues Requiring Follow-up:  - Follow up with Hematology for continued management of sickle cell disease    Advanced Directives:   [X] Full code  [ ] DNR  [ ] Hospice    Discharge Diagnoses:  - Acute sickle cell crisis

## 2023-12-01 NOTE — DISCHARGE NOTE PROVIDER - NSDCFUADDAPPT_GEN_ALL_CORE_FT
APPTS ARE READY TO BE MADE: [X] YES    Best Family or Patient Contact (if needed):  - Louise Gavin (Mother): [phone]    Additional Information about above appointments (if needed):    1: Follow up with your Hematologist in 1 week  2:   3:     Other comments or requests:    APPTS ARE READY TO BE MADE: [X] YES    Best Family or Patient Contact (if needed):  - Louise Gavin (Mother): [phone]    Additional Information about above appointments (if needed):    1: Follow up with your Hematologist in 1 week  2:   3:     Other comments or requests:    APPTS ARE READY TO BE MADE: [X] YES    Best Family or Patient Contact (if needed):  - Louise Gavin (Mother): [phone]    Additional Information about above appointments (if needed):    1: Follow up with your Hematologist in 1 week  2:   3:     Other comments or requests:    APPTS ARE READY TO BE MADE: [X] YES    Best Family or Patient Contact (if needed):  - Louise Gavin (Mother): [phone]    Additional Information about above appointments (if needed):    1: Follow up with your Hematologist in 1 week  2:   3:     Other comments or requests:   Patient was provided with follow up request details and was advised to call to schedule follow up within specified time frame. No scheduling assistance is needed at this time.   APPTS ARE READY TO BE MADE: [X] YES    Best Family or Patient Contact (if needed):  - Louise Gavin (Mother): [phone]    Additional Information about above appointments (if needed):    1: Follow up with your Hematologist in 1 week  2:   3:     Other comments or requests:   Patient was provided with follow up request details and was advised to call to schedule follow up within specified time frame. No scheduling assistance is needed at this time.   APPTS ARE READY TO BE MADE: [X] YES    Best Family or Patient Contact (if needed):  - Louise Gavin (Mother): [phone]    Additional Information about above appointments (if needed):    1: Follow up with your Hematologist in 1 week  2:   3:     Other comments or requests:   Patient was provided with follow up request details and was advised to call to schedule follow up within specified time frame. No scheduling assistance is needed at this time.

## 2023-12-01 NOTE — DISCHARGE NOTE PROVIDER - NSDCMRMEDTOKEN_GEN_ALL_CORE_FT
Jadenu 360 mg oral tablet: 2 tab(s) orally once a day  melatonin 10 mg oral tablet: 1 tab(s) orally once a day (at bedtime) LD 9/29/2023  naproxen 500 mg oral tablet: 1 tab(s) orally every 12 hours as needed for  mild pain  Tylenol 325 mg oral tablet: 2 tab(s) orally every 6 hours as needed for  mild pain as needed  Zoloft 50 mg oral tablet: 1 tab(s) orally once a day   cefpodoxime 200 mg oral tablet: 1 tab(s) orally 2 times a day start 12/3, Sunday - until 12/7  Jadenu 360 mg oral tablet: 2 tab(s) orally once a day  magic mouthwash: 5-10 mL every 6-8 hrs as needed for oral pain; swish and spit; or as directed by pharmacist  melatonin 10 mg oral tablet: 1 tab(s) orally once a day (at bedtime) LD 9/29/2023  naproxen 500 mg oral tablet: 1 tab(s) orally every 12 hours as needed for  mild pain  Tylenol 325 mg oral tablet: 2 tab(s) orally every 6 hours as needed for  mild pain as needed  Zoloft 50 mg oral tablet: 1 tab(s) orally once a day

## 2023-12-01 NOTE — PROGRESS NOTE ADULT - PROBLEM SELECTOR PLAN 2
- In ED, pt was febrile, hypotensive, tachycardic, and tachypneic  - Suspected 2/2 UTI vs  shunt line infection given recent revision 10/23 of one of 2  shunts  - Urine, blood culture (11/25) -> Pansensitive Proteus mirabilis  - S/p empiric vancomycin, cefepime, meropenem  - Deescalated to IV ceftriaxone following culture abx sensitivity results  - Renal, bladder US -> 1.3cm nonobstructing stone in upper pole of right kidney, otherwise unremarkable  - ID consulted, recommend c/w IV ceftriaxone, can transition to PO cefpodoxime 200mg BID for 10d once afebrile for >24hrs  - C/w IVF, as above Joe More MD. pt hd stable. w/u thus far unremarkable (labs, cts, xrays). pt will need admission for pt/ot and eval for possible acute rehab

## 2023-12-01 NOTE — PROGRESS NOTE ADULT - PROBLEM SELECTOR PLAN 1
- P/w severe chest pain, febrile to 103.8F in ED  - CXR -> Clear lungs; repeat CXR unremarkable  - CTA chest -> No evidence of PE or acute pulmonary disease  - Not meeting criteria for acute chest syndrome given no evidence of new pulmonary consolidation  - Hgb 6.3 -> 1U pRBCs (11/26)  - S/p IV morphine 4mg q4 prn (moderate pain); 6mg q4 prn (severe pain)  - Wll transition from morphine PCA pump to oral morphine 15mg q4 prn (moderate pain), 20mg q4 prn (severe pain)  - C/w 1/2NS @ 135 cc/hr  - Hematology consulted, recommend c/w home deferasirox, 2L venturi mask, repeat CXR and call if pt becomes hypoxic for possible exchange transfusion  - CTM hgb, hemolysis labs daily

## 2023-12-01 NOTE — PROGRESS NOTE ADULT - SUBJECTIVE AND OBJECTIVE BOX
PROGRESS NOTE:     Patient is a 23y old  Female who presents with a chief complaint of VOC (30 Nov 2023 10:00)      SUBJECTIVE / OVERNIGHT EVENTS:    OVERNIGHT: No acute overnight events.      Patient was examined at bedside and feels well this morning. Denies fever, chills, chest pain, SOB, nausea, vomiting. ROS otherwise negative and pt is amenable to current treatment plan.      REVIEW OF SYSTEMS:    CONSTITUTIONAL:  No weakness, fevers, or chills  EYES/ENT:  No visual changes, vertigo, or throat pain   NECK:  No pain or stiffness  RESPIRATORY:  No SOB, cough, wheezing, hemoptysis  CARDIOVASCULAR:  No chest pain or palpitations  GASTROINTESTINAL:  No abdominal pain, nausea, vomiting, or hematemesis; No diarrhea or constipation. No melena or hematochezia.  GENITOURINARY:  No dysuria, change in frequency, or hematuria  NEUROLOGICAL:  No numbness or weakness  SKIN:  No itching or rashes      MEDICATIONS  (STANDING):  cefTRIAXone   IVPB 2000 milliGRAM(s) IV Intermittent every 24 hours  deferasirox Tablet 720 milliGRAM(s) Oral daily  enoxaparin Injectable 40 milliGRAM(s) SubCutaneous every 24 hours  folic acid 1 milliGRAM(s) Oral daily  influenza   Vaccine 0.5 milliLiter(s) IntraMuscular once  melatonin 9 milliGRAM(s) Oral at bedtime  sertraline 50 milliGRAM(s) Oral daily  sodium chloride 0.45%. 1000 milliLiter(s) (135 mL/Hr) IV Continuous <Continuous>    MEDICATIONS  (PRN):  acetaminophen     Tablet .. 650 milliGRAM(s) Oral every 6 hours PRN Temp greater or equal to 38C (100.4F), Mild Pain (1 - 3)  morphine  - Injectable 2 milliGRAM(s) IV Push every 4 hours PRN breakthrough pain  morphine  IR 15 milliGRAM(s) Oral every 4 hours PRN Moderate Pain (4 - 6)  morphine  IR 20 milliGRAM(s) Oral every 4 hours PRN Severe Pain (7 - 10)  naloxone Injectable 0.1 milliGRAM(s) IV Push every 3 minutes PRN For ANY of the following changes in patient status:  A. RR LESS THAN 10 breaths per minute, B. Oxygen saturation LESS THAN 90%, C. Sedation score of 6  ondansetron Injectable 4 milliGRAM(s) IV Push every 6 hours PRN Nausea  senna 2 Tablet(s) Oral at bedtime PRN Constipation      CAPILLARY BLOOD GLUCOSE        I&O's Summary      PHYSICAL EXAM:  Vital Signs Last 24 Hrs  T(C): 36.8 (01 Dec 2023 06:34), Max: 36.9 (30 Nov 2023 13:25)  T(F): 98.2 (01 Dec 2023 06:34), Max: 98.5 (30 Nov 2023 13:25)  HR: 57 (01 Dec 2023 06:34) (57 - 88)  BP: 98/59 (01 Dec 2023 06:34) (98/59 - 110/44)  BP(mean): --  RR: 18 (01 Dec 2023 06:34) (17 - 18)  SpO2: 96% (01 Dec 2023 06:34) (96% - 98%)    Parameters below as of 01 Dec 2023 06:34  Patient On (Oxygen Delivery Method): room air        CONSTITUTIONAL: NAD; well-developed  HEENT: PERRL, clear conjunctiva  RESPIRATORY: Normal respiratory effort; lungs are clear to auscultation bilaterally; No Crackles/Rhonchi/Wheezing  CARDIOVASCULAR: Regular rate and rhythm, normal S1 and S2, no murmur/rub/gallop; No lower extremity edema; Peripheral pulses are 2+ bilaterally  ABDOMEN: Nontender to palpation, normoactive bowel sounds, no rebound/guarding; No hepatosplenomegaly  MUSCULOSKELETAL: no clubbing or cyanosis of digits; no joint swelling or tenderness to palpation  EXTREMITY: Lower extremities Non-tender to palpation; non-erythematous B/L  NEURO: A&Ox3; no focal deficits   PSYCH: normal mood; Affect appropriate    LABS:                        9.2    11.97 )-----------( 472      ( 01 Dec 2023 06:15 )             27.5     12-01    141  |  105  |  5<L>  ----------------------------<  96  4.1   |  25  |  0.48<L>    Ca    9.9      01 Dec 2023 06:15  Phos  3.5     12-01  Mg     2.20     12-01    TPro  7.2  /  Alb  4.1  /  TBili  1.5<H>  /  DBili  x   /  AST  38<H>  /  ALT  37<H>  /  AlkPhos  54  12-01          Urinalysis Basic - ( 01 Dec 2023 06:15 )    Color: x / Appearance: x / SG: x / pH: x  Gluc: 96 mg/dL / Ketone: x  / Bili: x / Urobili: x   Blood: x / Protein: x / Nitrite: x   Leuk Esterase: x / RBC: x / WBC x   Sq Epi: x / Non Sq Epi: x / Bacteria: x          RADIOLOGY & ADDITIONAL TESTS:    N/A PROGRESS NOTE:     Patient is a 23y old  Female who presents with a chief complaint of VOC (30 Nov 2023 10:00)      SUBJECTIVE / OVERNIGHT EVENTS:    OVERNIGHT: No acute overnight events.      Patient was examined at bedside and feels well this morning. Pain controlled on PCA pump. No fevers for 48hrs. Denies SOB, nausea, vomiting. ROS otherwise negative and pt is amenable to current treatment plan.      REVIEW OF SYSTEMS:    CONSTITUTIONAL:  No fevers, chills, or weakness  EYES/ENT:  No visual changes, vertigo, or throat pain   NECK:  No pain or stiffness  RESPIRATORY:  No SOB, cough, wheezing, hemoptysis  CARDIOVASCULAR:  No chest pain or palpitations  GASTROINTESTINAL:  No abdominal pain, nausea, vomiting, or hematemesis; No diarrhea or constipation. No melena or hematochezia.  GENITOURINARY:  No dysuria, change in frequency, or hematuria  NEUROLOGICAL:  No numbness or weakness  SKIN:  No itching or rashes      MEDICATIONS  (STANDING):  cefTRIAXone   IVPB 2000 milliGRAM(s) IV Intermittent every 24 hours  deferasirox Tablet 720 milliGRAM(s) Oral daily  enoxaparin Injectable 40 milliGRAM(s) SubCutaneous every 24 hours  folic acid 1 milliGRAM(s) Oral daily  influenza   Vaccine 0.5 milliLiter(s) IntraMuscular once  melatonin 9 milliGRAM(s) Oral at bedtime  sertraline 50 milliGRAM(s) Oral daily  sodium chloride 0.45%. 1000 milliLiter(s) (135 mL/Hr) IV Continuous <Continuous>    MEDICATIONS  (PRN):  acetaminophen     Tablet .. 650 milliGRAM(s) Oral every 6 hours PRN Temp greater or equal to 38C (100.4F), Mild Pain (1 - 3)  morphine  - Injectable 2 milliGRAM(s) IV Push every 4 hours PRN breakthrough pain  morphine  IR 15 milliGRAM(s) Oral every 4 hours PRN Moderate Pain (4 - 6)  morphine  IR 20 milliGRAM(s) Oral every 4 hours PRN Severe Pain (7 - 10)  naloxone Injectable 0.1 milliGRAM(s) IV Push every 3 minutes PRN For ANY of the following changes in patient status:  A. RR LESS THAN 10 breaths per minute, B. Oxygen saturation LESS THAN 90%, C. Sedation score of 6  ondansetron Injectable 4 milliGRAM(s) IV Push every 6 hours PRN Nausea  senna 2 Tablet(s) Oral at bedtime PRN Constipation      CAPILLARY BLOOD GLUCOSE        I&O's Summary      PHYSICAL EXAM:  Vital Signs Last 24 Hrs  T(C): 36.8 (01 Dec 2023 06:34), Max: 36.9 (30 Nov 2023 13:25)  T(F): 98.2 (01 Dec 2023 06:34), Max: 98.5 (30 Nov 2023 13:25)  HR: 57 (01 Dec 2023 06:34) (57 - 88)  BP: 98/59 (01 Dec 2023 06:34) (98/59 - 110/44)  BP(mean): --  RR: 18 (01 Dec 2023 06:34) (17 - 18)  SpO2: 96% (01 Dec 2023 06:34) (96% - 98%)    Parameters below as of 01 Dec 2023 06:34  Patient On (Oxygen Delivery Method): room air        CONSTITUTIONAL: NAD; well-developed  HEENT: PERRL, clear conjunctiva  RESPIRATORY: Normal respiratory effort; lungs are clear to auscultation bilaterally; No Crackles/Rhonchi/Wheezing  CARDIOVASCULAR: Regular rate and rhythm, normal S1 and S2, no murmur/rub/gallop; No lower extremity edema; Peripheral pulses are 2+ bilaterally  ABDOMEN: Nontender to palpation, normoactive bowel sounds, no rebound/guarding; No hepatosplenomegaly  MUSCULOSKELETAL: no clubbing or cyanosis of digits; no joint swelling or tenderness to palpation  EXTREMITY: Lower extremities Non-tender to palpation; non-erythematous B/L  NEURO: A&Ox3; no focal deficits   PSYCH: normal mood; Affect appropriate    LABS:                        9.2    11.97 )-----------( 472      ( 01 Dec 2023 06:15 )             27.5     12-01    141  |  105  |  5<L>  ----------------------------<  96  4.1   |  25  |  0.48<L>    Ca    9.9      01 Dec 2023 06:15  Phos  3.5     12-01  Mg     2.20     12-01    TPro  7.2  /  Alb  4.1  /  TBili  1.5<H>  /  DBili  x   /  AST  38<H>  /  ALT  37<H>  /  AlkPhos  54  12-01          Urinalysis Basic - ( 01 Dec 2023 06:15 )    Color: x / Appearance: x / SG: x / pH: x  Gluc: 96 mg/dL / Ketone: x  / Bili: x / Urobili: x   Blood: x / Protein: x / Nitrite: x   Leuk Esterase: x / RBC: x / WBC x   Sq Epi: x / Non Sq Epi: x / Bacteria: x          RADIOLOGY & ADDITIONAL TESTS:    N/A

## 2023-12-01 NOTE — DISCHARGE NOTE PROVIDER - NSDCFUSCHEDAPPT_GEN_ALL_CORE_FT
[facility]  Jules Dunham  Scheduled Appointment: 12/02/2023     [facility]  Jules Dunham  Scheduled Appointment: 12/02/2023     [facility]  Jules Dunham  Scheduled Appointment: 12/02/2023

## 2023-12-01 NOTE — DISCHARGE NOTE PROVIDER - NSDCCPTREATMENT_GEN_ALL_CORE_FT
PRINCIPAL PROCEDURE  Procedure: CTA chest w/w/o contrast  Findings and Treatment: ACC: [id number] EXAM:  CT ANGIO CHEST PULM ART WAWI   ORDERED BY: JLUIS GOINS   PROCEDURE DATE:  11/25/2023    INTERPRETATION:  CLINICAL INFORMATION: Chest pain  COMPARISON: CT chest 9/26/2023.  CONTRAST/COMPLICATIONS:  IV Contrast: Omnipaque 350  67 cc administered   33 cc discarded  Oral Contrast: NONE  Complications: None reported at time of study completion  PROCEDURE:  CT Angiography of the Chest.  Sagittal and coronal reformats were performed as well as 3D (MIP)   reconstructions.  FINDINGS:  LUNGS AND AIRWAYS: Patent central airways.  Lungs are clear.  PLEURA: No pleural effusion.  MEDIASTINUM AND CHRIS: No lymphadenopathy. Small volume of residual versus   hyperplastic thymic tissue.  VESSELS: Within normal limits  HEART: Heart size is normal. No pericardial effusion.  CHEST WALL AND LOWER NECK: Left chest Mediport catheter tip terminates at   the upper SVC.  Partially visualized  shunt catheter along the right lower neck, right   chest wall to right upper abdominal wall.  VISUALIZED UPPER ABDOMEN: Atrophic calcified spleen containing   approximately 3.2 cm most likely pseudocyst.  Nonobstructing left upper pole calculi measuring up to 7 mm. Wedge-shaped   hypoenhancement of left upper renal pole  BONES: Sequela of sickle cell disease  IMPRESSION:  No acute pulmonary embolism or acute pulmonary disease  Sequela of sickle cell disease as described. Wedge-shaped hypoenhancement   of the left upper renal pole likely represents a renal infarct  --- End of Report ---     PRINCIPAL PROCEDURE  Procedure: CTA chest w/w/o contrast  Findings and Treatment: ACC: [id number] EXAM:  CT ANGIO CHEST PULM ART WAWI   ORDERED BY: JLUIS GOINS   PROCEDURE DATE:  11/25/2023    INTERPRETATION:  CLINICAL INFORMATION: Chest pain  COMPARISON: CT chest 9/26/2023.  CONTRAST/COMPLICATIONS:  IV Contrast: Omnipaque 350  67 cc administered   33 cc discarded  Oral Contrast: NONE  Complications: None reported at time of study completion  PROCEDURE:  CT Angiography of the Chest.  Sagittal and coronal reformats were performed as well as 3D (MIP)   reconstructions.  FINDINGS:  LUNGS AND AIRWAYS: Patent central airways.  Lungs are clear.  PLEURA: No pleural effusion.  MEDIASTINUM AND CHRIS: No lymphadenopathy. Small volume of residual versus   hyperplastic thymic tissue.  VESSELS: Within normal limits  HEART: Heart size is normal. No pericardial effusion.  CHEST WALL AND LOWER NECK: Left chest Mediport catheter tip terminates at   the upper SVC.  Partially visualized  shunt catheter along the right lower neck, right   chest wall to right upper abdominal wall.  VISUALIZED UPPER ABDOMEN: Atrophic calcified spleen containing   approximately 3.2 cm most likely pseudocyst.  Nonobstructing left upper pole calculi measuring up to 7 mm. Wedge-shaped   hypoenhancement of left upper renal pole  BONES: Sequela of sickle cell disease  IMPRESSION:  No acute pulmonary embolism or acute pulmonary disease  Sequela of sickle cell disease as described. Wedge-shaped hypoenhancement   of the left upper renal pole likely represents a renal infarct  --- End of Report ---     PRINCIPAL PROCEDURE  Procedure: CTA chest w/w/o contrast  Findings and Treatment: ACC: [id number] EXAM:  CT ANGIO CHEST PULM ART WAWI   ORDERED BY: JLUIS GOINS   PROCEDURE DATE:  11/25/2023    INTERPRETATION:  CLINICAL INFORMATION: Chest pain  COMPARISON: CT chest 9/26/2023.  CONTRAST/COMPLICATIONS:  IV Contrast: Omnipaque 350  67 cc administered   33 cc discarded  Oral Contrast: NONE  Complications: None reported at time of study completion  PROCEDURE:  CT Angiography of the Chest.  Sagittal and coronal reformats were performed as well as 3D (MIP)   reconstructions.  FINDINGS:  LUNGS AND AIRWAYS: Patent central airways.  Lungs are clear.  PLEURA: No pleural effusion.  MEDIASTINUM AND CHRIS: No lymphadenopathy. Small volume of residual versus   hyperplastic thymic tissue.  VESSELS: Within normal limits  HEART: Heart size is normal. No pericardial effusion.  CHEST WALL AND LOWER NECK: Left chest Mediport catheter tip terminates at   the upper SVC.  Partially visualized  shunt catheter along the right lower neck, right   chest wall to right upper abdominal wall.  VISUALIZED UPPER ABDOMEN: Atrophic calcified spleen containing   approximately 3.2 cm most likely pseudocyst.  Nonobstructing left upper pole calculi measuring up to 7 mm. Wedge-shaped   hypoenhancement of left upper renal pole  BONES: Sequela of sickle cell disease  IMPRESSION:  No acute pulmonary embolism or acute pulmonary disease  Sequela of sickle cell disease as described. Wedge-shaped hypoenhancement   of the left upper renal pole likely represents a renal infarct  --- End of Report ---

## 2023-12-01 NOTE — DISCHARGE NOTE PROVIDER - NSDCCPCAREPLAN_GEN_ALL_CORE_FT
PRINCIPAL DISCHARGE DIAGNOSIS  Diagnosis: Vaso-occlusive sickle cell crisis  Assessment and Plan of Treatment: You were admitted to the hospital for a sickle cell crisis. During this time, you blood count was monitored daily and were given blood transfusions as needed. You pain was also controlled with a morphine PCA pump before transitioning to an oral pain regimen. PLease take all medications exactly as prescribed. Please follow up with your hematologist for further management of your sickle cell disease. Please return to the ED if you experience any new or worsening symptms.  Medication Changes:  - START Cefepodoxime 200mg twice a dfay for 9 days  Follow Up:  - Follow up with hematology for further management of sickle cell disease      SECONDARY DISCHARGE DIAGNOSES  Diagnosis: Sepsis  Assessment and Plan of Treatment: While you were in the hospital, you were found to have a blood infection by a bacteria called "Proteus mirabilis." This bacteria was also found in your urine so it was likely caused by an untreated urinary tract infection. You were started on IV antiobiotics and this blood infection resolved. The infectious disease team was consulted and they recommended to continue oral antibiotics upon discharge. Please take the above prescribed antibiotic exactly how prescribed.     PRINCIPAL DISCHARGE DIAGNOSIS  Diagnosis: Vaso-occlusive sickle cell crisis  Assessment and Plan of Treatment: You were admitted to the hospital for a sickle cell crisis. During this time, you blood count was monitored daily and were given blood transfusions as needed. You pain was also controlled with a morphine PCA pump before transitioning to an oral pain regimen. PLease take all medications exactly as prescribed. Please follow up with your hematologist for further management of your sickle cell disease. Please return to the ED if you experience any new or worsening symptms.  Medication Changes:  - START Cefepodoxime 200mg twice a day TOMORROW (Sunday, 12/3) until Thursday, 12/7  - Continue taking Tylenol or ibuprofen/naproxen as needed for pain   Follow Up:  - Follow up with hematology for further management of sickle cell disease      SECONDARY DISCHARGE DIAGNOSES  Diagnosis: Sepsis  Assessment and Plan of Treatment: While you were in the hospital, you were found to have a blood infection by a bacteria called "Proteus mirabilis." This bacteria was also found in your urine so it was likely caused by an untreated urinary tract infection. You were started on IV antiobiotics and this blood infection resolved. The infectious disease team was consulted and they recommended to continue oral antibiotics upon discharge. Please take the above prescribed antibiotic exactly how prescribed.    Diagnosis: Tongue lesion  Assessment and Plan of Treatment: Please follow up with your primary care doctor regarding the white lesion on the right side of your tongue to make sure it resolves. In the meantime, we have prescribed some "magic mouthwash" that can help with any pain or discomfort over the area.

## 2023-12-01 NOTE — DIETITIAN INITIAL EVALUATION ADULT - PERTINENT LABORATORY DATA
12-01    141  |  105  |  5<L>  ----------------------------<  96  4.1   |  25  |  0.48<L>    Ca    9.9      01 Dec 2023 06:15  Phos  3.5     12-01  Mg     2.20     12-01    TPro  7.2  /  Alb  4.1  /  TBili  1.5<H>  /  DBili  x   /  AST  38<H>  /  ALT  37<H>  /  AlkPhos  54  12-01  A1C with Estimated Average Glucose Result: 4.3 % (12-27-22 @ 06:50)

## 2023-12-01 NOTE — DISCHARGE NOTE PROVIDER - NSDCCAREPROVSEEN_GEN_ALL_CORE_FT
[facility] Medicine Team 3  Dr. Gissell Ashford [facility] Medicine Team 3  Dr. Gissell Ashford [facility] Medicine Team 3  Dr. Gissell Ashford
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
## 2023-12-01 NOTE — DISCHARGE NOTE PROVIDER - PROVIDER TOKENS
PROVIDER:[TOKEN:[[id number]],FOLLOWUP:[1 week],ESTABLISHEDPATIENT:[T]] PROVIDER:[TOKEN:[[id number]],FOLLOWUP:[1 week],ESTABLISHEDPATIENT:[T]] PROVIDER:[TOKEN:[[id number]],FOLLOWUP:[1 week],ESTABLISHEDPATIENT:[T]]

## 2023-12-01 NOTE — DIETITIAN INITIAL EVALUATION ADULT - OTHER INFO
Pt 24 yo female with PMHx of sickle cell disease w h/o acute chest, hydrocephalus s/p  shunt with recent revision, PE, presented with chest pain and SOB - per chart review.    At time of visit, Pt awake, alert. Per Pt, her appetite good; no chewing swallowing difficulty; no vomiting or diarrhea @ this time. Per Pt, her height: ~61" & her body weight: ~110#; no report of weight loss & weight changes PTA. Of note, Pt on Regular diet at present. No food related concerns voiced at present. RD remains available, Pt made aware.

## 2023-12-01 NOTE — DISCHARGE NOTE PROVIDER - ATTENDING DISCHARGE PHYSICAL EXAMINATION:
VITAL SIGNS:  T(C): 36.9 (12-02-23 @ 11:07), Max: 36.9 (12-02-23 @ 05:00)  T(F): 98.5 (12-02-23 @ 11:07), Max: 98.5 (12-02-23 @ 05:00)  HR: 76 (12-02-23 @ 11:07) (59 - 76)  BP: 96/58 (12-02-23 @ 11:07) (96/58 - 100/62)  BP(mean): --  RR: 17 (12-02-23 @ 11:07) (15 - 18)  SpO2: 99% (12-02-23 @ 11:07) (97% - 99%)  Wt(kg): --    PHYSICAL EXAM:  Constitutional: resting comfortably in bed; NAD  Head: NC/AT  Eyes: PERRL, EOMI, anicteric sclera  ENT: no nasal discharge; MMM  Neck: supple; no JVD  Respiratory: CTA B/L; no W/R/R  Cardiac: +S1/S2; RRR; no M/R/G  Gastrointestinal: soft, NT/ND; no rebound or guarding; +BSx4  Extremities: WWP, no clubbing or cyanosis; no peripheral edema  Musculoskeletal: NROM x4; no joint swelling, tenderness or erythema  Vascular: 2+ radial, DP/PT pulses B/L  Dermatologic: skin warm, dry and intact; no rashes, wounds, or scars  Neurologic: AAOx3; CNII-XII grossly intact; no focal deficits  Psychiatric: affect and characteristics of appearance, verbalizations, behaviors are appropriate

## 2023-12-01 NOTE — DISCHARGE NOTE PROVIDER - CARE PROVIDER_API CALL
Terrance Berger  Hematology  [facility], [address]  [place] [address]  Phone: [phone]  Fax: [phone]  Established Patient  Follow Up Time: 1 week   Terrance Berger  Hematology  [facility], [address]  [place] [address]  Phone: [phone]  Fax: [phone]  Established Patient  Follow Up Time: 1 week   Terrance Berger  Hematology  [facility], [address]  [place] [address]  Phone: [phone]  Fax: [phone]  Established Patient  Follow Up Time: 1 week

## 2023-12-02 ENCOUNTER — APPOINTMENT (OUTPATIENT)
Dept: INFUSION THERAPY | Facility: HOSPITAL | Age: 23
End: 2023-12-02

## 2023-12-02 ENCOUNTER — TRANSCRIPTION ENCOUNTER (OUTPATIENT)
Age: 23
End: 2023-12-02

## 2023-12-02 VITALS
OXYGEN SATURATION: 99 % | HEART RATE: 76 BPM | SYSTOLIC BLOOD PRESSURE: 96 MMHG | RESPIRATION RATE: 17 BRPM | TEMPERATURE: 98 F | DIASTOLIC BLOOD PRESSURE: 58 MMHG

## 2023-12-02 DIAGNOSIS — K14.8 OTHER DISEASES OF TONGUE: ICD-10-CM

## 2023-12-02 LAB
ALBUMIN SERPL ELPH-MCNC: 4.2 G/DL — SIGNIFICANT CHANGE UP (ref 3.3–5)
ALBUMIN SERPL ELPH-MCNC: 4.2 G/DL — SIGNIFICANT CHANGE UP (ref 3.3–5)
ALP SERPL-CCNC: 56 U/L — SIGNIFICANT CHANGE UP (ref 40–120)
ALP SERPL-CCNC: 56 U/L — SIGNIFICANT CHANGE UP (ref 40–120)
ALT FLD-CCNC: 47 U/L — HIGH (ref 4–33)
ALT FLD-CCNC: 47 U/L — HIGH (ref 4–33)
ANION GAP SERPL CALC-SCNC: 12 MMOL/L — SIGNIFICANT CHANGE UP (ref 7–14)
ANION GAP SERPL CALC-SCNC: 12 MMOL/L — SIGNIFICANT CHANGE UP (ref 7–14)
AST SERPL-CCNC: 55 U/L — HIGH (ref 4–32)
AST SERPL-CCNC: 55 U/L — HIGH (ref 4–32)
BILIRUB SERPL-MCNC: 1.6 MG/DL — HIGH (ref 0.2–1.2)
BILIRUB SERPL-MCNC: 1.6 MG/DL — HIGH (ref 0.2–1.2)
BUN SERPL-MCNC: 7 MG/DL — SIGNIFICANT CHANGE UP (ref 7–23)
BUN SERPL-MCNC: 7 MG/DL — SIGNIFICANT CHANGE UP (ref 7–23)
CALCIUM SERPL-MCNC: 10.2 MG/DL — SIGNIFICANT CHANGE UP (ref 8.4–10.5)
CALCIUM SERPL-MCNC: 10.2 MG/DL — SIGNIFICANT CHANGE UP (ref 8.4–10.5)
CHLORIDE SERPL-SCNC: 103 MMOL/L — SIGNIFICANT CHANGE UP (ref 98–107)
CHLORIDE SERPL-SCNC: 103 MMOL/L — SIGNIFICANT CHANGE UP (ref 98–107)
CO2 SERPL-SCNC: 24 MMOL/L — SIGNIFICANT CHANGE UP (ref 22–31)
CO2 SERPL-SCNC: 24 MMOL/L — SIGNIFICANT CHANGE UP (ref 22–31)
CREAT SERPL-MCNC: 0.52 MG/DL — SIGNIFICANT CHANGE UP (ref 0.5–1.3)
CREAT SERPL-MCNC: 0.52 MG/DL — SIGNIFICANT CHANGE UP (ref 0.5–1.3)
EGFR: 134 ML/MIN/1.73M2 — SIGNIFICANT CHANGE UP
EGFR: 134 ML/MIN/1.73M2 — SIGNIFICANT CHANGE UP
GLUCOSE SERPL-MCNC: 92 MG/DL — SIGNIFICANT CHANGE UP (ref 70–99)
GLUCOSE SERPL-MCNC: 92 MG/DL — SIGNIFICANT CHANGE UP (ref 70–99)
HAPTOGLOB SERPL-MCNC: 49 MG/DL — SIGNIFICANT CHANGE UP (ref 34–200)
HAPTOGLOB SERPL-MCNC: 49 MG/DL — SIGNIFICANT CHANGE UP (ref 34–200)
HCT VFR BLD CALC: 30.8 % — LOW (ref 34.5–45)
HCT VFR BLD CALC: 30.8 % — LOW (ref 34.5–45)
HGB BLD-MCNC: 10.2 G/DL — LOW (ref 11.5–15.5)
HGB BLD-MCNC: 10.2 G/DL — LOW (ref 11.5–15.5)
LDH SERPL L TO P-CCNC: 417 U/L — HIGH (ref 135–225)
LDH SERPL L TO P-CCNC: 417 U/L — HIGH (ref 135–225)
MAGNESIUM SERPL-MCNC: 2.2 MG/DL — SIGNIFICANT CHANGE UP (ref 1.6–2.6)
MAGNESIUM SERPL-MCNC: 2.2 MG/DL — SIGNIFICANT CHANGE UP (ref 1.6–2.6)
MCHC RBC-ENTMCNC: 29.2 PG — SIGNIFICANT CHANGE UP (ref 27–34)
MCHC RBC-ENTMCNC: 29.2 PG — SIGNIFICANT CHANGE UP (ref 27–34)
MCHC RBC-ENTMCNC: 33.1 GM/DL — SIGNIFICANT CHANGE UP (ref 32–36)
MCHC RBC-ENTMCNC: 33.1 GM/DL — SIGNIFICANT CHANGE UP (ref 32–36)
MCV RBC AUTO: 88.3 FL — SIGNIFICANT CHANGE UP (ref 80–100)
MCV RBC AUTO: 88.3 FL — SIGNIFICANT CHANGE UP (ref 80–100)
NRBC # BLD: 1 /100 WBCS — HIGH (ref 0–0)
NRBC # BLD: 1 /100 WBCS — HIGH (ref 0–0)
NRBC # FLD: 0.15 K/UL — HIGH (ref 0–0)
NRBC # FLD: 0.15 K/UL — HIGH (ref 0–0)
PHOSPHATE SERPL-MCNC: 3.9 MG/DL — SIGNIFICANT CHANGE UP (ref 2.5–4.5)
PHOSPHATE SERPL-MCNC: 3.9 MG/DL — SIGNIFICANT CHANGE UP (ref 2.5–4.5)
PLATELET # BLD AUTO: 624 K/UL — HIGH (ref 150–400)
PLATELET # BLD AUTO: 624 K/UL — HIGH (ref 150–400)
POTASSIUM SERPL-MCNC: 4 MMOL/L — SIGNIFICANT CHANGE UP (ref 3.5–5.3)
POTASSIUM SERPL-MCNC: 4 MMOL/L — SIGNIFICANT CHANGE UP (ref 3.5–5.3)
POTASSIUM SERPL-SCNC: 4 MMOL/L — SIGNIFICANT CHANGE UP (ref 3.5–5.3)
POTASSIUM SERPL-SCNC: 4 MMOL/L — SIGNIFICANT CHANGE UP (ref 3.5–5.3)
PROT SERPL-MCNC: 7.6 G/DL — SIGNIFICANT CHANGE UP (ref 6–8.3)
PROT SERPL-MCNC: 7.6 G/DL — SIGNIFICANT CHANGE UP (ref 6–8.3)
RBC # BLD: 3.49 M/UL — LOW (ref 3.8–5.2)
RBC # FLD: 20.8 % — HIGH (ref 10.3–14.5)
RBC # FLD: 20.8 % — HIGH (ref 10.3–14.5)
RETICS #: 310.6 K/UL — HIGH (ref 25–125)
RETICS #: 310.6 K/UL — HIGH (ref 25–125)
RETICS/RBC NFR: 8.9 % — HIGH (ref 0.5–2.5)
RETICS/RBC NFR: 8.9 % — HIGH (ref 0.5–2.5)
SODIUM SERPL-SCNC: 139 MMOL/L — SIGNIFICANT CHANGE UP (ref 135–145)
SODIUM SERPL-SCNC: 139 MMOL/L — SIGNIFICANT CHANGE UP (ref 135–145)
WBC # BLD: 12.16 K/UL — HIGH (ref 3.8–10.5)
WBC # BLD: 12.16 K/UL — HIGH (ref 3.8–10.5)
WBC # FLD AUTO: 12.16 K/UL — HIGH (ref 3.8–10.5)
WBC # FLD AUTO: 12.16 K/UL — HIGH (ref 3.8–10.5)

## 2023-12-02 PROCEDURE — 99239 HOSP IP/OBS DSCHRG MGMT >30: CPT

## 2023-12-02 RX ORDER — LIDOCAINE 4 G/100G
0 CREAM TOPICAL
Qty: 200 | Refills: 0
Start: 2023-12-02

## 2023-12-02 RX ADMIN — CEFTRIAXONE 100 MILLIGRAM(S): 500 INJECTION, POWDER, FOR SOLUTION INTRAMUSCULAR; INTRAVENOUS at 11:31

## 2023-12-02 NOTE — DISCHARGE NOTE NURSING/CASE MANAGEMENT/SOCIAL WORK - PATIENT PORTAL LINK FT
You can access the FollowMyHealth Patient Portal offered by [facility] by registering at the following website: [email or web address]. By joining [facility]’s FollowMyHealth portal, you will also be able to view your health information using other applications (apps) compatible with our system.

## 2023-12-02 NOTE — PROGRESS NOTE ADULT - PROBLEM SELECTOR PLAN 6
DVT Ppx: Lovenox  Diet: Regular  Dispo: home today - Per neurosurgery on 11/26,  shunt is likely not infected, however should obtain a CT head, if evidence of ventriculomegaly then reconsult is warranted  - CT head unremarkable

## 2023-12-02 NOTE — PROGRESS NOTE ADULT - PROBLEM SELECTOR PLAN 4
- Not currently on home AC, was only on AC for 9 month period stopped in the end of 2022 - C/w home zoloft

## 2023-12-02 NOTE — PROGRESS NOTE ADULT - PROBLEM SELECTOR PROBLEM 1
Vaso-occlusive sickle cell crisis

## 2023-12-02 NOTE — PROGRESS NOTE ADULT - ATTENDING COMMENTS
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    Afebrile x24 hours. Hgb drop this morning and having more pain.    - Appreciate hematology recommendations  - ID consulted, appreciate recommendations  - Proteus pan sensitive - continue ceftriaxone for bacteremia -- per ID transition to cefpodoxime on discharge  - Hypotonic saline, supplemental oxygen, incentive spirometry  - Transfuse 1U pRBC today  - Continue morphine PCA    Plan to discharge once pain better controlled and no longer requiring transfusions
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    Pain significantly improved. Febrile yesterday afternoon -- frequency of fevers seems to be decreasing. She is eager to go home.    - Appreciate hematology recommendations  - ID consulted, appreciate recommendations  - Proteus pan sensitive - continue ceftriaxone for bacteremia -- per ID transition to cefpodoxime on discharge  - Hypotonic saline, supplemental oxygen, incentive spirometry  - Continue morphine PCA    Plan to discharge once afebrile x24 hours
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    Pain better controlled on PCA. Continues to have high fevers overnight and this morning. No urinary symptoms, flank pain, chest pain, shortness of breath, cough, URI symptoms, joint pain, rashes.     - Appreciate hematology recommendations  - Renal ultrasound to evaluate for perinephric abscess given persistent fevers  - ID consult given persistent fevers despite treatment of known bacteremia   - Proteus pan sensititive - narrow antibiotics to ceftriaxone for bacteremia  - Hypotonic saline, supplemental oxygen, incentive spirometry  - Continue morphine PCA
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    Remains afebrile. Pain better controlled this morning, but still present    - Appreciate hematology recommendations  - ID consulted, appreciate recommendations  - Proteus pan sensitive - continue ceftriaxone for bacteremia -- per ID transition to cefpodoxime on discharge  - Hypotonic saline, supplemental oxygen, incentive spirometry  - Transition from morphine PCA to oral morphine PRN in order to assess readiness for discharge  - GC/Chlamydia and HIV pending per patient request for STI testing
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   #SCD with VOC , Hemolytic Anemia   Transfuse per Hem/Onc   Consult called, f/w recs   Monitor CBC, hemolysis panel  C/w IVF  Pain control  CXR    Rest per resident notes.
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    Remains afebrile. Pain better controlled this morning, off PCA pump and did not require PRNs since pump dc'd.    - Appreciate hematology recommendations  - ID consulted, appreciate recommendations  - Proteus pan sensitive - continue ceftriaxone for bacteremia -- per ID transition to cefpodoxime on discharge, through 12/7  - Hypotonic saline, supplemental oxygen, incentive spirometry  - DC with recommendation for PRN tylenol / ibuprofen, no need for opioids and pt in agreement

## 2023-12-02 NOTE — PROGRESS NOTE ADULT - PROVIDER SPECIALTY LIST ADULT
Infectious Disease
Internal Medicine
Heme/Onc

## 2023-12-02 NOTE — PROGRESS NOTE ADULT - PROBLEM SELECTOR PLAN 3
- C/w home zoloft with white scalloped lesion along R lateral tongue, pt noticed last night, mild discomfort, benign appearing   - f/u outpatient PCP  - will discharge with magic mouthwash

## 2023-12-02 NOTE — PROGRESS NOTE ADULT - ASSESSMENT
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  Had fever and pyuria.  BC and UC sent, both +Proteus.  Hx CT that showed possible left renal infarct.  Follow up sonogram for evaluation of perinephric abscess was negative for collection but did show left renal non-obstructing stone.  Antibiotics adjusted to ceftriaxone    Sepsis secondary to Proteus UTI with bacteremia  - Ceftriaxone 2 g daily  - D#5 IV antibiotics today  - can transition to oral cefpodoxime 200mg bid to complete 10 days once afebrile for >24 hours    
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 Patient gets 2U pRBCs outpatient every 3 weeks, and follows with Dr. Berger at [facility].     Labs at admission: WBC 21.2, Hgb 6.3 (baseline 8), , , T bili 4,4, I bili 4.1. U/A+ for UTI CT chest ruled out PE and confirmed clear lungs. Patient on RA.    PLAN:   # Sickle Cell Disease  # Rule out ACS  - Low suspicion for ACS given patient is on RA and CTA chest did not show infiltrates  - continue home Deferasirox for iron chelation  - Would recommend discussing with Dr. Berger starting hydroxyurea outpatient  - Infectious workup and management per primary team  - Please call hematology urgently if patient becomes hypoxic - can repeat CXR at this point and if infiltrates, may need an exchange transfusion  - Please give and encourage use of incentive spirometry  - Trend CBC w/ diff daily with retic count, and hemolysis labs daily (LDH, CMP, fractionated bilirubin, Phos, Ca)  - F/u hemoglobin electrophoresis- Hgb S 36%  - continue folic acid qd  - continue half NS maintenance fluids  - continue antibiotics for GNR bacteremia (proteus infxn), infxn management per primary team  - Pain management per primary team  - Patient will follow with Dr. Berger upon hospital discharge  
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 Pain now improved, stable for discharge home today. 
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 

## 2023-12-02 NOTE — DISCHARGE NOTE NURSING/CASE MANAGEMENT/SOCIAL WORK - NSDCFUADDAPPT_GEN_ALL_CORE_FT
APPTS ARE READY TO BE MADE: [X] YES    Best Family or Patient Contact (if needed):  - Louise Gavin (Mother): [phone]    Additional Information about above appointments (if needed):    1: Follow up with your Hematologist in 1 week  2:   3:     Other comments or requests:

## 2023-12-02 NOTE — PROGRESS NOTE ADULT - PROBLEM SELECTOR PLAN 1
- P/w severe chest pain, febrile to 103.8F in ED - now resolved. s/p PCA pump, did not require any PO PRN opiates   - CXR -> Clear lungs; repeat CXR unremarkable  - CTA chest -> No evidence of PE or acute pulmonary disease  - Not meeting criteria for acute chest syndrome given no evidence of new pulmonary consolidation  - Hgb 6.3 -> 1U pRBCs (11/26)  - S/p IV morphine 4mg q4 prn (moderate pain); 6mg q4 prn (severe pain)  - s/p morphine PCA pump to oral morphine 15mg q4 prn (moderate pain), 20mg q4 prn (severe pain)   - C/w 1/2NS @ 135 cc/hr  - Hematology was consulted, recommend c/w home deferasirox, 2L venturi mask, repeat CXR and call if pt becomes hypoxic for possible exchange transfusion    - will discharge home today. Did not require any additional opiates after PCA pump. Will discharge with tylenol/ibuprofen PRN

## 2023-12-02 NOTE — DISCHARGE NOTE NURSING/CASE MANAGEMENT/SOCIAL WORK - NSDCPEFALRISK_GEN_ALL_CORE
Yes For information on Fall & Injury Prevention, visit: [email or web address] OR  [email or web address] OR  https://www.cdc.gov/steadi/patient.html

## 2023-12-02 NOTE — PROGRESS NOTE ADULT - PROBLEM SELECTOR PLAN 5
- Per neurosurgery on 11/26,  shunt is likely not infected, however should obtain a CT head, if evidence of ventriculomegaly then reconsult is warranted  - CT head unremarkable - Not currently on home AC, was only on AC for 9 month period stopped in the end of 2022

## 2023-12-02 NOTE — PROGRESS NOTE ADULT - SUBJECTIVE AND OBJECTIVE BOX
******INCOMPLETE NOTE******    No acute events overnight  Alex Villalpando PGY3  Internal Medicine  Available on Teams     Progress Note  11-25-23 (7d)  Patient is a 23y old  Female who presents with a chief complaint of VOC (02 Dec 2023 08:16)    Subjective / Interval 24 Events :  - No acute events overnight. Did not require any PRN opiates overnight   - Pt seen and examined at bedside this AM. States pain resolved. Does report some mild pain over R lateral tongue, noticed some white stuff on the side last night, states she never had this before.      Additional ROS (if any): see above, otherwise negative     MEDICATIONS  (STANDING):  cefTRIAXone   IVPB 2000 milliGRAM(s) IV Intermittent every 24 hours  deferasirox Tablet 720 milliGRAM(s) Oral daily  enoxaparin Injectable 40 milliGRAM(s) SubCutaneous every 24 hours  folic acid 1 milliGRAM(s) Oral daily  influenza   Vaccine 0.5 milliLiter(s) IntraMuscular once  melatonin 9 milliGRAM(s) Oral at bedtime  sertraline 50 milliGRAM(s) Oral daily    MEDICATIONS  (PRN):  acetaminophen     Tablet .. 650 milliGRAM(s) Oral every 6 hours PRN Temp greater or equal to 38C (100.4F), Mild Pain (1 - 3)  senna 2 Tablet(s) Oral at bedtime PRN Constipation    CAPILLARY BLOOD GLUCOSE    I&O's Summary      PHYSICAL EXAM:  Vital Signs Last 24 Hrs  T(C): 36.9 (02 Dec 2023 05:00), Max: 37.1 (01 Dec 2023 12:00)  T(F): 98.5 (02 Dec 2023 05:00), Max: 98.7 (01 Dec 2023 12:00)  HR: 63 (02 Dec 2023 05:00) (55 - 63)  BP: 100/62 (02 Dec 2023 05:00) (96/57 - 100/62)  BP(mean): --  RR: 15 (02 Dec 2023 05:00) (15 - 18)  SpO2: 97% (02 Dec 2023 05:00) (97% - 98%)    Parameters below as of 02 Dec 2023 05:00  Patient On (Oxygen Delivery Method): room air      General: NAD, non-toxic appearing   HEENT: benign appearing scalloped-like white lesion over R lateral tongue; no scleral icterus  CV: RRR, normal S1 and S2  Lungs: normal respiratory effort, CTAB  Abd: soft, nontender, nondistended  Ext: no edema, warm, well perfused  Pysch: AAOx3, appropriate affect   Neuro: grossly non-focal, moving all extremities spontaneously   Skin: no rashes or lesions       LABS:                          10.2   12.16 )-----------( 624      ( 02 Dec 2023 06:40 )             30.8       WBC Trend: 12.16<--, 11.97<--, 11.64<--  Hb Trend: 10.2<--, 9.2<--, 7.0<--, 7.5<--, 7.4<--    12-02    139  |  103  |  7   ----------------------------<  92  4.0   |  24  |  0.52    Ca    10.2      02 Dec 2023 06:40  Phos  3.9     12-02  Mg     2.20     12-02    TPro  7.6  /  Alb  4.2  /  TBili  1.6<H>  /  DBili  x   /  AST  55<H>  /  ALT  47<H>  /  AlkPhos  56  12-02      Urinalysis Basic - ( 02 Dec 2023 06:40 )    Color: x / Appearance: x / SG: x / pH: x  Gluc: 92 mg/dL / Ketone: x  / Bili: x / Urobili: x   Blood: x / Protein: x / Nitrite: x   Leuk Esterase: x / RBC: x / WBC x   Sq Epi: x / Non Sq Epi: x / Bacteria: x        RADIOLOGY & ADDITIONAL TESTS: Reviewed  - No new images

## 2023-12-03 LAB
CULTURE RESULTS: SIGNIFICANT CHANGE UP
SPECIMEN SOURCE: SIGNIFICANT CHANGE UP

## 2023-12-03 RX ORDER — CEFPODOXIME PROXETIL 100 MG
1 TABLET ORAL
Qty: 10 | Refills: 0
Start: 2023-12-03 | End: 2023-12-07

## 2023-12-05 NOTE — ED PROVIDER NOTE - MEDICAL DECISION MAKING DETAILS
dry, intact, no bleeding and no hematoma. Patient/Caregiver provided printed discharge information. 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 states she is having diff breathing when taking deep breaths. has had hx of ACS and hx of VOC of entire body in past. denies fever. no cough. no v/d. no abd pain. last transfusion last thursday. hx of VPS attending mdm: 17 yo female with HbSS, chronic transfusion, here with chest pain since this morning. states she is having diff breathing when taking deep breaths. has had hx of ACS and hx of VOC of entire body in past. denies fever. no cough. no v/d. no abd pain. last transfusion last thursday. hx of VPS for hydrocephalus. on exam, pt well appearing, no resp distress. TMs nl. PERRL OP clear, MMM. lungs clear, no wheeze. s1s2 no murmurs, mild chest tenderness over sternum, mediport non tender. abd soft ntnd, ext wwp. CR < 2 sec. A/P HbSS on chronic transfusion, here with chest pain, likely VOC but will obtain cxr to r/o ACS, will do labs and give pain meds, pain is 5/10 so will trial oral pain meds first. heme consult. Sunil Granado MD Attending

## 2023-12-28 ENCOUNTER — OUTPATIENT (OUTPATIENT)
Dept: OUTPATIENT SERVICES | Facility: HOSPITAL | Age: 23
LOS: 1 days | End: 2023-12-28
Payer: MEDICAID

## 2023-12-28 ENCOUNTER — APPOINTMENT (OUTPATIENT)
Dept: HEMATOLOGY ONCOLOGY | Facility: CLINIC | Age: 23
End: 2023-12-28

## 2023-12-28 DIAGNOSIS — Z95.828 PRESENCE OF OTHER VASCULAR IMPLANTS AND GRAFTS: Chronic | ICD-10-CM

## 2023-12-28 DIAGNOSIS — D64.9 ANEMIA, UNSPECIFIED: ICD-10-CM

## 2023-12-29 ENCOUNTER — APPOINTMENT (OUTPATIENT)
Dept: INFUSION THERAPY | Facility: HOSPITAL | Age: 23
End: 2023-12-29

## 2023-12-29 PROCEDURE — 86850 RBC ANTIBODY SCREEN: CPT

## 2023-12-29 PROCEDURE — 86923 COMPATIBILITY TEST ELECTRIC: CPT

## 2023-12-29 PROCEDURE — 86900 BLOOD TYPING SEROLOGIC ABO: CPT

## 2023-12-29 PROCEDURE — 86902 BLOOD TYPE ANTIGEN DONOR EA: CPT

## 2023-12-29 PROCEDURE — 86901 BLOOD TYPING SEROLOGIC RH(D): CPT

## 2024-01-22 NOTE — ED PEDIATRIC TRIAGE NOTE - NS ED TRIAGE HISTORIAN
January 22, 2024    Hello, may I speak with Chinmay Roque?    My name is Radha      I am  with [facility]  [address]  [address]  [place] [address] [phone].    Before we get started may I verify your date of birth? 1950    I am calling to officially welcome you to our practice and ask about your recent visit. Is this a good time to talk? My chart message sent for patient rounding.   Patient/Mother

## 2024-01-31 ENCOUNTER — OUTPATIENT (OUTPATIENT)
Dept: OUTPATIENT SERVICES | Facility: HOSPITAL | Age: 24
LOS: 1 days | Discharge: HOME | End: 2024-01-31

## 2024-01-31 DIAGNOSIS — D64.9 ANEMIA, UNSPECIFIED: ICD-10-CM

## 2024-01-31 DIAGNOSIS — Z95.828 PRESENCE OF OTHER VASCULAR IMPLANTS AND GRAFTS: Chronic | ICD-10-CM

## 2024-01-31 DIAGNOSIS — D57.3 SICKLE-CELL TRAIT: ICD-10-CM

## 2024-02-02 ENCOUNTER — OUTPATIENT (OUTPATIENT)
Dept: OUTPATIENT SERVICES | Facility: HOSPITAL | Age: 24
LOS: 1 days | End: 2024-02-02
Payer: MEDICAID

## 2024-02-02 ENCOUNTER — APPOINTMENT (OUTPATIENT)
Dept: HEMATOLOGY ONCOLOGY | Facility: CLINIC | Age: 24
End: 2024-02-02

## 2024-02-02 DIAGNOSIS — D64.9 ANEMIA, UNSPECIFIED: ICD-10-CM

## 2024-02-02 DIAGNOSIS — Z95.828 PRESENCE OF OTHER VASCULAR IMPLANTS AND GRAFTS: Chronic | ICD-10-CM

## 2024-02-03 ENCOUNTER — APPOINTMENT (OUTPATIENT)
Dept: INFUSION THERAPY | Facility: HOSPITAL | Age: 24
End: 2024-02-03

## 2024-02-04 NOTE — ED PROVIDER NOTE - CADM POA CENTRAL LINE
Problem: Discharge Planning  Goal: Discharge to home or other facility with appropriate resources  Outcome: Progressing  Flowsheets (Taken 2/4/2024 1306)  Discharge to home or other facility with appropriate resources:   Identify barriers to discharge with patient and caregiver   Identify discharge learning needs (meds, wound care, etc)   Refer to discharge planning if patient needs post-hospital services based on physician order or complex needs related to functional status, cognitive ability or social support system   Arrange for needed discharge resources and transportation as appropriate   Arrange for interpreters to assist at discharge as needed     Problem: Pain  Goal: Verbalizes/displays adequate comfort level or baseline comfort level  Outcome: Progressing  Flowsheets (Taken 2/4/2024 1306)  Verbalizes/displays adequate comfort level or baseline comfort level:   Encourage patient to monitor pain and request assistance   Administer analgesics based on type and severity of pain and evaluate response   Consider cultural and social influences on pain and pain management   Assess pain using appropriate pain scale   Implement non-pharmacological measures as appropriate and evaluate response   Notify Licensed Independent Practitioner if interventions unsuccessful or patient reports new pain      No

## 2024-02-06 DIAGNOSIS — Z51.89 ENCOUNTER FOR OTHER SPECIFIED AFTERCARE: ICD-10-CM

## 2024-02-08 NOTE — PATIENT PROFILE ADULT - STATED REASON FOR ADMISSION
Subjective   Patient ID: Mady is a 42 year old female.    Chief Complaint   Patient presents with    Follow-up     6 month   Follow-up visit.    History of allergies, hx melanoma, low vitamin D.  Colonoscopy 2020. Due repeat 2025.  Hx sinus infection 12/2023 .assoc w rodriguez, body aches . Seen [facility] . test covid, flu.  Both Neg .  Use zyrtec d prn .  Mammo 5/2023 . Pap 9/2022 - due this year.     HPI  Review of Systems   Constitutional:  Negative for activity change, chills, diaphoresis and fever.   HENT: Negative.     Eyes: Negative.    Respiratory:  Negative for chest tightness, shortness of breath and wheezing.    Cardiovascular:  Negative for chest pain, palpitations and leg swelling.   Gastrointestinal:  Negative for abdominal pain, constipation, diarrhea, nausea and vomiting.   Endocrine: Negative.  Negative for cold intolerance and heat intolerance.   Genitourinary: Negative.    Musculoskeletal:  Negative for arthralgias, back pain, gait problem and myalgias.   Skin:  Negative for rash.        Dry skin   Allergic/Immunologic: Positive for environmental allergies.   Neurological:  Negative for dizziness, weakness and numbness.   Hematological:  Negative for adenopathy. Does not bruise/bleed easily.   Psychiatric/Behavioral:  Negative for decreased concentration. The patient is not nervous/anxious.        ALLERGIES:  No Known Allergies  Current Outpatient Medications   Medication Sig    cetirizine-pseudoephedrine (12 Hour Allergy-D) 5-120 MG per tablet Take 1 tablet by mouth 2 times daily as needed for Allergies.    fluticasone (FLONASE) 50 MCG/ACT nasal spray Spray 2 sprays in each nostril daily.    cholecalciferol (VITAMIN D) 25 mcg (1,000 units) tablet Take 2 tablets by mouth daily.    hydroCORTisone-pramoxine (ANALPRAM-HC) 2.5-1 % rectal cream Apply a small amount rectally twice a day as needed for pain    Multiple Vitamins-Minerals (vitamin - therapeutic multivitamins w/minerals) tablet      No current  facility-administered medications for this visit.     Past Medical History:   Diagnosis Date    Allergic rhinitis     COVID-19 09/2022    Dry eye syndrome     External hemorrhoids     Low vitamin D level     Melanoma (CMD) 2010    on back      Past Surgical History:   Procedure Laterality Date    Colonoscopy  12/2020    polyps   (fhx polyps)    Lasik surgery      Melaris analysis      removed from back     Mole removal  2019    Benign    Wrist surgery Right     left wrist      Family History   Problem Relation Age of Onset    Scoliosis Mother     Cancer, Lung Mother         2023    GERD Mother         HH    Hyperlipidemia Father     Patient is unaware of any medical problems Sister     Colon Polyps Brother     Patient is unaware of any medical problems Brother     Patient is unaware of any medical problems Brother     ADHD/ADD Maternal Grandmother     Cancer, Lung Maternal Grandfather     Patient is unaware of any medical problems Paternal Grandmother     Patient is unaware of any medical problems Paternal Grandfather     Cancer, Ovarian Maternal Great-Grandmother     Diabetes Neg Hx     Coronary Artery Disease Neg Hx      Social History     Substance and Sexual Activity   Alcohol Use Yes    Comment: occ     Social History     Tobacco Use   Smoking Status Some Days    Current packs/day: 1.00    Types: Cigarettes   Smokeless Tobacco Never   Tobacco Comments    pack/month     Immunization History   Administered Date(s) Administered    COVID Pfizer 12Y+ (Requires Dilution) 03/20/2021, 04/17/2021, 01/03/2022    Influenza, Unspecified Formulation 11/06/2014, 09/27/2016    Influenza, high dose seasonal, preservative-free 12/19/2018    Influenza, quadrivalent, preserve-free 10/01/2019, 09/24/2020, 11/09/2021    Tdap 04/12/2015, 02/13/2017     Objective   Visit Vitals  /76   Pulse 92   Temp 97.9 °F (36.6 °C) (Temporal)   Ht 5' 7\" (1.702 m)   Wt 101.5 kg (223 lb 11.2 oz)   SpO2 100%   BMI 35.04 kg/m²     Physical  Exam  Vitals reviewed.   Constitutional:       General: She is not in acute distress.     Appearance: Normal appearance. She is well-developed.   HENT:      Head: Normocephalic and atraumatic.   Eyes:      Extraocular Movements: Extraocular movements intact.      Conjunctiva/sclera: Conjunctivae normal.      Pupils: Pupils are equal, round, and reactive to light.   Neck:      Thyroid: No thyromegaly.      Vascular: No carotid bruit or JVD.   Cardiovascular:      Rate and Rhythm: Normal rate and regular rhythm.      Heart sounds: Normal heart sounds. No murmur heard.  Pulmonary:      Effort: Pulmonary effort is normal. No respiratory distress.      Breath sounds: Normal breath sounds.   Lymphadenopathy:      Cervical: No cervical adenopathy.   Skin:     General: Skin is warm and dry.      Findings: No bruising.   Neurological:      General: No focal deficit present.      Mental Status: She is alert and oriented to person, place, and time.      Sensory: No sensory deficit.      Motor: No weakness.      Gait: Gait normal.   Psychiatric:         Mood and Affect: Mood normal.         Thought Content: Thought content normal.         Judgment: Judgment normal.      Comments: anxious       No visits with results within 1 Month(s) from this visit.   Latest known visit with results is:   Walk In on 12/28/2023   Component Date Value Ref Range Status    POCT SARS-COV-2 ANTIGEN 12/28/2023 Not Detected  Not Detected Final    Rapid Influenza A Ag 12/28/2023 Negative  Negative, Indeterminate, Invalid Results - please disregard Final    Rapid Influenza B Ag 12/28/2023 Negative  Negative, Indeterminate, Invalid Results - please disregard Final    TEST LOT NUMBER 12/28/2023 [id number]   Final    TEST LOT EXPIRATION DATE 12/28/2023 11/21/24   Final     Assessment and Plan  1. Allergic rhinitis due to pollen, unspecified seasonality  Continue with current meds.  Seasonal.    2. Low vitamin D level  Currently on vitamin D 2000 units/day.     Check vitamin D level  - Vitamin D -25 Hydroxy; Future    3. History of melanoma  Follow-up with Dr. Charles dermatology for annual skin check    4. Elevated liver enzymes  Previously elevated due to patient's alcohol consumption while on vacation.  Currently only drinking on the weekend.  Check repeat LFT per patient request  - Hepatic Function Panel; Future    Medication changes: No   Due for mammogram in May 2024  Due for pelvic and Pap with gynecologist in September  Due for colonoscopy 2025  Follow-up in 6  months  Flu and COVID vaccines given today  Greater than 50% of the 25 minute appointment was spent counseling patient regarding disease management, and treatment plan.    Keo Toscano MD  02/08/24  9:26 AM     weakness

## 2024-02-16 NOTE — ED PEDIATRIC NURSE NOTE - THOUGHTS OF HOMICIDE/VIOLENCE TOWARDS OTHERS YN, MLM
From: Umm Mckeon  To: Dr. Gay Zavala  Sent: 2/6/2024 8:24 PM EST  Subject: Appointment Request    Appointment Request From: Umm Mckeon    With Provider: Gay Zavala MD [Sentara Obici No

## 2024-02-29 ENCOUNTER — APPOINTMENT (OUTPATIENT)
Dept: HEMATOLOGY ONCOLOGY | Facility: CLINIC | Age: 24
End: 2024-02-29

## 2024-03-01 ENCOUNTER — APPOINTMENT (OUTPATIENT)
Dept: HEMATOLOGY ONCOLOGY | Facility: CLINIC | Age: 24
End: 2024-03-01

## 2024-03-02 ENCOUNTER — APPOINTMENT (OUTPATIENT)
Dept: INFUSION THERAPY | Facility: HOSPITAL | Age: 24
End: 2024-03-02

## 2024-03-02 PROCEDURE — 86902 BLOOD TYPE ANTIGEN DONOR EA: CPT

## 2024-03-02 PROCEDURE — 86923 COMPATIBILITY TEST ELECTRIC: CPT

## 2024-03-02 PROCEDURE — 86850 RBC ANTIBODY SCREEN: CPT

## 2024-03-02 PROCEDURE — 86901 BLOOD TYPING SEROLOGIC RH(D): CPT

## 2024-03-02 PROCEDURE — 86900 BLOOD TYPING SEROLOGIC ABO: CPT

## 2024-04-12 ENCOUNTER — RESULT REVIEW (OUTPATIENT)
Age: 24
End: 2024-04-12

## 2024-04-12 ENCOUNTER — OUTPATIENT (OUTPATIENT)
Dept: OUTPATIENT SERVICES | Facility: HOSPITAL | Age: 24
LOS: 1 days | Discharge: HOME | End: 2024-04-12

## 2024-04-12 ENCOUNTER — APPOINTMENT (OUTPATIENT)
Dept: HEMATOLOGY ONCOLOGY | Facility: CLINIC | Age: 24
End: 2024-04-12

## 2024-04-12 DIAGNOSIS — Z95.828 PRESENCE OF OTHER VASCULAR IMPLANTS AND GRAFTS: Chronic | ICD-10-CM

## 2024-04-12 DIAGNOSIS — D57.3 SICKLE-CELL TRAIT: ICD-10-CM

## 2024-04-12 DIAGNOSIS — D64.9 ANEMIA, UNSPECIFIED: ICD-10-CM

## 2024-04-13 ENCOUNTER — APPOINTMENT (OUTPATIENT)
Dept: INFUSION THERAPY | Facility: HOSPITAL | Age: 24
End: 2024-04-13

## 2024-05-02 NOTE — ED PROVIDER NOTE - OBJECTIVE STATEMENT
Patient is returning call   [facility]: Cancer Care                                                                                          Patient called stating he just got out of the hospital with pneumonia and is not up to coming in tomorrow. Will push his treatment and visit out 1-2 weeks so as to allow him time to recover. Patient in agreement with plan.  Rosa Maria Quick RN on 5/2/2024 at 8:52 AM      Signature:  Rosa Maria Quick RN    19 yo F w HgbSS pw fever. Patient reports feeling nausea and HA since this morning with fever to 101.4. Patient had a WBC count of 20 at the PMD today.    ex 22wker with hydrocephalus,  shunt in place; ACS multiple times; 2 strokes in the past; Hx RAD   s/p cholecystectomy 19 yo F w HgbSS pw fever. Patient reports feeling nausea and HA since this morning with fever to 101.4. Patient had a WBC count of 20 at the PMD today. Patient is on a chronic transfusion protocol with a port.     ex 22wker with hydrocephalus,  shunt in place; ACS multiple times; 2 strokes in the past; Hx RAD   s/p cholecystectomy 18 year old female with sickle cell disease on chronic transfusion protocol with port in place here today due to fever. Patient reports feeling well prior to this morning around 10 am, when she began to present nausea and headache at school, as well as fever with temperature of 101.4. She finished her day at school and was then taken to her pediatrician where WBC count of 20 was drawn. Also reports body aches and feeling tired. Denies recent cough, vomiting, diarrhea, congestion. Patient presented one green watery emetic episode during examination.     Medical history:  Ex 22 weeks gestational age with Grade IV hemorrhage and hydrocephalus s/p  shunts x2 (one newer one active, one inactive). Sickle cell with chronic transfusion protocol for approx. 4 years now. 2 strokes in the past, acute chest syndrome multiple times. Multiple surgeries including tonsillectomy, adenoidectomy, cholecystectomy, port revisions.  Medications: Abilify, Zoloft, Jadenu, Flovent, Albuterol, Vitamin D. 18 year old female with sickle cell disease on chronic transfusion protocol with port in place here today due to fever. Patient reports feeling well prior to this morning around 10 am, when she began to present nausea and headache at school, as well as fever with temperature of 101.4. She finished her day at school and was then taken to her pediatrician where WBC count of 20 was drawn. Also reports body aches and feeling tired. Denies recent cough, vomiting, diarrhea, congestion. Patient presented one green watery emetic episode during examination.     Medical history:  Ex 22 weeks gestational age with Grade IV hemorrhage and hydrocephalus s/p  shunts x2 (one newer one active, one inactive). Sickle cell with chronic transfusion protocol for approx. 4 years now. 2 strokes in the past, acute chest syndrome multiple times. Multiple surgeries including tonsillectomy, adenoidectomy, cholecystectomy, port revisions.   Medications: Abilify, Zoloft, Jadenu, Flovent, Albuterol, Vitamin D.

## 2024-05-10 ENCOUNTER — APPOINTMENT (OUTPATIENT)
Dept: HEMATOLOGY ONCOLOGY | Facility: CLINIC | Age: 24
End: 2024-05-10

## 2024-05-11 ENCOUNTER — APPOINTMENT (OUTPATIENT)
Dept: INFUSION THERAPY | Facility: HOSPITAL | Age: 24
End: 2024-05-11

## 2024-05-13 DIAGNOSIS — Z51.89 ENCOUNTER FOR OTHER SPECIFIED AFTERCARE: ICD-10-CM

## 2024-05-15 NOTE — ED PROVIDER NOTE - CPE EDP RESP NORM
Call your healthcare provider or seek medical care right away if any of these occur:   Increase in swelling or redness around the eyelid after 48 to 72 hours  Increase in eye pain or the eyelid blisters  Increase in warmth--the eyelid feels hot  Drainage of blood or thick pus from the sty  Blister on the eyelid  Inability to open the eyelid due to swelling  Fever of 100.4°F (38°C) or above, or as directed by your provider  Vision changes  Headache or stiff neck  The sty comes back   normal...

## 2024-05-30 ENCOUNTER — OUTPATIENT (OUTPATIENT)
Dept: OUTPATIENT SERVICES | Age: 24
LOS: 1 days | End: 2024-05-30

## 2024-05-30 ENCOUNTER — APPOINTMENT (OUTPATIENT)
Dept: MRI IMAGING | Facility: HOSPITAL | Age: 24
End: 2024-05-30
Payer: MEDICAID

## 2024-05-30 DIAGNOSIS — Z95.828 PRESENCE OF OTHER VASCULAR IMPLANTS AND GRAFTS: Chronic | ICD-10-CM

## 2024-05-30 DIAGNOSIS — G91.9 HYDROCEPHALUS, UNSPECIFIED: ICD-10-CM

## 2024-05-30 PROCEDURE — 70551 MRI BRAIN STEM W/O DYE: CPT | Mod: 26

## 2024-05-31 ENCOUNTER — OUTPATIENT (OUTPATIENT)
Dept: OUTPATIENT SERVICES | Facility: HOSPITAL | Age: 24
LOS: 1 days | End: 2024-05-31
Payer: MEDICAID

## 2024-05-31 ENCOUNTER — APPOINTMENT (OUTPATIENT)
Dept: RADIOLOGY | Facility: HOSPITAL | Age: 24
End: 2024-05-31

## 2024-05-31 DIAGNOSIS — G91.9 HYDROCEPHALUS, UNSPECIFIED: ICD-10-CM

## 2024-05-31 PROCEDURE — 75809 NONVASCULAR SHUNT X-RAY: CPT | Mod: 26

## 2024-06-19 ENCOUNTER — OUTPATIENT (OUTPATIENT)
Dept: OUTPATIENT SERVICES | Facility: HOSPITAL | Age: 24
LOS: 1 days | Discharge: HOME | End: 2024-06-19

## 2024-06-19 DIAGNOSIS — Z95.828 PRESENCE OF OTHER VASCULAR IMPLANTS AND GRAFTS: Chronic | ICD-10-CM

## 2024-06-19 DIAGNOSIS — D57.3 SICKLE-CELL TRAIT: ICD-10-CM

## 2024-06-19 DIAGNOSIS — D64.9 ANEMIA, UNSPECIFIED: ICD-10-CM

## 2024-06-21 ENCOUNTER — APPOINTMENT (OUTPATIENT)
Dept: HEMATOLOGY ONCOLOGY | Facility: CLINIC | Age: 24
End: 2024-06-21

## 2024-06-22 ENCOUNTER — APPOINTMENT (OUTPATIENT)
Dept: INFUSION THERAPY | Facility: HOSPITAL | Age: 24
End: 2024-06-22

## 2024-06-24 DIAGNOSIS — Z51.89 ENCOUNTER FOR OTHER SPECIFIED AFTERCARE: ICD-10-CM

## 2024-07-19 ENCOUNTER — APPOINTMENT (OUTPATIENT)
Dept: HEMATOLOGY ONCOLOGY | Facility: CLINIC | Age: 24
End: 2024-07-19

## 2024-07-20 ENCOUNTER — APPOINTMENT (OUTPATIENT)
Dept: INFUSION THERAPY | Facility: HOSPITAL | Age: 24
End: 2024-07-20

## 2024-07-22 NOTE — H&P PST ADULT - NEGATIVE LYMPHATIC SYMPTOMS
Assessment   Diagnoses and all orders for this visit:  Cervical radiculopathy  -     MRI CERVICAL SPINE WO CONTRAST; Future      He has abnormal sensation to light touch in the left arm.   Normal motor strength.   I am concerned about a cervical radiculopathy.  I would like an MRI of the cervical spine.     Return visit after the MRI.   
no enlarged lymph nodes/no tender lymph nodes/no swelling of extremity

## 2024-07-25 ENCOUNTER — APPOINTMENT (OUTPATIENT)
Dept: MRI IMAGING | Facility: HOSPITAL | Age: 24
End: 2024-07-25
Payer: MEDICAID

## 2024-07-25 PROCEDURE — 70551 MRI BRAIN STEM W/O DYE: CPT | Mod: 26

## 2024-08-23 ENCOUNTER — APPOINTMENT (OUTPATIENT)
Dept: HEMATOLOGY ONCOLOGY | Facility: CLINIC | Age: 24
End: 2024-08-23

## 2024-08-24 ENCOUNTER — APPOINTMENT (OUTPATIENT)
Dept: INFUSION THERAPY | Facility: HOSPITAL | Age: 24
End: 2024-08-24

## 2024-09-27 ENCOUNTER — APPOINTMENT (OUTPATIENT)
Dept: HEMATOLOGY ONCOLOGY | Facility: CLINIC | Age: 24
End: 2024-09-27

## 2024-09-28 ENCOUNTER — APPOINTMENT (OUTPATIENT)
Dept: INFUSION THERAPY | Facility: HOSPITAL | Age: 24
End: 2024-09-28

## 2024-11-27 NOTE — ED PROVIDER NOTE - WET READ LAUNCH FT
Hub staff attempted to follow warm transfer process and was unsuccessful     Caller: SHAUN    Relationship to patient: [facility]    Best call back number: [phone]    Patient is needing: NEEDS ICD10 CODE FOR ELEVATED PSA         There are no Wet Read(s) to document. There is 1 Wet Read(s) to document.

## 2024-12-20 NOTE — PATIENT PROFILE PEDIATRIC. - PROVIDER NOTIFICATION
[Well Developed] : well developed [Well Nourished] : well nourished [No Acute Distress] : no acute distress [Normal Conjunctiva] : normal conjunctiva [No Xanthelasma] : no xanthelasma [Normal Venous Pressure] : normal venous pressure [No Carotid Bruit] : no carotid bruit [Normal S1, S2] : normal S1, S2 [No Murmur] : no murmur [No Rub] : no rub [No Gallop] : no gallop [Clear Lung Fields] : clear lung fields [Good Air Entry] : good air entry [No Respiratory Distress] : no respiratory distress  [Soft] : abdomen soft [Non Tender] : non-tender [No Masses/organomegaly] : no masses/organomegaly [Normal Gait] : normal gait [No Edema] : no edema [No Cyanosis] : no cyanosis [No Clubbing] : no clubbing [No Rash] : no rash [No Skin Lesions] : no skin lesions [Moves all extremities] : moves all extremities [No Focal Deficits] : no focal deficits [Normal Speech] : normal speech [Alert and Oriented] : alert and oriented [Normal memory] : normal memory [de-identified] : Bilateral injected sclera with redness Declines

## 2025-02-07 NOTE — ED PEDIATRIC NURSE NOTE - CAS TRG GEN SKIN CONDITION
Belongings:   In security, Pt. Friend brought in $20 bills 5X = $100  In locker, 6 bags unchecked was brought in for patient  
Dry/Warm

## 2025-02-21 NOTE — CONSULT NOTE ADULT - SUBJECTIVE AND OBJECTIVE BOX
[facility] DEPARTMENT OF OPHTHALMOLOGY - INITIAL ADULT CONSULT  -----------------------------------------------------------------------------------------------------------------  Carmita Brown MD PGY 2  Contact via [employer]  -----------------------------------------------------------------------------------------------------------------    HPI: nPer ED    22yo woman with PMH PE no longer on Xarelto, sickle cell disease complicated by acute chest syndrome/CVAs/multiple eye surgeries, hydrocephalus s/p VPeritoneal Shunt x2 with multiple revisions, atrophic spleen s/p splenectomy, presenting due to headache and neck pain x1 month, concerns that her  shunts is malfunctioning per recent MRI. Last MRI in system is from 5/2023, which shows "Ventricular catheters and ventricular configuration remain unchanged since 2022". MRI from 10/2022 shows "Redemonstration of right parietal and frontal approach ventriculostomy catheters, unchanged in position. Ventricular size is unchanged, without hydrocephalus." Neurosurgeon Dr. Loza office #[phone] saw patient in office in May. Patient denies fevers, chest pain, SOB, N/V.    Interval History: NO blurry vision. No visual changes. No tinnitus    PAST MEDICAL & SURGICAL HISTORY:  Sickle Cell Disease      Hydrocephalus      Reactive Airway Disease  receives albuterol and xoponex treatments at home      Chronic Lung Disease of Premat  follows with [facility]      Strabismus      CVA (Cerebral Vascular Accident)  Onset date unknown, noted on MRI from 5/2010      Depression      Anxiety      Impacted teeth      Acute chest syndrome      Pulmonary embolus      S/P Tonsillectomy and Adenoide      S/P  Shunt  x6 revisions, last 2012 w/ Dr. Loza      S/P Cholecystectomy  s/p open cholecystectomy 2012      Strabismus Repair  x4        Past Ocular History: Strabismus    Family History:  FAMILY HISTORY:  Family history of sickle cell trait      Ophthalmic Medications:    MEDICATIONS  (STANDING):    MEDICATIONS  (PRN):    Allergies & Intolerances:   ceftriaxone (Pruritus)    Review of Systems:  Constitutional: No fever, chills  Eyes: No blurry vision, flashes, floaters, FBS, erythema, discharge, double vision, OU  Neuro: No tremors  Cardiovascular: No chest pain, palpitations  Respiratory: No SOB, no cough  GI: No nausea, vomiting, abdominal pain  : No dysuria  Skin: no rash  Psych: no depression  Endocrine: no polyuria, polydipsia  Heme/lymph: no swelling    VITALS: T(C): 36.7 (08-06-23 @ 12:51)  T(F): 98.1 (08-06-23 @ 12:51), Max: 98.8 (08-06-23 @ 08:19)  HR: 68 (08-06-23 @ 12:51) (63 - 70)  BP: 92/48 (08-06-23 @ 12:51) (92/48 - 107/46)  RR:  (16 - 17)  SpO2:  (98% - 100%)  Wt(kg): --  General: AAO x 3, appropriate mood and affect    Ophthalmology Exam:  Visual acuity (sc): 20/20 OU.  Pupils: PERRL OU, no APD  Tonometry:  12 OU  Extraocular movements (EOMs): Full OU, no pain, no diplopia.  Confrontational Visual Field (CVF): Full OU.  Color Plates: 12/12 OU.    Pen Light Exam (PLE)  External: Normal OU.  Lids/Lashes/Lacrimal Ducts: Flat OU.  Sclera/Conjunctiva: White and quiet OU.  Cornea: Clear OU.  Anterior Chamber: Deep and formed OU.    Iris: Flat OU.  Lens: Clear OU.    Fundus Exam: dilated with 1% tropicamide and 2.5% phenylephrine  Approval obtained from primary team for dilation  Patient aware that pupils can remained dilated for at least 4-6 hours.  Exam performed with 20 D lens    Vitreous: wnl OU  Disc, cup/disc: sharp and pink, 0.4 OU  Macula: wnl OU  Vessels: tortuous OU  Periphery: wnl OU       The skin at the access site was anesthetized. Using a micropuncture needle with the Modified Seldinger technique and ultrasound ([employer]) the right brachial vein was succesfully accessed using a SHEATH INTRO OD5 FR L10 CM .021 IN L35 MM FLEX STRGT SHTH DIL ANT. Ultrasound found the vessel was patent. A permanent recording was saved. [facility] DEPARTMENT OF OPHTHALMOLOGY - INITIAL ADULT CONSULT  -----------------------------------------------------------------------------------------------------------------  Carmita Brown MD PGY 2  Contact via [employer] Teams  -----------------------------------------------------------------------------------------------------------------    HPI: nPer ED    22yo woman with PMH PE no longer on Xarelto, sickle cell disease complicated by acute chest syndrome/CVAs/multiple eye surgeries, hydrocephalus s/p VPeritoneal Shunt x2 with multiple revisions, atrophic spleen s/p splenectomy, presenting due to headache and neck pain x1 month, concerns that her  shunts is malfunctioning per recent MRI. Last MRI in system is from 5/2023, which shows "Ventricular catheters and ventricular configuration remain unchanged since 2022". MRI from 10/2022 shows "Redemonstration of right parietal and frontal approach ventriculostomy catheters, unchanged in position. Ventricular size is unchanged, without hydrocephalus." Neurosurgeon Dr. Loza office #[phone] saw patient in office in May. Patient denies fevers, chest pain, SOB, N/V.    Interval History: NO blurry vision. No visual changes. No tinnitus    PAST MEDICAL & SURGICAL HISTORY:  Sickle Cell Disease      Hydrocephalus      Reactive Airway Disease  receives albuterol and xoponex treatments at home      Chronic Lung Disease of Premat  follows with [facility]      Strabismus      CVA (Cerebral Vascular Accident)  Onset date unknown, noted on MRI from 5/2010      Depression      Anxiety      Impacted teeth      Acute chest syndrome      Pulmonary embolus      S/P Tonsillectomy and Adenoide      S/P  Shunt  x6 revisions, last 2012 w/ Dr. Loza      S/P Cholecystectomy  s/p open cholecystectomy 2012      Strabismus Repair  x4        Past Ocular History: Strabismus    Family History:  FAMILY HISTORY:  Family history of sickle cell trait      Ophthalmic Medications:    MEDICATIONS  (STANDING):    MEDICATIONS  (PRN):    Allergies & Intolerances:   ceftriaxone (Pruritus)    Review of Systems:  Constitutional: No fever, chills  Eyes: No blurry vision, flashes, floaters, FBS, erythema, discharge, double vision, OU  Neuro: No tremors  Cardiovascular: No chest pain, palpitations  Respiratory: No SOB, no cough  GI: No nausea, vomiting, abdominal pain  : No dysuria  Skin: no rash  Psych: no depression  Endocrine: no polyuria, polydipsia  Heme/lymph: no swelling    VITALS: T(C): 36.7 (08-06-23 @ 12:51)  T(F): 98.1 (08-06-23 @ 12:51), Max: 98.8 (08-06-23 @ 08:19)  HR: 68 (08-06-23 @ 12:51) (63 - 70)  BP: 92/48 (08-06-23 @ 12:51) (92/48 - 107/46)  RR:  (16 - 17)  SpO2:  (98% - 100%)  Wt(kg): --  General: AAO x 3, appropriate mood and affect    Ophthalmology Exam:  Visual acuity (sc): 20/20 OU.  Pupils: PERRL OU, no APD  Tonometry:  12 OU  Extraocular movements (EOMs): Full OU, no pain, no diplopia.  Confrontational Visual Field (CVF): Full OU.  Color Plates: 12/12 OU.    Pen Light Exam (PLE)  External: Normal OU.  Lids/Lashes/Lacrimal Ducts: Flat OU.  Sclera/Conjunctiva: White and quiet OU.  Cornea: Clear OU.  Anterior Chamber: Deep and formed OU.    Iris: Flat OU.  Lens: Clear OU.    Fundus Exam: dilated with 1% tropicamide and 2.5% phenylephrine  Approval obtained from primary team for dilation  Patient aware that pupils can remained dilated for at least 4-6 hours.  Exam performed with 20 D lens    Vitreous: wnl OU  Disc, cup/disc: sharp and pink, 0.4 OU  Macula: wnl OU  Vessels: tortuous OU  Periphery: wnl OU       [facility] DEPARTMENT OF OPHTHALMOLOGY - INITIAL ADULT CONSULT  -----------------------------------------------------------------------------------------------------------------  Carmita Brown MD PGY 2  Contact via [employer] Teams  -----------------------------------------------------------------------------------------------------------------    HPI: nPer ED    24yo woman with PMH PE no longer on Xarelto, sickle cell disease complicated by acute chest syndrome/CVAs/multiple eye surgeries, hydrocephalus s/p VPeritoneal Shunt x2 with multiple revisions, atrophic spleen s/p splenectomy, presenting due to headache and neck pain x1 month, concerns that her  shunts is malfunctioning per recent MRI. Last MRI in system is from 5/2023, which shows "Ventricular catheters and ventricular configuration remain unchanged since 2022". MRI from 10/2022 shows "Redemonstration of right parietal and frontal approach ventriculostomy catheters, unchanged in position. Ventricular size is unchanged, without hydrocephalus." Neurosurgeon Dr. Loza office #[phone] saw patient in office in May. Patient denies fevers, chest pain, SOB, N/V.    Interval History: NO blurry vision. No visual changes. No tinnitus    PAST MEDICAL & SURGICAL HISTORY:  Sickle Cell Disease      Hydrocephalus      Reactive Airway Disease  receives albuterol and xoponex treatments at home      Chronic Lung Disease of Premat  follows with [facility]      Strabismus      CVA (Cerebral Vascular Accident)  Onset date unknown, noted on MRI from 5/2010      Depression      Anxiety      Impacted teeth      Acute chest syndrome      Pulmonary embolus      S/P Tonsillectomy and Adenoide      S/P  Shunt  x6 revisions, last 2012 w/ Dr. Loza      S/P Cholecystectomy  s/p open cholecystectomy 2012      Strabismus Repair  x4        Past Ocular History: Strabismus    Family History:  FAMILY HISTORY:  Family history of sickle cell trait      Ophthalmic Medications:    MEDICATIONS  (STANDING):    MEDICATIONS  (PRN):    Allergies & Intolerances:   ceftriaxone (Pruritus)    Review of Systems:  Constitutional: No fever, chills  Eyes: No blurry vision, flashes, floaters, FBS, erythema, discharge, double vision, OU  Neuro: No tremors  Cardiovascular: No chest pain, palpitations  Respiratory: No SOB, no cough  GI: No nausea, vomiting, abdominal pain  : No dysuria  Skin: no rash  Psych: no depression  Endocrine: no polyuria, polydipsia  Heme/lymph: no swelling    VITALS: T(C): 36.7 (08-06-23 @ 12:51)  T(F): 98.1 (08-06-23 @ 12:51), Max: 98.8 (08-06-23 @ 08:19)  HR: 68 (08-06-23 @ 12:51) (63 - 70)  BP: 92/48 (08-06-23 @ 12:51) (92/48 - 107/46)  RR:  (16 - 17)  SpO2:  (98% - 100%)  Wt(kg): --  General: AAO x 3, appropriate mood and affect    Ophthalmology Exam:  Visual acuity (sc): 20/20 OU.  Pupils: PERRL OU, no APD  Tonometry:  12 OU  Extraocular movements (EOMs): Full OU, no pain, no diplopia.  Confrontational Visual Field (CVF): Full OU.  Color Plates: 12/12 OU.    Pen Light Exam (PLE)  External: Normal OU.  Lids/Lashes/Lacrimal Ducts: Flat OU.  Sclera/Conjunctiva: White and quiet OU.  Cornea: Clear OU.  Anterior Chamber: Deep and formed OU.    Iris: Flat OU.  Lens: Clear OU.    Fundus Exam: dilated with 1% tropicamide and 2.5% phenylephrine  Approval obtained from primary team for dilation  Patient aware that pupils can remained dilated for at least 4-6 hours.  Exam performed with 20 D lens    Vitreous: wnl OU  Disc, cup/disc: sharp and pink, 0.4 OU  Macula: wnl OU  Vessels: tortuous OU  Periphery: wnl OU       [facility] DEPARTMENT OF OPHTHALMOLOGY - INITIAL ADULT CONSULT  -----------------------------------------------------------------------------------------------------------------  Carmita Brown MD PGY 2  Contact via [employer] Teams  -----------------------------------------------------------------------------------------------------------------    HPI: nPer ED    22yo woman with PMH PE no longer on Xarelto, sickle cell disease complicated by acute chest syndrome/CVAs/multiple eye surgeries, hydrocephalus s/p VPeritoneal Shunt x2 with multiple revisions, atrophic spleen s/p splenectomy, presenting due to headache and neck pain x1 month, concerns that her  shunts is malfunctioning per recent MRI. Last MRI in system is from 5/2023, which shows "Ventricular catheters and ventricular configuration remain unchanged since 2022". MRI from 10/2022 shows "Redemonstration of right parietal and frontal approach ventriculostomy catheters, unchanged in position. Ventricular size is unchanged, without hydrocephalus." Neurosurgeon Dr. Loza office #[phone] saw patient in office in May. Patient denies fevers, chest pain, SOB, N/V.    Interval History: NO blurry vision. No visual changes. No tinnitus    PAST MEDICAL & SURGICAL HISTORY:  Sickle Cell Disease      Hydrocephalus      Reactive Airway Disease  receives albuterol and xoponex treatments at home      Chronic Lung Disease of Premat  follows with [facility]ology      Strabismus      CVA (Cerebral Vascular Accident)  Onset date unknown, noted on MRI from 5/2010      Depression      Anxiety      Impacted teeth      Acute chest syndrome      Pulmonary embolus      S/P Tonsillectomy and Adenoide      S/P  Shunt  x6 revisions, last 2012 w/ Dr. Loza      S/P Cholecystectomy  s/p open cholecystectomy 2012      Strabismus Repair  x4        Past Ocular History: Strabismus    Family History:  FAMILY HISTORY:  Family history of sickle cell trait      Ophthalmic Medications:    MEDICATIONS  (STANDING):    MEDICATIONS  (PRN):    Allergies & Intolerances:   ceftriaxone (Pruritus)    Review of Systems:  Constitutional: No fever, chills  Eyes: No blurry vision, flashes, floaters, FBS, erythema, discharge, double vision, OU  Neuro: No tremors  Cardiovascular: No chest pain, palpitations  Respiratory: No SOB, no cough  GI: No nausea, vomiting, abdominal pain  : No dysuria  Skin: no rash  Psych: no depression  Endocrine: no polyuria, polydipsia  Heme/lymph: no swelling    VITALS: T(C): 36.7 (08-06-23 @ 12:51)  T(F): 98.1 (08-06-23 @ 12:51), Max: 98.8 (08-06-23 @ 08:19)  HR: 68 (08-06-23 @ 12:51) (63 - 70)  BP: 92/48 (08-06-23 @ 12:51) (92/48 - 107/46)  RR:  (16 - 17)  SpO2:  (98% - 100%)  Wt(kg): --  General: AAO x 3, appropriate mood and affect    Ophthalmology Exam:  Visual acuity (sc): 20/20 OU.  Pupils: PERRL OU, no APD  Tonometry:  12 OU  Extraocular movements (EOMs): Full OU, no pain, no diplopia.  Confrontational Visual Field (CVF): Full OU.  Color Plates: 12/12 OU.    Pen Light Exam (PLE)  External: Normal OU.  Lids/Lashes/Lacrimal Ducts: Flat OU.  Sclera/Conjunctiva: White and quiet OU.  Cornea: Clear OU.  Anterior Chamber: Deep and formed OU.    Iris: Flat OU.  Lens: Clear OU.    Fundus Exam: dilated with 1% tropicamide and 2.5% phenylephrine  Approval obtained from primary team for dilation  Patient aware that pupils can remained dilated for at least 4-6 hours.  Exam performed with 20 D lens    Vitreous: wnl OU  Disc, cup/disc: sharp and pink, 0.4 OU  Macula: wnl OU  Vessels: tortuous OU  Periphery: wnl OU

## 2025-03-27 NOTE — PATIENT PROFILE PEDIATRIC. - TRANSPORTATION AVAILABLE, PROFILE
Orders:    Thiamine HCl (vitamin B-1) 100 MG TABS; Take 1 tablet (100 mg total) by mouth daily    
90
car

## 2025-04-09 NOTE — ED PEDIATRIC NURSE NOTE - NS ED NURSE LEVEL OF CONSCIOUSNESS AFFECT
Received ADT that pt was discharged home from [facility] on 4/4/25.     Patient declined home health services per home visit note on 4/6/25 as pt told RN he does not want any help.  Palliative Care noted on 4/7/25 that they were able to schedule a home visit.     Outpatient Advanced Heart Failure LCSW closed social work episode.  Please re-consult if outpatient resources are needed in the future.     Appropriate/Calm

## 2025-04-25 NOTE — ED PROVIDER NOTE - NS_EDPROVIDERDISPOUSERTYPE_ED_A_ED
Medical/PA/NP Students Attestation (For Medical/PA/NP Student USE Only)... No Attending Attestation (For Attendings USE Only).../Medical/PA/NP Students Attestation (For Medical/PA/NP Student USE Only)...

## 2025-07-07 NOTE — ED PROVIDER NOTE - NSICDXPASTSURGICALHX_GEN_ALL_CORE_FT
show PAST SURGICAL HISTORY:  H/O surgical procedure s/p Mediport placement 2011    S/P Cholecystectomy s/p open cholecystectomy 2012    S/P Tonsillectomy and Adenoide     S/P  Shunt x6 revisions, last 2012 w/ Dr. Loza    Strabismus Repair x4
